# Patient Record
Sex: FEMALE | Race: WHITE | NOT HISPANIC OR LATINO | Employment: OTHER | URBAN - METROPOLITAN AREA
[De-identification: names, ages, dates, MRNs, and addresses within clinical notes are randomized per-mention and may not be internally consistent; named-entity substitution may affect disease eponyms.]

---

## 2017-05-04 RX ORDER — NADOLOL 80 MG/1
TABLET ORAL
Qty: 30 TABLET | Refills: 11 | Status: SHIPPED | OUTPATIENT
Start: 2017-05-04 | End: 2018-05-25 | Stop reason: SDUPTHER

## 2017-05-30 RX ORDER — SUMATRIPTAN SUCCINATE 100 MG/1
TABLET ORAL
Qty: 9 TABLET | Refills: 1 | Status: SHIPPED | OUTPATIENT
Start: 2017-05-30 | End: 2017-11-07 | Stop reason: SDUPTHER

## 2017-06-28 ENCOUNTER — TELEPHONE (OUTPATIENT)
Dept: FAMILY MEDICINE | Facility: CLINIC | Age: 77
End: 2017-06-28

## 2017-06-28 NOTE — TELEPHONE ENCOUNTER
Returned call. Spoke with patient. Patient stated that she got a letter for jury duty in and she wants to know if Dr. Scott will please excuse her from it due to the blood sugar attacks that she has. She states that she can not sit through jury duty that log. Informed would forward information to doctor. Patient verbalized understanding.

## 2017-06-28 NOTE — TELEPHONE ENCOUNTER
----- Message from Mitali Lou sent at 6/28/2017  9:36 AM CDT -----  Please call pt back at [phone] in regards to personal matter.

## 2017-06-29 RX ORDER — AMITRIPTYLINE HYDROCHLORIDE 75 MG/1
TABLET ORAL
Qty: 30 TABLET | Refills: 11 | Status: SHIPPED | OUTPATIENT
Start: 2017-06-29 | End: 2018-07-05 | Stop reason: SDUPTHER

## 2017-07-07 ENCOUNTER — OFFICE VISIT (OUTPATIENT)
Dept: FAMILY MEDICINE | Facility: CLINIC | Age: 77
End: 2017-07-07
Payer: MEDICARE

## 2017-07-07 VITALS
DIASTOLIC BLOOD PRESSURE: 80 MMHG | HEART RATE: 80 BPM | HEIGHT: 59 IN | SYSTOLIC BLOOD PRESSURE: 130 MMHG | BODY MASS INDEX: 25.23 KG/M2 | WEIGHT: 125.13 LBS | OXYGEN SATURATION: 96 % | TEMPERATURE: 97 F

## 2017-07-07 DIAGNOSIS — E16.2 LOW BLOOD SUGAR: Primary | ICD-10-CM

## 2017-07-07 PROCEDURE — 1159F MED LIST DOCD IN RCRD: CPT | Mod: S$GLB,,, | Performed by: FAMILY MEDICINE

## 2017-07-07 PROCEDURE — 1126F AMNT PAIN NOTED NONE PRSNT: CPT | Mod: S$GLB,,, | Performed by: FAMILY MEDICINE

## 2017-07-07 PROCEDURE — 99999 PR PBB SHADOW E&M-EST. PATIENT-LVL III: CPT | Mod: PBBFAC,,, | Performed by: FAMILY MEDICINE

## 2017-07-07 PROCEDURE — 99214 OFFICE O/P EST MOD 30 MIN: CPT | Mod: S$GLB,,, | Performed by: FAMILY MEDICINE

## 2017-07-07 RX ORDER — INSULIN PUMP SYRINGE, 3 ML
EACH MISCELLANEOUS
Qty: 1 EACH | Refills: 0 | Status: SHIPPED | OUTPATIENT
Start: 2017-07-07 | End: 2017-07-07 | Stop reason: SDUPTHER

## 2017-07-07 NOTE — LETTER
[facility]  [address]  [place] [address]  Phone: [phone]  Fax: [phone] July 7, 2017    Susietonyaicha RIVAS Mario  [address] [place] [address]      To Whom It May Concern:    Haim Martin is unable to participate in jury duty due to medical problems with her sugar.    If you have any questions or concerns, please feel free to call my office.    Sincerely,        Kavya Mesa MD

## 2017-07-07 NOTE — LETTER
[facility]  [address]  [place] [address]  Phone: [phone]  Fax: [phone] July 7, 2017    Haim Martin  [address] [place] [address]      To Whom It May Concern:    Haim Aguilarn is unable to participate in jury duty due to near syncopal episodes .    If you have any questions or concerns, please feel free to call my office.    Sincerely,              Kavya Mesa MD

## 2017-07-07 NOTE — PROGRESS NOTES
"Subjective:       Patient ID: Haim Martin is a 77 y.o. female.    Chief Complaint: jury duty excuse    77 y old female here to discuss "Low BS spells" . They occur 3-4 ties per months . She states she starts feeling dizzy , get blurry vision and has to eat something sweet , shortly after she must lay down otherwise she faints ? . She has not check bs when this occurs . She acknowledges being depressed and anxious  however these  Spells  don't resemble a panic attack. concerned since she was  appointed  To serve as a juror on 7/18/17       Review of Systems   Constitutional: Negative.    HENT: Negative.    Respiratory: Negative.    Cardiovascular: Negative.    Gastrointestinal: Negative.    Musculoskeletal: Negative.    Neurological: Positive for light-headedness.   Psychiatric/Behavioral: Positive for agitation.       Objective:      Physical Exam   Constitutional: She is oriented to person, place, and time. She appears well-developed and well-nourished. No distress.   HENT:   Head: Normocephalic and atraumatic.   Right Ear: External ear normal.   Left Ear: External ear normal.   Mouth/Throat: No oropharyngeal exudate.   Eyes: Conjunctivae and EOM are normal. Pupils are equal, round, and reactive to light. Right eye exhibits no discharge. Left eye exhibits no discharge. No scleral icterus.   Neck: Normal range of motion. Neck supple. No JVD present. No tracheal deviation present. No thyromegaly present.   Cardiovascular: Normal rate, regular rhythm and normal heart sounds.  Exam reveals no gallop and no friction rub.    No murmur heard.  Pulmonary/Chest: Effort normal and breath sounds normal. No stridor. No respiratory distress. She has no wheezes. She has no rales. She exhibits no tenderness.   Abdominal: Soft. Bowel sounds are normal. She exhibits no distension. There is no tenderness. There is no rebound and no guarding.   Musculoskeletal: Normal range of motion.   Lymphadenopathy:     She has no cervical " adenopathy.   Neurological: She is alert and oriented to person, place, and time.   Skin: Skin is warm and dry. She is not diaphoretic.   Psychiatric: She has a normal mood and affect. Her behavior is normal. Judgment and thought content normal.       Assessment:       1. Low blood sugar        Plan:     Haim was seen today for jury duty excuse.    Diagnoses and all orders for this visit:    Low blood sugar  -     Comprehensive metabolic panel; Future  -     C-peptide; Future  -     Insulin, random; Future  -     Hypoglycemic Agent Screen; Future  -     Cortisol, 8AM; Future  -     Insulin antibody; Future  -     Proinsulin; Future    Other orders  -     blood-glucose meter kit; Use as instructed  -     blood sugar diagnostic Strp; check bs as once daily  -     lancets-blood glucose strips 30 gauge Cmpk; 1 application by Misc.(Non-Drug; Combo Route) route once daily at 6am.     Will continue to follow up . Excuse for jury duty provided

## 2017-07-10 ENCOUNTER — LAB VISIT (OUTPATIENT)
Dept: LAB | Facility: HOSPITAL | Age: 77
End: 2017-07-10
Attending: FAMILY MEDICINE
Payer: MEDICARE

## 2017-07-10 DIAGNOSIS — E16.2 LOW BLOOD SUGAR: ICD-10-CM

## 2017-07-10 LAB
ALBUMIN SERPL BCP-MCNC: 3.4 G/DL
ALP SERPL-CCNC: 124 U/L
ALT SERPL W/O P-5'-P-CCNC: 31 U/L
ANION GAP SERPL CALC-SCNC: 11 MMOL/L
AST SERPL-CCNC: 35 U/L
BILIRUB SERPL-MCNC: 0.4 MG/DL
BUN SERPL-MCNC: 19 MG/DL
C PEPTIDE SERPL-MCNC: 2.44 NG/ML
CALCIUM SERPL-MCNC: 9.3 MG/DL
CHLORIDE SERPL-SCNC: 107 MMOL/L
CO2 SERPL-SCNC: 23 MMOL/L
CORTIS SERPL-MCNC: 15.4 UG/DL
CREAT SERPL-MCNC: 0.9 MG/DL
EST. GFR  (AFRICAN AMERICAN): >60 ML/MIN/1.73 M^2
EST. GFR  (NON AFRICAN AMERICAN): >60 ML/MIN/1.73 M^2
GLUCOSE SERPL-MCNC: 118 MG/DL
POTASSIUM SERPL-SCNC: 4.3 MMOL/L
PROT SERPL-MCNC: 6.9 G/DL
SODIUM SERPL-SCNC: 141 MMOL/L

## 2017-07-10 PROCEDURE — 80053 COMPREHEN METABOLIC PANEL: CPT

## 2017-07-10 PROCEDURE — 84206 ASSAY OF PROINSULIN: CPT

## 2017-07-10 PROCEDURE — 83525 ASSAY OF INSULIN: CPT

## 2017-07-10 PROCEDURE — 80307 DRUG TEST PRSMV CHEM ANLYZR: CPT

## 2017-07-10 PROCEDURE — 36415 COLL VENOUS BLD VENIPUNCTURE: CPT | Mod: PO

## 2017-07-10 PROCEDURE — 82533 TOTAL CORTISOL: CPT

## 2017-07-10 PROCEDURE — 84681 ASSAY OF C-PEPTIDE: CPT

## 2017-07-10 PROCEDURE — 86337 INSULIN ANTIBODIES: CPT

## 2017-07-10 RX ORDER — INSULIN PUMP SYRINGE, 3 ML
EACH MISCELLANEOUS
Qty: 1 EACH | Refills: 0 | Status: SHIPPED | OUTPATIENT
Start: 2017-07-10 | End: 2022-02-06

## 2017-07-11 ENCOUNTER — TELEPHONE (OUTPATIENT)
Dept: FAMILY MEDICINE | Facility: CLINIC | Age: 77
End: 2017-07-11

## 2017-07-11 DIAGNOSIS — R73.01 ELEVATED FASTING BLOOD SUGAR: Primary | ICD-10-CM

## 2017-07-11 LAB
INSULIN COLLECTION INTERVAL: NORMAL
INSULIN SERPL-ACNC: 11.3 UU/ML

## 2017-07-13 ENCOUNTER — TELEPHONE (OUTPATIENT)
Dept: FAMILY MEDICINE | Facility: CLINIC | Age: 77
End: 2017-07-13

## 2017-07-13 LAB — INSULIN AB SER-SCNC: 0 NMOL/L (ref 0–0.02)

## 2017-07-13 NOTE — TELEPHONE ENCOUNTER
----- Message from Dominique Benz sent at 7/13/2017 10:17 AM CDT -----  Contact: [facility]-[phone]  Would like to consult with nurse regarding additional information needed for the patient. She will need supported documentation for diabetic supplies in a diagnosis code and clinic notes. Please fax paperwork to [phone].Please call back at [phone].      Thanks,  Dominique Benz

## 2017-07-14 ENCOUNTER — LAB VISIT (OUTPATIENT)
Dept: LAB | Facility: HOSPITAL | Age: 77
End: 2017-07-14
Attending: FAMILY MEDICINE
Payer: MEDICARE

## 2017-07-14 DIAGNOSIS — R73.01 ELEVATED FASTING BLOOD SUGAR: ICD-10-CM

## 2017-07-14 LAB
ACETOHEXAMIDE SERPL-MCNC: NEGATIVE NG/ML
ANNOTATION COMMENT IMP: NORMAL
CHLORPROPAMIDE SERPL-MCNC: NEGATIVE NG/ML
ESTIMATED AVG GLUCOSE: 111 MG/DL
GLIMEPIRIDE SERPL-MCNC: NEGATIVE NG/ML
GLIPIZIDE SERPL-MCNC: NEGATIVE NG/ML
GLYBURIDE SERPL-MCNC: NEGATIVE NG/ML
HBA1C MFR BLD HPLC: 5.5 %
PROINSULIN SERPL-SCNC: 11 PMOL/L (ref 3–20)
REPAGLINIDE SERPL-MCNC: NEGATIVE NG/ML
TOLAZAMIDE SERPL-MCNC: NORMAL NG/ML
TOLBUTAMIDE SERPL-MCNC: NEGATIVE NG/ML

## 2017-07-14 PROCEDURE — 36415 COLL VENOUS BLD VENIPUNCTURE: CPT | Mod: PO

## 2017-07-14 PROCEDURE — 83036 HEMOGLOBIN GLYCOSYLATED A1C: CPT

## 2017-07-31 DIAGNOSIS — E03.9 ACQUIRED HYPOTHYROIDISM: Primary | ICD-10-CM

## 2017-07-31 RX ORDER — LEVOTHYROXINE SODIUM 75 UG/1
TABLET ORAL
Qty: 30 TABLET | Refills: 11 | Status: SHIPPED | OUTPATIENT
Start: 2017-07-31 | End: 2018-08-14 | Stop reason: SDUPTHER

## 2017-07-31 NOTE — TELEPHONE ENCOUNTER
Spoke with patient and informed her that she needs to come in for labs and her prescription for thyroid has been sent to the pharmacist.

## 2017-08-02 ENCOUNTER — LAB VISIT (OUTPATIENT)
Dept: LAB | Facility: HOSPITAL | Age: 77
End: 2017-08-02
Attending: NURSE PRACTITIONER
Payer: MEDICARE

## 2017-08-02 DIAGNOSIS — E03.9 ACQUIRED HYPOTHYROIDISM: ICD-10-CM

## 2017-08-02 LAB — TSH SERPL DL<=0.005 MIU/L-ACNC: 0.86 UIU/ML

## 2017-08-02 PROCEDURE — 36415 COLL VENOUS BLD VENIPUNCTURE: CPT | Mod: PO

## 2017-08-02 PROCEDURE — 84443 ASSAY THYROID STIM HORMONE: CPT

## 2017-08-28 DIAGNOSIS — E78.5 OTHER AND UNSPECIFIED HYPERLIPIDEMIA: ICD-10-CM

## 2017-08-28 RX ORDER — ATORVASTATIN CALCIUM 40 MG/1
TABLET, FILM COATED ORAL
Qty: 30 TABLET | Refills: 2 | Status: SHIPPED | OUTPATIENT
Start: 2017-08-28 | End: 2017-11-26 | Stop reason: SDUPTHER

## 2017-10-18 ENCOUNTER — TELEPHONE (OUTPATIENT)
Dept: FAMILY MEDICINE | Facility: CLINIC | Age: 77
End: 2017-10-18

## 2017-10-18 DIAGNOSIS — Z12.39 BREAST CANCER SCREENING: Primary | ICD-10-CM

## 2017-10-18 NOTE — TELEPHONE ENCOUNTER
Returned call and spoke with patient. She states that she received a letter from [facility] that stated that it was time for her mammogram and she wanted to schedule that. Informed would forward info to provider to get order and call her back with an appt.

## 2017-10-18 NOTE — TELEPHONE ENCOUNTER
Called patient to informed of mammo appt day and time. No answer. Left detailed message for patient.

## 2017-10-18 NOTE — TELEPHONE ENCOUNTER
----- Message from Dana Ramirez sent at 10/18/2017  1:46 PM CDT -----  Contact: self [phone]  States that she needs order for mammogram. Please call back at [phone]//thank you acc

## 2017-10-27 RX ORDER — SERTRALINE HYDROCHLORIDE 100 MG/1
TABLET, FILM COATED ORAL
Qty: 30 TABLET | Refills: 6 | Status: SHIPPED | OUTPATIENT
Start: 2017-10-27 | End: 2018-05-08 | Stop reason: SDUPTHER

## 2017-11-08 RX ORDER — SUMATRIPTAN SUCCINATE 100 MG/1
TABLET ORAL
Qty: 9 TABLET | Refills: 1 | Status: SHIPPED | OUTPATIENT
Start: 2017-11-08 | End: 2018-04-18 | Stop reason: SDUPTHER

## 2017-11-29 RX ORDER — ATORVASTATIN CALCIUM 40 MG/1
TABLET, FILM COATED ORAL
Qty: 30 TABLET | Refills: 2 | Status: SHIPPED | OUTPATIENT
Start: 2017-11-29 | End: 2018-02-02 | Stop reason: SDUPTHER

## 2018-02-02 RX ORDER — ATORVASTATIN CALCIUM 40 MG/1
TABLET, FILM COATED ORAL
Qty: 30 TABLET | Refills: 2 | Status: SHIPPED | OUTPATIENT
Start: 2018-02-02 | End: 2018-08-14 | Stop reason: SDUPTHER

## 2018-04-12 RX ORDER — SUMATRIPTAN SUCCINATE 100 MG/1
TABLET ORAL
Qty: 9 TABLET | Refills: 1 | Status: CANCELLED | OUTPATIENT
Start: 2018-04-12

## 2018-04-19 RX ORDER — SUMATRIPTAN SUCCINATE 100 MG/1
TABLET ORAL
Qty: 9 TABLET | Refills: 1 | Status: SHIPPED | OUTPATIENT
Start: 2018-04-19 | End: 2018-12-14 | Stop reason: SDUPTHER

## 2018-05-08 RX ORDER — SERTRALINE HYDROCHLORIDE 100 MG/1
TABLET, FILM COATED ORAL
Qty: 30 TABLET | Refills: 6 | Status: SHIPPED | OUTPATIENT
Start: 2018-05-08 | End: 2019-01-26 | Stop reason: SDUPTHER

## 2018-05-08 RX ORDER — NADOLOL 80 MG/1
TABLET ORAL
Qty: 30 TABLET | Refills: 11 | OUTPATIENT
Start: 2018-05-08

## 2018-05-25 RX ORDER — NADOLOL 80 MG/1
TABLET ORAL
Qty: 30 TABLET | Refills: 11 | OUTPATIENT
Start: 2018-05-25

## 2018-05-25 NOTE — TELEPHONE ENCOUNTER
----- Message from Cinthya Araujo sent at 5/25/2018  2:12 PM CDT -----  Contact: patient  Calling concerning a request for a RX refill. Please call patient @ [phone]. Thanks, be

## 2018-05-28 RX ORDER — NADOLOL 80 MG/1
40 TABLET ORAL DAILY
Qty: 30 TABLET | Refills: 0 | Status: SHIPPED | OUTPATIENT
Start: 2018-05-28 | End: 2018-06-01 | Stop reason: SDUPTHER

## 2018-05-31 ENCOUNTER — TELEPHONE (OUTPATIENT)
Dept: FAMILY MEDICINE | Facility: CLINIC | Age: 78
End: 2018-05-31

## 2018-06-01 RX ORDER — NADOLOL 80 MG/1
TABLET ORAL
Qty: 30 TABLET | Refills: 3 | Status: SHIPPED | OUTPATIENT
Start: 2018-06-01 | End: 2019-02-06 | Stop reason: SDUPTHER

## 2018-06-12 ENCOUNTER — HOSPITAL ENCOUNTER (OUTPATIENT)
Dept: RADIOLOGY | Facility: HOSPITAL | Age: 78
Discharge: HOME | End: 2018-06-12
Attending: FAMILY MEDICINE
Payer: MEDICARE

## 2018-06-12 DIAGNOSIS — Z12.39 BREAST CANCER SCREENING: ICD-10-CM

## 2018-06-12 PROCEDURE — 77063 BREAST TOMOSYNTHESIS BI: CPT | Mod: 26,,, | Performed by: RADIOLOGY

## 2018-06-12 PROCEDURE — 77067 SCR MAMMO BI INCL CAD: CPT | Mod: TC

## 2018-06-12 PROCEDURE — 77067 SCR MAMMO BI INCL CAD: CPT | Mod: 26,,, | Performed by: RADIOLOGY

## 2018-07-09 RX ORDER — AMITRIPTYLINE HYDROCHLORIDE 75 MG/1
TABLET ORAL
Qty: 90 TABLET | Refills: 0 | Status: SHIPPED | OUTPATIENT
Start: 2018-07-09 | End: 2018-07-14 | Stop reason: SDUPTHER

## 2018-07-16 RX ORDER — BLOOD SUGAR DIAGNOSTIC
STRIP MISCELLANEOUS
Qty: 50 STRIP | Refills: 1 | Status: SHIPPED | OUTPATIENT
Start: 2018-07-16 | End: 2022-02-06

## 2018-07-16 RX ORDER — AMITRIPTYLINE HYDROCHLORIDE 75 MG/1
TABLET ORAL
Qty: 30 TABLET | Refills: 0 | Status: SHIPPED | OUTPATIENT
Start: 2018-07-16 | End: 2018-11-11 | Stop reason: SDUPTHER

## 2018-08-14 RX ORDER — ATORVASTATIN CALCIUM 40 MG/1
TABLET, FILM COATED ORAL
Qty: 30 TABLET | Refills: 0 | Status: SHIPPED | OUTPATIENT
Start: 2018-08-14 | End: 2018-10-05 | Stop reason: SDUPTHER

## 2018-08-14 RX ORDER — LEVOTHYROXINE SODIUM 75 UG/1
TABLET ORAL
Qty: 30 TABLET | Refills: 0 | Status: SHIPPED | OUTPATIENT
Start: 2018-08-14 | End: 2018-09-28 | Stop reason: SDUPTHER

## 2018-08-14 NOTE — TELEPHONE ENCOUNTER
1 month supply of medication sent to pharmacy.  Pt is due for annual physical with labs.  Please schedule and come fasting.  No meds will be refilled until appt with labs

## 2018-08-20 ENCOUNTER — LAB VISIT (OUTPATIENT)
Dept: LAB | Facility: HOSPITAL | Age: 78
End: 2018-08-20
Attending: FAMILY MEDICINE
Payer: MEDICARE

## 2018-08-20 ENCOUNTER — OFFICE VISIT (OUTPATIENT)
Dept: FAMILY MEDICINE | Facility: CLINIC | Age: 78
End: 2018-08-20
Payer: MEDICARE

## 2018-08-20 VITALS
TEMPERATURE: 97 F | DIASTOLIC BLOOD PRESSURE: 86 MMHG | WEIGHT: 130.81 LBS | BODY MASS INDEX: 26.37 KG/M2 | HEART RATE: 90 BPM | HEIGHT: 59 IN | SYSTOLIC BLOOD PRESSURE: 132 MMHG | OXYGEN SATURATION: 98 %

## 2018-08-20 DIAGNOSIS — E78.5 DYSLIPIDEMIA: ICD-10-CM

## 2018-08-20 DIAGNOSIS — E03.9 HYPOTHYROIDISM, UNSPECIFIED TYPE: ICD-10-CM

## 2018-08-20 DIAGNOSIS — R79.9 ABNORMAL FINDING OF BLOOD CHEMISTRY: ICD-10-CM

## 2018-08-20 DIAGNOSIS — E03.9 HYPOTHYROIDISM, UNSPECIFIED TYPE: Primary | ICD-10-CM

## 2018-08-20 DIAGNOSIS — M85.80 OSTEOPENIA, UNSPECIFIED LOCATION: ICD-10-CM

## 2018-08-20 DIAGNOSIS — F32.4 MAJOR DEPRESSIVE DISORDER WITH SINGLE EPISODE, IN PARTIAL REMISSION: ICD-10-CM

## 2018-08-20 DIAGNOSIS — M94.9 DISORDER OF CARTILAGE: ICD-10-CM

## 2018-08-20 LAB
ALBUMIN SERPL BCP-MCNC: 3.7 G/DL
ALP SERPL-CCNC: 145 U/L
ALT SERPL W/O P-5'-P-CCNC: 34 U/L
ANION GAP SERPL CALC-SCNC: 8 MMOL/L
AST SERPL-CCNC: 34 U/L
BASOPHILS # BLD AUTO: 0.08 K/UL
BASOPHILS NFR BLD: 1.6 %
BILIRUB SERPL-MCNC: 0.6 MG/DL
BUN SERPL-MCNC: 17 MG/DL
CALCIUM SERPL-MCNC: 9.7 MG/DL
CHLORIDE SERPL-SCNC: 105 MMOL/L
CHOLEST SERPL-MCNC: 122 MG/DL
CHOLEST/HDLC SERPL: 2.1 {RATIO}
CO2 SERPL-SCNC: 28 MMOL/L
CREAT SERPL-MCNC: 0.9 MG/DL
DIFFERENTIAL METHOD: ABNORMAL
EOSINOPHIL # BLD AUTO: 0.3 K/UL
EOSINOPHIL NFR BLD: 5.2 %
ERYTHROCYTE [DISTWIDTH] IN BLOOD BY AUTOMATED COUNT: 13.8 %
EST. GFR  (AFRICAN AMERICAN): >60 ML/MIN/1.73 M^2
EST. GFR  (NON AFRICAN AMERICAN): >60 ML/MIN/1.73 M^2
ESTIMATED AVG GLUCOSE: 111 MG/DL
GLUCOSE SERPL-MCNC: 115 MG/DL
HBA1C MFR BLD HPLC: 5.5 %
HCT VFR BLD AUTO: 43.3 %
HDLC SERPL-MCNC: 57 MG/DL
HDLC SERPL: 46.7 %
HGB BLD-MCNC: 13.4 G/DL
IMM GRANULOCYTES # BLD AUTO: 0.01 K/UL
IMM GRANULOCYTES NFR BLD AUTO: 0.2 %
LDLC SERPL CALC-MCNC: 52.6 MG/DL
LYMPHOCYTES # BLD AUTO: 1.1 K/UL
LYMPHOCYTES NFR BLD: 22.8 %
MCH RBC QN AUTO: 32.2 PG
MCHC RBC AUTO-ENTMCNC: 30.9 G/DL
MCV RBC AUTO: 104 FL
MONOCYTES # BLD AUTO: 0.5 K/UL
MONOCYTES NFR BLD: 10.8 %
NEUTROPHILS # BLD AUTO: 3 K/UL
NEUTROPHILS NFR BLD: 59.4 %
NONHDLC SERPL-MCNC: 65 MG/DL
NRBC BLD-RTO: 0 /100 WBC
PLATELET # BLD AUTO: 206 K/UL
PMV BLD AUTO: 12.4 FL
POTASSIUM SERPL-SCNC: 4.1 MMOL/L
PROT SERPL-MCNC: 7 G/DL
RBC # BLD AUTO: 4.16 M/UL
SODIUM SERPL-SCNC: 141 MMOL/L
TRIGL SERPL-MCNC: 62 MG/DL
TSH SERPL DL<=0.005 MIU/L-ACNC: 1.65 UIU/ML
WBC # BLD AUTO: 5.01 K/UL

## 2018-08-20 PROCEDURE — 80053 COMPREHEN METABOLIC PANEL: CPT

## 2018-08-20 PROCEDURE — 85025 COMPLETE CBC W/AUTO DIFF WBC: CPT

## 2018-08-20 PROCEDURE — 99999 PR PBB SHADOW E&M-EST. PATIENT-LVL III: CPT | Mod: PBBFAC,,, | Performed by: FAMILY MEDICINE

## 2018-08-20 PROCEDURE — 84443 ASSAY THYROID STIM HORMONE: CPT

## 2018-08-20 PROCEDURE — 80061 LIPID PANEL: CPT

## 2018-08-20 PROCEDURE — 83036 HEMOGLOBIN GLYCOSYLATED A1C: CPT

## 2018-08-20 PROCEDURE — 99214 OFFICE O/P EST MOD 30 MIN: CPT | Mod: S$GLB,,, | Performed by: FAMILY MEDICINE

## 2018-08-20 PROCEDURE — 36415 COLL VENOUS BLD VENIPUNCTURE: CPT | Mod: PO

## 2018-08-20 NOTE — PROGRESS NOTES
Subjective:       Patient ID: Haim Martin is a 78 y.o. female.    Chief Complaint: Annual Exam    78 y old female with hypothyroidism, osteopenia , depression , migraines and lumbar DDD here for f.u . Doing fair Still mourns her late . No recent falls. On multivitamins. Compliant with Levothyroxine . No constipation , dry skin etc . Gets migraines once monthly   Abortive treatment  works well . 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    HENT: Negative.    Eyes: Negative.    Respiratory: Negative.    Cardiovascular: Negative.    Gastrointestinal: Negative.    Genitourinary: Negative.    Musculoskeletal: Negative.    Skin: Negative.    Hematological: Negative.        Objective:      Physical Exam   Constitutional: She is oriented to person, place, and time. She appears well-developed and well-nourished. No distress.   HENT:   Head: Normocephalic and atraumatic.   Right Ear: External ear normal.   Left Ear: External ear normal.   Mouth/Throat: No oropharyngeal exudate.   Eyes: Conjunctivae and EOM are normal. Pupils are equal, round, and reactive to light. Right eye exhibits no discharge. Left eye exhibits no discharge. No scleral icterus.   Neck: Normal range of motion. Neck supple. No JVD present. No tracheal deviation present. No thyromegaly present.   Cardiovascular: Normal rate, regular rhythm and normal heart sounds. Exam reveals no gallop and no friction rub.   No murmur heard.  Pulmonary/Chest: Effort normal and breath sounds normal. No stridor. No respiratory distress. She has no wheezes. She has no rales. She exhibits no tenderness.   Abdominal: Soft. Bowel sounds are normal. She exhibits no distension. There is no tenderness. There is no rebound and no guarding.   Musculoskeletal: Normal range of motion.   Lymphadenopathy:     She has no cervical adenopathy.   Neurological: She is alert and oriented to person, place, and time.   Skin:  Skin is warm and dry. She is not diaphoretic.   Psychiatric: She has a normal mood and affect. Her behavior is normal. Judgment and thought content normal.       Assessment:         Haim was seen today for annual exam.    Diagnoses and all orders for this visit:    Hypothyroidism, unspecified type  -     TSH; Future    Dyslipidemia  -     CBC auto differential; Future  -     Comprehensive metabolic panel; Future  -     Hemoglobin A1c; Future  -     Lipid panel; Future    Abnormal finding of blood chemistry   -     Hemoglobin A1c; Future    Osteopenia, unspecified location  -     DXA Bone Density Spine And Hip; Future    Disorder of cartilage   -     DXA Bone Density Spine And Hip; Future    Major depressive disorder with single episode, in partial remission      Plan:     Haim was seen today for annual exam.    Diagnoses and all orders for this visit:    Hypothyroidism, unspecified type  -     TSH; Future    Dyslipidemia  -     CBC auto differential; Future  -     Comprehensive metabolic panel; Future  -     Hemoglobin A1c; Future  -     Lipid panel; Future    Abnormal finding of blood chemistry   -     Hemoglobin A1c; Future    Osteopenia, unspecified location  -     DXA Bone Density Spine And Hip; Future    Disorder of cartilage   -     DXA Bone Density Spine And Hip; Future     Labs   Diet and ex   Rep DEXA   Stable . Cont med

## 2018-08-21 ENCOUNTER — APPOINTMENT (OUTPATIENT)
Dept: RADIOLOGY | Facility: CLINIC | Age: 78
End: 2018-08-21
Attending: FAMILY MEDICINE
Payer: MEDICARE

## 2018-08-21 DIAGNOSIS — M94.9 DISORDER OF CARTILAGE: ICD-10-CM

## 2018-08-21 DIAGNOSIS — M85.80 OSTEOPENIA, UNSPECIFIED LOCATION: ICD-10-CM

## 2018-08-21 PROCEDURE — 77080 DXA BONE DENSITY AXIAL: CPT | Mod: 26,,, | Performed by: RADIOLOGY

## 2018-08-21 PROCEDURE — 77080 DXA BONE DENSITY AXIAL: CPT | Mod: TC

## 2018-08-29 ENCOUNTER — TELEPHONE (OUTPATIENT)
Dept: FAMILY MEDICINE | Facility: CLINIC | Age: 78
End: 2018-08-29

## 2018-08-29 NOTE — TELEPHONE ENCOUNTER
Spoke with patient and informed her of lab results, patient aware that she has an appt to come into office tomorrow to discuss lab results.

## 2018-08-29 NOTE — TELEPHONE ENCOUNTER
----- Message from Sheila Choudhury sent at 8/29/2018  2:24 PM CDT -----  Contact: self [phone]  Returning a call. Please call back at [phone]. Elia Cam

## 2018-08-30 ENCOUNTER — OFFICE VISIT (OUTPATIENT)
Dept: FAMILY MEDICINE | Facility: CLINIC | Age: 78
End: 2018-08-30
Payer: MEDICARE

## 2018-08-30 ENCOUNTER — LAB VISIT (OUTPATIENT)
Dept: LAB | Facility: HOSPITAL | Age: 78
End: 2018-08-30
Attending: FAMILY MEDICINE
Payer: MEDICARE

## 2018-08-30 VITALS
HEART RATE: 90 BPM | BODY MASS INDEX: 26.56 KG/M2 | OXYGEN SATURATION: 99 % | HEIGHT: 59 IN | DIASTOLIC BLOOD PRESSURE: 76 MMHG | TEMPERATURE: 97 F | SYSTOLIC BLOOD PRESSURE: 138 MMHG | WEIGHT: 131.75 LBS

## 2018-08-30 DIAGNOSIS — M85.80 OSTEOPENIA, UNSPECIFIED LOCATION: ICD-10-CM

## 2018-08-30 DIAGNOSIS — M85.80 OSTEOPENIA, UNSPECIFIED LOCATION: Primary | ICD-10-CM

## 2018-08-30 DIAGNOSIS — R74.8 ELEVATED ALKALINE PHOSPHATASE LEVEL: ICD-10-CM

## 2018-08-30 LAB
25(OH)D3+25(OH)D2 SERPL-MCNC: 20 NG/ML
GGT SERPL-CCNC: 24 U/L

## 2018-08-30 PROCEDURE — 82306 VITAMIN D 25 HYDROXY: CPT

## 2018-08-30 PROCEDURE — 99999 PR PBB SHADOW E&M-EST. PATIENT-LVL III: CPT | Mod: PBBFAC,,, | Performed by: FAMILY MEDICINE

## 2018-08-30 PROCEDURE — 99214 OFFICE O/P EST MOD 30 MIN: CPT | Mod: S$GLB,,, | Performed by: FAMILY MEDICINE

## 2018-08-30 PROCEDURE — 82977 ASSAY OF GGT: CPT

## 2018-08-30 PROCEDURE — 36415 COLL VENOUS BLD VENIPUNCTURE: CPT | Mod: PO

## 2018-08-30 RX ORDER — ALENDRONATE SODIUM 70 MG/1
70 TABLET ORAL
Qty: 12 TABLET | Refills: 0 | Status: SHIPPED | OUTPATIENT
Start: 2018-08-30 | End: 2019-05-16

## 2018-08-30 NOTE — PROGRESS NOTES
Subjective:       Patient ID: Haim Matrin is a 78 y.o. female.    Chief Complaint: Follow-up    78 y old female whose recent DEXA  Showed O omar with a 3.3% risk of hip fracture . Stays active in her yard .  On Vit D . No fractures. She was also noted to have alk albert elevation . She doesn't drink etoh , no jaundice , ab pain etc       Review of Systems   Constitutional: Negative.    HENT: Negative.    Eyes: Negative.    Respiratory: Negative.    Cardiovascular: Negative.    Gastrointestinal: Negative.    Genitourinary: Negative.    Musculoskeletal: Negative.    Skin: Negative.    Hematological: Negative.        Objective:      Physical Exam   Constitutional: She is oriented to person, place, and time. She appears well-developed and well-nourished. No distress.   HENT:   Head: Normocephalic and atraumatic.   Right Ear: External ear normal.   Left Ear: External ear normal.   Mouth/Throat: No oropharyngeal exudate.   Eyes: Conjunctivae and EOM are normal. Pupils are equal, round, and reactive to light. Right eye exhibits no discharge. Left eye exhibits no discharge. No scleral icterus.   Neck: Normal range of motion. Neck supple. No JVD present. No tracheal deviation present. No thyromegaly present.   Cardiovascular: Normal rate, regular rhythm and normal heart sounds. Exam reveals no gallop and no friction rub.   No murmur heard.  Pulmonary/Chest: Effort normal and breath sounds normal. No stridor. No respiratory distress. She has no wheezes. She has no rales. She exhibits no tenderness.   Abdominal: Soft. Bowel sounds are normal. She exhibits no distension. There is no tenderness. There is no rebound and no guarding.   Musculoskeletal: Normal range of motion.   Lymphadenopathy:     She has no cervical adenopathy.   Neurological: She is alert and oriented to person, place, and time.   Skin: Skin is warm and dry. She is not diaphoretic.   Psychiatric: She has a normal mood and affect. Her behavior is normal.  Judgment and thought content normal.       Assessment:       1. Osteopenia, unspecified location    2. Elevated alkaline phosphatase level        Plan:     Haim was seen today for follow-up.    Diagnoses and all orders for this visit:    Osteopenia, unspecified location  -     Vitamin D; Future    Elevated alkaline phosphatase level  -     Gamma GT; Future     Haim was seen today for follow-up.    Diagnoses and all orders for this visit:    Osteopenia, unspecified location  -     Vitamin D; Future    Elevated alkaline phosphatase level  -     Gamma GT; Future    Other orders  -     alendronate (FOSAMAX) 70 MG tablet; Take 1 tablet (70 mg total) by mouth every 7 days.    Fosamax. SE discussed. Exercise !  GGT

## 2018-08-31 ENCOUNTER — TELEPHONE (OUTPATIENT)
Dept: FAMILY MEDICINE | Facility: CLINIC | Age: 78
End: 2018-08-31

## 2018-08-31 RX ORDER — ERGOCALCIFEROL 1.25 MG/1
50000 CAPSULE ORAL
Qty: 8 CAPSULE | Refills: 0 | Status: SHIPPED | OUTPATIENT
Start: 2018-08-31 | End: 2018-10-05 | Stop reason: SDUPTHER

## 2018-09-28 RX ORDER — LEVOTHYROXINE SODIUM 75 UG/1
TABLET ORAL
Qty: 90 TABLET | Refills: 0 | Status: SHIPPED | OUTPATIENT
Start: 2018-09-28 | End: 2018-12-23 | Stop reason: SDUPTHER

## 2018-10-05 RX ORDER — ATORVASTATIN CALCIUM 40 MG/1
TABLET, FILM COATED ORAL
Qty: 90 TABLET | Refills: 0 | Status: SHIPPED | OUTPATIENT
Start: 2018-10-05 | End: 2019-01-02 | Stop reason: SDUPTHER

## 2018-10-08 RX ORDER — ERGOCALCIFEROL 1.25 MG/1
CAPSULE ORAL
Qty: 12 CAPSULE | Refills: 0 | Status: SHIPPED | OUTPATIENT
Start: 2018-10-08 | End: 2019-05-16

## 2018-11-12 RX ORDER — AMITRIPTYLINE HYDROCHLORIDE 75 MG/1
TABLET ORAL
Qty: 90 TABLET | Refills: 0 | Status: SHIPPED | OUTPATIENT
Start: 2018-11-12 | End: 2019-02-13 | Stop reason: SDUPTHER

## 2018-12-14 RX ORDER — SUMATRIPTAN SUCCINATE 100 MG/1
TABLET ORAL
Qty: 9 TABLET | Refills: 0 | Status: SHIPPED | OUTPATIENT
Start: 2018-12-14 | End: 2019-03-14 | Stop reason: SDUPTHER

## 2018-12-26 RX ORDER — LEVOTHYROXINE SODIUM 75 UG/1
TABLET ORAL
Qty: 96 TABLET | Refills: 0 | Status: SHIPPED | OUTPATIENT
Start: 2018-12-26 | End: 2019-03-18 | Stop reason: ALTCHOICE

## 2019-01-02 RX ORDER — ATORVASTATIN CALCIUM 40 MG/1
TABLET, FILM COATED ORAL
Qty: 90 TABLET | Refills: 0 | Status: SHIPPED | OUTPATIENT
Start: 2019-01-02 | End: 2019-04-03 | Stop reason: SDUPTHER

## 2019-01-28 RX ORDER — SERTRALINE HYDROCHLORIDE 100 MG/1
TABLET, FILM COATED ORAL
Qty: 90 TABLET | Refills: 0 | Status: SHIPPED | OUTPATIENT
Start: 2019-01-28 | End: 2019-05-06 | Stop reason: SDUPTHER

## 2019-02-06 RX ORDER — NADOLOL 80 MG/1
TABLET ORAL
Qty: 30 TABLET | Refills: 3 | Status: SHIPPED | OUTPATIENT
Start: 2019-02-06 | End: 2019-11-02 | Stop reason: SDUPTHER

## 2019-02-13 RX ORDER — AMITRIPTYLINE HYDROCHLORIDE 75 MG/1
TABLET ORAL
Qty: 90 TABLET | Refills: 0 | Status: SHIPPED | OUTPATIENT
Start: 2019-02-13 | End: 2019-05-20 | Stop reason: SDUPTHER

## 2019-03-14 RX ORDER — SUMATRIPTAN SUCCINATE 100 MG/1
TABLET ORAL
Qty: 9 TABLET | Refills: 0 | Status: SHIPPED | OUTPATIENT
Start: 2019-03-14 | End: 2019-05-16

## 2019-03-18 RX ORDER — LEVOTHYROXINE SODIUM 75 UG/1
TABLET ORAL
Qty: 96 TABLET | Refills: 0 | Status: SHIPPED | OUTPATIENT
Start: 2019-03-18 | End: 2019-06-14 | Stop reason: SDUPTHER

## 2019-04-03 RX ORDER — ATORVASTATIN CALCIUM 40 MG/1
TABLET, FILM COATED ORAL
Qty: 90 TABLET | Refills: 0 | Status: SHIPPED | OUTPATIENT
Start: 2019-04-03 | End: 2019-07-08 | Stop reason: SDUPTHER

## 2019-05-03 ENCOUNTER — TELEPHONE (OUTPATIENT)
Dept: FAMILY MEDICINE | Facility: CLINIC | Age: 79
End: 2019-05-03

## 2019-05-03 NOTE — TELEPHONE ENCOUNTER
----- Message from Gray Palencia sent at 5/3/2019  1:06 PM CDT -----  Pt is here and needs to talk to someone abt her med sumatriptan,she states  the directions is written wrong

## 2019-05-06 RX ORDER — SERTRALINE HYDROCHLORIDE 100 MG/1
TABLET, FILM COATED ORAL
Qty: 90 TABLET | Refills: 0 | Status: SHIPPED | OUTPATIENT
Start: 2019-05-06 | End: 2019-08-09 | Stop reason: SDUPTHER

## 2019-05-16 ENCOUNTER — OFFICE VISIT (OUTPATIENT)
Dept: FAMILY MEDICINE | Facility: CLINIC | Age: 79
End: 2019-05-16
Attending: FAMILY MEDICINE
Payer: MEDICARE

## 2019-05-16 VITALS
DIASTOLIC BLOOD PRESSURE: 74 MMHG | HEIGHT: 59 IN | SYSTOLIC BLOOD PRESSURE: 124 MMHG | OXYGEN SATURATION: 98 % | WEIGHT: 128.88 LBS | HEART RATE: 84 BPM | TEMPERATURE: 98 F | BODY MASS INDEX: 25.98 KG/M2

## 2019-05-16 DIAGNOSIS — E78.5 DYSLIPIDEMIA: ICD-10-CM

## 2019-05-16 DIAGNOSIS — R30.0 DYSURIA: ICD-10-CM

## 2019-05-16 DIAGNOSIS — G43.809 OTHER MIGRAINE WITHOUT STATUS MIGRAINOSUS, NOT INTRACTABLE: ICD-10-CM

## 2019-05-16 DIAGNOSIS — E03.9 HYPOTHYROIDISM, UNSPECIFIED TYPE: Primary | ICD-10-CM

## 2019-05-16 DIAGNOSIS — R79.9 ABNORMAL FINDING OF BLOOD CHEMISTRY: ICD-10-CM

## 2019-05-16 DIAGNOSIS — F32.5 MAJOR DEPRESSIVE DISORDER WITH SINGLE EPISODE, IN FULL REMISSION: ICD-10-CM

## 2019-05-16 DIAGNOSIS — M85.80 OSTEOPENIA, UNSPECIFIED LOCATION: ICD-10-CM

## 2019-05-16 PROBLEM — F32.4 MAJOR DEPRESSIVE DISORDER WITH SINGLE EPISODE, IN PARTIAL REMISSION: Status: ACTIVE | Noted: 2019-05-16

## 2019-05-16 PROCEDURE — 90732 PPSV23 VACC 2 YRS+ SUBQ/IM: CPT | Mod: S$GLB,,, | Performed by: FAMILY MEDICINE

## 2019-05-16 PROCEDURE — G0009 ADMIN PNEUMOCOCCAL VACCINE: HCPCS | Mod: S$GLB,,, | Performed by: FAMILY MEDICINE

## 2019-05-16 PROCEDURE — 1101F PR PT FALLS ASSESS DOC 0-1 FALLS W/OUT INJ PAST YR: ICD-10-PCS | Mod: CPTII,S$GLB,, | Performed by: FAMILY MEDICINE

## 2019-05-16 PROCEDURE — G0009 PNEUMOCOCCAL POLYSACCHARIDE VACCINE 23-VALENT =>2YO SQ IM: ICD-10-PCS | Mod: S$GLB,,, | Performed by: FAMILY MEDICINE

## 2019-05-16 PROCEDURE — 99499 RISK ADDL DX/OHS AUDIT: ICD-10-PCS | Mod: S$GLB,,, | Performed by: FAMILY MEDICINE

## 2019-05-16 PROCEDURE — 99999 PR PBB SHADOW E&M-EST. PATIENT-LVL III: CPT | Mod: PBBFAC,,, | Performed by: FAMILY MEDICINE

## 2019-05-16 PROCEDURE — 99499 UNLISTED E&M SERVICE: CPT | Mod: S$GLB,,, | Performed by: FAMILY MEDICINE

## 2019-05-16 PROCEDURE — 99214 OFFICE O/P EST MOD 30 MIN: CPT | Mod: 25,S$GLB,, | Performed by: FAMILY MEDICINE

## 2019-05-16 PROCEDURE — 1101F PT FALLS ASSESS-DOCD LE1/YR: CPT | Mod: CPTII,S$GLB,, | Performed by: FAMILY MEDICINE

## 2019-05-16 PROCEDURE — 99999 PR PBB SHADOW E&M-EST. PATIENT-LVL III: ICD-10-PCS | Mod: PBBFAC,,, | Performed by: FAMILY MEDICINE

## 2019-05-16 PROCEDURE — 99214 PR OFFICE/OUTPT VISIT, EST, LEVL IV, 30-39 MIN: ICD-10-PCS | Mod: 25,S$GLB,, | Performed by: FAMILY MEDICINE

## 2019-05-16 PROCEDURE — 90732 PNEUMOCOCCAL POLYSACCHARIDE VACCINE 23-VALENT =>2YO SQ IM: ICD-10-PCS | Mod: S$GLB,,, | Performed by: FAMILY MEDICINE

## 2019-05-16 RX ORDER — SULFAMETHOXAZOLE AND TRIMETHOPRIM 800; 160 MG/1; MG/1
1 TABLET ORAL 2 TIMES DAILY
Qty: 14 TABLET | Refills: 0 | Status: SHIPPED | OUTPATIENT
Start: 2019-05-16 | End: 2019-10-07

## 2019-05-16 RX ORDER — ALENDRONATE SODIUM 70 MG/1
70 TABLET ORAL
Qty: 12 TABLET | Refills: 5 | Status: SHIPPED | OUTPATIENT
Start: 2019-05-16 | End: 2020-06-18 | Stop reason: SDUPTHER

## 2019-05-16 RX ORDER — SUMATRIPTAN SUCCINATE 100 MG/1
TABLET ORAL
Qty: 9 TABLET | Refills: 0 | Status: SHIPPED | OUTPATIENT
Start: 2019-05-16 | End: 2019-12-16 | Stop reason: SDUPTHER

## 2019-05-16 NOTE — PROGRESS NOTES
Subjective:       Patient ID: Haim Martin is a 79 y.o. female.    Chief Complaint: Hematuria and Dysuria    79 y old female with migraines, osteopenia , dlp and hypothyroidism , depression  here for f.u . + dysuria and hematuria since last night . No fever , abd pain , n/v / Stays active in her house . No falls. Tolerating fosamax. No fatigue, sadness  . constipation . Tries to  Keep a healthful  diet . Less than 4 migraines in 1 m     Review of Systems   Constitutional: Negative.    HENT: Negative.    Eyes: Negative.    Respiratory: Negative.    Cardiovascular: Negative.    Gastrointestinal: Negative.    Genitourinary: Positive for dysuria.   Musculoskeletal: Negative.    Skin: Negative.    Hematological: Negative.        Objective:      Physical Exam   Constitutional: She is oriented to person, place, and time. She appears well-developed and well-nourished. No distress.   HENT:   Head: Normocephalic and atraumatic.   Right Ear: External ear normal.   Left Ear: External ear normal.   Mouth/Throat: No oropharyngeal exudate.   Eyes: Pupils are equal, round, and reactive to light. Conjunctivae and EOM are normal. Right eye exhibits no discharge. Left eye exhibits no discharge. No scleral icterus.   Neck: Normal range of motion. Neck supple. No JVD present. No tracheal deviation present. No thyromegaly present.   Cardiovascular: Normal rate, regular rhythm and normal heart sounds. Exam reveals no gallop and no friction rub.   No murmur heard.  Pulmonary/Chest: Effort normal and breath sounds normal. No stridor. No respiratory distress. She has no wheezes. She has no rales. She exhibits no tenderness.   Abdominal: Soft. Bowel sounds are normal. She exhibits no distension. There is tenderness in the suprapubic area. There is no rebound and no guarding.   Musculoskeletal: Normal range of motion.   Lymphadenopathy:     She has no cervical adenopathy.   Neurological: She is alert and oriented to person, place, and  time.   Skin: Skin is warm and dry. She is not diaphoretic.   Psychiatric: She has a normal mood and affect. Her behavior is normal. Judgment and thought content normal.       Assessment:       1. Hypothyroidism, unspecified type    2. Osteopenia, unspecified location    3. Dyslipidemia    4. Dysuria    5. Abnormal finding of blood chemistry         Plan:     Hami was seen today for hematuria and dysuria.    Diagnoses and all orders for this visit:    Hypothyroidism, unspecified type  -     TSH; Future    Osteopenia, unspecified location  -     Vitamin D; Future    Dyslipidemia  -     CBC auto differential; Future  -     Comprehensive metabolic panel; Future  -     Hemoglobin A1c; Future  -     Lipid panel; Future    Dysuria  -     POCT URINE DIPSTICK WITHOUT MICROSCOPE  -     Urinalysis  -     Urine culture; Future    Abnormal finding of blood chemistry   -     Hemoglobin A1c; Future    Other orders  -     sumatriptan (IMITREX) 100 MG tablet; TAKE 1 TABLET BY MOUTH ON ONSET OF MIGRAINE  -     alendronate (FOSAMAX) 70 MG tablet; Take 1 tablet (70 mg total) by mouth every 7 days.  -     (In Office Administered) Pneumococcal Polysaccharide Vaccine (23 Valent) (SQ/IM)     labs   Cont fosamax, exercise   The patient is asked to make an attempt to improve diet and exercise patterns to aid in medical management of this problem.  Urine

## 2019-05-17 ENCOUNTER — LAB VISIT (OUTPATIENT)
Dept: LAB | Facility: HOSPITAL | Age: 79
End: 2019-05-17
Attending: FAMILY MEDICINE
Payer: MEDICARE

## 2019-05-17 DIAGNOSIS — M85.80 OSTEOPENIA, UNSPECIFIED LOCATION: ICD-10-CM

## 2019-05-17 DIAGNOSIS — R79.9 ABNORMAL FINDING OF BLOOD CHEMISTRY: ICD-10-CM

## 2019-05-17 DIAGNOSIS — E03.9 HYPOTHYROIDISM, UNSPECIFIED TYPE: ICD-10-CM

## 2019-05-17 DIAGNOSIS — E78.5 DYSLIPIDEMIA: ICD-10-CM

## 2019-05-17 LAB
25(OH)D3+25(OH)D2 SERPL-MCNC: 20 NG/ML (ref 30–96)
ALBUMIN SERPL BCP-MCNC: 3.7 G/DL (ref 3.5–5.2)
ALP SERPL-CCNC: 137 U/L (ref 55–135)
ALT SERPL W/O P-5'-P-CCNC: 21 U/L (ref 10–44)
ANION GAP SERPL CALC-SCNC: 7 MMOL/L (ref 8–16)
AST SERPL-CCNC: 23 U/L (ref 10–40)
BASOPHILS # BLD AUTO: 0.06 K/UL (ref 0–0.2)
BASOPHILS NFR BLD: 0.7 % (ref 0–1.9)
BILIRUB SERPL-MCNC: 0.5 MG/DL (ref 0.1–1)
BUN SERPL-MCNC: 16 MG/DL (ref 8–23)
CALCIUM SERPL-MCNC: 9.8 MG/DL (ref 8.7–10.5)
CHLORIDE SERPL-SCNC: 106 MMOL/L (ref 95–110)
CHOLEST SERPL-MCNC: 126 MG/DL (ref 120–199)
CHOLEST/HDLC SERPL: 2.1 {RATIO} (ref 2–5)
CO2 SERPL-SCNC: 27 MMOL/L (ref 23–29)
CREAT SERPL-MCNC: 1 MG/DL (ref 0.5–1.4)
DIFFERENTIAL METHOD: ABNORMAL
EOSINOPHIL # BLD AUTO: 0.3 K/UL (ref 0–0.5)
EOSINOPHIL NFR BLD: 3.2 % (ref 0–8)
ERYTHROCYTE [DISTWIDTH] IN BLOOD BY AUTOMATED COUNT: 14.1 % (ref 11.5–14.5)
EST. GFR  (AFRICAN AMERICAN): >60 ML/MIN/1.73 M^2
EST. GFR  (NON AFRICAN AMERICAN): 54.5 ML/MIN/1.73 M^2
ESTIMATED AVG GLUCOSE: 120 MG/DL (ref 68–131)
GLUCOSE SERPL-MCNC: 108 MG/DL (ref 70–110)
HBA1C MFR BLD HPLC: 5.8 % (ref 4–5.6)
HCT VFR BLD AUTO: 41.5 % (ref 37–48.5)
HDLC SERPL-MCNC: 60 MG/DL (ref 40–75)
HDLC SERPL: 47.6 % (ref 20–50)
HGB BLD-MCNC: 12.8 G/DL (ref 12–16)
IMM GRANULOCYTES # BLD AUTO: 0.03 K/UL (ref 0–0.04)
IMM GRANULOCYTES NFR BLD AUTO: 0.4 % (ref 0–0.5)
LDLC SERPL CALC-MCNC: 52 MG/DL (ref 63–159)
LYMPHOCYTES # BLD AUTO: 1.1 K/UL (ref 1–4.8)
LYMPHOCYTES NFR BLD: 13.6 % (ref 18–48)
MCH RBC QN AUTO: 31.8 PG (ref 27–31)
MCHC RBC AUTO-ENTMCNC: 30.8 G/DL (ref 32–36)
MCV RBC AUTO: 103 FL (ref 82–98)
MONOCYTES # BLD AUTO: 0.6 K/UL (ref 0.3–1)
MONOCYTES NFR BLD: 7.7 % (ref 4–15)
NEUTROPHILS # BLD AUTO: 6.1 K/UL (ref 1.8–7.7)
NEUTROPHILS NFR BLD: 74.4 % (ref 38–73)
NONHDLC SERPL-MCNC: 66 MG/DL
NRBC BLD-RTO: 0 /100 WBC
PLATELET # BLD AUTO: 206 K/UL (ref 150–350)
PMV BLD AUTO: 12.4 FL (ref 9.2–12.9)
POTASSIUM SERPL-SCNC: 4.4 MMOL/L (ref 3.5–5.1)
PROT SERPL-MCNC: 7.4 G/DL (ref 6–8.4)
RBC # BLD AUTO: 4.02 M/UL (ref 4–5.4)
SODIUM SERPL-SCNC: 140 MMOL/L (ref 136–145)
TRIGL SERPL-MCNC: 70 MG/DL (ref 30–150)
TSH SERPL DL<=0.005 MIU/L-ACNC: 2.68 UIU/ML (ref 0.4–4)
WBC # BLD AUTO: 8.23 K/UL (ref 3.9–12.7)

## 2019-05-17 PROCEDURE — 80053 COMPREHEN METABOLIC PANEL: CPT

## 2019-05-17 PROCEDURE — 85025 COMPLETE CBC W/AUTO DIFF WBC: CPT

## 2019-05-17 PROCEDURE — 36415 COLL VENOUS BLD VENIPUNCTURE: CPT | Mod: PO

## 2019-05-17 PROCEDURE — 82306 VITAMIN D 25 HYDROXY: CPT

## 2019-05-17 PROCEDURE — 83036 HEMOGLOBIN GLYCOSYLATED A1C: CPT

## 2019-05-17 PROCEDURE — 84443 ASSAY THYROID STIM HORMONE: CPT

## 2019-05-17 PROCEDURE — 80061 LIPID PANEL: CPT

## 2019-05-20 ENCOUNTER — TELEPHONE (OUTPATIENT)
Dept: FAMILY MEDICINE | Facility: CLINIC | Age: 79
End: 2019-05-20

## 2019-05-20 DIAGNOSIS — R31.9 HEMATURIA, UNSPECIFIED TYPE: Primary | ICD-10-CM

## 2019-05-20 DIAGNOSIS — E55.9 VITAMIN D DEFICIENCY: Primary | ICD-10-CM

## 2019-05-20 RX ORDER — AMITRIPTYLINE HYDROCHLORIDE 75 MG/1
TABLET ORAL
Qty: 90 TABLET | Refills: 0 | Status: SHIPPED | OUTPATIENT
Start: 2019-05-20 | End: 2019-08-19 | Stop reason: SDUPTHER

## 2019-05-20 RX ORDER — ERGOCALCIFEROL 1.25 MG/1
50000 CAPSULE ORAL
Qty: 8 CAPSULE | Refills: 0 | Status: SHIPPED | OUTPATIENT
Start: 2019-05-20 | End: 2019-07-03 | Stop reason: SDUPTHER

## 2019-05-27 ENCOUNTER — LAB VISIT (OUTPATIENT)
Dept: LAB | Facility: HOSPITAL | Age: 79
End: 2019-05-27
Attending: FAMILY MEDICINE
Payer: MEDICARE

## 2019-05-27 DIAGNOSIS — R31.9 HEMATURIA, UNSPECIFIED TYPE: ICD-10-CM

## 2019-05-27 PROCEDURE — 81001 URINALYSIS AUTO W/SCOPE: CPT

## 2019-05-28 LAB
BACTERIA #/AREA URNS AUTO: ABNORMAL /HPF
BILIRUB UR QL STRIP: NEGATIVE
CAOX CRY UR QL COMP ASSIST: ABNORMAL
CLARITY UR REFRACT.AUTO: ABNORMAL
COLOR UR AUTO: YELLOW
GLUCOSE UR QL STRIP: NEGATIVE
HGB UR QL STRIP: NEGATIVE
HYALINE CASTS UR QL AUTO: 6 /LPF
KETONES UR QL STRIP: NEGATIVE
LEUKOCYTE ESTERASE UR QL STRIP: ABNORMAL
MICROSCOPIC COMMENT: ABNORMAL
NITRITE UR QL STRIP: NEGATIVE
NON-SQ EPI CELLS #/AREA URNS AUTO: <1 /HPF
PH UR STRIP: 5 [PH] (ref 5–8)
PROT UR QL STRIP: NEGATIVE
RBC #/AREA URNS AUTO: 2 /HPF (ref 0–4)
SP GR UR STRIP: 1.02 (ref 1–1.03)
SQUAMOUS #/AREA URNS AUTO: 1 /HPF
URN SPEC COLLECT METH UR: ABNORMAL
WBC #/AREA URNS AUTO: 2 /HPF (ref 0–5)

## 2019-05-29 ENCOUNTER — TELEPHONE (OUTPATIENT)
Dept: FAMILY MEDICINE | Facility: CLINIC | Age: 79
End: 2019-05-29

## 2019-05-29 NOTE — TELEPHONE ENCOUNTER
Spoke with pt, informed her that Dr. Scott's office didn't call her today. She verbalized understanding.

## 2019-05-29 NOTE — TELEPHONE ENCOUNTER
----- Message from Wang Avendano sent at 5/29/2019  1:36 PM CDT -----  Contact: [phone]  ..Type:  Patient Returning Call    Who Called:Haim  Who Left Message for Patient:nurse  Does the patient know what this is regarding?:missed call   Would the patient rather a call back or a response via [facility]? Call back   Best Call Back Number:[phone]  Additional Information:

## 2019-06-14 RX ORDER — LEVOTHYROXINE SODIUM 75 UG/1
TABLET ORAL
Qty: 96 TABLET | Refills: 0 | Status: SHIPPED | OUTPATIENT
Start: 2019-06-14 | End: 2019-09-16 | Stop reason: SDUPTHER

## 2019-07-03 RX ORDER — ERGOCALCIFEROL 1.25 MG/1
CAPSULE ORAL
Qty: 8 CAPSULE | Refills: 0 | Status: SHIPPED | OUTPATIENT
Start: 2019-07-03 | End: 2019-09-03 | Stop reason: SDUPTHER

## 2019-07-08 RX ORDER — ATORVASTATIN CALCIUM 40 MG/1
TABLET, FILM COATED ORAL
Qty: 90 TABLET | Refills: 0 | Status: SHIPPED | OUTPATIENT
Start: 2019-07-08 | End: 2019-10-06 | Stop reason: SDUPTHER

## 2019-08-01 ENCOUNTER — TELEPHONE (OUTPATIENT)
Dept: FAMILY MEDICINE | Facility: CLINIC | Age: 79
End: 2019-08-01

## 2019-08-01 DIAGNOSIS — Z12.39 BREAST CANCER SCREENING: Primary | ICD-10-CM

## 2019-08-01 NOTE — TELEPHONE ENCOUNTER
----- Message from Rosetta Rod sent at 8/1/2019  4:31 PM CDT -----  Contact: Pt   States she's calling to have the orders placed to schedule her mammo and can be reached at [phone]//cass/miles     States to pls call back today

## 2019-08-02 ENCOUNTER — TELEPHONE (OUTPATIENT)
Dept: FAMILY MEDICINE | Facility: CLINIC | Age: 79
End: 2019-08-02

## 2019-08-02 NOTE — TELEPHONE ENCOUNTER
----- Message from Blanquita Roe sent at 8/2/2019  8:48 AM CDT -----  Contact: pt  She's calling in regards , to missed call       pls call pt back at [phone] (home)

## 2019-08-02 NOTE — TELEPHONE ENCOUNTER
Informed pt Dr. Scott has placed the order for a mammogram. Pt verbalized understanding and states she will call the office back to schedule. Verbalized understanding.

## 2019-08-09 RX ORDER — SERTRALINE HYDROCHLORIDE 100 MG/1
TABLET, FILM COATED ORAL
Qty: 90 TABLET | Refills: 0 | Status: SHIPPED | OUTPATIENT
Start: 2019-08-09 | End: 2019-08-15 | Stop reason: SDUPTHER

## 2019-08-15 RX ORDER — SERTRALINE HYDROCHLORIDE 100 MG/1
TABLET, FILM COATED ORAL
Qty: 90 TABLET | Refills: 0 | Status: SHIPPED | OUTPATIENT
Start: 2019-08-15 | End: 2020-02-17

## 2019-08-20 ENCOUNTER — TELEPHONE (OUTPATIENT)
Dept: OBSTETRICS AND GYNECOLOGY | Facility: CLINIC | Age: 79
End: 2019-08-20

## 2019-08-20 RX ORDER — AMITRIPTYLINE HYDROCHLORIDE 75 MG/1
TABLET ORAL
Qty: 90 TABLET | Refills: 0 | Status: SHIPPED | OUTPATIENT
Start: 2019-08-20 | End: 2019-11-21 | Stop reason: SDUPTHER

## 2019-08-20 NOTE — TELEPHONE ENCOUNTER
----- Message from Michi Paul sent at 8/20/2019  3:29 PM CDT -----  Contact: Pt  Pt called in regards to a appointment for a mammogram. Pt can be reached at [phone] (oxyu) .

## 2019-08-20 NOTE — TELEPHONE ENCOUNTER
Spoke with patient, has not been seen in OB in years, but PCP placed mammogram orders.  Mammogram booked with patient for 10/8/19 at 220 at [facility].  Patient verbalized understanding.

## 2019-09-03 RX ORDER — ERGOCALCIFEROL 1.25 MG/1
CAPSULE ORAL
Qty: 8 CAPSULE | Refills: 0 | Status: SHIPPED | OUTPATIENT
Start: 2019-09-03 | End: 2019-11-02 | Stop reason: SDUPTHER

## 2019-09-16 RX ORDER — LEVOTHYROXINE SODIUM 75 UG/1
TABLET ORAL
Qty: 96 TABLET | Refills: 0 | Status: SHIPPED | OUTPATIENT
Start: 2019-09-16 | End: 2019-12-30

## 2019-10-07 ENCOUNTER — OFFICE VISIT (OUTPATIENT)
Dept: FAMILY MEDICINE | Facility: CLINIC | Age: 79
End: 2019-10-07
Attending: FAMILY MEDICINE
Payer: MEDICARE

## 2019-10-07 ENCOUNTER — TELEPHONE (OUTPATIENT)
Dept: FAMILY MEDICINE | Facility: CLINIC | Age: 79
End: 2019-10-07

## 2019-10-07 VITALS
TEMPERATURE: 97 F | HEART RATE: 80 BPM | DIASTOLIC BLOOD PRESSURE: 84 MMHG | OXYGEN SATURATION: 97 % | BODY MASS INDEX: 27.38 KG/M2 | WEIGHT: 135.56 LBS | SYSTOLIC BLOOD PRESSURE: 142 MMHG

## 2019-10-07 DIAGNOSIS — S02.2XXA CLOSED FRACTURE OF NASAL BONE, INITIAL ENCOUNTER: ICD-10-CM

## 2019-10-07 DIAGNOSIS — M85.80 OSTEOPENIA, UNSPECIFIED LOCATION: ICD-10-CM

## 2019-10-07 DIAGNOSIS — I60.9 SUBARACHNOID HEMORRHAGE: Primary | ICD-10-CM

## 2019-10-07 PROCEDURE — 99214 PR OFFICE/OUTPT VISIT, EST, LEVL IV, 30-39 MIN: ICD-10-PCS | Mod: S$GLB,,, | Performed by: FAMILY MEDICINE

## 2019-10-07 PROCEDURE — 99214 OFFICE O/P EST MOD 30 MIN: CPT | Mod: S$GLB,,, | Performed by: FAMILY MEDICINE

## 2019-10-07 PROCEDURE — 1101F PR PT FALLS ASSESS DOC 0-1 FALLS W/OUT INJ PAST YR: ICD-10-PCS | Mod: CPTII,S$GLB,, | Performed by: FAMILY MEDICINE

## 2019-10-07 PROCEDURE — 1101F PT FALLS ASSESS-DOCD LE1/YR: CPT | Mod: CPTII,S$GLB,, | Performed by: FAMILY MEDICINE

## 2019-10-07 PROCEDURE — 99999 PR PBB SHADOW E&M-EST. PATIENT-LVL III: ICD-10-PCS | Mod: PBBFAC,,, | Performed by: FAMILY MEDICINE

## 2019-10-07 PROCEDURE — 99999 PR PBB SHADOW E&M-EST. PATIENT-LVL III: CPT | Mod: PBBFAC,,, | Performed by: FAMILY MEDICINE

## 2019-10-07 RX ORDER — LEVETIRACETAM 500 MG/1
500 TABLET ORAL 2 TIMES DAILY
Refills: 0 | COMMUNITY
Start: 2019-09-29 | End: 2020-06-18

## 2019-10-07 RX ORDER — BUTALBITAL, ACETAMINOPHEN AND CAFFEINE 50; 325; 40 MG/1; MG/1; MG/1
TABLET ORAL
Refills: 0 | COMMUNITY
Start: 2019-09-29 | End: 2020-06-18

## 2019-10-07 RX ORDER — ATORVASTATIN CALCIUM 40 MG/1
TABLET, FILM COATED ORAL
Qty: 90 TABLET | Refills: 0 | Status: SHIPPED | OUTPATIENT
Start: 2019-10-07 | End: 2020-01-15

## 2019-10-07 NOTE — PROGRESS NOTES
Subjective:       Patient ID: Haim Martin is a 79 y.o. female.    Chief Complaint: Hospital Follow Up    79 y old female with osteopenia  here for f/u on  observation stay at [facility]  On 9/28 due to fall  After flip flop bended which lead to  Small L side Subarachnoids hemorrhage and nasal bones fractures .  Head CT  On the following day demonstrated  stability of hemorrhage. ENT did not recommend  Any surgical  intervention  . Started on prophylactic  Keppra for 7 days . Doing well today     Review of Systems   Constitutional: Negative.    HENT: Negative.    Eyes: Negative.    Respiratory: Negative.    Cardiovascular: Negative.    Gastrointestinal: Negative.    Genitourinary: Negative.    Musculoskeletal: Negative.    Skin: Negative.    Hematological: Negative.        Objective:      Physical Exam   Constitutional: She is oriented to person, place, and time. No distress.   HENT:   Head: Normocephalic and atraumatic.   Right Ear: External ear normal.   Left Ear: External ear normal.   Mouth/Throat: No oropharyngeal exudate.   Eyes: Pupils are equal, round, and reactive to light. Conjunctivae and EOM are normal. Right eye exhibits no discharge. Left eye exhibits no discharge. No scleral icterus.   Neck: Normal range of motion. Neck supple. No JVD present. No tracheal deviation present. No thyromegaly present.   Cardiovascular: Normal rate, regular rhythm and normal heart sounds. Exam reveals no gallop and no friction rub.   No murmur heard.  Pulmonary/Chest: Effort normal and breath sounds normal. No stridor. No respiratory distress. She has no wheezes. She has no rales. She exhibits no tenderness.   Abdominal: Soft. Bowel sounds are normal. She exhibits no distension. There is no tenderness. There is no rebound and no guarding.   Musculoskeletal: Normal range of motion.   Lymphadenopathy:     She has no cervical adenopathy.   Neurological: She is alert and oriented to person, place, and time.   Skin: Skin is warm  and dry. She is not diaphoretic.   Psychiatric: She has a normal mood and affect. Her behavior is normal. Judgment and thought content normal.       Assessment:       1. Subarachnoid hemorrhage    2. Osteopenia, unspecified location    3. Closed fracture of nasal bone, initial encounter        Plan:     Haim was seen today for hospital follow up.    Diagnoses and all orders for this visit:    Subarachnoid hemorrhage    Osteopenia, unspecified location    Closed fracture of nasal bone, initial encounter    Other orders  -     Cancel: Mammo Digital Screening Bilateral With CAD; Future     Declined Neuro and ENT f.u     DEXA in 2 y . Cont fosamax.   High BP . N.v in 1 w     Time spent: 25 minutes in face to face discussion concerning diagnosis, prognosis, review of lab and test results, benefits of treatment as well as management of disease, counseling of patient and coordination of care between various health care providers . Greater than half the time spent was used for coordination of care and counseling of patient.

## 2019-10-16 ENCOUNTER — CLINICAL SUPPORT (OUTPATIENT)
Dept: FAMILY MEDICINE | Facility: CLINIC | Age: 79
End: 2019-10-16
Payer: MEDICARE

## 2019-10-16 ENCOUNTER — TELEPHONE (OUTPATIENT)
Dept: FAMILY MEDICINE | Facility: CLINIC | Age: 79
End: 2019-10-16

## 2019-10-16 VITALS — HEART RATE: 76 BPM | DIASTOLIC BLOOD PRESSURE: 80 MMHG | SYSTOLIC BLOOD PRESSURE: 134 MMHG

## 2019-10-16 NOTE — TELEPHONE ENCOUNTER
----- Message from Kimmy Yao sent at 10/16/2019 12:15 PM CDT -----  Contact: pt  Type:  Test Results    Who Called: Patient  Name of Test (Lab/Mammo/Etc): Blood Pressure Check  Date of Test: 10/16  Ordering Provider: Dr Scott  Where the test was performed: [facility]  Would the patient rather a call back or a response via [facility]? Call back  Best Call Back Number: [phone]  Additional Information:  na

## 2019-10-16 NOTE — NURSING
Pt came in today for a bp check,, pt is feeling fine no symptoms. Pt stated she was not taking any bp medications. bp was 134/80

## 2019-10-16 NOTE — TELEPHONE ENCOUNTER
Called pt left voicemail. Pt had stated she was not taking any bp meds at bp nurse visit. Needing to find out if she is taking nadolol 80 mg.

## 2019-10-16 NOTE — TELEPHONE ENCOUNTER
----- Message from Kavya Mesa MD sent at 10/16/2019 10:15 AM CDT -----  ?  ----- Message -----  From: Susy Angel MA  Sent: 10/16/2019  10:11 AM CDT  To: Kavya Mesa MD

## 2019-11-04 RX ORDER — NADOLOL 80 MG/1
TABLET ORAL
Qty: 30 TABLET | Refills: 3 | Status: SHIPPED | OUTPATIENT
Start: 2019-11-04 | End: 2020-02-27

## 2019-11-04 RX ORDER — ERGOCALCIFEROL 1.25 MG/1
CAPSULE ORAL
Qty: 8 CAPSULE | Refills: 0 | Status: SHIPPED | OUTPATIENT
Start: 2019-11-04 | End: 2019-12-30

## 2019-11-21 RX ORDER — AMITRIPTYLINE HYDROCHLORIDE 75 MG/1
TABLET ORAL
Qty: 90 TABLET | Refills: 0 | Status: SHIPPED | OUTPATIENT
Start: 2019-11-21 | End: 2020-02-26

## 2019-12-16 RX ORDER — SUMATRIPTAN SUCCINATE 100 MG/1
TABLET ORAL
Qty: 9 TABLET | Refills: 0 | Status: SHIPPED | OUTPATIENT
Start: 2019-12-16 | End: 2020-05-29

## 2019-12-30 RX ORDER — ERGOCALCIFEROL 1.25 MG/1
CAPSULE ORAL
Qty: 8 CAPSULE | Refills: 0 | Status: SHIPPED | OUTPATIENT
Start: 2019-12-30 | End: 2020-02-17

## 2019-12-30 RX ORDER — LEVOTHYROXINE SODIUM 75 UG/1
TABLET ORAL
Qty: 96 TABLET | Refills: 1 | Status: SHIPPED | OUTPATIENT
Start: 2019-12-30 | End: 2020-06-18 | Stop reason: SDUPTHER

## 2020-01-09 ENCOUNTER — HOSPITAL ENCOUNTER (OUTPATIENT)
Dept: RADIOLOGY | Facility: HOSPITAL | Age: 80
Discharge: HOME | End: 2020-01-09
Attending: FAMILY MEDICINE
Payer: MEDICARE

## 2020-01-09 DIAGNOSIS — Z12.39 BREAST CANCER SCREENING: ICD-10-CM

## 2020-01-09 DIAGNOSIS — Z12.31 VISIT FOR SCREENING MAMMOGRAM: ICD-10-CM

## 2020-01-09 PROCEDURE — 77067 MAMMO DIGITAL SCREENING BILAT WITH TOMOSYNTHESIS_CAD: ICD-10-PCS | Mod: 26,,, | Performed by: RADIOLOGY

## 2020-01-09 PROCEDURE — 77067 SCR MAMMO BI INCL CAD: CPT | Mod: 26,,, | Performed by: RADIOLOGY

## 2020-01-09 PROCEDURE — 77067 SCR MAMMO BI INCL CAD: CPT | Mod: TC

## 2020-01-09 PROCEDURE — 77063 MAMMO DIGITAL SCREENING BILAT WITH TOMOSYNTHESIS_CAD: ICD-10-PCS | Mod: 26,,, | Performed by: RADIOLOGY

## 2020-01-09 PROCEDURE — 77063 BREAST TOMOSYNTHESIS BI: CPT | Mod: 26,,, | Performed by: RADIOLOGY

## 2020-01-15 RX ORDER — ATORVASTATIN CALCIUM 40 MG/1
TABLET, FILM COATED ORAL
Qty: 90 TABLET | Refills: 0 | Status: SHIPPED | OUTPATIENT
Start: 2020-01-15 | End: 2020-04-13

## 2020-02-17 RX ORDER — SERTRALINE HYDROCHLORIDE 100 MG/1
TABLET, FILM COATED ORAL
Qty: 90 TABLET | Refills: 0 | Status: SHIPPED | OUTPATIENT
Start: 2020-02-17 | End: 2020-05-21

## 2020-02-17 RX ORDER — ERGOCALCIFEROL 1.25 MG/1
CAPSULE ORAL
Qty: 8 CAPSULE | Refills: 0 | Status: SHIPPED | OUTPATIENT
Start: 2020-02-17 | End: 2020-04-27

## 2020-02-26 ENCOUNTER — TELEPHONE (OUTPATIENT)
Dept: FAMILY MEDICINE | Facility: CLINIC | Age: 80
End: 2020-02-26

## 2020-02-26 RX ORDER — AMITRIPTYLINE HYDROCHLORIDE 75 MG/1
TABLET ORAL
Qty: 90 TABLET | Refills: 0 | Status: SHIPPED | OUTPATIENT
Start: 2020-02-26 | End: 2020-05-26

## 2020-02-27 RX ORDER — PROPRANOLOL HYDROCHLORIDE 40 MG/1
40 TABLET ORAL 2 TIMES DAILY
Qty: 60 TABLET | Refills: 0 | Status: SHIPPED | OUTPATIENT
Start: 2020-02-27 | End: 2020-05-19

## 2020-02-27 NOTE — TELEPHONE ENCOUNTER
----- Message from Bailey Dominguez MA sent at 2/27/2020  1:28 PM CST -----  Spoke with patient her Nadolol 80 mg cost $55.00 and she can not afford it, pharmacist stated that Propanolol 80 mg will not cost her anything. Can you change to Propanolol 80 mg 1 po daily?  ----- Message -----  From: Denisse Merchant  Sent: 2/27/2020  12:04 PM CST  To: Micha Hoff Staff    Type:  Needs Medical Advice    Who Called:  Jose Whitmore  Symptoms (please be specific):  The cost of med(Nadolol 80mg) has increased to $55.00   How long has patient had these symptoms:    Pharmacy name and phone #:    [facility] [address] in [place]///[phone]  Would the patient rather a call back or a response via [facility]?  Call back  Best Call Back Number:    [phone]   Additional Information:  The pharmacy has given her a name of a med(Prolranolol 80mg)//with this med she would not have to pay anything//please call to discuss//stuart/rosa elena

## 2020-04-13 RX ORDER — ATORVASTATIN CALCIUM 40 MG/1
TABLET, FILM COATED ORAL
Qty: 30 TABLET | Refills: 0 | Status: SHIPPED | OUTPATIENT
Start: 2020-04-13 | End: 2020-05-15

## 2020-04-27 RX ORDER — ERGOCALCIFEROL 1.25 MG/1
CAPSULE ORAL
Qty: 8 CAPSULE | Refills: 0 | Status: SHIPPED | OUTPATIENT
Start: 2020-04-27 | End: 2020-06-18 | Stop reason: SDUPTHER

## 2020-05-15 ENCOUNTER — TELEPHONE (OUTPATIENT)
Dept: FAMILY MEDICINE | Facility: CLINIC | Age: 80
End: 2020-05-15

## 2020-05-15 RX ORDER — ATORVASTATIN CALCIUM 40 MG/1
TABLET, FILM COATED ORAL
Qty: 30 TABLET | Refills: 0 | Status: SHIPPED | OUTPATIENT
Start: 2020-05-15 | End: 2020-06-18 | Stop reason: SDUPTHER

## 2020-05-15 NOTE — TELEPHONE ENCOUNTER
----- Message from Raúl Arndt sent at 5/15/2020  2:35 PM CDT -----  Contact: pt   .Type:  Patient Returning Call    Who Called: pt   Who Left Message for Patient: viki   Does the patient know what this is regarding? No   Would the patient rather a call back or a response via [facility]? Callback   Best Call Back Number: .[phone] (home)    Additional Information:

## 2020-05-15 NOTE — TELEPHONE ENCOUNTER
Spoke with pt, informed her that pt was refilled but due for appointment. Pt rescheduled appointment to 6/15/20 due to Dr. Scott being out of the clinic on 6/9/20.

## 2020-05-19 RX ORDER — PROPRANOLOL HYDROCHLORIDE 40 MG/1
TABLET ORAL
Qty: 60 TABLET | Refills: 0 | Status: SHIPPED | OUTPATIENT
Start: 2020-05-19 | End: 2020-06-18 | Stop reason: SDUPTHER

## 2020-05-21 RX ORDER — SERTRALINE HYDROCHLORIDE 100 MG/1
TABLET, FILM COATED ORAL
Qty: 90 TABLET | Refills: 0 | Status: SHIPPED | OUTPATIENT
Start: 2020-05-21 | End: 2020-06-18 | Stop reason: SDUPTHER

## 2020-05-26 RX ORDER — AMITRIPTYLINE HYDROCHLORIDE 75 MG/1
TABLET ORAL
Qty: 90 TABLET | Refills: 0 | Status: SHIPPED | OUTPATIENT
Start: 2020-05-26 | End: 2020-06-18 | Stop reason: SDUPTHER

## 2020-05-29 RX ORDER — SUMATRIPTAN SUCCINATE 100 MG/1
TABLET ORAL
Qty: 9 TABLET | Refills: 0 | Status: SHIPPED | OUTPATIENT
Start: 2020-05-29 | End: 2020-05-29 | Stop reason: SDUPTHER

## 2020-05-29 RX ORDER — SUMATRIPTAN SUCCINATE 100 MG/1
TABLET ORAL
Qty: 9 TABLET | Refills: 0 | Status: SHIPPED | OUTPATIENT
Start: 2020-05-29 | End: 2020-06-09 | Stop reason: SDUPTHER

## 2020-06-10 RX ORDER — SUMATRIPTAN SUCCINATE 100 MG/1
TABLET ORAL
Qty: 9 TABLET | Refills: 0 | Status: SHIPPED | OUTPATIENT
Start: 2020-06-10 | End: 2020-12-29 | Stop reason: SDUPTHER

## 2020-06-18 ENCOUNTER — TELEPHONE (OUTPATIENT)
Dept: FAMILY MEDICINE | Facility: CLINIC | Age: 80
End: 2020-06-18

## 2020-06-18 ENCOUNTER — HOSPITAL ENCOUNTER (OUTPATIENT)
Dept: RADIOLOGY | Facility: HOSPITAL | Age: 80
Discharge: HOME | End: 2020-06-18
Attending: FAMILY MEDICINE
Payer: MEDICARE

## 2020-06-18 ENCOUNTER — OFFICE VISIT (OUTPATIENT)
Dept: FAMILY MEDICINE | Facility: CLINIC | Age: 80
End: 2020-06-18
Attending: FAMILY MEDICINE
Payer: MEDICARE

## 2020-06-18 VITALS
TEMPERATURE: 98 F | SYSTOLIC BLOOD PRESSURE: 130 MMHG | DIASTOLIC BLOOD PRESSURE: 78 MMHG | WEIGHT: 132.06 LBS | HEART RATE: 99 BPM | OXYGEN SATURATION: 98 % | HEIGHT: 59 IN | BODY MASS INDEX: 26.62 KG/M2

## 2020-06-18 DIAGNOSIS — F32.5 MAJOR DEPRESSIVE DISORDER WITH SINGLE EPISODE, IN FULL REMISSION: ICD-10-CM

## 2020-06-18 DIAGNOSIS — M85.80 OSTEOPENIA, UNSPECIFIED LOCATION: ICD-10-CM

## 2020-06-18 DIAGNOSIS — M47.816 LUMBAR SPONDYLOSIS: Primary | ICD-10-CM

## 2020-06-18 DIAGNOSIS — R10.13 DYSPEPSIA: ICD-10-CM

## 2020-06-18 DIAGNOSIS — M54.9 DORSALGIA, UNSPECIFIED: ICD-10-CM

## 2020-06-18 DIAGNOSIS — E03.9 HYPOTHYROIDISM, UNSPECIFIED TYPE: ICD-10-CM

## 2020-06-18 DIAGNOSIS — R73.03 PRE-DIABETES: Primary | ICD-10-CM

## 2020-06-18 DIAGNOSIS — R79.9 ABNORMAL FINDING OF BLOOD CHEMISTRY, UNSPECIFIED: ICD-10-CM

## 2020-06-18 PROCEDURE — 99999 PR PBB SHADOW E&M-EST. PATIENT-LVL IV: CPT | Mod: PBBFAC,,, | Performed by: FAMILY MEDICINE

## 2020-06-18 PROCEDURE — 72100 XR LUMBAR SPINE AP AND LATERAL: ICD-10-PCS | Mod: 26,,, | Performed by: RADIOLOGY

## 2020-06-18 PROCEDURE — 1126F AMNT PAIN NOTED NONE PRSNT: CPT | Mod: S$GLB,,, | Performed by: FAMILY MEDICINE

## 2020-06-18 PROCEDURE — 1101F PT FALLS ASSESS-DOCD LE1/YR: CPT | Mod: CPTII,S$GLB,, | Performed by: FAMILY MEDICINE

## 2020-06-18 PROCEDURE — 1126F PR PAIN SEVERITY QUANTIFIED, NO PAIN PRESENT: ICD-10-PCS | Mod: S$GLB,,, | Performed by: FAMILY MEDICINE

## 2020-06-18 PROCEDURE — 72100 X-RAY EXAM L-S SPINE 2/3 VWS: CPT | Mod: TC,PO

## 2020-06-18 PROCEDURE — 99999 PR PBB SHADOW E&M-EST. PATIENT-LVL IV: ICD-10-PCS | Mod: PBBFAC,,, | Performed by: FAMILY MEDICINE

## 2020-06-18 PROCEDURE — 99499 RISK ADDL DX/OHS AUDIT: ICD-10-PCS | Mod: S$GLB,,, | Performed by: FAMILY MEDICINE

## 2020-06-18 PROCEDURE — 99214 OFFICE O/P EST MOD 30 MIN: CPT | Mod: S$GLB,,, | Performed by: FAMILY MEDICINE

## 2020-06-18 PROCEDURE — 1159F MED LIST DOCD IN RCRD: CPT | Mod: S$GLB,,, | Performed by: FAMILY MEDICINE

## 2020-06-18 PROCEDURE — 99214 PR OFFICE/OUTPT VISIT, EST, LEVL IV, 30-39 MIN: ICD-10-PCS | Mod: S$GLB,,, | Performed by: FAMILY MEDICINE

## 2020-06-18 PROCEDURE — 72100 X-RAY EXAM L-S SPINE 2/3 VWS: CPT | Mod: 26,,, | Performed by: RADIOLOGY

## 2020-06-18 PROCEDURE — 1159F PR MEDICATION LIST DOCUMENTED IN MEDICAL RECORD: ICD-10-PCS | Mod: S$GLB,,, | Performed by: FAMILY MEDICINE

## 2020-06-18 PROCEDURE — 99499 UNLISTED E&M SERVICE: CPT | Mod: S$GLB,,, | Performed by: FAMILY MEDICINE

## 2020-06-18 PROCEDURE — 1101F PR PT FALLS ASSESS DOC 0-1 FALLS W/OUT INJ PAST YR: ICD-10-PCS | Mod: CPTII,S$GLB,, | Performed by: FAMILY MEDICINE

## 2020-06-18 RX ORDER — ATORVASTATIN CALCIUM 40 MG/1
40 TABLET, FILM COATED ORAL NIGHTLY
Qty: 90 TABLET | Refills: 0 | Status: SHIPPED | OUTPATIENT
Start: 2020-06-18 | End: 2020-06-19

## 2020-06-18 RX ORDER — OMEPRAZOLE 20 MG/1
20 CAPSULE, DELAYED RELEASE ORAL
COMMUNITY
End: 2020-06-18 | Stop reason: SDUPTHER

## 2020-06-18 RX ORDER — SERTRALINE HYDROCHLORIDE 100 MG/1
100 TABLET, FILM COATED ORAL DAILY
Qty: 90 TABLET | Refills: 0 | Status: SHIPPED | OUTPATIENT
Start: 2020-06-18 | End: 2020-11-23

## 2020-06-18 RX ORDER — OMEPRAZOLE 20 MG/1
20 CAPSULE, DELAYED RELEASE ORAL
Qty: 90 CAPSULE | Refills: 4 | Status: SHIPPED | OUTPATIENT
Start: 2020-06-18

## 2020-06-18 RX ORDER — PROPRANOLOL HYDROCHLORIDE 40 MG/1
40 TABLET ORAL 2 TIMES DAILY
Qty: 180 TABLET | Refills: 0 | Status: SHIPPED | OUTPATIENT
Start: 2020-06-18 | End: 2020-10-06

## 2020-06-18 RX ORDER — AMITRIPTYLINE HYDROCHLORIDE 75 MG/1
75 TABLET ORAL NIGHTLY
Qty: 90 TABLET | Refills: 0 | Status: SHIPPED | OUTPATIENT
Start: 2020-06-18 | End: 2020-11-23

## 2020-06-18 RX ORDER — LEVOTHYROXINE SODIUM 75 UG/1
TABLET ORAL
Qty: 90 TABLET | Refills: 1 | Status: SHIPPED | OUTPATIENT
Start: 2020-06-18 | End: 2020-06-19

## 2020-06-18 RX ORDER — ERGOCALCIFEROL 1.25 MG/1
50000 CAPSULE ORAL
Qty: 8 CAPSULE | Refills: 0 | Status: SHIPPED | OUTPATIENT
Start: 2020-06-18 | End: 2020-08-17

## 2020-06-18 RX ORDER — ALENDRONATE SODIUM 70 MG/1
70 TABLET ORAL
Qty: 12 TABLET | Refills: 3 | Status: SHIPPED | OUTPATIENT
Start: 2020-06-18 | End: 2021-05-31

## 2020-06-18 NOTE — PROGRESS NOTES
Subjective:       Patient ID: Haim Martin is a 80 y.o. female.    Chief Complaint: Follow-up    80 y old female with osteopenia , on fosamax for 2 y , hypothyroidism , pre dm , depression with severe lower back pain for months . She injured lower back lifting her mother years back . Taking acetaminophen with no relief . Irradiated to RLE , no GI . NO  symptoms . Worsens with any activity . Also with dyspepsia for months . Not taking PPI on a regular basis. No weight loss, dysphagia  Etc .     Review of Systems   Constitutional: Negative.    HENT: Negative.    Eyes: Negative.    Respiratory: Negative.    Cardiovascular: Negative.    Gastrointestinal: Positive for abdominal pain.   Genitourinary: Negative.    Musculoskeletal: Positive for arthralgias.   Skin: Negative.    Hematological: Negative.        Objective:      Physical Exam  Constitutional:       General: She is not in acute distress.     Appearance: She is well-developed. She is not diaphoretic.   HENT:      Head: Normocephalic and atraumatic.      Right Ear: External ear normal.      Left Ear: External ear normal.      Mouth/Throat:      Pharynx: No oropharyngeal exudate.   Eyes:      General: No scleral icterus.        Right eye: No discharge.         Left eye: No discharge.      Conjunctiva/sclera: Conjunctivae normal.      Pupils: Pupils are equal, round, and reactive to light.   Neck:      Musculoskeletal: Normal range of motion and neck supple.      Thyroid: No thyromegaly.      Vascular: No JVD.      Trachea: No tracheal deviation.   Cardiovascular:      Rate and Rhythm: Normal rate and regular rhythm.      Heart sounds: Normal heart sounds. No murmur. No friction rub. No gallop.    Pulmonary:      Effort: Pulmonary effort is normal. No respiratory distress.      Breath sounds: Normal breath sounds. No stridor. No wheezing or rales.   Chest:      Chest wall: No tenderness.   Abdominal:      General: Bowel sounds are normal. There is no  distension.      Palpations: Abdomen is soft.      Tenderness: There is no abdominal tenderness. There is no guarding or rebound.   Musculoskeletal:      Lumbar back: She exhibits decreased range of motion, tenderness and bony tenderness. She exhibits no swelling, no edema, no deformity, no laceration and no pain.   Lymphadenopathy:      Cervical: No cervical adenopathy.   Skin:     General: Skin is warm and dry.   Neurological:      Mental Status: She is alert and oriented to person, place, and time.   Psychiatric:         Behavior: Behavior normal.         Thought Content: Thought content normal.         Judgment: Judgment normal.         Assessment:       Haim was seen today for follow-up.    Diagnoses and all orders for this visit:    Pre-diabetes  -     Hemoglobin A1C; Future    Dorsalgia, unspecified  -     X-Ray Lumbar Spine Ap And Lateral; Future    Hypothyroidism, unspecified type  -     TSH; Future    Osteopenia, unspecified location  -     CBC auto differential; Future  -     Comprehensive metabolic panel; Future  -     Lipid Panel; Future    Dyspepsia  -     H.Pylori Antibody IgG; Future    Abnormal finding of blood chemistry, unspecified   -     Lipid Panel; Future    Major depressive disorder with single episode, in full remission    Other orders  -     amitriptyline (ELAVIL) 75 MG tablet; Take 1 tablet (75 mg total) by mouth every evening.  -     atorvastatin (LIPITOR) 40 MG tablet; Take 1 tablet (40 mg total) by mouth every evening.  -     levothyroxine (SYNTHROID) 75 MCG tablet; TAKE 1 TABLET BY MOUTH DAILY MONDAY THROUGH SATURDAY AND 1 AND 1/2 TABLETS BY MOUTH ON SUNDAY  -     propranoloL (INDERAL) 40 MG tablet; Take 1 tablet (40 mg total) by mouth 2 (two) times daily.  -     sertraline (ZOLOFT) 100 MG tablet; Take 1 tablet (100 mg total) by mouth once daily.  -     ergocalciferol (VITAMIN D2) 50,000 unit Cap; Take 1 capsule (50,000 Units total) by mouth every 7 days.  -     omeprazole  (PRILOSEC) 20 MG capsule; Take 1 capsule (20 mg total) by mouth as needed.  -     alendronate (FOSAMAX) 70 MG tablet; Take 1 tablet (70 mg total) by mouth every 7 days.      Plan:   Diet and ex   X Rays . NSAID?   Labs   Fosamax   Take PPI daily if symptoms persist   Will proceedd to order EGD in 2 w   Stable . Cont med

## 2020-06-19 DIAGNOSIS — E87.6 HYPOKALEMIA: Primary | ICD-10-CM

## 2020-06-19 RX ORDER — ATORVASTATIN CALCIUM 40 MG/1
TABLET, FILM COATED ORAL
Qty: 30 TABLET | Refills: 0 | Status: SHIPPED | OUTPATIENT
Start: 2020-06-19 | End: 2020-10-14

## 2020-06-19 RX ORDER — LEVOTHYROXINE SODIUM 88 UG/1
TABLET ORAL
Qty: 45 TABLET | Refills: 0 | Status: SHIPPED | OUTPATIENT
Start: 2020-06-19 | End: 2020-08-04 | Stop reason: SDUPTHER

## 2020-06-19 RX ORDER — POTASSIUM CHLORIDE 20 MEQ/1
20 TABLET, EXTENDED RELEASE ORAL DAILY
Qty: 3 TABLET | Refills: 0 | Status: ON HOLD | OUTPATIENT
Start: 2020-06-19 | End: 2020-10-12 | Stop reason: HOSPADM

## 2020-07-01 ENCOUNTER — OFFICE VISIT (OUTPATIENT)
Dept: PAIN MEDICINE | Facility: CLINIC | Age: 80
End: 2020-07-01
Payer: MEDICARE

## 2020-07-01 ENCOUNTER — TELEPHONE (OUTPATIENT)
Dept: PAIN MEDICINE | Facility: CLINIC | Age: 80
End: 2020-07-01

## 2020-07-01 VITALS
BODY MASS INDEX: 26.61 KG/M2 | DIASTOLIC BLOOD PRESSURE: 64 MMHG | WEIGHT: 132 LBS | HEART RATE: 75 BPM | RESPIRATION RATE: 20 BRPM | HEIGHT: 59 IN | SYSTOLIC BLOOD PRESSURE: 136 MMHG

## 2020-07-01 DIAGNOSIS — M47.816 LUMBAR SPONDYLOSIS: ICD-10-CM

## 2020-07-01 DIAGNOSIS — M51.36 DDD (DEGENERATIVE DISC DISEASE), LUMBAR: ICD-10-CM

## 2020-07-01 DIAGNOSIS — Z03.818 ENCOUNTER FOR OBSERVATION FOR SUSPECTED EXPOSURE TO OTHER BIOLOGICAL AGENTS RULED OUT: Primary | ICD-10-CM

## 2020-07-01 DIAGNOSIS — M43.16 SPONDYLOLISTHESIS OF LUMBAR REGION: Primary | ICD-10-CM

## 2020-07-01 PROCEDURE — 99204 PR OFFICE/OUTPT VISIT, NEW, LEVL IV, 45-59 MIN: ICD-10-PCS | Mod: S$GLB,,, | Performed by: PHYSICAL MEDICINE & REHABILITATION

## 2020-07-01 PROCEDURE — 1125F AMNT PAIN NOTED PAIN PRSNT: CPT | Mod: S$GLB,,, | Performed by: PHYSICAL MEDICINE & REHABILITATION

## 2020-07-01 PROCEDURE — 1159F PR MEDICATION LIST DOCUMENTED IN MEDICAL RECORD: ICD-10-PCS | Mod: S$GLB,,, | Performed by: PHYSICAL MEDICINE & REHABILITATION

## 2020-07-01 PROCEDURE — 1101F PT FALLS ASSESS-DOCD LE1/YR: CPT | Mod: CPTII,S$GLB,, | Performed by: PHYSICAL MEDICINE & REHABILITATION

## 2020-07-01 PROCEDURE — 1125F PR PAIN SEVERITY QUANTIFIED, PAIN PRESENT: ICD-10-PCS | Mod: S$GLB,,, | Performed by: PHYSICAL MEDICINE & REHABILITATION

## 2020-07-01 PROCEDURE — 99999 PR PBB SHADOW E&M-EST. PATIENT-LVL V: CPT | Mod: PBBFAC,,, | Performed by: PHYSICAL MEDICINE & REHABILITATION

## 2020-07-01 PROCEDURE — 1101F PR PT FALLS ASSESS DOC 0-1 FALLS W/OUT INJ PAST YR: ICD-10-PCS | Mod: CPTII,S$GLB,, | Performed by: PHYSICAL MEDICINE & REHABILITATION

## 2020-07-01 PROCEDURE — 1159F MED LIST DOCD IN RCRD: CPT | Mod: S$GLB,,, | Performed by: PHYSICAL MEDICINE & REHABILITATION

## 2020-07-01 PROCEDURE — 99204 OFFICE O/P NEW MOD 45 MIN: CPT | Mod: S$GLB,,, | Performed by: PHYSICAL MEDICINE & REHABILITATION

## 2020-07-01 PROCEDURE — 99999 PR PBB SHADOW E&M-EST. PATIENT-LVL V: ICD-10-PCS | Mod: PBBFAC,,, | Performed by: PHYSICAL MEDICINE & REHABILITATION

## 2020-07-01 NOTE — H&P (VIEW-ONLY)
New Patient Chronic Pain Note (Initial Visit)    Referring Physician: Crow Mesa*    PCP: Kavya Mesa MD    Chief Complaint:   Chief Complaint   Patient presents with    Low-back Pain        SUBJECTIVE:    Haim Martin is a 80 y.o. female who presents to the clinic for the evaluation of lower back pain.  She was referred by her primary care provider for further evaluation management of this pain.  Of note, patient has past medical history of depression/anxiety, migraine, hypertension, hyperlipidemia, thyroid disease, pre diabetes, diverticulosis, osteopenia, and multiple other medical comorbidities as listed below.  The pain started about 1 year ago without any injury or trauma and symptoms have been worsening.The pain is located in the lower lumbar area and radiates to the bilateral hips and buttocks, mostly right-sided.  The pain is described as Aching and is rated as 4/10. The pain is rated with a score of  1/10 on the BEST day and a score of 8/10 on the WORST day.  Symptoms interfere with daily activity. The pain is exacerbated by walking, prolonged standing, increased activity.  The pain is mitigated by rest and sitting. The patient reports spending less than 2 hours per day reclining. The patient reports 6-8 hours of uninterrupted sleep per night.    Patient denies night fever/night sweats, urinary incontinence, bowel incontinence, significant weight loss, significant motor weakness and loss of sensations.  She does report that she sustained a fall in October 2019 after tripping over her [id number] any major injuries.    Pain Disability Index Review:  Last 3 PDI Scores 7/1/2020   Pain Disability Index (PDI) 24       Non-Pharmacologic Treatments:  Physical Therapy/Home Exercise: yes  Ice/Heat:yes  TENS: no  Acupuncture: no  Massage: no  Chiropractic: no    Other: no      Pain Medications:  - Opioids: None  - Adjuvant Medications: Elavil, Zoloft, sumatriptan, Fosamax  -  Anti-Coagulants: Aspirin     report:  Reviewed and consistent with medication use as prescribed.        Pain Procedures:   Denies      Imaging:   X-ray lumbar spine 06/18/2020:  No scoliosis.  Bones are demineralized.  No acute fracture.  There are 6 non rib weight-bearing lumbar type vertebral bodies.  Mild grade 1 anterolisthesis of L5 on L6, stable in appearance.  Multilevel facet arthropathy.  Aortic atherosclerosis.  No significant interval change.    Past Medical History:   Diagnosis Date    Depression     Diverticulosis of colon (without mention of hemorrhage) 8/25/2014    Hyperlipidemia     Hypertension     Thyroid disease      Past Surgical History:   Procedure Laterality Date    APPENDECTOMY      CATARACT EXTRACTION EXTRACAPSULAR W/ INTRAOCULAR LENS IMPLANTATION Bilateral 4/2014    STOMACH SURGERY  1998    not sure what they did, possible bowel resection, partial gastrectomy    TONSILLECTOMY       Social History     Socioeconomic History    Marital status:      Spouse name: Not on file    Number of children: Not on file    Years of education: Not on file    Highest education level: Not on file   Occupational History    Not on file   Social Needs    Financial resource strain: Not on file    Food insecurity     Worry: Not on file     Inability: Not on file    Transportation needs     Medical: Not on file     Non-medical: Not on file   Tobacco Use    Smoking status: Former Smoker    Smokeless tobacco: Never Used    Tobacco comment: quit 30 years ago   Substance and Sexual Activity    Alcohol use: No    Drug use: No    Sexual activity: Not Currently     Birth control/protection: None   Lifestyle    Physical activity     Days per week: Not on file     Minutes per session: Not on file    Stress: Not on file   Relationships    Social connections     Talks on phone: Not on file     Gets together: Not on file     Attends Hinduism service: Not on file     Active member of club or  organization: Not on file     Attends meetings of clubs or organizations: Not on file     Relationship status: Not on file   Other Topics Concern    Not on file   Social History Narrative    Not on file     Family History   Problem Relation Age of Onset    Lupus Mother     Stroke Father     Coronary artery disease Father     Diabetes type II Father     Kidney cancer Maternal Aunt     Breast cancer Maternal Aunt        Review of patient's allergies indicates:   Allergen Reactions    Clindamycin      Heaviness in chest      Penicillins Hives       Current Outpatient Medications   Medication Sig    ACCU-CHEK MARIANO PLUS TEST STRP Strp USE 1 STRIP TO CHECK GLUCOSE ONCE DAILY    alendronate (FOSAMAX) 70 MG tablet Take 1 tablet (70 mg total) by mouth every 7 days.    amitriptyline (ELAVIL) 75 MG tablet Take 1 tablet (75 mg total) by mouth every evening.    atorvastatin (LIPITOR) 40 MG tablet Take 1 tablet by mouth in the evening    ergocalciferol (VITAMIN D2) 50,000 unit Cap Take 1 capsule (50,000 Units total) by mouth every 7 days.    lancets-blood glucose strips 30 gauge Cmpk 1 application by Misc.(Non-Drug; Combo Route) route once daily at 6am.    levothyroxine (SYNTHROID) 88 MCG tablet TAKE 1 TABLET BY MOUTH DAILY MONDAY THROUGH SATURDAY AND 1 AND 1/2 TABLETS BY MOUTH ON SUNDAY    omeprazole (PRILOSEC) 20 MG capsule Take 1 capsule (20 mg total) by mouth as needed.    potassium chloride SA (K-DUR,KLOR-CON) 20 MEQ tablet Take 1 tablet (20 mEq total) by mouth once daily.    propranoloL (INDERAL) 40 MG tablet Take 1 tablet (40 mg total) by mouth 2 (two) times daily.    sertraline (ZOLOFT) 100 MG tablet Take 1 tablet (100 mg total) by mouth once daily.    sumatriptan (IMITREX) 100 MG tablet TAKE 1 TABLET BY MOUTH AT ONSET OF MIGRAINE    sumatriptan (IMITREX) 100 MG tablet TAKE 1 TABLET BY MOUTH AT ONSET OF MIGRAINE    vit C/E/Zn/coppr/lutein/zeaxan (PRESERVISION AREDS-2 ORAL) Take 1 tablet by mouth 2  "(two) times a day.    VIT C/VIT E/LUTEIN/MIN/OMEGA-3 (OCUVITE ORAL) Take by mouth once daily.    aspirin (ECOTRIN) 81 MG EC tablet Take 1 tablet (81 mg total) by mouth once daily.    blood-glucose meter kit Use as instructed     No current facility-administered medications for this visit.        Review of Systems     GENERAL:  No weight loss, malaise or fevers.  HEENT:   No recent changes in vision or hearing  NECK:  Negative for lumps, no difficulty with swallowing.  RESPIRATORY:  Negative for cough, wheezing or shortness of breath, patient denies any recent URI.  CARDIOVASCULAR:  Negative for chest pain, leg swelling or palpitations.  GI:  Negative for abdominal discomfort, blood in stools or black stools or change in bowel habits.  MUSCULOSKELETAL:  See HPI.  SKIN:  Negative for lesions, rash, and itching.  PSYCH:  No mood disorder or recent psychosocial stressors.  Patients sleep is not disturbed secondary to pain.  HEMATOLOGY/LYMPHOLOGY:  Negative for prolonged bleeding, bruising easily or swollen nodes.  Patient is currently taking anti-coagulants - ASA  NEURO:   No history of headaches, syncope, paralysis, seizures or tremors.  All other reviewed and negative other than HPI.    OBJECTIVE:    /64 (BP Location: Right arm, Patient Position: Sitting, BP Method: Medium (Automatic))   Pulse 75   Resp 20   Ht 4' 11" (1.499 m)   Wt 59.9 kg (132 lb)   BMI 26.66 kg/m²         Physical Exam    GENERAL: Well appearing, in no acute distress, alert and oriented x3.  PSYCH:  Mood and affect appropriate.  SKIN: Skin color, texture, turgor normal, no rashes or lesions.  HEAD/FACE:  Normocephalic, atraumatic. Cranial nerves grossly intact.  CV: RRR with palpation of the radial artery.  PULM: No evidence of respiratory difficulty, symmetric chest rise.  GI:  Soft and non-tender.  BACK: Positive axial loading test bilateral. Positive tenderness over both SIJ, Positive FABERE,Ganselin and Yeoman's test on the both " side. Negative FADIR. Straight leg raising in the sitting and supine positions is negative to radicular pain.  Mild-to-moderate pain to palpation over the facet joints of the lumbar spine and lumbar paraspinals.  Mildly limited range of motion secondary to pain reproduction.  EXTREMITIES: Peripheral joint ROM is full and pain free without obvious instability or laxity in all four extremities. No deformities, edema, or skin discoloration. Good capillary refill.  MUSCULOSKELETAL: Shoulder, hip, and knee provocative maneuvers are negative.   Bilateral upper and lower extremity strength is normal and symmetric.  No atrophy or tone abnormalities are noted.  NEURO: Bilateral upper and lower extremity coordination and muscle stretch reflexes are physiologic and symmetric.  Plantar response are downgoing. No clonus.  No loss of sensation is noted.  GAIT: normal.      LABS:  Lab Results   Component Value Date    WBC 10.51 06/18/2020    HGB 12.7 06/18/2020    HCT 41.9 06/18/2020     (H) 06/18/2020     06/18/2020       CMP  Sodium   Date Value Ref Range Status   06/18/2020 141 136 - 145 mmol/L Final     Potassium   Date Value Ref Range Status   06/18/2020 3.0 (L) 3.5 - 5.1 mmol/L Final     Chloride   Date Value Ref Range Status   06/18/2020 105 95 - 110 mmol/L Final     CO2   Date Value Ref Range Status   06/18/2020 25 23 - 29 mmol/L Final     Glucose   Date Value Ref Range Status   06/18/2020 115 (H) 70 - 110 mg/dL Final     BUN, Bld   Date Value Ref Range Status   06/18/2020 12 8 - 23 mg/dL Final     Creatinine   Date Value Ref Range Status   06/18/2020 1.0 0.5 - 1.4 mg/dL Final     Calcium   Date Value Ref Range Status   06/18/2020 9.1 8.7 - 10.5 mg/dL Final     Total Protein   Date Value Ref Range Status   06/18/2020 7.8 6.0 - 8.4 g/dL Final     Albumin   Date Value Ref Range Status   06/18/2020 3.9 3.5 - 5.2 g/dL Final     Total Bilirubin   Date Value Ref Range Status   06/18/2020 0.4 0.1 - 1.0 mg/dL Final      Comment:     For infants and newborns, interpretation of results should be based  on gestational age, weight and in agreement with clinical  observations.  Premature Infant recommended reference ranges:  Up to 24 hours.............<8.0 mg/dL  Up to 48 hours............<12.0 mg/dL  3-5 days..................<15.0 mg/dL  6-29 days.................<15.0 mg/dL       Alkaline Phosphatase   Date Value Ref Range Status   06/18/2020 105 55 - 135 U/L Final     AST   Date Value Ref Range Status   06/18/2020 25 10 - 40 U/L Final     ALT   Date Value Ref Range Status   06/18/2020 22 10 - 44 U/L Final     Anion Gap   Date Value Ref Range Status   06/18/2020 11 8 - 16 mmol/L Final     eGFR if    Date Value Ref Range Status   06/18/2020 >60.0 >60 mL/min/1.73 m^2 Final     eGFR if non    Date Value Ref Range Status   06/18/2020 54.1 (A) >60 mL/min/1.73 m^2 Final     Comment:     Calculation used to obtain the estimated glomerular filtration  rate (eGFR) is the CKD-EPI equation.          Lab Results   Component Value Date    HGBA1C 5.6 06/18/2020             ASSESSMENT: 80 y.o. year old female with lower back pain, consistent with     1. Spondylolisthesis of lumbar region     2. Lumbar spondylosis  Ambulatory referral/consult to Pain Clinic    IR Facet Inj Lumbar 3rd Vert Bi    IR Facet Inj Lumbar 2nd Vert Bi    IR Facet Inj Lumbar 1st Vert Bi    Case Request-RAD/Other Procedure Area: Bilateral L3-5 MBB with local   3. DDD (degenerative disc disease), lumbar           PLAN:   - Interventions: Schedule for a diagnostic Medial branch block at bilateral L3,4, and L5 to help with axial low back pain and progress with a home exercise program.. Explained the risks and benefits of the procedure in detail with the patient today in clinic along with alternative treatment options, and the patient elected to pursue the intervention at this time.  If there is significant benefit with the lumbar medial branch  blocks, will schedule for lumbar RFA at the same levels    - Anticoagulation use: yes aspirin    - Medications: I have stressed the importance of physical activity and a home exercise plan to help with pain and improve health. and Patient can continue with medications for now since they are providing benefits, using them appropriately, and without side effects.     - Therapy:  Advised patient continue with activities and exercises as tolerated    - Labs:  Reviewed    - Imaging: Reviewed available imaging with patient and answered any questions they had regarding study.    - Consults/Referrals:  None at this time    - Records:  Reviewed/Obtain old records from outside physicians and imaging    - Follow up visit:  Will call following the diagnostic medial branch blocks to determine future plan of care    - Counseled patient regarding the importance of activity modification and physical therapy    - This condition does not require this patient to take time off of work, and the primary goal of our Pain Management services is to improve the patient's functional capacity.    - Patient Questions: Answered all of the patient's questions regarding diagnosis, therapy, and treatment        The above plan and management options were discussed at length with patient. Patient is in agreement with the above and verbalized understanding.    I discussed the goals of interventional chronic pain management with the patient on today's visit.  I explained the utility of injections for diagnostic and therapeutic purposes.  We discussed a multimodal approach to pain including treating the patient's given worst pain at any given time.  We will use a systematic approach to addressing pain.  We will also adopt a multimodal approach that includes injections, adjuvant medications, physical therapy, at times psychiatry.  There may be a limited role for opioid use intermittently in the treatment of pain, more particularly for acute pain although  no one approach can be used as a sole treatment modality.    I emphasized the importance of regular exercise, core strengthening and stretching, diet and weight loss as a cornerstone of long-term pain management.      Luis Ashraf MD  Interventional Pain Management  [facility]    Disclaimer:  This note was prepared using voice recognition system and is likely to have sound alike errors that may have been overlooked even after proof reading.  Please call me with any questions

## 2020-07-01 NOTE — PATIENT INSTRUCTIONS
Pain Management Pre-Procedure Instructions  (also available in your [employer] account)    Patient Name:___Haim Martin____MRN: [id number] you are scheduled to have the following procedure:__ Lumbar Medial Branch Block  _with______Luis Ashraf MD on: __07/16/2020_____ at: [facility]    You will be contacted the day before your procedure to be given an arrival and procedure time                                                                                                            Day of Procedure   Ensure you have obtained arrival time from the Pain Management department  o We will call 48 hours in advance with your arrival time. Please check any voicemails you may have  o If you arrive past your scheduled procedure time, you may be asked to reschedule your procedure.   For your safety, ensure you have a  with you to remain present throughout your procedure   o If you arrive without a responsible adult to stay with you and drive you home, you may be asked to reschedule your procedure   Take all of your prescribed medications (exceptions noted below) with a small amount of water  o Do not have to stop medications  o [x] Nothing by mouth after midnight the night before your procedure.  It is ok to take your regular medications with a small sip of water.     Wear loose, comfortable clothing    You may wear glasses, dentures, contact lenses and/or hearing aids. Please leave all valuable items at home.   Contact the Pain Management department at [phone] or via [employer] if you are:  o Running a fever above 100 degrees  o Feel ill, have any type of infection, or are taking antibiotics now or have in the past 2 weeks  o Have had any outpatient procedures in the past 2 weeks (colonoscopy, major dental work, etc.)  o If you are allergic to iodine, IVP dye or shellfish.      Contact Information: [phone], ask to speak to the pain management department with any questions or  concerns or send a message via [employer]

## 2020-07-01 NOTE — PROGRESS NOTES
New Patient Chronic Pain Note (Initial Visit)    Referring Physician: Crow Mesa*    PCP: Kavya Mesa MD    Chief Complaint:   Chief Complaint   Patient presents with    Low-back Pain        SUBJECTIVE:    Haim Martin is a 80 y.o. female who presents to the clinic for the evaluation of lower back pain.  She was referred by her primary care provider for further evaluation management of this pain.  Of note, patient has past medical history of depression/anxiety, migraine, hypertension, hyperlipidemia, thyroid disease, pre diabetes, diverticulosis, osteopenia, and multiple other medical comorbidities as listed below.  The pain started about 1 year ago without any injury or trauma and symptoms have been worsening.The pain is located in the lower lumbar area and radiates to the bilateral hips and buttocks, mostly right-sided.  The pain is described as Aching and is rated as 4/10. The pain is rated with a score of  1/10 on the BEST day and a score of 8/10 on the WORST day.  Symptoms interfere with daily activity. The pain is exacerbated by walking, prolonged standing, increased activity.  The pain is mitigated by rest and sitting. The patient reports spending less than 2 hours per day reclining. The patient reports 6-8 hours of uninterrupted sleep per night.    Patient denies night fever/night sweats, urinary incontinence, bowel incontinence, significant weight loss, significant motor weakness and loss of sensations.  She does report that she sustained a fall in October 2019 after tripping over her [id number] any major injuries.    Pain Disability Index Review:  Last 3 PDI Scores 7/1/2020   Pain Disability Index (PDI) 24       Non-Pharmacologic Treatments:  Physical Therapy/Home Exercise: yes  Ice/Heat:yes  TENS: no  Acupuncture: no  Massage: no  Chiropractic: no    Other: no      Pain Medications:  - Opioids: None  - Adjuvant Medications: Elavil, Zoloft, sumatriptan, Fosamax  -  Anti-Coagulants: Aspirin     report:  Reviewed and consistent with medication use as prescribed.        Pain Procedures:   Denies      Imaging:   X-ray lumbar spine 06/18/2020:  No scoliosis.  Bones are demineralized.  No acute fracture.  There are 6 non rib weight-bearing lumbar type vertebral bodies.  Mild grade 1 anterolisthesis of L5 on L6, stable in appearance.  Multilevel facet arthropathy.  Aortic atherosclerosis.  No significant interval change.    Past Medical History:   Diagnosis Date    Depression     Diverticulosis of colon (without mention of hemorrhage) 8/25/2014    Hyperlipidemia     Hypertension     Thyroid disease      Past Surgical History:   Procedure Laterality Date    APPENDECTOMY      CATARACT EXTRACTION EXTRACAPSULAR W/ INTRAOCULAR LENS IMPLANTATION Bilateral 4/2014    STOMACH SURGERY  1998    not sure what they did, possible bowel resection, partial gastrectomy    TONSILLECTOMY       Social History     Socioeconomic History    Marital status:      Spouse name: Not on file    Number of children: Not on file    Years of education: Not on file    Highest education level: Not on file   Occupational History    Not on file   Social Needs    Financial resource strain: Not on file    Food insecurity     Worry: Not on file     Inability: Not on file    Transportation needs     Medical: Not on file     Non-medical: Not on file   Tobacco Use    Smoking status: Former Smoker    Smokeless tobacco: Never Used    Tobacco comment: quit 30 years ago   Substance and Sexual Activity    Alcohol use: No    Drug use: No    Sexual activity: Not Currently     Birth control/protection: None   Lifestyle    Physical activity     Days per week: Not on file     Minutes per session: Not on file    Stress: Not on file   Relationships    Social connections     Talks on phone: Not on file     Gets together: Not on file     Attends Mandaen service: Not on file     Active member of club or  organization: Not on file     Attends meetings of clubs or organizations: Not on file     Relationship status: Not on file   Other Topics Concern    Not on file   Social History Narrative    Not on file     Family History   Problem Relation Age of Onset    Lupus Mother     Stroke Father     Coronary artery disease Father     Diabetes type II Father     Kidney cancer Maternal Aunt     Breast cancer Maternal Aunt        Review of patient's allergies indicates:   Allergen Reactions    Clindamycin      Heaviness in chest      Penicillins Hives       Current Outpatient Medications   Medication Sig    ACCU-CHEK MARIANO PLUS TEST STRP Strp USE 1 STRIP TO CHECK GLUCOSE ONCE DAILY    alendronate (FOSAMAX) 70 MG tablet Take 1 tablet (70 mg total) by mouth every 7 days.    amitriptyline (ELAVIL) 75 MG tablet Take 1 tablet (75 mg total) by mouth every evening.    atorvastatin (LIPITOR) 40 MG tablet Take 1 tablet by mouth in the evening    ergocalciferol (VITAMIN D2) 50,000 unit Cap Take 1 capsule (50,000 Units total) by mouth every 7 days.    lancets-blood glucose strips 30 gauge Cmpk 1 application by Misc.(Non-Drug; Combo Route) route once daily at 6am.    levothyroxine (SYNTHROID) 88 MCG tablet TAKE 1 TABLET BY MOUTH DAILY MONDAY THROUGH SATURDAY AND 1 AND 1/2 TABLETS BY MOUTH ON SUNDAY    omeprazole (PRILOSEC) 20 MG capsule Take 1 capsule (20 mg total) by mouth as needed.    potassium chloride SA (K-DUR,KLOR-CON) 20 MEQ tablet Take 1 tablet (20 mEq total) by mouth once daily.    propranoloL (INDERAL) 40 MG tablet Take 1 tablet (40 mg total) by mouth 2 (two) times daily.    sertraline (ZOLOFT) 100 MG tablet Take 1 tablet (100 mg total) by mouth once daily.    sumatriptan (IMITREX) 100 MG tablet TAKE 1 TABLET BY MOUTH AT ONSET OF MIGRAINE    sumatriptan (IMITREX) 100 MG tablet TAKE 1 TABLET BY MOUTH AT ONSET OF MIGRAINE    vit C/E/Zn/coppr/lutein/zeaxan (PRESERVISION AREDS-2 ORAL) Take 1 tablet by mouth 2  "(two) times a day.    VIT C/VIT E/LUTEIN/MIN/OMEGA-3 (OCUVITE ORAL) Take by mouth once daily.    aspirin (ECOTRIN) 81 MG EC tablet Take 1 tablet (81 mg total) by mouth once daily.    blood-glucose meter kit Use as instructed     No current facility-administered medications for this visit.        Review of Systems     GENERAL:  No weight loss, malaise or fevers.  HEENT:   No recent changes in vision or hearing  NECK:  Negative for lumps, no difficulty with swallowing.  RESPIRATORY:  Negative for cough, wheezing or shortness of breath, patient denies any recent URI.  CARDIOVASCULAR:  Negative for chest pain, leg swelling or palpitations.  GI:  Negative for abdominal discomfort, blood in stools or black stools or change in bowel habits.  MUSCULOSKELETAL:  See HPI.  SKIN:  Negative for lesions, rash, and itching.  PSYCH:  No mood disorder or recent psychosocial stressors.  Patients sleep is not disturbed secondary to pain.  HEMATOLOGY/LYMPHOLOGY:  Negative for prolonged bleeding, bruising easily or swollen nodes.  Patient is currently taking anti-coagulants - ASA  NEURO:   No history of headaches, syncope, paralysis, seizures or tremors.  All other reviewed and negative other than HPI.    OBJECTIVE:    /64 (BP Location: Right arm, Patient Position: Sitting, BP Method: Medium (Automatic))   Pulse 75   Resp 20   Ht 4' 11" (1.499 m)   Wt 59.9 kg (132 lb)   BMI 26.66 kg/m²         Physical Exam    GENERAL: Well appearing, in no acute distress, alert and oriented x3.  PSYCH:  Mood and affect appropriate.  SKIN: Skin color, texture, turgor normal, no rashes or lesions.  HEAD/FACE:  Normocephalic, atraumatic. Cranial nerves grossly intact.  CV: RRR with palpation of the radial artery.  PULM: No evidence of respiratory difficulty, symmetric chest rise.  GI:  Soft and non-tender.  BACK: Positive axial loading test bilateral. Positive tenderness over both SIJ, Positive FABERE,Ganselin and Yeoman's test on the both " side. Negative FADIR. Straight leg raising in the sitting and supine positions is negative to radicular pain.  Mild-to-moderate pain to palpation over the facet joints of the lumbar spine and lumbar paraspinals.  Mildly limited range of motion secondary to pain reproduction.  EXTREMITIES: Peripheral joint ROM is full and pain free without obvious instability or laxity in all four extremities. No deformities, edema, or skin discoloration. Good capillary refill.  MUSCULOSKELETAL: Shoulder, hip, and knee provocative maneuvers are negative.   Bilateral upper and lower extremity strength is normal and symmetric.  No atrophy or tone abnormalities are noted.  NEURO: Bilateral upper and lower extremity coordination and muscle stretch reflexes are physiologic and symmetric.  Plantar response are downgoing. No clonus.  No loss of sensation is noted.  GAIT: normal.      LABS:  Lab Results   Component Value Date    WBC 10.51 06/18/2020    HGB 12.7 06/18/2020    HCT 41.9 06/18/2020     (H) 06/18/2020     06/18/2020       CMP  Sodium   Date Value Ref Range Status   06/18/2020 141 136 - 145 mmol/L Final     Potassium   Date Value Ref Range Status   06/18/2020 3.0 (L) 3.5 - 5.1 mmol/L Final     Chloride   Date Value Ref Range Status   06/18/2020 105 95 - 110 mmol/L Final     CO2   Date Value Ref Range Status   06/18/2020 25 23 - 29 mmol/L Final     Glucose   Date Value Ref Range Status   06/18/2020 115 (H) 70 - 110 mg/dL Final     BUN, Bld   Date Value Ref Range Status   06/18/2020 12 8 - 23 mg/dL Final     Creatinine   Date Value Ref Range Status   06/18/2020 1.0 0.5 - 1.4 mg/dL Final     Calcium   Date Value Ref Range Status   06/18/2020 9.1 8.7 - 10.5 mg/dL Final     Total Protein   Date Value Ref Range Status   06/18/2020 7.8 6.0 - 8.4 g/dL Final     Albumin   Date Value Ref Range Status   06/18/2020 3.9 3.5 - 5.2 g/dL Final     Total Bilirubin   Date Value Ref Range Status   06/18/2020 0.4 0.1 - 1.0 mg/dL Final      Comment:     For infants and newborns, interpretation of results should be based  on gestational age, weight and in agreement with clinical  observations.  Premature Infant recommended reference ranges:  Up to 24 hours.............<8.0 mg/dL  Up to 48 hours............<12.0 mg/dL  3-5 days..................<15.0 mg/dL  6-29 days.................<15.0 mg/dL       Alkaline Phosphatase   Date Value Ref Range Status   06/18/2020 105 55 - 135 U/L Final     AST   Date Value Ref Range Status   06/18/2020 25 10 - 40 U/L Final     ALT   Date Value Ref Range Status   06/18/2020 22 10 - 44 U/L Final     Anion Gap   Date Value Ref Range Status   06/18/2020 11 8 - 16 mmol/L Final     eGFR if    Date Value Ref Range Status   06/18/2020 >60.0 >60 mL/min/1.73 m^2 Final     eGFR if non    Date Value Ref Range Status   06/18/2020 54.1 (A) >60 mL/min/1.73 m^2 Final     Comment:     Calculation used to obtain the estimated glomerular filtration  rate (eGFR) is the CKD-EPI equation.          Lab Results   Component Value Date    HGBA1C 5.6 06/18/2020             ASSESSMENT: 80 y.o. year old female with lower back pain, consistent with     1. Spondylolisthesis of lumbar region     2. Lumbar spondylosis  Ambulatory referral/consult to Pain Clinic    IR Facet Inj Lumbar 3rd Vert Bi    IR Facet Inj Lumbar 2nd Vert Bi    IR Facet Inj Lumbar 1st Vert Bi    Case Request-RAD/Other Procedure Area: Bilateral L3-5 MBB with local   3. DDD (degenerative disc disease), lumbar           PLAN:   - Interventions: Schedule for a diagnostic Medial branch block at bilateral L3,4, and L5 to help with axial low back pain and progress with a home exercise program.. Explained the risks and benefits of the procedure in detail with the patient today in clinic along with alternative treatment options, and the patient elected to pursue the intervention at this time.  If there is significant benefit with the lumbar medial branch  blocks, will schedule for lumbar RFA at the same levels    - Anticoagulation use: yes aspirin    - Medications: I have stressed the importance of physical activity and a home exercise plan to help with pain and improve health. and Patient can continue with medications for now since they are providing benefits, using them appropriately, and without side effects.     - Therapy:  Advised patient continue with activities and exercises as tolerated    - Labs:  Reviewed    - Imaging: Reviewed available imaging with patient and answered any questions they had regarding study.    - Consults/Referrals:  None at this time    - Records:  Reviewed/Obtain old records from outside physicians and imaging    - Follow up visit:  Will call following the diagnostic medial branch blocks to determine future plan of care    - Counseled patient regarding the importance of activity modification and physical therapy    - This condition does not require this patient to take time off of work, and the primary goal of our Pain Management services is to improve the patient's functional capacity.    - Patient Questions: Answered all of the patient's questions regarding diagnosis, therapy, and treatment        The above plan and management options were discussed at length with patient. Patient is in agreement with the above and verbalized understanding.    I discussed the goals of interventional chronic pain management with the patient on today's visit.  I explained the utility of injections for diagnostic and therapeutic purposes.  We discussed a multimodal approach to pain including treating the patient's given worst pain at any given time.  We will use a systematic approach to addressing pain.  We will also adopt a multimodal approach that includes injections, adjuvant medications, physical therapy, at times psychiatry.  There may be a limited role for opioid use intermittently in the treatment of pain, more particularly for acute pain although  no one approach can be used as a sole treatment modality.    I emphasized the importance of regular exercise, core strengthening and stretching, diet and weight loss as a cornerstone of long-term pain management.      Luis Ashraf MD  Interventional Pain Management  [facility]    Disclaimer:  This note was prepared using voice recognition system and is likely to have sound alike errors that may have been overlooked even after proof reading.  Please call me with any questions

## 2020-07-01 NOTE — LETTER
July 1, 2020      Kavya Mesa MD  [address] [facility] [address]           [facility]  [address] [facility] [address]  Phone: [phone]  Fax: [phone]          Patient: Haim Martin   MR Number: [id number]   YOB: 1940   Date of Visit: 7/1/2020       Dear Dr. Kavya Mesa:    Thank you for referring Haim Martin to me for evaluation. Attached you will find relevant portions of my assessment and plan of care.    If you have questions, please do not hesitate to call me. I look forward to following Haim Martin along with you.    Sincerely,    Luis Ashraf MD    Enclosure  CC:  No Recipients    If you would like to receive this communication electronically, please contact [email or web address] or [phone] to request more information on reportbrain Link access.    For providers and/or their staff who would like to refer a patient to [facility], please contact us through our one-stop-shop provider referral line, [facility], at [phone].    If you feel you have received this communication in error or would no longer like to receive these types of communications, please e-mail [email or web address]

## 2020-07-01 NOTE — TELEPHONE ENCOUNTER
Mrs zarina lam has an appt with Dr Ashraf today 7/1/2020 I have called the contact number to [facility] the visit several times I have walked the parking lot in search for the pt I have came up with nothing I will continue to try to contact the pt for the visit ... virgil

## 2020-07-09 NOTE — PRE-PROCEDURE INSTRUCTIONS
Spoke with patient regarding procedure scheduled on   Arrival time 0630  Has patient been sick with fever or on antibiotics within the last 7 days? no  Has patient received a vaccination within the last 7 days? no  Has the patient stopped all medications as directed? na  Does patient have a pacemaker and or defibrillator? no  Does the patient have a ride to and from procedure and someone reliable to remain with patient? Yes bri Villasenor  Is the patient diabetic? No, but has issues with hypoglycemia  Does the patient have sleep apnea? Or use O2 at home? No no  Is the patient receiving sedation? yes  Is the patient instructed to remain NPO beginning at midnight the night before their procedure? yes  Procedure location confirmed with patient? Yes  Covid testin [address]

## 2020-07-13 ENCOUNTER — LAB VISIT (OUTPATIENT)
Dept: URGENT CARE | Facility: CLINIC | Age: 80
End: 2020-07-13
Payer: MEDICARE

## 2020-07-13 DIAGNOSIS — Z03.818 ENCOUNTER FOR OBSERVATION FOR SUSPECTED EXPOSURE TO OTHER BIOLOGICAL AGENTS RULED OUT: ICD-10-CM

## 2020-07-13 PROCEDURE — U0003 INFECTIOUS AGENT DETECTION BY NUCLEIC ACID (DNA OR RNA); SEVERE ACUTE RESPIRATORY SYNDROME CORONAVIRUS 2 (SARS-COV-2) (CORONAVIRUS DISEASE [COVID-19]), AMPLIFIED PROBE TECHNIQUE, MAKING USE OF HIGH THROUGHPUT TECHNOLOGIES AS DESCRIBED BY CMS-2020-01-R: HCPCS

## 2020-07-15 ENCOUNTER — TELEPHONE (OUTPATIENT)
Dept: PAIN MEDICINE | Facility: CLINIC | Age: 80
End: 2020-07-15

## 2020-07-15 LAB — SARS-COV-2 RNA RESP QL NAA+PROBE: NOT DETECTED

## 2020-07-15 NOTE — TELEPHONE ENCOUNTER
----- Message from Rudy Gracia sent at 7/15/2020  4:41 PM CDT -----  Regarding: Patient  The patient is scheduled for a procedure on 07/16/2020 at 6:30 am. She has not gotten her COVID test results back and is unsure of what to do. Please call back at [phone] (home)

## 2020-07-15 NOTE — TELEPHONE ENCOUNTER
Informed pt her covid test was negative . Pt understood. All questions answered.   Alexis Boyd,MA [facility]

## 2020-07-16 ENCOUNTER — HOSPITAL ENCOUNTER (OUTPATIENT)
Facility: HOSPITAL | Age: 80
Discharge: HOME | End: 2020-07-16
Attending: PHYSICAL MEDICINE & REHABILITATION | Admitting: PHYSICAL MEDICINE & REHABILITATION
Payer: MEDICARE

## 2020-07-16 VITALS
TEMPERATURE: 98 F | WEIGHT: 134.13 LBS | HEIGHT: 59 IN | OXYGEN SATURATION: 100 % | SYSTOLIC BLOOD PRESSURE: 167 MMHG | DIASTOLIC BLOOD PRESSURE: 74 MMHG | RESPIRATION RATE: 18 BRPM | HEART RATE: 68 BPM | BODY MASS INDEX: 27.04 KG/M2

## 2020-07-16 DIAGNOSIS — M47.816 SPONDYLOSIS WITHOUT MYELOPATHY OR RADICULOPATHY, LUMBAR REGION: ICD-10-CM

## 2020-07-16 PROCEDURE — 64495 INJ PARAVERT F JNT L/S 3 LEV: CPT | Mod: 50 | Performed by: PHYSICAL MEDICINE & REHABILITATION

## 2020-07-16 PROCEDURE — 64494 INJ PARAVERT F JNT L/S 2 LEV: CPT | Mod: LT,,, | Performed by: PHYSICAL MEDICINE & REHABILITATION

## 2020-07-16 PROCEDURE — 63600175 PHARM REV CODE 636 W HCPCS: Performed by: PHYSICAL MEDICINE & REHABILITATION

## 2020-07-16 PROCEDURE — 99152 MOD SED SAME PHYS/QHP 5/>YRS: CPT | Performed by: PHYSICAL MEDICINE & REHABILITATION

## 2020-07-16 PROCEDURE — 64494 INJ PARAVERT F JNT L/S 2 LEV: CPT | Mod: 50 | Performed by: PHYSICAL MEDICINE & REHABILITATION

## 2020-07-16 PROCEDURE — S0020 INJECTION, BUPIVICAINE HYDRO: HCPCS | Performed by: PHYSICAL MEDICINE & REHABILITATION

## 2020-07-16 PROCEDURE — 64493 INJ PARAVERT F JNT L/S 1 LEV: CPT | Mod: 50 | Performed by: PHYSICAL MEDICINE & REHABILITATION

## 2020-07-16 PROCEDURE — 64493 INJ PARAVERT F JNT L/S 1 LEV: CPT | Mod: 50,,, | Performed by: PHYSICAL MEDICINE & REHABILITATION

## 2020-07-16 PROCEDURE — 64494 PR INJ DX/THER AGNT PARAVERT FACET JOINT,IMG GUIDE,LUMBAR/SAC, 2ND LEVEL: ICD-10-PCS | Mod: LT,,, | Performed by: PHYSICAL MEDICINE & REHABILITATION

## 2020-07-16 PROCEDURE — 25000003 PHARM REV CODE 250: Performed by: PHYSICAL MEDICINE & REHABILITATION

## 2020-07-16 PROCEDURE — 64493 PR INJ DX/THER AGNT PARAVERT FACET JOINT,IMG GUIDE,LUMBAR/SAC,1ST LVL: ICD-10-PCS | Mod: 50,,, | Performed by: PHYSICAL MEDICINE & REHABILITATION

## 2020-07-16 RX ORDER — ONDANSETRON 2 MG/ML
4 INJECTION INTRAMUSCULAR; INTRAVENOUS ONCE AS NEEDED
Status: DISCONTINUED | OUTPATIENT
Start: 2020-07-16 | End: 2022-01-21 | Stop reason: SDUPTHER

## 2020-07-16 RX ORDER — FENTANYL CITRATE 50 UG/ML
INJECTION, SOLUTION INTRAMUSCULAR; INTRAVENOUS
Status: DISCONTINUED | OUTPATIENT
Start: 2020-07-16 | End: 2020-07-16 | Stop reason: HOSPADM

## 2020-07-16 RX ORDER — MIDAZOLAM HYDROCHLORIDE 1 MG/ML
INJECTION, SOLUTION INTRAMUSCULAR; INTRAVENOUS
Status: DISCONTINUED | OUTPATIENT
Start: 2020-07-16 | End: 2020-07-16 | Stop reason: HOSPADM

## 2020-07-16 RX ORDER — LIDOCAINE HYDROCHLORIDE 10 MG/ML
INJECTION, SOLUTION EPIDURAL; INFILTRATION; INTRACAUDAL; PERINEURAL
Status: DISCONTINUED | OUTPATIENT
Start: 2020-07-16 | End: 2020-07-16 | Stop reason: HOSPADM

## 2020-07-16 RX ORDER — BUPIVACAINE HYDROCHLORIDE 5 MG/ML
INJECTION, SOLUTION EPIDURAL; INTRACAUDAL
Status: DISCONTINUED | OUTPATIENT
Start: 2020-07-16 | End: 2020-07-16 | Stop reason: HOSPADM

## 2020-07-16 NOTE — DISCHARGE INSTRUCTIONS

## 2020-07-16 NOTE — DISCHARGE SUMMARY
Discharge Note  Short Stay      SUMMARY     Admit Date: 7/16/2020    Attending Physician: Luis Ashraf MD        Discharge Physician: Luis Ashraf MD        Discharge Date: 7/16/2020 7:26 AM    Procedure(s) (LRB):  Bilateral L3-5 MBB with local (Bilateral)    Final Diagnosis: Lumbar spondylosis [M47.816]    Disposition: Home or self care    Patient Instructions:   Current Discharge Medication List      CONTINUE these medications which have NOT CHANGED    Details   amitriptyline (ELAVIL) 75 MG tablet Take 1 tablet (75 mg total) by mouth every evening.  Qty: 90 tablet, Refills: 0      aspirin (ECOTRIN) 81 MG EC tablet Take 1 tablet (81 mg total) by mouth once daily.  Qty: 1 tablet, Refills: 0      atorvastatin (LIPITOR) 40 MG tablet Take 1 tablet by mouth in the evening  Qty: 30 tablet, Refills: 0      levothyroxine (SYNTHROID) 88 MCG tablet TAKE 1 TABLET BY MOUTH DAILY MONDAY THROUGH SATURDAY AND 1 AND 1/2 TABLETS BY MOUTH ON SUNDAY  Qty: 45 tablet, Refills: 0      omeprazole (PRILOSEC) 20 MG capsule Take 1 capsule (20 mg total) by mouth as needed.  Qty: 90 capsule, Refills: 4      propranoloL (INDERAL) 40 MG tablet Take 1 tablet (40 mg total) by mouth 2 (two) times daily.  Qty: 180 tablet, Refills: 0    Comments: .      sertraline (ZOLOFT) 100 MG tablet Take 1 tablet (100 mg total) by mouth once daily.  Qty: 90 tablet, Refills: 0      !! sumatriptan (IMITREX) 100 MG tablet TAKE 1 TABLET BY MOUTH AT ONSET OF MIGRAINE  Qty: 9 tablet, Refills: 0      vit C/E/Zn/coppr/lutein/zeaxan (PRESERVISION AREDS-2 ORAL) Take 1 tablet by mouth 2 (two) times a day.      VIT C/VIT E/LUTEIN/MIN/OMEGA-3 (OCUVITE ORAL) Take by mouth once daily.      ACCU-CHEK MARIANO PLUS TEST STRP Strp USE 1 STRIP TO CHECK GLUCOSE ONCE DAILY  Qty: 50 strip, Refills: 1    Comments: Please consider 90 day supplies to promote better adherence      alendronate (FOSAMAX) 70 MG tablet Take 1 tablet (70 mg total) by mouth every 7 days.  Qty: 12  tablet, Refills: 3      blood-glucose meter kit Use as instructed  Qty: 1 each, Refills: 0      ergocalciferol (VITAMIN D2) 50,000 unit Cap Take 1 capsule (50,000 Units total) by mouth every 7 days.  Qty: 8 capsule, Refills: 0      lancets-blood glucose strips 30 gauge Cmpk 1 application by Misc.(Non-Drug; Combo Route) route once daily at 6am.  Qty: 90 each, Refills: 0      potassium chloride SA (K-DUR,KLOR-CON) 20 MEQ tablet Take 1 tablet (20 mEq total) by mouth once daily.  Qty: 3 tablet, Refills: 0      !! sumatriptan (IMITREX) 100 MG tablet TAKE 1 TABLET BY MOUTH AT ONSET OF MIGRAINE  Qty: 9 tablet, Refills: 0       !! - Potential duplicate medications found. Please discuss with provider.              Discharge Diagnosis: Lumbar spondylosis [M47.816]  Condition on Discharge: Stable with no complications to procedure   Diet on Discharge: Same as before.  Activity: as per instruction sheet.  Discharge to: Home with a responsible adult.  Follow up: 2-4 weeks       Please call the office if you experience any weakness or loss of sensation, fever > 101.5, pain uncontrolled with oral medications, persistent nausea/vomiting/or diarrhea, redness or drainage from the incisions, or any other worrisome concerns. If physician on call was not reached or could not communicate with our office for any reason please go to the nearest emergency department

## 2020-07-16 NOTE — PLAN OF CARE
Pt and son verbalized understanding of discharge instructions. Bandaids x 2 to lower back c/d/i. Patient has no pain, with no further questions at this time. Dr. Ashraf office will call pt tomorrow in regards to block procedure.  Patient stood at side of bed, walked steps with no new motor deficits. VSS on RA. Recovery care complete.

## 2020-07-16 NOTE — OP NOTE
LUMBAR Medial Branch Block Under Fluoroscopy  Time-out taken to identify patient and procedure side prior to starting the procedure.            07/16/2020                                                         PROCEDURE:  Bilateral medial branch block at the transverse processes at the level of L4, L5, Sacral ala    REASON FOR PROCEDURE: Lumbar spondylosis [M47.816]    PHYSICIAN: Luis Ashraf MD  ASSISTANTS: None    SEDATION: Conscious sedation provided by M.D    The patient was monitored with continuous pulse oximetry, EKG, and intermittent blood pressure monitors.  The patient was hemodynamically stable throughout the entire process was responsive to voice, and breathing spontaneously.  Supplemental O2 was provided at 2L/min via nasal cannula.  Patient was comfortable for the duration of the procedure. (See nurse documentation and case log for sedation time)    There was a total of 1mg IV Midazolam and 25mcg Fentanyl titrated for the procedure      MEDICATIONS INJECTED: 0.5% bupivicane, 1mL at each level    LOCAL ANESTHETIC USED: Xylocaine 1% 10ml    SEDATION MEDICATIONS: None    ESTIMATED BLOOD LOSS:  None.    COMPLICATIONS:  None.    TECHNIQUE: Laying in a prone position, the patient was prepped and draped in the usual sterile fashion using ChloraPrep and fenestrated drape.  The level was determined under fluoroscopic guidance.  Local anesthetic was given by going down to the hub of the 27-gauge 1.25in needle and raising a wheel.  A 22-gauge 3.5inch needle was introduced to the anatomic local of the medial branch at each of the above levels using fluoroscopy in the AP, oblique, and lateral views.  After negative aspiration, medication was injected slowly. The patient tolerated the procedure well.       The patient was monitored after the procedure.  Patient was given post procedure and discharge instructions to follow at home.  We will see the patient back in two weeks or the patient may call to inform of  status. The patient was discharged in a stable condition

## 2020-07-16 NOTE — INTERVAL H&P NOTE
The patient has been examined and the H&P has been reviewed:    I concur with the findings and no changes have occurred since H&P was written.    Anesthesia/Surgery risks, benefits and alternative options discussed and understood by patient/family.          There are no hospital problems to display for this patient.      Luis Ashraf MD  Interventional Pain Medicine  [facility] [facility]

## 2020-07-17 ENCOUNTER — TELEPHONE (OUTPATIENT)
Dept: PAIN MEDICINE | Facility: CLINIC | Age: 80
End: 2020-07-17

## 2020-07-17 NOTE — TELEPHONE ENCOUNTER
Tried to call to get % relief from procedure on 07/16. No answer. Left   Ernesto Parks MA  [facility]   [facility]

## 2020-07-20 DIAGNOSIS — M47.816 LUMBAR SPONDYLOSIS: Primary | ICD-10-CM

## 2020-07-21 ENCOUNTER — TELEPHONE (OUTPATIENT)
Dept: PAIN MEDICINE | Facility: CLINIC | Age: 80
End: 2020-07-21

## 2020-07-21 NOTE — TELEPHONE ENCOUNTER
Pt stated she would get with her son , to see what day she can come do repeat mbb . Gave her dates , she stated she would cb . Pt understood. All questions answered.   Alexis Boyd,MA [facility]

## 2020-07-27 ENCOUNTER — TELEPHONE (OUTPATIENT)
Dept: PAIN MEDICINE | Facility: CLINIC | Age: 80
End: 2020-07-27

## 2020-07-27 NOTE — TELEPHONE ENCOUNTER
Tried to call. No answer. Left   Alexis Boyd, MA [facility]   [facility]

## 2020-07-27 NOTE — TELEPHONE ENCOUNTER
----- Message from Betzaida Hercules sent at 7/27/2020 11:26 AM CDT -----  Patient called to schedule an procedure appointment specifically with Dr Ashraf  And wishes to speak with a nurse regarding this matter.          can be reached at [phone]    Thanks  KB

## 2020-07-29 ENCOUNTER — TELEPHONE (OUTPATIENT)
Dept: PAIN MEDICINE | Facility: CLINIC | Age: 80
End: 2020-07-29

## 2020-07-29 NOTE — TELEPHONE ENCOUNTER
----- Message from Katia Parks sent at 7/29/2020 11:22 AM CDT -----  Regarding: Patient Advice  Patient Advice:    Pt called to speak to the nurse regarding her care and would like a call back today    Pt can be reached at [phone]

## 2020-08-03 NOTE — PRE-PROCEDURE INSTRUCTIONS
Spoke with patient regarding procedure scheduled on 8/10  Arrival time AWAITING INSURANCE APPROVAL  Has patient been sick with fever or on antibiotics within the last 7 days? No  Has patient received a vaccination within the last 7 days? no  Has the patient stopped all medications as directed? NA  Does patient have a pacemaker and or defibrillator? no  Does the patient have a ride to and from procedure and someone reliable to remain with patient? Juan Villasenor  Is the patient diabetic? no  Does the patient have sleep apnea? Or use O2 at home? No and no   Is the patient receiving sedation? yes  Is the patient instructed to remain NPO beginning at midnight the night before their procedure? yes  Procedure location confirmed with patient? yes  Covid- Denies signs/symptoms

## 2020-08-04 RX ORDER — LEVOTHYROXINE SODIUM 88 UG/1
TABLET ORAL
Qty: 135 TABLET | Refills: 0 | Status: SHIPPED | OUTPATIENT
Start: 2020-08-04 | End: 2020-11-23

## 2020-08-05 NOTE — PRE-PROCEDURE INSTRUCTIONS
Insurance approved. Spoke with patient regarding procedure scheduled on 8/10 at the [facility]. Arrival time 0830. Verbalized understanding

## 2020-08-10 ENCOUNTER — HOSPITAL ENCOUNTER (OUTPATIENT)
Facility: HOSPITAL | Age: 80
Discharge: HOME | End: 2020-08-10
Attending: PHYSICAL MEDICINE & REHABILITATION | Admitting: PHYSICAL MEDICINE & REHABILITATION
Payer: MEDICARE

## 2020-08-10 VITALS
BODY MASS INDEX: 27.29 KG/M2 | WEIGHT: 135.38 LBS | TEMPERATURE: 98 F | OXYGEN SATURATION: 100 % | DIASTOLIC BLOOD PRESSURE: 76 MMHG | HEART RATE: 72 BPM | SYSTOLIC BLOOD PRESSURE: 172 MMHG | RESPIRATION RATE: 16 BRPM | HEIGHT: 59 IN

## 2020-08-10 DIAGNOSIS — M47.816 SPONDYLOSIS WITHOUT MYELOPATHY OR RADICULOPATHY, LUMBAR REGION: ICD-10-CM

## 2020-08-10 PROCEDURE — 64494 INJ PARAVERT F JNT L/S 2 LEV: CPT | Mod: 50 | Performed by: PHYSICAL MEDICINE & REHABILITATION

## 2020-08-10 PROCEDURE — 25000003 PHARM REV CODE 250: Performed by: PHYSICAL MEDICINE & REHABILITATION

## 2020-08-10 PROCEDURE — 64493 INJ PARAVERT F JNT L/S 1 LEV: CPT | Mod: 50,,, | Performed by: PHYSICAL MEDICINE & REHABILITATION

## 2020-08-10 PROCEDURE — 99152 MOD SED SAME PHYS/QHP 5/>YRS: CPT | Performed by: PHYSICAL MEDICINE & REHABILITATION

## 2020-08-10 PROCEDURE — S0020 INJECTION, BUPIVICAINE HYDRO: HCPCS | Performed by: PHYSICAL MEDICINE & REHABILITATION

## 2020-08-10 PROCEDURE — 63600175 PHARM REV CODE 636 W HCPCS: Performed by: PHYSICAL MEDICINE & REHABILITATION

## 2020-08-10 PROCEDURE — 64493 PR INJ DX/THER AGNT PARAVERT FACET JOINT,IMG GUIDE,LUMBAR/SAC,1ST LVL: ICD-10-PCS | Mod: 50,,, | Performed by: PHYSICAL MEDICINE & REHABILITATION

## 2020-08-10 PROCEDURE — 64494 PR INJ DX/THER AGNT PARAVERT FACET JOINT,IMG GUIDE,LUMBAR/SAC, 2ND LEVEL: ICD-10-PCS | Mod: 50,,, | Performed by: PHYSICAL MEDICINE & REHABILITATION

## 2020-08-10 PROCEDURE — 64494 INJ PARAVERT F JNT L/S 2 LEV: CPT | Mod: 50,,, | Performed by: PHYSICAL MEDICINE & REHABILITATION

## 2020-08-10 PROCEDURE — 64493 INJ PARAVERT F JNT L/S 1 LEV: CPT | Mod: 50 | Performed by: PHYSICAL MEDICINE & REHABILITATION

## 2020-08-10 RX ORDER — BUPIVACAINE HYDROCHLORIDE 5 MG/ML
INJECTION, SOLUTION EPIDURAL; INTRACAUDAL
Status: DISCONTINUED | OUTPATIENT
Start: 2020-08-10 | End: 2020-08-10 | Stop reason: HOSPADM

## 2020-08-10 RX ORDER — MIDAZOLAM HYDROCHLORIDE 1 MG/ML
INJECTION, SOLUTION INTRAMUSCULAR; INTRAVENOUS
Status: DISCONTINUED | OUTPATIENT
Start: 2020-08-10 | End: 2020-08-10 | Stop reason: HOSPADM

## 2020-08-10 RX ORDER — ONDANSETRON 2 MG/ML
4 INJECTION INTRAMUSCULAR; INTRAVENOUS ONCE AS NEEDED
Status: DISCONTINUED | OUTPATIENT
Start: 2020-08-10 | End: 2020-08-10 | Stop reason: HOSPADM

## 2020-08-10 RX ORDER — LIDOCAINE HYDROCHLORIDE 10 MG/ML
INJECTION, SOLUTION EPIDURAL; INFILTRATION; INTRACAUDAL; PERINEURAL
Status: DISCONTINUED | OUTPATIENT
Start: 2020-08-10 | End: 2020-08-10 | Stop reason: HOSPADM

## 2020-08-10 NOTE — OP NOTE
LUMBAR Medial Branch Block Under Fluoroscopy  Time-out taken to identify patient and procedure side prior to starting the procedure.            08/10/2020                                                         PROCEDURE:  Bilateral medial branch block at the transverse processes at the level of L4, L5, Sacral ala    REASON FOR PROCEDURE: Lumbar spondylosis [M47.816]    PHYSICIAN: Luis Ashraf MD  ASSISTANTS: None    SEDATION MEDICATIONS: Conscious sedation provided by M.D    The patient was monitored with continuous pulse oximetry, EKG, and intermittent blood pressure monitors.  The patient was hemodynamically stable throughout the entire process was responsive to voice, and breathing spontaneously.  Supplemental O2 was provided at 2L/min via nasal cannula.  Patient was comfortable for the duration of the procedure. (See nurse documentation and case log for sedation time)    There was a total of 1mg IV Midazolam and 0mcg Fentanyl titrated for the procedure    MEDICATIONS INJECTED: 0.5% bupivicane, 1mL at each level    LOCAL ANESTHETIC USED: Xylocaine 1% 10ml      ESTIMATED BLOOD LOSS:  None.    COMPLICATIONS:  None.    TECHNIQUE: Laying in a prone position, the patient was prepped and draped in the usual sterile fashion using ChloraPrep and fenestrated drape.  The level was determined under fluoroscopic guidance.  Local anesthetic was given by going down to the hub of the 27-gauge 1.25in needle and raising a wheel.  A 22-gauge 3.5inch needle was introduced to the anatomic local of the medial branch at each of the above levels using fluoroscopy in the AP, oblique, and lateral views.  After negative aspiration, medication was injected slowly. The patient tolerated the procedure well.       The patient was monitored after the procedure.  Patient was given post procedure and discharge instructions to follow at home.  We will see the patient back in two weeks or the patient may call to inform of status. The patient  was discharged in a stable condition

## 2020-08-10 NOTE — DISCHARGE SUMMARY
Discharge Note  Short Stay      SUMMARY     Admit Date: 8/10/2020    Attending Physician: Luis Ashraf MD        Discharge Physician: Luis Ashraf MD        Discharge Date: 8/10/2020 9:14 AM    Procedure(s) (LRB):  Bilateral L3-5 MBB with local (Bilateral)    Final Diagnosis: Lumbar spondylosis [M47.816]    Disposition: Home or self care    Patient Instructions:   Current Discharge Medication List      CONTINUE these medications which have NOT CHANGED    Details   ACCU-CHEK MARIANO PLUS TEST STRP Strp USE 1 STRIP TO CHECK GLUCOSE ONCE DAILY  Qty: 50 strip, Refills: 1    Comments: Please consider 90 day supplies to promote better adherence      alendronate (FOSAMAX) 70 MG tablet Take 1 tablet (70 mg total) by mouth every 7 days.  Qty: 12 tablet, Refills: 3      amitriptyline (ELAVIL) 75 MG tablet Take 1 tablet (75 mg total) by mouth every evening.  Qty: 90 tablet, Refills: 0      aspirin (ECOTRIN) 81 MG EC tablet Take 1 tablet (81 mg total) by mouth once daily.  Qty: 1 tablet, Refills: 0      atorvastatin (LIPITOR) 40 MG tablet Take 1 tablet by mouth in the evening  Qty: 30 tablet, Refills: 0      blood-glucose meter kit Use as instructed  Qty: 1 each, Refills: 0      ergocalciferol (VITAMIN D2) 50,000 unit Cap Take 1 capsule (50,000 Units total) by mouth every 7 days.  Qty: 8 capsule, Refills: 0      lancets-blood glucose strips 30 gauge Cmpk 1 application by Misc.(Non-Drug; Combo Route) route once daily at 6am.  Qty: 90 each, Refills: 0      levothyroxine (SYNTHROID) 88 MCG tablet TAKE 1 TABLET BY MOUTH DAILY MONDAY THROUGH SATURDAY AND 1 AND 1/2 TABLETS BY MOUTH ON SUNDAY  Qty: 135 tablet, Refills: 0      omeprazole (PRILOSEC) 20 MG capsule Take 1 capsule (20 mg total) by mouth as needed.  Qty: 90 capsule, Refills: 4      potassium chloride SA (K-DUR,KLOR-CON) 20 MEQ tablet Take 1 tablet (20 mEq total) by mouth once daily.  Qty: 3 tablet, Refills: 0      propranoloL (INDERAL) 40 MG tablet Take 1 tablet (40  mg total) by mouth 2 (two) times daily.  Qty: 180 tablet, Refills: 0    Comments: .      sertraline (ZOLOFT) 100 MG tablet Take 1 tablet (100 mg total) by mouth once daily.  Qty: 90 tablet, Refills: 0      !! sumatriptan (IMITREX) 100 MG tablet TAKE 1 TABLET BY MOUTH AT ONSET OF MIGRAINE  Qty: 9 tablet, Refills: 0      !! sumatriptan (IMITREX) 100 MG tablet TAKE 1 TABLET BY MOUTH AT ONSET OF MIGRAINE  Qty: 9 tablet, Refills: 0      vit C/E/Zn/coppr/lutein/zeaxan (PRESERVISION AREDS-2 ORAL) Take 1 tablet by mouth 2 (two) times a day.      VIT C/VIT E/LUTEIN/MIN/OMEGA-3 (OCUVITE ORAL) Take by mouth once daily.       !! - Potential duplicate medications found. Please discuss with provider.              Discharge Diagnosis: Lumbar spondylosis [M47.816]  Condition on Discharge: Stable with no complications to procedure   Diet on Discharge: Same as before.  Activity: as per instruction sheet.  Discharge to: Home with a responsible adult.  Follow up: 2-4 weeks       Please call the office if you experience any weakness or loss of sensation, fever > 101.5, pain uncontrolled with oral medications, persistent nausea/vomiting/or diarrhea, redness or drainage from the incisions, or any other worrisome concerns. If physician on call was not reached or could not communicate with our office for any reason please go to the nearest emergency department

## 2020-08-10 NOTE — H&P
"HPI  Patient presenting for Procedure(s) (LRB):  Bilateral L3-5 MBB with local (Bilateral)     Patient on Anti-coagulation Yes, ASA - no need to hold    No health changes since previous encounter    Past Medical History:   Diagnosis Date    Depression     Diverticulosis of colon (without mention of hemorrhage) 8/25/2014    Hyperlipidemia     Hypertension     Thyroid disease      Past Surgical History:   Procedure Laterality Date    APPENDECTOMY      CATARACT EXTRACTION EXTRACAPSULAR W/ INTRAOCULAR LENS IMPLANTATION Bilateral 4/2014    INJECTION OF ANESTHETIC AGENT AROUND MEDIAL BRANCH NERVES INNERVATING LUMBAR FACET JOINT Bilateral 7/16/2020    Procedure: Bilateral L3-5 MBB with local;  Surgeon: Luis Ashraf MD;  Location: [facility];  Service: Pain Management;  Laterality: Bilateral;    STOMACH SURGERY  1998    not sure what they did, possible bowel resection, partial gastrectomy    TONSILLECTOMY       Review of patient's allergies indicates:   Allergen Reactions    Clindamycin      Heaviness in chest      Penicillins Hives      No current facility-administered medications for this encounter.      Facility-Administered Medications Ordered in Other Encounters   Medication    ondansetron injection 4 mg       PMHx, PSHx, Allergies, Medications reviewed in epic    ROS negative except pain complaints in HPI    OBJECTIVE:    BP (!) 159/79 (BP Location: Right arm, Patient Position: Sitting)   Pulse 81   Temp 98.1 °F (36.7 °C) (Temporal)   Resp 18   Ht 4' 11" (1.499 m)   Wt 61.4 kg (135 lb 5.8 oz)   SpO2 98%   Breastfeeding No   BMI 27.34 kg/m²     PHYSICAL EXAMINATION:    GENERAL: Well appearing, in no acute distress, alert and oriented x3.  PSYCH:  Mood and affect appropriate.  SKIN: Skin color, texture, turgor normal, no rashes or lesions which will impact the procedure.  CV: RRR with palpation of the radial artery.  PULM: No evidence of respiratory difficulty, symmetric chest rise. Clear to " auscultation.  NEURO: Cranial nerves grossly intact.    Plan:    Proceed with procedure as planned Procedure(s) (LRB):  Bilateral L3-5 MBB with local (Bilateral)    Luis Ashraf MD  08/10/2020

## 2020-08-10 NOTE — PLAN OF CARE
Discharge instructions reviewed with patient, verbalized understanding. 4 injection sites to lower back; clean, dry and intact. Voiced no complaints. Vital signs stable. Discharging home with ride.

## 2020-08-11 ENCOUNTER — TELEPHONE (OUTPATIENT)
Dept: PAIN MEDICINE | Facility: CLINIC | Age: 80
End: 2020-08-11

## 2020-08-11 DIAGNOSIS — M47.816 LUMBAR SPONDYLOSIS: ICD-10-CM

## 2020-08-11 DIAGNOSIS — M43.16 SPONDYLOLISTHESIS OF LUMBAR REGION: Primary | ICD-10-CM

## 2020-08-11 NOTE — TELEPHONE ENCOUNTER
Tried to call. No answer. No  vm set up   Alexis Boyd, MA [facility]   [facility]

## 2020-08-11 NOTE — TELEPHONE ENCOUNTER
Patient Name:___________Haim Dickerodianelys______________________MRN:  [id number]       underwent the following procedure:__Bilateral L3-5 ________ Lumbar Medial Branch Block  _with______Luis Ashraf MD on: _08/10/2020_____________________ and  received ____90________________% RELIEF.

## 2020-08-11 NOTE — TELEPHONE ENCOUNTER
Good morning,  Dr. Ashraf would like to perform a lumbar RFA , but patient is on aspirin and would need to hold all asa products 7 days prior, along with any other blood thinners. Please advise.

## 2020-08-17 ENCOUNTER — TELEPHONE (OUTPATIENT)
Dept: PAIN MEDICINE | Facility: CLINIC | Age: 80
End: 2020-08-17

## 2020-08-17 NOTE — TELEPHONE ENCOUNTER
Tried to call to set up procedure . No answer. Left   Ernesto Parks, MA [facility]   [facility]

## 2020-08-20 ENCOUNTER — TELEPHONE (OUTPATIENT)
Dept: PAIN MEDICINE | Facility: CLINIC | Age: 80
End: 2020-08-20

## 2020-08-20 NOTE — TELEPHONE ENCOUNTER
Made appt with  to discuss RFA in detail with  . Pt was asking detailed questions , so made appt with  to further address all her questions .

## 2020-09-02 ENCOUNTER — PATIENT OUTREACH (OUTPATIENT)
Dept: ADMINISTRATIVE | Facility: OTHER | Age: 80
End: 2020-09-02

## 2020-09-02 ENCOUNTER — TELEPHONE (OUTPATIENT)
Dept: PAIN MEDICINE | Facility: CLINIC | Age: 80
End: 2020-09-02

## 2020-09-02 ENCOUNTER — OFFICE VISIT (OUTPATIENT)
Dept: PAIN MEDICINE | Facility: CLINIC | Age: 80
End: 2020-09-02
Payer: MEDICARE

## 2020-09-02 VITALS
HEART RATE: 92 BPM | BODY MASS INDEX: 27.21 KG/M2 | RESPIRATION RATE: 18 BRPM | HEIGHT: 59 IN | WEIGHT: 135 LBS | DIASTOLIC BLOOD PRESSURE: 80 MMHG | SYSTOLIC BLOOD PRESSURE: 139 MMHG

## 2020-09-02 DIAGNOSIS — M51.36 DDD (DEGENERATIVE DISC DISEASE), LUMBAR: ICD-10-CM

## 2020-09-02 DIAGNOSIS — M43.16 SPONDYLOLISTHESIS OF LUMBAR REGION: ICD-10-CM

## 2020-09-02 DIAGNOSIS — M47.816 LUMBAR SPONDYLOSIS: Primary | ICD-10-CM

## 2020-09-02 PROCEDURE — 1159F PR MEDICATION LIST DOCUMENTED IN MEDICAL RECORD: ICD-10-PCS | Mod: S$GLB,,, | Performed by: PHYSICAL MEDICINE & REHABILITATION

## 2020-09-02 PROCEDURE — 1126F PR PAIN SEVERITY QUANTIFIED, NO PAIN PRESENT: ICD-10-PCS | Mod: S$GLB,,, | Performed by: PHYSICAL MEDICINE & REHABILITATION

## 2020-09-02 PROCEDURE — 1126F AMNT PAIN NOTED NONE PRSNT: CPT | Mod: S$GLB,,, | Performed by: PHYSICAL MEDICINE & REHABILITATION

## 2020-09-02 PROCEDURE — 1101F PR PT FALLS ASSESS DOC 0-1 FALLS W/OUT INJ PAST YR: ICD-10-PCS | Mod: CPTII,S$GLB,, | Performed by: PHYSICAL MEDICINE & REHABILITATION

## 2020-09-02 PROCEDURE — 99212 PR OFFICE/OUTPT VISIT, EST, LEVL II, 10-19 MIN: ICD-10-PCS | Mod: S$GLB,,, | Performed by: PHYSICAL MEDICINE & REHABILITATION

## 2020-09-02 PROCEDURE — 99999 PR PBB SHADOW E&M-EST. PATIENT-LVL V: ICD-10-PCS | Mod: PBBFAC,,, | Performed by: PHYSICAL MEDICINE & REHABILITATION

## 2020-09-02 PROCEDURE — 1101F PT FALLS ASSESS-DOCD LE1/YR: CPT | Mod: CPTII,S$GLB,, | Performed by: PHYSICAL MEDICINE & REHABILITATION

## 2020-09-02 PROCEDURE — 99212 OFFICE O/P EST SF 10 MIN: CPT | Mod: S$GLB,,, | Performed by: PHYSICAL MEDICINE & REHABILITATION

## 2020-09-02 PROCEDURE — 99999 PR PBB SHADOW E&M-EST. PATIENT-LVL V: CPT | Mod: PBBFAC,,, | Performed by: PHYSICAL MEDICINE & REHABILITATION

## 2020-09-02 PROCEDURE — 1159F MED LIST DOCD IN RCRD: CPT | Mod: S$GLB,,, | Performed by: PHYSICAL MEDICINE & REHABILITATION

## 2020-09-02 NOTE — TELEPHONE ENCOUNTER
Good morning,  Dr. Ashraf would like to perform a Lumbar RFA, but patient is on low-dose Aspirin. And we need to hold it 7 days prior the procedure. Please, advise.    Sincerely,  Tia Munoz CMA

## 2020-09-02 NOTE — PROGRESS NOTES
Established Patient Chronic Pain Note (Follow up visit)    Chief Complaint:   Chief Complaint   Patient presents with    Low-back Pain       SUBJECTIVE:  Hami Martin is a 80 y.o. female who presents to the clinic for a follow-up appointment for lower back pain.  She is status post bilateral L3-5 medial branch blocks on 07/16/2020 and 08/10/2020 that provided 100% and 90% relief respectively.  Since the last visit, Haim Martin states the pain has been persistant. Current pain intensity is 0/10 currently, but her pain will increase to 8/10 when she is more active.  She locates the pain in the same lower lumbar area with radiation into the buttock bilaterally..    Patient denies night fever/night sweats, urinary incontinence, bowel incontinence, significant weight loss, significant motor weakness and loss of sensations.  She does report that she sustained a fall in October 2019 after tripping over her [id number] any major injuries.    Pain Disability Index Review:  Last 3 PDI Scores 9/2/2020 7/1/2020   Pain Disability Index (PDI) 33 24     Initial HPI 07/01/2020:  Haim Martin is a 80 y.o. female who presents to the clinic for the evaluation of lower back pain.  She was referred by her primary care provider for further evaluation management of this pain.  Of note, patient has past medical history of depression/anxiety, migraine, hypertension, hyperlipidemia, thyroid disease, pre diabetes, diverticulosis, osteopenia, and multiple other medical comorbidities as listed below.  The pain started about 1 year ago without any injury or trauma and symptoms have been worsening.The pain is located in the lower lumbar area and radiates to the bilateral hips and buttocks, mostly right-sided.  The pain is described as Aching and is rated as 4/10. The pain is rated with a score of  1/10 on the BEST day and a score of 8/10 on the WORST day.  Symptoms interfere with daily activity. The pain is exacerbated  by walking, prolonged standing, increased activity.  The pain is mitigated by rest and sitting. The patient reports spending less than 2 hours per day reclining. The patient reports 6-8 hours of uninterrupted sleep per night.        Non-Pharmacologic Treatments:  Physical Therapy/Home Exercise: yes  Ice/Heat:yes  TENS: no  Acupuncture: no  Massage: no  Chiropractic: no    Other: no        Pain Medications:  - Opioids: None  - Adjuvant Medications: Elavil, Zoloft, sumatriptan, Fosamax  - Anti-Coagulants: Aspirin      report:  Reviewed and consistent with medication use as prescribed.          Pain Procedures:   -07/16/2020 and 08/10/2020:  Lumbar medial branch blocks bilateral L3-5, 100% and 90% relief        Imaging:   X-ray lumbar spine 06/18/2020:  No scoliosis.  Bones are demineralized.  No acute fracture.  There are 6 non rib weight-bearing lumbar type vertebral bodies.  Mild grade 1 anterolisthesis of L5 on L6, stable in appearance.  Multilevel facet arthropathy.  Aortic atherosclerosis.  No significant interval change.        PMHx,PSHx, Social history, and Family history:  I have reviewed the patient's medical, surgical, social, and family history in detail and updated the computerized patient record.        Review of patient's allergies indicates:   Allergen Reactions    Clindamycin      Heaviness in chest      Penicillins Hives       Current Outpatient Medications   Medication Sig    ACCU-CHEK MARIANO PLUS TEST STRP Strp USE 1 STRIP TO CHECK GLUCOSE ONCE DAILY    alendronate (FOSAMAX) 70 MG tablet Take 1 tablet (70 mg total) by mouth every 7 days.    amitriptyline (ELAVIL) 75 MG tablet Take 1 tablet (75 mg total) by mouth every evening.    atorvastatin (LIPITOR) 40 MG tablet Take 1 tablet by mouth in the evening    lancets-blood glucose strips 30 gauge Cmpk 1 application by Misc.(Non-Drug; Combo Route) route once daily at 6am.    levothyroxine (SYNTHROID) 88 MCG tablet TAKE 1 TABLET BY MOUTH DAILY  MONDAY THROUGH SATURDAY AND 1 AND 1/2 TABLETS BY MOUTH ON SUNDAY    omeprazole (PRILOSEC) 20 MG capsule Take 1 capsule (20 mg total) by mouth as needed.    propranoloL (INDERAL) 40 MG tablet Take 1 tablet (40 mg total) by mouth 2 (two) times daily.    sertraline (ZOLOFT) 100 MG tablet Take 1 tablet (100 mg total) by mouth once daily.    sumatriptan (IMITREX) 100 MG tablet TAKE 1 TABLET BY MOUTH AT ONSET OF MIGRAINE    sumatriptan (IMITREX) 100 MG tablet TAKE 1 TABLET BY MOUTH AT ONSET OF MIGRAINE    vit C/E/Zn/coppr/lutein/zeaxan (PRESERVISION AREDS-2 ORAL) Take 1 tablet by mouth 2 (two) times a day.    VIT C/VIT E/LUTEIN/MIN/OMEGA-3 (OCUVITE ORAL) Take by mouth once daily.    VITAMIN D2 1,250 mcg (50,000 unit) capsule Take 1 capsule by mouth once a week    aspirin (ECOTRIN) 81 MG EC tablet Take 1 tablet (81 mg total) by mouth once daily.    blood-glucose meter kit Use as instructed    potassium chloride SA (K-DUR,KLOR-CON) 20 MEQ tablet Take 1 tablet (20 mEq total) by mouth once daily.     No current facility-administered medications for this visit.      Facility-Administered Medications Ordered in Other Visits   Medication    ondansetron injection 4 mg         REVIEW OF SYSTEMS:    GENERAL:  No weight loss, malaise or fevers.  HEENT:   No recent changes in vision or hearing  NECK:  Negative for lumps, no difficulty with swallowing.  RESPIRATORY:  Negative for cough, wheezing or shortness of breath, patient denies any recent URI.  CARDIOVASCULAR:  Negative for chest pain, leg swelling or palpitations.  GI:  Negative for abdominal discomfort, blood in stools or black stools or change in bowel habits.  MUSCULOSKELETAL:  See HPI.  SKIN:  Negative for lesions, rash, and itching.  PSYCH:  No mood disorder or recent psychosocial stressors.  Patients sleep is not disturbed secondary to pain.  HEMATOLOGY/LYMPHOLOGY:  Negative for prolonged bleeding, bruising easily or swollen nodes.  Patient is currently taking  "anti-coagulants - ASA  NEURO:   No history of headaches, syncope, paralysis, seizures or tremors.  All other reviewed and negative other than HPI.    OBJECTIVE:    /80 (BP Location: Right arm, Patient Position: Sitting, BP Method: Medium (Automatic))   Pulse 92   Resp 18   Ht 4' 11" (1.499 m)   Wt 61.2 kg (135 lb)   BMI 27.27 kg/m²     PHYSICAL EXAMINATION:    GENERAL: Well appearing, in no acute distress, alert and oriented x3.  PSYCH:  Mood and affect appropriate.  SKIN: Skin color, texture, turgor normal, no rashes or lesions.  HEAD/FACE:  Normocephalic, atraumatic. Cranial nerves grossly intact.  CV: RRR with palpation of the radial artery.  PULM: No evidence of respiratory difficulty, symmetric chest rise.  GI:  Soft and non-tender.  BACK: Positive axial loading test bilateral. Positive tenderness over both SIJ, Positive FABERE,Ganselin and Yeoman's test on the both side. Negative FADIR. Straight leg raising in the sitting and supine positions is negative to radicular pain.  Mild-to-moderate pain to palpation over the facet joints of the lumbar spine and lumbar paraspinals.  Mildly limited range of motion secondary to pain reproduction.  EXTREMITIES: Peripheral joint ROM is full and pain free without obvious instability or laxity in all four extremities. No deformities, edema, or skin discoloration. Good capillary refill.  MUSCULOSKELETAL: Shoulder, hip, and knee provocative maneuvers are negative.   Bilateral upper and lower extremity strength is normal and symmetric.  No atrophy or tone abnormalities are noted.  NEURO: Bilateral upper and lower extremity coordination and muscle stretch reflexes are physiologic and symmetric.  Plantar response are downgoing. No clonus.  No loss of sensation is noted.  GAIT: normal.      LABS:  Lab Results   Component Value Date    WBC 10.51 06/18/2020    HGB 12.7 06/18/2020    HCT 41.9 06/18/2020     (H) 06/18/2020     06/18/2020       CMP  Sodium "   Date Value Ref Range Status   06/18/2020 141 136 - 145 mmol/L Final     Potassium   Date Value Ref Range Status   06/18/2020 3.0 (L) 3.5 - 5.1 mmol/L Final     Chloride   Date Value Ref Range Status   06/18/2020 105 95 - 110 mmol/L Final     CO2   Date Value Ref Range Status   06/18/2020 25 23 - 29 mmol/L Final     Glucose   Date Value Ref Range Status   06/18/2020 115 (H) 70 - 110 mg/dL Final     BUN, Bld   Date Value Ref Range Status   06/18/2020 12 8 - 23 mg/dL Final     Creatinine   Date Value Ref Range Status   06/18/2020 1.0 0.5 - 1.4 mg/dL Final     Calcium   Date Value Ref Range Status   06/18/2020 9.1 8.7 - 10.5 mg/dL Final     Total Protein   Date Value Ref Range Status   06/18/2020 7.8 6.0 - 8.4 g/dL Final     Albumin   Date Value Ref Range Status   06/18/2020 3.9 3.5 - 5.2 g/dL Final     Total Bilirubin   Date Value Ref Range Status   06/18/2020 0.4 0.1 - 1.0 mg/dL Final     Comment:     For infants and newborns, interpretation of results should be based  on gestational age, weight and in agreement with clinical  observations.  Premature Infant recommended reference ranges:  Up to 24 hours.............<8.0 mg/dL  Up to 48 hours............<12.0 mg/dL  3-5 days..................<15.0 mg/dL  6-29 days.................<15.0 mg/dL       Alkaline Phosphatase   Date Value Ref Range Status   06/18/2020 105 55 - 135 U/L Final     AST   Date Value Ref Range Status   06/18/2020 25 10 - 40 U/L Final     ALT   Date Value Ref Range Status   06/18/2020 22 10 - 44 U/L Final     Anion Gap   Date Value Ref Range Status   06/18/2020 11 8 - 16 mmol/L Final     eGFR if    Date Value Ref Range Status   06/18/2020 >60.0 >60 mL/min/1.73 m^2 Final     eGFR if non    Date Value Ref Range Status   06/18/2020 54.1 (A) >60 mL/min/1.73 m^2 Final     Comment:     Calculation used to obtain the estimated glomerular filtration  rate (eGFR) is the CKD-EPI equation.          Lab Results   Component Value  Date    HGBA1C 5.6 06/18/2020             ASSESSMENT: 80 y.o. year old female with lower back pain, consistent with     1. Lumbar spondylosis  IR RF Ablation Lumbar with Imaging    IR RF Ablation Lumbar with Imaging    IR RF Ablation Lumbar with Imaging    Case Request-RAD/Other Procedure Area: Left L3-5 Lumbar RFA    IR RF Ablation Lumbar with Imaging    IR RF Ablation Lumbar with Imaging    IR RF Ablation Lumbar with Imaging    Case Request-RAD/Other Procedure Area: Right L3-5 Lumbar RFA   2. Spondylolisthesis of lumbar region     3. DDD (degenerative disc disease), lumbar           PLAN:   - Interventions: Scheduled for RFA of the lumbar spine bilaterally L3-5 with the left side being done 1st on the right side being done 2 weeks later.. Explained the risks and benefits of the procedure in detail with the patient today in clinic along with alternative treatment options, and the patient elected to pursue the intervention.    - Anticoagulation: yes, aspirin will need to hold x7 days prior to procedures-message in PCP who is managing  - Medications: I have stressed the importance of physical activity and a home exercise plan to help with pain and improve health. and Patient can continue with medications for now since they are providing benefits, using them appropriately, and without side effects.  - Therapy:  Advised patient continue activities and exercises as tolerated  - Labs:  Reviewed  - Imaging:  Reviewed imaging available  - Consults/Referrals:  None at this time  - Records:  Reviewed/Obtain old records from outside physicians and imaging  - Follow up visit: return to clinic 4 weeks status post procedure  - Counseled patient regarding the importance of activity modification and physical therapy  - This condition does not require this patient to take time off of work, and the primary goal of our Pain Management services is to improve the patient's functional capacity.  - Patient Questions: Answered all of the  patient's questions regarding diagnosis, therapy, and treatment    The above plan and management options were discussed at length with patient. Patient is in agreement with the above and verbalized understanding.      Luis Ashraf MD  Interventional Pain Management  [facility]    Disclaimer:  This note was prepared using voice recognition system and is likely to have sound alike errors that may have been overlooked even after proof reading.  Please call me with any questions

## 2020-09-02 NOTE — H&P (VIEW-ONLY)
Established Patient Chronic Pain Note (Follow up visit)    Chief Complaint:   Chief Complaint   Patient presents with    Low-back Pain       SUBJECTIVE:  Haim Martin is a 80 y.o. female who presents to the clinic for a follow-up appointment for lower back pain.  She is status post bilateral L3-5 medial branch blocks on 07/16/2020 and 08/10/2020 that provided 100% and 90% relief respectively.  Since the last visit, Haim Martin states the pain has been persistant. Current pain intensity is 0/10 currently, but her pain will increase to 8/10 when she is more active.  She locates the pain in the same lower lumbar area with radiation into the buttock bilaterally..    Patient denies night fever/night sweats, urinary incontinence, bowel incontinence, significant weight loss, significant motor weakness and loss of sensations.  She does report that she sustained a fall in October 2019 after tripping over her [id number] any major injuries.    Pain Disability Index Review:  Last 3 PDI Scores 9/2/2020 7/1/2020   Pain Disability Index (PDI) 33 24     Initial HPI 07/01/2020:  Haim Martin is a 80 y.o. female who presents to the clinic for the evaluation of lower back pain.  She was referred by her primary care provider for further evaluation management of this pain.  Of note, patient has past medical history of depression/anxiety, migraine, hypertension, hyperlipidemia, thyroid disease, pre diabetes, diverticulosis, osteopenia, and multiple other medical comorbidities as listed below.  The pain started about 1 year ago without any injury or trauma and symptoms have been worsening.The pain is located in the lower lumbar area and radiates to the bilateral hips and buttocks, mostly right-sided.  The pain is described as Aching and is rated as 4/10. The pain is rated with a score of  1/10 on the BEST day and a score of 8/10 on the WORST day.  Symptoms interfere with daily activity. The pain is exacerbated  by walking, prolonged standing, increased activity.  The pain is mitigated by rest and sitting. The patient reports spending less than 2 hours per day reclining. The patient reports 6-8 hours of uninterrupted sleep per night.        Non-Pharmacologic Treatments:  Physical Therapy/Home Exercise: yes  Ice/Heat:yes  TENS: no  Acupuncture: no  Massage: no  Chiropractic: no    Other: no        Pain Medications:  - Opioids: None  - Adjuvant Medications: Elavil, Zoloft, sumatriptan, Fosamax  - Anti-Coagulants: Aspirin      report:  Reviewed and consistent with medication use as prescribed.          Pain Procedures:   -07/16/2020 and 08/10/2020:  Lumbar medial branch blocks bilateral L3-5, 100% and 90% relief        Imaging:   X-ray lumbar spine 06/18/2020:  No scoliosis.  Bones are demineralized.  No acute fracture.  There are 6 non rib weight-bearing lumbar type vertebral bodies.  Mild grade 1 anterolisthesis of L5 on L6, stable in appearance.  Multilevel facet arthropathy.  Aortic atherosclerosis.  No significant interval change.        PMHx,PSHx, Social history, and Family history:  I have reviewed the patient's medical, surgical, social, and family history in detail and updated the computerized patient record.        Review of patient's allergies indicates:   Allergen Reactions    Clindamycin      Heaviness in chest      Penicillins Hives       Current Outpatient Medications   Medication Sig    ACCU-CHEK MARIANO PLUS TEST STRP Strp USE 1 STRIP TO CHECK GLUCOSE ONCE DAILY    alendronate (FOSAMAX) 70 MG tablet Take 1 tablet (70 mg total) by mouth every 7 days.    amitriptyline (ELAVIL) 75 MG tablet Take 1 tablet (75 mg total) by mouth every evening.    atorvastatin (LIPITOR) 40 MG tablet Take 1 tablet by mouth in the evening    lancets-blood glucose strips 30 gauge Cmpk 1 application by Misc.(Non-Drug; Combo Route) route once daily at 6am.    levothyroxine (SYNTHROID) 88 MCG tablet TAKE 1 TABLET BY MOUTH DAILY  MONDAY THROUGH SATURDAY AND 1 AND 1/2 TABLETS BY MOUTH ON SUNDAY    omeprazole (PRILOSEC) 20 MG capsule Take 1 capsule (20 mg total) by mouth as needed.    propranoloL (INDERAL) 40 MG tablet Take 1 tablet (40 mg total) by mouth 2 (two) times daily.    sertraline (ZOLOFT) 100 MG tablet Take 1 tablet (100 mg total) by mouth once daily.    sumatriptan (IMITREX) 100 MG tablet TAKE 1 TABLET BY MOUTH AT ONSET OF MIGRAINE    sumatriptan (IMITREX) 100 MG tablet TAKE 1 TABLET BY MOUTH AT ONSET OF MIGRAINE    vit C/E/Zn/coppr/lutein/zeaxan (PRESERVISION AREDS-2 ORAL) Take 1 tablet by mouth 2 (two) times a day.    VIT C/VIT E/LUTEIN/MIN/OMEGA-3 (OCUVITE ORAL) Take by mouth once daily.    VITAMIN D2 1,250 mcg (50,000 unit) capsule Take 1 capsule by mouth once a week    aspirin (ECOTRIN) 81 MG EC tablet Take 1 tablet (81 mg total) by mouth once daily.    blood-glucose meter kit Use as instructed    potassium chloride SA (K-DUR,KLOR-CON) 20 MEQ tablet Take 1 tablet (20 mEq total) by mouth once daily.     No current facility-administered medications for this visit.      Facility-Administered Medications Ordered in Other Visits   Medication    ondansetron injection 4 mg         REVIEW OF SYSTEMS:    GENERAL:  No weight loss, malaise or fevers.  HEENT:   No recent changes in vision or hearing  NECK:  Negative for lumps, no difficulty with swallowing.  RESPIRATORY:  Negative for cough, wheezing or shortness of breath, patient denies any recent URI.  CARDIOVASCULAR:  Negative for chest pain, leg swelling or palpitations.  GI:  Negative for abdominal discomfort, blood in stools or black stools or change in bowel habits.  MUSCULOSKELETAL:  See HPI.  SKIN:  Negative for lesions, rash, and itching.  PSYCH:  No mood disorder or recent psychosocial stressors.  Patients sleep is not disturbed secondary to pain.  HEMATOLOGY/LYMPHOLOGY:  Negative for prolonged bleeding, bruising easily or swollen nodes.  Patient is currently taking  "anti-coagulants - ASA  NEURO:   No history of headaches, syncope, paralysis, seizures or tremors.  All other reviewed and negative other than HPI.    OBJECTIVE:    /80 (BP Location: Right arm, Patient Position: Sitting, BP Method: Medium (Automatic))   Pulse 92   Resp 18   Ht 4' 11" (1.499 m)   Wt 61.2 kg (135 lb)   BMI 27.27 kg/m²     PHYSICAL EXAMINATION:    GENERAL: Well appearing, in no acute distress, alert and oriented x3.  PSYCH:  Mood and affect appropriate.  SKIN: Skin color, texture, turgor normal, no rashes or lesions.  HEAD/FACE:  Normocephalic, atraumatic. Cranial nerves grossly intact.  CV: RRR with palpation of the radial artery.  PULM: No evidence of respiratory difficulty, symmetric chest rise.  GI:  Soft and non-tender.  BACK: Positive axial loading test bilateral. Positive tenderness over both SIJ, Positive FABERE,Ganselin and Yeoman's test on the both side. Negative FADIR. Straight leg raising in the sitting and supine positions is negative to radicular pain.  Mild-to-moderate pain to palpation over the facet joints of the lumbar spine and lumbar paraspinals.  Mildly limited range of motion secondary to pain reproduction.  EXTREMITIES: Peripheral joint ROM is full and pain free without obvious instability or laxity in all four extremities. No deformities, edema, or skin discoloration. Good capillary refill.  MUSCULOSKELETAL: Shoulder, hip, and knee provocative maneuvers are negative.   Bilateral upper and lower extremity strength is normal and symmetric.  No atrophy or tone abnormalities are noted.  NEURO: Bilateral upper and lower extremity coordination and muscle stretch reflexes are physiologic and symmetric.  Plantar response are downgoing. No clonus.  No loss of sensation is noted.  GAIT: normal.      LABS:  Lab Results   Component Value Date    WBC 10.51 06/18/2020    HGB 12.7 06/18/2020    HCT 41.9 06/18/2020     (H) 06/18/2020     06/18/2020       CMP  Sodium "   Date Value Ref Range Status   06/18/2020 141 136 - 145 mmol/L Final     Potassium   Date Value Ref Range Status   06/18/2020 3.0 (L) 3.5 - 5.1 mmol/L Final     Chloride   Date Value Ref Range Status   06/18/2020 105 95 - 110 mmol/L Final     CO2   Date Value Ref Range Status   06/18/2020 25 23 - 29 mmol/L Final     Glucose   Date Value Ref Range Status   06/18/2020 115 (H) 70 - 110 mg/dL Final     BUN, Bld   Date Value Ref Range Status   06/18/2020 12 8 - 23 mg/dL Final     Creatinine   Date Value Ref Range Status   06/18/2020 1.0 0.5 - 1.4 mg/dL Final     Calcium   Date Value Ref Range Status   06/18/2020 9.1 8.7 - 10.5 mg/dL Final     Total Protein   Date Value Ref Range Status   06/18/2020 7.8 6.0 - 8.4 g/dL Final     Albumin   Date Value Ref Range Status   06/18/2020 3.9 3.5 - 5.2 g/dL Final     Total Bilirubin   Date Value Ref Range Status   06/18/2020 0.4 0.1 - 1.0 mg/dL Final     Comment:     For infants and newborns, interpretation of results should be based  on gestational age, weight and in agreement with clinical  observations.  Premature Infant recommended reference ranges:  Up to 24 hours.............<8.0 mg/dL  Up to 48 hours............<12.0 mg/dL  3-5 days..................<15.0 mg/dL  6-29 days.................<15.0 mg/dL       Alkaline Phosphatase   Date Value Ref Range Status   06/18/2020 105 55 - 135 U/L Final     AST   Date Value Ref Range Status   06/18/2020 25 10 - 40 U/L Final     ALT   Date Value Ref Range Status   06/18/2020 22 10 - 44 U/L Final     Anion Gap   Date Value Ref Range Status   06/18/2020 11 8 - 16 mmol/L Final     eGFR if    Date Value Ref Range Status   06/18/2020 >60.0 >60 mL/min/1.73 m^2 Final     eGFR if non    Date Value Ref Range Status   06/18/2020 54.1 (A) >60 mL/min/1.73 m^2 Final     Comment:     Calculation used to obtain the estimated glomerular filtration  rate (eGFR) is the CKD-EPI equation.          Lab Results   Component Value  Date    HGBA1C 5.6 06/18/2020             ASSESSMENT: 80 y.o. year old female with lower back pain, consistent with     1. Lumbar spondylosis  IR RF Ablation Lumbar with Imaging    IR RF Ablation Lumbar with Imaging    IR RF Ablation Lumbar with Imaging    Case Request-RAD/Other Procedure Area: Left L3-5 Lumbar RFA    IR RF Ablation Lumbar with Imaging    IR RF Ablation Lumbar with Imaging    IR RF Ablation Lumbar with Imaging    Case Request-RAD/Other Procedure Area: Right L3-5 Lumbar RFA   2. Spondylolisthesis of lumbar region     3. DDD (degenerative disc disease), lumbar           PLAN:   - Interventions: Scheduled for RFA of the lumbar spine bilaterally L3-5 with the left side being done 1st on the right side being done 2 weeks later.. Explained the risks and benefits of the procedure in detail with the patient today in clinic along with alternative treatment options, and the patient elected to pursue the intervention.    - Anticoagulation: yes, aspirin will need to hold x7 days prior to procedures-message in PCP who is managing  - Medications: I have stressed the importance of physical activity and a home exercise plan to help with pain and improve health. and Patient can continue with medications for now since they are providing benefits, using them appropriately, and without side effects.  - Therapy:  Advised patient continue activities and exercises as tolerated  - Labs:  Reviewed  - Imaging:  Reviewed imaging available  - Consults/Referrals:  None at this time  - Records:  Reviewed/Obtain old records from outside physicians and imaging  - Follow up visit: return to clinic 4 weeks status post procedure  - Counseled patient regarding the importance of activity modification and physical therapy  - This condition does not require this patient to take time off of work, and the primary goal of our Pain Management services is to improve the patient's functional capacity.  - Patient Questions: Answered all of the  patient's questions regarding diagnosis, therapy, and treatment    The above plan and management options were discussed at length with patient. Patient is in agreement with the above and verbalized understanding.      Luis Ashraf MD  Interventional Pain Management  [facility]    Disclaimer:  This note was prepared using voice recognition system and is likely to have sound alike errors that may have been overlooked even after proof reading.  Please call me with any questions

## 2020-09-09 NOTE — TELEPHONE ENCOUNTER
Contacted pt. Appt for procedure scheduled 09/28/2020 and 10/12/2020  with . Went over instructions with pt and pt verbalized understanding.Instructions also mailed to pt.  All questions answered.

## 2020-09-22 NOTE — PRE-PROCEDURE INSTRUCTIONS
Spoke with patient regarding procedure scheduled on 9/28    Arrival time 1200    Has patient been sick with fever or on antibiotics within the last 7 days? No    Has patient received a vaccination within the last 7 days? no    Has the patient stopped all medications as directed? ASA, vitamins and supplements on 9/21. Hold vitamins, supplements and other NSAIDS.    Does patient have a pacemaker and or defibrillator? no    Does the patient have a ride to and from procedure and someone reliable to remain with patient? Son Hamilton    Is the patient diabetic? no    Does the patient have sleep apnea? Or use O2 at home? No and no     Is the patient receiving sedation? yes    Is the patient instructed to remain NPO beginning at midnight the night before their procedure? yes    Procedure location confirmed with patient? Yes    Covid- Denies signs/symptoms. Instructed to notify PAT/MD if any changes.

## 2020-09-28 ENCOUNTER — HOSPITAL ENCOUNTER (OUTPATIENT)
Facility: HOSPITAL | Age: 80
Discharge: HOME | End: 2020-09-28
Attending: PHYSICAL MEDICINE & REHABILITATION | Admitting: PHYSICAL MEDICINE & REHABILITATION
Payer: MEDICARE

## 2020-09-28 VITALS
DIASTOLIC BLOOD PRESSURE: 61 MMHG | HEIGHT: 59 IN | RESPIRATION RATE: 16 BRPM | WEIGHT: 134.94 LBS | HEART RATE: 78 BPM | BODY MASS INDEX: 27.2 KG/M2 | TEMPERATURE: 98 F | OXYGEN SATURATION: 98 % | SYSTOLIC BLOOD PRESSURE: 116 MMHG

## 2020-09-28 DIAGNOSIS — M47.816 SPONDYLOSIS WITHOUT MYELOPATHY OR RADICULOPATHY, LUMBAR REGION: ICD-10-CM

## 2020-09-28 PROCEDURE — 64635 DESTROY LUMB/SAC FACET JNT: CPT | Mod: LT,,, | Performed by: PHYSICAL MEDICINE & REHABILITATION

## 2020-09-28 PROCEDURE — 64636 DESTROY L/S FACET JNT ADDL: CPT | Performed by: PHYSICAL MEDICINE & REHABILITATION

## 2020-09-28 PROCEDURE — 99152 MOD SED SAME PHYS/QHP 5/>YRS: CPT | Performed by: PHYSICAL MEDICINE & REHABILITATION

## 2020-09-28 PROCEDURE — 64635 PR DESTROY LUMB/SAC FACET JNT: ICD-10-PCS | Mod: LT,,, | Performed by: PHYSICAL MEDICINE & REHABILITATION

## 2020-09-28 PROCEDURE — 63600175 PHARM REV CODE 636 W HCPCS: Performed by: PHYSICAL MEDICINE & REHABILITATION

## 2020-09-28 PROCEDURE — 64636 DESTROY L/S FACET JNT ADDL: CPT | Mod: LT,,, | Performed by: PHYSICAL MEDICINE & REHABILITATION

## 2020-09-28 PROCEDURE — 64635 DESTROY LUMB/SAC FACET JNT: CPT | Performed by: PHYSICAL MEDICINE & REHABILITATION

## 2020-09-28 PROCEDURE — 64636 PR DESTROY L/S FACET JNT ADDL: ICD-10-PCS | Mod: LT,,, | Performed by: PHYSICAL MEDICINE & REHABILITATION

## 2020-09-28 RX ORDER — FENTANYL CITRATE 50 UG/ML
INJECTION, SOLUTION INTRAMUSCULAR; INTRAVENOUS
Status: DISCONTINUED | OUTPATIENT
Start: 2020-09-28 | End: 2020-09-28 | Stop reason: HOSPADM

## 2020-09-28 RX ORDER — MIDAZOLAM HYDROCHLORIDE 1 MG/ML
INJECTION, SOLUTION INTRAMUSCULAR; INTRAVENOUS
Status: DISCONTINUED | OUTPATIENT
Start: 2020-09-28 | End: 2020-09-28 | Stop reason: HOSPADM

## 2020-09-28 RX ORDER — ONDANSETRON 2 MG/ML
4 INJECTION INTRAMUSCULAR; INTRAVENOUS ONCE AS NEEDED
Status: DISCONTINUED | OUTPATIENT
Start: 2020-09-28 | End: 2020-09-28 | Stop reason: HOSPADM

## 2020-09-28 NOTE — DISCHARGE SUMMARY
Discharge Note  Short Stay      SUMMARY     Admit Date: 9/28/2020    Attending Physician: Luis Ashraf MD        Discharge Physician: Luis Ashraf MD        Discharge Date: 9/28/2020 1:19 PM    Procedure(s) (LRB):  Left L3-5 Lumbar RFA (Left)    Final Diagnosis: Lumbar spondylosis [M47.816]    Disposition: Home or self care    Patient Instructions:   Current Discharge Medication List      CONTINUE these medications which have NOT CHANGED    Details   amitriptyline (ELAVIL) 75 MG tablet Take 1 tablet (75 mg total) by mouth every evening.  Qty: 90 tablet, Refills: 0      atorvastatin (LIPITOR) 40 MG tablet Take 1 tablet by mouth in the evening  Qty: 30 tablet, Refills: 0      levothyroxine (SYNTHROID) 88 MCG tablet TAKE 1 TABLET BY MOUTH DAILY MONDAY THROUGH SATURDAY AND 1 AND 1/2 TABLETS BY MOUTH ON SUNDAY  Qty: 135 tablet, Refills: 0      omeprazole (PRILOSEC) 20 MG capsule Take 1 capsule (20 mg total) by mouth as needed.  Qty: 90 capsule, Refills: 4      potassium chloride SA (K-DUR,KLOR-CON) 20 MEQ tablet Take 1 tablet (20 mEq total) by mouth once daily.  Qty: 3 tablet, Refills: 0      propranoloL (INDERAL) 40 MG tablet Take 1 tablet (40 mg total) by mouth 2 (two) times daily.  Qty: 180 tablet, Refills: 0    Comments: .      sertraline (ZOLOFT) 100 MG tablet Take 1 tablet (100 mg total) by mouth once daily.  Qty: 90 tablet, Refills: 0      !! sumatriptan (IMITREX) 100 MG tablet TAKE 1 TABLET BY MOUTH AT ONSET OF MIGRAINE  Qty: 9 tablet, Refills: 0      ACCU-CHEK MARIANO PLUS TEST STRP Strp USE 1 STRIP TO CHECK GLUCOSE ONCE DAILY  Qty: 50 strip, Refills: 1    Comments: Please consider 90 day supplies to promote better adherence      alendronate (FOSAMAX) 70 MG tablet Take 1 tablet (70 mg total) by mouth every 7 days.  Qty: 12 tablet, Refills: 3      aspirin (ECOTRIN) 81 MG EC tablet Take 1 tablet (81 mg total) by mouth once daily.  Qty: 1 tablet, Refills: 0      blood-glucose meter kit Use as  instructed  Qty: 1 each, Refills: 0      lancets-blood glucose strips 30 gauge Cmpk 1 application by Misc.(Non-Drug; Combo Route) route once daily at 6am.  Qty: 90 each, Refills: 0      !! sumatriptan (IMITREX) 100 MG tablet TAKE 1 TABLET BY MOUTH AT ONSET OF MIGRAINE  Qty: 9 tablet, Refills: 0      vit C/E/Zn/coppr/lutein/zeaxan (PRESERVISION AREDS-2 ORAL) Take 1 tablet by mouth 2 (two) times a day.      VIT C/VIT E/LUTEIN/MIN/OMEGA-3 (OCUVITE ORAL) Take by mouth once daily.      VITAMIN D2 1,250 mcg (50,000 unit) capsule Take 1 capsule by mouth once a week  Qty: 8 capsule, Refills: 0       !! - Potential duplicate medications found. Please discuss with provider.              Discharge Diagnosis: Lumbar spondylosis [M47.816]  Condition on Discharge: Stable with no complications to procedure   Diet on Discharge: Same as before.  Activity: as per instruction sheet.  Discharge to: Home with a responsible adult.  Follow up: 2-4 weeks       Please call the office if you experience any weakness or loss of sensation, fever > 101.5, pain uncontrolled with oral medications, persistent nausea/vomiting/or diarrhea, redness or drainage from the incisions, or any other worrisome concerns. If physician on call was not reached or could not communicate with our office for any reason please go to the nearest emergency department

## 2020-09-28 NOTE — DISCHARGE INSTRUCTIONS

## 2020-09-28 NOTE — INTERVAL H&P NOTE
The patient has been examined and the H&P has been reviewed:    I concur with the findings and no changes have occurred since H&P was written.    Anesthesia/Surgery risks, benefits and alternative options discussed and understood by patient/family.          Active Hospital Problems    Diagnosis  POA    Spondylosis without myelopathy or radiculopathy, lumbar region [M47.816]  Yes      Resolved Hospital Problems   No resolved problems to display.

## 2020-09-28 NOTE — OP NOTE
Lumbar Medial nerve branch block radiofrequency ablation Under Fluoroscopy     Time-out taken to identify patient and procedure side prior to starting the procedure.     09/28/2020    PROCEDURE:   1) Left radiofrequency ablation of the the medial branch nerves at the   transverse process of  L4, L5 and sacral ala    2)Conscious sedation provided by MD     REASON FOR PROCEDURE: Lumbar spondylosis [M47.816]     PHYSICIAN: Luis Ashraf MD    ASSISTANTS: None     SEDATION: Conscious sedation provided by M.D   The patient was monitored with continuous pulse oximetry, EKG, and intermittent blood pressure monitors. The patient was hemodynamically stable throughout the entire process was responsive to voice, and breathing spontaneously. Supplemental O2 was provided at 2L/min via nasal cannula. Patient was comfortable for the duration of the procedure. (See nurse documentation and case log for sedation time)    There was a total of 2mg IV Midazolam and 50mcg Fentanyl titrated for the procedure    MEDICATIONS INJECTED: 0.25% Bupivicaine total 6mL     LOCAL ANESTHETIC USED: Xylocaine 1% 1mL / site     ESTIMATED BLOOD LOSS: None.     COMPLICATIONS: None.     Interval history: Patient reports that he had complete relief of pain for the day of the procedure, we will proceed with the RFA     TECHNIQUE: Laying in a prone position, the patient was prepped and draped in the usual sterile fashion using ChloraPrep and fenestrated drape. The level was determined under fluoroscopic guidance. Local anesthetic was given by going down to the hub of the 27-gauge 1.25in needle and raising a wheel. A 18-gauge 10mm curved active tip needle was introduced to the anatomic local of the medial branch at each of the above levels using fluoroscopy. Then sensory and motor testing was performed to confirm that the needle tips were in the correct location. Then after negative aspiration, 1 mL of 0.25% bupivacaine was injected into each level. This  was followed by thermal lesioning at 80 degrees celsius for 90 seconds.       The patient tolerated the procedure well. Did not have any procedure related motor deficit at the conclusion of the procedure    The patient was monitored after the procedure. Patient was given post procedure and discharge instructions to follow at home. We will see the patient back in two weeks or the patient may call to inform of status. The patient was discharged in a stable condition

## 2020-10-05 NOTE — PRE-PROCEDURE INSTRUCTIONS
Spoke with patient regarding procedure scheduled on 10.12     Arrival time 0800     Has patient been sick with fever or on antibiotics within the last 7 days? No     Has patient received a vaccination within the last 7 days? no     Has the patient stopped all medications as directed? ASA, vitamins and supplements on 10.5. Hold vitamins, supplements and other NSAIDS.     Does patient have a pacemaker and or defibrillator? no     Does the patient have a ride to and from procedure and someone reliable to remain with patient? Juan Villasenor     Is the patient diabetic? no     Does the patient have sleep apnea? Or use O2 at home? No and no      Is the patient receiving sedation? yes     Is the patient instructed to remain NPO beginning at midnight the night before their procedure? yes     Procedure location confirmed with patient? Yes     Covid- Denies signs/symptoms. Instructed to notify PAT/MD if any changes.

## 2020-10-12 ENCOUNTER — HOSPITAL ENCOUNTER (OUTPATIENT)
Facility: HOSPITAL | Age: 80
Discharge: HOME | End: 2020-10-12
Attending: PHYSICAL MEDICINE & REHABILITATION | Admitting: PHYSICAL MEDICINE & REHABILITATION
Payer: MEDICARE

## 2020-10-12 VITALS
HEART RATE: 74 BPM | BODY MASS INDEX: 27.8 KG/M2 | WEIGHT: 137.88 LBS | SYSTOLIC BLOOD PRESSURE: 131 MMHG | TEMPERATURE: 99 F | HEIGHT: 59 IN | DIASTOLIC BLOOD PRESSURE: 58 MMHG | RESPIRATION RATE: 16 BRPM | OXYGEN SATURATION: 100 %

## 2020-10-12 DIAGNOSIS — M47.816 SPONDYLOSIS WITHOUT MYELOPATHY OR RADICULOPATHY, LUMBAR REGION: ICD-10-CM

## 2020-10-12 PROCEDURE — 64636 DESTROY L/S FACET JNT ADDL: CPT | Mod: RT,,, | Performed by: PHYSICAL MEDICINE & REHABILITATION

## 2020-10-12 PROCEDURE — 64635 PR DESTROY LUMB/SAC FACET JNT: ICD-10-PCS | Mod: RT,,, | Performed by: PHYSICAL MEDICINE & REHABILITATION

## 2020-10-12 PROCEDURE — 64636 DESTROY L/S FACET JNT ADDL: CPT | Performed by: PHYSICAL MEDICINE & REHABILITATION

## 2020-10-12 PROCEDURE — 64635 DESTROY LUMB/SAC FACET JNT: CPT | Mod: RT,,, | Performed by: PHYSICAL MEDICINE & REHABILITATION

## 2020-10-12 PROCEDURE — 64636 PR DESTROY L/S FACET JNT ADDL: ICD-10-PCS | Mod: RT,,, | Performed by: PHYSICAL MEDICINE & REHABILITATION

## 2020-10-12 PROCEDURE — 99152 MOD SED SAME PHYS/QHP 5/>YRS: CPT | Performed by: PHYSICAL MEDICINE & REHABILITATION

## 2020-10-12 PROCEDURE — 64635 DESTROY LUMB/SAC FACET JNT: CPT | Performed by: PHYSICAL MEDICINE & REHABILITATION

## 2020-10-12 PROCEDURE — 63600175 PHARM REV CODE 636 W HCPCS: Performed by: PHYSICAL MEDICINE & REHABILITATION

## 2020-10-12 RX ORDER — MIDAZOLAM HYDROCHLORIDE 1 MG/ML
INJECTION, SOLUTION INTRAMUSCULAR; INTRAVENOUS
Status: DISCONTINUED | OUTPATIENT
Start: 2020-10-12 | End: 2020-10-12 | Stop reason: HOSPADM

## 2020-10-12 RX ORDER — ONDANSETRON 2 MG/ML
4 INJECTION INTRAMUSCULAR; INTRAVENOUS ONCE AS NEEDED
Status: DISCONTINUED | OUTPATIENT
Start: 2020-10-12 | End: 2020-10-12 | Stop reason: HOSPADM

## 2020-10-12 RX ORDER — FENTANYL CITRATE 50 UG/ML
INJECTION, SOLUTION INTRAMUSCULAR; INTRAVENOUS
Status: DISCONTINUED | OUTPATIENT
Start: 2020-10-12 | End: 2020-10-12 | Stop reason: HOSPADM

## 2020-10-12 NOTE — PLAN OF CARE
Pt aaa o x4, denies any pain/ discomfort, what to expect during recovery discussed with Pt  at bedside. Pt  verbalized understanding of post op instructions. All questions and concerns addressed, will continue to monitor until discharged.

## 2020-10-12 NOTE — H&P
"HPI  Patient presenting for Procedure(s) (LRB):  Right L3-5 Lumbar RFA (Right)     Patient on Anti-coagulation Yes, ASA - held appropriately    No health changes since previous encounter    Past Medical History:   Diagnosis Date    Depression     Diverticulosis of colon (without mention of hemorrhage) 8/25/2014    Hyperlipidemia     Hypertension     Thyroid disease      Past Surgical History:   Procedure Laterality Date    APPENDECTOMY      CATARACT EXTRACTION EXTRACAPSULAR W/ INTRAOCULAR LENS IMPLANTATION Bilateral 4/2014    INJECTION OF ANESTHETIC AGENT AROUND MEDIAL BRANCH NERVES INNERVATING LUMBAR FACET JOINT Bilateral 7/16/2020    Procedure: Bilateral L3-5 MBB with local;  Surgeon: Luis Ashraf MD;  Location: [facility];  Service: Pain Management;  Laterality: Bilateral;    INJECTION OF ANESTHETIC AGENT AROUND MEDIAL BRANCH NERVES INNERVATING LUMBAR FACET JOINT Bilateral 8/10/2020    Procedure: Bilateral L3-5 MBB with local;  Surgeon: Luis Ashraf MD;  Location: [facility];  Service: Pain Management;  Laterality: Bilateral;    RADIOFREQUENCY THERMOCOAGULATION Left 9/28/2020    Procedure: Left L3-5 Lumbar RFA;  Surgeon: Luis Ashraf MD;  Location: [facility];  Service: Pain Management;  Laterality: Left;    STOMACH SURGERY  1998    not sure what they did, possible bowel resection, partial gastrectomy    TONSILLECTOMY       Review of patient's allergies indicates:   Allergen Reactions    Clindamycin      Heaviness in chest      Penicillins Hives      No current facility-administered medications for this encounter.      Facility-Administered Medications Ordered in Other Encounters   Medication    ondansetron injection 4 mg       PMHx, PSHx, Allergies, Medications reviewed in epic    ROS negative except pain complaints in HPI    OBJECTIVE:    /64 (BP Location: Right arm, Patient Position: Sitting)   Pulse 73   Temp 97.7 °F (36.5 °C) (Temporal)   Resp 16   Ht 4' 11" " (1.499 m)   Wt 62.5 kg (137 lb 14.4 oz)   SpO2 98%   Breastfeeding No   BMI 27.85 kg/m²     PHYSICAL EXAMINATION:    GENERAL: Well appearing, in no acute distress, alert and oriented x3.  PSYCH:  Mood and affect appropriate.  SKIN: Skin color, texture, turgor normal, no rashes or lesions which will impact the procedure.  CV: RRR with palpation of the radial artery.  PULM: No evidence of respiratory difficulty, symmetric chest rise. Clear to auscultation.  NEURO: Cranial nerves grossly intact.    Plan:    Proceed with procedure as planned Procedure(s) (LRB):  Right L3-5 Lumbar RFA (Right)    Luis Ashraf MD  10/12/2020

## 2020-10-12 NOTE — DISCHARGE SUMMARY
Discharge Note  Short Stay      SUMMARY     Admit Date: 10/12/2020    Attending Physician: Luis Ashraf MD        Discharge Physician: Luis Ashraf MD        Discharge Date: 10/12/2020 9:34 AM    Procedure(s) (LRB):  Right L3-5 Lumbar RFA (Right)    Final Diagnosis: Lumbar spondylosis [M47.816]    Disposition: Home or self care    Patient Instructions:   Current Discharge Medication List      CONTINUE these medications which have NOT CHANGED    Details   alendronate (FOSAMAX) 70 MG tablet Take 1 tablet (70 mg total) by mouth every 7 days.  Qty: 12 tablet, Refills: 3      amitriptyline (ELAVIL) 75 MG tablet Take 1 tablet (75 mg total) by mouth every evening.  Qty: 90 tablet, Refills: 0      aspirin (ECOTRIN) 81 MG EC tablet Take 1 tablet (81 mg total) by mouth once daily.  Qty: 1 tablet, Refills: 0      atorvastatin (LIPITOR) 40 MG tablet Take 1 tablet by mouth in the evening  Qty: 30 tablet, Refills: 0      levothyroxine (SYNTHROID) 88 MCG tablet TAKE 1 TABLET BY MOUTH DAILY MONDAY THROUGH SATURDAY AND 1 AND 1/2 TABLETS BY MOUTH ON SUNDAY  Qty: 135 tablet, Refills: 0      omeprazole (PRILOSEC) 20 MG capsule Take 1 capsule (20 mg total) by mouth as needed.  Qty: 90 capsule, Refills: 4      propranoloL (INDERAL) 40 MG tablet Take 1 tablet by mouth twice daily  Qty: 180 tablet, Refills: 0      sertraline (ZOLOFT) 100 MG tablet Take 1 tablet (100 mg total) by mouth once daily.  Qty: 90 tablet, Refills: 0      vit C/E/Zn/coppr/lutein/zeaxan (PRESERVISION AREDS-2 ORAL) Take 1 tablet by mouth 2 (two) times a day.      VIT C/VIT E/LUTEIN/MIN/OMEGA-3 (OCUVITE ORAL) Take by mouth once daily.      VITAMIN D2 1,250 mcg (50,000 unit) capsule Take 1 capsule by mouth once a week  Qty: 8 capsule, Refills: 0      ACCU-CHEK MARIANO PLUS TEST STRP Strp USE 1 STRIP TO CHECK GLUCOSE ONCE DAILY  Qty: 50 strip, Refills: 1    Comments: Please consider 90 day supplies to promote better adherence      blood-glucose meter kit Use as  instructed  Qty: 1 each, Refills: 0      lancets-blood glucose strips 30 gauge Cmpk 1 application by Misc.(Non-Drug; Combo Route) route once daily at 6am.  Qty: 90 each, Refills: 0      !! sumatriptan (IMITREX) 100 MG tablet TAKE 1 TABLET BY MOUTH AT ONSET OF MIGRAINE  Qty: 9 tablet, Refills: 0      !! sumatriptan (IMITREX) 100 MG tablet TAKE 1 TABLET BY MOUTH AT ONSET OF MIGRAINE  Qty: 9 tablet, Refills: 0       !! - Potential duplicate medications found. Please discuss with provider.      STOP taking these medications       potassium chloride SA (K-DUR,KLOR-CON) 20 MEQ tablet Comments:   Reason for Stopping:                   Discharge Diagnosis: Lumbar spondylosis [M47.816]  Condition on Discharge: Stable with no complications to procedure   Diet on Discharge: Same as before.  Activity: as per instruction sheet.  Discharge to: Home with a responsible adult.  Follow up: 2-4 weeks       Please call the office if you experience any weakness or loss of sensation, fever > 101.5, pain uncontrolled with oral medications, persistent nausea/vomiting/or diarrhea, redness or drainage from the incisions, or any other worrisome concerns. If physician on call was not reached or could not communicate with our office for any reason please go to the nearest emergency department

## 2020-10-12 NOTE — DISCHARGE INSTRUCTIONS

## 2020-10-12 NOTE — OP NOTE
Lumbar Medial nerve branch block radiofrequency ablation Under Fluoroscopy     Time-out taken to identify patient and procedure side prior to starting the procedure.     10/12/2020    PROCEDURE:   1) Right radiofrequency ablation of the the medial branch nerves at the   transverse process of  L4, L5 and sacral ala    2) Conscious sedation provided by MD     REASON FOR PROCEDURE: Lumbar spondylosis [M47.816]     PHYSICIAN: Luis Ashraf MD    ASSISTANTS: None    SEDATION: Conscious sedation provided by M.D    The patient was monitored with continuous pulse oximetry, EKG, and intermittent blood pressure monitors.  The patient was hemodynamically stable throughout the entire process was responsive to voice, and breathing spontaneously.  Supplemental O2 was provided at 2L/min via nasal cannula.  Patient was comfortable for the duration of the procedure. (See nurse documentation and case log for sedation time)    There was a total of 2mg IV Midazolam and 25mcg Fentanyl titrated for the procedure       MEDICATIONS INJECTED: 0.25% Bupivicaine total 6mL     LOCAL ANESTHETIC USED: Xylocaine 1% 1mL / site     ESTIMATED BLOOD LOSS: None.     COMPLICATIONS: None.     Interval history: Patient reports that he had complete relief of pain for the day of the procedure, we will proceed with the RFA     TECHNIQUE: Laying in a prone position, the patient was prepped and draped in the usual sterile fashion using ChloraPrep and fenestrated drape. The level was determined under fluoroscopic guidance. Local anesthetic was given by going down to the hub of the 27-gauge 1.25in needle and raising a wheel. A 18-gauge 10mm curved active tip needle was introduced to the anatomic local of the medial branch at each of the above levels using fluoroscopy. Then sensory and motor testing was performed to confirm that the needle tips were in the correct location. Then after negative aspiration, 1 mL of 0.25% bupivacaine was injected into each  level. This was followed by thermal lesioning at 80 degrees celsius for 90 seconds.     The patient tolerated the procedure well. Did not have any procedure related motor deficit at the conclusion of the procedure    The patient was monitored after the procedure. Patient was given post procedure and discharge instructions to follow at home. We will see the patient back in two weeks or the patient may call to inform of status. The patient was discharged in a stable condition

## 2020-12-30 RX ORDER — SUMATRIPTAN SUCCINATE 100 MG/1
TABLET ORAL
Qty: 9 TABLET | Refills: 0 | Status: SHIPPED | OUTPATIENT
Start: 2020-12-30 | End: 2020-12-31 | Stop reason: SDUPTHER

## 2021-01-11 ENCOUNTER — IMMUNIZATION (OUTPATIENT)
Dept: INTERNAL MEDICINE | Facility: CLINIC | Age: 81
End: 2021-01-11

## 2021-01-11 DIAGNOSIS — Z23 NEED FOR VACCINATION: ICD-10-CM

## 2021-01-11 PROCEDURE — 91300 COVID-19, MRNA, LNP-S, PF, 30 MCG/0.3 ML DOSE VACCINE: CPT | Mod: S$GLB,,, | Performed by: FAMILY MEDICINE

## 2021-01-11 PROCEDURE — 0001A COVID-19, MRNA, LNP-S, PF, 30 MCG/0.3 ML DOSE VACCINE: ICD-10-PCS | Mod: CV19,S$GLB,, | Performed by: FAMILY MEDICINE

## 2021-01-11 PROCEDURE — 91300 COVID-19, MRNA, LNP-S, PF, 30 MCG/0.3 ML DOSE VACCINE: ICD-10-PCS | Mod: S$GLB,,, | Performed by: FAMILY MEDICINE

## 2021-01-11 PROCEDURE — 0001A COVID-19, MRNA, LNP-S, PF, 30 MCG/0.3 ML DOSE VACCINE: CPT | Mod: CV19,S$GLB,, | Performed by: FAMILY MEDICINE

## 2021-02-01 ENCOUNTER — IMMUNIZATION (OUTPATIENT)
Dept: INTERNAL MEDICINE | Facility: CLINIC | Age: 81
End: 2021-02-01
Payer: MEDICARE

## 2021-02-01 DIAGNOSIS — Z23 NEED FOR VACCINATION: Primary | ICD-10-CM

## 2021-02-01 PROCEDURE — 91300 COVID-19, MRNA, LNP-S, PF, 30 MCG/0.3 ML DOSE VACCINE: CPT | Mod: PBBFAC | Performed by: FAMILY MEDICINE

## 2021-02-01 PROCEDURE — 0002A COVID-19, MRNA, LNP-S, PF, 30 MCG/0.3 ML DOSE VACCINE: CPT | Mod: PBBFAC | Performed by: FAMILY MEDICINE

## 2021-05-31 RX ORDER — ALENDRONATE SODIUM 70 MG/1
TABLET ORAL
Qty: 12 TABLET | Refills: 0 | Status: SHIPPED | OUTPATIENT
Start: 2021-05-31 | End: 2021-09-21

## 2021-08-03 RX ORDER — LEVOTHYROXINE SODIUM 88 UG/1
TABLET ORAL
Qty: 10 TABLET | Refills: 0 | Status: SHIPPED | OUTPATIENT
Start: 2021-08-03 | End: 2021-08-12 | Stop reason: SDUPTHER

## 2021-08-12 ENCOUNTER — LAB VISIT (OUTPATIENT)
Dept: LAB | Facility: HOSPITAL | Age: 81
End: 2021-08-12
Attending: FAMILY MEDICINE
Payer: MEDICARE

## 2021-08-12 ENCOUNTER — TELEPHONE (OUTPATIENT)
Dept: PAIN MEDICINE | Facility: CLINIC | Age: 81
End: 2021-08-12

## 2021-08-12 ENCOUNTER — OFFICE VISIT (OUTPATIENT)
Dept: FAMILY MEDICINE | Facility: CLINIC | Age: 81
End: 2021-08-12
Attending: FAMILY MEDICINE
Payer: MEDICARE

## 2021-08-12 VITALS
SYSTOLIC BLOOD PRESSURE: 120 MMHG | HEIGHT: 59 IN | DIASTOLIC BLOOD PRESSURE: 72 MMHG | OXYGEN SATURATION: 95 % | HEART RATE: 89 BPM | WEIGHT: 136.25 LBS | BODY MASS INDEX: 27.47 KG/M2 | TEMPERATURE: 98 F

## 2021-08-12 DIAGNOSIS — E03.9 HYPOTHYROIDISM, UNSPECIFIED TYPE: Primary | ICD-10-CM

## 2021-08-12 DIAGNOSIS — R79.9 ABNORMAL FINDING OF BLOOD CHEMISTRY, UNSPECIFIED: ICD-10-CM

## 2021-08-12 DIAGNOSIS — N95.8 OTHER SPECIFIED MENOPAUSAL AND PERIMENOPAUSAL DISORDERS: ICD-10-CM

## 2021-08-12 DIAGNOSIS — M85.80 OSTEOPENIA, UNSPECIFIED LOCATION: ICD-10-CM

## 2021-08-12 DIAGNOSIS — E03.9 HYPOTHYROIDISM, UNSPECIFIED TYPE: ICD-10-CM

## 2021-08-12 DIAGNOSIS — I10 HYPERTENSION, UNSPECIFIED TYPE: ICD-10-CM

## 2021-08-12 DIAGNOSIS — M51.36 DDD (DEGENERATIVE DISC DISEASE), LUMBAR: ICD-10-CM

## 2021-08-12 DIAGNOSIS — F32.5 MAJOR DEPRESSIVE DISORDER WITH SINGLE EPISODE, IN FULL REMISSION: ICD-10-CM

## 2021-08-12 LAB
BASOPHILS # BLD AUTO: 0.07 K/UL (ref 0–0.2)
BASOPHILS NFR BLD: 1.2 % (ref 0–1.9)
DIFFERENTIAL METHOD: ABNORMAL
EOSINOPHIL # BLD AUTO: 0.2 K/UL (ref 0–0.5)
EOSINOPHIL NFR BLD: 3.8 % (ref 0–8)
ERYTHROCYTE [DISTWIDTH] IN BLOOD BY AUTOMATED COUNT: 13.2 % (ref 11.5–14.5)
ESTIMATED AVG GLUCOSE: 114 MG/DL (ref 68–131)
HBA1C MFR BLD: 5.6 % (ref 4–5.6)
HCT VFR BLD AUTO: 40.2 % (ref 37–48.5)
HGB BLD-MCNC: 12.3 G/DL (ref 12–16)
IMM GRANULOCYTES # BLD AUTO: 0.02 K/UL (ref 0–0.04)
IMM GRANULOCYTES NFR BLD AUTO: 0.3 % (ref 0–0.5)
LYMPHOCYTES # BLD AUTO: 1.4 K/UL (ref 1–4.8)
LYMPHOCYTES NFR BLD: 24.4 % (ref 18–48)
MCH RBC QN AUTO: 30.9 PG (ref 27–31)
MCHC RBC AUTO-ENTMCNC: 30.6 G/DL (ref 32–36)
MCV RBC AUTO: 101 FL (ref 82–98)
MONOCYTES # BLD AUTO: 0.6 K/UL (ref 0.3–1)
MONOCYTES NFR BLD: 10.5 % (ref 4–15)
NEUTROPHILS # BLD AUTO: 3.4 K/UL (ref 1.8–7.7)
NEUTROPHILS NFR BLD: 59.8 % (ref 38–73)
NRBC BLD-RTO: 0 /100 WBC
PLATELET # BLD AUTO: 214 K/UL (ref 150–450)
PMV BLD AUTO: 11.4 FL (ref 9.2–12.9)
RBC # BLD AUTO: 3.98 M/UL (ref 4–5.4)
TSH SERPL DL<=0.005 MIU/L-ACNC: 2.64 UIU/ML (ref 0.4–4)
WBC # BLD AUTO: 5.73 K/UL (ref 3.9–12.7)

## 2021-08-12 PROCEDURE — 99999 PR PBB SHADOW E&M-EST. PATIENT-LVL IV: ICD-10-PCS | Mod: PBBFAC,,, | Performed by: FAMILY MEDICINE

## 2021-08-12 PROCEDURE — 1160F RVW MEDS BY RX/DR IN RCRD: CPT | Mod: CPTII,S$GLB,, | Performed by: FAMILY MEDICINE

## 2021-08-12 PROCEDURE — 99214 OFFICE O/P EST MOD 30 MIN: CPT | Mod: S$GLB,,, | Performed by: FAMILY MEDICINE

## 2021-08-12 PROCEDURE — 3074F SYST BP LT 130 MM HG: CPT | Mod: CPTII,S$GLB,, | Performed by: FAMILY MEDICINE

## 2021-08-12 PROCEDURE — 3288F FALL RISK ASSESSMENT DOCD: CPT | Mod: CPTII,S$GLB,, | Performed by: FAMILY MEDICINE

## 2021-08-12 PROCEDURE — 84443 ASSAY THYROID STIM HORMONE: CPT | Performed by: FAMILY MEDICINE

## 2021-08-12 PROCEDURE — 80053 COMPREHEN METABOLIC PANEL: CPT | Performed by: FAMILY MEDICINE

## 2021-08-12 PROCEDURE — 1101F PT FALLS ASSESS-DOCD LE1/YR: CPT | Mod: CPTII,S$GLB,, | Performed by: FAMILY MEDICINE

## 2021-08-12 PROCEDURE — 1159F MED LIST DOCD IN RCRD: CPT | Mod: CPTII,S$GLB,, | Performed by: FAMILY MEDICINE

## 2021-08-12 PROCEDURE — 3078F DIAST BP <80 MM HG: CPT | Mod: CPTII,S$GLB,, | Performed by: FAMILY MEDICINE

## 2021-08-12 PROCEDURE — 1159F PR MEDICATION LIST DOCUMENTED IN MEDICAL RECORD: ICD-10-PCS | Mod: CPTII,S$GLB,, | Performed by: FAMILY MEDICINE

## 2021-08-12 PROCEDURE — 83036 HEMOGLOBIN GLYCOSYLATED A1C: CPT | Performed by: FAMILY MEDICINE

## 2021-08-12 PROCEDURE — 99499 UNLISTED E&M SERVICE: CPT | Mod: S$GLB,,, | Performed by: FAMILY MEDICINE

## 2021-08-12 PROCEDURE — 3078F PR MOST RECENT DIASTOLIC BLOOD PRESSURE < 80 MM HG: ICD-10-PCS | Mod: CPTII,S$GLB,, | Performed by: FAMILY MEDICINE

## 2021-08-12 PROCEDURE — 99999 PR PBB SHADOW E&M-EST. PATIENT-LVL IV: CPT | Mod: PBBFAC,,, | Performed by: FAMILY MEDICINE

## 2021-08-12 PROCEDURE — 1126F PR PAIN SEVERITY QUANTIFIED, NO PAIN PRESENT: ICD-10-PCS | Mod: CPTII,S$GLB,, | Performed by: FAMILY MEDICINE

## 2021-08-12 PROCEDURE — 36415 COLL VENOUS BLD VENIPUNCTURE: CPT | Mod: PO | Performed by: FAMILY MEDICINE

## 2021-08-12 PROCEDURE — 99214 PR OFFICE/OUTPT VISIT, EST, LEVL IV, 30-39 MIN: ICD-10-PCS | Mod: S$GLB,,, | Performed by: FAMILY MEDICINE

## 2021-08-12 PROCEDURE — 1101F PR PT FALLS ASSESS DOC 0-1 FALLS W/OUT INJ PAST YR: ICD-10-PCS | Mod: CPTII,S$GLB,, | Performed by: FAMILY MEDICINE

## 2021-08-12 PROCEDURE — 3288F PR FALLS RISK ASSESSMENT DOCUMENTED: ICD-10-PCS | Mod: CPTII,S$GLB,, | Performed by: FAMILY MEDICINE

## 2021-08-12 PROCEDURE — 3074F PR MOST RECENT SYSTOLIC BLOOD PRESSURE < 130 MM HG: ICD-10-PCS | Mod: CPTII,S$GLB,, | Performed by: FAMILY MEDICINE

## 2021-08-12 PROCEDURE — 1160F PR REVIEW ALL MEDS BY PRESCRIBER/CLIN PHARMACIST DOCUMENTED: ICD-10-PCS | Mod: CPTII,S$GLB,, | Performed by: FAMILY MEDICINE

## 2021-08-12 PROCEDURE — 80061 LIPID PANEL: CPT | Performed by: FAMILY MEDICINE

## 2021-08-12 PROCEDURE — 1126F AMNT PAIN NOTED NONE PRSNT: CPT | Mod: CPTII,S$GLB,, | Performed by: FAMILY MEDICINE

## 2021-08-12 PROCEDURE — 85025 COMPLETE CBC W/AUTO DIFF WBC: CPT | Performed by: FAMILY MEDICINE

## 2021-08-12 PROCEDURE — 99499 RISK ADDL DX/OHS AUDIT: ICD-10-PCS | Mod: S$GLB,,, | Performed by: FAMILY MEDICINE

## 2021-08-12 RX ORDER — LEVOTHYROXINE SODIUM 88 UG/1
TABLET ORAL
Qty: 30 TABLET | Refills: 0 | Status: SHIPPED | OUTPATIENT
Start: 2021-08-12 | End: 2021-09-14

## 2021-08-13 LAB
ALBUMIN SERPL BCP-MCNC: 3.8 G/DL (ref 3.5–5.2)
ALP SERPL-CCNC: 103 U/L (ref 55–135)
ALT SERPL W/O P-5'-P-CCNC: 16 U/L (ref 10–44)
ANION GAP SERPL CALC-SCNC: 10 MMOL/L (ref 8–16)
AST SERPL-CCNC: 21 U/L (ref 10–40)
BILIRUB SERPL-MCNC: 0.6 MG/DL (ref 0.1–1)
BUN SERPL-MCNC: 12 MG/DL (ref 8–23)
CALCIUM SERPL-MCNC: 9.7 MG/DL (ref 8.7–10.5)
CHLORIDE SERPL-SCNC: 106 MMOL/L (ref 95–110)
CHOLEST SERPL-MCNC: 120 MG/DL (ref 120–199)
CHOLEST/HDLC SERPL: 2.4 {RATIO} (ref 2–5)
CO2 SERPL-SCNC: 24 MMOL/L (ref 23–29)
CREAT SERPL-MCNC: 0.8 MG/DL (ref 0.5–1.4)
EST. GFR  (AFRICAN AMERICAN): >60 ML/MIN/1.73 M^2
EST. GFR  (NON AFRICAN AMERICAN): >60 ML/MIN/1.73 M^2
GLUCOSE SERPL-MCNC: 103 MG/DL (ref 70–110)
HDLC SERPL-MCNC: 49 MG/DL (ref 40–75)
HDLC SERPL: 40.8 % (ref 20–50)
LDLC SERPL CALC-MCNC: 53.8 MG/DL (ref 63–159)
NONHDLC SERPL-MCNC: 71 MG/DL
POTASSIUM SERPL-SCNC: 4.2 MMOL/L (ref 3.5–5.1)
PROT SERPL-MCNC: 7.2 G/DL (ref 6–8.4)
SODIUM SERPL-SCNC: 140 MMOL/L (ref 136–145)
TRIGL SERPL-MCNC: 86 MG/DL (ref 30–150)

## 2021-08-26 ENCOUNTER — TELEPHONE (OUTPATIENT)
Dept: FAMILY MEDICINE | Facility: CLINIC | Age: 81
End: 2021-08-26

## 2021-09-14 RX ORDER — LEVOTHYROXINE SODIUM 88 UG/1
TABLET ORAL
Qty: 96 TABLET | Refills: 0 | Status: SHIPPED | OUTPATIENT
Start: 2021-09-14 | End: 2021-09-20

## 2021-09-14 RX ORDER — LEVOTHYROXINE SODIUM 88 UG/1
TABLET ORAL
Qty: 10 TABLET | Refills: 0 | Status: SHIPPED | OUTPATIENT
Start: 2021-09-14 | End: 2021-09-14 | Stop reason: SDUPTHER

## 2021-10-18 ENCOUNTER — TELEPHONE (OUTPATIENT)
Dept: PAIN MEDICINE | Facility: CLINIC | Age: 81
End: 2021-10-18

## 2021-10-19 ENCOUNTER — APPOINTMENT (OUTPATIENT)
Dept: RADIOLOGY | Facility: HOSPITAL | Age: 81
End: 2021-10-19
Attending: FAMILY MEDICINE
Payer: MEDICARE

## 2021-10-19 DIAGNOSIS — M85.80 OSTEOPENIA, UNSPECIFIED LOCATION: ICD-10-CM

## 2021-10-19 DIAGNOSIS — N95.8 OTHER SPECIFIED MENOPAUSAL AND PERIMENOPAUSAL DISORDERS: ICD-10-CM

## 2021-10-19 PROCEDURE — 77080 DEXA BONE DENSITY SPINE HIP: ICD-10-PCS | Mod: 26,,, | Performed by: RADIOLOGY

## 2021-10-19 PROCEDURE — 77080 DXA BONE DENSITY AXIAL: CPT | Mod: 26,,, | Performed by: RADIOLOGY

## 2021-10-19 PROCEDURE — 77080 DXA BONE DENSITY AXIAL: CPT | Mod: TC

## 2021-10-20 ENCOUNTER — PATIENT OUTREACH (OUTPATIENT)
Dept: ADMINISTRATIVE | Facility: OTHER | Age: 81
End: 2021-10-20

## 2021-10-21 ENCOUNTER — OFFICE VISIT (OUTPATIENT)
Dept: PAIN MEDICINE | Facility: CLINIC | Age: 81
End: 2021-10-21
Payer: MEDICARE

## 2021-10-21 VITALS
BODY MASS INDEX: 26.85 KG/M2 | DIASTOLIC BLOOD PRESSURE: 84 MMHG | RESPIRATION RATE: 17 BRPM | WEIGHT: 133.19 LBS | SYSTOLIC BLOOD PRESSURE: 156 MMHG | HEIGHT: 59 IN | HEART RATE: 88 BPM

## 2021-10-21 DIAGNOSIS — M47.816 LUMBAR SPONDYLOSIS: ICD-10-CM

## 2021-10-21 DIAGNOSIS — M46.1 SACROILIITIS: Primary | ICD-10-CM

## 2021-10-21 DIAGNOSIS — M53.3 SACROILIAC JOINT PAIN: ICD-10-CM

## 2021-10-21 DIAGNOSIS — M43.16 SPONDYLOLISTHESIS OF LUMBAR REGION: ICD-10-CM

## 2021-10-21 PROCEDURE — 99214 OFFICE O/P EST MOD 30 MIN: CPT | Mod: S$GLB,,, | Performed by: PHYSICIAN ASSISTANT

## 2021-10-21 PROCEDURE — 3077F PR MOST RECENT SYSTOLIC BLOOD PRESSURE >= 140 MM HG: ICD-10-PCS | Mod: CPTII,S$GLB,, | Performed by: PHYSICIAN ASSISTANT

## 2021-10-21 PROCEDURE — 99499 RISK ADDL DX/OHS AUDIT: ICD-10-PCS | Mod: S$GLB,,, | Performed by: PHYSICIAN ASSISTANT

## 2021-10-21 PROCEDURE — 99999 PR PBB SHADOW E&M-EST. PATIENT-LVL V: ICD-10-PCS | Mod: PBBFAC,,, | Performed by: PHYSICIAN ASSISTANT

## 2021-10-21 PROCEDURE — 1101F PT FALLS ASSESS-DOCD LE1/YR: CPT | Mod: CPTII,S$GLB,, | Performed by: PHYSICIAN ASSISTANT

## 2021-10-21 PROCEDURE — 1126F PR PAIN SEVERITY QUANTIFIED, NO PAIN PRESENT: ICD-10-PCS | Mod: CPTII,S$GLB,, | Performed by: PHYSICIAN ASSISTANT

## 2021-10-21 PROCEDURE — 3288F FALL RISK ASSESSMENT DOCD: CPT | Mod: CPTII,S$GLB,, | Performed by: PHYSICIAN ASSISTANT

## 2021-10-21 PROCEDURE — 1160F PR REVIEW ALL MEDS BY PRESCRIBER/CLIN PHARMACIST DOCUMENTED: ICD-10-PCS | Mod: CPTII,S$GLB,, | Performed by: PHYSICIAN ASSISTANT

## 2021-10-21 PROCEDURE — 3079F PR MOST RECENT DIASTOLIC BLOOD PRESSURE 80-89 MM HG: ICD-10-PCS | Mod: CPTII,S$GLB,, | Performed by: PHYSICIAN ASSISTANT

## 2021-10-21 PROCEDURE — 1159F MED LIST DOCD IN RCRD: CPT | Mod: CPTII,S$GLB,, | Performed by: PHYSICIAN ASSISTANT

## 2021-10-21 PROCEDURE — 3288F PR FALLS RISK ASSESSMENT DOCUMENTED: ICD-10-PCS | Mod: CPTII,S$GLB,, | Performed by: PHYSICIAN ASSISTANT

## 2021-10-21 PROCEDURE — 99999 PR PBB SHADOW E&M-EST. PATIENT-LVL V: CPT | Mod: PBBFAC,,, | Performed by: PHYSICIAN ASSISTANT

## 2021-10-21 PROCEDURE — 1126F AMNT PAIN NOTED NONE PRSNT: CPT | Mod: CPTII,S$GLB,, | Performed by: PHYSICIAN ASSISTANT

## 2021-10-21 PROCEDURE — 3079F DIAST BP 80-89 MM HG: CPT | Mod: CPTII,S$GLB,, | Performed by: PHYSICIAN ASSISTANT

## 2021-10-21 PROCEDURE — 1101F PR PT FALLS ASSESS DOC 0-1 FALLS W/OUT INJ PAST YR: ICD-10-PCS | Mod: CPTII,S$GLB,, | Performed by: PHYSICIAN ASSISTANT

## 2021-10-21 PROCEDURE — 3077F SYST BP >= 140 MM HG: CPT | Mod: CPTII,S$GLB,, | Performed by: PHYSICIAN ASSISTANT

## 2021-10-21 PROCEDURE — 1159F PR MEDICATION LIST DOCUMENTED IN MEDICAL RECORD: ICD-10-PCS | Mod: CPTII,S$GLB,, | Performed by: PHYSICIAN ASSISTANT

## 2021-10-21 PROCEDURE — 99499 UNLISTED E&M SERVICE: CPT | Mod: S$GLB,,, | Performed by: PHYSICIAN ASSISTANT

## 2021-10-21 PROCEDURE — 1160F RVW MEDS BY RX/DR IN RCRD: CPT | Mod: CPTII,S$GLB,, | Performed by: PHYSICIAN ASSISTANT

## 2021-10-21 PROCEDURE — 99214 PR OFFICE/OUTPT VISIT, EST, LEVL IV, 30-39 MIN: ICD-10-PCS | Mod: S$GLB,,, | Performed by: PHYSICIAN ASSISTANT

## 2021-10-21 RX ORDER — MELOXICAM 7.5 MG/1
7.5 TABLET ORAL DAILY PRN
Qty: 30 TABLET | Refills: 0 | Status: SHIPPED | OUTPATIENT
Start: 2021-10-21 | End: 2021-12-09 | Stop reason: SDUPTHER

## 2021-10-21 RX ORDER — LIDOCAINE 50 MG/G
PATCH TOPICAL
Qty: 90 PATCH | Refills: 0 | Status: SHIPPED | OUTPATIENT
Start: 2021-10-21 | End: 2021-12-09 | Stop reason: SDUPTHER

## 2021-11-01 NOTE — PRE-PROCEDURE INSTRUCTIONS
Spoke with patient regarding procedure scheduled on 11.4     Arrival time 0740     Has patient been sick with fever or on antibiotics within the last 7 days? No     Does the patient have any open wounds, sores or rashes? No     Does the patient have any recent fractures? no     Has patient received a vaccination within the last 7 days? No     Received the COVID vaccination? yes     Has the patient stopped all medications as directed? Na     Does patient have a pacemaker and or defibrillator? no     Does the patient have a ride to and from procedure and someone reliable to remain with patient? JENNA PATEL      Is the patient diabetic? no     Does the patient have sleep apnea? Or use O2 at home? No and no      Is the patient receiving sedation? yes     Is the patient instructed to remain NPO beginning at midnight the night before their procedure? yes     Procedure location confirmed with patient? Yes     Covid- Denies signs/symptoms. Instructed to notify PAT/MD if any changes.

## 2021-11-04 ENCOUNTER — HOSPITAL ENCOUNTER (OUTPATIENT)
Facility: HOSPITAL | Age: 81
Discharge: HOME | End: 2021-11-04
Attending: PHYSICAL MEDICINE & REHABILITATION | Admitting: PHYSICAL MEDICINE & REHABILITATION
Payer: MEDICARE

## 2021-11-04 VITALS
WEIGHT: 133.06 LBS | TEMPERATURE: 98 F | BODY MASS INDEX: 26.82 KG/M2 | OXYGEN SATURATION: 98 % | DIASTOLIC BLOOD PRESSURE: 67 MMHG | HEIGHT: 59 IN | HEART RATE: 68 BPM | SYSTOLIC BLOOD PRESSURE: 154 MMHG | RESPIRATION RATE: 16 BRPM

## 2021-11-04 DIAGNOSIS — M46.1 SACROILIITIS: Primary | ICD-10-CM

## 2021-11-04 PROCEDURE — 27096 PR INJECTION,SACROILIAC JOINT: ICD-10-PCS | Mod: 50,,, | Performed by: PHYSICAL MEDICINE & REHABILITATION

## 2021-11-04 PROCEDURE — 27096 INJECT SACROILIAC JOINT: CPT | Mod: 50,,, | Performed by: PHYSICAL MEDICINE & REHABILITATION

## 2021-11-04 PROCEDURE — 63600175 PHARM REV CODE 636 W HCPCS: Performed by: PHYSICAL MEDICINE & REHABILITATION

## 2021-11-04 PROCEDURE — 25500020 PHARM REV CODE 255: Performed by: PHYSICAL MEDICINE & REHABILITATION

## 2021-11-04 PROCEDURE — 25000003 PHARM REV CODE 250: Performed by: PHYSICAL MEDICINE & REHABILITATION

## 2021-11-04 PROCEDURE — 27096 INJECT SACROILIAC JOINT: CPT | Mod: 50 | Performed by: PHYSICAL MEDICINE & REHABILITATION

## 2021-11-04 RX ORDER — BUPIVACAINE HYDROCHLORIDE 2.5 MG/ML
INJECTION, SOLUTION EPIDURAL; INFILTRATION; INTRACAUDAL
Status: DISCONTINUED | OUTPATIENT
Start: 2021-11-04 | End: 2021-11-04 | Stop reason: HOSPADM

## 2021-11-04 RX ORDER — FENTANYL CITRATE 50 UG/ML
INJECTION, SOLUTION INTRAMUSCULAR; INTRAVENOUS
Status: DISCONTINUED | OUTPATIENT
Start: 2021-11-04 | End: 2021-11-04 | Stop reason: HOSPADM

## 2021-11-04 RX ORDER — MIDAZOLAM HYDROCHLORIDE 1 MG/ML
INJECTION, SOLUTION INTRAMUSCULAR; INTRAVENOUS
Status: DISCONTINUED | OUTPATIENT
Start: 2021-11-04 | End: 2021-11-04 | Stop reason: HOSPADM

## 2021-11-04 RX ORDER — METHYLPREDNISOLONE ACETATE 80 MG/ML
INJECTION, SUSPENSION INTRA-ARTICULAR; INTRALESIONAL; INTRAMUSCULAR; SOFT TISSUE
Status: DISCONTINUED | OUTPATIENT
Start: 2021-11-04 | End: 2021-11-04 | Stop reason: HOSPADM

## 2021-11-04 NOTE — DISCHARGE INSTRUCTIONS

## 2021-11-04 NOTE — DISCHARGE SUMMARY
Discharge Note  Short Stay      SUMMARY     Admit Date: 11/4/2021    Attending Physician: uLis Ashraf MD        Discharge Physician: Luis Ashraf MD        Discharge Date: 11/4/2021 9:08 AM    Procedure(s) (LRB):  Bilateral SIJ Injection (Bilateral)    Final Diagnosis: Sacroiliitis [M46.1]    Disposition: Home or self care    Patient Instructions:   Current Discharge Medication List      CONTINUE these medications which have NOT CHANGED    Details   alendronate (FOSAMAX) 70 MG tablet TAKE 1 TABLET BY MOUTH EVERY 7 DAYS  Qty: 12 tablet, Refills: 0      amitriptyline (ELAVIL) 75 MG tablet Take 1 tablet by mouth in the evening  Qty: 90 tablet, Refills: 0      aspirin (ECOTRIN) 81 MG EC tablet Take 1 tablet (81 mg total) by mouth once daily.  Qty: 1 tablet, Refills: 0      atorvastatin (LIPITOR) 40 MG tablet Take 1 tablet by mouth in the evening  Qty: 90 tablet, Refills: 4      levothyroxine (SYNTHROID) 88 MCG tablet TAKE 1 TABLET BY MOUTH ONCE DAILY ON MONDAY THROUGH SATURDAY AND 1.5 TABLETS ON SUNDAY  Qty: 10 tablet, Refills: 0      omeprazole (PRILOSEC) 20 MG capsule Take 1 capsule (20 mg total) by mouth as needed.  Qty: 90 capsule, Refills: 4      !! propranoloL (INDERAL) 40 MG tablet Take 1 tablet by mouth twice daily  Qty: 180 tablet, Refills: 0      sertraline (ZOLOFT) 100 MG tablet Take 1 tablet by mouth once daily  Qty: 90 tablet, Refills: 3      sumatriptan (IMITREX) 100 MG tablet TAKE ONE TABLET BY MOUTH AT ONSET OF MIGRAINE  Qty: 9 tablet, Refills: 0      vit C/E/Zn/coppr/lutein/zeaxan (PRESERVISION AREDS-2 ORAL) Take 1 tablet by mouth 2 (two) times a day.      VIT C/VIT E/LUTEIN/MIN/OMEGA-3 (OCUVITE ORAL) Take by mouth once daily.      ACCU-CHEK MARIANO PLUS TEST STRP Strp USE 1 STRIP TO CHECK GLUCOSE ONCE DAILY  Qty: 50 strip, Refills: 1    Comments: Please consider 90 day supplies to promote better adherence      blood-glucose meter kit Use as instructed  Qty: 1 each, Refills: 0       ergocalciferol (ERGOCALCIFEROL) 50,000 unit Cap Take 1 capsule by mouth once a week  Qty: 8 capsule, Refills: 0      lancets-blood glucose strips 30 gauge Cmpk 1 application by Misc.(Non-Drug; Combo Route) route once daily at 6am.  Qty: 90 each, Refills: 0      LIDOcaine (LIDODERM) 5 % Use 1-3 patches per day over recommended area. Remove & Discard patch within 12 hours or as directed by MD.  Qty: 90 patch, Refills: 0    Associated Diagnoses: Sacroiliac joint pain      meloxicam (MOBIC) 7.5 MG tablet Take 1 tablet (7.5 mg total) by mouth daily as needed for Pain. Take with food.  Qty: 30 tablet, Refills: 0    Associated Diagnoses: Lumbar spondylosis      !! propranoloL (INDERAL) 40 MG tablet Take 1 tablet by mouth twice daily  Qty: 180 tablet, Refills: 0       !! - Potential duplicate medications found. Please discuss with provider.              Discharge Diagnosis: Sacroiliitis [M46.1]  Condition on Discharge: Stable with no complications to procedure   Diet on Discharge: Same as before.  Activity: as per instruction sheet.  Discharge to: Home with a responsible adult.  Follow up: 2-4 weeks       Please call the office if you experience any weakness or loss of sensation, fever > 101.5, pain uncontrolled with oral medications, persistent nausea/vomiting/or diarrhea, redness or drainage from the incisions, or any other worrisome concerns. If physician on call was not reached or could not communicate with our office for any reason please go to the nearest emergency department

## 2021-11-04 NOTE — OP NOTE
Sacroiliac Joint Injection under Fluoroscopy    Date of procedure 11/04/2021    Time-out taken to identify patient and procedure side prior to starting the procedure.                                                                 PROCEDURE:  Bilateral sacroiliac joint injection under fluoroscopy.    REASON FOR PROCEDURE: bilateral Sacroiliitis [M46.1]    PHYSICIAN: Luis Ashraf MD    ASSISTANTS: None    MEDICATIONS INJECTED:  Depo-Medrol 40mg and 4 mL Bupivacaine 0.25%    LOCAL ANESTHETIC USED: Xylocaine 1% 5mL    SEDATION MEDICATIONS: Conscious sedation provided by M.D    The patient was monitored with continuous pulse oximetry, EKG, and intermittent blood pressure monitors, immediately prior to administration of sedation.  The patient was hemodynamically stable throughout the entire process was responsive to voice, and breathing spontaneously.  Supplemental O2 was provided at 2L/min via nasal cannula.  Patient was comfortable for the duration of the procedure.     There was a total of 2mg IV Midazolam and 25mcg Fentanyl titrated for the procedure    Total sedation time was <10 minutes      ESTIMATED BLOOD LOSS:  None.    COMPLICATIONS:  None.    TECHNIQUE:   Laying in the prone position, the patient was prepped and draped in the usual sterile fashion using ChloraPrep and fenestrated drape.  The area was determined under fluoroscopy.  Local Xylocaine was injected by raising a wheel and going down to the periosteum using a 27-gauge hypodermic needle.  The 3.5 inch 22-gauge spinal needle was introduce into the bilateral sacroiliac joint.  Negative pressure applied to confirm no intravascular placement.  Omnipaque was injected to confirm placement and to confirm that there was no vascular runoff.  The medication was then injected slowly.  The patient tolerated the procedure well.      The patient was monitored for approximately 30 minutes after the procedure.  Patient was given post procedure and discharge  instructions to follow at home.  We will see the patient back in two weeks or the patient may call to inform of status. The patient was discharged in a stable condition

## 2021-11-04 NOTE — H&P
HPI  Patient presenting for Procedure(s) (LRB):  Bilateral SIJ Injection (Bilateral)     No health changes since previous encounter    Past Medical History:   Diagnosis Date    Depression     Diverticulosis of colon (without mention of hemorrhage) 8/25/2014    Hyperlipidemia     Hypertension     Thyroid disease      Past Surgical History:   Procedure Laterality Date    APPENDECTOMY      CATARACT EXTRACTION EXTRACAPSULAR W/ INTRAOCULAR LENS IMPLANTATION Bilateral 4/2014    INJECTION OF ANESTHETIC AGENT AROUND MEDIAL BRANCH NERVES INNERVATING LUMBAR FACET JOINT Bilateral 7/16/2020    Procedure: Bilateral L3-5 MBB with local;  Surgeon: Luis Ashraf MD;  Location: [facility];  Service: Pain Management;  Laterality: Bilateral;    INJECTION OF ANESTHETIC AGENT AROUND MEDIAL BRANCH NERVES INNERVATING LUMBAR FACET JOINT Bilateral 8/10/2020    Procedure: Bilateral L3-5 MBB with local;  Surgeon: Luis Ashraf MD;  Location: [facility];  Service: Pain Management;  Laterality: Bilateral;    RADIOFREQUENCY THERMOCOAGULATION Left 9/28/2020    Procedure: Left L3-5 Lumbar RFA;  Surgeon: Luis Ashraf MD;  Location: [facility];  Service: Pain Management;  Laterality: Left;    RADIOFREQUENCY THERMOCOAGULATION Right 10/12/2020    Procedure: Right L3-5 Lumbar RFA;  Surgeon: Luis Ashraf MD;  Location: [facility];  Service: Pain Management;  Laterality: Right;    STOMACH SURGERY  1998    not sure what they did, possible bowel resection, partial gastrectomy    TONSILLECTOMY       Review of patient's allergies indicates:   Allergen Reactions    Clindamycin      Heaviness in chest      Penicillins Hives        Current Facility-Administered Medications on File Prior to Encounter   Medication Dose Route Frequency Provider Last Rate Last Admin    ondansetron injection 4 mg  4 mg Intravenous Once PRN Luis Ashraf MD         Current Outpatient Medications on File Prior to Encounter   Medication  Sig Dispense Refill    alendronate (FOSAMAX) 70 MG tablet TAKE 1 TABLET BY MOUTH EVERY 7 DAYS 12 tablet 0    amitriptyline (ELAVIL) 75 MG tablet Take 1 tablet by mouth in the evening 90 tablet 0    aspirin (ECOTRIN) 81 MG EC tablet Take 1 tablet (81 mg total) by mouth once daily. 1 tablet 0    atorvastatin (LIPITOR) 40 MG tablet Take 1 tablet by mouth in the evening 90 tablet 4    levothyroxine (SYNTHROID) 88 MCG tablet TAKE 1 TABLET BY MOUTH ONCE DAILY ON MONDAY THROUGH SATURDAY AND 1.5 TABLETS ON SUNDAY 10 tablet 0    omeprazole (PRILOSEC) 20 MG capsule Take 1 capsule (20 mg total) by mouth as needed. 90 capsule 4    sertraline (ZOLOFT) 100 MG tablet Take 1 tablet by mouth once daily 90 tablet 3    sumatriptan (IMITREX) 100 MG tablet TAKE ONE TABLET BY MOUTH AT ONSET OF MIGRAINE 9 tablet 0    vit C/E/Zn/coppr/lutein/zeaxan (PRESERVISION AREDS-2 ORAL) Take 1 tablet by mouth 2 (two) times a day.      VIT C/VIT E/LUTEIN/MIN/OMEGA-3 (OCUVITE ORAL) Take by mouth once daily.      ACCU-CHEK MARIANO PLUS TEST STRP Strp USE 1 STRIP TO CHECK GLUCOSE ONCE DAILY 50 strip 1    blood-glucose meter kit Use as instructed 1 each 0    ergocalciferol (ERGOCALCIFEROL) 50,000 unit Cap Take 1 capsule by mouth once a week 8 capsule 0    lancets-blood glucose strips 30 gauge Cmpk 1 application by Misc.(Non-Drug; Combo Route) route once daily at 6am. 90 each 0    LIDOcaine (LIDODERM) 5 % Use 1-3 patches per day over recommended area. Remove & Discard patch within 12 hours or as directed by MD. 90 patch 0    meloxicam (MOBIC) 7.5 MG tablet Take 1 tablet (7.5 mg total) by mouth daily as needed for Pain. Take with food. 30 tablet 0    propranoloL (INDERAL) 40 MG tablet Take 1 tablet by mouth twice daily 180 tablet 0        PMHx, PSHx, Allergies, Medications reviewed in epic    ROS negative except pain complaints in HPI    OBJECTIVE:    /83 (BP Location: Left arm, Patient Position: Lying)   Pulse 71   Temp 97.1 °F (36.2  "°C) (Temporal)   Resp 17   Ht 4' 11" (1.499 m)   Wt 60.3 kg (133 lb 0.8 oz)   SpO2 100%   Breastfeeding No   BMI 26.87 kg/m²     PHYSICAL EXAMINATION:    GENERAL: Well appearing, in no acute distress, alert and oriented x3.  PSYCH:  Mood and affect appropriate.  SKIN: Skin color, texture, turgor normal, no rashes or lesions which will impact the procedure.  CV: RRR with palpation of the radial artery.  PULM: No evidence of respiratory difficulty, symmetric chest rise. Clear to auscultation.  NEURO: Cranial nerves grossly intact.    Plan:    Proceed with procedure as planned Procedure(s) (LRB):  Bilateral SIJ Injection (Bilateral)    Luis Ashraf MD  11/04/2021            "

## 2021-12-09 ENCOUNTER — OFFICE VISIT (OUTPATIENT)
Dept: PAIN MEDICINE | Facility: CLINIC | Age: 81
End: 2021-12-09
Payer: MEDICARE

## 2021-12-09 VITALS
SYSTOLIC BLOOD PRESSURE: 165 MMHG | HEIGHT: 59 IN | DIASTOLIC BLOOD PRESSURE: 77 MMHG | WEIGHT: 134.56 LBS | RESPIRATION RATE: 18 BRPM | HEART RATE: 78 BPM | BODY MASS INDEX: 27.13 KG/M2

## 2021-12-09 DIAGNOSIS — M51.36 DDD (DEGENERATIVE DISC DISEASE), LUMBAR: ICD-10-CM

## 2021-12-09 DIAGNOSIS — M51.36 DISCOGENIC LOW BACK PAIN: ICD-10-CM

## 2021-12-09 DIAGNOSIS — M43.16 SPONDYLOLISTHESIS OF LUMBAR REGION: Primary | ICD-10-CM

## 2021-12-09 DIAGNOSIS — M47.816 LUMBAR SPONDYLOSIS: ICD-10-CM

## 2021-12-09 PROCEDURE — 99999 PR PBB SHADOW E&M-EST. PATIENT-LVL V: ICD-10-PCS | Mod: PBBFAC,,, | Performed by: PHYSICIAN ASSISTANT

## 2021-12-09 PROCEDURE — 99999 PR PBB SHADOW E&M-EST. PATIENT-LVL V: CPT | Mod: PBBFAC,,, | Performed by: PHYSICIAN ASSISTANT

## 2021-12-09 PROCEDURE — 99214 PR OFFICE/OUTPT VISIT, EST, LEVL IV, 30-39 MIN: ICD-10-PCS | Mod: S$GLB,,, | Performed by: PHYSICIAN ASSISTANT

## 2021-12-09 PROCEDURE — 99214 OFFICE O/P EST MOD 30 MIN: CPT | Mod: S$GLB,,, | Performed by: PHYSICIAN ASSISTANT

## 2021-12-09 RX ORDER — MELOXICAM 7.5 MG/1
7.5 TABLET ORAL 2 TIMES DAILY PRN
Qty: 30 TABLET | Refills: 1 | Status: SHIPPED | OUTPATIENT
Start: 2021-12-09 | End: 2022-02-06

## 2021-12-13 ENCOUNTER — TELEPHONE (OUTPATIENT)
Dept: PAIN MEDICINE | Facility: CLINIC | Age: 81
End: 2021-12-13
Payer: MEDICARE

## 2021-12-27 ENCOUNTER — TELEPHONE (OUTPATIENT)
Dept: PAIN MEDICINE | Facility: CLINIC | Age: 81
End: 2021-12-27
Payer: MEDICARE

## 2021-12-31 ENCOUNTER — HOSPITAL ENCOUNTER (EMERGENCY)
Facility: HOSPITAL | Age: 81
Discharge: HOME | End: 2021-12-31
Attending: EMERGENCY MEDICINE
Payer: MEDICARE

## 2021-12-31 VITALS
RESPIRATION RATE: 18 BRPM | DIASTOLIC BLOOD PRESSURE: 73 MMHG | WEIGHT: 130.19 LBS | HEIGHT: 59 IN | OXYGEN SATURATION: 97 % | TEMPERATURE: 98 F | SYSTOLIC BLOOD PRESSURE: 130 MMHG | HEART RATE: 100 BPM | BODY MASS INDEX: 26.24 KG/M2

## 2021-12-31 DIAGNOSIS — L02.511 ABSCESS OF RIGHT INDEX FINGER: Primary | ICD-10-CM

## 2021-12-31 PROCEDURE — 25000003 PHARM REV CODE 250: Performed by: EMERGENCY MEDICINE

## 2021-12-31 PROCEDURE — 99283 EMERGENCY DEPT VISIT LOW MDM: CPT | Mod: 25

## 2021-12-31 PROCEDURE — U0005 INFEC AGEN DETEC AMPLI PROBE: HCPCS | Performed by: EMERGENCY MEDICINE

## 2021-12-31 PROCEDURE — 10060 I&D ABSCESS SIMPLE/SINGLE: CPT | Mod: RT

## 2021-12-31 PROCEDURE — U0003 INFECTIOUS AGENT DETECTION BY NUCLEIC ACID (DNA OR RNA); SEVERE ACUTE RESPIRATORY SYNDROME CORONAVIRUS 2 (SARS-COV-2) (CORONAVIRUS DISEASE [COVID-19]), AMPLIFIED PROBE TECHNIQUE, MAKING USE OF HIGH THROUGHPUT TECHNOLOGIES AS DESCRIBED BY CMS-2020-01-R: HCPCS | Performed by: EMERGENCY MEDICINE

## 2021-12-31 RX ORDER — SULFAMETHOXAZOLE AND TRIMETHOPRIM 800; 160 MG/1; MG/1
1 TABLET ORAL
Status: COMPLETED | OUTPATIENT
Start: 2021-12-31 | End: 2021-12-31

## 2021-12-31 RX ORDER — LIDOCAINE HYDROCHLORIDE 10 MG/ML
10 INJECTION, SOLUTION EPIDURAL; INFILTRATION; INTRACAUDAL; PERINEURAL
Status: COMPLETED | OUTPATIENT
Start: 2021-12-31 | End: 2021-12-31

## 2021-12-31 RX ORDER — SULFAMETHOXAZOLE AND TRIMETHOPRIM 800; 160 MG/1; MG/1
1 TABLET ORAL 2 TIMES DAILY
Qty: 14 TABLET | Refills: 0 | Status: SHIPPED | OUTPATIENT
Start: 2021-12-31 | End: 2022-01-07

## 2021-12-31 RX ADMIN — LIDOCAINE HYDROCHLORIDE 100 MG: 10 INJECTION, SOLUTION EPIDURAL; INFILTRATION; INTRACAUDAL at 02:12

## 2021-12-31 RX ADMIN — SULFAMETHOXAZOLE AND TRIMETHOPRIM 1 TABLET: 800; 160 TABLET ORAL at 02:12

## 2021-12-31 NOTE — DISCHARGE INSTRUCTIONS
Warm compresses 3 times daily.  Use ibuprofen for pain.  Bactrim for infection.  Follow up with her doctor on Monday.  Return as needed for any worsening symptoms, problems, questions or concerns.

## 2021-12-31 NOTE — ED PROVIDER NOTES
SCRIBE #1 NOTE: I, Pedro Hernandez, am scribing for, and in the presence of, Fredis Vera Jr., MD. I have scribed the entire note.       History     Chief Complaint   Patient presents with    Hand Pain     Pt. States he has been noticing pain in fingers where bump has developed but states they have been there for a while but just recently has noted swelling with redness that has started progressing up finger to hand.      Review of patient's allergies indicates:   Allergen Reactions    Clindamycin      Heaviness in chest      Penicillins Hives         History of Present Illness     HPI    12/31/2021, 2:07 PM  History obtained from the patient      History of Present Illness: Haim Martin is a 81 y.o. female patient with a PMHx of HLD and HTN who presents to the Emergency Department for evaluation of R pointer and L pointer finger pain and swelling. Symptoms are constant and moderate in severity. No mitigating or exacerbating factors reported. No associated sxs reported. Patient denies any fever, chills, n/v, abd pain, HA, and all other sxs at this time. No prior tx reported. No further complaints or concerns at this time.       Arrival mode: Personal vehicle     PCP: Kavya Mesa MD        Past Medical History:  Past Medical History:   Diagnosis Date    Depression     Diverticulosis of colon (without mention of hemorrhage) 8/25/2014    Hyperlipidemia     Hypertension     Thyroid disease        Past Surgical History:  Past Surgical History:   Procedure Laterality Date    APPENDECTOMY      CATARACT EXTRACTION EXTRACAPSULAR W/ INTRAOCULAR LENS IMPLANTATION Bilateral 4/2014    INJECTION OF ANESTHETIC AGENT AROUND MEDIAL BRANCH NERVES INNERVATING LUMBAR FACET JOINT Bilateral 7/16/2020    Procedure: Bilateral L3-5 MBB with local;  Surgeon: Luis Ashraf MD;  Location: [facility];  Service: Pain Management;  Laterality: Bilateral;    INJECTION OF ANESTHETIC AGENT AROUND MEDIAL BRANCH  NERVES INNERVATING LUMBAR FACET JOINT Bilateral 8/10/2020    Procedure: Bilateral L3-5 MBB with local;  Surgeon: Luis Ashraf MD;  Location: [facility];  Service: Pain Management;  Laterality: Bilateral;    INJECTION OF ANESTHETIC AGENT INTO SACROILIAC JOINT Bilateral 11/4/2021    Procedure: Bilateral SIJ Injection;  Surgeon: Luis Ashraf MD;  Location: [facility];  Service: Pain Management;  Laterality: Bilateral;    RADIOFREQUENCY THERMOCOAGULATION Left 9/28/2020    Procedure: Left L3-5 Lumbar RFA;  Surgeon: Luis Ashraf MD;  Location: [facility];  Service: Pain Management;  Laterality: Left;    RADIOFREQUENCY THERMOCOAGULATION Right 10/12/2020    Procedure: Right L3-5 Lumbar RFA;  Surgeon: Luis Ashraf MD;  Location: [facility];  Service: Pain Management;  Laterality: Right;    STOMACH SURGERY  1998    not sure what they did, possible bowel resection, partial gastrectomy    TONSILLECTOMY           Family History:  Family History   Problem Relation Age of Onset    Lupus Mother     Stroke Father     Coronary artery disease Father     Diabetes type II Father     Kidney cancer Maternal Aunt     Breast cancer Maternal Aunt        Social History:  Social History     Tobacco Use    Smoking status: Former Smoker    Smokeless tobacco: Never Used    Tobacco comment: quit 30 years ago   Substance and Sexual Activity    Alcohol use: No    Drug use: No    Sexual activity: Not Currently     Birth control/protection: None        Review of Systems     Review of Systems   Constitutional: Negative for chills and fever.   HENT: Negative for sore throat.    Respiratory: Negative for shortness of breath.    Cardiovascular: Negative for chest pain.   Gastrointestinal: Negative for abdominal pain, nausea and vomiting.   Genitourinary: Negative for dysuria.   Musculoskeletal: Positive for joint swelling. Negative for back pain.        (+) R and L pointer finger pain and swelling   Skin:  Negative for rash.   Neurological: Negative for weakness and headaches.   Hematological: Does not bruise/bleed easily.   All other systems reviewed and are negative.     Physical Exam     Initial Vitals [12/31/21 1356]   BP Pulse Resp Temp SpO2   130/73 100 18 98 °F (36.7 °C) 97 %      MAP       --          Physical Exam  Nursing Notes and Vital Signs Reviewed.  Constitutional: Patient is in no acute distress. Well-developed and well-nourished.  Head: Atraumatic. Normocephalic.  Eyes:  EOM intact.  No scleral icterus.  ENT: Mucous membranes are moist.  Nares clear   Neck:  Full ROM. No JVD.  Cardiovascular: Regular rate. Regular rhythm No murmurs, rubs, or gallops. Distal pulses are 2+ and symmetric  Musculoskeletal: Moves all extremities. No obvious deformities.  5 x 5 strength in all extremities there is swelling to the distal aspect of the right index finger.  This is from the D IP to the finger tip with some mild swelling extending proximally.  She does have movement of the D IP.  She has no sausage digit.  This appears to be a superficial abscess with lesion to the ulnar aspect of the finger.  This is not appear to be tenosynovitis.  There is no swelling to the palmar aspect.  There is no paronychia.  There is no felon.  Skin: Warm and dry.  See musculoskeletal  Neurological:  Alert, awake, and appropriate.  Normal speech.  No acute focal neurological deficits are appreciated.  Two through 12 intact bilaterally.  Psychiatric: Normal affect. Good eye contact. Appropriate in content.     ED Course   I & D - Incision and Drainage    Date/Time: 12/31/2021 2:49 PM  Location procedure was performed: [facility] EMERGENCY DEPARTMENT  Performed by: Fredis Vera Jr., MD  Authorized by: Fredis Vera Jr., MD   Consent Done: Yes  Consent: Verbal consent obtained.  Risks and benefits: risks, benefits and alternatives were discussed  Consent given by: patient  Patient understanding: patient states understanding of the  "procedure being performed  Patient consent: the patient's understanding of the procedure matches consent given  Procedure consent: procedure consent matches procedure scheduled  Relevant documents: relevant documents present and verified  Test results: test results available and properly labeled  Site marked: the operative site was marked  Imaging studies: imaging studies available  Patient identity confirmed: , MRN and name  Time out: Immediately prior to procedure a "time out" was called to verify the correct patient, procedure, equipment, support staff and site/side marked as required.  Body area: upper extremity  Location details: right hand  Anesthesia: local infiltration    Anesthesia:  Local Anesthetic: lidocaine 1% with epinephrine    Patient sedated: no  Complexity: simple  Wound treatment: No packing.  Complications: No  Specimens: No  Implants: No  Patient tolerance: Patient tolerated the procedure well with no immediate complications        ED Vital Signs:  Vitals:    21 1356   BP: 130/73   Pulse: 100   Resp: 18   Temp: 98 °F (36.7 °C)   TempSrc: Oral   SpO2: 97%   Weight: 59.1 kg (130 lb 2.9 oz)   Height: 4' 11" (1.499 m)       Abnormal Lab Results:  Labs Reviewed   SARS-COV-2 (COVID-19) QUALITATIVE PCR        All Lab Results:  none  Imaging Results:  Imaging Results    None                 The Emergency Provider reviewed the vital signs and test results, which are outlined above.     ED Discussion     2:49 PM: Reassessed pt at this time. Discussed with pt all pertinent ED information and results. Discussed pt dx and plan of tx. Gave pt all f/u and return to the ED instructions. All questions and concerns were addressed at this time. Pt expresses understanding of information and instructions, and is comfortable with plan to discharge. Pt is stable for discharge.    I discussed with patient and/or family/caretaker that evaluation in the ED does not suggest any emergent or life threatening " medical conditions requiring immediate intervention beyond what was provided in the ED, and I believe patient is safe for discharge.  Regardless, an unremarkable evaluation in the ED does not preclude the development or presence of a serious of life threatening condition. As such, patient was instructed to return immediately for any worsening or change in current symptoms.                 ED Medication(s):  Medications   LIDOcaine (PF) 10 mg/ml (1%) injection 100 mg (100 mg Infiltration Given 12/31/21 1425)   sulfamethoxazole-trimethoprim 800-160mg per tablet 1 tablet (1 tablet Oral Given 12/31/21 1425)       New Prescriptions    SULFAMETHOXAZOLE-TRIMETHOPRIM 800-160MG (BACTRIM DS) 800-160 MG TAB    Take 1 tablet by mouth 2 (two) times daily. for 7 days        Follow-up Information     Kavya Mesa MD In 3 days.    Specialty: Family Medicine  Contact information:  [address] [place] [id number]  [phone]                             Scribe Attestation:   Scribe #1: I performed the above scribed service and the documentation accurately describes the services I performed. I attest to the accuracy of the note.     Attending:   Physician Attestation Statement for Scribe #1: I, Fredis Vera Jr., MD, personally performed the services described in this documentation, as scribed by Pedro Hernandez, in my presence, and it is both accurate and complete.           Clinical Impression       ICD-10-CM ICD-9-CM   1. Abscess of right index finger  L02.511 681.00       Disposition:   Disposition: Discharged  Condition: Stable         Fredis Vera Jr., MD  12/31/21 [id number]

## 2022-01-02 LAB
SARS-COV-2 RNA RESP QL NAA+PROBE: DETECTED
SARS-COV-2- CYCLE NUMBER: 6

## 2022-01-03 ENCOUNTER — PATIENT MESSAGE (OUTPATIENT)
Dept: PAIN MEDICINE | Facility: CLINIC | Age: 82
End: 2022-01-03
Payer: MEDICARE

## 2022-01-03 DIAGNOSIS — U07.1 COVID-19 VIRUS DETECTED: ICD-10-CM

## 2022-01-08 NOTE — TELEPHONE ENCOUNTER
No new care gaps identified.  Powered by [employer]. Reference number: [id number].   1/08/2022 1:53:01 PM CST

## 2022-01-09 ENCOUNTER — HOSPITAL ENCOUNTER (EMERGENCY)
Facility: HOSPITAL | Age: 82
Discharge: HOME | End: 2022-01-09
Attending: EMERGENCY MEDICINE
Payer: MEDICARE

## 2022-01-09 VITALS
RESPIRATION RATE: 20 BRPM | TEMPERATURE: 97 F | HEART RATE: 104 BPM | OXYGEN SATURATION: 100 % | DIASTOLIC BLOOD PRESSURE: 100 MMHG | SYSTOLIC BLOOD PRESSURE: 160 MMHG | BODY MASS INDEX: 25.89 KG/M2 | WEIGHT: 128.19 LBS

## 2022-01-09 DIAGNOSIS — L03.011 CELLULITIS OF FINGER OF RIGHT HAND: Primary | ICD-10-CM

## 2022-01-09 LAB
ALBUMIN SERPL BCP-MCNC: 3.9 G/DL (ref 3.5–5.2)
ALP SERPL-CCNC: 121 U/L (ref 55–135)
ALT SERPL W/O P-5'-P-CCNC: 20 U/L (ref 10–44)
ANION GAP SERPL CALC-SCNC: 11 MMOL/L (ref 8–16)
AST SERPL-CCNC: 30 U/L (ref 10–40)
BASOPHILS # BLD AUTO: 0.08 K/UL (ref 0–0.2)
BASOPHILS NFR BLD: 1.1 % (ref 0–1.9)
BILIRUB SERPL-MCNC: 0.5 MG/DL (ref 0.1–1)
BUN SERPL-MCNC: 16 MG/DL (ref 8–23)
CALCIUM SERPL-MCNC: 9.1 MG/DL (ref 8.7–10.5)
CHLORIDE SERPL-SCNC: 108 MMOL/L (ref 95–110)
CO2 SERPL-SCNC: 19 MMOL/L (ref 23–29)
CREAT SERPL-MCNC: 1 MG/DL (ref 0.5–1.4)
CRP SERPL-MCNC: 16.8 MG/L (ref 0–8.2)
DIFFERENTIAL METHOD: ABNORMAL
EOSINOPHIL # BLD AUTO: 0.2 K/UL (ref 0–0.5)
EOSINOPHIL NFR BLD: 3.1 % (ref 0–8)
ERYTHROCYTE [DISTWIDTH] IN BLOOD BY AUTOMATED COUNT: 14 % (ref 11.5–14.5)
ERYTHROCYTE [SEDIMENTATION RATE] IN BLOOD BY WESTERGREN METHOD: 75 MM/HR (ref 0–20)
EST. GFR  (AFRICAN AMERICAN): >60 ML/MIN/1.73 M^2
EST. GFR  (NON AFRICAN AMERICAN): 54 ML/MIN/1.73 M^2
GLUCOSE SERPL-MCNC: 110 MG/DL (ref 70–110)
HCT VFR BLD AUTO: 40.9 % (ref 37–48.5)
HGB BLD-MCNC: 12.8 G/DL (ref 12–16)
IMM GRANULOCYTES # BLD AUTO: 0.05 K/UL (ref 0–0.04)
IMM GRANULOCYTES NFR BLD AUTO: 0.7 % (ref 0–0.5)
LYMPHOCYTES # BLD AUTO: 1.6 K/UL (ref 1–4.8)
LYMPHOCYTES NFR BLD: 21.2 % (ref 18–48)
MCH RBC QN AUTO: 30.9 PG (ref 27–31)
MCHC RBC AUTO-ENTMCNC: 31.3 G/DL (ref 32–36)
MCV RBC AUTO: 99 FL (ref 82–98)
MONOCYTES # BLD AUTO: 0.6 K/UL (ref 0.3–1)
MONOCYTES NFR BLD: 7.4 % (ref 4–15)
NEUTROPHILS # BLD AUTO: 4.9 K/UL (ref 1.8–7.7)
NEUTROPHILS NFR BLD: 66.5 % (ref 38–73)
NRBC BLD-RTO: 0 /100 WBC
PLATELET # BLD AUTO: 370 K/UL (ref 150–450)
PMV BLD AUTO: 9.8 FL (ref 9.2–12.9)
POTASSIUM SERPL-SCNC: 5.8 MMOL/L (ref 3.5–5.1)
PROT SERPL-MCNC: 8 G/DL (ref 6–8.4)
RBC # BLD AUTO: 4.14 M/UL (ref 4–5.4)
SODIUM SERPL-SCNC: 138 MMOL/L (ref 136–145)
WBC # BLD AUTO: 7.41 K/UL (ref 3.9–12.7)

## 2022-01-09 PROCEDURE — 86140 C-REACTIVE PROTEIN: CPT | Performed by: PHYSICIAN ASSISTANT

## 2022-01-09 PROCEDURE — 85651 RBC SED RATE NONAUTOMATED: CPT | Performed by: PHYSICIAN ASSISTANT

## 2022-01-09 PROCEDURE — 85025 COMPLETE CBC W/AUTO DIFF WBC: CPT | Performed by: NURSE PRACTITIONER

## 2022-01-09 PROCEDURE — 87077 CULTURE AEROBIC IDENTIFY: CPT | Performed by: PHYSICIAN ASSISTANT

## 2022-01-09 PROCEDURE — 87186 SC STD MICRODIL/AGAR DIL: CPT | Performed by: PHYSICIAN ASSISTANT

## 2022-01-09 PROCEDURE — 26010 DRAINAGE OF FINGER ABSCESS: CPT | Mod: RT

## 2022-01-09 PROCEDURE — 87070 CULTURE OTHR SPECIMN AEROBIC: CPT | Performed by: PHYSICIAN ASSISTANT

## 2022-01-09 PROCEDURE — 99284 EMERGENCY DEPT VISIT MOD MDM: CPT | Mod: 25

## 2022-01-09 PROCEDURE — 80053 COMPREHEN METABOLIC PANEL: CPT | Performed by: NURSE PRACTITIONER

## 2022-01-09 PROCEDURE — 25000003 PHARM REV CODE 250: Performed by: PHYSICIAN ASSISTANT

## 2022-01-09 RX ORDER — DOXYCYCLINE HYCLATE 100 MG
100 TABLET ORAL
Status: COMPLETED | OUTPATIENT
Start: 2022-01-09 | End: 2022-01-09

## 2022-01-09 RX ORDER — HYDROCODONE BITARTRATE AND ACETAMINOPHEN 5; 325 MG/1; MG/1
1 TABLET ORAL EVERY 4 HOURS PRN
Qty: 8 TABLET | Refills: 0 | Status: ON HOLD | OUTPATIENT
Start: 2022-01-09 | End: 2022-01-24 | Stop reason: HOSPADM

## 2022-01-09 RX ORDER — DOXYCYCLINE 100 MG/1
100 CAPSULE ORAL 2 TIMES DAILY
Qty: 14 CAPSULE | Refills: 0 | Status: SHIPPED | OUTPATIENT
Start: 2022-01-09 | End: 2022-01-16

## 2022-01-09 RX ORDER — BUPIVACAINE HYDROCHLORIDE 5 MG/ML
5 INJECTION, SOLUTION EPIDURAL; INTRACAUDAL
Status: COMPLETED | OUTPATIENT
Start: 2022-01-09 | End: 2022-01-09

## 2022-01-09 RX ADMIN — BUPIVACAINE HYDROCHLORIDE 25 MG: 5 INJECTION, SOLUTION EPIDURAL; INTRACAUDAL at 01:01

## 2022-01-09 RX ADMIN — DOXYCYCLINE HYCLATE 100 MG: 100 TABLET, COATED ORAL at 04:01

## 2022-01-09 NOTE — ED NOTES
Patient identifiers verified and correct for Haim Martin.  Patient presents to ED with abscessed right index finger. Finger is noted to be edematous, reddened and painful.     LOC: The patient is awake, alert and aware of environment with an appropriate affect, the patient is oriented x 3 and speaking appropriately.  APPEARANCE: Patient resting comfortably and in no acute distress, patient is clean and well groomed, patient's clothing is properly fastened.  SKIN: The skin is warm and dry, color consistent with ethnicity, patient has normal skin turgor and moist mucus membranes, skin intact, no breakdown or bruising noted.  MUSCULOSKELETAL: Patient moving all extremities spontaneously.  RESPIRATORY: Airway is open and patent, respirations are spontaneous.  CARDIAC: Patient has a normal rate, no periphreal edema noted, capillary refill < 3 seconds.  ABDOMEN: Soft and non tender to palpation.

## 2022-01-09 NOTE — FIRST PROVIDER EVALUATION
"Medical screening exam completed.  I have conducted a focused provider triage encounter, findings are as follows:    Brief history of present illness: right index finger pain, swelling, redness. Pain 10/10 if bumps.   Here 12/31/2021 initial flare. Has worsened. Completed 7 days bactrim ds, triple antibiotic. She states finger is making her "just feel terrible" with pain of right index finger    There were no vitals filed for this visit.    Pertinent physical exam:  Right 2nd finger, erythema, swelling, arthritic deformity, pain. Appears stable.     Brief workup plan:  Xray finger, lab, q track evaluation.     Preliminary workup initiated; this workup will be continued and followed by the physician or advanced practice provider that is assigned to the patient when roomed.  "

## 2022-01-09 NOTE — ED PROVIDER NOTES
History      Chief Complaint   Patient presents with    Wound Check     Pt was seen [facility] for wound/abscess on 4th finger on R. Hand. Pt reports worsening symptoms.        Review of patient's allergies indicates:   Allergen Reactions    Clindamycin      Heaviness in chest      Penicillins Hives        HPI   HPI    1/9/2022, 1:34 PM   History obtained from the patient      History of Present Illness: Haim Martin is a 81 y.o. female patient who presents to the Emergency Department for swelling and redness to 4th finger.  She had I&D 12/31 and completed bactrim with no improvement. Symptoms are moderate in severity.     No further complaints or concerns at this time.           PCP: Kavya Mesa MD       Past Medical History:  Past Medical History:   Diagnosis Date    Depression     Diverticulosis of colon (without mention of hemorrhage) 8/25/2014    Hyperlipidemia     Hypertension     Thyroid disease          Past Surgical History:  Past Surgical History:   Procedure Laterality Date    APPENDECTOMY      CATARACT EXTRACTION EXTRACAPSULAR W/ INTRAOCULAR LENS IMPLANTATION Bilateral 4/2014    INJECTION OF ANESTHETIC AGENT AROUND MEDIAL BRANCH NERVES INNERVATING LUMBAR FACET JOINT Bilateral 7/16/2020    Procedure: Bilateral L3-5 MBB with local;  Surgeon: Luis Ashraf MD;  Location: [facility] PAIN [facility]GT;  Service: Pain Management;  Laterality: Bilateral;    INJECTION OF ANESTHETIC AGENT AROUND MEDIAL BRANCH NERVES INNERVATING LUMBAR FACET JOINT Bilateral 8/10/2020    Procedure: Bilateral L3-5 MBB with local;  Surgeon: Luis Ashraf MD;  Location: [facility] PAIN [facility]GT;  Service: Pain Management;  Laterality: Bilateral;    INJECTION OF ANESTHETIC AGENT INTO SACROILIAC JOINT Bilateral 11/4/2021    Procedure: Bilateral SIJ Injection;  Surgeon: Luis Ashraf MD;  Location: [facility] PAIN [facility]GT;  Service: Pain Management;  Laterality: Bilateral;    RADIOFREQUENCY THERMOCOAGULATION Left  9/28/2020    Procedure: Left L3-5 Lumbar RFA;  Surgeon: Luis Ashraf MD;  Location: [facility]IN [facility]GT;  Service: Pain Management;  Laterality: Left;    RADIOFREQUENCY THERMOCOAGULATION Right 10/12/2020    Procedure: Right L3-5 Lumbar RFA;  Surgeon: Luis Ashraf MD;  Location: [facility] PAIN [facility]GT;  Service: Pain Management;  Laterality: Right;    STOMACH SURGERY  1998    not sure what they did, possible bowel resection, partial gastrectomy    TONSILLECTOMY             Family History:  Family History   Problem Relation Age of Onset    Lupus Mother     Stroke Father     Coronary artery disease Father     Diabetes type II Father     Kidney cancer Maternal Aunt     Breast cancer Maternal Aunt            Social History:  Social History     Tobacco Use    Smoking status: Former Smoker    Smokeless tobacco: Never Used    Tobacco comment: quit 30 years ago   Substance and Sexual Activity    Alcohol use: No    Drug use: No    Sexual activity: Not Currently     Birth control/protection: None       ROS     Review of Systems   Constitutional: Negative for chills and fever.   HENT: Negative for facial swelling and trouble swallowing.    Eyes: Negative for discharge, redness and visual disturbance.   Respiratory: Negative for chest tightness and shortness of breath.    Cardiovascular: Negative for chest pain and leg swelling.   Gastrointestinal: Negative for diarrhea and vomiting.   Genitourinary: Negative for decreased urine volume and dysuria.   Musculoskeletal: Negative for joint swelling and neck stiffness.   Skin: Positive for color change and wound. Negative for rash.   Neurological: Negative for syncope and facial asymmetry.   All other systems reviewed and are negative.      Physical Exam      Initial Vitals [01/09/22 1309]   BP Pulse Resp Temp SpO2   (!) 160/100 104 20 97.4 °F (36.3 °C) 100 %      MAP       --         Physical Exam  Vital signs and nursing notes reviewed.  Constitutional: Patient is in NAD.  Awake and alert. Well-developed and well-nourished.  Head: Atraumatic. Normocephalic.  Eyes: PERRL. EOM intact. Conjunctivae nl. No scleral icterus.  ENT: Mucous membranes are moist. Oropharynx is clear.  Neck: Supple. No JVD. No lymphadenopathy.  No meningismus  Cardiovascular: Regular rate and rhythm. No murmurs, rubs, or gallops. Distal pulses are 2+ and symmetric.  Pulmonary/Chest: No respiratory distress. Clear to auscultation bilaterally. No wheezing, rales, or rhonchi.  Abdominal: Soft. Non-distended. No TTP. No rebound, guarding, or rigidity. Good bowel sounds.  Genitourinary: No CVA tenderness  Musculoskeletal: Moves all extremities.               Skin: Warm and dry.  Neurological: Awake and alert. No acute focal neurological deficits are appreciated.  Psychiatric: Normal affect. Good eye contact. Appropriate in content.      ED Course          I & D - Incision and Drainage    Date/Time: 1/9/2022 12:16 PM  Location procedure was performed: [facility] EMERGENCY DEPARTMENT  Performed by: Betzaida Villa PA-C  Authorized by: Vinnie Farris MD   Type: abscess  Body area: upper extremity  Location details: right index finger  Anesthesia: digital block  Scalpel size: 11  Incision type: single straight  Complexity: simple  Drainage: bloody  Wound treatment: incision,  expression of material and  no return  Complications: No  Patient tolerance: Patient tolerated the procedure well with no immediate complications        ED Vital Signs:  Vitals:    01/09/22 1309   BP: (!) 160/100   Pulse: 104   Resp: 20   Temp: 97.4 °F (36.3 °C)   TempSrc: Oral   SpO2: 100%   Weight: 58.2 kg (128 lb 3.2 oz)         Results for orders placed or performed during the hospital encounter of 01/09/22   Aerobic culture (Specify Source) **CANNOT BE ORDERED AS STAT**    Specimen: Abscess   Result Value Ref Range    Aerobic Bacterial Culture (A)      STAPHYLOCOCCUS AUREUS  Moderate  Susceptibility pending     CBC auto differential   Result Value  Ref Range    WBC 7.41 3.90 - 12.70 K/uL    RBC 4.14 4.00 - 5.40 M/uL    Hemoglobin 12.8 12.0 - 16.0 g/dL    Hematocrit 40.9 37.0 - 48.5 %    MCV 99 (H) 82 - 98 fL    MCH 30.9 27.0 - 31.0 pg    MCHC 31.3 (L) 32.0 - 36.0 g/dL    RDW 14.0 11.5 - 14.5 %    Platelets 370 150 - 450 K/uL    MPV 9.8 9.2 - 12.9 fL    Immature Granulocytes 0.7 (H) 0.0 - 0.5 %    Gran # (ANC) 4.9 1.8 - 7.7 K/uL    Immature Grans (Abs) 0.05 (H) 0.00 - 0.04 K/uL    Lymph # 1.6 1.0 - 4.8 K/uL    Mono # 0.6 0.3 - 1.0 K/uL    Eos # 0.2 0.0 - 0.5 K/uL    Baso # 0.08 0.00 - 0.20 K/uL    nRBC 0 0 /100 WBC    Gran % 66.5 38.0 - 73.0 %    Lymph % 21.2 18.0 - 48.0 %    Mono % 7.4 4.0 - 15.0 %    Eosinophil % 3.1 0.0 - 8.0 %    Basophil % 1.1 0.0 - 1.9 %    Differential Method Automated    Comprehensive metabolic panel   Result Value Ref Range    Sodium 138 136 - 145 mmol/L    Potassium 5.8 (H) 3.5 - 5.1 mmol/L    Chloride 108 95 - 110 mmol/L    CO2 19 (L) 23 - 29 mmol/L    Glucose 110 70 - 110 mg/dL    BUN 16 8 - 23 mg/dL    Creatinine 1.0 0.5 - 1.4 mg/dL    Calcium 9.1 8.7 - 10.5 mg/dL    Total Protein 8.0 6.0 - 8.4 g/dL    Albumin 3.9 3.5 - 5.2 g/dL    Total Bilirubin 0.5 0.1 - 1.0 mg/dL    Alkaline Phosphatase 121 55 - 135 U/L    AST 30 10 - 40 U/L    ALT 20 10 - 44 U/L    Anion Gap 11 8 - 16 mmol/L    eGFR if African American >60 >60 mL/min/1.73 m^2    eGFR if non African American 54 (A) >60 mL/min/1.73 m^2   Sedimentation rate   Result Value Ref Range    Sed Rate 75 (H) 0 - 20 mm/Hr   C-reactive protein   Result Value Ref Range    CRP 16.8 (H) 0.0 - 8.2 mg/L             Imaging Results:  Imaging Results          X-Ray Finger 2 or More Views Right (Final result)  Result time 01/09/22 13:23:51    Final result by DEVIN Thorne Sr., MD (01/09/22 13:23:51)                 Impression:      1. There is mild prominence of the soft tissue thickness in the distal aspect of the index finger.  This is consistent with the patient's history and characteristic  of cellulitis.  2. There is no radiographic evidence of acute osteomyelitis.  3. There is a 2 mm oval-shaped calcific density in the soft tissue distal to the distal phalanx of the index finger.  This is characteristic of prior trauma.  A foreign body cannot be excluded.  4. There is a 4 mm oval shaped osseous density dorsal and medial to the distal aspect of the middle phalanx of the index finger.  This is characteristic of a fracture fragment or an unfused accessory ossification center.      Electronically signed by: Imtiaz Thorne MD  Date:    01/09/2022  Time:    13:23             Narrative:    EXAMINATION:  XR FINGER 2 OR MORE VIEWS RIGHT    CLINICAL HISTORY:  right index finger cellulitis;    COMPARISON:  None    FINDINGS:  There is a 4 mm oval shaped osseous density dorsal and medial to the distal aspect of the middle phalanx of the index finger.  There is no dislocation.  There is mild prominence of the soft tissue thickness in the distal aspect of the index finger.  There is no radiographic evidence of acute osteomyelitis.  There is a 2 mm oval-shaped calcific density in the soft tissue distal to the distal phalanx of the index finger.                                   The Emergency Provider reviewed the vital signs and test results, which are outlined above.    ED Discussion       12:17 PM CONSULT Discussed case with Dr Arias, who looked at pictures and recommends repeat I&D, doxy, and pt to fu in clinic wed.        Medication(s) given in the ER:  Medications   BUPivacaine (PF) 0.5% (5 mg/mL) injection 25 mg (25 mg Subcutaneous Given 1/9/22 1341)   doxycycline tablet 100 mg (100 mg Oral Given 1/9/22 1637)            Follow-up Information     [facility]. Schedule an appointment as soon as possible for a visit in 3 days.    Why: CALL TOMORROW AM AND LET  KNOW THAT DR ARIAS SAID FOR YOU TO COME IN WEDNESDAY  Contact information:  [address] [place] [address] [address]  [place]  [id number]  [phone]                                Medication List      START taking these medications    doxycycline 100 MG Cap  Commonly known as: VIBRAMYCIN  Take 1 capsule (100 mg total) by mouth 2 (two) times daily. for 7 days     HYDROcodone-acetaminophen 5-325 mg per tablet  Commonly known as: NORCO  Take 1 tablet by mouth every 4 (four) hours as needed for Pain.        ASK your doctor about these medications    ACCU-CHEK MARIANO PLUS TEST STRP Strp  Generic drug: blood sugar diagnostic  USE 1 STRIP TO CHECK GLUCOSE ONCE DAILY     alendronate 70 MG tablet  Commonly known as: FOSAMAX  TAKE 1 TABLET BY MOUTH EVERY 7 DAYS     amitriptyline 75 MG tablet  Commonly known as: ELAVIL  Take 1 tablet by mouth in the evening     aspirin 81 MG EC tablet  Commonly known as: ECOTRIN  Take 1 tablet (81 mg total) by mouth once daily.     atorvastatin 40 MG tablet  Commonly known as: LIPITOR  Take 1 tablet by mouth in the evening     blood-glucose meter kit  Use as instructed     ergocalciferol 50,000 unit Cap  Commonly known as: ERGOCALCIFEROL  Take 1 capsule by mouth once a week     lancets-blood glucose strips 30 gauge Cmpk  1 application by Misc.(Non-Drug; Combo Route) route once daily at 6am.     levothyroxine 88 MCG tablet  Commonly known as: SYNTHROID  TAKE 1 TABLET BY MOUTH ONCE DAILY ON MONDAY THROUGH SATURDAY AND 1.5 TABLETS ON SUNDAY     meloxicam 7.5 MG tablet  Commonly known as: MOBIC  Take 1 tablet (7.5 mg total) by mouth 2 (two) times daily as needed for Pain. Take with food.     OCUVITE ORAL     omeprazole 20 MG capsule  Commonly known as: PRILOSEC  Take 1 capsule (20 mg total) by mouth as needed.     PRESERVISION AREDS-2 ORAL     * propranoloL 40 MG tablet  Commonly known as: INDERAL  Take 1 tablet by mouth twice daily     * propranoloL 40 MG tablet  Commonly known as: INDERAL  Take 1 tablet by mouth twice daily     sertraline 100 MG tablet  Commonly known as: ZOLOFT  Take 1 tablet by mouth once daily      sumatriptan 100 MG tablet  Commonly known as: IMITREX  TAKE 1 TABLET BY MOUTH AT ONSET OF MIGRAINE         * This list has 2 medication(s) that are the same as other medications prescribed for you. Read the directions carefully, and ask your doctor or other care provider to review them with you.               Where to Get Your Medications      These medications were sent to [facility] [address] [place] [address], [place] - [address] [facility]  [address] [facility], [facility] [address]    Phone: [phone]   · doxycycline 100 MG Cap     You can get these medications from any pharmacy    Bring a paper prescription for each of these medications  · HYDROcodone-acetaminophen 5-325 mg per tablet             Medical Decision Making        All findings were reviewed with the patient/family in detail.   All remaining questions and concerns were addressed at that time.  Patient/family has been counseled regarding the need for follow-up as well as the indication to return to the emergency room should new or worrisome developments occur.        MDM               Clinical Impression:        ICD-10-CM ICD-9-CM   1. Cellulitis of finger of right hand  L03.011 681.00               Betzaida Villa PA-C  01/11/22 1218

## 2022-01-10 ENCOUNTER — TELEPHONE (OUTPATIENT)
Dept: ORTHOPEDICS | Facility: CLINIC | Age: 82
End: 2022-01-10
Payer: MEDICARE

## 2022-01-10 ENCOUNTER — TELEPHONE (OUTPATIENT)
Dept: FAMILY MEDICINE | Facility: CLINIC | Age: 82
End: 2022-01-10
Payer: MEDICARE

## 2022-01-10 NOTE — TELEPHONE ENCOUNTER
----- Message from Patrica Perez MA sent at 1/10/2022 11:27 AM CST -----  Contact: [phone] Patient    ----- Message -----  From: Yuli Orellana  Sent: 1/10/2022  11:09 AM CST  To: [facility] Ortho Clinical Staff    Pt states she was advised by the ER doctor to see someone in ortho for cellulitis of the finger on the right hand. Pt states she advised to be seen by 01/12/2022. Pt is requesting to be seen at the [place] location. Please call and advise.

## 2022-01-10 NOTE — TELEPHONE ENCOUNTER
Spoke to pt, she states that she took Doxycycline on an empty stomach and vomited within 15 minutes. She states she hasn't taken it since. Advised pt to try eating and then taking medication. She states she will call back if symptoms persist.

## 2022-01-10 NOTE — TELEPHONE ENCOUNTER
----- Message from Yuli Orellana sent at 1/10/2022 11:10 AM CST -----  Contact: Patient [phone]  Pt is asking if the medication doxycycline (VIBRAMYCIN) 100 MG Cap would cause her to vomit. Pt states she took the medication yesterday on an empty stomach and vomited 1X. Please call and advise.

## 2022-01-10 NOTE — TELEPHONE ENCOUNTER
Spoke with patient and scheduled her ER follow up appointment. Patient verbalized understanding of appointment date, time and location

## 2022-01-12 ENCOUNTER — OFFICE VISIT (OUTPATIENT)
Dept: ORTHOPEDICS | Facility: CLINIC | Age: 82
End: 2022-01-12
Payer: MEDICARE

## 2022-01-12 VITALS
DIASTOLIC BLOOD PRESSURE: 81 MMHG | WEIGHT: 128 LBS | SYSTOLIC BLOOD PRESSURE: 134 MMHG | HEIGHT: 59 IN | HEART RATE: 96 BPM | BODY MASS INDEX: 25.8 KG/M2

## 2022-01-12 DIAGNOSIS — M00.9: Primary | ICD-10-CM

## 2022-01-12 LAB — BACTERIA SPEC AEROBE CULT: ABNORMAL

## 2022-01-12 PROCEDURE — 3288F PR FALLS RISK ASSESSMENT DOCUMENTED: ICD-10-PCS | Mod: CPTII,S$GLB,, | Performed by: ORTHOPAEDIC SURGERY

## 2022-01-12 PROCEDURE — 3288F FALL RISK ASSESSMENT DOCD: CPT | Mod: CPTII,S$GLB,, | Performed by: ORTHOPAEDIC SURGERY

## 2022-01-12 PROCEDURE — 3079F PR MOST RECENT DIASTOLIC BLOOD PRESSURE 80-89 MM HG: ICD-10-PCS | Mod: CPTII,S$GLB,, | Performed by: ORTHOPAEDIC SURGERY

## 2022-01-12 PROCEDURE — 99203 PR OFFICE/OUTPT VISIT, NEW, LEVL III, 30-44 MIN: ICD-10-PCS | Mod: S$GLB,,, | Performed by: ORTHOPAEDIC SURGERY

## 2022-01-12 PROCEDURE — 1126F AMNT PAIN NOTED NONE PRSNT: CPT | Mod: CPTII,S$GLB,, | Performed by: ORTHOPAEDIC SURGERY

## 2022-01-12 PROCEDURE — 1159F PR MEDICATION LIST DOCUMENTED IN MEDICAL RECORD: ICD-10-PCS | Mod: CPTII,S$GLB,, | Performed by: ORTHOPAEDIC SURGERY

## 2022-01-12 PROCEDURE — 3079F DIAST BP 80-89 MM HG: CPT | Mod: CPTII,S$GLB,, | Performed by: ORTHOPAEDIC SURGERY

## 2022-01-12 PROCEDURE — 1160F PR REVIEW ALL MEDS BY PRESCRIBER/CLIN PHARMACIST DOCUMENTED: ICD-10-PCS | Mod: CPTII,S$GLB,, | Performed by: ORTHOPAEDIC SURGERY

## 2022-01-12 PROCEDURE — 99999 PR PBB SHADOW E&M-EST. PATIENT-LVL IV: ICD-10-PCS | Mod: PBBFAC,,, | Performed by: ORTHOPAEDIC SURGERY

## 2022-01-12 PROCEDURE — 1101F PT FALLS ASSESS-DOCD LE1/YR: CPT | Mod: CPTII,S$GLB,, | Performed by: ORTHOPAEDIC SURGERY

## 2022-01-12 PROCEDURE — 1126F PR PAIN SEVERITY QUANTIFIED, NO PAIN PRESENT: ICD-10-PCS | Mod: CPTII,S$GLB,, | Performed by: ORTHOPAEDIC SURGERY

## 2022-01-12 PROCEDURE — 99999 PR PBB SHADOW E&M-EST. PATIENT-LVL IV: CPT | Mod: PBBFAC,,, | Performed by: ORTHOPAEDIC SURGERY

## 2022-01-12 PROCEDURE — 99203 OFFICE O/P NEW LOW 30 MIN: CPT | Mod: S$GLB,,, | Performed by: ORTHOPAEDIC SURGERY

## 2022-01-12 PROCEDURE — 1101F PR PT FALLS ASSESS DOC 0-1 FALLS W/OUT INJ PAST YR: ICD-10-PCS | Mod: CPTII,S$GLB,, | Performed by: ORTHOPAEDIC SURGERY

## 2022-01-12 PROCEDURE — 3075F PR MOST RECENT SYSTOLIC BLOOD PRESS GE 130-139MM HG: ICD-10-PCS | Mod: CPTII,S$GLB,, | Performed by: ORTHOPAEDIC SURGERY

## 2022-01-12 PROCEDURE — 1160F RVW MEDS BY RX/DR IN RCRD: CPT | Mod: CPTII,S$GLB,, | Performed by: ORTHOPAEDIC SURGERY

## 2022-01-12 PROCEDURE — 3075F SYST BP GE 130 - 139MM HG: CPT | Mod: CPTII,S$GLB,, | Performed by: ORTHOPAEDIC SURGERY

## 2022-01-12 PROCEDURE — 1159F MED LIST DOCD IN RCRD: CPT | Mod: CPTII,S$GLB,, | Performed by: ORTHOPAEDIC SURGERY

## 2022-01-12 RX ORDER — MUPIROCIN 20 MG/G
OINTMENT TOPICAL 2 TIMES DAILY
Qty: 15 G | Refills: 1 | Status: SHIPPED | OUTPATIENT
Start: 2022-01-12 | End: 2023-05-16

## 2022-01-12 RX ORDER — ALENDRONATE SODIUM 70 MG/1
TABLET ORAL
Qty: 12 TABLET | Refills: 0 | OUTPATIENT
Start: 2022-01-12

## 2022-01-12 RX ORDER — LEVOTHYROXINE SODIUM 88 UG/1
TABLET ORAL
Qty: 90 TABLET | Refills: 0 | Status: SHIPPED | OUTPATIENT
Start: 2022-01-12 | End: 2022-05-19

## 2022-01-12 NOTE — PATIENT INSTRUCTIONS
Infected right index finger distal joint.  Do warm soaks with Epsom salts twice a day, apply the mupirocin prescription and cover with bandage.  Return in 1 week.  Continue doxycycline.

## 2022-01-12 NOTE — PROGRESS NOTES
Subjective:     Patient ID: Haim Martin is a 81 y.o. female.    Chief Complaint: Pain of the Right Hand      HPI:  The patient is here today in follow-up from the emergency room.  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  The right index finger cyst opened and drained became infected about 2 weeks ago.  She was placed on Bactrim in the ER but did not improve.  She returned to the ER January 9th and was changed to doxycycline.  She still has a painful swollen finger tip but says is been no purulent drainage in some time.    Past Medical History:   Diagnosis Date    Depression     Diverticulosis of colon (without mention of hemorrhage) 8/25/2014    Hyperlipidemia     Hypertension     Thyroid disease      Past Surgical History:   Procedure Laterality Date    APPENDECTOMY      CATARACT EXTRACTION EXTRACAPSULAR W/ INTRAOCULAR LENS IMPLANTATION Bilateral 4/2014    INJECTION OF ANESTHETIC AGENT AROUND MEDIAL BRANCH NERVES INNERVATING LUMBAR FACET JOINT Bilateral 7/16/2020    Procedure: Bilateral L3-5 MBB with local;  Surgeon: Luis Ashraf MD;  Location: [facility] PAIN MGT;  Service: Pain Management;  Laterality: Bilateral;    INJECTION OF ANESTHETIC AGENT AROUND MEDIAL BRANCH NERVES INNERVATING LUMBAR FACET JOINT Bilateral 8/10/2020    Procedure: Bilateral L3-5 MBB with local;  Surgeon: Luis Ashraf MD;  Location: [facility] PAIN MGT;  Service: Pain Management;  Laterality: Bilateral;    INJECTION OF ANESTHETIC AGENT INTO SACROILIAC JOINT Bilateral 11/4/2021    Procedure: Bilateral SIJ Injection;  Surgeon: Luis Ashraf MD;  [facility];  Service: Pain Management;  Laterality: Bilateral;    RADIOFREQUENCY THERMOCOAGULATION Left 9/28/2020    Procedure: Left L3-5 Lumbar RFA;  Surgeon: Luis Ashraf MD;  Location: [facility] PAIN MGT;  Service: Pain Management;  Laterality: Left;    RADIOFREQUENCY THERMOCOAGULATION Right 10/12/2020     Procedure: Right L3-5 Lumbar RFA;  Surgeon: Luis Ashraf MD;  Location: [facility] PAIN [facility]GT;  Service: Pain Management;  Laterality: Right;    STOMACH SURGERY  1998    not sure what they did, possible bowel resection, partial gastrectomy    TONSILLECTOMY       Family History   Problem Relation Age of Onset    Lupus Mother     Stroke Father     Coronary artery disease Father     Diabetes type II Father     Kidney cancer Maternal Aunt     Breast cancer Maternal Aunt      Social History     Socioeconomic History    Marital status:    Tobacco Use    Smoking status: Former Smoker    Smokeless tobacco: Never Used    Tobacco comment: quit 30 years ago   Substance and Sexual Activity    Alcohol use: No    Drug use: No    Sexual activity: Not Currently     Birth control/protection: None     Medication List with Changes/Refills   New Medications    MUPIROCIN (BACTROBAN) 2 % OINTMENT    Apply topically 2 (two) times daily.   Current Medications    ACCU-CHEK MARIANO PLUS TEST STRP STRP    USE 1 STRIP TO CHECK GLUCOSE ONCE DAILY    ALENDRONATE (FOSAMAX) 70 MG TABLET    TAKE 1 TABLET BY MOUTH EVERY 7 DAYS    AMITRIPTYLINE (ELAVIL) 75 MG TABLET    Take 1 tablet by mouth in the evening    ASPIRIN (ECOTRIN) 81 MG EC TABLET    Take 1 tablet (81 mg total) by mouth once daily.    ATORVASTATIN (LIPITOR) 40 MG TABLET    Take 1 tablet by mouth in the evening    BLOOD-GLUCOSE METER KIT    Use as instructed    DOXYCYCLINE (VIBRAMYCIN) 100 MG CAP    Take 1 capsule (100 mg total) by mouth 2 (two) times daily. for 7 days    ERGOCALCIFEROL (ERGOCALCIFEROL) 50,000 UNIT CAP    Take 1 capsule by mouth once a week    HYDROCODONE-ACETAMINOPHEN (NORCO) 5-325 MG PER TABLET    Take 1 tablet by mouth every 4 (four) hours as needed for Pain.    LANCETS-BLOOD GLUCOSE STRIPS 30 GAUGE CMPK    1 application by Misc.(Non-Drug; Combo Route) route once daily at 6am.    LEVOTHYROXINE (SYNTHROID) 88 MCG TABLET    TAKE 1 TABLET BY MOUTH ONCE  "DAILY MONDAY - SATURDAY  AND ONE & ONE-HALF TABLET ON SUNDAY    MELOXICAM (MOBIC) 7.5 MG TABLET    Take 1 tablet (7.5 mg total) by mouth 2 (two) times daily as needed for Pain. Take with food.    OMEPRAZOLE (PRILOSEC) 20 MG CAPSULE    Take 1 capsule (20 mg total) by mouth as needed.    PROPRANOLOL (INDERAL) 40 MG TABLET    Take 1 tablet by mouth twice daily    PROPRANOLOL (INDERAL) 40 MG TABLET    Take 1 tablet by mouth twice daily    SERTRALINE (ZOLOFT) 100 MG TABLET    Take 1 tablet by mouth once daily    SUMATRIPTAN (IMITREX) 100 MG TABLET    TAKE 1 TABLET BY MOUTH AT ONSET OF MIGRAINE    VIT C/E/ZN/COPPR/LUTEIN/ZEAXAN (PRESERVISION AREDS-2 ORAL)    Take 1 tablet by mouth 2 (two) times a day.    VIT C/VIT E/LUTEIN/MIN/OMEGA-3 (OCUVITE ORAL)    Take by mouth once daily.     Review of patient's allergies indicates:   Allergen Reactions    Clindamycin      Heaviness in chest      Penicillins Hives     ROS     Objective:   Body mass index is 25.85 kg/m².  Vitals:    01/12/22 1035   BP: 134/81   Pulse: 96   Weight: 58.1 kg (128 lb)   Height: 4' 11" (1.499 m)   PainSc: 0-No pain   PainLoc: Finger       PHYSICAL EXAM:  Well-developed well-nourished female in no acute distress.  She is awake, alert, oriented, cooperative with evaluation.    Examination of the right index finger reveals swelling over the distal joint with an open ulcerated looking wound at the ulnar aspect of the D IP.  There was mallet deformity to the finger.  Surrounding erythema.  No active drainage.    The patient has degenerative changes of all the small joints in her hands and does have a healed over mucinous cyst at the left middle finger distal joint.    X-rays of the right hand are reviewed.  The index finger distal joint is bone on bone with flexion deformity and a exostosis are osteophyte at the ulnar aspect of the distal joint likely from the middle phalanx    Infected finger joint  -     mupirocin (BACTROBAN) 2 % ointment; Apply topically " 2 (two) times daily.  Dispense: 15 g; Refill: 1        Plan:  The patient has infected right index finger involving the distal joint.  There is underlying degenerative arthritis.  She is going to do twice daily soaks, topical antibiotic, continue the doxycycline.  Discussed potential need for surgical intervention.  Return for follow-up in 1 week.  I did review her culture report, growing Staph coccus which is sensitive to the tetracycline.    Patient Instructions     Infected right index finger distal joint.  Do warm soaks with Epsom salts twice a day, apply the mupirocin prescription and cover with bandage.  Return in 1 week.  Continue doxycycline.             Glenn Arias MD, FAAOS [facility], Orthopedic Trauma Service  [place]

## 2022-01-12 NOTE — TELEPHONE ENCOUNTER
Refill Routing Note   Medication(s) are not appropriate for processing by [facility] for the following reason(s):      - Outside of protocol  - Patient has been seen in the Emergency Room/Department since the last PCP visit    [facility] action(s):  Defer  Route       Medication Therapy Plan: Recent ED visit for Cellulitis of finger of right hand  (1/9/22); Defer levothyroxine; Route  alendronate  --->Care Gap information included in message below if applicable.   Medication reconciliation completed: No   Automatic Epic Generated Protocol Data:        Requested Prescriptions   Pending Prescriptions Disp Refills    alendronate (FOSAMAX) 70 MG tablet [Pharmacy Med Name: Alendronate Sodium 70 MG Oral Tablet] 12 tablet 0     Sig: Take 1 tablet by mouth once a week       There is no refill protocol information for this order       levothyroxine (SYNTHROID) 88 MCG tablet [Pharmacy Med Name: Levothyroxine Sodium 88 MCG Oral Tablet] 96 tablet 0     Sig: TAKE 1 TABLET BY MOUTH ONCE DAILY MONDAY - SATURDAY  AND ONE & ONE-HALF TABLET ON SUNDAY       Endocrinology:  Hypothyroid Agents Passed - 1/8/2022  1:52 PM        Passed - Patient is at least 18 years old        Passed - Valid encounter within last 15 months     Recent Visits  Date Type Provider Dept   08/12/21 Office Visit Kavya Mesa MD [facility] Internal Medicine   06/18/20 Office Visit Kavya Mesa MD [facility] Internal Medicine   Showing recent visits within past 720 days and meeting all other requirements  Future Appointments  No visits were found meeting these conditions.  Showing future appointments within next 150 days and meeting all other requirements      Future Appointments              Today [facility]                Passed - Manual Review: FT4 is not required if last TSH is WNL.        Passed - TSH in normal range and within 360 days     TSH   Date Value Ref Range Status   08/12/2021 2.644  0.400 - 4.000 uIU/mL Final   06/18/2020 6.795 (H) 0.400 - 4.000 uIU/mL Final   05/17/2019 2.679 0.400 - 4.000 uIU/mL Final              Passed - T4 free within 1080 days     Free T4   Date Value Ref Range Status   06/18/2020 0.92 0.71 - 1.51 ng/dL Final   12/04/2007 1.19 0.71 - 1.51 ng/dl Final   10/10/2007 1.25 0.71 - 1.51 ng/dl Final                    Appointments  past 12m or future 3m with PCP    Date Provider   Last Visit   8/12/2021 Kavya Mesa MD   Next Visit    Kavya Mesa MD   ED visits in past 90 days: 2        Note composed:5:56 AM 01/12/2022

## 2022-01-13 ENCOUNTER — TELEPHONE (OUTPATIENT)
Dept: ORTHOPEDICS | Facility: CLINIC | Age: 82
End: 2022-01-13
Payer: MEDICARE

## 2022-01-13 NOTE — TELEPHONE ENCOUNTER
----- Message from Kaia Gamboa sent at 1/13/2022 12:14 PM CST -----  Contact: self/[phone]  Would like to consult with nurse regarding last appt and medication. Please call her back at [phone]. Thanks/ar

## 2022-01-13 NOTE — TELEPHONE ENCOUNTER
Spoke with patient and advised her to apply a very thin coat of the Bactroban Ointment the the wound and then cover the area with Xeroform and dress with a bandage. Patient verbalized understanding.

## 2022-01-18 ENCOUNTER — PES CALL (OUTPATIENT)
Dept: ADMINISTRATIVE | Facility: CLINIC | Age: 82
End: 2022-01-18
Payer: MEDICARE

## 2022-01-19 RX ORDER — ALENDRONATE SODIUM 70 MG/1
TABLET ORAL
Qty: 12 TABLET | Refills: 0 | OUTPATIENT
Start: 2022-01-19

## 2022-01-19 RX ORDER — ERGOCALCIFEROL 1.25 MG/1
CAPSULE ORAL
Qty: 8 CAPSULE | Refills: 0 | Status: SHIPPED | OUTPATIENT
Start: 2022-01-19 | End: 2023-01-26 | Stop reason: SDUPTHER

## 2022-01-21 ENCOUNTER — OFFICE VISIT (OUTPATIENT)
Dept: ORTHOPEDICS | Facility: CLINIC | Age: 82
End: 2022-01-21
Payer: MEDICARE

## 2022-01-21 ENCOUNTER — HOSPITAL ENCOUNTER (INPATIENT)
Facility: HOSPITAL | Age: 82
LOS: 4 days | Discharge: HOME HEALTH | DRG: 516 | End: 2022-01-25
Attending: STUDENT IN AN ORGANIZED HEALTH CARE EDUCATION/TRAINING PROGRAM | Admitting: INTERNAL MEDICINE
Payer: MEDICARE

## 2022-01-21 ENCOUNTER — ANESTHESIA (OUTPATIENT)
Dept: SURGERY | Facility: HOSPITAL | Age: 82
DRG: 516 | End: 2022-01-21
Payer: MEDICARE

## 2022-01-21 ENCOUNTER — ANESTHESIA EVENT (OUTPATIENT)
Dept: SURGERY | Facility: HOSPITAL | Age: 82
DRG: 516 | End: 2022-01-21
Payer: MEDICARE

## 2022-01-21 VITALS
HEART RATE: 71 BPM | BODY MASS INDEX: 25.8 KG/M2 | HEIGHT: 59 IN | DIASTOLIC BLOOD PRESSURE: 72 MMHG | SYSTOLIC BLOOD PRESSURE: 151 MMHG | WEIGHT: 128 LBS

## 2022-01-21 DIAGNOSIS — L08.9 ABRASION OF RIGHT INDEX FINGER WITH INFECTION: Primary | ICD-10-CM

## 2022-01-21 DIAGNOSIS — S60.410A ABRASION OF RIGHT INDEX FINGER WITH INFECTION: Primary | ICD-10-CM

## 2022-01-21 DIAGNOSIS — Z01.818 PRE-OP EXAM: ICD-10-CM

## 2022-01-21 DIAGNOSIS — F32.4 MAJOR DEPRESSIVE DISORDER WITH SINGLE EPISODE, IN PARTIAL REMISSION: ICD-10-CM

## 2022-01-21 DIAGNOSIS — R07.9 CHEST PAIN: ICD-10-CM

## 2022-01-21 DIAGNOSIS — M00.9: Primary | ICD-10-CM

## 2022-01-21 DIAGNOSIS — M86.9 FINGER OSTEOMYELITIS, RIGHT: ICD-10-CM

## 2022-01-21 DIAGNOSIS — R07.9 CHEST PAIN, UNSPECIFIED TYPE: ICD-10-CM

## 2022-01-21 DIAGNOSIS — E03.9 HYPOTHYROIDISM, UNSPECIFIED TYPE: ICD-10-CM

## 2022-01-21 LAB
ANION GAP SERPL CALC-SCNC: 11 MMOL/L (ref 8–16)
BASOPHILS # BLD AUTO: 0.05 K/UL (ref 0–0.2)
BASOPHILS NFR BLD: 0.8 % (ref 0–1.9)
BUN SERPL-MCNC: 8 MG/DL (ref 8–23)
CALCIUM SERPL-MCNC: 9.1 MG/DL (ref 8.7–10.5)
CHLORIDE SERPL-SCNC: 109 MMOL/L (ref 95–110)
CO2 SERPL-SCNC: 22 MMOL/L (ref 23–29)
CREAT SERPL-MCNC: 0.7 MG/DL (ref 0.5–1.4)
DIFFERENTIAL METHOD: ABNORMAL
EOSINOPHIL # BLD AUTO: 0.1 K/UL (ref 0–0.5)
EOSINOPHIL NFR BLD: 0.8 % (ref 0–8)
ERYTHROCYTE [DISTWIDTH] IN BLOOD BY AUTOMATED COUNT: 14.7 % (ref 11.5–14.5)
EST. GFR  (AFRICAN AMERICAN): >60 ML/MIN/1.73 M^2
EST. GFR  (NON AFRICAN AMERICAN): >60 ML/MIN/1.73 M^2
GLUCOSE SERPL-MCNC: 117 MG/DL (ref 70–110)
HCT VFR BLD AUTO: 37.3 % (ref 37–48.5)
HGB BLD-MCNC: 11.5 G/DL (ref 12–16)
IMM GRANULOCYTES # BLD AUTO: 0.02 K/UL (ref 0–0.04)
IMM GRANULOCYTES NFR BLD AUTO: 0.3 % (ref 0–0.5)
LYMPHOCYTES # BLD AUTO: 0.9 K/UL (ref 1–4.8)
LYMPHOCYTES NFR BLD: 13 % (ref 18–48)
MCH RBC QN AUTO: 31.3 PG (ref 27–31)
MCHC RBC AUTO-ENTMCNC: 30.8 G/DL (ref 32–36)
MCV RBC AUTO: 101 FL (ref 82–98)
MONOCYTES # BLD AUTO: 0.2 K/UL (ref 0.3–1)
MONOCYTES NFR BLD: 2.3 % (ref 4–15)
NEUTROPHILS # BLD AUTO: 5.4 K/UL (ref 1.8–7.7)
NEUTROPHILS NFR BLD: 82.8 % (ref 38–73)
NRBC BLD-RTO: 0 /100 WBC
PLATELET # BLD AUTO: 195 K/UL (ref 150–450)
PMV BLD AUTO: 11.2 FL (ref 9.2–12.9)
POCT GLUCOSE: 109 MG/DL (ref 70–110)
POCT GLUCOSE: 152 MG/DL (ref 70–110)
POTASSIUM SERPL-SCNC: 3.7 MMOL/L (ref 3.5–5.1)
RBC # BLD AUTO: 3.68 M/UL (ref 4–5.4)
SODIUM SERPL-SCNC: 142 MMOL/L (ref 136–145)
WBC # BLD AUTO: 6.56 K/UL (ref 3.9–12.7)

## 2022-01-21 PROCEDURE — 37000008 HC ANESTHESIA 1ST 15 MINUTES: Performed by: STUDENT IN AN ORGANIZED HEALTH CARE EDUCATION/TRAINING PROGRAM

## 2022-01-21 PROCEDURE — 99999 PR PBB SHADOW E&M-EST. PATIENT-LVL V: ICD-10-PCS | Mod: PBBFAC,,, | Performed by: STUDENT IN AN ORGANIZED HEALTH CARE EDUCATION/TRAINING PROGRAM

## 2022-01-21 PROCEDURE — 87077 CULTURE AEROBIC IDENTIFY: CPT | Performed by: STUDENT IN AN ORGANIZED HEALTH CARE EDUCATION/TRAINING PROGRAM

## 2022-01-21 PROCEDURE — 26080 PR EXPLORE/TREAT INTERPHALANGEAL JT,EA: ICD-10-PCS | Mod: F6,,, | Performed by: STUDENT IN AN ORGANIZED HEALTH CARE EDUCATION/TRAINING PROGRAM

## 2022-01-21 PROCEDURE — 36000704 HC OR TIME LEV I 1ST 15 MIN: Performed by: STUDENT IN AN ORGANIZED HEALTH CARE EDUCATION/TRAINING PROGRAM

## 2022-01-21 PROCEDURE — 71000039 HC RECOVERY, EACH ADD'L HOUR: Performed by: STUDENT IN AN ORGANIZED HEALTH CARE EDUCATION/TRAINING PROGRAM

## 2022-01-21 PROCEDURE — 36000705 HC OR TIME LEV I EA ADD 15 MIN: Performed by: STUDENT IN AN ORGANIZED HEALTH CARE EDUCATION/TRAINING PROGRAM

## 2022-01-21 PROCEDURE — 63600175 PHARM REV CODE 636 W HCPCS: Performed by: INTERNAL MEDICINE

## 2022-01-21 PROCEDURE — 37000009 HC ANESTHESIA EA ADD 15 MINS: Performed by: STUDENT IN AN ORGANIZED HEALTH CARE EDUCATION/TRAINING PROGRAM

## 2022-01-21 PROCEDURE — 3077F SYST BP >= 140 MM HG: CPT | Mod: CPTII,S$GLB,, | Performed by: STUDENT IN AN ORGANIZED HEALTH CARE EDUCATION/TRAINING PROGRAM

## 2022-01-21 PROCEDURE — 3288F FALL RISK ASSESSMENT DOCD: CPT | Mod: CPTII,S$GLB,, | Performed by: STUDENT IN AN ORGANIZED HEALTH CARE EDUCATION/TRAINING PROGRAM

## 2022-01-21 PROCEDURE — 3077F PR MOST RECENT SYSTOLIC BLOOD PRESSURE >= 140 MM HG: ICD-10-PCS | Mod: CPTII,S$GLB,, | Performed by: STUDENT IN AN ORGANIZED HEALTH CARE EDUCATION/TRAINING PROGRAM

## 2022-01-21 PROCEDURE — 1125F AMNT PAIN NOTED PAIN PRSNT: CPT | Mod: CPTII,S$GLB,, | Performed by: STUDENT IN AN ORGANIZED HEALTH CARE EDUCATION/TRAINING PROGRAM

## 2022-01-21 PROCEDURE — 99214 OFFICE O/P EST MOD 30 MIN: CPT | Mod: S$GLB,,, | Performed by: STUDENT IN AN ORGANIZED HEALTH CARE EDUCATION/TRAINING PROGRAM

## 2022-01-21 PROCEDURE — 3078F DIAST BP <80 MM HG: CPT | Mod: CPTII,S$GLB,, | Performed by: STUDENT IN AN ORGANIZED HEALTH CARE EDUCATION/TRAINING PROGRAM

## 2022-01-21 PROCEDURE — 26080 EXPLORE/TREAT FINGER JOINT: CPT | Mod: F6,,, | Performed by: STUDENT IN AN ORGANIZED HEALTH CARE EDUCATION/TRAINING PROGRAM

## 2022-01-21 PROCEDURE — 1125F PR PAIN SEVERITY QUANTIFIED, PAIN PRESENT: ICD-10-PCS | Mod: CPTII,S$GLB,, | Performed by: STUDENT IN AN ORGANIZED HEALTH CARE EDUCATION/TRAINING PROGRAM

## 2022-01-21 PROCEDURE — 25000003 PHARM REV CODE 250: Performed by: PHYSICIAN ASSISTANT

## 2022-01-21 PROCEDURE — 3288F PR FALLS RISK ASSESSMENT DOCUMENTED: ICD-10-PCS | Mod: CPTII,S$GLB,, | Performed by: STUDENT IN AN ORGANIZED HEALTH CARE EDUCATION/TRAINING PROGRAM

## 2022-01-21 PROCEDURE — 87205 SMEAR GRAM STAIN: CPT | Mod: 59 | Performed by: STUDENT IN AN ORGANIZED HEALTH CARE EDUCATION/TRAINING PROGRAM

## 2022-01-21 PROCEDURE — 99999 PR PBB SHADOW E&M-EST. PATIENT-LVL V: CPT | Mod: PBBFAC,,, | Performed by: STUDENT IN AN ORGANIZED HEALTH CARE EDUCATION/TRAINING PROGRAM

## 2022-01-21 PROCEDURE — 1101F PR PT FALLS ASSESS DOC 0-1 FALLS W/OUT INJ PAST YR: ICD-10-PCS | Mod: CPTII,S$GLB,, | Performed by: STUDENT IN AN ORGANIZED HEALTH CARE EDUCATION/TRAINING PROGRAM

## 2022-01-21 PROCEDURE — 87070 CULTURE OTHR SPECIMN AEROBIC: CPT | Performed by: STUDENT IN AN ORGANIZED HEALTH CARE EDUCATION/TRAINING PROGRAM

## 2022-01-21 PROCEDURE — 25000003 PHARM REV CODE 250: Performed by: INTERNAL MEDICINE

## 2022-01-21 PROCEDURE — 87075 CULTR BACTERIA EXCEPT BLOOD: CPT | Performed by: STUDENT IN AN ORGANIZED HEALTH CARE EDUCATION/TRAINING PROGRAM

## 2022-01-21 PROCEDURE — 85025 COMPLETE CBC W/AUTO DIFF WBC: CPT | Performed by: INTERNAL MEDICINE

## 2022-01-21 PROCEDURE — 25000003 PHARM REV CODE 250: Performed by: NURSE ANESTHETIST, CERTIFIED REGISTERED

## 2022-01-21 PROCEDURE — A4216 STERILE WATER/SALINE, 10 ML: HCPCS | Performed by: INTERNAL MEDICINE

## 2022-01-21 PROCEDURE — 25000003 PHARM REV CODE 250: Performed by: STUDENT IN AN ORGANIZED HEALTH CARE EDUCATION/TRAINING PROGRAM

## 2022-01-21 PROCEDURE — 36415 COLL VENOUS BLD VENIPUNCTURE: CPT | Performed by: INTERNAL MEDICINE

## 2022-01-21 PROCEDURE — 63600175 PHARM REV CODE 636 W HCPCS: Performed by: NURSE ANESTHETIST, CERTIFIED REGISTERED

## 2022-01-21 PROCEDURE — 80048 BASIC METABOLIC PNL TOTAL CA: CPT | Performed by: INTERNAL MEDICINE

## 2022-01-21 PROCEDURE — 87186 SC STD MICRODIL/AGAR DIL: CPT | Performed by: STUDENT IN AN ORGANIZED HEALTH CARE EDUCATION/TRAINING PROGRAM

## 2022-01-21 PROCEDURE — 1101F PT FALLS ASSESS-DOCD LE1/YR: CPT | Mod: CPTII,S$GLB,, | Performed by: STUDENT IN AN ORGANIZED HEALTH CARE EDUCATION/TRAINING PROGRAM

## 2022-01-21 PROCEDURE — 71000033 HC RECOVERY, INTIAL HOUR: Performed by: STUDENT IN AN ORGANIZED HEALTH CARE EDUCATION/TRAINING PROGRAM

## 2022-01-21 PROCEDURE — 3078F PR MOST RECENT DIASTOLIC BLOOD PRESSURE < 80 MM HG: ICD-10-PCS | Mod: CPTII,S$GLB,, | Performed by: STUDENT IN AN ORGANIZED HEALTH CARE EDUCATION/TRAINING PROGRAM

## 2022-01-21 PROCEDURE — 87102 FUNGUS ISOLATION CULTURE: CPT | Performed by: STUDENT IN AN ORGANIZED HEALTH CARE EDUCATION/TRAINING PROGRAM

## 2022-01-21 PROCEDURE — 87040 BLOOD CULTURE FOR BACTERIA: CPT | Performed by: INTERNAL MEDICINE

## 2022-01-21 PROCEDURE — 99214 PR OFFICE/OUTPT VISIT, EST, LEVL IV, 30-39 MIN: ICD-10-PCS | Mod: S$GLB,,, | Performed by: STUDENT IN AN ORGANIZED HEALTH CARE EDUCATION/TRAINING PROGRAM

## 2022-01-21 RX ORDER — MELOXICAM 7.5 MG/1
7.5 TABLET ORAL 2 TIMES DAILY PRN
Status: DISCONTINUED | OUTPATIENT
Start: 2022-01-21 | End: 2022-01-25 | Stop reason: HOSPADM

## 2022-01-21 RX ORDER — NALOXONE HCL 0.4 MG/ML
0.02 VIAL (ML) INJECTION
Status: DISCONTINUED | OUTPATIENT
Start: 2022-01-21 | End: 2022-01-25 | Stop reason: HOSPADM

## 2022-01-21 RX ORDER — SUCRALFATE 1 G/10ML
1 SUSPENSION ORAL EVERY 6 HOURS
Status: DISCONTINUED | OUTPATIENT
Start: 2022-01-21 | End: 2022-01-25 | Stop reason: HOSPADM

## 2022-01-21 RX ORDER — BUPIVACAINE HYDROCHLORIDE 2.5 MG/ML
INJECTION, SOLUTION EPIDURAL; INFILTRATION; INTRACAUDAL
Status: DISCONTINUED | OUTPATIENT
Start: 2022-01-21 | End: 2022-01-21 | Stop reason: HOSPADM

## 2022-01-21 RX ORDER — HEPARIN SODIUM 5000 [USP'U]/ML
5000 INJECTION, SOLUTION INTRAVENOUS; SUBCUTANEOUS EVERY 8 HOURS
Status: DISCONTINUED | OUTPATIENT
Start: 2022-01-21 | End: 2022-01-25 | Stop reason: HOSPADM

## 2022-01-21 RX ORDER — CHLORHEXIDINE GLUCONATE ORAL RINSE 1.2 MG/ML
10 SOLUTION DENTAL 2 TIMES DAILY
Status: DISCONTINUED | OUTPATIENT
Start: 2022-01-21 | End: 2022-01-25 | Stop reason: HOSPADM

## 2022-01-21 RX ORDER — AMITRIPTYLINE HYDROCHLORIDE 25 MG/1
25 TABLET, FILM COATED ORAL NIGHTLY
Status: DISCONTINUED | OUTPATIENT
Start: 2022-01-21 | End: 2022-01-25 | Stop reason: HOSPADM

## 2022-01-21 RX ORDER — GLUCAGON 1 MG
1 KIT INJECTION
Status: DISCONTINUED | OUTPATIENT
Start: 2022-01-21 | End: 2022-01-25 | Stop reason: HOSPADM

## 2022-01-21 RX ORDER — LIDOCAINE HYDROCHLORIDE 10 MG/ML
INJECTION, SOLUTION EPIDURAL; INFILTRATION; INTRACAUDAL; PERINEURAL
Status: DISCONTINUED | OUTPATIENT
Start: 2022-01-21 | End: 2022-01-21 | Stop reason: HOSPADM

## 2022-01-21 RX ORDER — SODIUM CHLORIDE, SODIUM LACTATE, POTASSIUM CHLORIDE, CALCIUM CHLORIDE 600; 310; 30; 20 MG/100ML; MG/100ML; MG/100ML; MG/100ML
INJECTION, SOLUTION INTRAVENOUS CONTINUOUS PRN
Status: DISCONTINUED | OUTPATIENT
Start: 2022-01-21 | End: 2022-01-21

## 2022-01-21 RX ORDER — ACETAMINOPHEN 325 MG/1
650 TABLET ORAL EVERY 4 HOURS PRN
Status: DISCONTINUED | OUTPATIENT
Start: 2022-01-21 | End: 2022-01-25 | Stop reason: HOSPADM

## 2022-01-21 RX ORDER — AMITRIPTYLINE HYDROCHLORIDE 75 MG/1
75 TABLET ORAL NIGHTLY
Status: DISCONTINUED | OUTPATIENT
Start: 2022-01-21 | End: 2022-01-21

## 2022-01-21 RX ORDER — PROPOFOL 10 MG/ML
INJECTION, EMULSION INTRAVENOUS
Status: DISCONTINUED | OUTPATIENT
Start: 2022-01-21 | End: 2022-01-21

## 2022-01-21 RX ORDER — LIDOCAINE HCL/PF 100 MG/5ML
SYRINGE (ML) INTRAVENOUS
Status: DISCONTINUED | OUTPATIENT
Start: 2022-01-21 | End: 2022-01-21

## 2022-01-21 RX ORDER — SODIUM CHLORIDE 0.9 % (FLUSH) 0.9 %
10 SYRINGE (ML) INJECTION
Status: DISCONTINUED | OUTPATIENT
Start: 2022-01-21 | End: 2022-01-25 | Stop reason: HOSPADM

## 2022-01-21 RX ORDER — ONDANSETRON 2 MG/ML
INJECTION INTRAMUSCULAR; INTRAVENOUS
Status: DISCONTINUED | OUTPATIENT
Start: 2022-01-21 | End: 2022-01-21

## 2022-01-21 RX ORDER — IBUPROFEN 200 MG
16 TABLET ORAL
Status: DISCONTINUED | OUTPATIENT
Start: 2022-01-21 | End: 2022-01-25 | Stop reason: HOSPADM

## 2022-01-21 RX ORDER — LEVOTHYROXINE SODIUM 88 UG/1
88 TABLET ORAL
Status: DISCONTINUED | OUTPATIENT
Start: 2022-01-22 | End: 2022-01-25 | Stop reason: HOSPADM

## 2022-01-21 RX ORDER — AMITRIPTYLINE HYDROCHLORIDE 50 MG/1
50 TABLET, FILM COATED ORAL NIGHTLY
Status: DISCONTINUED | OUTPATIENT
Start: 2022-01-21 | End: 2022-01-25 | Stop reason: HOSPADM

## 2022-01-21 RX ORDER — LANOLIN ALCOHOL/MO/W.PET/CERES
800 CREAM (GRAM) TOPICAL
Status: DISCONTINUED | OUTPATIENT
Start: 2022-01-21 | End: 2022-01-25 | Stop reason: HOSPADM

## 2022-01-21 RX ORDER — ONDANSETRON 2 MG/ML
4 INJECTION INTRAMUSCULAR; INTRAVENOUS EVERY 8 HOURS PRN
Status: DISCONTINUED | OUTPATIENT
Start: 2022-01-21 | End: 2022-01-25 | Stop reason: HOSPADM

## 2022-01-21 RX ORDER — ONDANSETRON 2 MG/ML
4 INJECTION INTRAMUSCULAR; INTRAVENOUS DAILY PRN
Status: DISCONTINUED | OUTPATIENT
Start: 2022-01-21 | End: 2022-01-21 | Stop reason: HOSPADM

## 2022-01-21 RX ORDER — HYDROCODONE BITARTRATE AND ACETAMINOPHEN 5; 325 MG/1; MG/1
1 TABLET ORAL EVERY 4 HOURS PRN
Status: DISCONTINUED | OUTPATIENT
Start: 2022-01-21 | End: 2022-01-25 | Stop reason: HOSPADM

## 2022-01-21 RX ORDER — TALC
6 POWDER (GRAM) TOPICAL NIGHTLY PRN
Status: DISCONTINUED | OUTPATIENT
Start: 2022-01-21 | End: 2022-01-25 | Stop reason: HOSPADM

## 2022-01-21 RX ORDER — HYDROCODONE BITARTRATE AND ACETAMINOPHEN 5; 325 MG/1; MG/1
1 TABLET ORAL EVERY 4 HOURS PRN
Status: DISCONTINUED | OUTPATIENT
Start: 2022-01-21 | End: 2022-01-21 | Stop reason: SDUPTHER

## 2022-01-21 RX ORDER — SODIUM,POTASSIUM PHOSPHATES 280-250MG
2 POWDER IN PACKET (EA) ORAL
Status: DISCONTINUED | OUTPATIENT
Start: 2022-01-21 | End: 2022-01-25 | Stop reason: HOSPADM

## 2022-01-21 RX ORDER — DEXAMETHASONE SODIUM PHOSPHATE 4 MG/ML
INJECTION, SOLUTION INTRA-ARTICULAR; INTRALESIONAL; INTRAMUSCULAR; INTRAVENOUS; SOFT TISSUE
Status: DISCONTINUED | OUTPATIENT
Start: 2022-01-21 | End: 2022-01-21

## 2022-01-21 RX ORDER — IBUPROFEN 200 MG
24 TABLET ORAL
Status: DISCONTINUED | OUTPATIENT
Start: 2022-01-21 | End: 2022-01-25 | Stop reason: HOSPADM

## 2022-01-21 RX ORDER — OXYCODONE AND ACETAMINOPHEN 5; 325 MG/1; MG/1
1 TABLET ORAL
Status: DISCONTINUED | OUTPATIENT
Start: 2022-01-21 | End: 2022-01-21

## 2022-01-21 RX ORDER — ONDANSETRON 8 MG/1
8 TABLET, ORALLY DISINTEGRATING ORAL EVERY 8 HOURS PRN
Status: DISCONTINUED | OUTPATIENT
Start: 2022-01-21 | End: 2022-01-25 | Stop reason: HOSPADM

## 2022-01-21 RX ORDER — ATORVASTATIN CALCIUM 40 MG/1
40 TABLET, FILM COATED ORAL NIGHTLY
Status: DISCONTINUED | OUTPATIENT
Start: 2022-01-21 | End: 2022-01-25 | Stop reason: HOSPADM

## 2022-01-21 RX ORDER — HYDROMORPHONE HYDROCHLORIDE 2 MG/ML
0.2 INJECTION, SOLUTION INTRAMUSCULAR; INTRAVENOUS; SUBCUTANEOUS EVERY 5 MIN PRN
Status: DISCONTINUED | OUTPATIENT
Start: 2022-01-21 | End: 2022-01-21 | Stop reason: HOSPADM

## 2022-01-21 RX ORDER — INSULIN ASPART 100 [IU]/ML
0-5 INJECTION, SOLUTION INTRAVENOUS; SUBCUTANEOUS
Status: DISCONTINUED | OUTPATIENT
Start: 2022-01-21 | End: 2022-01-25 | Stop reason: HOSPADM

## 2022-01-21 RX ORDER — ASPIRIN 81 MG/1
81 TABLET ORAL DAILY
Status: DISCONTINUED | OUTPATIENT
Start: 2022-01-22 | End: 2022-01-25 | Stop reason: HOSPADM

## 2022-01-21 RX ORDER — SERTRALINE HYDROCHLORIDE 50 MG/1
100 TABLET, FILM COATED ORAL DAILY
Status: DISCONTINUED | OUTPATIENT
Start: 2022-01-22 | End: 2022-01-25 | Stop reason: HOSPADM

## 2022-01-21 RX ORDER — PANTOPRAZOLE SODIUM 40 MG/1
40 TABLET, DELAYED RELEASE ORAL DAILY
Refills: 4 | Status: DISCONTINUED | OUTPATIENT
Start: 2022-01-22 | End: 2022-01-25 | Stop reason: HOSPADM

## 2022-01-21 RX ORDER — PROPRANOLOL HYDROCHLORIDE 40 MG/1
40 TABLET ORAL 2 TIMES DAILY
Status: DISCONTINUED | OUTPATIENT
Start: 2022-01-21 | End: 2022-01-25 | Stop reason: HOSPADM

## 2022-01-21 RX ORDER — MAG HYDROX/ALUMINUM HYD/SIMETH 200-200-20
30 SUSPENSION, ORAL (FINAL DOSE FORM) ORAL 4 TIMES DAILY PRN
Status: DISCONTINUED | OUTPATIENT
Start: 2022-01-21 | End: 2022-01-25 | Stop reason: HOSPADM

## 2022-01-21 RX ORDER — ONDANSETRON 8 MG/1
8 TABLET, ORALLY DISINTEGRATING ORAL EVERY 8 HOURS PRN
Status: DISCONTINUED | OUTPATIENT
Start: 2022-01-21 | End: 2022-01-21 | Stop reason: SDUPTHER

## 2022-01-21 RX ORDER — KETOROLAC TROMETHAMINE 30 MG/ML
15 INJECTION, SOLUTION INTRAMUSCULAR; INTRAVENOUS EVERY 8 HOURS PRN
Status: DISCONTINUED | OUTPATIENT
Start: 2022-01-21 | End: 2022-01-21 | Stop reason: HOSPADM

## 2022-01-21 RX ORDER — FENTANYL CITRATE 50 UG/ML
INJECTION, SOLUTION INTRAMUSCULAR; INTRAVENOUS
Status: DISCONTINUED | OUTPATIENT
Start: 2022-01-21 | End: 2022-01-21

## 2022-01-21 RX ORDER — SODIUM CHLORIDE 0.9 % (FLUSH) 0.9 %
10 SYRINGE (ML) INJECTION EVERY 8 HOURS
Status: DISCONTINUED | OUTPATIENT
Start: 2022-01-21 | End: 2022-01-25 | Stop reason: HOSPADM

## 2022-01-21 RX ORDER — PROPOFOL 10 MG/ML
INJECTION, EMULSION INTRAVENOUS CONTINUOUS PRN
Status: DISCONTINUED | OUTPATIENT
Start: 2022-01-21 | End: 2022-01-21

## 2022-01-21 RX ORDER — AMOXICILLIN 250 MG
1 CAPSULE ORAL 2 TIMES DAILY PRN
Status: DISCONTINUED | OUTPATIENT
Start: 2022-01-21 | End: 2022-01-25 | Stop reason: HOSPADM

## 2022-01-21 RX ORDER — SIMETHICONE 80 MG
1 TABLET,CHEWABLE ORAL 4 TIMES DAILY PRN
Status: DISCONTINUED | OUTPATIENT
Start: 2022-01-21 | End: 2022-01-25 | Stop reason: HOSPADM

## 2022-01-21 RX ORDER — MAG HYDROX/ALUMINUM HYD/SIMETH 200-200-20
30 SUSPENSION, ORAL (FINAL DOSE FORM) ORAL
Status: DISCONTINUED | OUTPATIENT
Start: 2022-01-21 | End: 2022-01-25 | Stop reason: HOSPADM

## 2022-01-21 RX ORDER — HYDROCODONE BITARTRATE AND ACETAMINOPHEN 10; 325 MG/1; MG/1
1 TABLET ORAL EVERY 6 HOURS PRN
Status: DISCONTINUED | OUTPATIENT
Start: 2022-01-21 | End: 2022-01-25 | Stop reason: HOSPADM

## 2022-01-21 RX ADMIN — FENTANYL CITRATE 50 MCG: 50 INJECTION, SOLUTION INTRAMUSCULAR; INTRAVENOUS at 02:01

## 2022-01-21 RX ADMIN — CHLORHEXIDINE GLUCONATE 0.12% ORAL RINSE 10 ML: 1.2 LIQUID ORAL at 09:01

## 2022-01-21 RX ADMIN — PROPRANOLOL HYDROCHLORIDE 40 MG: 40 TABLET ORAL at 09:01

## 2022-01-21 RX ADMIN — MAGNESIUM HYDROXIDE/ALUMINUM HYDROXICE/SIMETHICONE 30 ML: 120; 1200; 1200 SUSPENSION ORAL at 09:01

## 2022-01-21 RX ADMIN — PROPOFOL 75 MCG/KG/MIN: 10 INJECTION, EMULSION INTRAVENOUS at 02:01

## 2022-01-21 RX ADMIN — LIDOCAINE HYDROCHLORIDE 50 MG: 20 INJECTION, SOLUTION INTRAVENOUS at 02:01

## 2022-01-21 RX ADMIN — PROPOFOL 50 MG: 10 INJECTION, EMULSION INTRAVENOUS at 02:01

## 2022-01-21 RX ADMIN — VANCOMYCIN HYDROCHLORIDE 1000 MG: 1 INJECTION, POWDER, LYOPHILIZED, FOR SOLUTION INTRAVENOUS at 02:01

## 2022-01-21 RX ADMIN — Medication 10 ML: at 09:01

## 2022-01-21 RX ADMIN — AMITRIPTYLINE HYDROCHLORIDE 25 MG: 25 TABLET, FILM COATED ORAL at 09:01

## 2022-01-21 RX ADMIN — DEXAMETHASONE SODIUM PHOSPHATE 4 MG: 4 INJECTION, SOLUTION INTRAMUSCULAR; INTRAVENOUS at 02:01

## 2022-01-21 RX ADMIN — ONDANSETRON 4 MG: 2 INJECTION, SOLUTION INTRAMUSCULAR; INTRAVENOUS at 02:01

## 2022-01-21 RX ADMIN — VANCOMYCIN HYDROCHLORIDE 500 MG: 500 INJECTION, POWDER, LYOPHILIZED, FOR SOLUTION INTRAVENOUS at 09:01

## 2022-01-21 RX ADMIN — ATORVASTATIN CALCIUM 40 MG: 40 TABLET, FILM COATED ORAL at 09:01

## 2022-01-21 RX ADMIN — SODIUM CHLORIDE, SODIUM LACTATE, POTASSIUM CHLORIDE, AND CALCIUM CHLORIDE: .6; .31; .03; .02 INJECTION, SOLUTION INTRAVENOUS at 02:01

## 2022-01-21 RX ADMIN — AMITRIPTYLINE HYDROCHLORIDE 50 MG: 50 TABLET, FILM COATED ORAL at 09:01

## 2022-01-21 RX ADMIN — HEPARIN SODIUM 5000 UNITS: 5000 INJECTION INTRAVENOUS; SUBCUTANEOUS at 09:01

## 2022-01-21 NOTE — ASSESSMENT & PLAN NOTE
Ortho on board s/p I&D R Index finger  ID on consult for recs on MSSA Cellulitis.  XRAY negative  Vancomycin on board  Aerobic Bacterial Culture  Abnormal   STAPHYLOCOCCUS AUREUS   Moderate     Resulting Agency [facility]          Susceptibility     Staphylococcus aureus     CULTURE, AEROBIC  (SPECIFY SOURCE)     Clindamycin <=0.5 mcg/mL Resistant     Erythromycin >4 mcg/mL Resistant     Oxacillin <=0.25 mcg/mL Sensitive     Penicillin 2 mcg/mL Resistant     Tetracycline <=4 mcg/mL Sensitive     Trimeth/Sulfa <=0.5/9.5 m... Sensitive               Plan for abx based on ID recommendation

## 2022-01-21 NOTE — TRANSFER OF CARE
"Anesthesia Transfer of Care Note    Patient: Haim Martin    Procedure(s) Performed: Procedure(s) (LRB):  INCISION AND DRAINAGE, HAND (Right)    Patient location: PACU    Anesthesia Type: MAC    Transport from OR: Transported from OR on room air with adequate spontaneous ventilation    Post pain: adequate analgesia    Post assessment: no apparent anesthetic complications    Post vital signs: stable    Level of consciousness: awake    Complications: none    Transfer of care protocol was followed      Last vitals:   Visit Vitals  BP (!) 193/81   Pulse 71   Temp 36.6 °C (97.8 °F) (Temporal)   Resp 18   Ht 4' 11" (1.499 m)   Wt 61.1 kg (134 lb 13 oz)   SpO2 100%   Breastfeeding No   BMI 27.23 kg/m²     "

## 2022-01-21 NOTE — H&P
Patient ID: Haim Martin is a 81 y.o. female.     Chief Complaint: Pain and Follow-up of the Right Hand     HPI:  Patient is a 81-year-old female right-hand-dominant who presents to orthopedic clinic for follow-up regarding right index finger draining mucous cyst and cellulitis.  Patient has not had any improvement and states that the erythema is worsening.  She was initially managed non operatively with soaks and antibiotics.  The patient previously had incisions and drainage performed in the ED on December 13, 2021 and January 9th was 1022. The patient states that she has not had any improvement in her symptoms.  Currently she states that the pain is a 7/10.  Denies any fevers or chills.  States that the erythema is localized to her finger.            Past Medical History:   Diagnosis Date    Depression      Diverticulosis of colon (without mention of hemorrhage) 8/25/2014    Hyperlipidemia      Hypertension      Thyroid disease           Current Outpatient Medications:     ACCU-CHEK MARIANO PLUS TEST STRP Strp, USE 1 STRIP TO CHECK GLUCOSE ONCE DAILY, Disp: 50 strip, Rfl: 1    alendronate (FOSAMAX) 70 MG tablet, TAKE 1 TABLET BY MOUTH EVERY 7 DAYS, Disp: 12 tablet, Rfl: 0    amitriptyline (ELAVIL) 75 MG tablet, Take 1 tablet by mouth in the evening, Disp: 90 tablet, Rfl: 0    atorvastatin (LIPITOR) 40 MG tablet, Take 1 tablet by mouth in the evening, Disp: 90 tablet, Rfl: 4    ergocalciferol (ERGOCALCIFEROL) 50,000 unit Cap, Take 1 capsule by mouth once a week, Disp: 8 capsule, Rfl: 0    lancets-blood glucose strips 30 gauge Cmpk, 1 application by Misc.(Non-Drug; Combo Route) route once daily at 6am., Disp: 90 each, Rfl: 0    levothyroxine (SYNTHROID) 88 MCG tablet, TAKE 1 TABLET BY MOUTH ONCE DAILY MONDAY - SATURDAY  AND ONE & ONE-HALF TABLET ON SUNDAY, Disp: 90 tablet, Rfl: 0    meloxicam (MOBIC) 7.5 MG tablet, Take 1 tablet (7.5 mg total) by mouth 2 (two) times daily as needed for Pain. Take  "with food., Disp: 30 tablet, Rfl: 1    mupirocin (BACTROBAN) 2 % ointment, Apply topically 2 (two) times daily., Disp: 15 g, Rfl: 1    omeprazole (PRILOSEC) 20 MG capsule, Take 1 capsule (20 mg total) by mouth as needed., Disp: 90 capsule, Rfl: 4    propranoloL (INDERAL) 40 MG tablet, Take 1 tablet by mouth twice daily, Disp: 180 tablet, Rfl: 0    propranoloL (INDERAL) 40 MG tablet, Take 1 tablet by mouth twice daily, Disp: 180 tablet, Rfl: 0    sertraline (ZOLOFT) 100 MG tablet, Take 1 tablet by mouth once daily, Disp: 90 tablet, Rfl: 3    sumatriptan (IMITREX) 100 MG tablet, TAKE 1 TABLET BY MOUTH AT ONSET OF MIGRAINE, Disp: 9 tablet, Rfl: 0    vit C/E/Zn/coppr/lutein/zeaxan (PRESERVISION AREDS-2 ORAL), Take 1 tablet by mouth 2 (two) times a day., Disp: , Rfl:     VIT C/VIT E/LUTEIN/MIN/OMEGA-3 (OCUVITE ORAL), Take by mouth once daily., Disp: , Rfl:     aspirin (ECOTRIN) 81 MG EC tablet, Take 1 tablet (81 mg total) by mouth once daily., Disp: 1 tablet, Rfl: 0    blood-glucose meter kit, Use as instructed, Disp: 1 each, Rfl: 0    HYDROcodone-acetaminophen (NORCO) 5-325 mg per tablet, Take 1 tablet by mouth every 4 (four) hours as needed for Pain. (Patient not taking: Reported on 1/21/2022), Disp: 8 tablet, Rfl: 0  No current facility-administered medications for this visit.     Facility-Administered Medications Ordered in Other Visits:     ondansetron injection 4 mg, 4 mg, Intravenous, Once PRN, Luis Ashraf MD        Review of patient's allergies indicates:   Allergen Reactions    Clindamycin         Heaviness in chest       Penicillins Hives         BP (!) 151/72   Pulse 71   Ht 4' 11" (1.499 m)   Wt 58.1 kg (128 lb)   BMI 25.85 kg/m²      Review of Systems   Skin: Positive for color change and rash.   Musculoskeletal: Positive for joint pain and joint swelling.   All other systems reviewed and are negative.         Objective:    Right Hand Exam      Tenderness   The patient is experiencing " tenderness in the dorsal area.     Range of Motion   Wrist   Extension: normal   Flexion: normal   Pronation: normal   Supination: normal   Hand   MP Index: normal   PIP Index: normal   DIP Index: abnormal      Other   Erythema: present  Scars: present  Sensation: normal  Pulse: present     Comments:  Patient with mucinous cyst that is draining on the ulnar aspect of the right index finger DIPJ.  There is an area of 1 cm of drainage.  There is surrounding erythema that stops distal to the PIPJ.  There is pain with passive motion of the DIPJ.                  Assessment:      Imaging:  No new imaging was obtained at today's visit.  Previous radiographs demonstrate degenerative changes noted at the DIPJ of the index finger.  There is a 2 mm calcific density over the DIPJ.           1. Abrasion of right index finger with infection           Plan:       Follow up in about 2 weeks (around 2/4/2022) for Wound check.  Patient will be going to hospital today for hospitalization and surgery..         The patient has failed outpatient therapy and conservative measures.  She will require formal irrigation and debridement in the operating room.  She will also require hospitalization for IV antibiotics.  She will need an Infectious Disease consultation and management of antibiotic therapy.  She should be admitted by Medicine for medical management of her medical comorbidities.  The plan will be for a right index finger irrigation and debridement with cultures taken.  Plan for surgery today.  Patient will be NPO.       1.  Activity Modifications: [The patient will continue with activity modifications, to include low impact activities.]  2.  Conservative Measures: [The patient will proceed with non-operative management at this time.]  3.  Medications: [The patient will continue to take NSAIDs as needed for pain.]  4.  Injections: [No need.]  5.  Physical Therapy: [A consult to OT will be placed after surgery.]  6.  Surgery:  [Surgical options were discussed with the patient.]  7.  Counseling: [Greater than 25 minutes was spent counseling the patient and the patient was given ample time to ask questions. All questions were answered.]     The patient was counseled about the risks and benefits of surgery.  Risks discussed include infection, bleeding, damage to surrounding structures such as vessels and nerves, failure to provide intended benefit of surgery, blood clots potentially leading to pulmonary embolism and death, stiffness, need for further procedures, wound complications, loss of limb, and pain. Risks of anesthesia include but are not limited to heart attack, stroke, and loss of life.     The Patient was educated on the etiology of the pathology as well as treatment option spectrum ranging from most conservative to most invasive.      Patient verbalized understanding diagnosis, treatment options, and had all questioned answered.      Ramirez Flores MD, MPH  Orthopedic Surgery

## 2022-01-21 NOTE — H&P
[facility]  History & Physical    Patient Name: Haim Martin  MRN: [id number]  Patient Class: OP- Outpatient Recovery  Admission Date: 1/21/2022  Attending Physician: Alen Caal MD  Primary Care Provider: Kavya Mesa MD         Patient information was obtained from parent and ER records.     Subjective:     Principal Problem:Finger osteomyelitis, right    Chief Complaint:   Chief Complaint   Patient presents with    Hand Pain    R Index Finger s/p I&D    HPI: Patient 80 yo female with pmhx significant for HTN, Hypothyroidism, Hyperlipidemia presents to hospital for I&D of R Index finger. Patient with cellulitis, concern for Osteomyelitis. Patient wound culture on 1/9/22 + for MSSA sn to Bactrim, Oxacillin. Patient tolerated procedure well and is placed in O/P extended recovery. Patient denies current pain, no cp, sob, n/v/d/f/c. Patient initiated on Vancomycin, ID consult pending.Patient is afebrile, alert and appropriate. Await consultant inputs.    Review of Systems - General ROS: positive for  - R Index Finger pain / swelling s/p I&D  No CHest Pain, Nausea, Vomiting, Fever, Chills    Physical Exam  Vitals and nursing note reviewed.   Constitutional:       General: She is not in acute distress.     Appearance: She is well-developed and well-nourished. She is not ill-appearing.   HENT:      Head: Normocephalic and atraumatic.   Eyes:      Extraocular Movements: EOM normal.      Pupils: Pupils are equal, round, and reactive to light.   Neck:      Vascular: No JVD.   Cardiovascular:      Rate and Rhythm: Normal rate and regular rhythm.      Pulses: Intact distal pulses.      Heart sounds: Murmur heard.    Systolic murmur is present with a grade of 3/6.      Pulmonary:      Effort: Pulmonary effort is normal. No respiratory distress.      Breath sounds: Normal breath sounds. No wheezing.   Abdominal:      General: Bowel sounds are normal. There is no distension.       Palpations: Abdomen is soft.   Musculoskeletal:         General: No tenderness or edema. Normal range of motion.      Cervical back: Normal range of motion and neck supple.      Right lower leg: No edema.      Left lower leg: No edema.   Skin:     General: Skin is warm and dry.      Findings: No erythema.   Neurological:      Mental Status: She is alert and oriented to person, place, and time.      Cranial Nerves: No cranial nerve deficit.      Deep Tendon Reflexes: Reflexes are normal and symmetric.   Psychiatric:         Mood and Affect: Mood and affect normal.         Behavior: Behavior normal.         Thought Content: Thought content normal.         Judgment: Judgment normal.               Assessment/Plan:     * Finger osteomyelitis, right  Ortho on board s/p I&D R Index finger  ID on consult for recs on MSSA Cellulitis.  XRAY negative  Vancomycin on board  Blood Cultures pending    Aerobic Bacterial Culture  Abnormal   STAPHYLOCOCCUS AUREUS   Moderate     Resulting Agency [facility]          Susceptibility     Staphylococcus aureus     CULTURE, AEROBIC  (SPECIFY SOURCE)     Clindamycin <=0.5 mcg/mL Resistant     Erythromycin >4 mcg/mL Resistant     Oxacillin <=0.25 mcg/mL Sensitive     Penicillin 2 mcg/mL Resistant     Tetracycline <=4 mcg/mL Sensitive     Trimeth/Sulfa <=0.5/9.5 m... Sensitive               Plan for abx based on ID recommendation      Major depressive disorder with single episode, in partial remission  Resume home meds      Pre-diabetes  Accuchecks  NISS      Anxiety  Home Med  Elavil  Monitor  Controlled at this time      Hypothyroidism  Resume home meds      VTE Risk Mitigation (From admission, onward)         Ordered     heparin (porcine) injection 5,000 Units  Every 8 hours         01/21/22 1637     Place sequential compression device  Until discontinued         01/21/22 1637     IP VTE HIGH RISK PATIENT  Once         01/21/22 1637     Place sequential compression device  Until discontinued          01/21/22 1507                   Alen Caal MD  Department of Hospital Medicine   [facility] - Med Surg

## 2022-01-21 NOTE — PROGRESS NOTES
Subjective:      Patient ID: Haim Martin is a 81 y.o. female.    Chief Complaint: Pain and Follow-up of the Right Hand      HPI:  Patient is a 81-year-old female right-hand-dominant who presents to orthopedic clinic for follow-up regarding right index finger draining mucous cyst and cellulitis.  Patient has not had any improvement and states that the erythema is worsening.  She was initially managed non operatively with soaks and antibiotics.  The patient previously had incisions and drainage performed in the ED on December 13, 2021 and January 9th was 1022. The patient states that she has not had any improvement in her symptoms.  Currently she states that the pain is a 7/10.  Denies any fevers or chills.  States that the erythema is localized to her finger.      Past Medical History:   Diagnosis Date    Depression     Diverticulosis of colon (without mention of hemorrhage) 8/25/2014    Hyperlipidemia     Hypertension     Thyroid disease        Current Outpatient Medications:     ACCU-CHEK MARIANO PLUS TEST STRP Strp, USE 1 STRIP TO CHECK GLUCOSE ONCE DAILY, Disp: 50 strip, Rfl: 1    alendronate (FOSAMAX) 70 MG tablet, TAKE 1 TABLET BY MOUTH EVERY 7 DAYS, Disp: 12 tablet, Rfl: 0    amitriptyline (ELAVIL) 75 MG tablet, Take 1 tablet by mouth in the evening, Disp: 90 tablet, Rfl: 0    atorvastatin (LIPITOR) 40 MG tablet, Take 1 tablet by mouth in the evening, Disp: 90 tablet, Rfl: 4    ergocalciferol (ERGOCALCIFEROL) 50,000 unit Cap, Take 1 capsule by mouth once a week, Disp: 8 capsule, Rfl: 0    lancets-blood glucose strips 30 gauge Cmpk, 1 application by Misc.(Non-Drug; Combo Route) route once daily at 6am., Disp: 90 each, Rfl: 0    levothyroxine (SYNTHROID) 88 MCG tablet, TAKE 1 TABLET BY MOUTH ONCE DAILY MONDAY - SATURDAY  AND ONE & ONE-HALF TABLET ON SUNDAY, Disp: 90 tablet, Rfl: 0    meloxicam (MOBIC) 7.5 MG tablet, Take 1 tablet (7.5 mg total) by mouth 2 (two) times daily as needed for Pain.  "Take with food., Disp: 30 tablet, Rfl: 1    mupirocin (BACTROBAN) 2 % ointment, Apply topically 2 (two) times daily., Disp: 15 g, Rfl: 1    omeprazole (PRILOSEC) 20 MG capsule, Take 1 capsule (20 mg total) by mouth as needed., Disp: 90 capsule, Rfl: 4    propranoloL (INDERAL) 40 MG tablet, Take 1 tablet by mouth twice daily, Disp: 180 tablet, Rfl: 0    propranoloL (INDERAL) 40 MG tablet, Take 1 tablet by mouth twice daily, Disp: 180 tablet, Rfl: 0    sertraline (ZOLOFT) 100 MG tablet, Take 1 tablet by mouth once daily, Disp: 90 tablet, Rfl: 3    sumatriptan (IMITREX) 100 MG tablet, TAKE 1 TABLET BY MOUTH AT ONSET OF MIGRAINE, Disp: 9 tablet, Rfl: 0    vit C/E/Zn/coppr/lutein/zeaxan (PRESERVISION AREDS-2 ORAL), Take 1 tablet by mouth 2 (two) times a day., Disp: , Rfl:     VIT C/VIT E/LUTEIN/MIN/OMEGA-3 (OCUVITE ORAL), Take by mouth once daily., Disp: , Rfl:     aspirin (ECOTRIN) 81 MG EC tablet, Take 1 tablet (81 mg total) by mouth once daily., Disp: 1 tablet, Rfl: 0    blood-glucose meter kit, Use as instructed, Disp: 1 each, Rfl: 0    HYDROcodone-acetaminophen (NORCO) 5-325 mg per tablet, Take 1 tablet by mouth every 4 (four) hours as needed for Pain. (Patient not taking: Reported on 1/21/2022), Disp: 8 tablet, Rfl: 0  No current facility-administered medications for this visit.    Facility-Administered Medications Ordered in Other Visits:     ondansetron injection 4 mg, 4 mg, Intravenous, Once PRN, Luis Ashraf MD  Review of patient's allergies indicates:   Allergen Reactions    Clindamycin      Heaviness in chest      Penicillins Hives       BP (!) 151/72   Pulse 71   Ht 4' 11" (1.499 m)   Wt 58.1 kg (128 lb)   BMI 25.85 kg/m²     Review of Systems   Skin: Positive for color change and rash.   Musculoskeletal: Positive for joint pain and joint swelling.   All other systems reviewed and are negative.        Objective:    Right Hand Exam     Tenderness   The patient is experiencing tenderness " in the dorsal area.    Range of Motion   Wrist   Extension: normal   Flexion: normal   Pronation: normal   Supination: normal   Hand   MP Index: normal   PIP Index: normal   DIP Index: abnormal     Other   Erythema: present  Scars: present  Sensation: normal  Pulse: present    Comments:  Patient with mucinous cyst that is draining on the ulnar aspect of the right index finger DIPJ.  There is an area of 1 cm of drainage.  There is surrounding erythema that stops distal to the PIPJ.  There is pain with passive motion of the DIPJ.               Assessment:     Imaging:  No new imaging was obtained at today's visit.  Previous radiographs demonstrate degenerative changes noted at the DIPJ of the index finger.  There is a 2 mm calcific density over the DIPJ.          1. Abrasion of right index finger with infection          Plan:          Follow up in about 2 weeks (around 2/4/2022) for Wound check.  Patient will be going to hospital today for hospitalization and surgery..        The patient has failed outpatient therapy and conservative measures.  She will require formal irrigation and debridement in the operating room.  She will also require hospitalization for IV antibiotics.  She will need an Infectious Disease consultation and management of antibiotic therapy.  She should be admitted by Medicine for medical management of her medical comorbidities.  The plan will be for a right index finger irrigation and debridement with cultures taken.  Plan for surgery today.  Patient will be NPO.      1.  Activity Modifications: [The patient will continue with activity modifications, to include low impact activities.]  2.  Conservative Measures: [The patient will proceed with non-operative management at this time.]  3.  Medications: [The patient will continue to take NSAIDs as needed for pain.]  4.  Injections: [No need.]  5.  Physical Therapy: [A consult to OT will be placed after surgery.]  6.  Surgery: [Surgical options were  discussed with the patient.]  7.  Counseling: [Greater than 25 minutes was spent counseling the patient and the patient was given ample time to ask questions. All questions were answered.]    The patient was counseled about the risks and benefits of surgery.  Risks discussed include infection, bleeding, damage to surrounding structures such as vessels and nerves, failure to provide intended benefit of surgery, blood clots potentially leading to pulmonary embolism and death, stiffness, need for further procedures, wound complications, loss of limb, and pain. Risks of anesthesia include but are not limited to heart attack, stroke, and loss of life.    The Patient was educated on the etiology of the pathology as well as treatment option spectrum ranging from most conservative to most invasive.     Patient verbalized understanding diagnosis, treatment options, and had all questioned answered.     Ramirez Flores MD, MPH  Orthopedic Surgery

## 2022-01-21 NOTE — OP NOTE
OPERATIVE NOTE    Date of surgery:  21 January 2022    Preoperative Diagnosis:  · 1.  Right index finger distal interphalangeal joint infection    Postoperative Diagnosis:  · 1. Right index finger distal phalanx osteomyelitis  · 2. Right index finger distal interphalangeal joint infection    Surgeon: Ramirez Flores MD    Assistant Surgeon:  Joseph Kenny PA-C    Procedure Performed:  1. Right index finger irrigation and debridement    Anesthesia:  MAC with local block    Fluids: Per Anesthesia Record    UOP: Per Anesthesia Record    Blood Loss: 5 mL    Implants: None    Drains:  None    Tourniquet Time:  N/A    Fluoro/C-arm utilized during the case: N/A    Intraoperative Findings:  Infected distal interphalangeal joint    Indications for Procedure and Brief History:    Patient is a 81-year-old female who presented to orthopedic clinic earlier today with a right index finger infection.  She had a previous draining mucus cyst with overlying cellulitis.  She has failed outpatient therapy and repeated irrigation debridements in the emergency department.    I had a long discussion with the patient about treatment options. We discussed operative and non-operative options. We discussed the risks of surgery at length, including but not limited to pain, infection, bleeding, damage to adjacent structures such as nerves and blood vessels, failure to heal, stiffness, laxity, need for more surgery, stroke, blood clot, loss of life, loss of limb, need for removal of any implants used, anesthesia risks, breathing problems, and heart problems. She expressed understanding of the risks an opted to proceed with surgical management.    Description of Procedure: I me the the patient in the preoperative holding area. I identified, confirmed, and marked the operative extremity. All questions were answered. The patient was then taken back to the operating room and transferred to the operative table. The patient was placed in the supine  position. All bony prominences were padded. Anesthesia was induced without complication. The operative extremity was then prepped and draped in the standard sterile fashion. A timeout was performed to ensure we had the proper patient, proper operative site, and were performing the proper procedure. All members of the operative team were in agreement with this. Intravenous antibiotics were administered within 60 minutes prior to the incision.     I began the procedure by putting 5 cc of 1% lidocaine in the index finger.  This was done for a local digital block.  I marked out a 1 cm incision over the dorsal aspect of the right index finger in line with the draining mucus cyst.  Decision is made with a 15 blade.  There is evidence that the infection had been a chronic process and had been wearing away at her extensor tendon and triangular ligament.  The patient had direct communication with the joint.  There was a bony fragment that appeared to be infected that was in direct line with the draining tract.  This was removed with a rongeur.    Debridement was carried out an excisional fashion by sharp cutting and curetting for any necrotic, contaminated, and infected appearing skin, subcutaneous tissue, muscle, fat, and bone until healthy appearing tissues with bleeding edges were obtained.  In total a 1 cm x 1 cm area along the traumatic wound is debrided along the aforementioned areas.  The wound was then irrigated with normal saline.  Edges of the incision were loosely approximated with nylon suture.  Quarter-inch packing was placed into the finger.  The wound was then dressed with Xeroform, 4x4s, 2 inch Chato and a aluminum foam splint.    All sponge, instrument, and needle counts were correct x 2 at the end of the case. I was present and performed all key portions of the procedure. The patient was awoken from anesthesia transported to the PACU in stable condition. The patient was examined postoperatively and the limb  was warm and well perfused with appropriate neurovascular status relative to preoperative status and block status.     Postoperative Plan:  · Medicine admission  · Weight bearing: Nonweightbearing  · Range of motion: As tolerated  · IV antibiotics for cellulitis.   · DVT PPx SCDs and ambulation. Chemoprophylaxis per primary team.   · Postoperative x-rays of right hand  · PT/OT for wound care  · Follow-up: 2 weeks after surgery with [facility].     Ramriez Flores MD  Orthopaedic Surgeon

## 2022-01-21 NOTE — HPI
Patient 82 yo female with pmhx significant for HTN, Hypothyroidism, Hyperlipidemia presents to hospital for I&D of R Index finger. Patient with cellulitis, concern for Osteomyelitis. Patient wound culture on 1/9/22 + for MSSA sn to Bactrim, Oxacillin. Patient tolerated procedure well and is placed in O/P extended recovery. Patietn denies current pain, no cp, sob, n/v/d/f/c. Patient initiated on Vancomycin, ID consult pending.Patient is afebrile, alert and appropriate. Await consultant inputs.

## 2022-01-21 NOTE — PATIENT INSTRUCTIONS
Please go to the hospital and do not have anything to eat or drink.  He will need surgery to take care of the infection.  He will likely need IV antibiotics and hospitalization.

## 2022-01-21 NOTE — ANESTHESIA PREPROCEDURE EVALUATION
01/21/2022  Haim Martin is a 81 y.o., female.  Patient Active Problem List   Diagnosis    Hypothyroidism    Depressive disorder, not elsewhere classified    Anxiety    Other and unspecified hyperlipidemia    Migraine    Special screening for malignant neoplasms, colon    Diverticulosis of colon (without mention of hemorrhage)    Lumbar spondylosis    Osteopenia    Pre-diabetes    Major depressive disorder with single episode, in partial remission    Spondylosis without myelopathy or radiculopathy, lumbar region    Sacroiliitis    Infected finger joint     Past Surgical History:   Procedure Laterality Date    APPENDECTOMY      CATARACT EXTRACTION EXTRACAPSULAR W/ INTRAOCULAR LENS IMPLANTATION Bilateral 4/2014    INJECTION OF ANESTHETIC AGENT AROUND MEDIAL BRANCH NERVES INNERVATING LUMBAR FACET JOINT Bilateral 7/16/2020    Procedure: Bilateral L3-5 MBB with local;  Surgeon: Luis Ashraf MD;  Location: [facility];  Service: Pain Management;  Laterality: Bilateral;    INJECTION OF ANESTHETIC AGENT AROUND MEDIAL BRANCH NERVES INNERVATING LUMBAR FACET JOINT Bilateral 8/10/2020    Procedure: Bilateral L3-5 MBB with local;  Surgeon: Luis Ashraf MD;  Location: [facility];  Service: Pain Management;  Laterality: Bilateral;    INJECTION OF ANESTHETIC AGENT INTO SACROILIAC JOINT Bilateral 11/4/2021    Procedure: Bilateral SIJ Injection;  Surgeon: Luis Ashraf MD;  Location: [facility];  Service: Pain Management;  Laterality: Bilateral;    RADIOFREQUENCY THERMOCOAGULATION Left 9/28/2020    Procedure: Left L3-5 Lumbar RFA;  Surgeon: Luis Ashraf MD;  Location: [facility];  Service: Pain Management;  Laterality: Left;    RADIOFREQUENCY THERMOCOAGULATION Right 10/12/2020    Procedure: Right L3-5 Lumbar RFA;  Surgeon: Luis Ashraf MD;  Location: [facility];   Service: Pain Management;  Laterality: Right;    STOMACH SURGERY  1998    not sure what they did, possible bowel resection, partial gastrectomy    TONSILLECTOMY         Anesthesia Evaluation    I have reviewed the Patient Summary Reports.    I have reviewed the Nursing Notes. I have reviewed the NPO Status.   I have reviewed the Medications.     Review of Systems  Anesthesia Hx:  No problems with previous Anesthesia    Social:  Non-Smoker    Hematology/Oncology:  Hematology Normal        Cardiovascular:   Hypertension hyperlipidemia    Pulmonary:  Pulmonary Normal    Renal/:  Renal/ Normal     Hepatic/GI:  Hepatic/GI Normal    Musculoskeletal:   Arthritis  Spondylosis without myelopathy or radiculopathy, lumbar region  Sacroiliitis  Infected finger joint     Neurological:   Headaches    Endocrine:   Hypothyroidism Pre-diabetes   Psych:   depression          Physical Exam  General:  Well nourished    Airway/Jaw/Neck:  Airway Findings: Mouth Opening: Small, but > 3cm Tongue: Normal  General Airway Assessment: Adult  Mallampati: II  TM Distance: 4 - 6 cm  Jaw/Neck Findings:  Neck ROM: Normal ROM      Dental:  Dental Findings: Upper Dentures, Lower Dentures   Chest/Lungs:  Chest/Lungs Findings: Clear to auscultation, Normal Respiratory Rate     Heart/Vascular:  Heart Findings: Rate: Normal  Rhythm: Regular Rhythm  Sounds: Normal        Mental Status:  Mental Status Findings:  Cooperative, Alert and Oriented         Anesthesia Plan  Type of Anesthesia, risks & benefits discussed:  Anesthesia Type:  general    Patient's Preference:   Plan Factors:          Intra-op Monitoring Plan: standard ASA monitors  Intra-op Monitoring Plan Comments:   Post Op Pain Control Plan: multimodal analgesia and per primary service following discharge from PACU  Post Op Pain Control Plan Comments:     Induction:   IV  Beta Blocker:  Patient is on a Beta-Blocker and has received one dose within the past 24 hours (No further documentation  required).       Informed Consent: Patient understands risks and agrees with Anesthesia plan.  Questions answered. Anesthesia consent signed with patient.  ASA Score: 2     Day of Surgery Review of History & Physical:  There are no significant changes.          Ready For Surgery From Anesthesia Perspective.         Chemistry        Component Value Date/Time     01/09/2022 1351    K 5.8 (H) 01/09/2022 1351     01/09/2022 1351    CO2 19 (L) 01/09/2022 1351    BUN 16 01/09/2022 1351    CREATININE 1.0 01/09/2022 1351     01/09/2022 1351        Component Value Date/Time    CALCIUM 9.1 01/09/2022 1351    ALKPHOS 121 01/09/2022 1351    AST 30 01/09/2022 1351    ALT 20 01/09/2022 1351    BILITOT 0.5 01/09/2022 1351    ESTGFRAFRICA >60 01/09/2022 1351    EGFRNONAA 54 (A) 01/09/2022 1351        Lab Results   Component Value Date    WBC 7.41 01/09/2022    HGB 12.8 01/09/2022    HCT 40.9 01/09/2022    MCV 99 (H) 01/09/2022     01/09/2022

## 2022-01-21 NOTE — PLAN OF CARE
Patient prepped for surgery; patient verbalized understanding of preop procedures; belongings given to family/secured; patient had no further questions; will continue to update patient while in preop and make safety rounds.

## 2022-01-21 NOTE — OP NOTE
Certification of Assistant at Surgery       Surgery Date: 1/21/2022     Participating Surgeons:  Surgeon(s) and Role:     * Ramirez Flores MD - Primary    Procedures:  Procedure(s) (LRB):  INCISION AND DRAINAGE, HAND (Right)    Assistant Surgeon's Certification of Necessity:  I understand that section 1842 (b) (6) (d) of the Social Security Act generally prohibits Medicare Part B reasonable charge payment for the services of assistants at surgery in teaching hospitals when qualified residents are available to furnish such services. I certify that the services for which payment is claimed were medically necessary, and that no qualified resident was available to perform the services. I further understand that these services are subject to post-payment review by the Medicare carrier.      Joseph Kenny PA-C    01/21/2022  3:17 PM

## 2022-01-21 NOTE — SUBJECTIVE & OBJECTIVE
Past Medical History:   Diagnosis Date    Depression     Diverticulosis of colon (without mention of hemorrhage) 8/25/2014    Hyperlipidemia     Hypertension     Thyroid disease        Past Surgical History:   Procedure Laterality Date    APPENDECTOMY      CATARACT EXTRACTION EXTRACAPSULAR W/ INTRAOCULAR LENS IMPLANTATION Bilateral 4/2014    INJECTION OF ANESTHETIC AGENT AROUND MEDIAL BRANCH NERVES INNERVATING LUMBAR FACET JOINT Bilateral 7/16/2020    Procedure: Bilateral L3-5 MBB with local;  Surgeon: Luis Ashraf MD;  Location: [facility];  Service: Pain Management;  Laterality: Bilateral;    INJECTION OF ANESTHETIC AGENT AROUND MEDIAL BRANCH NERVES INNERVATING LUMBAR FACET JOINT Bilateral 8/10/2020    Procedure: Bilateral L3-5 MBB with local;  Surgeon: Luis Ashraf MD;  Location: [facility];  Service: Pain Management;  Laterality: Bilateral;    INJECTION OF ANESTHETIC AGENT INTO SACROILIAC JOINT Bilateral 11/4/2021    Procedure: Bilateral SIJ Injection;  Surgeon: Luis Ashraf MD;  Location: [facility];  Service: Pain Management;  Laterality: Bilateral;    RADIOFREQUENCY THERMOCOAGULATION Left 9/28/2020    Procedure: Left L3-5 Lumbar RFA;  Surgeon: Luis Ashraf MD;  Location: [facility];  Service: Pain Management;  Laterality: Left;    RADIOFREQUENCY THERMOCOAGULATION Right 10/12/2020    Procedure: Right L3-5 Lumbar RFA;  Surgeon: Luis Ashraf MD;  Location: [facility];  Service: Pain Management;  Laterality: Right;    STOMACH SURGERY  1998    not sure what they did, possible bowel resection, partial gastrectomy    TONSILLECTOMY         Review of patient's allergies indicates:   Allergen Reactions    Clindamycin      Heaviness in chest      Penicillins Hives       Current Facility-Administered Medications on File Prior to Encounter   Medication    ondansetron injection 4 mg     Current Outpatient Medications on File Prior to Encounter   Medication Sig     alendronate (FOSAMAX) 70 MG tablet TAKE 1 TABLET BY MOUTH EVERY 7 DAYS    amitriptyline (ELAVIL) 75 MG tablet Take 1 tablet by mouth in the evening    atorvastatin (LIPITOR) 40 MG tablet Take 1 tablet by mouth in the evening    ergocalciferol (ERGOCALCIFEROL) 50,000 unit Cap Take 1 capsule by mouth once a week    levothyroxine (SYNTHROID) 88 MCG tablet TAKE 1 TABLET BY MOUTH ONCE DAILY MONDAY - SATURDAY  AND ONE & ONE-HALF TABLET ON SUNDAY    propranoloL (INDERAL) 40 MG tablet Take 1 tablet by mouth twice daily    propranoloL (INDERAL) 40 MG tablet Take 1 tablet by mouth twice daily    sertraline (ZOLOFT) 100 MG tablet Take 1 tablet by mouth once daily    ACCU-CHEK MARIANO PLUS TEST STRP Strp USE 1 STRIP TO CHECK GLUCOSE ONCE DAILY    aspirin (ECOTRIN) 81 MG EC tablet Take 1 tablet (81 mg total) by mouth once daily.    blood-glucose meter kit Use as instructed    HYDROcodone-acetaminophen (NORCO) 5-325 mg per tablet Take 1 tablet by mouth every 4 (four) hours as needed for Pain. (Patient not taking: No sig reported)    lancets-blood glucose strips 30 gauge Cmpk 1 application by Misc.(Non-Drug; Combo Route) route once daily at 6am.    meloxicam (MOBIC) 7.5 MG tablet Take 1 tablet (7.5 mg total) by mouth 2 (two) times daily as needed for Pain. Take with food.    mupirocin (BACTROBAN) 2 % ointment Apply topically 2 (two) times daily.    omeprazole (PRILOSEC) 20 MG capsule Take 1 capsule (20 mg total) by mouth as needed.    sumatriptan (IMITREX) 100 MG tablet TAKE 1 TABLET BY MOUTH AT ONSET OF MIGRAINE    vit C/E/Zn/coppr/lutein/zeaxan (PRESERVISION AREDS-2 ORAL) Take 1 tablet by mouth 2 (two) times a day.    VIT C/VIT E/LUTEIN/MIN/OMEGA-3 (OCUVITE ORAL) Take by mouth once daily.     Family History     Problem Relation (Age of Onset)    Breast cancer Maternal Aunt    Coronary artery disease Father    Diabetes type II Father    Kidney cancer Maternal Aunt    Lupus Mother    Stroke Father        Tobacco  Use    Smoking status: Former Smoker    Smokeless tobacco: Never Used    Tobacco comment: quit 30 years ago   Substance and Sexual Activity    Alcohol use: No    Drug use: No    Sexual activity: Not Currently     Birth control/protection: None     Review of Systems   Constitutional: Positive for activity change. Negative for unexpected weight change.   HENT: Negative.  Negative for trouble swallowing.    Eyes: Negative.    Respiratory: Negative.  Negative for cough, shortness of breath and wheezing.    Cardiovascular: Negative.  Negative for chest pain.   Gastrointestinal: Negative.    Endocrine: Negative.    Genitourinary: Negative.    Musculoskeletal: Negative.    Skin: Positive for wound.   Allergic/Immunologic: Negative.    Neurological: Positive for weakness. Negative for dizziness.   Hematological: Negative.    Psychiatric/Behavioral: Negative.    All other systems reviewed and are negative.    Objective:     Vital Signs (Most Recent):  Temp: 97.1 °F (36.2 °C) (01/21/22 1515)  Pulse: 78 (01/21/22 1655)  Resp: 13 (01/21/22 1655)  BP: (!) 160/69 (01/21/22 1645)  SpO2: 96 % (01/21/22 1655) Vital Signs (24h Range):  Temp:  [97.1 °F (36.2 °C)-97.8 °F (36.6 °C)] 97.1 °F (36.2 °C)  Pulse:  [66-78] 78  Resp:  [12-25] 13  SpO2:  [96 %-100 %] 96 %  BP: (151-204)/(56-86) 160/69     Weight: 61.1 kg (134 lb 13 oz)  Body mass index is 27.23 kg/m².    Physical Exam  Vitals and nursing note reviewed.   Constitutional:       Appearance: She is well-developed and well-nourished.   HENT:      Head: Normocephalic and atraumatic.   Eyes:      Extraocular Movements: EOM normal.      Pupils: Pupils are equal, round, and reactive to light.   Neck:      Vascular: No JVD.   Cardiovascular:      Rate and Rhythm: Normal rate and regular rhythm.      Pulses: Intact distal pulses.      Heart sounds: Murmur heard.    Systolic murmur is present with a grade of 3/6.      Pulmonary:      Effort: Pulmonary effort is normal. No tachypnea,  accessory muscle usage or respiratory distress.      Breath sounds: Normal breath sounds. No wheezing.   Abdominal:      General: Bowel sounds are normal. There is no distension.      Palpations: Abdomen is soft.   Musculoskeletal:         General: No tenderness or edema. Normal range of motion.      Cervical back: Normal range of motion and neck supple.      Right lower leg: No edema.      Left lower leg: No edema.   Skin:     General: Skin is warm and dry.      Findings: No erythema.          Neurological:      Mental Status: She is alert and oriented to person, place, and time.      Cranial Nerves: No cranial nerve deficit.      Deep Tendon Reflexes: Reflexes are normal and symmetric.   Psychiatric:         Mood and Affect: Mood and affect normal.         Behavior: Behavior normal.         Thought Content: Thought content normal.         Judgment: Judgment normal.           CRANIAL NERVES     CN III, IV, VI   Pupils are equal, round, and reactive to light.  Extraocular motions are normal.          Significant Imaging: I have reviewed all pertinent imaging results/findings within the past 24 hours.     Aerobic Bacterial Culture  Abnormal   STAPHYLOCOCCUS AUREUS   Moderate     Resulting Agency [facility]          Susceptibility     Staphylococcus aureus     CULTURE, AEROBIC  (SPECIFY SOURCE)     Clindamycin <=0.5 mcg/mL Resistant     Erythromycin >4 mcg/mL Resistant     Oxacillin <=0.25 mcg/mL Sensitive     Penicillin 2 mcg/mL Resistant     Tetracycline <=4 mcg/mL Sensitive     Trimeth/Sulfa <=0.5/9.5 m... Sensitive

## 2022-01-22 LAB
ALBUMIN SERPL BCP-MCNC: 3.2 G/DL (ref 3.5–5.2)
ALP SERPL-CCNC: 86 U/L (ref 55–135)
ALT SERPL W/O P-5'-P-CCNC: 28 U/L (ref 10–44)
ANION GAP SERPL CALC-SCNC: 11 MMOL/L (ref 8–16)
AST SERPL-CCNC: 28 U/L (ref 10–40)
BILIRUB SERPL-MCNC: 0.5 MG/DL (ref 0.1–1)
BUN SERPL-MCNC: 10 MG/DL (ref 8–23)
CALCIUM SERPL-MCNC: 9 MG/DL (ref 8.7–10.5)
CHLORIDE SERPL-SCNC: 107 MMOL/L (ref 95–110)
CO2 SERPL-SCNC: 25 MMOL/L (ref 23–29)
CREAT SERPL-MCNC: 0.7 MG/DL (ref 0.5–1.4)
EST. GFR  (AFRICAN AMERICAN): >60 ML/MIN/1.73 M^2
EST. GFR  (NON AFRICAN AMERICAN): >60 ML/MIN/1.73 M^2
GLUCOSE SERPL-MCNC: 113 MG/DL (ref 70–110)
GRAM STN SPEC: NORMAL
POCT GLUCOSE: 108 MG/DL (ref 70–110)
POCT GLUCOSE: 122 MG/DL (ref 70–110)
POTASSIUM SERPL-SCNC: 3.4 MMOL/L (ref 3.5–5.1)
PROT SERPL-MCNC: 6.1 G/DL (ref 6–8.4)
SODIUM SERPL-SCNC: 143 MMOL/L (ref 136–145)

## 2022-01-22 PROCEDURE — 25000003 PHARM REV CODE 250: Performed by: INTERNAL MEDICINE

## 2022-01-22 PROCEDURE — 97165 OT EVAL LOW COMPLEX 30 MIN: CPT

## 2022-01-22 PROCEDURE — 97530 THERAPEUTIC ACTIVITIES: CPT

## 2022-01-22 PROCEDURE — 63600175 PHARM REV CODE 636 W HCPCS: Performed by: INTERNAL MEDICINE

## 2022-01-22 PROCEDURE — 36415 COLL VENOUS BLD VENIPUNCTURE: CPT | Performed by: INTERNAL MEDICINE

## 2022-01-22 PROCEDURE — 99223 1ST HOSP IP/OBS HIGH 75: CPT | Mod: NSCH,,, | Performed by: INTERNAL MEDICINE

## 2022-01-22 PROCEDURE — 97161 PT EVAL LOW COMPLEX 20 MIN: CPT

## 2022-01-22 PROCEDURE — 99223 PR INITIAL HOSPITAL CARE,LEVL III: ICD-10-PCS | Mod: NSCH,,, | Performed by: INTERNAL MEDICINE

## 2022-01-22 PROCEDURE — 25000003 PHARM REV CODE 250: Performed by: NURSE PRACTITIONER

## 2022-01-22 PROCEDURE — 36569 INSJ PICC 5 YR+ W/O IMAGING: CPT

## 2022-01-22 PROCEDURE — 11000001 HC ACUTE MED/SURG PRIVATE ROOM

## 2022-01-22 PROCEDURE — 80053 COMPREHEN METABOLIC PANEL: CPT | Performed by: INTERNAL MEDICINE

## 2022-01-22 PROCEDURE — C1751 CATH, INF, PER/CENT/MIDLINE: HCPCS

## 2022-01-22 PROCEDURE — A4216 STERILE WATER/SALINE, 10 ML: HCPCS | Performed by: INTERNAL MEDICINE

## 2022-01-22 PROCEDURE — 25000003 PHARM REV CODE 250: Performed by: PHYSICIAN ASSISTANT

## 2022-01-22 PROCEDURE — 94761 N-INVAS EAR/PLS OXIMETRY MLT: CPT

## 2022-01-22 RX ORDER — POTASSIUM CHLORIDE 20 MEQ/1
40 TABLET, EXTENDED RELEASE ORAL ONCE
Status: COMPLETED | OUTPATIENT
Start: 2022-01-22 | End: 2022-01-22

## 2022-01-22 RX ADMIN — MAGNESIUM HYDROXIDE/ALUMINUM HYDROXICE/SIMETHICONE 30 ML: 120; 1200; 1200 SUSPENSION ORAL at 09:01

## 2022-01-22 RX ADMIN — ATORVASTATIN CALCIUM 40 MG: 40 TABLET, FILM COATED ORAL at 09:01

## 2022-01-22 RX ADMIN — PROPRANOLOL HYDROCHLORIDE 40 MG: 40 TABLET ORAL at 09:01

## 2022-01-22 RX ADMIN — HEPARIN SODIUM 5000 UNITS: 5000 INJECTION INTRAVENOUS; SUBCUTANEOUS at 06:01

## 2022-01-22 RX ADMIN — Medication 10 ML: at 01:01

## 2022-01-22 RX ADMIN — AMITRIPTYLINE HYDROCHLORIDE 25 MG: 25 TABLET, FILM COATED ORAL at 09:01

## 2022-01-22 RX ADMIN — LEVOTHYROXINE SODIUM 88 MCG: 88 TABLET ORAL at 06:01

## 2022-01-22 RX ADMIN — PANTOPRAZOLE SODIUM 40 MG: 40 TABLET, DELAYED RELEASE ORAL at 08:01

## 2022-01-22 RX ADMIN — MAGNESIUM HYDROXIDE/ALUMINUM HYDROXICE/SIMETHICONE 30 ML: 120; 1200; 1200 SUSPENSION ORAL at 06:01

## 2022-01-22 RX ADMIN — HEPARIN SODIUM 5000 UNITS: 5000 INJECTION INTRAVENOUS; SUBCUTANEOUS at 01:01

## 2022-01-22 RX ADMIN — SUCRALFATE 1 G: 1 SUSPENSION ORAL at 06:01

## 2022-01-22 RX ADMIN — AMITRIPTYLINE HYDROCHLORIDE 50 MG: 50 TABLET, FILM COATED ORAL at 09:01

## 2022-01-22 RX ADMIN — POTASSIUM CHLORIDE 40 MEQ: 1500 TABLET, EXTENDED RELEASE ORAL at 11:01

## 2022-01-22 RX ADMIN — Medication 10 ML: at 06:01

## 2022-01-22 RX ADMIN — HYDROCODONE BITARTRATE AND ACETAMINOPHEN 1 TABLET: 10; 325 TABLET ORAL at 09:01

## 2022-01-22 RX ADMIN — CHLORHEXIDINE GLUCONATE 0.12% ORAL RINSE 10 ML: 1.2 LIQUID ORAL at 08:01

## 2022-01-22 RX ADMIN — PROPRANOLOL HYDROCHLORIDE 40 MG: 40 TABLET ORAL at 08:01

## 2022-01-22 RX ADMIN — HYDROCODONE BITARTRATE AND ACETAMINOPHEN 1 TABLET: 5; 325 TABLET ORAL at 07:01

## 2022-01-22 RX ADMIN — Medication 10 ML: at 10:01

## 2022-01-22 RX ADMIN — SERTRALINE HYDROCHLORIDE 100 MG: 50 TABLET ORAL at 08:01

## 2022-01-22 RX ADMIN — SUCRALFATE 1 G: 1 SUSPENSION ORAL at 12:01

## 2022-01-22 RX ADMIN — CHLORHEXIDINE GLUCONATE 0.12% ORAL RINSE 10 ML: 1.2 LIQUID ORAL at 09:01

## 2022-01-22 RX ADMIN — HEPARIN SODIUM 5000 UNITS: 5000 INJECTION INTRAVENOUS; SUBCUTANEOUS at 09:01

## 2022-01-22 RX ADMIN — DAPTOMYCIN 350 MG: 350 INJECTION, POWDER, LYOPHILIZED, FOR SOLUTION INTRAVENOUS at 11:01

## 2022-01-22 RX ADMIN — ASPIRIN 81 MG: 81 TABLET, COATED ORAL at 08:01

## 2022-01-22 NOTE — ASSESSMENT & PLAN NOTE
On 1/9/2022          S/pI and D at this time, was on bactrim/doxy- will do IV Ancef  2 gram every 8 hours for 6 weeks   Weekly ESR, cRP, CBc , BMP  EOC-02/26/22  Wound cultures-[facility]

## 2022-01-22 NOTE — PT/OT/SLP EVAL
Occupational Therapy   Evaluation and Discharge Note    Name: Haim Martin  MRN: [id number]  Admitting Diagnosis:  Finger osteomyelitis, right   Recent Surgery: Procedure(s) (LRB):  INCISION AND DRAINAGE, HAND (Right) 1 Day Post-Op    Recommendations:     Discharge Recommendations: home health OT (TO ASSESS SAFETY IN HOME ENVIRONMENT.)  Discharge Equipment Recommendations:  none  Barriers to discharge:  None (PATIENT STATED HER SON FROM [place] WILL STAY WITH HER AT D/C.  PATIENT STATED DTR CANNOT (A) PATIENT DUE TO HAVING ROTATOR CUFF SX.)    Assessment:     Haim Martin is a 81 y.o. female with a medical diagnosis of Finger osteomyelitis, right. At this time, patient is functioning at their prior level of function and does not require further acute OT services.     Plan:     During this hospitalization, patient does not require further acute OT services.  Please re-consult if situation changes.    · Plan of Care Reviewed with: patient    Subjective     Chief Complaint: NO C/O.  Patient/Family Comments/goals: TO RETURN HOME.     Occupational Profile:  Living Environment: PATIENT LIVES ALONE IN 1 STORY HOME WITH NO STEPS.  Previous level of function: PATIENT WAS (I) WITH ALL LEVELS OF SELF CARE.  Roles and Routines: PATIENT WAS (I) DRIVING AND [employer]PING  Equipment Used at home:  none  Assistance upon Discharge: SON FROM [place] WILL STAY WITH PAT D/C.    Pain/Comfort:  · Pain Rating 1: 0/10    Patients cultural, spiritual, Uatsdin conflicts given the current situation:      Objective:     Communicated with: NURSE prior to session.  Patient found supine with KIRBY Hayden upon OT entry to room.    General Precautions: Standard, fall   Orthopedic Precautions: ((R) 2ND DIGIT SECURED WITH GAUZE AND COBAN.)   Braces:    Respiratory Status: N/A     Occupational Performance:    Bed Mobility:    · Patient completed Scooting/Bridging with modified independence  · Patient completed Supine to Sit with  modified independence  · Patient completed Sit to Supine with modified independence    Functional Mobility/Transfers:  · Patient completed Sit <> Stand Transfer with modified independence  with  no assistive device   · Patient completed Bed <> Chair Transfer using Stand Pivot technique with modified independence with no assistive device  · Patient completed Toilet Transfer Stand Pivot technique with modified independence with  no AD  · Functional Mobility: PATIENT MOD (I) WITH FUNC MOBILITY.    Activities of Daily Living:  · Upper Body Dressing: modified independence      · Lower Body Dressing: modified independence      · Toileting: modified independence        Cognitive/Visual Perceptual:  NO DEFICITS NOTED.    Physical Exam:  Balance:    -       NO DEFICITS NOTED.  Upper Extremity Range of Motion:     -       Right Upper Extremity: WFL except 2ND DIGIT IMMOBILIZED AFTER SX.  -       Left Upper Extremity: WFL    AMPAC 6 Click ADL:  AMPAC Total Score: 23    Treatment & Education:  OT EVAL COMPLETED.  PATIENT PRACTICED FUNC ACTIVITIES SEATED AND STANDING WITH NO LOB.  Education:    Patient left up in chair with all lines intact and call button in reach    GOALS:   Multidisciplinary Problems     Occupational Therapy Goals        Problem: Occupational Therapy Goal    Goal Priority Disciplines Outcome Interventions   Occupational Therapy Goal     OT, PT/OT     Description: PATIENT TO PARTICIPATE IN PEOPLE MOVERS PROGRAM DUE TO NO SKILLED OT INTERVENTION NEEDS IDENTIFIED.                   History:     Past Medical History:   Diagnosis Date    Depression     Diverticulosis of colon (without mention of hemorrhage) 8/25/2014    Hyperlipidemia     Hypertension     Thyroid disease        Past Surgical History:   Procedure Laterality Date    APPENDECTOMY      CATARACT EXTRACTION EXTRACAPSULAR W/ INTRAOCULAR LENS IMPLANTATION Bilateral 4/2014    INJECTION OF ANESTHETIC AGENT AROUND MEDIAL BRANCH NERVES INNERVATING  LUMBAR FACET JOINT Bilateral 7/16/2020    Procedure: Bilateral L3-5 MBB with local;  Surgeon: Luis Ashraf MD;  Location: [facility];  Service: Pain Management;  Laterality: Bilateral;    INJECTION OF ANESTHETIC AGENT AROUND MEDIAL BRANCH NERVES INNERVATING LUMBAR FACET JOINT Bilateral 8/10/2020    Procedure: Bilateral L3-5 MBB with local;  Surgeon: Luis Ashraf MD;  Location: [facility];  Service: Pain Management;  Laterality: Bilateral;    INJECTION OF ANESTHETIC AGENT INTO SACROILIAC JOINT Bilateral 11/4/2021    Procedure: Bilateral SIJ Injection;  Surgeon: Luis Ashraf MD;  Location: [facility];  Service: Pain Management;  Laterality: Bilateral;    RADIOFREQUENCY THERMOCOAGULATION Left 9/28/2020    Procedure: Left L3-5 Lumbar RFA;  Surgeon: Luis Ashraf MD;  Location: [facility];  Service: Pain Management;  Laterality: Left;    RADIOFREQUENCY THERMOCOAGULATION Right 10/12/2020    Procedure: Right L3-5 Lumbar RFA;  Surgeon: Luis Ashraf MD;  Location: [facility];  Service: Pain Management;  Laterality: Right;    STOMACH SURGERY  1998    not sure what they did, possible bowel resection, partial gastrectomy    TONSILLECTOMY         Time Tracking:     OT Date of Treatment: 01/22/22  OT Start Time:  947  OT Stop Time:  1012  OT Total Time (min):   25 MINUTES  Billable Minutes:Evaluation 10  Therapeutic Activity 15    1/22/2022

## 2022-01-22 NOTE — PROGRESS NOTES
Vancomycin Sign-Off:    Therapy with Vancomycin is complete and/or consult discontinued by provider.  Pharmacy will sign off, please re-consult as needed.    Thanks,    Ernesto Washburn, PharmD 1/22/2022 10:39 AM

## 2022-01-22 NOTE — CONSULTS
Pharmacokinetic Initial Assessment: IV Vancomycin    Assessment/Plan:    Initiate intravenous vancomycin with loading dose of 1500 mg once followed by a maintenance dose of vancomycin 1250 mg IV every 24 hours  Desired empiric serum trough concentration is 10 to 15 mcg/mL  Draw vancomycin trough level 60 min prior to third dose on 1/23/22 at approximately 1330.  Pharmacy will continue to follow and monitor vancomycin.      Please contact pharmacy at extension [phone] with any questions regarding this assessment.     Thank you for the consult,   Fernanda Malhotra       Patient brief summary:  Haim Martin is a 81 y.o. female initiated on antimicrobial therapy with IV Vancomycin for treatment of suspected skin & soft tissue infection    Drug Allergies:   Review of patient's allergies indicates:   Allergen Reactions    Clindamycin      Heaviness in chest      Penicillins Hives       Actual Body Weight:   61.1 Kg     Renal Function:   Estimated Creatinine Clearance: 55.3 mL/min (based on SCr of 0.7 mg/dL).,     Dialysis Method (if applicable):  N/A    CBC (last 72 hours):  Recent Labs   Lab Result Units 01/21/22  1806   WBC K/uL 6.56   Hemoglobin g/dL 11.5*   Hematocrit % 37.3   Platelets K/uL 195   Gran % % 82.8*   Lymph % % 13.0*   Mono % % 2.3*   Eosinophil % % 0.8   Basophil % % 0.8   Differential Method  Automated       Metabolic Panel (last 72 hours):  Recent Labs   Lab Result Units 01/21/22  1806   Sodium mmol/L 142   Potassium mmol/L 3.7   Chloride mmol/L 109   CO2 mmol/L 22*   Glucose mg/dL 117*   BUN mg/dL 8   Creatinine mg/dL 0.7       Drug levels (last 3 results):  No results for input(s): VANCOMYCINRA, VANCOMYCINPE, VANCOMYCINTR in the last 72 hours.    Microbiologic Results:  Microbiology Results (last 7 days)       Procedure Component Value Units Date/Time    Blood culture (site 1) [[id number]] Collected: 01/21/22 1738    Order Status: Sent Specimen: Blood Updated: 01/21/22 1738    Blood culture  (site 2) [[id number]] Collected: 01/21/22 1738    Order Status: Sent Specimen: Blood from Antecubital, Left Arm Updated: 01/21/22 1738    Gram stain [[id number]] Collected: 01/21/22 1527    Order Status: Sent Specimen: Abscess from Finger, Right Hand Updated: 01/21/22 1551    Aerobic culture [[id number]] Collected: 01/21/22 1527    Order Status: Sent Specimen: Abscess from Finger, Right Hand Updated: 01/21/22 1551    Fungus culture [[id number]] Collected: 01/21/22 1527    Order Status: Sent Specimen: Abscess from Finger, Right Hand Updated: 01/21/22 1551    Culture, Anaerobe [[id number]] Collected: 01/21/22 1527    Order Status: Sent Specimen: Abscess from Finger, Right Hand Updated: 01/21/22 1550    Gram stain [[id number]] Collected: 01/21/22 1530    Order Status: Sent Specimen: Bone from Finger, Right Hand Updated: 01/21/22 1547    Aerobic culture [[id number]] Collected: 01/21/22 1530    Order Status: Sent Specimen: Bone from Finger, Right Hand Updated: 01/21/22 1546    Fungus culture [[id number]] Collected: 01/21/22 1530    Order Status: Sent Specimen: Bone from Finger, Right Hand Updated: 01/21/22 1546    Culture, Anaerobe [[id number]] Collected: 01/21/22 1530    Order Status: Sent Specimen: Bone from Finger, Right Hand Updated: 01/21/22 1531    Tissue culture [[id number]]     Order Status: No result Specimen: Tissue from Finger, Right Hand

## 2022-01-22 NOTE — PLAN OF CARE
Pt remains free from falls/injuries this shift. Safety precautions maintained. Pain managed with pain medication and rest. Finger soaked and redressed per orders. No s/s of acute distress noted. Will continue to monitor. Chart check completed.

## 2022-01-22 NOTE — PROCEDURES
"Haim Martin is a 81 y.o. female patient.    Temp: 97.9 °F (36.6 °C) (01/22/22 1119)  Pulse: 79 (01/22/22 1119)  Resp: 16 (01/22/22 1119)  BP: 130/65 (01/22/22 1119)  SpO2: 98 % (01/22/22 1119)  Weight: 66.3 kg (146 lb 2.6 oz) (01/22/22 0421)  Height: 4' 11" (149.9 cm) (01/21/22 1863)    PICC  Date/Time: 1/22/2022 1:23 PM  Performed by: Lonnie Ro RN  Supervising provider: Diane Becker RN  Consent Done: Yes  Time out: Immediately prior to procedure a time out was called to verify the correct patient, procedure, equipment, support staff and site/side marked as required  Indications: med administration and vascular access  Anesthesia: local infiltration  Local anesthetic: lidocaine 1% without epinephrine  Anesthetic Total (mL): 3  Preparation: skin prepped with ChloraPrep  Skin prep agent dried: skin prep agent completely dried prior to procedure  Sterile barriers: all five maximum sterile barriers used - cap, mask, sterile gown, sterile gloves, and large sterile sheet  Hand hygiene: hand hygiene performed prior to central venous catheter insertion  Location details: left basilic  Catheter size: 5 Fr  Catheter Length: 0cm    Ultrasound guidance: yes  Vessel Caliber: medium and patent, compressibility normal  Vascular Doppler: not done  Needle advanced into vessel with real time Ultrasound guidance.  Guidewire confirmed in vessel.  Sterile sheath used.  no esophageal manometryNumber of attempts: 1  Post-procedure: blood return through all ports, chlorhexidine patch and sterile dressing applied  Estimated blood loss (mL): 0  Specimens: No  Implants: No  Assessment: placement verified by x-ray  Tip Termination Explanation: see rad. report  Complications: none  Comments: Do not use until placement verified by MD          Oneyda Ro RN  1/22/2022    "

## 2022-01-22 NOTE — ASSESSMENT & PLAN NOTE
Ortho on board s/p I&D R Index finger  ID on consult for recs on MSSA Cellulitis.  XRAY negative  Vancomycin on board  Aerobic Bacterial Culture  Abnormal   STAPHYLOCOCCUS AUREUS   Moderate     Resulting Agency [facility]          Susceptibility     Staphylococcus aureus     CULTURE, AEROBIC  (SPECIFY SOURCE)     Clindamycin <=0.5 mcg/mL Resistant     Erythromycin >4 mcg/mL Resistant     Oxacillin <=0.25 mcg/mL Sensitive     Penicillin 2 mcg/mL Resistant     Tetracycline <=4 mcg/mL Sensitive     Trimeth/Sulfa <=0.5/9.5 m... Sensitive               Plan for abx based on ID recommendation  1/22/22  -Pain improving.   -Infectious diease consulted recommends IV daptomycin 350 mg daily x 6 weeks.  - Picc line placed today. Social work consulted to arrange for home infusion.   -Awaiting insurance authorization.

## 2022-01-22 NOTE — ANESTHESIA POSTPROCEDURE EVALUATION
Anesthesia Post Evaluation    Patient: Haim Martin    Procedure(s) Performed: Procedure(s) (LRB):  INCISION AND DRAINAGE, HAND (Right)    Final Anesthesia Type: MAC      Patient location during evaluation: PACU  Patient participation: Yes- Able to Participate  Level of consciousness: awake and alert  Post-procedure vital signs: reviewed and stable  Airway patency: patent      Anesthetic complications: no      Cardiovascular status: blood pressure returned to baseline  Respiratory status: unassisted and spontaneous ventilation  Hydration status: euvolemic  Follow-up not needed.          Vitals Value Taken Time   /66 01/21/22 1906   Temp 36.6 °C (97.9 °F) 01/21/22 1906   Pulse 81 01/21/22 1906   Resp 16 01/21/22 1906   SpO2 97 % 01/21/22 1906         Event Time   Out of Recovery 16:58:00         Pain/Rachel Score: Rachel Score: 10 (1/21/2022  5:00 PM)

## 2022-01-22 NOTE — HOSPITAL COURSE
S/p I&D of right index finger. Pain improving. Infectious diease consulted recommends IV daptomycin 350 mg daily x 6 weeks. Picc line placed today. Social work consulted to arrange for home infusion, approved with [facility]. Patient to follow-up with Orthopedic Surgery in outpatient setting. Patient clinically improved and medically stable for discharge home with home health with instructions to follow-up with PCP in 1-2 weeks.    Patient and/or family was seen on day of discharge by myself and was examined. They were stable for discharge. Discharge plan, follow up instructions, and contacts in case of further needs were discussed in detail.

## 2022-01-22 NOTE — SUBJECTIVE & OBJECTIVE
Past Medical History:   Diagnosis Date    Depression     Diverticulosis of colon (without mention of hemorrhage) 8/25/2014    Hyperlipidemia     Hypertension     Thyroid disease        Past Surgical History:   Procedure Laterality Date    APPENDECTOMY      CATARACT EXTRACTION EXTRACAPSULAR W/ INTRAOCULAR LENS IMPLANTATION Bilateral 4/2014    INJECTION OF ANESTHETIC AGENT AROUND MEDIAL BRANCH NERVES INNERVATING LUMBAR FACET JOINT Bilateral 7/16/2020    Procedure: Bilateral L3-5 MBB with local;  Surgeon: Luis Ashraf MD;  Location: [facility];  Service: Pain Management;  Laterality: Bilateral;    INJECTION OF ANESTHETIC AGENT AROUND MEDIAL BRANCH NERVES INNERVATING LUMBAR FACET JOINT Bilateral 8/10/2020    Procedure: Bilateral L3-5 MBB with local;  Surgeon: Luis Ashraf MD;  Location: [facility];  Service: Pain Management;  Laterality: Bilateral;    INJECTION OF ANESTHETIC AGENT INTO SACROILIAC JOINT Bilateral 11/4/2021    Procedure: Bilateral SIJ Injection;  Surgeon: Luis Ashraf MD;  Location: [facility];  Service: Pain Management;  Laterality: Bilateral;    RADIOFREQUENCY THERMOCOAGULATION Left 9/28/2020    Procedure: Left L3-5 Lumbar RFA;  Surgeon: Luis Ashraf MD;  Location: [facility];  Service: Pain Management;  Laterality: Left;    RADIOFREQUENCY THERMOCOAGULATION Right 10/12/2020    Procedure: Right L3-5 Lumbar RFA;  Surgeon: Luis Ashraf MD;  Location: [facility];  Service: Pain Management;  Laterality: Right;    STOMACH SURGERY  1998    not sure what they did, possible bowel resection, partial gastrectomy    TONSILLECTOMY         Review of patient's allergies indicates:   Allergen Reactions    Clindamycin      Heaviness in chest      Penicillins Hives       Medications:  Medications Prior to Admission   Medication Sig    alendronate (FOSAMAX) 70 MG tablet TAKE 1 TABLET BY MOUTH EVERY 7 DAYS    amitriptyline (ELAVIL) 75 MG tablet Take 1 tablet by  "mouth in the evening    atorvastatin (LIPITOR) 40 MG tablet Take 1 tablet by mouth in the evening    ergocalciferol (ERGOCALCIFEROL) 50,000 unit Cap Take 1 capsule by mouth once a week    levothyroxine (SYNTHROID) 88 MCG tablet TAKE 1 TABLET BY MOUTH ONCE DAILY MONDAY - SATURDAY  AND ONE & ONE-HALF TABLET ON SUNDAY    propranoloL (INDERAL) 40 MG tablet Take 1 tablet by mouth twice daily    propranoloL (INDERAL) 40 MG tablet Take 1 tablet by mouth twice daily    sertraline (ZOLOFT) 100 MG tablet Take 1 tablet by mouth once daily    ACCU-CHEK MARIANO PLUS TEST STRP Strp USE 1 STRIP TO CHECK GLUCOSE ONCE DAILY    aspirin (ECOTRIN) 81 MG EC tablet Take 1 tablet (81 mg total) by mouth once daily.    blood-glucose meter kit Use as instructed    HYDROcodone-acetaminophen (NORCO) 5-325 mg per tablet Take 1 tablet by mouth every 4 (four) hours as needed for Pain. (Patient not taking: No sig reported)    lancets-blood glucose strips 30 gauge Cmpk 1 application by Misc.(Non-Drug; Combo Route) route once daily at 6am.    meloxicam (MOBIC) 7.5 MG tablet Take 1 tablet (7.5 mg total) by mouth 2 (two) times daily as needed for Pain. Take with food.    mupirocin (BACTROBAN) 2 % ointment Apply topically 2 (two) times daily.    omeprazole (PRILOSEC) 20 MG capsule Take 1 capsule (20 mg total) by mouth as needed.    sumatriptan (IMITREX) 100 MG tablet TAKE 1 TABLET BY MOUTH AT ONSET OF MIGRAINE    vit C/E/Zn/coppr/lutein/zeaxan (PRESERVISION AREDS-2 ORAL) Take 1 tablet by mouth 2 (two) times a day.    VIT C/VIT E/LUTEIN/MIN/OMEGA-3 (OCUVITE ORAL) Take by mouth once daily.     Antibiotics (From admission, onward)            Start     Stop Route Frequency Ordered    01/22/22 1430  vancomycin 1.25 g in dextrose 5% 250 mL IVPB (ready to mix)         -- IV Every 24 hours (non-standard times) 01/21/22 1940    01/21/22 1730  vancomycin - pharmacy to dose  (vancomycin IVPB)        "And" Linked Group Details    -- IV pharmacy to " manage frequency 01/21/22 1639        Antifungals (From admission, onward)            None        Antivirals (From admission, onward)    None           Immunization History   Administered Date(s) Administered    COVID-19, MRNA, LN-S, PF (Pfizer) (Purple Cap) 01/11/2021, 02/01/2021    Influenza 09/13/2013    Influenza - High Dose - PF (65 years and older) 10/10/2011, 10/02/2012, 09/22/2014, 08/21/2015, 10/21/2016, 09/26/2018    Influenza Split 10/18/2006, 10/07/2008, 10/07/2009, 10/12/2010    Pneumococcal Conjugate - 13 Valent 07/15/2015    Pneumococcal Polysaccharide - 23 Valent 05/16/2019    Tdap 07/15/2015, 09/28/2019       Family History     Problem Relation (Age of Onset)    Breast cancer Maternal Aunt    Coronary artery disease Father    Diabetes type II Father    Kidney cancer Maternal Aunt    Lupus Mother    Stroke Father        Social History     Socioeconomic History    Marital status:    Tobacco Use    Smoking status: Former Smoker    Smokeless tobacco: Never Used    Tobacco comment: quit 30 years ago   Substance and Sexual Activity    Alcohol use: No    Drug use: No    Sexual activity: Not Currently     Birth control/protection: None     Review of Systems   Constitutional: Negative for chills and fatigue.   HENT: Negative for congestion, ear pain, facial swelling, sinus pressure and sore throat.    Eyes: Negative for pain.   Respiratory: Negative for apnea, chest tightness, shortness of breath and stridor.    Cardiovascular: Negative for chest pain, palpitations and leg swelling.   Gastrointestinal: Negative for abdominal distention, abdominal pain, diarrhea and nausea.   Endocrine: Negative for polydipsia and polyphagia.   Genitourinary: Negative for decreased urine volume, difficulty urinating, frequency and genital sores.   Musculoskeletal: Negative for arthralgias and gait problem.        Dressing noted over the right finger   Neurological: Negative for light-headedness and  headaches.   Hematological: Negative for adenopathy.   Psychiatric/Behavioral: Negative for agitation, confusion and decreased concentration.     Objective:     Vital Signs (Most Recent):  Temp: 97.7 °F (36.5 °C) (01/22/22 0701)  Pulse: 83 (01/22/22 0701)  Resp: 16 (01/22/22 0722)  BP: (!) 147/72 (01/22/22 0701)  SpO2: 97 % (01/22/22 0753) Vital Signs (24h Range):  Temp:  [97.1 °F (36.2 °C)-98.4 °F (36.9 °C)] 97.7 °F (36.5 °C)  Pulse:  [66-88] 83  Resp:  [12-25] 16  SpO2:  [96 %-100 %] 97 %  BP: (116-204)/(56-86) 147/72     Weight: 66.3 kg (146 lb 2.6 oz)  Body mass index is 29.52 kg/m².    Estimated Creatinine Clearance: 57.4 mL/min (based on SCr of 0.7 mg/dL).    Physical Exam  Vitals and nursing note reviewed.   Constitutional:       Appearance: She is well-developed.   HENT:      Head: Normocephalic and atraumatic.   Eyes:      Pupils: Pupils are equal, round, and reactive to light.   Neck:      Vascular: No JVD.   Cardiovascular:      Rate and Rhythm: Normal rate and regular rhythm.      Heart sounds: Murmur heard.    Systolic murmur is present with a grade of 3/6.      Pulmonary:      Effort: Pulmonary effort is normal. No tachypnea, accessory muscle usage or respiratory distress.      Breath sounds: Normal breath sounds. No wheezing.   Abdominal:      General: Bowel sounds are normal. There is no distension.      Palpations: Abdomen is soft.   Musculoskeletal:         General: No tenderness. Normal range of motion.      Cervical back: Normal range of motion and neck supple.      Right lower leg: No edema.      Left lower leg: No edema.   Skin:     General: Skin is warm and dry.      Findings: No erythema.          Neurological:      Mental Status: She is alert and oriented to person, place, and time.      Cranial Nerves: No cranial nerve deficit.      Deep Tendon Reflexes: Reflexes are normal and symmetric.   Psychiatric:         Behavior: Behavior normal.         Thought Content: Thought content normal.          Judgment: Judgment normal.     01/09-            Significant Labs:   Blood Culture: No results for input(s): LABBLOO in the last 4320 hours.  CBC:   Recent Labs   Lab 01/21/22  1806   WBC 6.56   HGB 11.5*   HCT 37.3        Wound Culture:   Recent Labs   Lab 01/09/22  1632   LABAERO STAPHYLOCOCCUS AUREUS  Moderate  *     All pertinent labs within the past 24 hours have been reviewed.    Significant Imaging: I have reviewed all pertinent imaging results/findings within the past 24 hours.

## 2022-01-22 NOTE — ASSESSMENT & PLAN NOTE
1/21 Taken to OR by Dr. Flores for I&D, drain placement.  1/22 Pain improving, dressing removed and soaks started; ID recs pending culture data    WBAT to the right hand and can do soapy soaks 2-3 times a day with OT or wound care. She has a packing strip that can be pulled Sunday. The patient can be discharged if her clinical exam is improved, inflammatory markers are down trending, and once cultures finalize so she can be discharged with the appropriate antibiotics. In the setting of osteomyelitis, she will likely require IV antibiotics and a PICC line . We will continue to follow.

## 2022-01-22 NOTE — PROGRESS NOTES
[facility]  Orthopedics  Progress Note    Patient Name: Haim Martin  MRN: [id number]  Admission Date: 1/21/2022  Hospital Length of Stay: 1 days  Attending Provider: Ramirez Anguiano MD  Primary Care Provider: Kavya Mesa MD  Follow-up For: Procedure(s) (LRB):  INCISION AND DRAINAGE, HAND (Right)    Post-Operative Day: Day of Surgery  Subjective:     Principal Problem:Finger osteomyelitis, right    Principal Orthopedic Problem: Right index finger osteomyelitis, DIPJ infection    Interval History: Patient is resting comfortably in bed and eating dinner. States pain is well controlled.     Review of patient's allergies indicates:   Allergen Reactions    Clindamycin      Heaviness in chest      Penicillins Hives       Current Facility-Administered Medications   Medication    acetaminophen tablet 650 mg    aluminum-magnesium hydroxide-simethicone 200-200-20 mg/5 mL suspension 30 mL    aluminum-magnesium hydroxide-simethicone 200-200-20 mg/5 mL suspension 30 mL    amitriptyline tablet 50 mg    And    amitriptyline tablet 25 mg    [START ON 1/22/2022] aspirin EC tablet 81 mg    atorvastatin tablet 40 mg    chlorhexidine 0.12 % solution 10 mL    dextrose 50% injection 12.5 g    dextrose 50% injection 25 g    glucagon (human recombinant) injection 1 mg    glucose chewable tablet 16 g    glucose chewable tablet 24 g    heparin (porcine) injection 5,000 Units    HYDROcodone-acetaminophen  mg per tablet 1 tablet    HYDROcodone-acetaminophen 5-325 mg per tablet 1 tablet    insulin aspart U-100 pen 0-5 Units    [START ON 1/22/2022] levothyroxine tablet 88 mcg    magnesium oxide tablet 800 mg    magnesium oxide tablet 800 mg    melatonin tablet 6 mg    meloxicam tablet 7.5 mg    naloxone 0.4 mg/mL injection 0.02 mg    ondansetron disintegrating tablet 8 mg    ondansetron injection 4 mg    [START ON 1/22/2022] pantoprazole EC tablet 40 mg    potassium bicarbonate  "disintegrating tablet 35 mEq    potassium bicarbonate disintegrating tablet 50 mEq    potassium bicarbonate disintegrating tablet 60 mEq    potassium, sodium phosphates 280-160-250 mg packet 2 packet    potassium, sodium phosphates 280-160-250 mg packet 2 packet    potassium, sodium phosphates 280-160-250 mg packet 2 packet    propranoloL tablet 40 mg    senna-docusate 8.6-50 mg per tablet 1 tablet    [START ON 1/22/2022] sertraline tablet 100 mg    simethicone chewable tablet 80 mg    sodium chloride 0.9% flush 10 mL    sodium chloride 0.9% flush 10 mL    sucralfate 100 mg/mL suspension 1 g    vancomycin - pharmacy to dose    [START ON 1/22/2022] vancomycin 1.25 g in dextrose 5% 250 mL IVPB (ready to mix)    vancomycin 500 mg in dextrose 5 % 100 mL IVPB (ready to mix system)     Objective:     Vital Signs (Most Recent):  Temp: 97.9 °F (36.6 °C) (01/21/22 2015)  Pulse: 84 (01/21/22 2015)  Resp: 16 (01/21/22 2015)  BP: (!) 148/66 (01/21/22 2015)  SpO2: 97 % (01/21/22 2015) Vital Signs (24h Range):  Temp:  [97.1 °F (36.2 °C)-98.4 °F (36.9 °C)] 97.9 °F (36.6 °C)  Pulse:  [66-84] 84  Resp:  [12-25] 16  SpO2:  [96 %-100 %] 97 %  BP: (148-204)/(56-86) 148/66     Weight: 61.1 kg (134 lb 13 oz)  Height: 4' 11" (149.9 cm)  Body mass index is 27.23 kg/m².      Intake/Output Summary (Last 24 hours) at 1/21/2022 2111  Last data filed at 1/21/2022 1552  Gross per 24 hour   Intake 300 ml   Output 175 ml   Net 125 ml                   Right Hand/Wrist Exam     Comments:  Dressing is c/d/i. No signs of saturation.             Significant Labs: All pertinent labs within the past 24 hours have been reviewed.    Significant Imaging: I have reviewed all pertinent imaging results/findings.    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 She is doing well at this time. She is to be WBAT to the right hand and can do soapy soaks 2-3 times a day with OT or wound care. She has a packing strip that can be pulled Sunday. The patient can be discharged if her clinical exam is improved, inflammatory markers are down trending, and once cultures finalize so she can be discharged with the appropriate antibiotics. In the setting of osteomyelitis, she will likely require IV antibiotics and a PICC line . We will continue to follow.       Ramirez Flores MD  Orthopedics  [facility]

## 2022-01-22 NOTE — PROGRESS NOTES
[facility] [place] [facility]  Progress Note    Patient Name: Haim Martin  MRN: [id number]  Patient Class: OP- Outpatient Recovery   Admission Date: 1/21/2022  Length of Stay: 1 days  Attending Physician: Ramirez Anguiano MD  Primary Care Provider: Kavya Mesa MD        Subjective:     Principal Problem:Finger osteomyelitis, right        HPI:  Patient 82 yo female with pmhx significant for HTN, Hypothyroidism, Hyperlipidemia presents to hospital for I&D of R Index finger. Patient with cellulitis, concern for Osteomyelitis. Patient wound culture on 1/9/22 + for MSSA sn to Bactrim, Oxacillin. Patient tolerated procedure well and is placed in O/P extended recovery. Patietn denies current pain, no cp, sob, n/v/d/f/c. Patient initiated on Vancomycin, ID consult pending.Patient is afebrile, alert and appropriate. Await consultant inputs.        Overview/Hospital Course:  S/p I&D of right index finger. Pain improving. Infectious diease consulted recommends IV daptomycin 350 mg daily x 6 weeks. Picc line placed today. Social work consulted to arrange for home infusion. Awaiting insurance authorization.          Review of Systems   Constitutional: Positive for activity change. Negative for unexpected weight change.   HENT: Negative.  Negative for trouble swallowing.    Eyes: Negative.    Respiratory: Negative.  Negative for cough, shortness of breath and wheezing.    Cardiovascular: Negative.  Negative for chest pain.   Gastrointestinal: Negative.    Endocrine: Negative.    Genitourinary: Negative.    Musculoskeletal: Negative.    Skin: Positive for wound.   Allergic/Immunologic: Negative.    Neurological: Positive for weakness. Negative for dizziness.   Hematological: Negative.    Psychiatric/Behavioral: Negative.    All other systems reviewed and are negative.    Objective:     Vital Signs (Most Recent):  Temp: 97.6 °F (36.4 °C) (01/22/22 1509)  Pulse: 80 (01/22/22 1509)  Resp: 16 (01/22/22  1509)  BP: (!) 120/58 (01/22/22 1509)  SpO2: 96 % (01/22/22 1509) Vital Signs (24h Range):  Temp:  [97.6 °F (36.4 °C)-98.4 °F (36.9 °C)] 97.6 °F (36.4 °C)  Pulse:  [66-88] 80  Resp:  [12-25] 16  SpO2:  [96 %-100 %] 96 %  BP: (116-188)/(56-78) 120/58     Weight: 66.3 kg (146 lb 2.6 oz)  Body mass index is 29.52 kg/m².    Intake/Output Summary (Last 24 hours) at 1/22/2022 1550  Last data filed at 1/22/2022 0800  Gross per 24 hour   Intake 681.52 ml   Output 415 ml   Net 266.52 ml      Physical Exam  Vitals and nursing note reviewed.   Constitutional:       Appearance: She is well-developed.   HENT:      Head: Normocephalic and atraumatic.   Eyes:      Pupils: Pupils are equal, round, and reactive to light.   Neck:      Vascular: No JVD.   Cardiovascular:      Rate and Rhythm: Normal rate and regular rhythm.      Heart sounds: Murmur heard.    Systolic murmur is present with a grade of 3/6.      Pulmonary:      Effort: Pulmonary effort is normal. No tachypnea, accessory muscle usage or respiratory distress.      Breath sounds: Normal breath sounds. No wheezing.   Abdominal:      General: Bowel sounds are normal. There is no distension.      Palpations: Abdomen is soft.   Musculoskeletal:         General: No tenderness. Normal range of motion.      Cervical back: Normal range of motion and neck supple.      Right lower leg: No edema.      Left lower leg: No edema.   Skin:     General: Skin is warm and dry.      Findings: No erythema.          Neurological:      Mental Status: She is alert and oriented to person, place, and time.      Cranial Nerves: No cranial nerve deficit.      Deep Tendon Reflexes: Reflexes are normal and symmetric.   Psychiatric:         Behavior: Behavior normal.         Thought Content: Thought content normal.         Judgment: Judgment normal.         Significant Labs:   All pertinent labs within the past 24 hours have been reviewed.  Blood Culture: No results for input(s): LABBLOO in the last 48  hours.  BMP:   Recent Labs   Lab 01/22/22  0705   *      K 3.4*      CO2 25   BUN 10   CREATININE 0.7   CALCIUM 9.0     CBC:   Recent Labs   Lab 01/21/22  1806   WBC 6.56   HGB 11.5*   HCT 37.3        CMP:   Recent Labs   Lab 01/21/22  1806 01/22/22  0705    143   K 3.7 3.4*    107   CO2 22* 25   * 113*   BUN 8 10   CREATININE 0.7 0.7   CALCIUM 9.1 9.0   PROT  --  6.1   ALBUMIN  --  3.2*   BILITOT  --  0.5   ALKPHOS  --  86   AST  --  28   ALT  --  28   ANIONGAP 11 11   EGFRNONAA >60 >60       Significant Imaging: I have reviewed all pertinent imaging results/findings within the past 24 hours.      Assessment/Plan:      * Finger osteomyelitis, right  Ortho on board s/p I&D R Index finger  ID on consult for recs on MSSA Cellulitis.  XRAY negative  Vancomycin on board  Aerobic Bacterial Culture  Abnormal   STAPHYLOCOCCUS AUREUS   Moderate     Resulting Agency [facility]          Susceptibility     Staphylococcus aureus     CULTURE, AEROBIC  (SPECIFY SOURCE)     Clindamycin <=0.5 mcg/mL Resistant     Erythromycin >4 mcg/mL Resistant     Oxacillin <=0.25 mcg/mL Sensitive     Penicillin 2 mcg/mL Resistant     Tetracycline <=4 mcg/mL Sensitive     Trimeth/Sulfa <=0.5/9.5 m... Sensitive               Plan for abx based on ID recommendation  1/22/22  -Pain improving.   -Infectious diease consulted recommends IV daptomycin 350 mg daily x 6 weeks.  - Picc line placed today. Social work consulted to arrange for home infusion.   -Awaiting insurance authorization.        Major depressive disorder with single episode, in partial remission  Stable   Resume home meds      Pre-diabetes  Hemoglobin A1c 5.6 on 8/12/2021      Anxiety  Home Med  Elavil  Monitor  Controlled at this time      Hypothyroidism  Resume home meds        VTE Risk Mitigation (From admission, onward)         Ordered     heparin (porcine) injection 5,000 Units  Every 8 hours         01/21/22 1637     Place sequential  compression device  Until discontinued         01/21/22 1637     IP VTE HIGH RISK PATIENT  Once         01/21/22 1637     Place sequential compression device  Until discontinued         01/21/22 1507                Discharge Planning   JUAN: 1/22/2022     Code Status: Full Code   Is the patient medically ready for discharge?:     Reason for patient still in hospital (select all that apply): Laboratory test, Treatment and Pending disposition                     Rubia Sigala NP  Department of Hospital Medicine   [facility]

## 2022-01-22 NOTE — HPI
Patient 80 yo female with pmhx significant for HTN, Hypothyroidism, Hyperlipidemia presents to hospital for I&D of R Index finger. P    All cultures reviewed-. Patient wound culture on 1/9/22 + for MSSA .She was seen in the ED- 12/31 and was given Bactrim and came back for wound check -1/09/22 -given doxycycline.  She is afebrile.

## 2022-01-22 NOTE — PT/OT/SLP EVAL
Physical Therapy Evaluation and Discharge Note    Patient Name:  Haim Martin   MRN:  [id number]    Recommendations:     Discharge Recommendations:  home   Discharge Equipment Recommendations: none   Barriers to discharge: None    Assessment:     Haim Martin is a 81 y.o. female admitted with a medical diagnosis of Finger osteomyelitis, right. .  At this time, patient is functioning at their prior level of function and does not require further acute PT services.     Recent Surgery: Procedure(s) (LRB):  INCISION AND DRAINAGE, HAND (Right) 1 Day Post-Op    Plan:     During this hospitalization, patient does not require further acute PT services.  Please re-consult if situation changes.  D/C PT FROM P.T. SERVICES TO PEOPLE [facility] PROGRAM FOR CONT. GAIT/TE TO TOLERANCE    Subjective     Chief Complaint:   Patient/Family Comments/goals:   Pain/Comfort:  · Pain Rating 1: 2/10  · Location - Side 1: Right  · Location 1: hand  · Pain Addressed 1: Reposition,Distraction    Patients cultural, spiritual, Hoahaoism conflicts given the current situation:      Living Environment:  PT LIVES ALONE, SON AND DAUGHTER CHECK IN, 1 STORY HOUSE NO STEPS, AMB INDEP COMMUNITY DISTANCES, DRIVES, INDEP WITH ADL'S  Prior to admission, patients level of function was INDEP.  Equipment used at home: grab bar,cane, straight (PT DOES NOT USE DME).  DME owned (not currently used): none.  Upon discharge, patient will have assistance from FAMILY.    Objective:     Communicated with NURSE PLAZA prior to session.  Patient found supine with peripheral IV,PureWick upon PT entry to room.    General Precautions: Standard  Orthopedic Precautions:N/A   Braces: N/A   Respiratory Status: Room air    Exams:  · Cognitive Exam:  Patient is oriented to Person, Place, Time and Situation  · Postural Exam:  Patient presented with the following abnormalities:    · -       Rounded shoulders  · Sensation:    · -       Intact  · RLE ROM: WFL  · RLE  Strength: WFL  · LLE ROM: WFL  · LLE Strength: WFL    Functional Mobility:  · Bed Mobility:     · Rolling Left:  modified independence  · Scooting: modified independence  · Supine to Sit: modified independence  · Transfers:     · Sit to Stand:  modified independence with no AD  · Bed to Chair: modified independence with  no AD  using  Step Transfer  · Toilet Transfer: modified independence with  no AD  using  Step Transfer  · Gait: PT ' NO AD RICKY, NO LOB OR SOB ON ROOM AIR  · Balance: GOOD    AM-PAC 6 CLICK MOBILITY  Total Score:21     Therapeutic Activities and Exercises:   PT EDUCATED IN ROLE OF P.T., PT PERFORMED TOILETING IN BATHROOM RICKY, PT WASH/DRY HANDS AT SINK RICKY, PT ENCOURAGED TO INCREASE TIME OOB IN CHAIR    AM-PAC 6 CLICK MOBILITY  Total Score:21     Patient left up in chair with all lines intact, call button in reach and NURSE notified.    GOALS:   Multidisciplinary Problems     Physical Therapy Goals     Not on file                History:     Past Medical History:   Diagnosis Date    Depression     Diverticulosis of colon (without mention of hemorrhage) 8/25/2014    Hyperlipidemia     Hypertension     Thyroid disease        Past Surgical History:   Procedure Laterality Date    APPENDECTOMY      CATARACT EXTRACTION EXTRACAPSULAR W/ INTRAOCULAR LENS IMPLANTATION Bilateral 4/2014    INJECTION OF ANESTHETIC AGENT AROUND MEDIAL BRANCH NERVES INNERVATING LUMBAR FACET JOINT Bilateral 7/16/2020    Procedure: Bilateral L3-5 MBB with local;  Surgeon: Luis Ashraf MD;  Location: [facility];  Service: Pain Management;  Laterality: Bilateral;    INJECTION OF ANESTHETIC AGENT AROUND MEDIAL BRANCH NERVES INNERVATING LUMBAR FACET JOINT Bilateral 8/10/2020    Procedure: Bilateral L3-5 MBB with local;  Surgeon: Luis Ashraf MD;  Location: [facility]IN [facility];  Service: Pain Management;  Laterality: Bilateral;    INJECTION OF ANESTHETIC AGENT INTO SACROILIAC JOINT Bilateral 11/4/2021     Procedure: Bilateral SIJ Injection;  Surgeon: Luis Ashraf MD;  Location: [facility]IN [facility]GT;  Service: Pain Management;  Laterality: Bilateral;    RADIOFREQUENCY THERMOCOAGULATION Left 9/28/2020    Procedure: Left L3-5 Lumbar RFA;  Surgeon: Luis Ashraf MD;  Location: [facility] PAIN [facility]GT;  Service: Pain Management;  Laterality: Left;    RADIOFREQUENCY THERMOCOAGULATION Right 10/12/2020    Procedure: Right L3-5 Lumbar RFA;  Surgeon: Luis Ashraf MD;  Location: [facility]IN [facility]GT;  Service: Pain Management;  Laterality: Right;    STOMACH SURGERY  1998    not sure what they did, possible bowel resection, partial gastrectomy    TONSILLECTOMY         Time Tracking:     PT Received On: 01/22/22  PT Start Time: 0900     PT Stop Time: 0923  PT Total Time (min): 23 min     Billable Minutes: Evaluation 15 and Therapeutic Activity 8    01/22/2022

## 2022-01-22 NOTE — PROGRESS NOTES
[facility]  Or[facility]opedics  Progress Note    Patient Name: Haim Martin  MRN: [id number]  Admission Date: 1/21/2022  Hospital Length of Stay: 1 days  Attending Provider: Ramirez Anguiano MD  Primary Care Provider: Kavya Mesa MD  Follow-up For: Procedure(s) (LRB):  INCISION AND DRAINAGE, HAND (Right)    Post-Operative Day: 1 Day Post-Op  Subjective:     Principal Problem:Finger osteomyelitis, right    Principal Orthopedic Problem: Same  Interval History: Pain improving slowly, .    Review of patient's allergies indicates:   Allergen Reactions    Clindamycin      Heaviness in chest      Penicillins Hives       Current Facility-Administered Medications   Medication    acetaminophen tablet 650 mg    aluminum-magnesium hydroxide-simethicone 200-200-20 mg/5 mL suspension 30 mL    aluminum-magnesium hydroxide-simethicone 200-200-20 mg/5 mL suspension 30 mL    amitriptyline tablet 50 mg    And    amitriptyline tablet 25 mg    aspirin EC tablet 81 mg    atorvastatin tablet 40 mg    chlorhexidine 0.12 % solution 10 mL    DAPTOmycin (CUBICIN) 350 mg in sodium chloride 0.9% IVPB    dextrose 50% injection 12.5 g    dextrose 50% injection 25 g    glucagon (human recombinant) injection 1 mg    glucose chewable tablet 16 g    glucose chewable tablet 24 g    heparin (porcine) injection 5,000 Units    HYDROcodone-acetaminophen  mg per tablet 1 tablet    HYDROcodone-acetaminophen 5-325 mg per tablet 1 tablet    insulin aspart U-100 pen 0-5 Units    levothyroxine tablet 88 mcg    magnesium oxide tablet 800 mg    magnesium oxide tablet 800 mg    melatonin tablet 6 mg    meloxicam tablet 7.5 mg    naloxone 0.4 mg/mL injection 0.02 mg    ondansetron disintegrating tablet 8 mg    ondansetron injection 4 mg    pantoprazole EC tablet 40 mg    potassium bicarbonate disintegrating tablet 35 mEq    potassium bicarbonate disintegrating tablet 50 mEq    potassium  "bicarbonate disintegrating tablet 60 mEq    potassium chloride SA CR tablet 40 mEq    potassium, sodium phosphates 280-160-250 mg packet 2 packet    potassium, sodium phosphates 280-160-250 mg packet 2 packet    potassium, sodium phosphates 280-160-250 mg packet 2 packet    propranoloL tablet 40 mg    senna-docusate 8.6-50 mg per tablet 1 tablet    sertraline tablet 100 mg    simethicone chewable tablet 80 mg    sodium chloride 0.9% flush 10 mL    sodium chloride 0.9% flush 10 mL    sucralfate 100 mg/mL suspension 1 g     Objective:     Vital Signs (Most Recent):  Temp: 97.7 °F (36.5 °C) (01/22/22 0701)  Pulse: 83 (01/22/22 0701)  Resp: 16 (01/22/22 0722)  BP: (!) 147/72 (01/22/22 0701)  SpO2: 97 % (01/22/22 0753) Vital Signs (24h Range):  Temp:  [97.1 °F (36.2 °C)-98.4 °F (36.9 °C)] 97.7 °F (36.5 °C)  Pulse:  [66-88] 83  Resp:  [12-25] 16  SpO2:  [96 %-100 %] 97 %  BP: (116-204)/(56-86) 147/72     Weight: 66.3 kg (146 lb 2.6 oz)  Height: 4' 11" (149.9 cm)  Body mass index is 29.52 kg/m².      Intake/Output Summary (Last 24 hours) at 1/22/2022 1110  Last data filed at 1/22/2022 0800  Gross per 24 hour   Intake 981.52 ml   Output 415 ml   Net 566.52 ml       General    Constitutional: No distress.             Right Hand/Wrist Exam     Comments:  Incision benign, packing in place, erythema improved.  TTP over DIP but no proximal palmar pain; neg Canavel signs          Vascular Exam       Capillary Refill  Decreased capillary refill: Brisk.      Significant Labs:   CBC:   Recent Labs   Lab 01/21/22  1806   WBC 6.56   HGB 11.5*   HCT 37.3        CRP: No results for input(s): CRP in the last 48 hours.  Wound Culture: Gram stain neg, cultures pending  All pertinent labs within the past 24 hours have been reviewed.    Significant Imaging: None    Assessment/Plan:     * Finger osteomyelitis, right  1/21 Taken to OR by Dr. Flores for I&D, drain placement.  1/22 Pain improving, dressing removed and soaks " started; ID recs pending culture data    WBAT to the right hand and can do soapy soaks 2-3 times a day with OT or wound care. She has a packing strip that can be pulled Sunday. The patient can be discharged if her clinical exam is improved, inflammatory markers are down trending, and once cultures finalize so she can be discharged with the appropriate antibiotics. In the setting of osteomyelitis, she will likely require IV antibiotics and a PICC line . We will continue to follow.         Stephanie Apodaca MD  Orthopedics  [facility]

## 2022-01-22 NOTE — PLAN OF CARE
Problem: Adult Inpatient Plan of Care  Goal: Plan of Care Review  Outcome: Ongoing, Progressing        Received pt resting in bed AAOx3 in stable condition. No apparent distress noted. Respiration even unlabored. No complaint of any pain thus far. ABD soft nontender. Skin clean and dry. ADLs assistance offered. Safety and comfort measures in place. Call bell in reach

## 2022-01-22 NOTE — PLAN OF CARE
OT EVAL COMPLETED.  NO SKILLED OT INTERVENTION NEEDS IDENTIFIED.  PATIENT WILL PARTICIPATE IN [facility] PROGRAM FOR MOBILITY AND CONDITIONING TO PREPARE PATIENT FOR SAFE D/C HOME.

## 2022-01-22 NOTE — CONSULTS
Thank you for your consult to [facility]. We have reviewed the patient chart. This patient does meet criteria for [facility] service at this time. Will assume care on 01/23/22 at 6AM     Vinnie Prieto M.D.  Department [facility] Medicine

## 2022-01-22 NOTE — CONSULTS
[facility]  Infectious Disease  Consult Note    Patient Name: Haim Martin  MRN: [id number]  Admission Date: 1/21/2022  Hospital Length of Stay: 1 days  Attending Physician: Ramirez Anguiano MD  Primary Care Provider: Kavya Mesa MD     Isolation Status: No active isolations    Patient information was obtained from patient, past medical records and ER records.      Consults  Assessment/Plan:     * Finger osteomyelitis, right  On 1/9/2022          S/pI and D at this time, was on bactrim/doxy- will do IV Ancef  2 gram every 8 hours for 6 weeks   Weekly ESR, cRP, CBc , BMP  EOC-02/26/22  Wound cultures-MSSA    Major depressive disorder with single episode, in partial remission  Follow primary team    Anxiety  Follow primary team    Hypothyroidism  Follow primary team , synthroid         Thank you for your consult. I will follow-up with patient. Please contact us if you have any additional questions.    Kevin Flor MD  Infectious Disease  [facility]    Subjective:     Principal Problem: Finger osteomyelitis, right    HPI: Patient 82 yo female with pmhx significant for HTN, Hypothyroidism, Hyperlipidemia presents to hospital for I&D of R Index finger. P    All cultures reviewed-. Patient wound culture on 1/9/22 + for MSSA .She was seen in the ED- 12/31 and was given Bactrim and came back for wound check -1/09/22 -given doxycycline.  She is afebrile.        Past Medical History:   Diagnosis Date    Depression     Diverticulosis of colon (without mention of hemorrhage) 8/25/2014    Hyperlipidemia     Hypertension     Thyroid disease        Past Surgical History:   Procedure Laterality Date    APPENDECTOMY      CATARACT EXTRACTION EXTRACAPSULAR W/ INTRAOCULAR LENS IMPLANTATION Bilateral 4/2014    INJECTION OF ANESTHETIC AGENT AROUND MEDIAL BRANCH NERVES INNERVATING LUMBAR FACET JOINT Bilateral 7/16/2020    Procedure: Bilateral L3-5 MBB with local;  Surgeon: Luis Ashraf MD;   Location: [facility];  Service: Pain Management;  Laterality: Bilateral;    INJECTION OF ANESTHETIC AGENT AROUND MEDIAL BRANCH NERVES INNERVATING LUMBAR FACET JOINT Bilateral 8/10/2020    Procedure: Bilateral L3-5 MBB with local;  Surgeon: Luis Ashraf MD;  Location: [facility];  Service: Pain Management;  Laterality: Bilateral;    INJECTION OF ANESTHETIC AGENT INTO SACROILIAC JOINT Bilateral 11/4/2021    Procedure: Bilateral SIJ Injection;  Surgeon: Luis Ashraf MD;  Location: [facility];  Service: Pain Management;  Laterality: Bilateral;    RADIOFREQUENCY THERMOCOAGULATION Left 9/28/2020    Procedure: Left L3-5 Lumbar RFA;  Surgeon: Luis Ashraf MD;  Location: [facility];  Service: Pain Management;  Laterality: Left;    RADIOFREQUENCY THERMOCOAGULATION Right 10/12/2020    Procedure: Right L3-5 Lumbar RFA;  Surgeon: Luis Ashraf MD;  Location: [facility];  Service: Pain Management;  Laterality: Right;    STOMACH SURGERY  1998    not sure what they did, possible bowel resection, partial gastrectomy    TONSILLECTOMY         Review of patient's allergies indicates:   Allergen Reactions    Clindamycin      Heaviness in chest      Penicillins Hives       Medications:  Medications Prior to Admission   Medication Sig    alendronate (FOSAMAX) 70 MG tablet TAKE 1 TABLET BY MOUTH EVERY 7 DAYS    amitriptyline (ELAVIL) 75 MG tablet Take 1 tablet by mouth in the evening    atorvastatin (LIPITOR) 40 MG tablet Take 1 tablet by mouth in the evening    ergocalciferol (ERGOCALCIFEROL) 50,000 unit Cap Take 1 capsule by mouth once a week    levothyroxine (SYNTHROID) 88 MCG tablet TAKE 1 TABLET BY MOUTH ONCE DAILY MONDAY - SATURDAY  AND ONE & ONE-HALF TABLET ON SUNDAY    propranoloL (INDERAL) 40 MG tablet Take 1 tablet by mouth twice daily    propranoloL (INDERAL) 40 MG tablet Take 1 tablet by mouth twice daily    sertraline (ZOLOFT) 100 MG tablet Take 1 tablet by mouth once daily  "   ACCU-CHEK MARIANO PLUS TEST STRP Strp USE 1 STRIP TO CHECK GLUCOSE ONCE DAILY    aspirin (ECOTRIN) 81 MG EC tablet Take 1 tablet (81 mg total) by mouth once daily.    blood-glucose meter kit Use as instructed    HYDROcodone-acetaminophen (NORCO) 5-325 mg per tablet Take 1 tablet by mouth every 4 (four) hours as needed for Pain. (Patient not taking: No sig reported)    lancets-blood glucose strips 30 gauge Cmpk 1 application by Misc.(Non-Drug; Combo Route) route once daily at 6am.    meloxicam (MOBIC) 7.5 MG tablet Take 1 tablet (7.5 mg total) by mouth 2 (two) times daily as needed for Pain. Take with food.    mupirocin (BACTROBAN) 2 % ointment Apply topically 2 (two) times daily.    omeprazole (PRILOSEC) 20 MG capsule Take 1 capsule (20 mg total) by mouth as needed.    sumatriptan (IMITREX) 100 MG tablet TAKE 1 TABLET BY MOUTH AT ONSET OF MIGRAINE    vit C/E/Zn/coppr/lutein/zeaxan (PRESERVISION AREDS-2 ORAL) Take 1 tablet by mouth 2 (two) times a day.    VIT C/VIT E/LUTEIN/MIN/OMEGA-3 (OCUVITE ORAL) Take by mouth once daily.     Antibiotics (From admission, onward)            Start     Stop Route Frequency Ordered    01/22/22 1430  vancomycin 1.25 g in dextrose 5% 250 mL IVPB (ready to mix)         -- IV Every 24 hours (non-standard times) 01/21/22 1940    01/21/22 1739  vancomycin - pharmacy to dose  (vancomycin IVPB)        "And" Linked Group Details    -- IV pharmacy to manage frequency 01/21/22 1639        Antifungals (From admission, onward)            None        Antivirals (From admission, onward)    None           Immunization History   Administered Date(s) Administered    COVID-19, MRNA, LN-S, PF (Pfizer) (Purple Cap) 01/11/2021, 02/01/2021    Influenza 09/13/2013    Influenza - High Dose - PF (65 years and older) 10/10/2011, 10/02/2012, 09/22/2014, 08/21/2015, 10/21/2016, 09/26/2018    Influenza Split 10/18/2006, 10/07/2008, 10/07/2009, 10/12/2010    Pneumococcal Conjugate - 13 Valent " 07/15/2015    Pneumococcal Polysaccharide - 23 Valent 05/16/2019    Tdap 07/15/2015, 09/28/2019       Family History     Problem Relation (Age of Onset)    Breast cancer Maternal Aunt    Coronary artery disease Father    Diabetes type II Father    Kidney cancer Maternal Aunt    Lupus Mother    Stroke Father        Social History     Socioeconomic History    Marital status:    Tobacco Use    Smoking status: Former Smoker    Smokeless tobacco: Never Used    Tobacco comment: quit 30 years ago   Substance and Sexual Activity    Alcohol use: No    Drug use: No    Sexual activity: Not Currently     Birth control/protection: None     Review of Systems   Constitutional: Negative for chills and fatigue.   HENT: Negative for congestion, ear pain, facial swelling, sinus pressure and sore throat.    Eyes: Negative for pain.   Respiratory: Negative for apnea, chest tightness, shortness of breath and stridor.    Cardiovascular: Negative for chest pain, palpitations and leg swelling.   Gastrointestinal: Negative for abdominal distention, abdominal pain, diarrhea and nausea.   Endocrine: Negative for polydipsia and polyphagia.   Genitourinary: Negative for decreased urine volume, difficulty urinating, frequency and genital sores.   Musculoskeletal: Negative for arthralgias and gait problem.        Dressing noted over the right finger   Neurological: Negative for light-headedness and headaches.   Hematological: Negative for adenopathy.   Psychiatric/Behavioral: Negative for agitation, confusion and decreased concentration.     Objective:     Vital Signs (Most Recent):  Temp: 97.7 °F (36.5 °C) (01/22/22 0701)  Pulse: 83 (01/22/22 0701)  Resp: 16 (01/22/22 0722)  BP: (!) 147/72 (01/22/22 0701)  SpO2: 97 % (01/22/22 0753) Vital Signs (24h Range):  Temp:  [97.1 °F (36.2 °C)-98.4 °F (36.9 °C)] 97.7 °F (36.5 °C)  Pulse:  [66-88] 83  Resp:  [12-25] 16  SpO2:  [96 %-100 %] 97 %  BP: (116-204)/(56-86) 147/72     Weight: 66.3  kg (146 lb 2.6 oz)  Body mass index is 29.52 kg/m².    Estimated Creatinine Clearance: 57.4 mL/min (based on SCr of 0.7 mg/dL).    Physical Exam  Vitals and nursing note reviewed.   Constitutional:       Appearance: She is well-developed.   HENT:      Head: Normocephalic and atraumatic.   Eyes:      Pupils: Pupils are equal, round, and reactive to light.   Neck:      Vascular: No JVD.   Cardiovascular:      Rate and Rhythm: Normal rate and regular rhythm.      Heart sounds: Murmur heard.    Systolic murmur is present with a grade of 3/6.      Pulmonary:      Effort: Pulmonary effort is normal. No tachypnea, accessory muscle usage or respiratory distress.      Breath sounds: Normal breath sounds. No wheezing.   Abdominal:      General: Bowel sounds are normal. There is no distension.      Palpations: Abdomen is soft.   Musculoskeletal:         General: No tenderness. Normal range of motion.      Cervical back: Normal range of motion and neck supple.      Right lower leg: No edema.      Left lower leg: No edema.   Skin:     General: Skin is warm and dry.      Findings: No erythema.          Neurological:      Mental Status: She is alert and oriented to person, place, and time.      Cranial Nerves: No cranial nerve deficit.      Deep Tendon Reflexes: Reflexes are normal and symmetric.   Psychiatric:         Behavior: Behavior normal.         Thought Content: Thought content normal.         Judgment: Judgment normal.     01/09-            Significant Labs:   Blood Culture: No results for input(s): LABBLOO in the last 4320 hours.  CBC:   Recent Labs   Lab 01/21/22  1806   WBC 6.56   HGB 11.5*   HCT 37.3        Wound Culture:   Recent Labs   Lab 01/09/22  1632   LABAERO STAPHYLOCOCCUS AUREUS  Moderate  *     All pertinent labs within the past 24 hours have been reviewed.    Significant Imaging: I have reviewed all pertinent imaging results/findings within the past 24 hours.

## 2022-01-22 NOTE — SUBJECTIVE & OBJECTIVE
Review of Systems   Constitutional: Positive for activity change. Negative for unexpected weight change.   HENT: Negative.  Negative for trouble swallowing.    Eyes: Negative.    Respiratory: Negative.  Negative for cough, shortness of breath and wheezing.    Cardiovascular: Negative.  Negative for chest pain.   Gastrointestinal: Negative.    Endocrine: Negative.    Genitourinary: Negative.    Musculoskeletal: Negative.    Skin: Positive for wound.   Allergic/Immunologic: Negative.    Neurological: Positive for weakness. Negative for dizziness.   Hematological: Negative.    Psychiatric/Behavioral: Negative.    All other systems reviewed and are negative.    Objective:     Vital Signs (Most Recent):  Temp: 97.6 °F (36.4 °C) (01/22/22 1509)  Pulse: 80 (01/22/22 1509)  Resp: 16 (01/22/22 1509)  BP: (!) 120/58 (01/22/22 1509)  SpO2: 96 % (01/22/22 1509) Vital Signs (24h Range):  Temp:  [97.6 °F (36.4 °C)-98.4 °F (36.9 °C)] 97.6 °F (36.4 °C)  Pulse:  [66-88] 80  Resp:  [12-25] 16  SpO2:  [96 %-100 %] 96 %  BP: (116-188)/(56-78) 120/58     Weight: 66.3 kg (146 lb 2.6 oz)  Body mass index is 29.52 kg/m².    Intake/Output Summary (Last 24 hours) at 1/22/2022 1550  Last data filed at 1/22/2022 0800  Gross per 24 hour   Intake 681.52 ml   Output 415 ml   Net 266.52 ml      Physical Exam  Vitals and nursing note reviewed.   Constitutional:       Appearance: She is well-developed.   HENT:      Head: Normocephalic and atraumatic.   Eyes:      Pupils: Pupils are equal, round, and reactive to light.   Neck:      Vascular: No JVD.   Cardiovascular:      Rate and Rhythm: Normal rate and regular rhythm.      Heart sounds: Murmur heard.    Systolic murmur is present with a grade of 3/6.      Pulmonary:      Effort: Pulmonary effort is normal. No tachypnea, accessory muscle usage or respiratory distress.      Breath sounds: Normal breath sounds. No wheezing.   Abdominal:      General: Bowel sounds are normal. There is no distension.       Palpations: Abdomen is soft.   Musculoskeletal:         General: No tenderness. Normal range of motion.      Cervical back: Normal range of motion and neck supple.      Right lower leg: No edema.      Left lower leg: No edema.   Skin:     General: Skin is warm and dry.      Findings: No erythema.          Neurological:      Mental Status: She is alert and oriented to person, place, and time.      Cranial Nerves: No cranial nerve deficit.      Deep Tendon Reflexes: Reflexes are normal and symmetric.   Psychiatric:         Behavior: Behavior normal.         Thought Content: Thought content normal.         Judgment: Judgment normal.         Significant Labs:   All pertinent labs within the past 24 hours have been reviewed.  Blood Culture: No results for input(s): LABBLOO in the last 48 hours.  BMP:   Recent Labs   Lab 01/22/22  0705   *      K 3.4*      CO2 25   BUN 10   CREATININE 0.7   CALCIUM 9.0     CBC:   Recent Labs   Lab 01/21/22  1806   WBC 6.56   HGB 11.5*   HCT 37.3        CMP:   Recent Labs   Lab 01/21/22  1806 01/22/22  0705    143   K 3.7 3.4*    107   CO2 22* 25   * 113*   BUN 8 10   CREATININE 0.7 0.7   CALCIUM 9.1 9.0   PROT  --  6.1   ALBUMIN  --  3.2*   BILITOT  --  0.5   ALKPHOS  --  86   AST  --  28   ALT  --  28   ANIONGAP 11 11   EGFRNONAA >60 >60       Significant Imaging: I have reviewed all pertinent imaging results/findings within the past 24 hours.

## 2022-01-22 NOTE — SUBJECTIVE & OBJECTIVE
Principal Problem:Finger osteomyelitis, right    Principal Orthopedic Problem: Same  Interval History: Pain improving slowly, .    Review of patient's allergies indicates:   Allergen Reactions    Clindamycin      Heaviness in chest      Penicillins Hives       Current Facility-Administered Medications   Medication    acetaminophen tablet 650 mg    aluminum-magnesium hydroxide-simethicone 200-200-20 mg/5 mL suspension 30 mL    aluminum-magnesium hydroxide-simethicone 200-200-20 mg/5 mL suspension 30 mL    amitriptyline tablet 50 mg    And    amitriptyline tablet 25 mg    aspirin EC tablet 81 mg    atorvastatin tablet 40 mg    chlorhexidine 0.12 % solution 10 mL    DAPTOmycin (CUBICIN) 350 mg in sodium chloride 0.9% IVPB    dextrose 50% injection 12.5 g    dextrose 50% injection 25 g    glucagon (human recombinant) injection 1 mg    glucose chewable tablet 16 g    glucose chewable tablet 24 g    heparin (porcine) injection 5,000 Units    HYDROcodone-acetaminophen  mg per tablet 1 tablet    HYDROcodone-acetaminophen 5-325 mg per tablet 1 tablet    insulin aspart U-100 pen 0-5 Units    levothyroxine tablet 88 mcg    magnesium oxide tablet 800 mg    magnesium oxide tablet 800 mg    melatonin tablet 6 mg    meloxicam tablet 7.5 mg    naloxone 0.4 mg/mL injection 0.02 mg    ondansetron disintegrating tablet 8 mg    ondansetron injection 4 mg    pantoprazole EC tablet 40 mg    potassium bicarbonate disintegrating tablet 35 mEq    potassium bicarbonate disintegrating tablet 50 mEq    potassium bicarbonate disintegrating tablet 60 mEq    potassium chloride SA CR tablet 40 mEq    potassium, sodium phosphates 280-160-250 mg packet 2 packet    potassium, sodium phosphates 280-160-250 mg packet 2 packet    potassium, sodium phosphates 280-160-250 mg packet 2 packet    propranoloL tablet 40 mg    senna-docusate 8.6-50 mg per tablet 1 tablet    sertraline tablet 100 mg     "simethicone chewable tablet 80 mg    sodium chloride 0.9% flush 10 mL    sodium chloride 0.9% flush 10 mL    sucralfate 100 mg/mL suspension 1 g     Objective:     Vital Signs (Most Recent):  Temp: 97.7 °F (36.5 °C) (01/22/22 0701)  Pulse: 83 (01/22/22 0701)  Resp: 16 (01/22/22 0722)  BP: (!) 147/72 (01/22/22 0701)  SpO2: 97 % (01/22/22 0753) Vital Signs (24h Range):  Temp:  [97.1 °F (36.2 °C)-98.4 °F (36.9 °C)] 97.7 °F (36.5 °C)  Pulse:  [66-88] 83  Resp:  [12-25] 16  SpO2:  [96 %-100 %] 97 %  BP: (116-204)/(56-86) 147/72     Weight: 66.3 kg (146 lb 2.6 oz)  Height: 4' 11" (149.9 cm)  Body mass index is 29.52 kg/m².      Intake/Output Summary (Last 24 hours) at 1/22/2022 1110  Last data filed at 1/22/2022 0800  Gross per 24 hour   Intake 981.52 ml   Output 415 ml   Net 566.52 ml       General    Constitutional: No distress.             Right Hand/Wrist Exam     Comments:  Incision benign, packing in place, erythema improved.  TTP over DIP but no proximal palmar pain; neg Canavel signs          Vascular Exam       Capillary Refill  Decreased capillary refill: Brisk.      Significant Labs:   CBC:   Recent Labs   Lab 01/21/22  1806   WBC 6.56   HGB 11.5*   HCT 37.3        CRP: No results for input(s): CRP in the last 48 hours.  Wound Culture: Gram stain neg, cultures pending  All pertinent labs within the past 24 hours have been reviewed.    Significant Imaging: None  "

## 2022-01-22 NOTE — HOSPITAL COURSE
1/21 Taken to OR by Dr. Flores for I&D, drain placement.  1/22 Pain improving, dressing removed and soaks started; ID recs pending culture data

## 2022-01-23 LAB
ALBUMIN SERPL BCP-MCNC: 3.1 G/DL (ref 3.5–5.2)
ALP SERPL-CCNC: 78 U/L (ref 55–135)
ALT SERPL W/O P-5'-P-CCNC: 25 U/L (ref 10–44)
ANION GAP SERPL CALC-SCNC: 10 MMOL/L (ref 8–16)
AST SERPL-CCNC: 21 U/L (ref 10–40)
BILIRUB SERPL-MCNC: 0.4 MG/DL (ref 0.1–1)
BUN SERPL-MCNC: 16 MG/DL (ref 8–23)
CALCIUM SERPL-MCNC: 8.8 MG/DL (ref 8.7–10.5)
CHLORIDE SERPL-SCNC: 107 MMOL/L (ref 95–110)
CO2 SERPL-SCNC: 26 MMOL/L (ref 23–29)
CREAT SERPL-MCNC: 0.8 MG/DL (ref 0.5–1.4)
EST. GFR  (AFRICAN AMERICAN): >60 ML/MIN/1.73 M^2
EST. GFR  (NON AFRICAN AMERICAN): >60 ML/MIN/1.73 M^2
GLUCOSE SERPL-MCNC: 88 MG/DL (ref 70–110)
POTASSIUM SERPL-SCNC: 3.9 MMOL/L (ref 3.5–5.1)
PROT SERPL-MCNC: 5.8 G/DL (ref 6–8.4)
SODIUM SERPL-SCNC: 143 MMOL/L (ref 136–145)

## 2022-01-23 PROCEDURE — 25000003 PHARM REV CODE 250: Performed by: INTERNAL MEDICINE

## 2022-01-23 PROCEDURE — 36415 COLL VENOUS BLD VENIPUNCTURE: CPT | Performed by: INTERNAL MEDICINE

## 2022-01-23 PROCEDURE — 11000001 HC ACUTE MED/SURG PRIVATE ROOM

## 2022-01-23 PROCEDURE — 25000003 PHARM REV CODE 250: Performed by: PHYSICIAN ASSISTANT

## 2022-01-23 PROCEDURE — 63600175 PHARM REV CODE 636 W HCPCS: Performed by: INTERNAL MEDICINE

## 2022-01-23 PROCEDURE — 80053 COMPREHEN METABOLIC PANEL: CPT | Performed by: INTERNAL MEDICINE

## 2022-01-23 PROCEDURE — A4216 STERILE WATER/SALINE, 10 ML: HCPCS | Performed by: INTERNAL MEDICINE

## 2022-01-23 RX ADMIN — CHLORHEXIDINE GLUCONATE 0.12% ORAL RINSE 10 ML: 1.2 LIQUID ORAL at 09:01

## 2022-01-23 RX ADMIN — PROPRANOLOL HYDROCHLORIDE 40 MG: 40 TABLET ORAL at 09:01

## 2022-01-23 RX ADMIN — ATORVASTATIN CALCIUM 40 MG: 40 TABLET, FILM COATED ORAL at 09:01

## 2022-01-23 RX ADMIN — PANTOPRAZOLE SODIUM 40 MG: 40 TABLET, DELAYED RELEASE ORAL at 09:01

## 2022-01-23 RX ADMIN — LEVOTHYROXINE SODIUM 88 MCG: 88 TABLET ORAL at 06:01

## 2022-01-23 RX ADMIN — HEPARIN SODIUM 5000 UNITS: 5000 INJECTION INTRAVENOUS; SUBCUTANEOUS at 06:01

## 2022-01-23 RX ADMIN — SUCRALFATE 1 G: 1 SUSPENSION ORAL at 12:01

## 2022-01-23 RX ADMIN — AMITRIPTYLINE HYDROCHLORIDE 50 MG: 50 TABLET, FILM COATED ORAL at 10:01

## 2022-01-23 RX ADMIN — DAPTOMYCIN 350 MG: 350 INJECTION, POWDER, LYOPHILIZED, FOR SOLUTION INTRAVENOUS at 11:01

## 2022-01-23 RX ADMIN — Medication 10 ML: at 10:01

## 2022-01-23 RX ADMIN — HEPARIN SODIUM 5000 UNITS: 5000 INJECTION INTRAVENOUS; SUBCUTANEOUS at 01:01

## 2022-01-23 RX ADMIN — AMITRIPTYLINE HYDROCHLORIDE 25 MG: 25 TABLET, FILM COATED ORAL at 09:01

## 2022-01-23 RX ADMIN — HYDROCODONE BITARTRATE AND ACETAMINOPHEN 1 TABLET: 10; 325 TABLET ORAL at 09:01

## 2022-01-23 RX ADMIN — HEPARIN SODIUM 5000 UNITS: 5000 INJECTION INTRAVENOUS; SUBCUTANEOUS at 09:01

## 2022-01-23 RX ADMIN — Medication 10 ML: at 01:01

## 2022-01-23 RX ADMIN — SERTRALINE HYDROCHLORIDE 100 MG: 50 TABLET ORAL at 09:01

## 2022-01-23 RX ADMIN — MAGNESIUM HYDROXIDE/ALUMINUM HYDROXICE/SIMETHICONE 30 ML: 120; 1200; 1200 SUSPENSION ORAL at 09:01

## 2022-01-23 RX ADMIN — ASPIRIN 81 MG: 81 TABLET, COATED ORAL at 09:01

## 2022-01-23 RX ADMIN — SUCRALFATE 1 G: 1 SUSPENSION ORAL at 06:01

## 2022-01-23 RX ADMIN — Medication 10 ML: at 06:01

## 2022-01-23 NOTE — SUBJECTIVE & OBJECTIVE
Principal Problem:Finger osteomyelitis, right    Principal Orthopedic Problem: Same     Interval History: Pain continues to improve.  Notes it looked better this AM as well - just completed soak.    Review of patient's allergies indicates:   Allergen Reactions    Clindamycin      Heaviness in chest      Penicillins Hives       Current Facility-Administered Medications   Medication    acetaminophen tablet 650 mg    aluminum-magnesium hydroxide-simethicone 200-200-20 mg/5 mL suspension 30 mL    aluminum-magnesium hydroxide-simethicone 200-200-20 mg/5 mL suspension 30 mL    amitriptyline tablet 50 mg    And    amitriptyline tablet 25 mg    aspirin EC tablet 81 mg    atorvastatin tablet 40 mg    chlorhexidine 0.12 % solution 10 mL    DAPTOmycin (CUBICIN) 350 mg in sodium chloride 0.9% IVPB    dextrose 50% injection 12.5 g    dextrose 50% injection 25 g    glucagon (human recombinant) injection 1 mg    glucose chewable tablet 16 g    glucose chewable tablet 24 g    heparin (porcine) injection 5,000 Units    HYDROcodone-acetaminophen  mg per tablet 1 tablet    HYDROcodone-acetaminophen 5-325 mg per tablet 1 tablet    insulin aspart U-100 pen 0-5 Units    levothyroxine tablet 88 mcg    magnesium oxide tablet 800 mg    magnesium oxide tablet 800 mg    melatonin tablet 6 mg    meloxicam tablet 7.5 mg    naloxone 0.4 mg/mL injection 0.02 mg    ondansetron disintegrating tablet 8 mg    ondansetron injection 4 mg    pantoprazole EC tablet 40 mg    potassium bicarbonate disintegrating tablet 35 mEq    potassium bicarbonate disintegrating tablet 50 mEq    potassium bicarbonate disintegrating tablet 60 mEq    potassium, sodium phosphates 280-160-250 mg packet 2 packet    potassium, sodium phosphates 280-160-250 mg packet 2 packet    potassium, sodium phosphates 280-160-250 mg packet 2 packet    propranoloL tablet 40 mg    senna-docusate 8.6-50 mg per tablet 1 tablet    sertraline tablet  "100 mg    simethicone chewable tablet 80 mg    sodium chloride 0.9% flush 10 mL    sodium chloride 0.9% flush 10 mL    sucralfate 100 mg/mL suspension 1 g     Objective:     Vital Signs (Most Recent):  Temp: 97.8 °F (36.6 °C) (01/23/22 1201)  Pulse: 71 (01/23/22 1201)  Resp: 16 (01/23/22 1201)  BP: (!) 117/56 (01/23/22 1201)  SpO2: (!) 93 % (01/23/22 1201) Vital Signs (24h Range):  Temp:  [97.4 °F (36.3 °C)-98.9 °F (37.2 °C)] 97.8 °F (36.6 °C)  Pulse:  [71-80] 71  Resp:  [14-16] 16  SpO2:  [93 %-98 %] 93 %  BP: (116-139)/(56-63) 117/56     Weight: 67.5 kg (148 lb 13 oz)  Height: 4' 11" (149.9 cm)  Body mass index is 30.06 kg/m².      Intake/Output Summary (Last 24 hours) at 1/23/2022 1337  Last data filed at 1/23/2022 0900  Gross per 24 hour   Intake 360 ml   Output --   Net 360 ml               Left Hand/Wrist Exam     Comments:  Dressing CDI.  Will defer packing pull until tomorrow.            Significant Labs:   CBC:   Recent Labs   Lab 01/21/22  1806   WBC 6.56   HGB 11.5*   HCT 37.3        Wound Culture:   Recent Labs   Lab 01/21/22  1527 01/21/22  1530   LABAERO STAPHYLOCOCCUS AUREUS  Few  Susceptibility pending  * No growth     All pertinent labs within the past 24 hours have been reviewed.    Significant Imaging: None  "

## 2022-01-23 NOTE — PLAN OF CARE
Pt remains free from falls/injuries this shift. Safety precautions maintained. Pain managed with rest. Finger soaked and dressing changed. No s/s of acute distress noted. Will continue to monitor. Chart check completed.

## 2022-01-23 NOTE — CONSULTS
JANIS spoke with Lonnie from [employer] in reference to the referral for Daptomycin.  He reported that they will have to run patient's insurance due to the medication sometimes being a $1000.00 copay.  He reported that they will not be able to run his insurance until Monday.  JANIS sent the referral via [facility] for JANIS/[place] to follow up in the am. JANIS spoke with and informed the NP.       Mariangel Tran LMSW 1/23/2022 10:28 AM

## 2022-01-23 NOTE — PROGRESS NOTES
[facility] Medicine  Telemedicine Progress Note    Patient Name: Haim Martin  MRN: [id number]  Patient Class: IP- Inpatient   Admission Date: 1/21/2022  Length of Stay: 2 days  Attending Physician: Vinnie Prieto MD  Primary Care Provider: Kavya Mesa MD          Subjective:     Principal Problem:Finger osteomyelitis, right        HPI:  Patient 80 yo female with pmhx significant for HTN, Hypothyroidism, Hyperlipidemia presents to hospital for I&D of R Index finger. Patient with cellulitis, concern for Osteomyelitis. Patient wound culture on 1/9/22 + for MSSA sn to Bactrim, Oxacillin. Patient tolerated procedure well and is placed in O/P extended recovery. Patietn denies current pain, no cp, sob, n/v/d/f/c. Patient initiated on Vancomycin, ID consult pending.Patient is afebrile, alert and appropriate. Await consultant inputs.        Overview/Hospital Course:  S/p I&D of right index finger. Pain improving. Infectious diease consulted recommends IV daptomycin 350 mg daily x 6 weeks. Picc line placed today. Social work consulted to arrange for home infusion. Awaiting insurance authorization.        Interval History:     Ms. Martin feels great. She has no new complaints. Her hand pain is improving.    Discussed with NP taking care of the patient yesterday. The current plan is for daptomycin for her MSSA osteomyelitis, on the basis of distant penicillin allergy.    Ms. More reports a distant history of hives with PCN when she was in her early 20s. She tolerated keflex without issue in 2016 prior to a dental procedure. Will reach out to ID and see if ancef remains a feasible option rather than dapto, for both narrower spectrum and lower cost.     Review of Systems   Constitutional: Negative for chills and fever.   Respiratory: Negative for cough and shortness of breath.    Cardiovascular: Negative for chest pain and leg swelling.   Gastrointestinal: Negative for  abdominal pain, constipation, diarrhea, nausea and vomiting.   Genitourinary: Negative for difficulty urinating.   Musculoskeletal: Positive for arthralgias and joint swelling. Negative for myalgias.   Skin: Positive for wound. Negative for rash.   Neurological: Negative for weakness and numbness.   Psychiatric/Behavioral: Negative for dysphoric mood. The patient is not nervous/anxious.      Objective:     Vital Signs (Most Recent):  Temp: 97.8 °F (36.6 °C) (01/23/22 1201)  Pulse: 71 (01/23/22 1201)  Resp: 16 (01/23/22 1201)  BP: (!) 117/56 (01/23/22 1201)  SpO2: (!) 93 % (01/23/22 1201) Vital Signs (24h Range):  Temp:  [97.4 °F (36.3 °C)-98.9 °F (37.2 °C)] 97.8 °F (36.6 °C)  Pulse:  [71-80] 71  Resp:  [14-16] 16  SpO2:  [93 %-98 %] 93 %  BP: (116-139)/(56-63) 117/56     Weight: 67.5 kg (148 lb 13 oz)  Body mass index is 30.06 kg/m².    Intake/Output Summary (Last 24 hours) at 1/23/2022 1259  Last data filed at 1/23/2022 0900  Gross per 24 hour   Intake 360 ml   Output --   Net 360 ml      Physical Exam  Constitutional:       General: She is not in acute distress.  HENT:      Head: Normocephalic and atraumatic.   Eyes:      General: No scleral icterus.        Right eye: No discharge.         Left eye: No discharge.   Pulmonary:      Effort: Pulmonary effort is normal.      Comments: Breathing comfortably  Abdominal:      General: There is no distension.   Musculoskeletal:      Comments: Moves all extremities well  Right hand in post-op bandage   Skin:     Findings: No erythema or rash.   Neurological:      General: No focal deficit present.      Mental Status: She is alert and oriented to person, place, and time.      Comments: Alert, conversant   Psychiatric:         Mood and Affect: Mood normal.         Behavior: Behavior normal.         Significant Labs: All pertinent labs within the past 24 hours have been reviewed.    Significant Imaging: I have reviewed all pertinent imaging results/findings within the past 24  hours.      Assessment/Plan:      * Finger osteomyelitis, right    Improving  Ortho on board s/p I&D R Index finger  ID on consult for recs on MSSA Cellulitis.  XRAY negative  Vancomycin on board  Aerobic Bacterial Culture  Abnormal   STAPHYLOCOCCUS AUREUS   Moderate     Resulting Agency [facility]          Susceptibility     Staphylococcus aureus     CULTURE, AEROBIC  (SPECIFY SOURCE)     Clindamycin <=0.5 mcg/mL Resistant     Erythromycin >4 mcg/mL Resistant     Oxacillin <=0.25 mcg/mL Sensitive     Penicillin 2 mcg/mL Resistant     Tetracycline <=4 mcg/mL Sensitive     Trimeth/Sulfa <=0.5/9.5 m... Sensitive               Plan for abx based on ID recommendation  1/22/22  -Pain improving.   -Infectious diease consulted recommends IV daptomycin 350 mg daily x 6 weeks.  - Picc line placed today. Social work consulted to arrange for home infusion.   -Awaiting insurance authorization.      1/23 - re-exploring ancef due to keflex tolerance in 2016, otherwise patient improving still with plans for home IV ABx. Reached out to ID       Major depressive disorder with single episode, in partial remission    - Chronic and stable  - Continue home meds      Pre-diabetes    - Chronic and stable  - Hemoglobin A1c 5.6 on 8/12/2021  - PCP f/u    Anxiety    - Chronic and stable  - Continue home Elavil    Hypothyroidism    - Chronic and stable  - Continue home levothyroxine      VTE Risk Mitigation (From admission, onward)         Ordered     heparin (porcine) injection 5,000 Units  Every 8 hours         01/21/22 1637     Place sequential compression device  Until discontinued         01/21/22 1637     IP VTE HIGH RISK PATIENT  Once         01/21/22 1637     Place sequential compression device  Until discontinued         01/21/22 1507                      I have assessed these finding virtually using telemed platform and with assistance of bedside nurse                 The attending portion of this evaluation, treatment, and documentation  was performed per Vinnie Prieto MD via Telemedicine AudioVisual using the secure [employer] software platform with 2 way audio/video. The provider was located off-site and the patient is located in the hospital. The aforementioned video software was utilized to document the relevant history and physical exam    Vinnie Prieto MD  Department of Hospital Medicine   '[facility]

## 2022-01-23 NOTE — PROGRESS NOTES
[facility]  Orthopedics  Progress Note    Patient Name: Haim Martin  MRN: [id number]  Admission Date: 1/21/2022  Hospital Length of Stay: 2 days  Attending Provider: Vinnie Prieto MD  Primary Care Provider: Kavya Mesa MD  Follow-up For: Procedure(s) (LRB):  INCISION AND DRAINAGE, HAND (Right)    Post-Operative Day: 2 Days Post-Op  Subjective:     Principal Problem:Finger osteomyelitis, right    Principal Orthopedic Problem: Same     Interval History: Pain continues to improve.  Notes it looked better this AM as well - just completed soak.    Review of patient's allergies indicates:   Allergen Reactions    Clindamycin      Heaviness in chest      Penicillins Hives       Current Facility-Administered Medications   Medication    acetaminophen tablet 650 mg    aluminum-magnesium hydroxide-simethicone 200-200-20 mg/5 mL suspension 30 mL    aluminum-magnesium hydroxide-simethicone 200-200-20 mg/5 mL suspension 30 mL    amitriptyline tablet 50 mg    And    amitriptyline tablet 25 mg    aspirin EC tablet 81 mg    atorvastatin tablet 40 mg    chlorhexidine 0.12 % solution 10 mL    DAPTOmycin (CUBICIN) 350 mg in sodium chloride 0.9% IVPB    dextrose 50% injection 12.5 g    dextrose 50% injection 25 g    glucagon (human recombinant) injection 1 mg    glucose chewable tablet 16 g    glucose chewable tablet 24 g    heparin (porcine) injection 5,000 Units    HYDROcodone-acetaminophen  mg per tablet 1 tablet    HYDROcodone-acetaminophen 5-325 mg per tablet 1 tablet    insulin aspart U-100 pen 0-5 Units    levothyroxine tablet 88 mcg    magnesium oxide tablet 800 mg    magnesium oxide tablet 800 mg    melatonin tablet 6 mg    meloxicam tablet 7.5 mg    naloxone 0.4 mg/mL injection 0.02 mg    ondansetron disintegrating tablet 8 mg    ondansetron injection 4 mg    pantoprazole EC tablet 40 mg    potassium bicarbonate disintegrating tablet 35 mEq    potassium  "bicarbonate disintegrating tablet 50 mEq    potassium bicarbonate disintegrating tablet 60 mEq    potassium, sodium phosphates 280-160-250 mg packet 2 packet    potassium, sodium phosphates 280-160-250 mg packet 2 packet    potassium, sodium phosphates 280-160-250 mg packet 2 packet    propranoloL tablet 40 mg    senna-docusate 8.6-50 mg per tablet 1 tablet    sertraline tablet 100 mg    simethicone chewable tablet 80 mg    sodium chloride 0.9% flush 10 mL    sodium chloride 0.9% flush 10 mL    sucralfate 100 mg/mL suspension 1 g     Objective:     Vital Signs (Most Recent):  Temp: 97.8 °F (36.6 °C) (01/23/22 1201)  Pulse: 71 (01/23/22 1201)  Resp: 16 (01/23/22 1201)  BP: (!) 117/56 (01/23/22 1201)  SpO2: (!) 93 % (01/23/22 1201) Vital Signs (24h Range):  Temp:  [97.4 °F (36.3 °C)-98.9 °F (37.2 °C)] 97.8 °F (36.6 °C)  Pulse:  [71-80] 71  Resp:  [14-16] 16  SpO2:  [93 %-98 %] 93 %  BP: (116-139)/(56-63) 117/56     Weight: 67.5 kg (148 lb 13 oz)  Height: 4' 11" (149.9 cm)  Body mass index is 30.06 kg/m².      Intake/Output Summary (Last 24 hours) at 1/23/2022 1337  Last data filed at 1/23/2022 0900  Gross per 24 hour   Intake 360 ml   Output --   Net 360 ml               Left Hand/Wrist Exam     Comments:  Dressing CDI.  Will defer packing pull until tomorrow.            Significant Labs:   CBC:   Recent Labs   Lab 01/21/22  1806   WBC 6.56   HGB 11.5*   HCT 37.3        Wound Culture:   Recent Labs   Lab 01/21/22  1527 01/21/22  1530   LABAERO STAPHYLOCOCCUS AUREUS  Few  Susceptibility pending  * No growth     All pertinent labs within the past 24 hours have been reviewed.    Significant Imaging: None    Assessment/Plan:     * Finger osteomyelitis, right  1/21 Taken to OR by Dr. Flores for I&D, drain placement.  1/22 Pain improving, dressing removed and soaks started; ID recs pending culture data  1/23 Continues to clinically improve; staph aureus positive cultures, sensitivities pending, per Dr. Flores- " high suspicion for residual osteomyelitis    · Medicine admission  · Weight bearing: Nonweightbearing, no lifting gripping >5 pounds  · Range of motion: As tolerated  · IV antibiotics for cellulitis and osteomyelitis, appreciate ID recs  · DVT PPx SCDs and ambulation. Chemoprophylaxis per primary team.   · Wound: daily CHG + saline soaks, remove packing Monday  · Follow-up: 2 weeks after surgery with [facility].   · Discharge pending packing removal and stable wound as well as finalization of ID recs            Stephanie Apodaca MD  Orthopedics  [facility]

## 2022-01-23 NOTE — ASSESSMENT & PLAN NOTE
Improving  Ortho on board s/p I&D R Index finger  ID on consult for recs on MSSA Cellulitis.  XRAY negative  Vancomycin on board  Aerobic Bacterial Culture  Abnormal   STAPHYLOCOCCUS AUREUS   Moderate     Resulting Agency [facility]          Susceptibility     Staphylococcus aureus     CULTURE, AEROBIC  (SPECIFY SOURCE)     Clindamycin <=0.5 mcg/mL Resistant     Erythromycin >4 mcg/mL Resistant     Oxacillin <=0.25 mcg/mL Sensitive     Penicillin 2 mcg/mL Resistant     Tetracycline <=4 mcg/mL Sensitive     Trimeth/Sulfa <=0.5/9.5 m... Sensitive               Plan for abx based on ID recommendation  1/22/22  -Pain improving.   -Infectious diease consulted recommends IV daptomycin 350 mg daily x 6 weeks.  - Picc line placed today. Social work consulted to arrange for home infusion.   -Awaiting insurance authorization.      1/23 - re-exploring ancef due to keflex tolerance in 2016, otherwise patient improving still with plans for home IV ABx. Reached out to ID

## 2022-01-23 NOTE — ASSESSMENT & PLAN NOTE
1/21 Taken to OR by Dr. Flores for I&D, drain placement.  1/22 Pain improving, dressing removed and soaks started; ID recs pending culture data  1/23 Continues to clinically improve; staph aureus positive cultures, sensitivities pending, per Dr. Flores- high suspicion for residual osteomyelitis    · Medicine admission  · Weight bearing: Nonweightbearing, no lifting gripping >5 pounds  · Range of motion: As tolerated  · IV antibiotics for cellulitis and osteomyelitis, appreciate ID recs  · DVT PPx SCDs and ambulation. Chemoprophylaxis per primary team.   · Wound: daily CHG + saline soaks, remove packing Monday  · Follow-up: 2 weeks after surgery with [facility].   · Discharge pending packing removal and stable wound as well as finalization of ID recs

## 2022-01-23 NOTE — SUBJECTIVE & OBJECTIVE
Interval History:     Ms. Martin feels great. She has no new complaints. Her hand pain is improving.    Discussed with NP taking care of the patient yesterday. The current plan is for daptomycin for her MSSA osteomyelitis, on the basis of distant penicillin allergy.    Ms. More reports a distant history of hives with PCN when she was in her early 20s. She tolerated keflex without issue in 2016 prior to a dental procedure. Will reach out to ID and see if ancef remains a feasible option rather than dapto, for both narrower spectrum and lower cost.     Review of Systems   Constitutional: Negative for chills and fever.   Respiratory: Negative for cough and shortness of breath.    Cardiovascular: Negative for chest pain and leg swelling.   Gastrointestinal: Negative for abdominal pain, constipation, diarrhea, nausea and vomiting.   Genitourinary: Negative for difficulty urinating.   Musculoskeletal: Positive for arthralgias and joint swelling. Negative for myalgias.   Skin: Positive for wound. Negative for rash.   Neurological: Negative for weakness and numbness.   Psychiatric/Behavioral: Negative for dysphoric mood. The patient is not nervous/anxious.      Objective:     Vital Signs (Most Recent):  Temp: 97.8 °F (36.6 °C) (01/23/22 1201)  Pulse: 71 (01/23/22 1201)  Resp: 16 (01/23/22 1201)  BP: (!) 117/56 (01/23/22 1201)  SpO2: (!) 93 % (01/23/22 1201) Vital Signs (24h Range):  Temp:  [97.4 °F (36.3 °C)-98.9 °F (37.2 °C)] 97.8 °F (36.6 °C)  Pulse:  [71-80] 71  Resp:  [14-16] 16  SpO2:  [93 %-98 %] 93 %  BP: (116-139)/(56-63) 117/56     Weight: 67.5 kg (148 lb 13 oz)  Body mass index is 30.06 kg/m².    Intake/Output Summary (Last 24 hours) at 1/23/2022 1259  Last data filed at 1/23/2022 0900  Gross per 24 hour   Intake 360 ml   Output --   Net 360 ml      Physical Exam  Constitutional:       General: She is not in acute distress.  HENT:      Head: Normocephalic and atraumatic.   Eyes:      General: No scleral  icterus.        Right eye: No discharge.         Left eye: No discharge.   Pulmonary:      Effort: Pulmonary effort is normal.      Comments: Breathing comfortably  Abdominal:      General: There is no distension.   Musculoskeletal:      Comments: Moves all extremities well  Right hand in post-op bandage   Skin:     Findings: No erythema or rash.   Neurological:      General: No focal deficit present.      Mental Status: She is alert and oriented to person, place, and time.      Comments: Alert, conversant   Psychiatric:         Mood and Affect: Mood normal.         Behavior: Behavior normal.         Significant Labs: All pertinent labs within the past 24 hours have been reviewed.    Significant Imaging: I have reviewed all pertinent imaging results/findings within the past 24 hours.

## 2022-01-24 LAB
ALBUMIN SERPL BCP-MCNC: 3.1 G/DL (ref 3.5–5.2)
ALP SERPL-CCNC: 81 U/L (ref 55–135)
ALT SERPL W/O P-5'-P-CCNC: 21 U/L (ref 10–44)
ANION GAP SERPL CALC-SCNC: 11 MMOL/L (ref 8–16)
AST SERPL-CCNC: 24 U/L (ref 10–40)
BACTERIA SPEC AEROBE CULT: ABNORMAL
BILIRUB SERPL-MCNC: 0.4 MG/DL (ref 0.1–1)
BUN SERPL-MCNC: 13 MG/DL (ref 8–23)
CALCIUM SERPL-MCNC: 8.7 MG/DL (ref 8.7–10.5)
CHLORIDE SERPL-SCNC: 106 MMOL/L (ref 95–110)
CO2 SERPL-SCNC: 24 MMOL/L (ref 23–29)
CREAT SERPL-MCNC: 0.9 MG/DL (ref 0.5–1.4)
EST. GFR  (AFRICAN AMERICAN): >60 ML/MIN/1.73 M^2
EST. GFR  (NON AFRICAN AMERICAN): >60 ML/MIN/1.73 M^2
GLUCOSE SERPL-MCNC: 232 MG/DL (ref 70–110)
POTASSIUM SERPL-SCNC: 3.4 MMOL/L (ref 3.5–5.1)
PROT SERPL-MCNC: 6 G/DL (ref 6–8.4)
SODIUM SERPL-SCNC: 141 MMOL/L (ref 136–145)

## 2022-01-24 PROCEDURE — 25000003 PHARM REV CODE 250: Performed by: PHYSICIAN ASSISTANT

## 2022-01-24 PROCEDURE — 11000001 HC ACUTE MED/SURG PRIVATE ROOM

## 2022-01-24 PROCEDURE — 99024 PR POST-OP FOLLOW-UP VISIT: ICD-10-PCS | Mod: ,,, | Performed by: PHYSICIAN ASSISTANT

## 2022-01-24 PROCEDURE — 36415 COLL VENOUS BLD VENIPUNCTURE: CPT | Performed by: INTERNAL MEDICINE

## 2022-01-24 PROCEDURE — A4216 STERILE WATER/SALINE, 10 ML: HCPCS | Performed by: INTERNAL MEDICINE

## 2022-01-24 PROCEDURE — 63600175 PHARM REV CODE 636 W HCPCS: Performed by: INTERNAL MEDICINE

## 2022-01-24 PROCEDURE — 25000003 PHARM REV CODE 250: Performed by: INTERNAL MEDICINE

## 2022-01-24 PROCEDURE — 99024 POSTOP FOLLOW-UP VISIT: CPT | Mod: ,,, | Performed by: PHYSICIAN ASSISTANT

## 2022-01-24 PROCEDURE — 80053 COMPREHEN METABOLIC PANEL: CPT | Performed by: INTERNAL MEDICINE

## 2022-01-24 RX ORDER — HYDROCODONE BITARTRATE AND ACETAMINOPHEN 5; 325 MG/1; MG/1
1-2 TABLET ORAL EVERY 6 HOURS PRN
Qty: 10 TABLET | Refills: 0 | Status: SHIPPED | OUTPATIENT
Start: 2022-01-24 | End: 2022-02-06

## 2022-01-24 RX ADMIN — SUCRALFATE 1 G: 1 SUSPENSION ORAL at 06:01

## 2022-01-24 RX ADMIN — AMITRIPTYLINE HYDROCHLORIDE 25 MG: 25 TABLET, FILM COATED ORAL at 09:01

## 2022-01-24 RX ADMIN — PANTOPRAZOLE SODIUM 40 MG: 40 TABLET, DELAYED RELEASE ORAL at 10:01

## 2022-01-24 RX ADMIN — ATORVASTATIN CALCIUM 40 MG: 40 TABLET, FILM COATED ORAL at 09:01

## 2022-01-24 RX ADMIN — AMITRIPTYLINE HYDROCHLORIDE 50 MG: 50 TABLET, FILM COATED ORAL at 09:01

## 2022-01-24 RX ADMIN — SUCRALFATE 1 G: 1 SUSPENSION ORAL at 12:01

## 2022-01-24 RX ADMIN — DAPTOMYCIN 350 MG: 350 INJECTION, POWDER, LYOPHILIZED, FOR SOLUTION INTRAVENOUS at 10:01

## 2022-01-24 RX ADMIN — SERTRALINE HYDROCHLORIDE 100 MG: 50 TABLET ORAL at 10:01

## 2022-01-24 RX ADMIN — HEPARIN SODIUM 5000 UNITS: 5000 INJECTION INTRAVENOUS; SUBCUTANEOUS at 09:01

## 2022-01-24 RX ADMIN — Medication 10 ML: at 09:01

## 2022-01-24 RX ADMIN — ASPIRIN 81 MG: 81 TABLET, COATED ORAL at 10:01

## 2022-01-24 RX ADMIN — PROPRANOLOL HYDROCHLORIDE 40 MG: 40 TABLET ORAL at 09:01

## 2022-01-24 RX ADMIN — CHLORHEXIDINE GLUCONATE 0.12% ORAL RINSE 10 ML: 1.2 LIQUID ORAL at 10:01

## 2022-01-24 RX ADMIN — LEVOTHYROXINE SODIUM 88 MCG: 88 TABLET ORAL at 06:01

## 2022-01-24 RX ADMIN — HEPARIN SODIUM 5000 UNITS: 5000 INJECTION INTRAVENOUS; SUBCUTANEOUS at 02:01

## 2022-01-24 RX ADMIN — HEPARIN SODIUM 5000 UNITS: 5000 INJECTION INTRAVENOUS; SUBCUTANEOUS at 06:01

## 2022-01-24 RX ADMIN — Medication 10 ML: at 06:01

## 2022-01-24 RX ADMIN — CHLORHEXIDINE GLUCONATE 0.12% ORAL RINSE 10 ML: 1.2 LIQUID ORAL at 09:01

## 2022-01-24 RX ADMIN — PROPRANOLOL HYDROCHLORIDE 40 MG: 40 TABLET ORAL at 10:01

## 2022-01-24 NOTE — SUBJECTIVE & OBJECTIVE
Interval History:     Ms. Martin feels well. She has no new complaints. Her finger pain has improved.    She is stable for d/c just awaiting insurance auth for her abx    Review of Systems   Constitutional: Negative for chills and fever.   Respiratory: Negative for cough and shortness of breath.    Cardiovascular: Negative for chest pain and leg swelling.   Gastrointestinal: Negative for abdominal pain, constipation, diarrhea, nausea and vomiting.   Genitourinary: Negative for difficulty urinating.   Musculoskeletal: Positive for arthralgias and joint swelling. Negative for myalgias.   Skin: Positive for wound. Negative for rash.   Neurological: Negative for weakness and numbness.   Psychiatric/Behavioral: Negative for dysphoric mood. The patient is not nervous/anxious.      Objective:     Vital Signs (Most Recent):  Temp: 98.1 °F (36.7 °C) (01/24/22 1142)  Pulse: 76 (01/24/22 1142)  Resp: 18 (01/24/22 1142)  BP: (!) 130/58 (01/24/22 1142)  SpO2: 97 % (01/24/22 1142) Vital Signs (24h Range):  Temp:  [97.5 °F (36.4 °C)-98.6 °F (37 °C)] 98.1 °F (36.7 °C)  Pulse:  [69-76] 76  Resp:  [14-18] 18  SpO2:  [94 %-98 %] 97 %  BP: (117-148)/(56-67) 130/58     Weight: 68.9 kg (151 lb 14.4 oz)  Body mass index is 30.68 kg/m².    Intake/Output Summary (Last 24 hours) at 1/24/2022 1526  Last data filed at 1/24/2022 1400  Gross per 24 hour   Intake 787.02 ml   Output --   Net 787.02 ml      Physical Exam  Constitutional:       General: She is not in acute distress.  HENT:      Head: Normocephalic and atraumatic.   Eyes:      General: No scleral icterus.        Right eye: No discharge.         Left eye: No discharge.   Pulmonary:      Effort: Pulmonary effort is normal.      Comments: Breathing comfortably  Abdominal:      General: There is no distension.   Musculoskeletal:      Comments: Moves all extremities well  Right hand in post-op bandage   Skin:     Findings: No erythema or rash.   Neurological:      General: No focal  deficit present.      Mental Status: She is alert and oriented to person, place, and time.      Comments: Alert, conversant   Psychiatric:         Mood and Affect: Mood normal.         Behavior: Behavior normal.         Significant Labs: All pertinent labs within the past 24 hours have been reviewed.    Significant Imaging: I have reviewed all pertinent imaging results/findings within the past 24 hours.

## 2022-01-24 NOTE — PROGRESS NOTES
[facility]  Telemedicine Progress Note    Patient Name: Haim Martin  MRN: [id number]  Patient Class: IP- Inpatient   Admission Date: 1/21/2022  Length of Stay: 3 days  Attending Physician: Vinnie Prieto MD  Primary Care Provider: Kavya Mesa MD          Subjective:     Principal Problem:Finger osteomyelitis, right        HPI:  Patient 80 yo female with pmhx significant for HTN, Hypothyroidism, Hyperlipidemia presents to hospital for I&D of R Index finger. Patient with cellulitis, concern for Osteomyelitis. Patient wound culture on 1/9/22 + for MSSA sn to Bactrim, Oxacillin. Patient tolerated procedure well and is placed in O/P extended recovery. Patietn denies current pain, no cp, sob, n/v/d/f/c. Patient initiated on Vancomycin, ID consult pending.Patient is afebrile, alert and appropriate. Await consultant inputs.        Overview/Hospital Course:  S/p I&D of right index finger. Pain improving. Infectious diease consulted recommends IV daptomycin 350 mg daily x 6 weeks. Picc line placed today. Social work consulted to arrange for home infusion. Awaiting insurance authorization.        Interval History:     Ms. Martin feels well. She has no new complaints. Her finger pain has improved.    She is stable for d/c just awaiting insurance auth for her abx    Review of Systems   Constitutional: Negative for chills and fever.   Respiratory: Negative for cough and shortness of breath.    Cardiovascular: Negative for chest pain and leg swelling.   Gastrointestinal: Negative for abdominal pain, constipation, diarrhea, nausea and vomiting.   Genitourinary: Negative for difficulty urinating.   Musculoskeletal: Positive for arthralgias and joint swelling. Negative for myalgias.   Skin: Positive for wound. Negative for rash.   Neurological: Negative for weakness and numbness.   Psychiatric/Behavioral: Negative for dysphoric mood. The patient is not nervous/anxious.       Objective:     Vital Signs (Most Recent):  Temp: 98.1 °F (36.7 °C) (01/24/22 1142)  Pulse: 76 (01/24/22 1142)  Resp: 18 (01/24/22 1142)  BP: (!) 130/58 (01/24/22 1142)  SpO2: 97 % (01/24/22 1142) Vital Signs (24h Range):  Temp:  [97.5 °F (36.4 °C)-98.6 °F (37 °C)] 98.1 °F (36.7 °C)  Pulse:  [69-76] 76  Resp:  [14-18] 18  SpO2:  [94 %-98 %] 97 %  BP: (117-148)/(56-67) 130/58     Weight: 68.9 kg (151 lb 14.4 oz)  Body mass index is 30.68 kg/m².    Intake/Output Summary (Last 24 hours) at 1/24/2022 1526  Last data filed at 1/24/2022 1400  Gross per 24 hour   Intake 787.02 ml   Output --   Net 787.02 ml      Physical Exam  Constitutional:       General: She is not in acute distress.  HENT:      Head: Normocephalic and atraumatic.   Eyes:      General: No scleral icterus.        Right eye: No discharge.         Left eye: No discharge.   Pulmonary:      Effort: Pulmonary effort is normal.      Comments: Breathing comfortably  Abdominal:      General: There is no distension.   Musculoskeletal:      Comments: Moves all extremities well  Right hand in post-op bandage   Skin:     Findings: No erythema or rash.   Neurological:      General: No focal deficit present.      Mental Status: She is alert and oriented to person, place, and time.      Comments: Alert, conversant   Psychiatric:         Mood and Affect: Mood normal.         Behavior: Behavior normal.         Significant Labs: All pertinent labs within the past 24 hours have been reviewed.    Significant Imaging: I have reviewed all pertinent imaging results/findings within the past 24 hours.      Assessment/Plan:      * Finger osteomyelitis, right    Improving  Ortho on board s/p I&D R Index finger  ID on consult for recs on MSSA Cellulitis.  XRAY negative  Vancomycin on board  Aerobic Bacterial Culture  Abnormal   STAPHYLOCOCCUS AUREUS   Moderate     Resulting Agency [facility]          Susceptibility     Staphylococcus aureus     CULTURE, AEROBIC  (SPECIFY SOURCE)      Clindamycin <=0.5 mcg/mL Resistant     Erythromycin >4 mcg/mL Resistant     Oxacillin <=0.25 mcg/mL Sensitive     Penicillin 2 mcg/mL Resistant     Tetracycline <=4 mcg/mL Sensitive     Trimeth/Sulfa <=0.5/9.5 m... Sensitive               Plan for abx based on ID recommendation  1/22/22  -Pain improving.   -Infectious diease consulted recommends IV daptomycin 350 mg daily x 6 weeks.  - Picc line placed today. Social work consulted to arrange for home infusion.   -Awaiting insurance authorization.      1/23 - re-exploring ancef due to keflex tolerance in 2016, otherwise patient improving still with plans for home IV ABx. Reached out to ID   1/24 - Discussed with Dr. Panda who still recommends dapto with ancef as a backup option      Major depressive disorder with single episode, in partial remission    - Chronic and stable  - Continue home meds      Pre-diabetes    - Chronic and stable  - Hemoglobin A1c 5.6 on 8/12/2021  - PCP f/u    Anxiety    - Chronic and stable  - Continue home Elavil    Hypothyroidism    - Chronic and stable  - Continue home levothyroxine      VTE Risk Mitigation (From admission, onward)         Ordered     heparin (porcine) injection 5,000 Units  Every 8 hours         01/21/22 1637     Place sequential compression device  Until discontinued         01/21/22 1637     IP VTE HIGH RISK PATIENT  Once         01/21/22 1637     Place sequential compression device  Until discontinued         01/21/22 1507                      I have assessed these finding virtually using telemed platform and with assistance of bedside nurse                 The attending portion of this evaluation, treatment, and documentation was performed per Vinnie Prieto MD via Telemedicine AudioVisual using the secure [employer] software platform with 2 way audio/video. The provider was located off-site and the patient is located in the hospital. The aforementioned video software was utilized to document the relevant history and  physical exam    Vinnie Prieto MD  Department of Hospital Medicine   '[facility]

## 2022-01-24 NOTE — ASSESSMENT & PLAN NOTE
Improving  Ortho on board s/p I&D R Index finger  ID on consult for recs on MSSA Cellulitis.  XRAY negative  Vancomycin on board  Aerobic Bacterial Culture  Abnormal   STAPHYLOCOCCUS AUREUS   Moderate     Resulting Agency [facility]          Susceptibility     Staphylococcus aureus     CULTURE, AEROBIC  (SPECIFY SOURCE)     Clindamycin <=0.5 mcg/mL Resistant     Erythromycin >4 mcg/mL Resistant     Oxacillin <=0.25 mcg/mL Sensitive     Penicillin 2 mcg/mL Resistant     Tetracycline <=4 mcg/mL Sensitive     Trimeth/Sulfa <=0.5/9.5 m... Sensitive               Plan for abx based on ID recommendation  1/22/22  -Pain improving.   -Infectious diease consulted recommends IV daptomycin 350 mg daily x 6 weeks.  - Picc line placed today. Social work consulted to arrange for home infusion.   -Awaiting insurance authorization.      1/23 - re-exploring ancef due to keflex tolerance in 2016, otherwise patient improving still with plans for home IV ABx. Reached out to ID   1/24 - Discussed with Dr. Panda who still recommends dapto with ancef as a backup option

## 2022-01-24 NOTE — PLAN OF CARE
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       Follow Up Appointments:  No future appointments.    Allergies:  Review of patient's allergies indicates:   Allergen Reactions    Clindamycin      Heaviness in chest      Penicillins Hives       Medications: Review discharge medications with patient and family and provide education.    Current Facility-Administered Medications   Medication Dose Route Frequency Provider Last Rate Last Admin    acetaminophen tablet 650 mg  650 mg Oral Q4H PRN Alen Caal MD        aluminum-magnesium hydroxide-simethicone 200-200-20 mg/5 mL suspension 30 mL  30 mL Oral QID (AC & HS) Alen Caal MD   30 mL at 01/23/22 2157    aluminum-magnesium hydroxide-simethicone 200-200-20 mg/5 mL suspension 30 mL  30 mL Oral QID PRN Alen Caal MD   30 mL at 01/22/22 2118    amitriptyline tablet 50 mg  50 mg Oral QHS Ramirez Anguiano MD   50 mg at 01/23/22 2202    And    amitriptyline tablet 25 mg  25 mg Oral QHS Ramirez Anguiano MD   25 mg at 01/23/22 2156    aspirin EC tablet 81 mg  81 mg Oral Daily Alen Caal MD   81 mg at 01/24/22 1023    atorvastatin tablet 40 mg  40 mg Oral QHS Alen Caal MD   40 mg at 01/23/22 2157    chlorhexidine 0.12 % solution 10 mL  10 mL Mouth/Throat BID Joseph Kenny PA-C   10 mL at 01/24/22 1024     DAPTOmycin (CUBICIN) 350 mg in sodium chloride 0.9% IVPB  350 mg Intravenous Q24H Alen Caal MD   Stopped at 01/24/22 1049    dextrose 50% injection 12.5 g  12.5 g Intravenous PRN Alen Caal MD        dextrose 50% injection 25 g  25 g Intravenous PRN Alen Caal MD        glucagon (human recombinant) injection 1 mg  1 mg Intramuscular PRN Alen Caal MD        glucose chewable tablet 16 g  16 g Oral PRN Alen Caal MD        glucose chewable tablet 24 g  24 g Oral PRN Alen Caal MD        heparin (porcine) injection 5,000 Units  5,000 Units Subcutaneous Q8H Alen Caal MD   5,000 Units at 01/24/22 0602    HYDROcodone-acetaminophen  mg per tablet 1 tablet  1 tablet Oral Q6H PRN Alen Caal MD   1 tablet at 01/23/22 2156    HYDROcodone-acetaminophen 5-325 mg per tablet 1 tablet  1 tablet Oral Q4H PRN Joseph Kenny PA-C   1 tablet at 01/22/22 0722    insulin aspart U-100 pen 0-5 Units  0-5 Units Subcutaneous QID (AC + HS) PRN Alen Caal MD        levothyroxine tablet 88 mcg  88 mcg Oral Before breakfast Alen Caal MD   88 mcg at 01/24/22 0602    magnesium oxide tablet 800 mg  800 mg Oral PRN Alen Caal MD        magnesium oxide tablet 800 mg  800 mg Oral PRN Alen Caal MD        melatonin tablet 6 mg  6 mg Oral Nightly PRN Alen Caal MD        meloxicam tablet 7.5 mg  7.5 mg Oral BID PRN Alen Caal MD        naloxone 0.4 mg/mL injection 0.02 mg  0.02 mg Intravenous PRN Alen Caal MD        ondansetron disintegrating tablet 8 mg  8 mg Oral Q8H PRN Joseph Kenny PA-C        ondansetron injection 4 mg  4 mg Intravenous Q8H PRN Alen Caal MD        pantoprazole EC tablet 40 mg  40 mg Oral Daily Alen Caal MD   40 mg at 01/24/22 1023    potassium bicarbonate disintegrating tablet 35 mEq  35 mEq Oral PRN Alen Caal MD        potassium bicarbonate disintegrating tablet 50 mEq  50  mEq Oral PRN Alen Caal MD        potassium bicarbonate disintegrating tablet 60 mEq  60 mEq Oral PRN Alen Caal MD        potassium, sodium phosphates 280-160-250 mg packet 2 packet  2 packet Oral PRN Alen Caal MD        potassium, sodium phosphates 280-160-250 mg packet 2 packet  2 packet Oral PRN Alen Caal MD        potassium, sodium phosphates 280-160-250 mg packet 2 packet  2 packet Oral PRN Alen Caal MD        propranoloL tablet 40 mg  40 mg Oral BID Alen Caal MD   40 mg at 01/24/22 1023    sars-cov-2 (covid-19) (MODERNA COVID-19) 50 mcg/0.25 ml injection (BOOSTER) 0.25 mL  0.25 mL Intramuscular vaccine x 1 dose Vinnie Prieto MD        senna-docusate 8.6-50 mg per tablet 1 tablet  1 tablet Oral BID PRN Alen Caal MD        sertraline tablet 100 mg  100 mg Oral Daily Alen Caal MD   100 mg at 01/24/22 1023    simethicone chewable tablet 80 mg  1 tablet Oral QID PRN Alen Caal MD        sodium chloride 0.9% flush 10 mL  10 mL Intravenous PRN Alen Caal MD        sodium chloride 0.9% flush 10 mL  10 mL Intravenous Q8H Alen Caal MD   10 mL at 01/24/22 0600    sucralfate 100 mg/mL suspension 1 g  1 g Oral Q6H Alen Caal MD   1 g at 01/24/22 0602     Current Discharge Medication List      START taking these medications    Details   sodium chloride 0.9% SolP 50 mL with DAPTOmycin 350 mg SolR 350 mg Inject 350 mg into the vein once daily. Ending 02/6/22         CONTINUE these medications which have CHANGED    Details   HYDROcodone-acetaminophen (NORCO) 5-325 mg per tablet Take 1-2 tablets by mouth every 6 (six) hours as needed.  Qty: 10 tablet, Refills: 0    Comments: Quantity prescribed more than 7 day supply? No         CONTINUE these medications which have NOT CHANGED    Details   alendronate (FOSAMAX) 70 MG tablet TAKE 1 TABLET BY MOUTH EVERY 7 DAYS  Qty: 12 tablet, Refills: 0      amitriptyline (ELAVIL) 75 MG  tablet Take 1 tablet by mouth in the evening  Qty: 90 tablet, Refills: 0      atorvastatin (LIPITOR) 40 MG tablet Take 1 tablet by mouth in the evening  Qty: 90 tablet, Refills: 4      ergocalciferol (ERGOCALCIFEROL) 50,000 unit Cap Take 1 capsule by mouth once a week  Qty: 8 capsule, Refills: 0      levothyroxine (SYNTHROID) 88 MCG tablet TAKE 1 TABLET BY MOUTH ONCE DAILY MONDAY - SATURDAY  AND ONE & ONE-HALF TABLET ON SUNDAY  Qty: 90 tablet, Refills: 0      !! propranoloL (INDERAL) 40 MG tablet Take 1 tablet by mouth twice daily  Qty: 180 tablet, Refills: 0      !! propranoloL (INDERAL) 40 MG tablet Take 1 tablet by mouth twice daily  Qty: 180 tablet, Refills: 0      sertraline (ZOLOFT) 100 MG tablet Take 1 tablet by mouth once daily  Qty: 90 tablet, Refills: 3      ACCU-CHEK MARIANO PLUS TEST STRP Strp USE 1 STRIP TO CHECK GLUCOSE ONCE DAILY  Qty: 50 strip, Refills: 1    Comments: Please consider 90 day supplies to promote better adherence      aspirin (ECOTRIN) 81 MG EC tablet Take 1 tablet (81 mg total) by mouth once daily.  Qty: 1 tablet, Refills: 0      blood-glucose meter kit Use as instructed  Qty: 1 each, Refills: 0      lancets-blood glucose strips 30 gauge Cmpk 1 application by Misc.(Non-Drug; Combo Route) route once daily at 6am.  Qty: 90 each, Refills: 0      meloxicam (MOBIC) 7.5 MG tablet Take 1 tablet (7.5 mg total) by mouth 2 (two) times daily as needed for Pain. Take with food.  Qty: 30 tablet, Refills: 1    Associated Diagnoses: Lumbar spondylosis      mupirocin (BACTROBAN) 2 % ointment Apply topically 2 (two) times daily.  Qty: 15 g, Refills: 1    Associated Diagnoses: Infected finger joint      omeprazole (PRILOSEC) 20 MG capsule Take 1 capsule (20 mg total) by mouth as needed.  Qty: 90 capsule, Refills: 4      sumatriptan (IMITREX) 100 MG tablet TAKE 1 TABLET BY MOUTH AT ONSET OF MIGRAINE  Qty: 9 tablet, Refills: 0      vit C/E/Zn/coppr/lutein/zeaxan (PRESERVISION AREDS-2 ORAL) Take 1 tablet by  mouth 2 (two) times a day.      VIT C/VIT E/LUTEIN/MIN/OMEGA-3 (OCUVITE ORAL) Take by mouth once daily.       !! - Potential duplicate medications found. Please discuss with provider.            I have seen and examined this patient within the last 30 days. My clinical findings that support the need for the home health skilled services and home bound status are the following:no   Weakness/numbness causing balance and gait disturbance due to Infection making it taxing to leave home.     Diet:   regular diet    Labs:    Weekly Weekly ESR, CRP, CB, BMP, and CK    Referrals/ Consults  Physical Therapy to evaluate and treat. Evaluate for home safety and equipment needs; Establish/upgrade home exercise program. Perform / instruct on therapeutic exercises, gait training, transfer training, and Range of Motion.  Occupational Therapy to evaluate and treat. Evaluate home environment for safety and equipment needs. Perform/Instruct on transfers, ADL training, ROM, and therapeutic exercises.   to evaluate for community resources/long-range planning.  Aide to provide assistance with personal care, ADLs, and vital signs.    Activities:   activity as tolerated    Nursing:   Agency to admit patient within 24 hours of hospital discharge unless specified on physician order or at patient request    SN to complete comprehensive assessment including routine vital signs. Instruct on disease process and s/s of complications to report to MD. Review/verify medication list sent home with the patient at time of discharge  and instruct patient/caregiver as needed. Frequency may be adjusted depending on start of care date.     Skilled nurse to perform up to 3 visits PRN for symptoms related to diagnosis    Notify MD if SBP > 160 or < 90; DBP > 90 or < 50; HR > 120 or < 50; Temp > 101; O2 < 88%    Ok to schedule additional visits based on staff availability and patient request on consecutive days within the home health  episode.    When multiple disciplines ordered:    Start of Care occurs on Sunday - Wednesday schedule remaining discipline evaluations as ordered on separate consecutive days following the start of care.    Thursday SOC -schedule subsequent evaluations Friday and Monday the following week.     Friday - Saturday SOC - schedule subsequent discipline evaluations on consecutive days starting Monday of the following week.    For all post-discharge communication and subsequent orders please contact patient's primary care physician.    Miscellaneous   Home Infusion Therapy:   SN to perform Infusion Therapy/Central Line Care.  Review Central Line Care & Central Line Flush with patient.    Administer (drug and dose): Daptomycin 350 mg IV Q24H until 2/6/22  Last dose given: 1/24/22                         Home dose due: 1/25/22    Scrub the Hub: Prior to accessing the line, always perform a 30 second alcohol scrub  Each lumen of the central line is to be flushed at least daily with 10 mL Normal Saline and 3 mL Heparin flush (10 units/mL)  Skilled Nurse (SN) may draw blood from IV access  Blood Draw Procedure:   - Aspirate at least 5 mL of blood   - Discard   - Obtain specimen   - Change injection cap   - Flush with 20 mL Normal Saline followed by a                 3-5 mL Heparin flush (10 units/mL)  Central :   - Sterile dressing changes are done weekly and as needed.   - Use chlor-hexadine scrub to cleanse site, apply Biopatch to insertion site,       apply securement device dressing   - Injection caps are changed weekly and after EVERY lab draw.   - If sterile gauze is under dressing to control oozing,                 dressing change must be performed every 24 hours until gauze is not needed.    Home Health Aide:  Nursing Three times weekly, Physical Therapy Three times weekly, Occupational Therapy Three times weekly, Medical Social Work Weekly and Home Health Aide Three times weekly    Wound Care  Orders  n/a    I certify that this patient is confined to her home and needs intermittent skilled nursing care, physical therapy and occupational therapy.           [facility]

## 2022-01-24 NOTE — PLAN OF CARE
[facility]  Initial Discharge Assessment       Primary Care Provider: Kavya Mesa MD    Admission Diagnosis: Abrasion of right index finger with infection [S60.410A, L08.9]  Finger osteomyelitis, right [M86.9]    Admission Date: 1/21/2022  Expected Discharge Date: 1/22/2022    Discharge Barriers Identified: None    Payor: [employer] MEDICARE / Plan: [employer] MEDICARE / Product Type: Medicare Advantage /     Extended Emergency Contact Information  Primary Emergency Contact: aaron lam  Mobile Phone: [phone]  Relation: Son    Discharge Plan A: Home with family,Home Health  Discharge Plan B: Home with family,Home Health      [facility] [facility] [place] - [address] [facility] [facility]  [address] [facility] [address]  Phone: [phone] Fax: [phone]    [facility] [facility] [place] - [address]  [address] [address]  Phone: [phone] Fax: [phone]    [address] - [address] [place] [address]  [address] [place] [address]  [facility] [address]  Phone: [phone] Fax: [phone]      Initial Assessment (most recent)     Adult Discharge Assessment - 01/24/22 0950        Discharge Assessment    Assessment Type Discharge Planning Assessment     Confirmed/corrected address, phone number and insurance Yes     Confirmed Demographics Correct on Facesheet     Source of Information patient     When was your last doctors appointment? 08/12/21     Communicated JUAN with patient/caregiver Yes     Reason For Admission Finger infection     Lives With alone     Facility Arrived From: Home     Do you expect to return to your current living situation? Yes     Do you have help at home or someone to help you manage your care at home? Yes     Who are your caregiver(s) and their phone number(s)? Aaron Lam [phone]     Prior to hospitilization  cognitive status: Alert/Oriented     Current cognitive status: Alert/Oriented     Walking or Climbing Stairs Difficulty none     Dressing/Bathing Difficulty none     Equipment Currently Used at Home none     Readmission within 30 days? No     Patient currently being followed by outpatient case management? No     Do you currently have service(s) that help you manage your care at home? No     Do you take prescription medications? Yes     Do you have prescription coverage? Yes     Coverage [facility]     Do you have any problems affording any of your prescribed medications? No     Is the patient taking medications as prescribed? yes     Who is going to help you get home at discharge? [place]     How do you get to doctors appointments? family or friend will provide;car, drives self     Are you on dialysis? No     Do you take coumadin? No     Discharge Plan A Home with family;Home Health     Discharge Plan B Home with family;Home Health     DME Needed Upon Discharge  none     Discharge Plan discussed with: Patient     Discharge Barriers Identified None               CM met with patient at the bedside to assess for discharge needs.  Patient normally lives alone but her son is currently staying with her.  Her son will be her transportation home at discharge.  CM discussed the possible need for home health services.  Patient is agreeable and a choice form was completed for [facility] assignment. CM will continue to follow for needs.  CM provided a transitional care folder, information on advanced directives, information on pharmacy bedside delivery, and discharge planning begins on admission with contact information for any needs/questions.

## 2022-01-24 NOTE — PLAN OF CARE
Patient received laying in bed awake, x4, in no acute distress or discomfort. Vitals presents within defined limits. Medications tolerated well. Dressing to right hand remains clean and intact with splint in place. Safety wdl, Care and comfort provided for by staff. Will continue to moniotr progress.

## 2022-01-24 NOTE — PROGRESS NOTES
[facility]  Orthopedics  Progress Note    Patient Name: Haim Martin  MRN: [id number]  Admission Date: 1/21/2022  Hospital Length of Stay: 3 days  Attending Provider: Vinnie Prieto MD  Primary Care Provider: Kavya Mesa MD  Follow-up For: Procedure(s) (LRB):  INCISION AND DRAINAGE, HAND (Right)    Post-Operative Day: 3 Days Post-Op  Subjective:     Principal Problem:Finger osteomyelitis, right    Principal Orthopedic Problem:  Osteomyelitis right index finger    Interval History: Haim Martin is a 81-year-old female postop day 3 status post right index finger I and D with bone debridement.  Patient is resting comfortably in the bed.  She reports that she experienced pain the middle the night and hand the dressing taken down and wound redressed.  Patient reports packing was pulled at that time.  No new complaints at this time    Review of patient's allergies indicates:   Allergen Reactions    Clindamycin      Heaviness in chest      Penicillins Hives       Current Facility-Administered Medications   Medication    acetaminophen tablet 650 mg    aluminum-magnesium hydroxide-simethicone 200-200-20 mg/5 mL suspension 30 mL    aluminum-magnesium hydroxide-simethicone 200-200-20 mg/5 mL suspension 30 mL    amitriptyline tablet 50 mg    And    amitriptyline tablet 25 mg    aspirin EC tablet 81 mg    atorvastatin tablet 40 mg    chlorhexidine 0.12 % solution 10 mL    DAPTOmycin (CUBICIN) 350 mg in sodium chloride 0.9% IVPB    dextrose 50% injection 12.5 g    dextrose 50% injection 25 g    glucagon (human recombinant) injection 1 mg    glucose chewable tablet 16 g    glucose chewable tablet 24 g    heparin (porcine) injection 5,000 Units    HYDROcodone-acetaminophen  mg per tablet 1 tablet    HYDROcodone-acetaminophen 5-325 mg per tablet 1 tablet    insulin aspart U-100 pen 0-5 Units    levothyroxine tablet 88 mcg    magnesium oxide tablet 800 mg    magnesium  "oxide tablet 800 mg    melatonin tablet 6 mg    meloxicam tablet 7.5 mg    naloxone 0.4 mg/mL injection 0.02 mg    ondansetron disintegrating tablet 8 mg    ondansetron injection 4 mg    pantoprazole EC tablet 40 mg    potassium bicarbonate disintegrating tablet 35 mEq    potassium bicarbonate disintegrating tablet 50 mEq    potassium bicarbonate disintegrating tablet 60 mEq    potassium, sodium phosphates 280-160-250 mg packet 2 packet    potassium, sodium phosphates 280-160-250 mg packet 2 packet    potassium, sodium phosphates 280-160-250 mg packet 2 packet    propranoloL tablet 40 mg    senna-docusate 8.6-50 mg per tablet 1 tablet    sertraline tablet 100 mg    simethicone chewable tablet 80 mg    sodium chloride 0.9% flush 10 mL    sodium chloride 0.9% flush 10 mL    sucralfate 100 mg/mL suspension 1 g     Objective:     Vital Signs (Most Recent):  Temp: 97.5 °F (36.4 °C) (01/24/22 0741)  Pulse: 70 (01/24/22 0741)  Resp: 15 (01/24/22 0741)  BP: 134/61 (01/24/22 0741)  SpO2: 97 % (01/24/22 0741) Vital Signs (24h Range):  Temp:  [97.5 °F (36.4 °C)-98.6 °F (37 °C)] 97.5 °F (36.4 °C)  Pulse:  [69-76] 70  Resp:  [14-16] 15  SpO2:  [93 %-98 %] 97 %  BP: (117-148)/(56-67) 134/61     Weight: 68.9 kg (151 lb 14.4 oz)  Height: 4' 11" (149.9 cm)  Body mass index is 30.68 kg/m².      Intake/Output Summary (Last 24 hours) at 1/24/2022 1104  Last data filed at 1/24/2022 1100  Gross per 24 hour   Intake 907.02 ml   Output --   Net 907.02 ml       Ortho/SPM Exam   Right index finger  Dressing is clean, dry, and intact  Mild edema  Sensation pulses intact    GEN: Well developed, well nourished female. AAOX3. No acute distress.   Normocephalic, atraumatic.   FARRAH  Breathing unlabored.  Mood and affect appropriate.      Significant Labs: All pertinent labs within the past 24 hours have been reviewed.    Significant Imaging: I have reviewed and interpreted all pertinent imaging " results/findings.    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] 05/16/2019 Yes    Pre-diabetes [R73.03] 07/28/2015 Yes    Hypothyroidism [E03.9] 03/10/2014 Yes    Anxiety [F41.9] 03/10/2014 Yes      Problems Resolved During this Admission:     Assessment:  81-year-old female postop day 3 status post right index finger I&D with post debridement    Plan:  NWB RUE  ROM as tolerated  Continue IV antibiotics  Await culture results  Packing was removed today  DVT prophylaxis:  SCDs and ambulation, chemoprophylaxis per primary team  [facility] 2 weeks postop  Ready for discharge from orthopedic standpoint    Joseph Kenny PA-C  Orthopedics  [facility]

## 2022-01-24 NOTE — PLAN OF CARE
FLORENCE communicated with Marilu at [facility].  They will process the antibiotic order but does not expect to receive authorization from [facility] today.  FLORENCE updated Dr Prieto via secure chat.

## 2022-01-24 NOTE — CONSULTS
CM faxed home health orders to [facility] for assignment.    FLORENCE sent a message to Marilu Bhardwaj to check status of IV antibiotics

## 2022-01-25 ENCOUNTER — TELEPHONE (OUTPATIENT)
Dept: ORTHOPEDICS | Facility: CLINIC | Age: 82
End: 2022-01-25
Payer: MEDICARE

## 2022-01-25 VITALS
BODY MASS INDEX: 28.98 KG/M2 | RESPIRATION RATE: 15 BRPM | SYSTOLIC BLOOD PRESSURE: 137 MMHG | TEMPERATURE: 98 F | WEIGHT: 143.75 LBS | OXYGEN SATURATION: 96 % | DIASTOLIC BLOOD PRESSURE: 75 MMHG | HEART RATE: 102 BPM | HEIGHT: 59 IN

## 2022-01-25 PROCEDURE — 25000003 PHARM REV CODE 250: Performed by: INTERNAL MEDICINE

## 2022-01-25 PROCEDURE — 63600175 PHARM REV CODE 636 W HCPCS: Performed by: INTERNAL MEDICINE

## 2022-01-25 PROCEDURE — 25000003 PHARM REV CODE 250: Performed by: PHYSICIAN ASSISTANT

## 2022-01-25 PROCEDURE — A4216 STERILE WATER/SALINE, 10 ML: HCPCS | Performed by: INTERNAL MEDICINE

## 2022-01-25 RX ADMIN — LEVOTHYROXINE SODIUM 88 MCG: 88 TABLET ORAL at 05:01

## 2022-01-25 RX ADMIN — HYDROCODONE BITARTRATE AND ACETAMINOPHEN 1 TABLET: 10; 325 TABLET ORAL at 01:01

## 2022-01-25 RX ADMIN — HEPARIN SODIUM 5000 UNITS: 5000 INJECTION INTRAVENOUS; SUBCUTANEOUS at 05:01

## 2022-01-25 RX ADMIN — SERTRALINE HYDROCHLORIDE 100 MG: 50 TABLET ORAL at 08:01

## 2022-01-25 RX ADMIN — CHLORHEXIDINE GLUCONATE 0.12% ORAL RINSE 10 ML: 1.2 LIQUID ORAL at 08:01

## 2022-01-25 RX ADMIN — HYDROCODONE BITARTRATE AND ACETAMINOPHEN 1 TABLET: 5; 325 TABLET ORAL at 03:01

## 2022-01-25 RX ADMIN — ASPIRIN 81 MG: 81 TABLET, COATED ORAL at 08:01

## 2022-01-25 RX ADMIN — DAPTOMYCIN 350 MG: 350 INJECTION, POWDER, LYOPHILIZED, FOR SOLUTION INTRAVENOUS at 10:01

## 2022-01-25 RX ADMIN — PANTOPRAZOLE SODIUM 40 MG: 40 TABLET, DELAYED RELEASE ORAL at 08:01

## 2022-01-25 RX ADMIN — Medication 10 ML: at 05:01

## 2022-01-25 NOTE — NURSING
Received pt sitting up in bed awake and alert, resp even and unlabored, denies any pain or discomfort at this time, assessment per flowsheet, bed low, call light within reach. Will continue to monitor.

## 2022-01-25 NOTE — PLAN OF CARE
Andrew spoke with Melvi with [facility], patient has been placed with [facility].  contacted [facility] to confirm admit date, [employer] will admit on 01/26/2022.

## 2022-01-25 NOTE — NURSING
Pt c/o pain to left hip. Tolerated pain medication well. Awake and alert, bed low, call light within reach.

## 2022-01-25 NOTE — DISCHARGE SUMMARY
[facility] Medicine  Discharge Summary      Patient Name: Haim Martin  MRN: [id number]  Patient Class: IP- Inpatient  Admission Date: 1/21/2022  Hospital Length of Stay: 4 days  Discharge Date and Time:  01/25/2022 12:59 PM  Attending Physician: Irineo Kendall MD   Discharging Provider: Irineo Kendall MD  Primary Care Provider: Kavya Mesa MD      HPI:   Patient 82 yo female with pmhx significant for HTN, Hypothyroidism, Hyperlipidemia presents to hospital for I&D of R Index finger. Patient with cellulitis, concern for Osteomyelitis. Patient wound culture on 1/9/22 + for MSSA sn to Bactrim, Oxacillin. Patient tolerated procedure well and is placed in O/P extended recovery. Patietn denies current pain, no cp, sob, n/v/d/f/c. Patient initiated on Vancomycin, ID consult pending.Patient is afebrile, alert and appropriate. Await consultant inputs.        Procedure(s) (LRB):  INCISION AND DRAINAGE, HAND (Right)      Hospital Course:   S/p I&D of right index finger. Pain improving. Infectious diease consulted recommends IV daptomycin 350 mg daily x 6 weeks. Picc line placed today. Social work consulted to arrange for home infusion, approved with [facility] [place]. Patient to follow-up with Orthopedic Surgery in outpatient setting. Patient clinically improved and medically stable for discharge home with home health with instructions to follow-up with PCP in 1-2 weeks.    Patient and/or family was seen on day of discharge by myself and was examined. They were stable for discharge. Discharge plan, follow up instructions, and contacts in case of further needs were discussed in detail.         Goals of Care Treatment Preferences:  Code Status: Full Code      Consults:   Consults (From admission, onward)        Status Ordering Provider     Inpatient consult to Social Work  Once        Provider:  (Not yet assigned)    Completed LASHON COUCH     Inpatient virtual consult to [facility]  Medicine  Once        Provider:  Vinnie Prieto MD    Completed KARO GUTIÉRREZ     Inpatient consult to Social Work/Case Management  Once        Provider:  (Not yet assigned)    Completed ROSA CHAMBERS     Inpatient consult to PICC team ([facility])  Once        Provider:  (Not yet assigned)    Acknowledged ROSA CHAMBERS     Inpatient consult to Social Work  Once        Provider:  (Not yet assigned)    Completed KARO GUTIÉRREZ     Inpatient consult to Infectious Diseases  Once        Provider:  Kevin Flor MD    Completed ROSA CHAMBERS          No new Assessment & Plan notes have been filed under this hospital service since the last note was generated.  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    Specialty: Family Medicine  Why: Post-hospitalization Follow-up  Contact information:  [address] [place] [address] [phone]             Ramirez Flores MD. Schedule an appointment as soon as possible for a visit in 2 weeks.    Specialty: Orthopedic Surgery  Why: Post-hospitalization Follow-up  Contact information:  [address] [place] [address] Dr Marizol [place]E [address] [phone]                       Patient Instructions:      Ambulatory referral/consult to Home Health   Standing Status: Future   Referral Priority: Routine Referral Type: Home Health   Referral Reason: Specialty Services Required   Requested Specialty:  Home Health Services   Number of Visits Requested: 1     Diet Cardiac     Notify your health care provider if you experience any of the following:  temperature >100.4     Notify your health care provider if you experience any of the following:  persistent nausea and vomiting or diarrhea     Notify your health care provider if you experience any of the following:  severe uncontrolled pain     Notify your health care provider if you experience any of the following:  redness, tenderness, or signs of infection (pain, swelling, redness, odor or green/yellow discharge around incision site)     Notify your health care provider if you experience any of the following:  difficulty breathing or increased cough     Notify your health care provider if you experience any of the following:  severe persistent headache     Notify your health care provider if you experience any of the following:  worsening rash     Notify your health care provider if you experience any of the following:  persistent dizziness, light-headedness, or visual disturbances     Notify your health care provider if you experience any of the following:  increased confusion or weakness     Notify your health care provider if you experience any of the following:  temperature >100.4     Notify your health care provider if you experience any of the following:  severe uncontrolled pain     Notify your health care provider if you experience any of the following:  redness, tenderness, or signs of infection (pain, swelling, redness, odor or green/yellow discharge around incision site)     Notify your health care provider if you experience any of the following:  difficulty breathing or increased cough     Notify your health care provider if you experience any of the following:  persistent dizziness, light-headedness, or visual disturbances     Notify your health care provider if you experience any of the following:  increased confusion or weakness     Activity as  tolerated       Significant Diagnostic Studies: Labs:   [facility]   Recent Labs   Lab 01/24/22  0908      K 3.4*      CO2 24   *   BUN 13   CREATININE 0.9   CALCIUM 8.7   PROT 6.0   ALBUMIN 3.1*   BILITOT 0.4   ALKPHOS 81   AST 24   ALT 21   ANIONGAP 11   ESTGFRAFRICA >60   EGFRNONAA >60    and CBC No results for input(s): WBC, HGB, HCT, PLT in the last 48 hours.    Pending Diagnostic Studies:     None         Medications:  Reconciled Home Medications:      Medication List      START taking these medications    sodium chloride 0.9% SolP 50 mL with DAPTOmycin 350 mg SolR 350 mg  Inject 350 mg into the vein once daily. Ending 02/6/22        CHANGE how you take these medications    HYDROcodone-acetaminophen 5-325 mg per tablet  Commonly known as: NORCO  Take 1-2 tablets by mouth every 6 (six) hours as needed.  What changed:   · how much to take  · when to take this  · reasons to take this        CONTINUE taking these medications    ACCU-CHEK MARIANO PLUS TEST STRP Strp  Generic drug: blood sugar diagnostic  USE 1 STRIP TO CHECK GLUCOSE ONCE DAILY     alendronate 70 MG tablet  Commonly known as: FOSAMAX  TAKE 1 TABLET BY MOUTH EVERY 7 DAYS     amitriptyline 75 MG tablet  Commonly known as: ELAVIL  Take 1 tablet by mouth in the evening     aspirin 81 MG EC tablet  Commonly known as: ECOTRIN  Take 1 tablet (81 mg total) by mouth once daily.     atorvastatin 40 MG tablet  Commonly known as: LIPITOR  Take 1 tablet by mouth in the evening     blood-glucose meter kit  Use as instructed     ergocalciferol 50,000 unit Cap  Commonly known as: ERGOCALCIFEROL  Take 1 capsule by mouth once a week     lancets-blood glucose strips [facility]  1 application by Misc.(Non-Drug; Combo Route) route once daily at 6am.     levothyroxine 88 MCG tablet  Commonly known as: SYNTHROID  TAKE 1 TABLET BY MOUTH ONCE DAILY MONDAY - SATURDAY  AND ONE & ONE-HALF TABLET ON SUNDAY     meloxicam 7.5 MG tablet  Commonly known as: MOBIC  Take  1 tablet (7.5 mg total) by mouth 2 (two) times daily as needed for Pain. Take with food.     mupirocin 2 % ointment  Commonly known as: BACTROBAN  Apply topically 2 (two) times daily.     OCUVITE ORAL  Take by mouth once daily.     omeprazole 20 MG capsule  Commonly known as: PRILOSEC  Take 1 capsule (20 mg total) by mouth as needed.     PRESERVISION AREDS-2 ORAL  Take 1 tablet by mouth 2 (two) times a day.     * propranoloL 40 MG tablet  Commonly known as: INDERAL  Take 1 tablet by mouth twice daily     * propranoloL 40 MG tablet  Commonly known as: INDERAL  Take 1 tablet by mouth twice daily     sertraline 100 MG tablet  Commonly known as: ZOLOFT  Take 1 tablet by mouth once daily     sumatriptan 100 MG tablet  Commonly known as: IMITREX  TAKE 1 TABLET BY MOUTH AT ONSET OF MIGRAINE         * This list has 2 medication(s) that are the same as other medications prescribed for you. Read the directions carefully, and ask your doctor or other care provider to review them with you.                Indwelling Lines/Drains at time of discharge:   Lines/Drains/Airways     Peripherally Inserted Central Catheter Line            PICC Double Lumen 01/22/22 1323 left basilic 2 days                Time spent on the discharge of patient: 35 minutes         The attending portion of this evaluation, treatment, and documentation was performed per Irineo Kendall MD via Telemedicine AudioVisual using the secure Vidyo software platform with 2 way audio/video. The provider was located off-site and the patient is located in the hospital. The aforementioned video software was utilized to document the relevant history and physical exam    Irineo Kendall MD  Department of Hospital Medicine  [facility] Surg

## 2022-01-25 NOTE — PLAN OF CARE
[facility] - Med Surg  Discharge Final Note    Primary Care Provider: Kavya Mesa MD    Expected Discharge Date: 1/25/2022    Final Discharge Note (most recent)     Final Note - 01/25/22 1322        Final Note    Assessment Type Final Discharge Note     Anticipated Discharge Disposition Home or Self Care     Hospital Resources/Appts/Education Provided Provided patient/caregiver with written discharge plan information;Appointments scheduled and added to AVS        Post-Acute Status    Discharge Delays None known at this time                 Important Message from Medicare  Important Message from Medicare regarding Discharge Appeal Rights: Given to patient/caregiver,Explained to patient/caregiver,Signed/date by patient/caregiver     Date IMM was signed: 01/24/22  Time IMM was signed: 0938    Contact Info     Kavya Mesa MD   Specialty: Family Medicine   Relationship: PCP - General    [address] [place] [address] [place] [address]   Phone: [phone]       Next Steps: Schedule an appointment as soon as possible for a visit    Instructions: Post-hospitalization Follow-up    Ramirez Flores MD   Specialty: Orthopedic Surgery    [address] [place] [address] Dr Marizol [address]E [address]   Phone: [phone]       Next Steps: Schedule an appointment as soon as possible for a visit in 2 week(s)    Instructions: Post-hospitalization Follow-up        Patient will dc home with [facility] . FU appts scheduled and added to AVS. No other cm needs.

## 2022-01-25 NOTE — PLAN OF CARE
Patient received laying in bed awake, x4, in no acute distress or discomfort. Vitals presents within defined limits. Medications tolerated well. Dressing to right hand remains clean and intact with splint in place. Safety wdl, Care and comfort provided for by staff. Will continue to moniotr progress.  **pt. D/c'd during shift, paperwork reviewed and received by pt. No additional questions or concerns.

## 2022-01-25 NOTE — DISCHARGE INSTRUCTIONS
Thank you for choosing [facility] for your medical care. The primary doctor who is taking care of you at the time of your discharge is Irineo Kendall MD.     You were admitted to the hospital with Finger osteomyelitis, right.     Please note your discharge instructions, including diet/activity restrictions, follow-up appointments, and medication changes.  If you have any questions about your medical issues, prescriptions, or any other questions, please feel free to contact the [facility] at [phone] and we will help.    If you are previously with Home health, outpatient PT/OT or under a therapy program, you are cleared to return to those programs.    Please direct all long term medication refills and follow up to your primary care provider, Kavya Mesa MD. Thank you again for letting us take care of your health care needs.    Please note the following discharge instructions per your discharging physician-  Follow-up with your PCP in 1-2 weeks.  Follow-up with your Orthopedic Surgeon in 1-2 weeks.

## 2022-01-26 ENCOUNTER — LAB VISIT (OUTPATIENT)
Dept: LAB | Facility: HOSPITAL | Age: 82
End: 2022-01-26
Attending: INTERNAL MEDICINE
Payer: MEDICARE

## 2022-01-26 DIAGNOSIS — M86.9 INFLAMMATION OF BONE: Primary | ICD-10-CM

## 2022-01-26 LAB
ALBUMIN SERPL BCP-MCNC: 3.6 G/DL (ref 3.5–5.2)
ALP SERPL-CCNC: 97 U/L (ref 55–135)
ALT SERPL W/O P-5'-P-CCNC: 35 U/L (ref 10–44)
ANION GAP SERPL CALC-SCNC: 12 MMOL/L (ref 8–16)
AST SERPL-CCNC: 28 U/L (ref 10–40)
BACTERIA SPEC AEROBE CULT: NO GROWTH
BACTERIA SPEC ANAEROBE CULT: NORMAL
BASOPHILS # BLD AUTO: 0.03 K/UL (ref 0–0.2)
BASOPHILS NFR BLD: 0.4 % (ref 0–1.9)
BILIRUB SERPL-MCNC: 0.6 MG/DL (ref 0.1–1)
BUN SERPL-MCNC: 15 MG/DL (ref 8–23)
CALCIUM SERPL-MCNC: 8.9 MG/DL (ref 8.7–10.5)
CHLORIDE SERPL-SCNC: 101 MMOL/L (ref 95–110)
CK SERPL-CCNC: 49 U/L (ref 20–180)
CO2 SERPL-SCNC: 26 MMOL/L (ref 23–29)
CREAT SERPL-MCNC: 0.8 MG/DL (ref 0.5–1.4)
CRP SERPL-MCNC: 136.6 MG/L (ref 0–8.2)
DIFFERENTIAL METHOD: ABNORMAL
EOSINOPHIL # BLD AUTO: 0.3 K/UL (ref 0–0.5)
EOSINOPHIL NFR BLD: 3.9 % (ref 0–8)
ERYTHROCYTE [DISTWIDTH] IN BLOOD BY AUTOMATED COUNT: 15.1 % (ref 11.5–14.5)
ERYTHROCYTE [SEDIMENTATION RATE] IN BLOOD BY WESTERGREN METHOD: 85 MM/HR (ref 0–20)
EST. GFR  (AFRICAN AMERICAN): >60 ML/MIN/1.73 M^2
EST. GFR  (NON AFRICAN AMERICAN): >60 ML/MIN/1.73 M^2
GLUCOSE SERPL-MCNC: 104 MG/DL (ref 70–110)
HCT VFR BLD AUTO: 35.6 % (ref 37–48.5)
HGB BLD-MCNC: 10.9 G/DL (ref 12–16)
IMM GRANULOCYTES # BLD AUTO: 0.02 K/UL (ref 0–0.04)
IMM GRANULOCYTES NFR BLD AUTO: 0.3 % (ref 0–0.5)
LYMPHOCYTES # BLD AUTO: 1.1 K/UL (ref 1–4.8)
LYMPHOCYTES NFR BLD: 14 % (ref 18–48)
MCH RBC QN AUTO: 31.1 PG (ref 27–31)
MCHC RBC AUTO-ENTMCNC: 30.6 G/DL (ref 32–36)
MCV RBC AUTO: 102 FL (ref 82–98)
MONOCYTES # BLD AUTO: 0.8 K/UL (ref 0.3–1)
MONOCYTES NFR BLD: 10 % (ref 4–15)
NEUTROPHILS # BLD AUTO: 5.4 K/UL (ref 1.8–7.7)
NEUTROPHILS NFR BLD: 71.4 % (ref 38–73)
NRBC BLD-RTO: 0 /100 WBC
PLATELET # BLD AUTO: 158 K/UL (ref 150–450)
PMV BLD AUTO: 11.7 FL (ref 9.2–12.9)
POTASSIUM SERPL-SCNC: 3.7 MMOL/L (ref 3.5–5.1)
PROT SERPL-MCNC: 6.6 G/DL (ref 6–8.4)
RBC # BLD AUTO: 3.5 M/UL (ref 4–5.4)
SODIUM SERPL-SCNC: 139 MMOL/L (ref 136–145)
WBC # BLD AUTO: 7.51 K/UL (ref 3.9–12.7)

## 2022-01-26 PROCEDURE — 82550 ASSAY OF CK (CPK): CPT | Performed by: INTERNAL MEDICINE

## 2022-01-26 PROCEDURE — G0180 PR HOME HEALTH MD CERTIFICATION: ICD-10-PCS | Mod: ,,, | Performed by: INTERNAL MEDICINE

## 2022-01-26 PROCEDURE — G0180 MD CERTIFICATION HHA PATIENT: HCPCS | Mod: ,,, | Performed by: INTERNAL MEDICINE

## 2022-01-26 PROCEDURE — 85025 COMPLETE CBC W/AUTO DIFF WBC: CPT | Performed by: INTERNAL MEDICINE

## 2022-01-26 PROCEDURE — 36415 COLL VENOUS BLD VENIPUNCTURE: CPT | Performed by: INTERNAL MEDICINE

## 2022-01-26 PROCEDURE — 86140 C-REACTIVE PROTEIN: CPT | Performed by: INTERNAL MEDICINE

## 2022-01-26 PROCEDURE — 80053 COMPREHEN METABOLIC PANEL: CPT | Performed by: INTERNAL MEDICINE

## 2022-01-26 PROCEDURE — 85651 RBC SED RATE NONAUTOMATED: CPT | Performed by: INTERNAL MEDICINE

## 2022-01-26 RX ORDER — HYDROCODONE BITARTRATE AND ACETAMINOPHEN 7.5; 325 MG/1; MG/1
TABLET ORAL
Qty: 20 TABLET | Refills: 0 | Status: SHIPPED | OUTPATIENT
Start: 2022-01-26 | End: 2022-02-06

## 2022-01-26 NOTE — TELEPHONE ENCOUNTER
Patient called complaining on L. Hip pain. States that pain rate is 10 out of 10. She was in the hospital for finger infection and she got 14 tablets on Norco 5 - 325. She is taking currently 1 tab. BID with 1 200mg Advil and it doesn't touch her pain, she hardly can move. Per consult with Grace ESPINOZA informed patient that we send Delaplaine 7.5 mg to the pharmacy. And she has to watch her symptoms for constipation. Shae informed patietn that if she takes Advil, stop taking Meloxicam. Asked her to let us know how she is doing in couple of days.

## 2022-01-27 ENCOUNTER — TELEPHONE (OUTPATIENT)
Dept: ORTHOPEDICS | Facility: CLINIC | Age: 82
End: 2022-01-27
Payer: MEDICARE

## 2022-01-27 LAB
BACTERIA BLD CULT: NORMAL
BACTERIA BLD CULT: NORMAL

## 2022-01-27 NOTE — TELEPHONE ENCOUNTER
Spoke with patient and scheduled her post op appointment for 02/04/2022. Patient verbalized understanding of appointment date, time and location.

## 2022-01-31 ENCOUNTER — LAB VISIT (OUTPATIENT)
Dept: LAB | Facility: HOSPITAL | Age: 82
End: 2022-01-31
Attending: INTERNAL MEDICINE
Payer: MEDICARE

## 2022-01-31 ENCOUNTER — PATIENT OUTREACH (OUTPATIENT)
Dept: ADMINISTRATIVE | Facility: HOSPITAL | Age: 82
End: 2022-01-31
Payer: MEDICARE

## 2022-01-31 DIAGNOSIS — M86.9 INFLAMMATION OF BONE: Primary | ICD-10-CM

## 2022-01-31 LAB
ALBUMIN SERPL BCP-MCNC: 3.6 G/DL (ref 3.5–5.2)
ALP SERPL-CCNC: 99 U/L (ref 55–135)
ALT SERPL W/O P-5'-P-CCNC: 20 U/L (ref 10–44)
ANION GAP SERPL CALC-SCNC: 9 MMOL/L (ref 8–16)
AST SERPL-CCNC: 27 U/L (ref 10–40)
BASOPHILS # BLD AUTO: 0.06 K/UL (ref 0–0.2)
BASOPHILS NFR BLD: 0.8 % (ref 0–1.9)
BILIRUB SERPL-MCNC: 0.3 MG/DL (ref 0.1–1)
BUN SERPL-MCNC: 12 MG/DL (ref 8–23)
CALCIUM SERPL-MCNC: 8.9 MG/DL (ref 8.7–10.5)
CHLORIDE SERPL-SCNC: 106 MMOL/L (ref 95–110)
CO2 SERPL-SCNC: 27 MMOL/L (ref 23–29)
CREAT SERPL-MCNC: 1 MG/DL (ref 0.5–1.4)
CRP SERPL-MCNC: 42.2 MG/L (ref 0–8.2)
DIFFERENTIAL METHOD: ABNORMAL
EOSINOPHIL # BLD AUTO: 0.3 K/UL (ref 0–0.5)
EOSINOPHIL NFR BLD: 3.5 % (ref 0–8)
ERYTHROCYTE [DISTWIDTH] IN BLOOD BY AUTOMATED COUNT: 14.5 % (ref 11.5–14.5)
ERYTHROCYTE [SEDIMENTATION RATE] IN BLOOD BY WESTERGREN METHOD: 30 MM/HR (ref 0–20)
EST. GFR  (AFRICAN AMERICAN): >60 ML/MIN/1.73 M^2
EST. GFR  (NON AFRICAN AMERICAN): 54 ML/MIN/1.73 M^2
GLUCOSE SERPL-MCNC: 109 MG/DL (ref 70–110)
HCT VFR BLD AUTO: 36.4 % (ref 37–48.5)
HGB BLD-MCNC: 11.2 G/DL (ref 12–16)
IMM GRANULOCYTES # BLD AUTO: 0.02 K/UL (ref 0–0.04)
IMM GRANULOCYTES NFR BLD AUTO: 0.3 % (ref 0–0.5)
LYMPHOCYTES # BLD AUTO: 1.4 K/UL (ref 1–4.8)
LYMPHOCYTES NFR BLD: 18.6 % (ref 18–48)
MCH RBC QN AUTO: 32.1 PG (ref 27–31)
MCHC RBC AUTO-ENTMCNC: 30.8 G/DL (ref 32–36)
MCV RBC AUTO: 104 FL (ref 82–98)
MONOCYTES # BLD AUTO: 0.8 K/UL (ref 0.3–1)
MONOCYTES NFR BLD: 10.4 % (ref 4–15)
NEUTROPHILS # BLD AUTO: 4.9 K/UL (ref 1.8–7.7)
NEUTROPHILS NFR BLD: 66.4 % (ref 38–73)
NRBC BLD-RTO: 0 /100 WBC
PLATELET # BLD AUTO: 260 K/UL (ref 150–450)
PLATELET BLD QL SMEAR: ABNORMAL
PMV BLD AUTO: 11.2 FL (ref 9.2–12.9)
POTASSIUM SERPL-SCNC: 4.1 MMOL/L (ref 3.5–5.1)
PROT SERPL-MCNC: 6.9 G/DL (ref 6–8.4)
RBC # BLD AUTO: 3.49 M/UL (ref 4–5.4)
SODIUM SERPL-SCNC: 142 MMOL/L (ref 136–145)
WBC # BLD AUTO: 7.4 K/UL (ref 3.9–12.7)

## 2022-01-31 PROCEDURE — 85025 COMPLETE CBC W/AUTO DIFF WBC: CPT | Performed by: INTERNAL MEDICINE

## 2022-01-31 PROCEDURE — 86140 C-REACTIVE PROTEIN: CPT | Performed by: INTERNAL MEDICINE

## 2022-01-31 PROCEDURE — 36415 COLL VENOUS BLD VENIPUNCTURE: CPT | Performed by: INTERNAL MEDICINE

## 2022-01-31 PROCEDURE — 80053 COMPREHEN METABOLIC PANEL: CPT | Performed by: INTERNAL MEDICINE

## 2022-01-31 PROCEDURE — 85651 RBC SED RATE NONAUTOMATED: CPT | Performed by: INTERNAL MEDICINE

## 2022-02-01 ENCOUNTER — OFFICE VISIT (OUTPATIENT)
Dept: FAMILY MEDICINE | Facility: CLINIC | Age: 82
End: 2022-02-01
Payer: MEDICARE

## 2022-02-01 VITALS
DIASTOLIC BLOOD PRESSURE: 78 MMHG | BODY MASS INDEX: 26.27 KG/M2 | HEIGHT: 59 IN | HEART RATE: 100 BPM | WEIGHT: 130.31 LBS | SYSTOLIC BLOOD PRESSURE: 130 MMHG | TEMPERATURE: 97 F | OXYGEN SATURATION: 97 %

## 2022-02-01 DIAGNOSIS — M46.1 SACROILIITIS: ICD-10-CM

## 2022-02-01 DIAGNOSIS — M86.9 FINGER OSTEOMYELITIS, RIGHT: Primary | ICD-10-CM

## 2022-02-01 DIAGNOSIS — S06.6X0A TRAUMATIC SUBARACHNOID HEMORRHAGE WITHOUT LOSS OF CONSCIOUSNESS, INITIAL ENCOUNTER: ICD-10-CM

## 2022-02-01 PROCEDURE — 99999 PR PBB SHADOW E&M-EST. PATIENT-LVL IV: ICD-10-PCS | Mod: PBBFAC,,, | Performed by: NURSE PRACTITIONER

## 2022-02-01 PROCEDURE — 99999 PR PBB SHADOW E&M-EST. PATIENT-LVL IV: CPT | Mod: PBBFAC,,, | Performed by: NURSE PRACTITIONER

## 2022-02-01 PROCEDURE — 1126F PR PAIN SEVERITY QUANTIFIED, NO PAIN PRESENT: ICD-10-PCS | Mod: CPTII,S$GLB,, | Performed by: NURSE PRACTITIONER

## 2022-02-01 PROCEDURE — 3288F FALL RISK ASSESSMENT DOCD: CPT | Mod: CPTII,S$GLB,, | Performed by: NURSE PRACTITIONER

## 2022-02-01 PROCEDURE — 3078F PR MOST RECENT DIASTOLIC BLOOD PRESSURE < 80 MM HG: ICD-10-PCS | Mod: CPTII,S$GLB,, | Performed by: NURSE PRACTITIONER

## 2022-02-01 PROCEDURE — 1101F PT FALLS ASSESS-DOCD LE1/YR: CPT | Mod: CPTII,S$GLB,, | Performed by: NURSE PRACTITIONER

## 2022-02-01 PROCEDURE — 99214 PR OFFICE/OUTPT VISIT, EST, LEVL IV, 30-39 MIN: ICD-10-PCS | Mod: S$GLB,,, | Performed by: NURSE PRACTITIONER

## 2022-02-01 PROCEDURE — 1101F PR PT FALLS ASSESS DOC 0-1 FALLS W/OUT INJ PAST YR: ICD-10-PCS | Mod: CPTII,S$GLB,, | Performed by: NURSE PRACTITIONER

## 2022-02-01 PROCEDURE — 3075F PR MOST RECENT SYSTOLIC BLOOD PRESS GE 130-139MM HG: ICD-10-PCS | Mod: CPTII,S$GLB,, | Performed by: NURSE PRACTITIONER

## 2022-02-01 PROCEDURE — 3075F SYST BP GE 130 - 139MM HG: CPT | Mod: CPTII,S$GLB,, | Performed by: NURSE PRACTITIONER

## 2022-02-01 PROCEDURE — 1126F AMNT PAIN NOTED NONE PRSNT: CPT | Mod: CPTII,S$GLB,, | Performed by: NURSE PRACTITIONER

## 2022-02-01 PROCEDURE — 99214 OFFICE O/P EST MOD 30 MIN: CPT | Mod: S$GLB,,, | Performed by: NURSE PRACTITIONER

## 2022-02-01 PROCEDURE — 3288F PR FALLS RISK ASSESSMENT DOCUMENTED: ICD-10-PCS | Mod: CPTII,S$GLB,, | Performed by: NURSE PRACTITIONER

## 2022-02-01 PROCEDURE — 3078F DIAST BP <80 MM HG: CPT | Mod: CPTII,S$GLB,, | Performed by: NURSE PRACTITIONER

## 2022-02-01 NOTE — PROGRESS NOTES
Subjective:       Patient ID: Haim Martin is a 81 y.o. female.    Chief Complaint: Hospital Follow Up  Transitional Care Note    Family and/or Caretaker present at visit?  No.  Diagnostic tests reviewed/disposition: I have reviewed all completed as well as pending diagnostic tests at the time of discharge.  Disease/illness education: osteomyelitis   Home health/community services discussion/referrals: Patient has home health established at [facility].   Establishment or re-establishment of referral orders for community resources: No other necessary community resources.   Discussion with other health care providers: No discussion with other health care providers necessary.         Patient 82 yo female with pmhx significant for HTN, Hypothyroidism, Hyperlipidemia presents to hospital for I&D of R Index finger. Patient with cellulitis, concern for Osteomyelitis. Patient wound culture on 1/9/22 + for MSSA sn to Bactrim, Oxacillin. Patient tolerated procedure well and is placed in O/P extended recovery. Patietn denies current pain, no cp, sob, n/v/d/f/c. Patient initiated on Vancomycin, ID consult pending.    Discharged to home with IV daptomycin x6 days.  No recent fever, denies worsening edema, erythema.  Follow up appt has been scheduled with ortho        Review of Systems   Constitutional: Negative.  Negative for fever.   HENT: Negative.    Respiratory: Negative.    Genitourinary: Negative.    Musculoskeletal: Negative for arthralgias.       Objective:      Physical Exam  Vitals reviewed.   HENT:      Head: Normocephalic.      Nose: Nose normal.      Mouth/Throat:      Mouth: Mucous membranes are dry.   Cardiovascular:      Rate and Rhythm: Normal rate.      Heart sounds: Normal heart sounds.   Pulmonary:      Effort: Pulmonary effort is normal.      Breath sounds: Normal breath sounds.   Musculoskeletal:      Cervical back: Normal range of motion.      Comments: Index: dressing dry and intact.  Not removed  at this time   Skin:     General: Skin is warm and dry.   Neurological:      Mental Status: She is alert and oriented to person, place, and time.   Psychiatric:         Behavior: Behavior normal.         Assessment:       1. Finger osteomyelitis, right    2. Traumatic subarachnoid hemorrhage without loss of consciousness, initial encounter    3. Sacroiliitis        Plan:   Finger osteomyelitis, right  Continue treatment plan as outlined by ID and ortho.   Traumatic subarachnoid hemorrhage without loss of consciousness, initial encounter  09/28/2019; stable managed per neuro  Sacroiliitis  Stable continue treatment plan     No follow-ups on file.

## 2022-02-04 ENCOUNTER — TELEPHONE (OUTPATIENT)
Dept: ORTHOPEDICS | Facility: CLINIC | Age: 82
End: 2022-02-04
Payer: MEDICARE

## 2022-02-04 NOTE — TELEPHONE ENCOUNTER
----- Message from Miriam Weathers sent at 2/4/2022  8:03 AM CST -----  States she would like to reschedule her appt. Nothing coming up on the schedule. Please call pt [phone]. States she has been throwing up. She would like to get something called in for nausea. Thank you

## 2022-02-04 NOTE — TELEPHONE ENCOUNTER
Spoke with patient and rescheduled her appointment. Patient verbalized understanding of appointment date, time and location.

## 2022-02-06 ENCOUNTER — HOSPITAL ENCOUNTER (INPATIENT)
Facility: HOSPITAL | Age: 82
LOS: 7 days | Discharge: HOME HEALTH | DRG: 871 | End: 2022-02-14
Attending: EMERGENCY MEDICINE | Admitting: INTERNAL MEDICINE
Payer: MEDICARE

## 2022-02-06 DIAGNOSIS — A41.9 SEVERE SEPSIS: Primary | ICD-10-CM

## 2022-02-06 DIAGNOSIS — J96.01 ACUTE RESPIRATORY FAILURE WITH HYPOXIA: ICD-10-CM

## 2022-02-06 DIAGNOSIS — I38 ENDOCARDITIS: ICD-10-CM

## 2022-02-06 DIAGNOSIS — R07.9 CHEST PAIN, UNSPECIFIED TYPE: ICD-10-CM

## 2022-02-06 DIAGNOSIS — R65.20 SEVERE SEPSIS: Primary | ICD-10-CM

## 2022-02-06 DIAGNOSIS — R53.1 WEAKNESS: ICD-10-CM

## 2022-02-06 DIAGNOSIS — U07.1 PNEUMONIA DUE TO COVID-19 VIRUS: ICD-10-CM

## 2022-02-06 DIAGNOSIS — R53.81 DEBILITY: ICD-10-CM

## 2022-02-06 DIAGNOSIS — J12.82 PNEUMONIA DUE TO COVID-19 VIRUS: ICD-10-CM

## 2022-02-06 DIAGNOSIS — U07.1 COVID-19: ICD-10-CM

## 2022-02-06 PROBLEM — D64.9 NORMOCYTIC ANEMIA: Status: ACTIVE | Noted: 2022-02-06

## 2022-02-06 PROBLEM — E87.6 HYPOKALEMIA: Status: ACTIVE | Noted: 2022-02-06

## 2022-02-06 PROBLEM — E88.09 HYPOALBUMINEMIA: Status: ACTIVE | Noted: 2022-02-06

## 2022-02-06 LAB
ALBUMIN SERPL BCP-MCNC: 2.6 G/DL (ref 3.5–5.2)
ALP SERPL-CCNC: 91 U/L (ref 55–135)
ALT SERPL W/O P-5'-P-CCNC: 19 U/L (ref 10–44)
ANION GAP SERPL CALC-SCNC: 13 MMOL/L (ref 8–16)
APTT BLDCRRT: 36.5 SEC (ref 21–32)
AST SERPL-CCNC: 23 U/L (ref 10–40)
BASOPHILS # BLD AUTO: 0.01 K/UL (ref 0–0.2)
BASOPHILS NFR BLD: 0.2 % (ref 0–1.9)
BILIRUB SERPL-MCNC: 0.6 MG/DL (ref 0.1–1)
BILIRUB UR QL STRIP: NEGATIVE
BNP SERPL-MCNC: 38 PG/ML (ref 0–99)
BUN SERPL-MCNC: 6 MG/DL (ref 8–23)
CALCIUM SERPL-MCNC: 9.1 MG/DL (ref 8.7–10.5)
CHLORIDE SERPL-SCNC: 100 MMOL/L (ref 95–110)
CK SERPL-CCNC: 86 U/L (ref 20–180)
CLARITY UR: CLEAR
CO2 SERPL-SCNC: 23 MMOL/L (ref 23–29)
COLOR UR: YELLOW
CREAT SERPL-MCNC: 0.7 MG/DL (ref 0.5–1.4)
CRP SERPL-MCNC: 358.8 MG/L (ref 0–8.2)
CTP QC/QA: YES
D DIMER PPP IA.FEU-MCNC: 2.06 MG/L FEU
DIFFERENTIAL METHOD: ABNORMAL
EOSINOPHIL # BLD AUTO: 0.1 K/UL (ref 0–0.5)
EOSINOPHIL NFR BLD: 0.9 % (ref 0–8)
ERYTHROCYTE [DISTWIDTH] IN BLOOD BY AUTOMATED COUNT: 13.8 % (ref 11.5–14.5)
ERYTHROCYTE [SEDIMENTATION RATE] IN BLOOD BY WESTERGREN METHOD: 134 MM/HR (ref 0–20)
EST. GFR  (AFRICAN AMERICAN): >60 ML/MIN/1.73 M^2
EST. GFR  (NON AFRICAN AMERICAN): >60 ML/MIN/1.73 M^2
FERRITIN SERPL-MCNC: 455 NG/ML (ref 20–300)
GLUCOSE SERPL-MCNC: 145 MG/DL (ref 70–110)
GLUCOSE UR QL STRIP: NEGATIVE
HCT VFR BLD AUTO: 31 % (ref 37–48.5)
HGB BLD-MCNC: 9.9 G/DL (ref 12–16)
HGB UR QL STRIP: NEGATIVE
IMM GRANULOCYTES # BLD AUTO: 0.03 K/UL (ref 0–0.04)
IMM GRANULOCYTES NFR BLD AUTO: 0.5 % (ref 0–0.5)
INR PPP: 1 (ref 0.8–1.2)
KETONES UR QL STRIP: NEGATIVE
LACTATE SERPL-SCNC: 0.8 MMOL/L (ref 0.5–2.2)
LDH SERPL L TO P-CCNC: 288 U/L (ref 110–260)
LEUKOCYTE ESTERASE UR QL STRIP: NEGATIVE
LIPASE SERPL-CCNC: 4 U/L (ref 4–60)
LYMPHOCYTES # BLD AUTO: 0.6 K/UL (ref 1–4.8)
LYMPHOCYTES NFR BLD: 9 % (ref 18–48)
MAGNESIUM SERPL-MCNC: 1.8 MG/DL (ref 1.6–2.6)
MCH RBC QN AUTO: 31.4 PG (ref 27–31)
MCHC RBC AUTO-ENTMCNC: 31.9 G/DL (ref 32–36)
MCV RBC AUTO: 98 FL (ref 82–98)
MONOCYTES # BLD AUTO: 0.4 K/UL (ref 0.3–1)
MONOCYTES NFR BLD: 6.6 % (ref 4–15)
NEUTROPHILS # BLD AUTO: 5.3 K/UL (ref 1.8–7.7)
NEUTROPHILS NFR BLD: 82.8 % (ref 38–73)
NITRITE UR QL STRIP: NEGATIVE
NRBC BLD-RTO: 0 /100 WBC
PH UR STRIP: 6 [PH] (ref 5–8)
PLATELET # BLD AUTO: 363 K/UL (ref 150–450)
PLATELET BLD QL SMEAR: ABNORMAL
PMV BLD AUTO: 9.9 FL (ref 9.2–12.9)
POTASSIUM SERPL-SCNC: 2.7 MMOL/L (ref 3.5–5.1)
PROCALCITONIN SERPL IA-MCNC: 0.17 NG/ML
PROT SERPL-MCNC: 7.2 G/DL (ref 6–8.4)
PROT UR QL STRIP: NEGATIVE
PROTHROMBIN TIME: 10.8 SEC (ref 9–12.5)
RBC # BLD AUTO: 3.15 M/UL (ref 4–5.4)
SARS-COV-2 RDRP RESP QL NAA+PROBE: POSITIVE
SODIUM SERPL-SCNC: 136 MMOL/L (ref 136–145)
SP GR UR STRIP: 1.01 (ref 1–1.03)
TROPONIN I SERPL DL<=0.01 NG/ML-MCNC: <0.006 NG/ML (ref 0–0.03)
URN SPEC COLLECT METH UR: NORMAL
UROBILINOGEN UR STRIP-ACNC: NEGATIVE EU/DL
WBC # BLD AUTO: 6.36 K/UL (ref 3.9–12.7)

## 2022-02-06 PROCEDURE — 63600175 PHARM REV CODE 636 W HCPCS: Performed by: EMERGENCY MEDICINE

## 2022-02-06 PROCEDURE — 85610 PROTHROMBIN TIME: CPT | Performed by: NURSE PRACTITIONER

## 2022-02-06 PROCEDURE — 36415 COLL VENOUS BLD VENIPUNCTURE: CPT | Performed by: NURSE PRACTITIONER

## 2022-02-06 PROCEDURE — 25000003 PHARM REV CODE 250: Performed by: EMERGENCY MEDICINE

## 2022-02-06 PROCEDURE — 94799 UNLISTED PULMONARY SVC/PX: CPT

## 2022-02-06 PROCEDURE — 80053 COMPREHEN METABOLIC PANEL: CPT | Performed by: EMERGENCY MEDICINE

## 2022-02-06 PROCEDURE — G0378 HOSPITAL OBSERVATION PER HR: HCPCS

## 2022-02-06 PROCEDURE — 99291 CRITICAL CARE FIRST HOUR: CPT | Mod: 25

## 2022-02-06 PROCEDURE — 94761 N-INVAS EAR/PLS OXIMETRY MLT: CPT

## 2022-02-06 PROCEDURE — 96374 THER/PROPH/DIAG INJ IV PUSH: CPT

## 2022-02-06 PROCEDURE — 25000003 PHARM REV CODE 250: Performed by: NURSE PRACTITIONER

## 2022-02-06 PROCEDURE — 94640 AIRWAY INHALATION TREATMENT: CPT

## 2022-02-06 PROCEDURE — 85651 RBC SED RATE NONAUTOMATED: CPT | Performed by: NURSE PRACTITIONER

## 2022-02-06 PROCEDURE — 85379 FIBRIN DEGRADATION QUANT: CPT | Performed by: NURSE PRACTITIONER

## 2022-02-06 PROCEDURE — 93010 ELECTROCARDIOGRAM REPORT: CPT | Mod: ,,, | Performed by: INTERNAL MEDICINE

## 2022-02-06 PROCEDURE — 81003 URINALYSIS AUTO W/O SCOPE: CPT | Performed by: EMERGENCY MEDICINE

## 2022-02-06 PROCEDURE — 87040 BLOOD CULTURE FOR BACTERIA: CPT | Performed by: NURSE PRACTITIONER

## 2022-02-06 PROCEDURE — 84484 ASSAY OF TROPONIN QUANT: CPT | Performed by: EMERGENCY MEDICINE

## 2022-02-06 PROCEDURE — 83605 ASSAY OF LACTIC ACID: CPT | Performed by: EMERGENCY MEDICINE

## 2022-02-06 PROCEDURE — 84145 PROCALCITONIN (PCT): CPT | Performed by: EMERGENCY MEDICINE

## 2022-02-06 PROCEDURE — 27000221 HC OXYGEN, UP TO 24 HOURS

## 2022-02-06 PROCEDURE — 86140 C-REACTIVE PROTEIN: CPT | Performed by: EMERGENCY MEDICINE

## 2022-02-06 PROCEDURE — 96361 HYDRATE IV INFUSION ADD-ON: CPT

## 2022-02-06 PROCEDURE — 97161 PT EVAL LOW COMPLEX 20 MIN: CPT

## 2022-02-06 PROCEDURE — U0002 COVID-19 LAB TEST NON-CDC: HCPCS | Performed by: EMERGENCY MEDICINE

## 2022-02-06 PROCEDURE — 87186 SC STD MICRODIL/AGAR DIL: CPT | Mod: 59 | Performed by: NURSE PRACTITIONER

## 2022-02-06 PROCEDURE — 83880 ASSAY OF NATRIURETIC PEPTIDE: CPT | Performed by: NURSE PRACTITIONER

## 2022-02-06 PROCEDURE — 97530 THERAPEUTIC ACTIVITIES: CPT

## 2022-02-06 PROCEDURE — 63600175 PHARM REV CODE 636 W HCPCS: Performed by: NURSE PRACTITIONER

## 2022-02-06 PROCEDURE — 25000242 PHARM REV CODE 250 ALT 637 W/ HCPCS: Performed by: NURSE PRACTITIONER

## 2022-02-06 PROCEDURE — 85025 COMPLETE CBC W/AUTO DIFF WBC: CPT | Performed by: EMERGENCY MEDICINE

## 2022-02-06 PROCEDURE — 87077 CULTURE AEROBIC IDENTIFY: CPT | Performed by: NURSE PRACTITIONER

## 2022-02-06 PROCEDURE — 83615 LACTATE (LD) (LDH) ENZYME: CPT | Performed by: EMERGENCY MEDICINE

## 2022-02-06 PROCEDURE — 82728 ASSAY OF FERRITIN: CPT | Performed by: EMERGENCY MEDICINE

## 2022-02-06 PROCEDURE — 96372 THER/PROPH/DIAG INJ SC/IM: CPT | Performed by: NURSE PRACTITIONER

## 2022-02-06 PROCEDURE — 82550 ASSAY OF CK (CPK): CPT | Performed by: EMERGENCY MEDICINE

## 2022-02-06 PROCEDURE — 85730 THROMBOPLASTIN TIME PARTIAL: CPT | Performed by: NURSE PRACTITIONER

## 2022-02-06 PROCEDURE — 83735 ASSAY OF MAGNESIUM: CPT | Performed by: NURSE PRACTITIONER

## 2022-02-06 PROCEDURE — 83690 ASSAY OF LIPASE: CPT | Performed by: EMERGENCY MEDICINE

## 2022-02-06 PROCEDURE — 93005 ELECTROCARDIOGRAM TRACING: CPT

## 2022-02-06 PROCEDURE — 93010 EKG 12-LEAD: ICD-10-PCS | Mod: ,,, | Performed by: INTERNAL MEDICINE

## 2022-02-06 PROCEDURE — 99900035 HC TECH TIME PER 15 MIN (STAT)

## 2022-02-06 RX ORDER — ONDANSETRON 2 MG/ML
4 INJECTION INTRAMUSCULAR; INTRAVENOUS
Status: COMPLETED | OUTPATIENT
Start: 2022-02-06 | End: 2022-02-06

## 2022-02-06 RX ORDER — ENOXAPARIN SODIUM 100 MG/ML
1 INJECTION SUBCUTANEOUS 2 TIMES DAILY
Status: DISCONTINUED | OUTPATIENT
Start: 2022-02-06 | End: 2022-02-14 | Stop reason: HOSPADM

## 2022-02-06 RX ORDER — ZINC SULFATE 50(220)MG
220 CAPSULE ORAL NIGHTLY
Status: DISCONTINUED | OUTPATIENT
Start: 2022-02-06 | End: 2022-02-14 | Stop reason: HOSPADM

## 2022-02-06 RX ORDER — ASPIRIN 81 MG/1
81 TABLET ORAL DAILY
Status: DISCONTINUED | OUTPATIENT
Start: 2022-02-07 | End: 2022-02-14 | Stop reason: HOSPADM

## 2022-02-06 RX ORDER — ASPIRIN 81 MG/1
81 TABLET ORAL DAILY
COMMUNITY

## 2022-02-06 RX ORDER — ERGOCALCIFEROL 1.25 MG/1
50000 CAPSULE ORAL
Status: DISCONTINUED | OUTPATIENT
Start: 2022-02-06 | End: 2022-02-14 | Stop reason: HOSPADM

## 2022-02-06 RX ORDER — ASCORBIC ACID 500 MG
500 TABLET ORAL NIGHTLY
Status: DISCONTINUED | OUTPATIENT
Start: 2022-02-06 | End: 2022-02-14 | Stop reason: HOSPADM

## 2022-02-06 RX ORDER — SERTRALINE HYDROCHLORIDE 50 MG/1
100 TABLET, FILM COATED ORAL DAILY
Status: DISCONTINUED | OUTPATIENT
Start: 2022-02-07 | End: 2022-02-14 | Stop reason: HOSPADM

## 2022-02-06 RX ORDER — GUAIFENESIN/DEXTROMETHORPHAN 100-10MG/5
10 SYRUP ORAL EVERY 4 HOURS PRN
Status: DISCONTINUED | OUTPATIENT
Start: 2022-02-06 | End: 2022-02-14 | Stop reason: HOSPADM

## 2022-02-06 RX ORDER — ONDANSETRON 2 MG/ML
4 INJECTION INTRAMUSCULAR; INTRAVENOUS EVERY 6 HOURS PRN
Status: DISCONTINUED | OUTPATIENT
Start: 2022-02-06 | End: 2022-02-14 | Stop reason: HOSPADM

## 2022-02-06 RX ORDER — BISACODYL 10 MG
10 SUPPOSITORY, RECTAL RECTAL DAILY PRN
Status: DISCONTINUED | OUTPATIENT
Start: 2022-02-06 | End: 2022-02-14 | Stop reason: HOSPADM

## 2022-02-06 RX ORDER — POTASSIUM CHLORIDE 20 MEQ/1
40 TABLET, EXTENDED RELEASE ORAL
Status: COMPLETED | OUTPATIENT
Start: 2022-02-06 | End: 2022-02-06

## 2022-02-06 RX ORDER — PANTOPRAZOLE SODIUM 40 MG/1
40 TABLET, DELAYED RELEASE ORAL DAILY
Refills: 4 | Status: DISCONTINUED | OUTPATIENT
Start: 2022-02-07 | End: 2022-02-14 | Stop reason: HOSPADM

## 2022-02-06 RX ORDER — POLYETHYLENE GLYCOL 3350 17 G/17G
17 POWDER, FOR SOLUTION ORAL 2 TIMES DAILY PRN
Status: DISCONTINUED | OUTPATIENT
Start: 2022-02-06 | End: 2022-02-14 | Stop reason: HOSPADM

## 2022-02-06 RX ORDER — MAGNESIUM SULFATE HEPTAHYDRATE 40 MG/ML
2 INJECTION, SOLUTION INTRAVENOUS ONCE
Status: COMPLETED | OUTPATIENT
Start: 2022-02-06 | End: 2022-02-06

## 2022-02-06 RX ORDER — POTASSIUM CHLORIDE 7.45 MG/ML
10 INJECTION INTRAVENOUS
Status: COMPLETED | OUTPATIENT
Start: 2022-02-06 | End: 2022-02-06

## 2022-02-06 RX ORDER — ATORVASTATIN CALCIUM 40 MG/1
40 TABLET, FILM COATED ORAL NIGHTLY
COMMUNITY
End: 2023-05-30 | Stop reason: SDUPTHER

## 2022-02-06 RX ORDER — CHOLECALCIFEROL (VITAMIN D3) 25 MCG
2000 TABLET ORAL DAILY
Status: DISCONTINUED | OUTPATIENT
Start: 2022-02-06 | End: 2022-02-07

## 2022-02-06 RX ORDER — PROPRANOLOL HYDROCHLORIDE 40 MG/1
40 TABLET ORAL 2 TIMES DAILY
Status: DISCONTINUED | OUTPATIENT
Start: 2022-02-06 | End: 2022-02-14 | Stop reason: HOSPADM

## 2022-02-06 RX ORDER — LEVOTHYROXINE SODIUM 88 UG/1
88 TABLET ORAL
Status: DISCONTINUED | OUTPATIENT
Start: 2022-02-07 | End: 2022-02-14 | Stop reason: HOSPADM

## 2022-02-06 RX ORDER — MUPIROCIN 20 MG/G
OINTMENT TOPICAL 2 TIMES DAILY
Status: DISCONTINUED | OUTPATIENT
Start: 2022-02-06 | End: 2022-02-14 | Stop reason: HOSPADM

## 2022-02-06 RX ORDER — HYDRALAZINE HYDROCHLORIDE 20 MG/ML
5 INJECTION INTRAMUSCULAR; INTRAVENOUS EVERY 4 HOURS PRN
Status: DISCONTINUED | OUTPATIENT
Start: 2022-02-06 | End: 2022-02-08

## 2022-02-06 RX ORDER — ACETAMINOPHEN 325 MG/1
650 TABLET ORAL EVERY 8 HOURS PRN
Status: DISCONTINUED | OUTPATIENT
Start: 2022-02-06 | End: 2022-02-07

## 2022-02-06 RX ORDER — ACETAMINOPHEN 325 MG/1
650 TABLET ORAL EVERY 4 HOURS PRN
Status: DISCONTINUED | OUTPATIENT
Start: 2022-02-06 | End: 2022-02-14 | Stop reason: HOSPADM

## 2022-02-06 RX ORDER — PROPRANOLOL HYDROCHLORIDE 40 MG/1
40 TABLET ORAL 2 TIMES DAILY
COMMUNITY
End: 2022-02-21 | Stop reason: SDUPTHER

## 2022-02-06 RX ORDER — PROPRANOLOL HYDROCHLORIDE 40 MG/1
40 TABLET ORAL 2 TIMES DAILY
Status: DISCONTINUED | OUTPATIENT
Start: 2022-02-06 | End: 2022-02-06

## 2022-02-06 RX ORDER — ALBUTEROL SULFATE 90 UG/1
2 AEROSOL, METERED RESPIRATORY (INHALATION) EVERY 6 HOURS
Status: DISCONTINUED | OUTPATIENT
Start: 2022-02-06 | End: 2022-02-14 | Stop reason: HOSPADM

## 2022-02-06 RX ORDER — HEPARIN SODIUM 5000 [USP'U]/.5ML
INJECTION, SOLUTION INTRAVENOUS; SUBCUTANEOUS
COMMUNITY
End: 2022-02-06

## 2022-02-06 RX ORDER — ALENDRONATE SODIUM 70 MG/1
70 TABLET ORAL
COMMUNITY
End: 2023-05-16

## 2022-02-06 RX ORDER — SODIUM CHLORIDE 0.9 % (FLUSH) 0.9 %
10 SYRINGE (ML) INJECTION
Status: DISCONTINUED | OUTPATIENT
Start: 2022-02-06 | End: 2022-02-14 | Stop reason: HOSPADM

## 2022-02-06 RX ADMIN — ALBUTEROL SULFATE 2 PUFF: 90 AEROSOL, METERED RESPIRATORY (INHALATION) at 07:02

## 2022-02-06 RX ADMIN — ENOXAPARIN SODIUM 60 MG: 100 INJECTION SUBCUTANEOUS at 01:02

## 2022-02-06 RX ADMIN — SODIUM CHLORIDE 500 ML: 0.9 INJECTION, SOLUTION INTRAVENOUS at 09:02

## 2022-02-06 RX ADMIN — THERA TABS 1 TABLET: TAB at 01:02

## 2022-02-06 RX ADMIN — MUPIROCIN: 20 OINTMENT TOPICAL at 08:02

## 2022-02-06 RX ADMIN — POTASSIUM CHLORIDE 10 MEQ: 7.46 INJECTION, SOLUTION INTRAVENOUS at 01:02

## 2022-02-06 RX ADMIN — DEXAMETHASONE 6 MG: 4 TABLET ORAL at 01:02

## 2022-02-06 RX ADMIN — POTASSIUM CHLORIDE 40 MEQ: 1500 TABLET, EXTENDED RELEASE ORAL at 12:02

## 2022-02-06 RX ADMIN — OXYCODONE HYDROCHLORIDE AND ACETAMINOPHEN 500 MG: 500 TABLET ORAL at 08:02

## 2022-02-06 RX ADMIN — ONDANSETRON 4 MG: 2 INJECTION INTRAMUSCULAR; INTRAVENOUS at 09:02

## 2022-02-06 RX ADMIN — Medication 2000 UNITS: at 02:02

## 2022-02-06 RX ADMIN — PROPRANOLOL HYDROCHLORIDE 40 MG: 40 TABLET ORAL at 04:02

## 2022-02-06 RX ADMIN — MAGNESIUM SULFATE HEPTAHYDRATE 2 G: 2 INJECTION, SOLUTION INTRAVENOUS at 04:02

## 2022-02-06 RX ADMIN — POTASSIUM CHLORIDE 10 MEQ: 7.46 INJECTION, SOLUTION INTRAVENOUS at 02:02

## 2022-02-06 RX ADMIN — ALBUTEROL SULFATE 2 PUFF: 90 AEROSOL, METERED RESPIRATORY (INHALATION) at 02:02

## 2022-02-06 RX ADMIN — POTASSIUM CHLORIDE 10 MEQ: 7.46 INJECTION, SOLUTION INTRAVENOUS at 03:02

## 2022-02-06 RX ADMIN — ZINC SULFATE 220 MG (50 MG) CAPSULE 220 MG: CAPSULE at 08:02

## 2022-02-06 RX ADMIN — ERGOCALCIFEROL 50000 UNITS: 1.25 CAPSULE ORAL at 04:02

## 2022-02-06 RX ADMIN — ENOXAPARIN SODIUM 60 MG: 100 INJECTION SUBCUTANEOUS at 08:02

## 2022-02-06 RX ADMIN — REMDESIVIR 200 MG: 100 INJECTION, POWDER, LYOPHILIZED, FOR SOLUTION INTRAVENOUS at 02:02

## 2022-02-06 RX ADMIN — AMITRIPTYLINE HYDROCHLORIDE 75 MG: 50 TABLET, FILM COATED ORAL at 08:02

## 2022-02-06 NOTE — ASSESSMENT & PLAN NOTE
- COVID-19 testing Collection Date: 12/31/2021 Collection Time:   2:17 PM   - Infection Control notified     - Isolation:   - Airborne, Contact and Droplet Precautions  - Cohort patients into COVID units  - N95 mask, wear eye protection  - 20 second hand hygiene              - Limit visitors per hospital policy              - Consolidating lab draws, nursing care, provider visits, and interventions    - Management:  Supplemental O2 to maintain SpO2 >92%  Telemetry  Continuous/intermittent Pulse Ox  Albuterol treatment PRN  Continue MVI, Qpm Zinc and Vitamin C, Vitamin D, Remdesivir, and dexamethasone  Encourage Incentive spirometer q 1 hour while awake    Advance Care Planning   Full Code        This patient does have evidence of infective focus  My overall impression is Severe  sepsis. Vital signs were reviewed and noted in progress note.  Antibiotics given-   Antibiotics (From admission, onward)            None        Cultures were taken-   Microbiology Results (last 7 days)     Procedure Component Value Units Date/Time    Blood culture [[id number]]     Order Status: Sent Specimen: Blood     Blood culture [[id number]]     Order Status: Sent Specimen: Blood         Latest lactate reviewed, they are-  Recent Labs   Lab 02/06/22  0947   LACTATE 0.8       Organ dysfunction indicated by Acute respiratory failure     Source- Covid pneumonia    Source control Achieved by- Symptomatic relief

## 2022-02-06 NOTE — ED PROVIDER NOTES
"SCRIBE #1 NOTE: I, Yousef Alejandro, am scribing for, and in the presence of, Fredis Vera Jr., MD. I have scribed the entire note.       History     Chief Complaint   Patient presents with    Vomiting     PT states, "I have been vomiting since Thursday, feel very weak and have been running a fever."     Review of patient's allergies indicates:   Allergen Reactions    Clindamycin      Heaviness in chest      Penicillins Hives         History of Present Illness     HPI    2/6/2022, 9:22 AM  History obtained from the patient      History of Present Illness: Haim Martin is a 81 y.o. female patient with a PMHx of HLD and HTN who presents to the Emergency Department for evaluation of vomitting which onset gradually x 3 days pta. Pt states she was infected with COVID-19 3 weeks ago. She reports being on daptomycin. Symptoms are constant and moderate in severity. No mitigating or exacerbating factors reported. Associated sxs include dry cough and diahhrea. Patient denies any CP, leg swelling, abd pain, fever, chills, and all other sxs at this time. No further complaints or concerns at this time.       Arrival mode: Personal vehicle     PCP: Kavya Mesa MD        Past Medical History:  Past Medical History:   Diagnosis Date    Depression     Diverticulosis of colon (without mention of hemorrhage) 8/25/2014    Hyperlipidemia     Hypertension     Thyroid disease        Past Surgical History:  Past Surgical History:   Procedure Laterality Date    APPENDECTOMY      CATARACT EXTRACTION EXTRACAPSULAR W/ INTRAOCULAR LENS IMPLANTATION Bilateral 4/2014    INCISION AND DRAINAGE OF HAND Right 1/21/2022    Procedure: INCISION AND DRAINAGE, HAND;  Surgeon: Ramirez Flores MD;  Location: [facility];  Service: Orthopedics;  Laterality: Right;  Right Index Finger    INJECTION OF ANESTHETIC AGENT AROUND MEDIAL BRANCH NERVES INNERVATING LUMBAR FACET JOINT Bilateral 7/16/2020    Procedure: Bilateral L3-5 MBB " with local;  Surgeon: Luis Ashraf MD;  Location: [facility];  Service: Pain Management;  Laterality: Bilateral;    INJECTION OF ANESTHETIC AGENT AROUND MEDIAL BRANCH NERVES INNERVATING LUMBAR FACET JOINT Bilateral 8/10/2020    Procedure: Bilateral L3-5 MBB with local;  Surgeon: Luis Ashraf MD;  Location: [facility];  Service: Pain Management;  Laterality: Bilateral;    INJECTION OF ANESTHETIC AGENT INTO SACROILIAC JOINT Bilateral 11/4/2021    Procedure: Bilateral SIJ Injection;  Surgeon: Luis Ashraf MD;  Location: [facility];  Service: Pain Management;  Laterality: Bilateral;    RADIOFREQUENCY THERMOCOAGULATION Left 9/28/2020    Procedure: Left L3-5 Lumbar RFA;  Surgeon: Luis Ashraf MD;  Location: [facility];  Service: Pain Management;  Laterality: Left;    RADIOFREQUENCY THERMOCOAGULATION Right 10/12/2020    Procedure: Right L3-5 Lumbar RFA;  Surgeon: Luis Ashraf MD;  Location: [facility];  Service: Pain Management;  Laterality: Right;    STOMACH SURGERY  1998    not sure what they did, possible bowel resection, partial gastrectomy    TONSILLECTOMY           Family History:  Family History   Problem Relation Age of Onset    Lupus Mother     Stroke Father     Coronary artery disease Father     Diabetes type II Father     Kidney cancer Maternal Aunt     Breast cancer Maternal Aunt        Social History:  Social History     Tobacco Use    Smoking status: Former Smoker    Smokeless tobacco: Never Used    Tobacco comment: quit 30 years ago   Substance and Sexual Activity    Alcohol use: No    Drug use: No    Sexual activity: Not Currently     Birth control/protection: None        Review of Systems     Review of Systems   Constitutional: Negative for chills and fever.   HENT: Negative for sore throat.    Respiratory: Positive for cough (dry). Negative for shortness of breath.    Cardiovascular: Negative for chest pain and leg swelling.   Gastrointestinal:  Positive for diarrhea, nausea and vomiting. Negative for abdominal pain.   Genitourinary: Negative for dysuria.   Musculoskeletal: Negative for back pain.   Skin: Negative for rash.   Neurological: Negative for weakness.   Hematological: Does not bruise/bleed easily.   All other systems reviewed and are negative.       Physical Exam     Initial Vitals [02/06/22 0904]   BP Pulse Resp Temp SpO2   (!) 157/72 98 20 98.4 °F (36.9 °C) (!) 89 %      MAP       --          Physical Exam  Nursing Notes and Vital Signs Reviewed.  Constitutional: Patient is in no apparent distress. Well-developed and well-nourished.  Head: Atraumatic. Normocephalic.  Eyes: PERRL. EOM intact. Conjunctivae are not pale. No scleral icterus.  ENT: Mucous membranes are moist. Oropharynx is clear and symmetric.    Neck: Supple. Full ROM. No lymphadenopathy.  Cardiovascular: Regular rate. Regular rhythm. No murmurs, rubs, or gallops. Distal pulses are 2+ and symmetric.  Pulmonary/Chest: No respiratory distress. Clear to auscultation bilaterally. No wheezing or rales.  Abdominal: Soft and non-distended.  There is no tenderness.  No rebound, guarding, or rigidity. Good bowel sounds.  Genitourinary: No CVA tenderness  Musculoskeletal: Moves all extremities. No obvious deformities. No edema. No calf tenderness.  Skin: Warm and dry.  Neurological:  Alert, awake, and appropriate.  Normal speech.  No acute focal neurological deficits are appreciated.  Psychiatric: Normal affect. Good eye contact. Appropriate in content.     ED Course   Critical Care    Date/Time: 2/6/2022 10:22 AM  Performed by: Fredis Vera Jr., MD  Authorized by: Fredis Vera Jr., MD   Direct patient critical care time: 9 minutes  Additional history critical care time: 7 minutes  Ordering / reviewing critical care time: 8 minutes  Documentation critical care time: 5 minutes  Consulting other physicians critical care time: 4 minutes  Consult with family critical care time: 2  "minutes  Total critical care time (exclusive of procedural time) : 35 minutes  Critical care time was exclusive of separately billable procedures and treating other patients and teaching time.  Critical care was necessary to treat or prevent imminent or life-threatening deterioration of the following conditions: respiratory failure (hypoxia).  Critical care was time spent personally by me on the following activities: blood draw for specimens, development of treatment plan with patient or surrogate, discussions with consultants, interpretation of cardiac output measurements, evaluation of patient's response to treatment, examination of patient, obtaining history from patient or surrogate, ordering and performing treatments and interventions, ordering and review of laboratory studies, ordering and review of radiographic studies, pulse oximetry, re-evaluation of patient's condition and review of old charts.        ED Vital Signs:  Vitals:    02/06/22 0904 02/06/22 0917 02/06/22 0930 02/06/22 0937   BP: (!) 157/72  (!) 157/66    Pulse: 98  90    Resp: 20  20    Temp: 98.4 °F (36.9 °C)      TempSrc: Oral      SpO2: (!) 89%  96%    Weight:  61.5 kg (135 lb 9.6 oz)  61.4 kg (135 lb 6.4 oz)   Height: 4' 11" (1.499 m)          Abnormal Lab Results:  Labs Reviewed   CBC W/ AUTO DIFFERENTIAL - Abnormal; Notable for the following components:       Result Value    RBC 3.15 (*)     Hemoglobin 9.9 (*)     Hematocrit 31.0 (*)     MCH 31.4 (*)     MCHC 31.9 (*)     Lymph # 0.6 (*)     Gran % 82.8 (*)     Lymph % 9.0 (*)     All other components within normal limits   COMPREHENSIVE METABOLIC PANEL - Abnormal; Notable for the following components:    Potassium 2.7 (*)     Glucose 145 (*)     BUN 6 (*)     Albumin 2.6 (*)     All other components within normal limits    Narrative:      POTASSIUM  critical result(s) called and verbal readback obtained   from LYDIA DAVEY RN by HIRA 02/06/2022 10:56   C-REACTIVE PROTEIN - Abnormal; " Notable for the following components:    .8 (*)     All other components within normal limits   FERRITIN - Abnormal; Notable for the following components:    Ferritin 455 (*)     All other components within normal limits   LACTATE DEHYDROGENASE - Abnormal; Notable for the following components:     (*)     All other components within normal limits   SARS-COV-2 RDRP GENE - Abnormal; Notable for the following components:    POC Rapid COVID Positive (*)     All other components within normal limits    Narrative:     This test utilizes isothermal nucleic acid amplification   technology to detect the SARS-CoV-2 RdRp nucleic acid segment.   The analytical sensitivity (limit of detection) is 125 genome   equivalents/mL.   A POSITIVE result implies infection with the SARS-CoV-2 virus;   the patient is presumed to be contagious.     A NEGATIVE result means that SARS-CoV-2 nucleic acids are not   present above the limit of detection. A NEGATIVE result should be   treated as presumptive. It does not rule out the possibility of   COVID-19 and should not be the sole basis for treatment decisions.   If COVID-19 is strongly suspected based on clinical and exposure   history, re-testing using an alternate molecular assay should be   considered.   This test is only for use under the Food and Drug   Administration s Emergency Use Authorization (EUA).   Commercial kits are provided by [employer].   Performance characteristics of the EUA have been independently   verified by [facility] Department of   Pathology and Laboratory Medicine.   _________________________________________________________________   The authorized Fact Sheet for Healthcare Providers and the authorized Fact   Sheet for Patients of the ID NOW COVID-19 are available on the FDA   website:     https://www.fda.gov/media/420675/download  https://www.fda.gov/media/135241/download         CK   LACTIC ACID, PLASMA   TROPONIN I   PROCALCITONIN    LIPASE   URINALYSIS, REFLEX TO URINE CULTURE        All Lab Results:  Results for orders placed or performed during the hospital encounter of 02/06/22   CBC auto differential   Result Value Ref Range    WBC 6.36 3.90 - 12.70 K/uL    RBC 3.15 (L) 4.00 - 5.40 M/uL    Hemoglobin 9.9 (L) 12.0 - 16.0 g/dL    Hematocrit 31.0 (L) 37.0 - 48.5 %    MCV 98 82 - 98 fL    MCH 31.4 (H) 27.0 - 31.0 pg    MCHC 31.9 (L) 32.0 - 36.0 g/dL    RDW 13.8 11.5 - 14.5 %    Platelets 363 150 - 450 K/uL    MPV 9.9 9.2 - 12.9 fL    Immature Granulocytes 0.5 0.0 - 0.5 %    Gran # (ANC) 5.3 1.8 - 7.7 K/uL    Immature Grans (Abs) 0.03 0.00 - 0.04 K/uL    Lymph # 0.6 (L) 1.0 - 4.8 K/uL    Mono # 0.4 0.3 - 1.0 K/uL    Eos # 0.1 0.0 - 0.5 K/uL    Baso # 0.01 0.00 - 0.20 K/uL    nRBC 0 0 /100 WBC    Gran % 82.8 (H) 38.0 - 73.0 %    Lymph % 9.0 (L) 18.0 - 48.0 %    Mono % 6.6 4.0 - 15.0 %    Eosinophil % 0.9 0.0 - 8.0 %    Basophil % 0.2 0.0 - 1.9 %    Platelet Estimate Appears normal     Differential Method Automated    Comprehensive metabolic panel   Result Value Ref Range    Sodium 136 136 - 145 mmol/L    Potassium 2.7 (LL) 3.5 - 5.1 mmol/L    Chloride 100 95 - 110 mmol/L    CO2 23 23 - 29 mmol/L    Glucose 145 (H) 70 - 110 mg/dL    BUN 6 (L) 8 - 23 mg/dL    Creatinine 0.7 0.5 - 1.4 mg/dL    Calcium 9.1 8.7 - 10.5 mg/dL    Total Protein 7.2 6.0 - 8.4 g/dL    Albumin 2.6 (L) 3.5 - 5.2 g/dL    Total Bilirubin 0.6 0.1 - 1.0 mg/dL    Alkaline Phosphatase 91 55 - 135 U/L    AST 23 10 - 40 U/L    ALT 19 10 - 44 U/L    Anion Gap 13 8 - 16 mmol/L    eGFR if African American >60 >60 mL/min/1.73 m^2    eGFR if non African American >60 >60 mL/min/1.73 m^2   C-Reactive Protein   Result Value Ref Range    .8 (H) 0.0 - 8.2 mg/L   Ferritin   Result Value Ref Range    Ferritin 455 (H) 20.0 - 300.0 ng/mL   Lactate Dehydrogenase   Result Value Ref Range     (H) 110 - 260 U/L   CK   Result Value Ref Range    CPK 86 20 - 180 U/L   Lactic Acid, Plasma    Result Value Ref Range    Lactate (Lactic Acid) 0.8 0.5 - 2.2 mmol/L   Troponin I   Result Value Ref Range    Troponin I <0.006 0.000 - 0.026 ng/mL   Procalcitonin   Result Value Ref Range    Procalcitonin 0.17 <0.25 ng/mL   Lipase   Result Value Ref Range    Lipase 4 4 - 60 U/L   POCT COVID-19 Rapid Screening   Result Value Ref Range    POC Rapid COVID Positive (A) Negative     Acceptable Yes          Imaging Results:  Imaging Results          X-Ray Chest AP Portable (Final result)  Result time 02/06/22 09:35:06    Final result by Franklin Eduardo MD (02/06/22 09:35:06)                 Impression:      Development of bilateral infiltrates.      Electronically signed by: Franklin Eduardo MD  Date:    02/06/2022  Time:    09:35             Narrative:    EXAMINATION:  XR CHEST AP PORTABLE    CLINICAL HISTORY:  COVID-19;    TECHNIQUE:  AP view of the chest was performed.    COMPARISON:  01/22/2022    FINDINGS:  The cardiac and mediastinal silhouettes appear within normal limits. Patchy alveolar and ground-glass infiltrates in the perihilar and lower lobe regions bilaterally, left greater than right.  Stable position of the left-sided PICC.  No pneumothorax per in                                 The EKG was ordered, reviewed, and independently interpreted by the ED provider.  Interpretation time: 9:07  Rate: 99 BPM  Rhythm: Sinus rhythm with Premature supraventricular complexes and Fusion complexes  Interpretation: Possible Left atrial enlargement. Nonspecific ST abnormality. Abnormal ECG. No STEMI.             The Emergency Provider reviewed the vital signs and test results, which are outlined above.     ED Discussion       11:24 AM: Discussed case with CHRIS Preston ([facility]). Dr. Caal agrees with current care and management of pt and accepts admission.   Admitting Service: [facility] medicine  Admitting Physician: Dr. Caal  Admit to: Obs tele    11:27 AM: Re-evaluated pt. I have discussed  test results, shared treatment plan, and the need for admission with patient and family at bedside. Pt and family express understanding at this time and agree with all information. All questions answered. Pt and family have no further questions or concerns at this time. Pt is ready for admit.    Patient arrived hypoxic withf her COVID pneumonia.  She also has GI symptoms currently.  She is being admitted to Hospital Medicine for further evaluation treatment.     Medical Decision Making:   Clinical Tests:   Lab Tests: Ordered and Reviewed  Radiological Study: Reviewed and Ordered  Medical Tests: Ordered and Reviewed           ED Medication(s):  Medications   potassium chloride SA CR tablet 40 mEq (has no administration in time range)   sodium chloride 0.9% bolus 500 mL (500 mLs Intravenous New Bag 2/6/22 0948)   ondansetron injection 4 mg (4 mg Intravenous Given 2/6/22 0948)       New Prescriptions    No medications on file               Scribe Attestation:   Scribe #1: I performed the above scribed service and the documentation accurately describes the services I performed. I attest to the accuracy of the note.     Attending:   Physician Attestation Statement for Scribe #1: I, Fredis Vera Jr., MD, personally performed the services described in this documentation, as scribed by Yuli Allen, in my presence, and it is both accurate and complete.           Clinical Impression       ICD-10-CM ICD-9-CM   1. Pneumonia due to COVID-19 virus  U07.1 480.8    J12.82 079.89   2. Weakness  R53.1 780.79   3. COVID-19  U07.1 079.89   4. Acute respiratory failure with hypoxia  J96.01 518.81       Disposition:   Disposition: Placed in Observation  Condition: Fair         Fredis Vera Jr., MD  02/06/22 [id number]

## 2022-02-06 NOTE — H&P
"[facility] Medicine  History & Physical    Patient Name: Haim Martin  MRN: [id number]  Patient Class: OP- Observation  Admission Date: 2/6/2022  Attending Physician: Alen Caal MD   Primary Care Provider: Kavya Mesa MD     Daughter at bedside and updated on patient's status and plan of care    Patient information was obtained from patient, ER records and Daughter at bedside.     Subjective:     Principal Problem:Severe sepsis secondary to Covid pneumonia    Chief Complaint:   Chief Complaint   Patient presents with    Vomiting     PT states, "I have been vomiting since Thursday, feel very weak and have been running a fever."        HPI:   Vomiting        PT states, "I have been vomiting since Thursday, feel very weak and have been running a fever."      Per ER- This  is a 81 y.o. female patient with a PMHx of Depression, Diverticulosis of colon, thyroid disease,  HLD and HTN who presents to the Emergency Department for evaluation of vomitting which onset gradually x 3 days pta. Patient states she was infected with COVID-19 3 weeks ago. She reports being on daptomycin. Symptoms are constant and moderate in severity. No mitigating or exacerbating factors reported. Associated sxs include dry cough and diahhrea. Patient denies any CP, leg swelling, abd pain, fever, chills, and all other sxs at this time. No further complaints or concerns at this time.     Patient was evaluated in ER and found to have Covid pneumonia and Placed In Observation for further evaluation and treatment.     Patient also reports she has been receiving Iv daptomycin at home with infected finger.       Past Medical History:   Diagnosis Date    Depression     Diverticulosis of colon (without mention of hemorrhage) 8/25/2014    Hyperlipidemia     Hypertension     Thyroid disease        Past Surgical History:   Procedure Laterality Date    APPENDECTOMY      CATARACT EXTRACTION " EXTRACAPSULAR W/ INTRAOCULAR LENS IMPLANTATION Bilateral 4/2014    INCISION AND DRAINAGE OF HAND Right 1/21/2022    Procedure: INCISION AND DRAINAGE, HAND;  Surgeon: Ramirez Flores MD;  Location: [facility];  Service: Orthopedics;  Laterality: Right;  Right Index Finger    INJECTION OF ANESTHETIC AGENT AROUND MEDIAL BRANCH NERVES INNERVATING LUMBAR FACET JOINT Bilateral 7/16/2020    Procedure: Bilateral L3-5 MBB with local;  Surgeon: Luis Ashraf MD;  Location: [facility];  Service: Pain Management;  Laterality: Bilateral;    INJECTION OF ANESTHETIC AGENT AROUND MEDIAL BRANCH NERVES INNERVATING LUMBAR FACET JOINT Bilateral 8/10/2020    Procedure: Bilateral L3-5 MBB with local;  Surgeon: Luis Ashraf MD;  Location: [facility];  Service: Pain Management;  Laterality: Bilateral;    INJECTION OF ANESTHETIC AGENT INTO SACROILIAC JOINT Bilateral 11/4/2021    Procedure: Bilateral SIJ Injection;  Surgeon: Luis Ashraf MD;  Location: [facility];  Service: Pain Management;  Laterality: Bilateral;    RADIOFREQUENCY THERMOCOAGULATION Left 9/28/2020    Procedure: Left L3-5 Lumbar RFA;  Surgeon: Luis Ashraf MD;  Location: [facility];  Service: Pain Management;  Laterality: Left;    RADIOFREQUENCY THERMOCOAGULATION Right 10/12/2020    Procedure: Right L3-5 Lumbar RFA;  Surgeon: Luis Ashraf MD;  Location: [facility];  Service: Pain Management;  Laterality: Right;    STOMACH SURGERY  1998    not sure what they did, possible bowel resection, partial gastrectomy    TONSILLECTOMY         Review of patient's allergies indicates:   Allergen Reactions    Clindamycin      Heaviness in chest      Penicillins Hives       No current facility-administered medications on file prior to encounter.     Current Outpatient Medications on File Prior to Encounter   Medication Sig    amitriptyline (ELAVIL) 75 MG tablet Take 1 tablet by mouth in the evening    aspirin (ECOTRIN) 81 MG EC tablet Take 81  mg by mouth once daily.    levothyroxine (SYNTHROID) 88 MCG tablet TAKE 1 TABLET BY MOUTH ONCE DAILY MONDAY - SATURDAY  AND ONE & ONE-HALF TABLET ON SUNDAY    mupirocin (BACTROBAN) 2 % ointment Apply topically 2 (two) times daily.    omeprazole (PRILOSEC) 20 MG capsule Take 1 capsule (20 mg total) by mouth as needed.    propranoloL (INDERAL) 40 MG tablet Take 40 mg by mouth 2 (two) times daily.    sertraline (ZOLOFT) 100 MG tablet Take 1 tablet by mouth once daily    sumatriptan (IMITREX) 100 MG tablet TAKE 1 TABLET BY MOUTH AT ONSET OF MIGRAINE    vit C/E/Zn/coppr/lutein/zeaxan (PRESERVISION AREDS-2 ORAL) Take 1 tablet by mouth 2 (two) times a day.    [DISCONTINUED] aspirin (ECOTRIN) 81 MG EC tablet Take 1 tablet (81 mg total) by mouth once daily.    [DISCONTINUED] meloxicam (MOBIC) 7.5 MG tablet Take 1 tablet (7.5 mg total) by mouth 2 (two) times daily as needed for Pain. Take with food.    alendronate (FOSAMAX) 70 MG tablet Take 70 mg by mouth every 7 days.    atorvastatin (LIPITOR) 40 MG tablet Take 40 mg by mouth every evening.    ergocalciferol (ERGOCALCIFEROL) 50,000 unit Cap Take 1 capsule by mouth once a week (Patient taking differently: On Sundays)    sodium chloride 0.9% SolP 50 mL with DAPTOmycin 350 mg SolR 350 mg Inject 350 mg into the vein once daily. Ending 02/6/22    [DISCONTINUED] ACCU-CHEK MARIANO PLUS TEST STRP Strp USE 1 STRIP TO CHECK GLUCOSE ONCE DAILY    [DISCONTINUED] alendronate (FOSAMAX) 70 MG tablet TAKE 1 TABLET BY MOUTH EVERY 7 DAYS    [DISCONTINUED] atorvastatin (LIPITOR) 40 MG tablet Take 1 tablet by mouth in the evening    [DISCONTINUED] blood-glucose meter kit Use as instructed    [DISCONTINUED] heparin sodium,porcine/PF (HEPARIN, PORCINE, PF,) 5,000 unit/0.5 mL Syrg Inject into the skin.    [DISCONTINUED] HYDROcodone-acetaminophen (NORCO) 5-325 mg per tablet Take 1-2 tablets by mouth every 6 (six) hours as needed.    [DISCONTINUED] HYDROcodone-acetaminophen  (NORCO) 7.5-325 mg per tablet Take 1/2 to 1 tablets BID up to TID PRN pain.    [DISCONTINUED] lancets-blood glucose strips 30 gauge Cmpk 1 application by Misc.(Non-Drug; Combo Route) route once daily at 6am.    [DISCONTINUED] propranoloL (INDERAL) 40 MG tablet Take 1 tablet by mouth twice daily    [DISCONTINUED] propranoloL (INDERAL) 40 MG tablet Take 1 tablet by mouth twice daily    [DISCONTINUED] VIT C/VIT E/LUTEIN/MIN/OMEGA-3 (OCUVITE ORAL) Take by mouth once daily.     Family History     Problem Relation (Age of Onset)    Breast cancer Maternal Aunt    Coronary artery disease Father    Diabetes type II Father    Kidney cancer Maternal Aunt    Lupus Mother    Stroke Father        Tobacco Use    Smoking status: Former Smoker    Smokeless tobacco: Never Used    Tobacco comment: quit 30 years ago   Substance and Sexual Activity    Alcohol use: No    Drug use: No    Sexual activity: Not Currently     Birth control/protection: None     Review of Systems   Constitutional: Positive for activity change, appetite change, fatigue and fever. Negative for diaphoresis.   HENT: Negative for ear discharge, ear pain and facial swelling.    Eyes: Negative for pain and redness.   Respiratory: Positive for cough. Negative for shortness of breath.         No sputum   Cardiovascular: Negative for chest pain, palpitations and leg swelling.   Gastrointestinal: Positive for diarrhea, nausea and vomiting. Negative for abdominal distention, abdominal pain, blood in stool and constipation.   Endocrine: Negative for polydipsia and polyphagia.   Genitourinary: Negative for difficulty urinating, dyspareunia, dysuria, flank pain and hematuria.   Musculoskeletal: Negative for neck pain and neck stiffness.   Skin: Positive for wound. Negative for color change.        Stitches intact to right index finger   Allergic/Immunologic: Negative for food allergies.   Neurological: Positive for weakness. Negative for facial asymmetry and speech  difficulty.   Hematological: Does not bruise/bleed easily.   Psychiatric/Behavioral: Negative for agitation, behavioral problems, confusion, dysphoric mood and suicidal ideas. The patient is not nervous/anxious.      Objective:     Vital Signs (Most Recent):  Temp: 98.2 °F (36.8 °C) (02/06/22 1330)  Pulse: 83 (02/06/22 1405)  Resp: 18 (02/06/22 1405)  BP: (!) 170/76 (02/06/22 1330)  SpO2: (!) 94 % (02/06/22 1405) Vital Signs (24h Range):  Temp:  [98.2 °F (36.8 °C)-98.4 °F (36.9 °C)] 98.2 °F (36.8 °C)  Pulse:  [83-98] 83  Resp:  [18-26] 18  SpO2:  [89 %-96 %] 94 %  BP: (157-190)/(66-79) 170/76     Weight: 61.4 kg (135 lb 6.4 oz)  Body mass index is 27.35 kg/m².    Physical Exam  Constitutional:       General: She is not in acute distress.     Appearance: She is ill-appearing. She is not diaphoretic.   HENT:      Head: Normocephalic and atraumatic.      Mouth/Throat:      Mouth: Mucous membranes are dry.   Eyes:      General:         Right eye: No discharge.         Left eye: No discharge.      Extraocular Movements: Extraocular movements intact.      Conjunctiva/sclera: Conjunctivae normal.      Pupils: Pupils are equal, round, and reactive to light.   Cardiovascular:      Rate and Rhythm: Normal rate and regular rhythm.   Pulmonary:      Effort: Pulmonary effort is normal. No respiratory distress.   Abdominal:      General: There is no distension.      Tenderness: There is no abdominal tenderness. There is no guarding.   Genitourinary:     Comments: Not examined  Musculoskeletal:      Cervical back: Normal range of motion and neck supple. No rigidity or tenderness.      Comments: Stiff joints  General debility   Skin:     General: Skin is warm and dry.      Comments: Right index finger noted with intact stitches with incision approximated   Neurological:      General: No focal deficit present.      Mental Status: She is alert and oriented to person, place, and time. Mental status is at baseline.      Cranial Nerves:  No cranial nerve deficit.   Psychiatric:         Mood and Affect: Mood normal.         Behavior: Behavior normal.         Thought Content: Thought content normal.         Judgment: Judgment normal.           CRANIAL NERVES     CN III, IV, VI   Pupils are equal, round, and reactive to light.       Imaging Results          X-Ray Chest AP Portable (Final result)  Result time 02/06/22 09:35:06    Final result by Franklin Eduardo MD (02/06/22 09:35:06)                 Impression:      Development of bilateral infiltrates.      Electronically signed by: Franklin Eduardo MD  Date:    02/06/2022  Time:    09:35             Narrative:    EXAMINATION:  XR CHEST AP PORTABLE    CLINICAL HISTORY:  COVID-19;    TECHNIQUE:  AP view of the chest was performed.    COMPARISON:  01/22/2022    FINDINGS:  The cardiac and mediastinal silhouettes appear within normal limits. Patchy alveolar and ground-glass infiltrates in the perihilar and lower lobe regions bilaterally, left greater than right.  Stable position of the left-sided PICC.  No pneumothorax per in                                Results for orders placed or performed during the hospital encounter of 02/06/22   CBC auto differential   Result Value Ref Range    WBC 6.36 3.90 - 12.70 K/uL    RBC 3.15 (L) 4.00 - 5.40 M/uL    Hemoglobin 9.9 (L) 12.0 - 16.0 g/dL    Hematocrit 31.0 (L) 37.0 - 48.5 %    MCV 98 82 - 98 fL    MCH 31.4 (H) 27.0 - 31.0 pg    MCHC 31.9 (L) 32.0 - 36.0 g/dL    RDW 13.8 11.5 - 14.5 %    Platelets 363 150 - 450 K/uL    MPV 9.9 9.2 - 12.9 fL    Immature Granulocytes 0.5 0.0 - 0.5 %    Gran # (ANC) 5.3 1.8 - 7.7 K/uL    Immature Grans (Abs) 0.03 0.00 - 0.04 K/uL    Lymph # 0.6 (L) 1.0 - 4.8 K/uL    Mono # 0.4 0.3 - 1.0 K/uL    Eos # 0.1 0.0 - 0.5 K/uL    Baso # 0.01 0.00 - 0.20 K/uL    nRBC 0 0 /100 WBC    Gran % 82.8 (H) 38.0 - 73.0 %    Lymph % 9.0 (L) 18.0 - 48.0 %    Mono % 6.6 4.0 - 15.0 %    Eosinophil % 0.9 0.0 - 8.0 %    Basophil % 0.2 0.0 - 1.9 %    Platelet  Estimate Appears normal     Differential Method Automated    Comprehensive metabolic panel   Result Value Ref Range    Sodium 136 136 - 145 mmol/L    Potassium 2.7 (LL) 3.5 - 5.1 mmol/L    Chloride 100 95 - 110 mmol/L    CO2 23 23 - 29 mmol/L    Glucose 145 (H) 70 - 110 mg/dL    BUN 6 (L) 8 - 23 mg/dL    Creatinine 0.7 0.5 - 1.4 mg/dL    Calcium 9.1 8.7 - 10.5 mg/dL    Total Protein 7.2 6.0 - 8.4 g/dL    Albumin 2.6 (L) 3.5 - 5.2 g/dL    Total Bilirubin 0.6 0.1 - 1.0 mg/dL    Alkaline Phosphatase 91 55 - 135 U/L    AST 23 10 - 40 U/L    ALT 19 10 - 44 U/L    Anion Gap 13 8 - 16 mmol/L    eGFR if African American >60 >60 mL/min/1.73 m^2    eGFR if non African American >60 >60 mL/min/1.73 m^2   C-Reactive Protein   Result Value Ref Range    .8 (H) 0.0 - 8.2 mg/L   Ferritin   Result Value Ref Range    Ferritin 455 (H) 20.0 - 300.0 ng/mL   Lactate Dehydrogenase   Result Value Ref Range     (H) 110 - 260 U/L   CK   Result Value Ref Range    CPK 86 20 - 180 U/L   Lactic Acid, Plasma   Result Value Ref Range    Lactate (Lactic Acid) 0.8 0.5 - 2.2 mmol/L   Troponin I   Result Value Ref Range    Troponin I <0.006 0.000 - 0.026 ng/mL   Procalcitonin   Result Value Ref Range    Procalcitonin 0.17 <0.25 ng/mL   Lipase   Result Value Ref Range    Lipase 4 4 - 60 U/L   PT/INR   Result Value Ref Range    Prothrombin Time 10.8 9.0 - 12.5 sec    INR 1.0 0.8 - 1.2   PTT   Result Value Ref Range    aPTT 36.5 (H) 21.0 - 32.0 sec   D-Dimer, Quantitative   Result Value Ref Range    D-Dimer 2.06 (H) <0.50 mg/L FEU   Brain natriuretic peptide   Result Value Ref Range    BNP 38 0 - 99 pg/mL   Magnesium   Result Value Ref Range    Magnesium 1.8 1.6 - 2.6 mg/dL   POCT COVID-19 Rapid Screening   Result Value Ref Range    POC Rapid COVID Positive (A) Negative     Acceptable Yes          Assessment/Plan:     * Severe sepsis secondary to covid pneumonia  - COVID-19 testing Collection Date: 12/31/2021 Collection Time:    2:17 PM   - Infection Control notified     - Isolation:   - Airborne, Contact and Droplet Precautions  - Cohort patients into COVID units  - N95 mask, wear eye protection  - 20 second hand hygiene              - Limit visitors per hospital policy              - Consolidating lab draws, nursing care, provider visits, and interventions    - Management:  Supplemental O2 to maintain SpO2 >92%  Telemetry  Continuous/intermittent Pulse Ox  Albuterol treatment PRN  Continue MVI, Qpm Zinc and Vitamin C, Vitamin D, Remdesivir, and dexamethasone  Encourage Incentive spirometer q 1 hour while awake    Advance Care Planning   Full Code       This patient does have evidence of infective focus  My overall impression is Severe  sepsis. Vital signs were reviewed and noted in progress note.  Antibiotics given-   Antibiotics (From admission, onward)            None        Cultures were taken-   Microbiology Results (last 7 days)     Procedure Component Value Units Date/Time    Blood culture [[id number]]     Order Status: Sent Specimen: Blood     Blood culture [[id number]]     Order Status: Sent Specimen: Blood         Latest lactate reviewed, they are-  Recent Labs   Lab 02/06/22  0947   LACTATE 0.8       Organ dysfunction indicated by Acute respiratory failure     Source- Covid pneumonia    Source control Achieved by- Symptomatic relief      Debility  2/06 Fall and skin precautions  Turn Q 2 hours  Consult PT and OT        Hypoalbuminemia  2/06 Add po supplement      Normocytic anemia  2/06 Add MVI      Acute hypoxemic respiratory failure secondary to Covid pneumonia  Patient with Hypoxic Respiratory failure which is Acute.  she is not on home oxygen. Supplemental oxygen was provided and noted-  .   Signs/symptoms of respiratory failure include- tachypnea and increased work of breathing. Contributing diagnoses includes - Covid  Pneumonia      2/06 O2 at 2l per nc    Hypokalemia  2/06 Patient received 40 meq po in ER  Add KCL 10 meq Iv  riders x 3  Repeat level in am  Check magnesium level      Infected finger joint  2/06 Continue home Iv Daptomycin- Patient for a 6 weeks course started on 1/22/2022 as per prior Infectious diease consult  During last hospitalization  Patient reports she has orthopedic appointment for this week to get stitches out        VTE Risk Mitigation (From admission, onward)         Ordered     enoxaparin injection 60 mg  2 times daily         02/06/22 1301                Time spent seeing patient( greater than 1/2 spent in direct contact) : 76 minutes      CHRIS Mcfarland  Department of Hospital Medicine   [facility] - Telemetry ([facility])

## 2022-02-06 NOTE — HPI
"Vomiting        PT states, "I have been vomiting since Thursday, feel very weak and have been running a fever."      Per ER- This  is a 81 y.o. female patient with a PMHx of Depression, Diverticulosis of colon, thyroid disease,  HLD and HTN who presents to the Emergency Department for evaluation of vomitting which onset gradually x 3 days pta. Patient states she was infected with COVID-19 3 weeks ago. She reports being on daptomycin. Symptoms are constant and moderate in severity. No mitigating or exacerbating factors reported. Associated sxs include dry cough and diahhrea. Patient denies any CP, leg swelling, abd pain, fever, chills, and all other sxs at this time. No further complaints or concerns at this time.     Patient was evaluated in ER and found to have Covid pneumonia and Placed In Observation for further evaluation and treatment.     Patient also reports she has been receiving Iv daptomycin at home with infected finger.                    "

## 2022-02-06 NOTE — PHARMACY MED REC
"Admission Medication History     The home medication history was taken by Rogelio Muro.    You may go to "Admission" then "Reconcile Home Medications" tabs to review and/or act upon these items.      The home medication list has been updated by the Pharmacy department.    Please read ALL comments highlighted in yellow.    Please address this information as you see fit.     Feel free to contact us if you have any questions or require assistance.      Rogelio Muro  [phone]    Current Outpatient Medications on File Prior to Encounter   Medication Sig Dispense Refill Last Dose    amitriptyline (ELAVIL) 75 MG tablet Take 1 tablet by mouth in the evening 90 tablet 0 2/5/2022 at Unknown time    aspirin (ECOTRIN) 81 MG EC tablet Take 81 mg by mouth once daily.   2/5/2022 at Unknown time    levothyroxine (SYNTHROID) 88 MCG tablet TAKE 1 TABLET BY MOUTH ONCE DAILY MONDAY - SATURDAY  AND ONE & ONE-HALF TABLET ON SUNDAY 90 tablet 0 2/5/2022 at Unknown time    mupirocin (BACTROBAN) 2 % ointment Apply topically 2 (two) times daily. 15 g 1 Past Month at Unknown time    omeprazole (PRILOSEC) 20 MG capsule Take 1 capsule (20 mg total) by mouth as needed. 90 capsule 4 2/5/2022 at Unknown time    propranoloL (INDERAL) 40 MG tablet Take 40 mg by mouth 2 (two) times daily.   2/5/2022 at Unknown time    sertraline (ZOLOFT) 100 MG tablet Take 1 tablet by mouth once daily 90 tablet 3 2/5/2022 at Unknown time    sumatriptan (IMITREX) 100 MG tablet TAKE 1 TABLET BY MOUTH AT ONSET OF MIGRAINE 9 tablet 0 2/5/2022 at Unknown time    vit C/E/Zn/coppr/lutein/zeaxan (PRESERVISION AREDS-2 ORAL) Take 1 tablet by mouth 2 (two) times a day.   2/5/2022 at Unknown time    [DISCONTINUED] aspirin (ECOTRIN) 81 MG EC tablet Take 1 tablet (81 mg total) by mouth once daily. 1 tablet 0 2/5/2022    [DISCONTINUED] meloxicam (MOBIC) 7.5 MG tablet Take 1 tablet (7.5 mg total) by mouth 2 (two) times daily as needed for Pain. Take with " food. 30 tablet 1 2/5/2022 at Unknown time    alendronate (FOSAMAX) 70 MG tablet Take 70 mg by mouth every 7 days.   More than a month at Unknown time    atorvastatin (LIPITOR) 40 MG tablet Take 40 mg by mouth every evening.       ergocalciferol (ERGOCALCIFEROL) 50,000 unit Cap Take 1 capsule by mouth once a week (Patient taking differently: On Sundays) 8 capsule 0 1/30/2022    sodium chloride 0.9% SolP 50 mL with DAPTOmycin 350 mg SolR 350 mg Inject 350 mg into the vein once daily. Ending 02/6/22                                .

## 2022-02-06 NOTE — ASSESSMENT & PLAN NOTE
Patient with Hypoxic Respiratory failure which is Acute.  she is not on home oxygen. Supplemental oxygen was provided and noted-  .   Signs/symptoms of respiratory failure include- tachypnea and increased work of breathing. Contributing diagnoses includes - Covid  Pneumonia      2/06 O2 at 2l per nc

## 2022-02-06 NOTE — PT/OT/SLP EVAL
Physical Therapy Evaluation    Patient Name:  Haim Martin   MRN:  [id number]    Recommendations:     Discharge Recommendations:  home health PT   Discharge Equipment Recommendations: walker, rolling,shower chair   Barriers to discharge: None    Assessment:     Haim Martin is a 81 y.o. female admitted with a medical diagnosis of <principal problem not specified>.  She presents with the following impairments/functional limitations:  weakness,impaired endurance,impaired functional mobilty,gait instability,impaired balance,decreased lower extremity function Will benefit from acute care PT.    Rehab Prognosis: Good; patient would benefit from acute skilled PT services to address these deficits and reach maximum level of function.    Recent Surgery: * No surgery found *      Plan:     During this hospitalization, patient to be seen 3 x/week to address the identified rehab impairments via gait training,therapeutic activities,therapeutic exercises and progress toward the following goals:    · Plan of Care Expires:  02/20/22    Subjective     Chief Complaint: fatigue/weakness  Patient/Family Comments/goals: get stronger  Pain/Comfort:  · Pain Rating 1: 3/10  · Location 1: abdomen    Patients cultural, spiritual, Moravian conflicts given the current situation:      Living Environment:  Pt lives alone but son has been staying with her.  Lives in house with no steps.    Prior to admission, patients level of function was IND.  Equipment used at home: none.  DME owned (not currently used): none.  Upon discharge, patient will have assistance from son.    Objective:     Communicated with Epic and nursing prior to session.  Patient found HOB elevated with    upon PT entry to room.    General Precautions: Standard,     Orthopedic Precautions:    Braces:    Exams:  · RLE ROM: WFL  · RLE Strength: Deficits: 4/5  · LLE ROM: WFL  · LLE Strength: Deficits: 4/5    Functional Mobility:  · Bed Mobility:     · Supine to Sit:  minimum assistance  · Transfers:     · Bed to Chair: minimum assistance with  hand-held assist  using  Stand Pivot  · Toilet Transfer: minimum assistance with  hand-held assist  using  Stand Pivot  · Gait: 5 ft with min A HHA    Therapeutic Activities and Exercises:   After evaluation performed toilet transfer to [facility] with min A.  Min A for standing balance and to doff pajama bottoms.  Pt fatigued quickly     AM-PAC 6 CLICK MOBILITY  Total Score:17     Patient left HOB elevated with all lines intact and call button in reach.    GOALS:   Multidisciplinary Problems     Physical Therapy Goals        Problem: Physical Therapy Goal    Goal Priority Disciplines Outcome Goal Variances Interventions   Physical Therapy Goal     PT, PT/OT Ongoing, Progressing                     History:     Past Medical History:   Diagnosis Date    Depression     Diverticulosis of colon (without mention of hemorrhage) 8/25/2014    Hyperlipidemia     Hypertension     Thyroid disease        Past Surgical History:   Procedure Laterality Date    APPENDECTOMY      CATARACT EXTRACTION EXTRACAPSULAR W/ INTRAOCULAR LENS IMPLANTATION Bilateral 4/2014    INCISION AND DRAINAGE OF HAND Right 1/21/2022    Procedure: INCISION AND DRAINAGE, HAND;  Surgeon: Ramirez Flores MD;  Location: [facility];  Service: Orthopedics;  Laterality: Right;  Right Index Finger    INJECTION OF ANESTHETIC AGENT AROUND MEDIAL BRANCH NERVES INNERVATING LUMBAR FACET JOINT Bilateral 7/16/2020    Procedure: Bilateral L3-5 MBB with local;  Surgeon: Luis Ashraf MD;  Location: [facility];  Service: Pain Management;  Laterality: Bilateral;    INJECTION OF ANESTHETIC AGENT AROUND MEDIAL BRANCH NERVES INNERVATING LUMBAR FACET JOINT Bilateral 8/10/2020    Procedure: Bilateral L3-5 MBB with local;  Surgeon: Luis Ashraf MD;  Location: [facility];  Service: Pain Management;  Laterality: Bilateral;    INJECTION OF ANESTHETIC AGENT INTO SACROILIAC JOINT Bilateral  11/4/2021    Procedure: Bilateral SIJ Injection;  Surgeon: Luis Ashraf MD;  Location: [facility];  Service: Pain Management;  Laterality: Bilateral;    RADIOFREQUENCY THERMOCOAGULATION Left 9/28/2020    Procedure: Left L3-5 Lumbar RFA;  Surgeon: Luis Ashraf MD;  Location: [facility];  Service: Pain Management;  Laterality: Left;    RADIOFREQUENCY THERMOCOAGULATION Right 10/12/2020    Procedure: Right L3-5 Lumbar RFA;  Surgeon: Luis Ashraf MD;  Location: [facility];  Service: Pain Management;  Laterality: Right;    STOMACH SURGERY  1998    not sure what they did, possible bowel resection, partial gastrectomy    TONSILLECTOMY         Time Tracking:     PT Received On: 02/06/22  PT Start Time: 1345     PT Stop Time: 1410  PT Total Time (min): 25 min     Billable Minutes: Evaluation 15 and Therapeutic Activity 10      02/06/2022

## 2022-02-06 NOTE — PLAN OF CARE
PT eval complete. The following goals will be met in 14 days  Pt will be IND with bed mobility  Pt will be IND with transfers  Pt will amb 125 ft with RW and SPV

## 2022-02-06 NOTE — ASSESSMENT & PLAN NOTE
2/06 Continue home Iv Daptomycin  Patient reports she has orthopedic appointment for this week to get stitches out

## 2022-02-06 NOTE — SUBJECTIVE & OBJECTIVE
Past Medical History:   Diagnosis Date    Depression     Diverticulosis of colon (without mention of hemorrhage) 8/25/2014    Hyperlipidemia     Hypertension     Thyroid disease        Past Surgical History:   Procedure Laterality Date    APPENDECTOMY      CATARACT EXTRACTION EXTRACAPSULAR W/ INTRAOCULAR LENS IMPLANTATION Bilateral 4/2014    INCISION AND DRAINAGE OF HAND Right 1/21/2022    Procedure: INCISION AND DRAINAGE, HAND;  Surgeon: Ramirez Flores MD;  Location: [facility];  Service: Orthopedics;  Laterality: Right;  Right Index Finger    INJECTION OF ANESTHETIC AGENT AROUND MEDIAL BRANCH NERVES INNERVATING LUMBAR FACET JOINT Bilateral 7/16/2020    Procedure: Bilateral L3-5 MBB with local;  Surgeon: Luis Ashraf MD;  Location: [facility];  Service: Pain Management;  Laterality: Bilateral;    INJECTION OF ANESTHETIC AGENT AROUND MEDIAL BRANCH NERVES INNERVATING LUMBAR FACET JOINT Bilateral 8/10/2020    Procedure: Bilateral L3-5 MBB with local;  Surgeon: Luis Ashraf MD;  Location: [facility];  Service: Pain Management;  Laterality: Bilateral;    INJECTION OF ANESTHETIC AGENT INTO SACROILIAC JOINT Bilateral 11/4/2021    Procedure: Bilateral SIJ Injection;  Surgeon: Luis Ashraf MD;  Location: [facility];  Service: Pain Management;  Laterality: Bilateral;    RADIOFREQUENCY THERMOCOAGULATION Left 9/28/2020    Procedure: Left L3-5 Lumbar RFA;  Surgeon: Luis Ashraf MD;  Location: [facility];  Service: Pain Management;  Laterality: Left;    RADIOFREQUENCY THERMOCOAGULATION Right 10/12/2020    Procedure: Right L3-5 Lumbar RFA;  Surgeon: Luis Ashraf MD;  Location: [facility];  Service: Pain Management;  Laterality: Right;    STOMACH SURGERY  1998    not sure what they did, possible bowel resection, partial gastrectomy    TONSILLECTOMY         Review of patient's allergies indicates:   Allergen Reactions    Clindamycin      Heaviness in chest      Penicillins  Hives       No current facility-administered medications on file prior to encounter.     Current Outpatient Medications on File Prior to Encounter   Medication Sig    amitriptyline (ELAVIL) 75 MG tablet Take 1 tablet by mouth in the evening    aspirin (ECOTRIN) 81 MG EC tablet Take 81 mg by mouth once daily.    levothyroxine (SYNTHROID) 88 MCG tablet TAKE 1 TABLET BY MOUTH ONCE DAILY MONDAY - SATURDAY  AND ONE & ONE-HALF TABLET ON SUNDAY    mupirocin (BACTROBAN) 2 % ointment Apply topically 2 (two) times daily.    omeprazole (PRILOSEC) 20 MG capsule Take 1 capsule (20 mg total) by mouth as needed.    propranoloL (INDERAL) 40 MG tablet Take 40 mg by mouth 2 (two) times daily.    sertraline (ZOLOFT) 100 MG tablet Take 1 tablet by mouth once daily    sumatriptan (IMITREX) 100 MG tablet TAKE 1 TABLET BY MOUTH AT ONSET OF MIGRAINE    vit C/E/Zn/coppr/lutein/zeaxan (PRESERVISION AREDS-2 ORAL) Take 1 tablet by mouth 2 (two) times a day.    [DISCONTINUED] aspirin (ECOTRIN) 81 MG EC tablet Take 1 tablet (81 mg total) by mouth once daily.    [DISCONTINUED] meloxicam (MOBIC) 7.5 MG tablet Take 1 tablet (7.5 mg total) by mouth 2 (two) times daily as needed for Pain. Take with food.    alendronate (FOSAMAX) 70 MG tablet Take 70 mg by mouth every 7 days.    atorvastatin (LIPITOR) 40 MG tablet Take 40 mg by mouth every evening.    ergocalciferol (ERGOCALCIFEROL) 50,000 unit Cap Take 1 capsule by mouth once a week (Patient taking differently: On Sundays)    sodium chloride 0.9% SolP 50 mL with DAPTOmycin 350 mg SolR 350 mg Inject 350 mg into the vein once daily. Ending 02/6/22    [DISCONTINUED] ACCU-CHEK MARIANO PLUS TEST STRP Strp USE 1 STRIP TO CHECK GLUCOSE ONCE DAILY    [DISCONTINUED] alendronate (FOSAMAX) 70 MG tablet TAKE 1 TABLET BY MOUTH EVERY 7 DAYS    [DISCONTINUED] atorvastatin (LIPITOR) 40 MG tablet Take 1 tablet by mouth in the evening    [DISCONTINUED] blood-glucose meter kit Use as instructed     [DISCONTINUED] heparin sodium,porcine/PF (HEPARIN, PORCINE, PF,) 5,000 unit/0.5 mL Syrg Inject into the skin.    [DISCONTINUED] HYDROcodone-acetaminophen (NORCO) 5-325 mg per tablet Take 1-2 tablets by mouth every 6 (six) hours as needed.    [DISCONTINUED] HYDROcodone-acetaminophen (NORCO) 7.5-325 mg per tablet Take 1/2 to 1 tablets BID up to TID PRN pain.    [DISCONTINUED] lancets-blood glucose strips 30 gauge Cmpk 1 application by Misc.(Non-Drug; Combo Route) route once daily at 6am.    [DISCONTINUED] propranoloL (INDERAL) 40 MG tablet Take 1 tablet by mouth twice daily    [DISCONTINUED] propranoloL (INDERAL) 40 MG tablet Take 1 tablet by mouth twice daily    [DISCONTINUED] VIT C/VIT E/LUTEIN/MIN/OMEGA-3 (OCUVITE ORAL) Take by mouth once daily.     Family History     Problem Relation (Age of Onset)    Breast cancer Maternal Aunt    Coronary artery disease Father    Diabetes type II Father    Kidney cancer Maternal Aunt    Lupus Mother    Stroke Father        Tobacco Use    Smoking status: Former Smoker    Smokeless tobacco: Never Used    Tobacco comment: quit 30 years ago   Substance and Sexual Activity    Alcohol use: No    Drug use: No    Sexual activity: Not Currently     Birth control/protection: None     Review of Systems   Constitutional: Positive for activity change, appetite change, fatigue and fever. Negative for diaphoresis.   HENT: Negative for ear discharge, ear pain and facial swelling.    Eyes: Negative for pain and redness.   Respiratory: Positive for cough. Negative for shortness of breath.         No sputum   Cardiovascular: Negative for chest pain, palpitations and leg swelling.   Gastrointestinal: Positive for diarrhea, nausea and vomiting. Negative for abdominal distention, abdominal pain, blood in stool and constipation.   Endocrine: Negative for polydipsia and polyphagia.   Genitourinary: Negative for difficulty urinating, dyspareunia, dysuria, flank pain and hematuria.    Musculoskeletal: Negative for neck pain and neck stiffness.   Skin: Positive for wound. Negative for color change.        Stitches intact to right index finger   Allergic/Immunologic: Negative for food allergies.   Neurological: Positive for weakness. Negative for facial asymmetry and speech difficulty.   Hematological: Does not bruise/bleed easily.   Psychiatric/Behavioral: Negative for agitation, behavioral problems, confusion, dysphoric mood and suicidal ideas. The patient is not nervous/anxious.      Objective:     Vital Signs (Most Recent):  Temp: 98.2 °F (36.8 °C) (02/06/22 1330)  Pulse: 83 (02/06/22 1405)  Resp: 18 (02/06/22 1405)  BP: (!) 170/76 (02/06/22 1330)  SpO2: (!) 94 % (02/06/22 1405) Vital Signs (24h Range):  Temp:  [98.2 °F (36.8 °C)-98.4 °F (36.9 °C)] 98.2 °F (36.8 °C)  Pulse:  [83-98] 83  Resp:  [18-26] 18  SpO2:  [89 %-96 %] 94 %  BP: (157-190)/(66-79) 170/76     Weight: 61.4 kg (135 lb 6.4 oz)  Body mass index is 27.35 kg/m².    Physical Exam  Constitutional:       General: She is not in acute distress.     Appearance: She is ill-appearing. She is not diaphoretic.   HENT:      Head: Normocephalic and atraumatic.      Mouth/Throat:      Mouth: Mucous membranes are dry.   Eyes:      General:         Right eye: No discharge.         Left eye: No discharge.      Extraocular Movements: Extraocular movements intact.      Conjunctiva/sclera: Conjunctivae normal.      Pupils: Pupils are equal, round, and reactive to light.   Cardiovascular:      Rate and Rhythm: Normal rate and regular rhythm.   Pulmonary:      Effort: Pulmonary effort is normal. No respiratory distress.   Abdominal:      General: There is no distension.      Tenderness: There is no abdominal tenderness. There is no guarding.   Genitourinary:     Comments: Not examined  Musculoskeletal:      Cervical back: Normal range of motion and neck supple. No rigidity or tenderness.      Comments: Stiff joints  General debility   Skin:      General: Skin is warm and dry.      Comments: Right index finger noted with intact stitches with incision approximated   Neurological:      General: No focal deficit present.      Mental Status: She is alert and oriented to person, place, and time. Mental status is at baseline.      Cranial Nerves: No cranial nerve deficit.   Psychiatric:         Mood and Affect: Mood normal.         Behavior: Behavior normal.         Thought Content: Thought content normal.         Judgment: Judgment normal.           CRANIAL NERVES     CN III, IV, VI   Pupils are equal, round, and reactive to light.       Imaging Results          X-Ray Chest AP Portable (Final result)  Result time 02/06/22 09:35:06    Final result by Franklin Eduardo MD (02/06/22 09:35:06)                 Impression:      Development of bilateral infiltrates.      Electronically signed by: Franklin Eduardo MD  Date:    02/06/2022  Time:    09:35             Narrative:    EXAMINATION:  XR CHEST AP PORTABLE    CLINICAL HISTORY:  COVID-19;    TECHNIQUE:  AP view of the chest was performed.    COMPARISON:  01/22/2022    FINDINGS:  The cardiac and mediastinal silhouettes appear within normal limits. Patchy alveolar and ground-glass infiltrates in the perihilar and lower lobe regions bilaterally, left greater than right.  Stable position of the left-sided PICC.  No pneumothorax per in                                Results for orders placed or performed during the hospital encounter of 02/06/22   CBC auto differential   Result Value Ref Range    WBC 6.36 3.90 - 12.70 K/uL    RBC 3.15 (L) 4.00 - 5.40 M/uL    Hemoglobin 9.9 (L) 12.0 - 16.0 g/dL    Hematocrit 31.0 (L) 37.0 - 48.5 %    MCV 98 82 - 98 fL    MCH 31.4 (H) 27.0 - 31.0 pg    MCHC 31.9 (L) 32.0 - 36.0 g/dL    RDW 13.8 11.5 - 14.5 %    Platelets 363 150 - 450 K/uL    MPV 9.9 9.2 - 12.9 fL    Immature Granulocytes 0.5 0.0 - 0.5 %    Gran # (ANC) 5.3 1.8 - 7.7 K/uL    Immature Grans (Abs) 0.03 0.00 - 0.04 K/uL    Lymph # 0.6  (L) 1.0 - 4.8 K/uL    Mono # 0.4 0.3 - 1.0 K/uL    Eos # 0.1 0.0 - 0.5 K/uL    Baso # 0.01 0.00 - 0.20 K/uL    nRBC 0 0 /100 WBC    Gran % 82.8 (H) 38.0 - 73.0 %    Lymph % 9.0 (L) 18.0 - 48.0 %    Mono % 6.6 4.0 - 15.0 %    Eosinophil % 0.9 0.0 - 8.0 %    Basophil % 0.2 0.0 - 1.9 %    Platelet Estimate Appears normal     Differential Method Automated    Comprehensive metabolic panel   Result Value Ref Range    Sodium 136 136 - 145 mmol/L    Potassium 2.7 (LL) 3.5 - 5.1 mmol/L    Chloride 100 95 - 110 mmol/L    CO2 23 23 - 29 mmol/L    Glucose 145 (H) 70 - 110 mg/dL    BUN 6 (L) 8 - 23 mg/dL    Creatinine 0.7 0.5 - 1.4 mg/dL    Calcium 9.1 8.7 - 10.5 mg/dL    Total Protein 7.2 6.0 - 8.4 g/dL    Albumin 2.6 (L) 3.5 - 5.2 g/dL    Total Bilirubin 0.6 0.1 - 1.0 mg/dL    Alkaline Phosphatase 91 55 - 135 U/L    AST 23 10 - 40 U/L    ALT 19 10 - 44 U/L    Anion Gap 13 8 - 16 mmol/L    eGFR if African American >60 >60 mL/min/1.73 m^2    eGFR if non African American >60 >60 mL/min/1.73 m^2   C-Reactive Protein   Result Value Ref Range    .8 (H) 0.0 - 8.2 mg/L   Ferritin   Result Value Ref Range    Ferritin 455 (H) 20.0 - 300.0 ng/mL   Lactate Dehydrogenase   Result Value Ref Range     (H) 110 - 260 U/L   CK   Result Value Ref Range    CPK 86 20 - 180 U/L   Lactic Acid, Plasma   Result Value Ref Range    Lactate (Lactic Acid) 0.8 0.5 - 2.2 mmol/L   Troponin I   Result Value Ref Range    Troponin I <0.006 0.000 - 0.026 ng/mL   Procalcitonin   Result Value Ref Range    Procalcitonin 0.17 <0.25 ng/mL   Lipase   Result Value Ref Range    Lipase 4 4 - 60 U/L   PT/INR   Result Value Ref Range    Prothrombin Time 10.8 9.0 - 12.5 sec    INR 1.0 0.8 - 1.2   PTT   Result Value Ref Range    aPTT 36.5 (H) 21.0 - 32.0 sec   D-Dimer, Quantitative   Result Value Ref Range    D-Dimer 2.06 (H) <0.50 mg/L FEU   Brain natriuretic peptide   Result Value Ref Range    BNP 38 0 - 99 pg/mL   Magnesium   Result Value Ref Range     Magnesium 1.8 1.6 - 2.6 mg/dL   POCT COVID-19 Rapid Screening   Result Value Ref Range    POC Rapid COVID Positive (A) Negative     Acceptable Yes

## 2022-02-06 NOTE — PT/OT/SLP PROGRESS
Occupational Therapy      Patient Name:  Haim Martin   MRN:  [id number]    MD ORDER RECEIVED FOR OT EVAL AND TX.  EVAL INITIATED VIA Epic CHART REVIEW.  Will follow-up.    2/6/2022

## 2022-02-06 NOTE — ASSESSMENT & PLAN NOTE
2/06 Patient received 40 meq po in ER  Add KCL 10 meq Iv riders x 3  Repeat level in am  Check magnesium level

## 2022-02-07 PROBLEM — E87.6 HYPOKALEMIA: Status: RESOLVED | Noted: 2022-02-06 | Resolved: 2022-02-07

## 2022-02-07 LAB
ANION GAP SERPL CALC-SCNC: 11 MMOL/L (ref 8–16)
BASOPHILS # BLD AUTO: 0.01 K/UL (ref 0–0.2)
BASOPHILS NFR BLD: 0.1 % (ref 0–1.9)
BUN SERPL-MCNC: 9 MG/DL (ref 8–23)
CALCIUM SERPL-MCNC: 8.7 MG/DL (ref 8.7–10.5)
CHLORIDE SERPL-SCNC: 107 MMOL/L (ref 95–110)
CO2 SERPL-SCNC: 23 MMOL/L (ref 23–29)
CREAT SERPL-MCNC: 0.7 MG/DL (ref 0.5–1.4)
DIFFERENTIAL METHOD: ABNORMAL
EOSINOPHIL # BLD AUTO: 0 K/UL (ref 0–0.5)
EOSINOPHIL NFR BLD: 0.1 % (ref 0–8)
ERYTHROCYTE [DISTWIDTH] IN BLOOD BY AUTOMATED COUNT: 14.1 % (ref 11.5–14.5)
EST. GFR  (AFRICAN AMERICAN): >60 ML/MIN/1.73 M^2
EST. GFR  (NON AFRICAN AMERICAN): >60 ML/MIN/1.73 M^2
GLUCOSE SERPL-MCNC: 150 MG/DL (ref 70–110)
HCT VFR BLD AUTO: 33 % (ref 37–48.5)
HGB BLD-MCNC: 10.6 G/DL (ref 12–16)
IMM GRANULOCYTES # BLD AUTO: 0.05 K/UL (ref 0–0.04)
IMM GRANULOCYTES NFR BLD AUTO: 0.7 % (ref 0–0.5)
LYMPHOCYTES # BLD AUTO: 0.6 K/UL (ref 1–4.8)
LYMPHOCYTES NFR BLD: 9.1 % (ref 18–48)
MAGNESIUM SERPL-MCNC: 2.6 MG/DL (ref 1.6–2.6)
MCH RBC QN AUTO: 31.5 PG (ref 27–31)
MCHC RBC AUTO-ENTMCNC: 32.1 G/DL (ref 32–36)
MCV RBC AUTO: 98 FL (ref 82–98)
MONOCYTES # BLD AUTO: 0.4 K/UL (ref 0.3–1)
MONOCYTES NFR BLD: 6.6 % (ref 4–15)
NEUTROPHILS # BLD AUTO: 5.6 K/UL (ref 1.8–7.7)
NEUTROPHILS NFR BLD: 83.4 % (ref 38–73)
NRBC BLD-RTO: 0 /100 WBC
PLATELET # BLD AUTO: 385 K/UL (ref 150–450)
PMV BLD AUTO: 10.1 FL (ref 9.2–12.9)
POTASSIUM SERPL-SCNC: 4.7 MMOL/L (ref 3.5–5.1)
RBC # BLD AUTO: 3.37 M/UL (ref 4–5.4)
SODIUM SERPL-SCNC: 141 MMOL/L (ref 136–145)
WBC # BLD AUTO: 6.67 K/UL (ref 3.9–12.7)

## 2022-02-07 PROCEDURE — 85025 COMPLETE CBC W/AUTO DIFF WBC: CPT | Performed by: NURSE PRACTITIONER

## 2022-02-07 PROCEDURE — 99900035 HC TECH TIME PER 15 MIN (STAT)

## 2022-02-07 PROCEDURE — 80048 BASIC METABOLIC PNL TOTAL CA: CPT | Performed by: NURSE PRACTITIONER

## 2022-02-07 PROCEDURE — 94640 AIRWAY INHALATION TREATMENT: CPT

## 2022-02-07 PROCEDURE — 83735 ASSAY OF MAGNESIUM: CPT | Performed by: NURSE PRACTITIONER

## 2022-02-07 PROCEDURE — 27000207 HC ISOLATION

## 2022-02-07 PROCEDURE — 25000003 PHARM REV CODE 250: Performed by: NURSE PRACTITIONER

## 2022-02-07 PROCEDURE — 97530 THERAPEUTIC ACTIVITIES: CPT | Mod: CQ

## 2022-02-07 PROCEDURE — 27000221 HC OXYGEN, UP TO 24 HOURS

## 2022-02-07 PROCEDURE — 96372 THER/PROPH/DIAG INJ SC/IM: CPT | Performed by: NURSE PRACTITIONER

## 2022-02-07 PROCEDURE — 21400001 HC TELEMETRY ROOM

## 2022-02-07 PROCEDURE — 25000003 PHARM REV CODE 250: Performed by: EMERGENCY MEDICINE

## 2022-02-07 PROCEDURE — 97166 OT EVAL MOD COMPLEX 45 MIN: CPT

## 2022-02-07 PROCEDURE — 63600175 PHARM REV CODE 636 W HCPCS: Performed by: NURSE PRACTITIONER

## 2022-02-07 PROCEDURE — 36415 COLL VENOUS BLD VENIPUNCTURE: CPT | Performed by: NURSE PRACTITIONER

## 2022-02-07 PROCEDURE — 94799 UNLISTED PULMONARY SVC/PX: CPT

## 2022-02-07 PROCEDURE — 97110 THERAPEUTIC EXERCISES: CPT

## 2022-02-07 PROCEDURE — 94761 N-INVAS EAR/PLS OXIMETRY MLT: CPT

## 2022-02-07 RX ORDER — BUTALBITAL, ACETAMINOPHEN AND CAFFEINE 50; 325; 40 MG/1; MG/1; MG/1
2 TABLET ORAL EVERY 6 HOURS PRN
Status: DISCONTINUED | OUTPATIENT
Start: 2022-02-07 | End: 2022-02-07

## 2022-02-07 RX ORDER — BUTALBITAL, ACETAMINOPHEN AND CAFFEINE 50; 325; 40 MG/1; MG/1; MG/1
2 TABLET ORAL EVERY 8 HOURS PRN
Status: DISCONTINUED | OUTPATIENT
Start: 2022-02-07 | End: 2022-02-14 | Stop reason: HOSPADM

## 2022-02-07 RX ADMIN — SERTRALINE HYDROCHLORIDE 100 MG: 50 TABLET ORAL at 08:02

## 2022-02-07 RX ADMIN — MUPIROCIN: 20 OINTMENT TOPICAL at 08:02

## 2022-02-07 RX ADMIN — AMITRIPTYLINE HYDROCHLORIDE 75 MG: 50 TABLET, FILM COATED ORAL at 08:02

## 2022-02-07 RX ADMIN — REMDESIVIR 100 MG: 100 INJECTION, POWDER, LYOPHILIZED, FOR SOLUTION INTRAVENOUS at 08:02

## 2022-02-07 RX ADMIN — PROPRANOLOL HYDROCHLORIDE 40 MG: 40 TABLET ORAL at 08:02

## 2022-02-07 RX ADMIN — DAPTOMYCIN 350 MG: 350 INJECTION, POWDER, LYOPHILIZED, FOR SOLUTION INTRAVENOUS at 09:02

## 2022-02-07 RX ADMIN — PANTOPRAZOLE SODIUM 40 MG: 40 TABLET, DELAYED RELEASE ORAL at 08:02

## 2022-02-07 RX ADMIN — Medication 2000 UNITS: at 08:02

## 2022-02-07 RX ADMIN — ENOXAPARIN SODIUM 60 MG: 100 INJECTION SUBCUTANEOUS at 08:02

## 2022-02-07 RX ADMIN — LEVOTHYROXINE SODIUM 88 MCG: 88 TABLET ORAL at 05:02

## 2022-02-07 RX ADMIN — ALBUTEROL SULFATE 2 PUFF: 90 AEROSOL, METERED RESPIRATORY (INHALATION) at 07:02

## 2022-02-07 RX ADMIN — MUPIROCIN: 20 OINTMENT TOPICAL at 09:02

## 2022-02-07 RX ADMIN — ASPIRIN 81 MG: 81 TABLET, COATED ORAL at 08:02

## 2022-02-07 RX ADMIN — DEXAMETHASONE 6 MG: 4 TABLET ORAL at 08:02

## 2022-02-07 RX ADMIN — ZINC SULFATE 220 MG (50 MG) CAPSULE 220 MG: CAPSULE at 08:02

## 2022-02-07 RX ADMIN — THERA TABS 1 TABLET: TAB at 08:02

## 2022-02-07 RX ADMIN — ALBUTEROL SULFATE 2 PUFF: 90 AEROSOL, METERED RESPIRATORY (INHALATION) at 01:02

## 2022-02-07 RX ADMIN — OXYCODONE HYDROCHLORIDE AND ACETAMINOPHEN 500 MG: 500 TABLET ORAL at 08:02

## 2022-02-07 RX ADMIN — BUTALBITAL, ACETAMINOPHEN AND CAFFEINE 2 TABLET: 50; 325; 40 TABLET ORAL at 08:02

## 2022-02-07 NOTE — PLAN OF CARE
OT ottoniel completed. Patient completed sup>sit with CGA, sit>stand with CGA, and step>pivot to bedside chair with min HHA. Recommending HHOT at d/c.

## 2022-02-07 NOTE — PLAN OF CARE
Aox4.    Able to verbalize needs & follow commands. Calm & cooperative throughout shift.   POC reviewed with pt. Interventions implemented as appropriate.    VSS; SR on tele-monitor.  On 2L O2 NC. Respiratory treatments as ordered.    DEISY PICC patent.     Receiving Remdesivir & IV abx. No adverse reactions noted.  Skin WDI.    No c/o pain.    Incontinence of bladder. Incontinence pad changed as needed. External female catheter in place. No s/s of discomfort noted.  Lovenox for VTE.  Activity ad judson. Frequent position changes encouraged. Turn q2h.  NADN. Resting quietly in bed.   Free of falls. Hourly rounding complete.   All safety measures remain in place. SR up x2; bed low & locked. Bed alarm on & audible. Call light w/in reach.   Will continue to monitor throughout shift.

## 2022-02-07 NOTE — PT/OT/SLP EVAL
"Occupational Therapy   Evaluation    Name: Haim Martin  MRN: [id number]  Admitting Diagnosis:  Severe sepsis  Recent Surgery: * No surgery found *      Recommendations:     Discharge Recommendations: home health OT  Discharge Equipment Recommendations:  walker, rolling,shower chair,bath bench  Barriers to discharge:  None    Assessment:     Haim Martin is a 81 y.o. female with a medical diagnosis of Severe sepsis.  She presents with the following performance deficits affecting function: weakness,impaired endurance,impaired self care skills,impaired functional mobilty,impaired balance,decreased safety awareness,impaired cardiopulmonary response to activity.      Rehab Prognosis: Good; patient would benefit from acute skilled OT services to address these deficits and reach maximum level of function.       Plan:     Patient to be seen 2 x/week to address the above listed problems via self-care/home management,therapeutic activities,therapeutic exercises  · Plan of Care Expires: 02/21/22  · Plan of Care Reviewed with: patient    Subjective     Chief Complaint: reported "I am ready to get out of this bed."  Patient/Family Comments/goals: get stronger and go home    Occupational Profile:  Living Environment: Patient resides in a one story home with no steps to enter. She typically resides alone, but her son has been staying with her since September.  Previous level of function: Patient was independent with ADLs and ambulation prior to admission. She was an active .  Roles and Routines: n/a  Equipment Used at Home:  none  Assistance upon Discharge: son    Pain/Comfort:  · Pain Rating 1: 0/10    Objective:     Communicated with: NurseAliyah, prior to session.  Patient found supine with telemetry,peripheral IV upon OT entry to room.    General Precautions: Standard, airborne,fall,contact,droplet   Orthopedic Precautions:N/A   Braces: N/A  Respiratory Status: Nasal cannula, flow 2 L/min    Bed Mobility:  "   · Patient completed Supine to Sit with contact guard assistance and with side rail    Functional Mobility/Transfers:  · Patient completed Sit <> Stand Transfer with contact guard assistance  with  no assistive device   · Patient completed Bed <> Chair Transfer using Step Transfer technique with minimum assistance with hand-held assist    Activities of Daily Living:  · Grooming: setup while seated in bedside chair  · Upper Body Dressing: minimum assistance .    Cognitive/Visual Perceptual:  Cognitive/Psychosocial Skills:     -       Oriented to: Person, Place, Time and Situation   -       Follows Commands/attention:Follows two-step commands    Physical Exam:  Balance:    -       sitting: good, static standing: fair, dynamic standing: poor +  Upper Extremity Range of Motion:     -       Right Upper Extremity: WFL  -       Left Upper Extremity: WFL  Upper Extremity Strength:    -       Right Upper Extremity: Deficits: grossly 4/5  -       Left Upper Extremity: Deficits: grossly 4/5   Strength:    -       Right Upper Extremity: Deficits: fair  -       Left Upper Extremity: Deficits: fair    AMPAC 6 Click ADL:  AMPAC Total Score: 18    Treatment & Education:  Patient educated on role of OT in acute setting and benefits of participation. Educated on benefits of OOB activity and importance of calling for A to transfer back to bed to decrease risk of falling. Patient encouraged to complete B UE AROM HEP therex while seated in chair to increase strength and activity tolerance. Reinforcement of all education required.  Education:    Patient left up in chair with all lines intact, call button in reach and nurse present    GOALS:   Multidisciplinary Problems     Occupational Therapy Goals        Problem: Occupational Therapy Goal    Goal Priority Disciplines Outcome Interventions   Occupational Therapy Goal     OT, PT/OT     Description: Goals to be met by: 2/21/22     Patient will increase functional independence with  ADLs by performing:    UE Dressing with Modified Yell.  Toileting from toilet with Supervision for hygiene and clothing management.   Toilet transfer to toilet with Supervision.  Increased functional strength in B UE to grossly WFL to increase independence with ADLs.                     History:     Past Medical History:   Diagnosis Date    Depression     Diverticulosis of colon (without mention of hemorrhage) 8/25/2014    Hyperlipidemia     Hypertension     Thyroid disease        Past Surgical History:   Procedure Laterality Date    APPENDECTOMY      CATARACT EXTRACTION EXTRACAPSULAR W/ INTRAOCULAR LENS IMPLANTATION Bilateral 4/2014    INCISION AND DRAINAGE OF HAND Right 1/21/2022    Procedure: INCISION AND DRAINAGE, HAND;  Surgeon: Ramirez Flores MD;  Location: [facility] OR;  Service: Orthopedics;  Laterality: Right;  Right Index Finger    INJECTION OF ANESTHETIC AGENT AROUND MEDIAL BRANCH NERVES INNERVATING LUMBAR FACET JOINT Bilateral 7/16/2020    Procedure: Bilateral L3-5 MBB with local;  Surgeon: Luis Ashraf MD;  Location: [facility] PAIN [facility]GT;  Service: Pain Management;  Laterality: Bilateral;    INJECTION OF ANESTHETIC AGENT AROUND MEDIAL BRANCH NERVES INNERVATING LUMBAR FACET JOINT Bilateral 8/10/2020    Procedure: Bilateral L3-5 MBB with local;  Surgeon: Luis Ashraf MD;  Location: [facility] PAIN [facility];  Service: Pain Management;  Laterality: Bilateral;    INJECTION OF ANESTHETIC AGENT INTO SACROILIAC JOINT Bilateral 11/4/2021    Procedure: Bilateral SIJ Injection;  Surgeon: Luis Ashraf MD;  Location: [facility] PAIN [facility]GT;  Service: Pain Management;  Laterality: Bilateral;    RADIOFREQUENCY THERMOCOAGULATION Left 9/28/2020    Procedure: Left L3-5 Lumbar RFA;  Surgeon: Luis Ashraf MD;  Location: [facility] PAIN [facility]GT;  Service: Pain Management;  Laterality: Left;    RADIOFREQUENCY THERMOCOAGULATION Right 10/12/2020    Procedure: Right L3-5 Lumbar RFA;  Surgeon: Luis Ashraf MD;  Location:  [facility] PAIN MGT;  Service: Pain Management;  Laterality: Right;    STOMACH SURGERY  1998    not sure what they did, possible bowel resection, partial gastrectomy    TONSILLECTOMY         Time Tracking:     OT Date of Treatment: 02/07/22  OT Start Time: 0900  OT Stop Time: 0925  OT Total Time (min): 25 min    Billable Minutes:Evaluation 15  Therapeutic Activity 10    2/7/2022

## 2022-02-07 NOTE — PT/OT/SLP PROGRESS
Physical Therapy      Patient Name:  Haim Martin   MRN:  [id number]    Time: 1080-3116    Communicated with nurse, Alyiah, and completed Epic chart review prior to tx session.    S: Patient refused PT session stated she had just finished toileting and was feeling very tired and weak.     O: Educated patient on importance of full and active participation in functional mobility training to prevent further loss of ROM and strength. Encouraged patient to perform AROM TE to BLE throughout the day including: SAQ, Heel Slides, AP, Hip ADD/ABD, glute sets. Re enforced importance of utilizing call light to meet needs in room and not attempt to get up without staff assistance. Patient verbalized understanding and agreed to comply.     Patient left in supine with call light in reach.     A: Patient would continue to benefit from skilled PT to address functional limitations in order to return to PLOF/decrease caregiver burden.    P: Cont PT POC & recommended placement    Charges: 1HARMEET Bagley, PTA

## 2022-02-07 NOTE — PLAN OF CARE
Pt AAO x4.  VSS.  Pt remained afebrile throughout this shift.   IV meds administered per order.   Pt remained free of falls this shift.   Plan of care reviewed. Patient verbalizes understanding.   Bed low, side rails up x 2, wheels locked, call light in reach.   Patient instructed to call for assistance.   Hourly rounding completed.   Will continue to monitor.

## 2022-02-08 LAB
BASOPHILS # BLD AUTO: 0.01 K/UL (ref 0–0.2)
BASOPHILS NFR BLD: 0.2 % (ref 0–1.9)
BSA FOR ECHO PROCEDURE: 1.57 M2
CRP SERPL-MCNC: 266.4 MG/L (ref 0–8.2)
D DIMER PPP IA.FEU-MCNC: 1.84 MG/L FEU
DIFFERENTIAL METHOD: ABNORMAL
EJECTION FRACTION: 55 %
EOSINOPHIL # BLD AUTO: 0 K/UL (ref 0–0.5)
EOSINOPHIL NFR BLD: 0.7 % (ref 0–8)
ERYTHROCYTE [DISTWIDTH] IN BLOOD BY AUTOMATED COUNT: 14.4 % (ref 11.5–14.5)
HCT VFR BLD AUTO: 35.7 % (ref 37–48.5)
HGB BLD-MCNC: 11.1 G/DL (ref 12–16)
IMM GRANULOCYTES # BLD AUTO: 0.05 K/UL (ref 0–0.04)
IMM GRANULOCYTES NFR BLD AUTO: 0.9 % (ref 0–0.5)
LDH SERPL L TO P-CCNC: 283 U/L (ref 110–260)
LYMPHOCYTES # BLD AUTO: 1.1 K/UL (ref 1–4.8)
LYMPHOCYTES NFR BLD: 20.1 % (ref 18–48)
MCH RBC QN AUTO: 30.4 PG (ref 27–31)
MCHC RBC AUTO-ENTMCNC: 31.1 G/DL (ref 32–36)
MCV RBC AUTO: 98 FL (ref 82–98)
MONOCYTES # BLD AUTO: 0.3 K/UL (ref 0.3–1)
MONOCYTES NFR BLD: 5.4 % (ref 4–15)
NEUTROPHILS # BLD AUTO: 3.9 K/UL (ref 1.8–7.7)
NEUTROPHILS NFR BLD: 72.7 % (ref 38–73)
NRBC BLD-RTO: 0 /100 WBC
PISA TR MAX VEL: 3.49 M/S
PLATELET # BLD AUTO: 429 K/UL (ref 150–450)
PMV BLD AUTO: 10.1 FL (ref 9.2–12.9)
PROX AORTA: 2.7 CM
RBC # BLD AUTO: 3.65 M/UL (ref 4–5.4)
SINUS: 2.4 CM
TR MAX PG: 49 MMHG
WBC # BLD AUTO: 5.38 K/UL (ref 3.9–12.7)

## 2022-02-08 PROCEDURE — 21400001 HC TELEMETRY ROOM

## 2022-02-08 PROCEDURE — 85025 COMPLETE CBC W/AUTO DIFF WBC: CPT | Performed by: NURSE PRACTITIONER

## 2022-02-08 PROCEDURE — 99900035 HC TECH TIME PER 15 MIN (STAT)

## 2022-02-08 PROCEDURE — 27000207 HC ISOLATION

## 2022-02-08 PROCEDURE — 94640 AIRWAY INHALATION TREATMENT: CPT

## 2022-02-08 PROCEDURE — 94799 UNLISTED PULMONARY SVC/PX: CPT

## 2022-02-08 PROCEDURE — 27000221 HC OXYGEN, UP TO 24 HOURS

## 2022-02-08 PROCEDURE — 85379 FIBRIN DEGRADATION QUANT: CPT | Performed by: EMERGENCY MEDICINE

## 2022-02-08 PROCEDURE — 63600175 PHARM REV CODE 636 W HCPCS: Performed by: NURSE PRACTITIONER

## 2022-02-08 PROCEDURE — 25000003 PHARM REV CODE 250: Performed by: INTERNAL MEDICINE

## 2022-02-08 PROCEDURE — 97116 GAIT TRAINING THERAPY: CPT

## 2022-02-08 PROCEDURE — 25000003 PHARM REV CODE 250: Performed by: NURSE PRACTITIONER

## 2022-02-08 PROCEDURE — 94761 N-INVAS EAR/PLS OXIMETRY MLT: CPT

## 2022-02-08 PROCEDURE — 36415 COLL VENOUS BLD VENIPUNCTURE: CPT | Performed by: EMERGENCY MEDICINE

## 2022-02-08 PROCEDURE — 86140 C-REACTIVE PROTEIN: CPT | Performed by: EMERGENCY MEDICINE

## 2022-02-08 PROCEDURE — 97110 THERAPEUTIC EXERCISES: CPT

## 2022-02-08 PROCEDURE — 83615 LACTATE (LD) (LDH) ENZYME: CPT | Performed by: EMERGENCY MEDICINE

## 2022-02-08 RX ORDER — HYDRALAZINE HYDROCHLORIDE 20 MG/ML
5 INJECTION INTRAMUSCULAR; INTRAVENOUS EVERY 4 HOURS PRN
Status: DISCONTINUED | OUTPATIENT
Start: 2022-02-08 | End: 2022-02-14 | Stop reason: HOSPADM

## 2022-02-08 RX ORDER — CLONIDINE HYDROCHLORIDE 0.1 MG/1
0.1 TABLET ORAL 2 TIMES DAILY
Status: DISCONTINUED | OUTPATIENT
Start: 2022-02-08 | End: 2022-02-14 | Stop reason: HOSPADM

## 2022-02-08 RX ADMIN — ALBUTEROL SULFATE 2 PUFF: 90 AEROSOL, METERED RESPIRATORY (INHALATION) at 12:02

## 2022-02-08 RX ADMIN — OXYCODONE HYDROCHLORIDE AND ACETAMINOPHEN 500 MG: 500 TABLET ORAL at 08:02

## 2022-02-08 RX ADMIN — THERA TABS 1 TABLET: TAB at 08:02

## 2022-02-08 RX ADMIN — MUPIROCIN: 20 OINTMENT TOPICAL at 08:02

## 2022-02-08 RX ADMIN — ENOXAPARIN SODIUM 60 MG: 100 INJECTION SUBCUTANEOUS at 08:02

## 2022-02-08 RX ADMIN — ASPIRIN 81 MG: 81 TABLET, COATED ORAL at 08:02

## 2022-02-08 RX ADMIN — PROPRANOLOL HYDROCHLORIDE 40 MG: 40 TABLET ORAL at 08:02

## 2022-02-08 RX ADMIN — LEVOTHYROXINE SODIUM 88 MCG: 88 TABLET ORAL at 05:02

## 2022-02-08 RX ADMIN — ALBUTEROL SULFATE 2 PUFF: 90 AEROSOL, METERED RESPIRATORY (INHALATION) at 07:02

## 2022-02-08 RX ADMIN — PANTOPRAZOLE SODIUM 40 MG: 40 TABLET, DELAYED RELEASE ORAL at 08:02

## 2022-02-08 RX ADMIN — DAPTOMYCIN 350 MG: 350 INJECTION, POWDER, LYOPHILIZED, FOR SOLUTION INTRAVENOUS at 11:02

## 2022-02-08 RX ADMIN — SERTRALINE HYDROCHLORIDE 100 MG: 50 TABLET ORAL at 08:02

## 2022-02-08 RX ADMIN — ALBUTEROL SULFATE 2 PUFF: 90 AEROSOL, METERED RESPIRATORY (INHALATION) at 01:02

## 2022-02-08 RX ADMIN — DEXAMETHASONE 6 MG: 4 TABLET ORAL at 08:02

## 2022-02-08 RX ADMIN — AMITRIPTYLINE HYDROCHLORIDE 75 MG: 50 TABLET, FILM COATED ORAL at 08:02

## 2022-02-08 RX ADMIN — ZINC SULFATE 220 MG (50 MG) CAPSULE 220 MG: CAPSULE at 08:02

## 2022-02-08 RX ADMIN — CLONIDINE HYDROCHLORIDE 0.1 MG: 0.1 TABLET ORAL at 04:02

## 2022-02-08 RX ADMIN — REMDESIVIR 100 MG: 100 INJECTION, POWDER, LYOPHILIZED, FOR SOLUTION INTRAVENOUS at 08:02

## 2022-02-08 NOTE — ASSESSMENT & PLAN NOTE
Patient with Hypoxic Respiratory failure which is Acute.  she is not on home oxygen. Supplemental oxygen was provided and noted- Oxygen Concentration (%):  [28] 28.   Signs/symptoms of respiratory failure include- tachypnea and increased work of breathing. Contributing diagnoses includes - Covid  Pneumonia      2/06 O2 at 2l per nc    2/7- stable sats on 2 L NC

## 2022-02-08 NOTE — ASSESSMENT & PLAN NOTE
- COVID-19 testing Collection Date: 12/31/2021 Collection Time:   2:17 PM   - Infection Control notified     - Isolation:   - Airborne, Contact and Droplet Precautions  - Cohort patients into COVID units  - N95 mask, wear eye protection  - 20 second hand hygiene              - Limit visitors per hospital policy              - Consolidating lab draws, nursing care, provider visits, and interventions    - Management:  Supplemental O2 to maintain SpO2 >92%  Telemetry  Continuous/intermittent Pulse Ox  Albuterol treatment PRN  Continue MVI, Qpm Zinc and Vitamin C, Vitamin D, Remdesivir, and dexamethasone  Encourage Incentive spirometer q 1 hour while awake    Advance Care Planning   Full Code        This patient does have evidence of infective focus  My overall impression is Severe  sepsis. Vital signs were reviewed and noted in progress note.  Antibiotics given-   Antibiotics (From admission, onward)            Start     Stop Route Frequency Ordered    02/07/22 0900  DAPTOmycin (CUBICIN) 350 mg in sodium chloride 0.9% IVPB         -- IV Daily 02/06/22 1535    02/06/22 2100  mupirocin 2 % ointment         -- Top 2 times daily 02/06/22 1535        Cultures were taken-   Microbiology Results (last 7 days)     Procedure Component Value Units Date/Time    Blood culture [[id number]] Collected: 02/06/22 1418    Order Status: Completed Specimen: Blood Updated: 02/07/22 2012     Blood Culture, Routine No Growth to date      No Growth to date    Blood culture [[id number]] Collected: 02/06/22 1418    Order Status: Completed Specimen: Blood Updated: 02/07/22 1955     Blood Culture, Routine Gram stain aer bottle: Gram positive cocci in clusters resembling Staph      Results called to and read back by:Brendan Villarreal LPN 02/07/2022  19:55        Latest lactate reviewed, they are-  Recent Labs   Lab 02/06/22  0947   LACTATE 0.8       Organ dysfunction indicated by Acute respiratory failure     Source- Covid pneumonia    Source control  Achieved by- Symptomatic relief    Improving, afebrile- continue Remdesivir plus Dexa

## 2022-02-08 NOTE — SUBJECTIVE & OBJECTIVE
Interval History:     Review of Systems   Constitutional: Positive for activity change and fatigue. Negative for appetite change, chills, diaphoresis, fever and unexpected weight change.   HENT: Negative for congestion, hearing loss, mouth sores, postnasal drip, rhinorrhea, sore throat and trouble swallowing.    Eyes: Negative for discharge and visual disturbance.   Respiratory: Positive for cough and shortness of breath. Negative for chest tightness.    Cardiovascular: Negative for chest pain, palpitations and leg swelling.   Gastrointestinal: Positive for abdominal distention and nausea. Negative for blood in stool, constipation, diarrhea and vomiting.   Endocrine: Negative for cold intolerance and heat intolerance.   Genitourinary: Negative for difficulty urinating, dyspareunia, flank pain and hematuria.   Musculoskeletal: Positive for arthralgias. Negative for back pain and myalgias.   Skin: Negative.    Neurological: Positive for weakness and headaches. Negative for dizziness and light-headedness.   Hematological: Negative for adenopathy. Does not bruise/bleed easily.   Psychiatric/Behavioral: Negative for agitation, behavioral problems and confusion. The patient is not nervous/anxious.      Objective:     Vital Signs (Most Recent):  Temp: 97.5 °F (36.4 °C) (02/08/22 1203)  Pulse: 70 (02/08/22 1300)  Resp: 20 (02/08/22 1300)  BP: (!) 153/66 (02/08/22 1246)  SpO2: 96 % (02/08/22 1300) Vital Signs (24h Range):  Temp:  [96.7 °F (35.9 °C)-98.5 °F (36.9 °C)] 97.5 °F (36.4 °C)  Pulse:  [66-91] 70  Resp:  [16-20] 20  SpO2:  [90 %-96 %] 96 %  BP: (125-184)/(63-78) 153/66     Weight: 59 kg (130 lb)  Body mass index is 26.26 kg/m².    Intake/Output Summary (Last 24 hours) at 2/8/2022 1513  Last data filed at 2/8/2022 1000  Gross per 24 hour   Intake 440 ml   Output 500 ml   Net -60 ml      Physical Exam  Vitals and nursing note reviewed.   Constitutional:       General: She is not in acute distress.     Appearance: She  is well-developed. She is ill-appearing.   HENT:      Head: Normocephalic and atraumatic.      Right Ear: Hearing and external ear normal.      Left Ear: Hearing and external ear normal.      Nose: No rhinorrhea.      Right Sinus: No maxillary sinus tenderness or frontal sinus tenderness.      Left Sinus: No maxillary sinus tenderness or frontal sinus tenderness.      Mouth/Throat:      Mouth: No oral lesions.      Pharynx: Uvula midline.   Eyes:      General:         Right eye: No discharge.         Left eye: No discharge.      Conjunctiva/sclera: Conjunctivae normal.      Pupils: Pupils are equal, round, and reactive to light.   Neck:      Thyroid: No thyromegaly.      Vascular: No carotid bruit.      Trachea: No tracheal deviation.   Cardiovascular:      Rate and Rhythm: Normal rate and regular rhythm.      Pulses:           Dorsalis pedis pulses are 2+ on the right side and 2+ on the left side.      Heart sounds: Normal heart sounds, S1 normal and S2 normal. No murmur heard.      Pulmonary:      Effort: Pulmonary effort is normal. No respiratory distress.      Breath sounds: Examination of the right-lower field reveals rales. Examination of the left-lower field reveals rales. Rales present.   Chest:   Breasts:      Right: No supraclavicular adenopathy.      Left: No supraclavicular adenopathy.       Abdominal:      General: Bowel sounds are decreased. There is no distension.      Palpations: Abdomen is soft. There is no mass.      Tenderness: There is no abdominal tenderness.   Musculoskeletal:         General: Normal range of motion.      Cervical back: Normal range of motion.   Lymphadenopathy:      Cervical: No cervical adenopathy.      Upper Body:      Right upper body: No supraclavicular adenopathy.      Left upper body: No supraclavicular adenopathy.   Skin:     General: Skin is warm and dry.      Capillary Refill: Capillary refill takes less than 2 seconds.      Coloration: Skin is pale.      Findings: No  rash.   Neurological:      Mental Status: She is alert and oriented to person, place, and time.      Sensory: No sensory deficit.      Coordination: Coordination normal.      Gait: Gait normal.   Psychiatric:         Mood and Affect: Mood is anxious. Mood is not depressed. Affect is not labile.         Speech: Speech normal.         Behavior: Behavior normal.         Thought Content: Thought content normal.         Judgment: Judgment normal.         Significant Labs:   All pertinent labs within the past 24 hours have been reviewed.  CBC:   Recent Labs   Lab 02/07/22  0630 02/08/22  0508   WBC 6.67 5.38   HGB 10.6* 11.1*   HCT 33.0* 35.7*    429     CMP:   Recent Labs   Lab 02/07/22  0630      K 4.7      CO2 23   *   BUN 9   CREATININE 0.7   CALCIUM 8.7   ANIONGAP 11   EGFRNONAA >60       Significant Imaging: I have reviewed all pertinent imaging results/findings within the past 24 hours.

## 2022-02-08 NOTE — ASSESSMENT & PLAN NOTE
2/06 Continue home Iv Daptomycin  Patient reports she has orthopedic appointment for this week to get stitches out    2/7- LIF improving

## 2022-02-08 NOTE — PROGRESS NOTES
"[facility] ([facility]  Progress Note    Patient Name: Haim Martin  MRN: [id number]  Patient Class: IP- Inpatient   Admission Date: 2/6/2022  Length of Stay: 0 days  Attending Physician: José Miguel Blankenship MD  Primary Care Provider: Kavya Mesa MD        Subjective:     Principal Problem:Severe sepsis        HPI:  Vomiting        PT states, "I have been vomiting since Thursday, feel very weak and have been running a fever."      Per ER- This  is a 81 y.o. female patient with a PMHx of Depression, Diverticulosis of colon, thyroid disease,  HLD and HTN who presents to the Emergency Department for evaluation of vomitting which onset gradually x 3 days pta. Patient states she was infected with COVID-19 3 weeks ago. She reports being on daptomycin. Symptoms are constant and moderate in severity. No mitigating or exacerbating factors reported. Associated sxs include dry cough and diahhrea. Patient denies any CP, leg swelling, abd pain, fever, chills, and all other sxs at this time. No further complaints or concerns at this time.     Patient was evaluated in ER and found to have Covid pneumonia and Placed In Observation for further evaluation and treatment.     Patient also reports she has been receiving Iv daptomycin at home with infected finger.                        Overview/Hospital Course:  81 y.o. female patient with Hx of Depression, Diverticulosis of colon, thyroid disease, HLD and HTN who presente for evaluation of vomitting x 3 days. Patient states she was infected with COVID-19 3 weeks ago and reports being on Daptomycin. Associated sxs include dry cough and diahhrea. She  was evaluated in ER and found to have Covid pneumonia and placed In Observation for further evaluation and treatment. Patient also reports she has been receiving Iv daptomycin at home for infected finger.     2/7- still feels weak and has HA- treated with Fioricet but overall better. No diarrhea, did " PT/OT. K improved to 4.7, H/H 10.6/32. Getting Dexa and Remdesivir. Sats 92-93% on 2 L NC, encouraged to do IS and will try to get her OOB to chair and ambulate.       Interval History: still feels weak and has HA- treated with Fioricet but overall better. No diarrhea, did PT/OT. K improved to 4.7, H/H 10.6/32. Getting Dexa and Remdesivir. Sats 92-93% on 2 L NC, encouraged to do IS and will try to get her OOB to chair and ambulate.     Review of Systems   Constitutional: Positive for activity change, appetite change, fatigue and fever. Negative for diaphoresis.   HENT: Negative for ear discharge, ear pain and facial swelling.    Eyes: Negative for pain and redness.   Respiratory: Positive for cough. Negative for shortness of breath.         No sputum   Cardiovascular: Negative for chest pain, palpitations and leg swelling.   Gastrointestinal: Positive for diarrhea, nausea and vomiting. Negative for abdominal distention, abdominal pain, blood in stool and constipation.   Endocrine: Negative for polydipsia and polyphagia.   Genitourinary: Negative for difficulty urinating, dyspareunia, dysuria, flank pain and hematuria.   Musculoskeletal: Negative for neck pain and neck stiffness.   Skin: Positive for wound. Negative for color change.        Stitches intact to right index finger   Allergic/Immunologic: Negative for food allergies.   Neurological: Positive for weakness. Negative for facial asymmetry and speech difficulty.   Hematological: Does not bruise/bleed easily.   Psychiatric/Behavioral: Negative for agitation, behavioral problems, confusion, dysphoric mood and suicidal ideas. The patient is not nervous/anxious.      Objective:     Vital Signs (Most Recent):  Temp: 98.2 °F (36.8 °C) (02/07/22 1938)  Pulse: 83 (02/07/22 1938)  Resp: 18 (02/07/22 1938)  BP: 136/65 (02/07/22 1938)  SpO2: (!) 92 % (02/07/22 1938) Vital Signs (24h Range):  Temp:  [97.5 °F (36.4 °C)-99 °F (37.2 °C)] 98.2 °F (36.8 °C)  Pulse:  [70-91]  83  Resp:  [16-20] 18  SpO2:  [90 %-93 %] 92 %  BP: (126-183)/(64-78) 136/65     Weight: 59.9 kg (132 lb 0.9 oz)  Body mass index is 26.67 kg/m².    Intake/Output Summary (Last 24 hours) at 2/7/2022 2153  Last data filed at 2/7/2022 1700  Gross per 24 hour   Intake 438 ml   Output 601 ml   Net -163 ml      Physical Exam  Constitutional:       General: She is not in acute distress.     Appearance: She is ill-appearing. She is not diaphoretic.   HENT:      Head: Normocephalic and atraumatic.      Mouth/Throat:      Mouth: Mucous membranes are dry.   Eyes:      General:         Right eye: No discharge.         Left eye: No discharge.      Extraocular Movements: Extraocular movements intact.      Conjunctiva/sclera: Conjunctivae normal.      Pupils: Pupils are equal, round, and reactive to light.   Cardiovascular:      Rate and Rhythm: Normal rate and regular rhythm.   Pulmonary:      Effort: Pulmonary effort is normal. No respiratory distress.   Abdominal:      General: There is no distension.      Tenderness: There is no abdominal tenderness. There is no guarding.   Genitourinary:     Comments: Not examined  Musculoskeletal:      Cervical back: Normal range of motion and neck supple. No rigidity or tenderness.      Comments: Stiff joints  General debility   Skin:     General: Skin is warm and dry.      Comments: Right index finger noted with intact stitches with incision approximated   Neurological:      General: No focal deficit present.      Mental Status: She is alert and oriented to person, place, and time. Mental status is at baseline.      Cranial Nerves: No cranial nerve deficit.   Psychiatric:         Mood and Affect: Mood normal.         Behavior: Behavior normal.         Thought Content: Thought content normal.         Judgment: Judgment normal.       Flow (L/min): 2  Oxygen Concentration (%):  [28] 28  Temp:  [97.5 °F (36.4 °C)-99 °F (37.2 °C)] 98.2 °F (36.8 °C)  Pulse:  [70-91] 83  Resp:  [16-20] 18  SpO2:   [90 %-93 %] 92 %  BP: (126-183)/(64-78) 136/65      Lab Results   Component Value Date    [id number] Positive (A) 02/06/2022    [id number] Detected (A) 12/31/2021    [id number] Not Detected 07/13/2020      Recent Labs   Lab 02/06/22  0947 02/06/22  1418 02/07/22  0630   PROCAL 0.17  --   --    FERRITIN 455*  --   --    LACTATE 0.8  --   --    *  --   --    .8*  --   --    TROPONINI <0.006  --   --    DDIMER  --  2.06*  --      --  141   WBC 6.36  --  6.67   GRAN 82.8*  5.3  --  83.4*  5.6   LYMPH 9.0*  0.6*  --  9.1*  0.6*   HGB 9.9*  --  10.6*   HCT 31.0*  --  33.0*   BUN 6*  --  9   CREATININE 0.7  --  0.7   ESTGFRAFRICA >60  --  >60   EGFRNONAA >60  --  >60   K 2.7*  --  4.7     --  107   CO2 23  --  23   MG  --  1.8 2.6     Microbiology Results (last 7 days)     Procedure Component Value Units Date/Time    Blood culture [[id number]] Collected: 02/06/22 1418    Order Status: Completed Specimen: Blood Updated: 02/07/22 2012     Blood Culture, Routine No Growth to date      No Growth to date    Blood culture [[id number]] Collected: 02/06/22 1418    Order Status: Completed Specimen: Blood Updated: 02/07/22 1955     Blood Culture, Routine Gram stain aer bottle: Gram positive cocci in clusters resembling Staph      Results called to and read back by:Brendan Villarreal LPN 02/07/2022  19:55        Antibiotics (From admission, onward)            Start     Stop Route Frequency Ordered    02/07/22 0900  DAPTOmycin (CUBICIN) 350 mg in sodium chloride 0.9% IVPB         -- IV Daily 02/06/22 1535    02/06/22 2100  mupirocin 2 % ointment         -- Top 2 times daily 02/06/22 1535        Anticoagulants     Ordered     Route Frequency Start Stop    02/06/22 1301  enoxaparin         SubQ 2 times daily 02/06/22 1315 --        X-Ray Chest AP Portable  Narrative: EXAMINATION:  XR CHEST AP PORTABLE    CLINICAL HISTORY:  COVID-19;    TECHNIQUE:  AP view of the chest was  performed.    COMPARISON:  01/22/2022    FINDINGS:  The cardiac and mediastinal silhouettes appear within normal limits. Patchy alveolar and ground-glass infiltrates in the perihilar and lower lobe regions bilaterally, left greater than right.  Stable position of the left-sided PICC.  No pneumothorax per in  Impression: Development of bilateral infiltrates.    Electronically signed by: Franklin Eduardo MD  Date:    02/06/2022  Time:    09:35        Significant Labs: All pertinent labs within the past 24 hours have been reviewed.    Significant Imaging: I have reviewed all pertinent imaging results/findings within the past 24 hours.      Assessment/Plan:      * Severe sepsis secondary to covid pneumonia  - COVID-19 testing Collection Date: 12/31/2021 Collection Time:   2:17 PM   - Infection Control notified     - Isolation:   - Airborne, Contact and Droplet Precautions  - Cohort patients into COVID units  - N95 mask, wear eye protection  - 20 second hand hygiene              - Limit visitors per hospital policy              - Consolidating lab draws, nursing care, provider visits, and interventions    - Management:  Supplemental O2 to maintain SpO2 >92%  Telemetry  Continuous/intermittent Pulse Ox  Albuterol treatment PRN  Continue MVI, Qpm Zinc and Vitamin C, Vitamin D, Remdesivir, and dexamethasone  Encourage Incentive spirometer q 1 hour while awake    Advance Care Planning   Full Code       This patient does have evidence of infective focus  My overall impression is Severe  sepsis. Vital signs were reviewed and noted in progress note.  Antibiotics given-   Antibiotics (From admission, onward)            Start     Stop Route Frequency Ordered    02/07/22 0900  DAPTOmycin (CUBICIN) 350 mg in sodium chloride 0.9% IVPB         -- IV Daily 02/06/22 1535    02/06/22 2100  mupirocin 2 % ointment         -- Top 2 times daily 02/06/22 1535        Cultures were taken-   Microbiology Results (last 7 days)     Procedure Component  Value Units Date/Time    Blood culture [[id number]] Collected: 02/06/22 1418    Order Status: Completed Specimen: Blood Updated: 02/07/22 2012     Blood Culture, Routine No Growth to date      No Growth to date    Blood culture [[id number]] Collected: 02/06/22 1418    Order Status: Completed Specimen: Blood Updated: 02/07/22 1955     Blood Culture, Routine Gram stain aer bottle: Gram positive cocci in clusters resembling Staph      Results called to and read back by:Brendan Villarreal LPN 02/07/2022  19:55        Latest lactate reviewed, they are-  Recent Labs   Lab 02/06/22  0947   LACTATE 0.8       Organ dysfunction indicated by Acute respiratory failure     Source- Covid pneumonia    Source control Achieved by- Symptomatic relief    Improving, afebrile- continue Remdesivir plus Dexa      Acute hypoxemic respiratory failure secondary to Covid pneumonia  Patient with Hypoxic Respiratory failure which is Acute.  she is not on home oxygen. Supplemental oxygen was provided and noted- Oxygen Concentration (%):  [28] 28.   Signs/symptoms of respiratory failure include- tachypnea and increased work of breathing. Contributing diagnoses includes - Covid  Pneumonia      2/06 O2 at 2l per nc    2/7- stable sats on 2 L NC    Infected finger joint  2/06 Continue home Iv Daptomycin  Patient reports she has orthopedic appointment for this week to get stitches out    2/7- LIF improving      Hypoalbuminemia  2/06 Add po supplement      Normocytic anemia  2/06 Add MVI      Debility  2/06 Fall and skin precautions  Turn Q 2 hours  Consult PT and OT          VTE Risk Mitigation (From admission, onward)         Ordered     enoxaparin injection 60 mg  2 times daily         02/06/22 1301                Discharge Planning   JUAN:      Code Status: Full Code   Is the patient medically ready for discharge?:     Reason for patient still in hospital (select all that apply): Patient trending condition, Laboratory test, Treatment, Imaging, Consult  recommendations and PT / OT recommendations           José Miguel Blankenship MD  Department of Hospital Medicine   [facility] ([facility])

## 2022-02-08 NOTE — ASSESSMENT & PLAN NOTE
Chronic, likely secondary to chronic disease, normocytic    --Daily CBC  --Transfuse with 1 unit PRBC for Hgb <7.0 or <8.0 if symptomatic

## 2022-02-08 NOTE — ASSESSMENT & PLAN NOTE
- COVID-19 testing Collection Date: 12/31/2021 Collection Time:   2:17 PM   - Infection Control notified     - Isolation:   - Airborne, Contact and Droplet Precautions  - Cohort patients into COVID units  - N95 mask, wear eye protection  - 20 second hand hygiene              - Limit visitors per hospital policy              - Consolidating lab draws, nursing care, provider visits, and interventions    - Management:  Supplemental O2 to maintain SpO2 >92%  Telemetry  Continuous/intermittent Pulse Ox  Albuterol treatment PRN  Continue MVI, Qpm Zinc and Vitamin C, Vitamin D, Remdesivir, and dexamethasone  Encourage Incentive spirometer q 1 hour while awake    Advance Care Planning   Full Code        This patient does have evidence of infective focus  My overall impression is Severe  sepsis. Vital signs were reviewed and noted in progress note.  Antibiotics given-   Antibiotics (From admission, onward)            Start     Stop Route Frequency Ordered    02/07/22 0900  DAPTOmycin (CUBICIN) 350 mg in sodium chloride 0.9% IVPB         -- IV Daily 02/06/22 1535    02/06/22 2100  mupirocin 2 % ointment         -- Top 2 times daily 02/06/22 1535        Cultures were taken-   Microbiology Results (last 7 days)     Procedure Component Value Units Date/Time    Blood culture [[id number]] Collected: 02/06/22 1418    Order Status: Completed Specimen: Blood Updated: 02/08/22 0801     Blood Culture, Routine Gram stain aer bottle: Gram positive cocci in clusters resembling Staph      Positive results previously called 02/07/2022    Blood culture [[id number]] Collected: 02/06/22 1418    Order Status: Completed Specimen: Blood Updated: 02/08/22 0801     Blood Culture, Routine Gram stain aer bottle: Gram positive cocci in clusters resembling Staph      Results called to and read back by:Brendan Villarreal LPN 02/07/2022  19:55        Latest lactate reviewed, they are-  Recent Labs   Lab 02/06/22  0947   LACTATE 0.8       Organ dysfunction  indicated by Acute respiratory failure     Source- Covid pneumonia    Source control Achieved by- Symptomatic relief    Improving, afebrile- continue Remdesivir plus Dexa

## 2022-02-08 NOTE — ASSESSMENT & PLAN NOTE
Continue home Iv Daptomycin, expected to continue until 2/26/2022, managed by ID as outpatient  Patient reports she has orthopedic appointment for this week to get stitches out    --Continue Daptomycin

## 2022-02-08 NOTE — PLAN OF CARE
[facility] (H[facility])  Initial Discharge Assessment       Primary Care Provider: Kavya Mesa MD    Admission Diagnosis: Weakness [R53.1]  Acute respiratory failure with hypoxia [J96.01]  Pneumonia due to COVID-19 virus [U07.1, J12.82]  COVID-19 [U07.1]    Admission Date: 2/6/2022  Expected Discharge Date:     Discharge Barriers Identified: None    Payor: [employer] MEDICARE / Plan: [employer] MEDICARE / Product Type: Medicare Advantage /     Extended Emergency Contact Information  Primary Emergency Contact: aaron lam  Mobile Phone: [phone]  Relation: Son  Secondary Emergency Contact: carmencita pickering  Mobile Phone: [phone]  Relation: Daughter  Preferred language: English   needed? No    Discharge Plan A: Home Health         [facility] [facility] [place] - [address] [facility]  [address] [facility] [address]  Phone: [phone] Fax: [phone]    [facility] [facility] [place] - [address] [facility]  [address] [facility] [place] [address]  Phone: [phone] Fax: [phone]    [address] [place] [address]  [address] [place] [address]  [place] [address]  Phone: [phone] Fax: [phone]      Initial Assessment (most recent)     Adult Discharge Assessment - 02/08/22 1507        Discharge Assessment    Assessment Type Discharge Planning Assessment     Confirmed/corrected address, phone number and insurance Yes     Confirmed Demographics Correct on Facesheet     Source of Information patient     When was your last doctors appointment? 02/01/22     Communicated JUAN with patient/caregiver Date not available/Unable to determine     Reason For Admission Severe sepsis secondary to covid pneumonia     Lives With child(buster), adult     Facility Arrived From: home     Do you expect to return to your current living situation? Yes     Do you  have help at home or someone to help you manage your care at home? Yes     Who are your caregiver(s) and their phone number(s)? sonHamilton     Prior to hospitilization cognitive status: Alert/Oriented     Current cognitive status: Alert/Oriented     Walking or Climbing Stairs Difficulty none     Dressing/Bathing Difficulty none     Home Accessibility wheelchair accessible     Home Layout Able to live on 1st floor     Equipment Currently Used at Home none     Readmission within 30 days? No     Patient currently being followed by outpatient case management? No     Do you currently have service(s) that help you manage your care at home? Yes     Name and Contact number of agency [facility]     Is the pt/caregiver preference to resume services with current agency Yes     Do you take prescription medications? Yes     Do you have prescription coverage? Yes     Do you have any problems affording any of your prescribed medications? No     Is the patient taking medications as prescribed? yes     Who is going to help you get home at discharge? Hamilton Martinez     How do you get to doctors appointments? family or friend will provide     Are you on dialysis? No     Do you take coumadin? No     Discharge Plan A Home Health     DME Needed Upon Discharge  none     Discharge Plan discussed with: Adult children;Patient     Discharge Barriers Identified None        Relationship/Environment    Name(s) of Who Lives With Patient Hamilton Martin/bri                 Readmission Assessment (most recent)     Readmission Assessment - 02/08/22 1505        Readmission    Why were you hospitalized in the last 30 days? infected finger     Why were you readmitted? New medical problem     When you left the hospital how did you feel? good     When you left the hospital where did you go? Home with Home Health     Did patient/caregiver refused recommended DC plan? No     Tell me about what happened between when you left the hospital and  the day you returned. started having nausea     When did you start not feeling well? yesterday     Did you try to manage your symptoms your self? No     Did you try to see or did see a doctor or nurse before you came? No     Did you have  a follow-up appointment on discharge? Yes     Did you go? Yes     Was this a planned readmission? No                Anticipated DC dispo: Home Health (Current with [facility])  Prior Level of Function: independent   PCP: Kavya Mesa MD    Comments:  Readmission assessment complete. 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 Will need resumption orders for HH at [place]. Patient lives with alone but son is staying with her. Son will also be help at home and can provide transport. CM discharge needs depends on hospital progress. CM will continue following to assist with other needs.

## 2022-02-08 NOTE — ASSESSMENT & PLAN NOTE
Patient with Hypoxic Respiratory failure which is Acute.  she is not on home oxygen. Supplemental oxygen was provided and noted- Oxygen Concentration (%):  [28-36] 36.   Signs/symptoms of respiratory failure include- tachypnea and increased work of breathing. Contributing diagnoses includes - Covid  Pneumonia      --continue supportive O2  --Telemetry  --Vitals Q4H

## 2022-02-08 NOTE — PT/OT/SLP PROGRESS
Physical Therapy  Treatment    Haim Matrin   MRN: [id number]   Admitting Diagnosis: Severe sepsis    PT Received On: 02/08/22  PT Start Time: 1043     PT Stop Time: 1108    PT Total Time (min): 25 min       Billable Minutes:  Gait Training 15 and Therapeutic Exercise 10    Treatment Type: Treatment  PT/PTA: PT     PTA Visit Number: 0       General Precautions: Standard, airborne,fall,contact,droplet  Orthopedic Precautions: N/A   Braces: N/A  Respiratory Status: Nasal cannula, flow 4 L/min         Subjective:  Communicated with NURSE SMITH AND [facility] CHART REVIEW  prior to session.   PT AGREED TO TX     Pain/Comfort  Pain Rating 1: 0/10  Location 1: abdomen  Pain Rating Post-Intervention 1: 0/10    Objective:   Patient found with: telemetry,peripheral IV    Functional Mobility:  PT MET IN RM SUP IN BED. PT SUP>SIT EOB WITH SBA. PT SCOOTED TO EOB AND STOOD WITH RW AND GT TRAINED X 24' WITH RW AND O2 IN TOW. PT RETURNED TO CHAIR WITH INC SOB AND FATIGUE. PT SEATED FOR B LE TE X 10 REPS OF AP, TKE, AND MIP. PT LEFT SEATED IN CHAIR WITH ALL NEEDS MET.     AM-PAC 6 CLICK MOBILITY  How much help from another person does this patient currently need?   1 = Unable, Total/Dependent Assistance  2 = A lot, Maximum/Moderate Assistance  3 = A little, Minimum/Contact Guard/Supervision  4 = None, Modified [place]/Independent    Turning over in bed (including adjusting bedclothes, sheets and blankets)?: 3  Sitting down on and standing up from a chair with arms (e.g., wheelchair, bedside commode, etc.): 3  Moving from lying on back to sitting on the side of the bed?: 3  Moving to and from a bed to a chair (including a wheelchair)?: 3  Need to walk in hospital room?: 3  Climbing 3-5 steps with a railing?: 1  Basic Mobility Total Score: 16    AM-PAC Raw Score CMS G-Code Modifier Level of Impairment Assistance   6 % Total / Unable   7 - 9 CM 80 - 100% Maximal Assist   10 - 14 CL 60 - 80% Moderate Assist   15 - 19 CK 40 -  60% Moderate Assist   20 - 22 CJ 20 - 40% Minimal Assist   23 CI 1-20% SBA / CGA   24 CH 0% Independent/ Mod I     Patient left up in chair with call button in reach.    Assessment:  PT PROGRESSING WITH GT.     Rehab identified problem list/impairments: Rehab identified problem list/impairments: weakness,impaired endurance,gait instability,impaired balance,impaired self care skills,decreased lower extremity function,decreased ROM,impaired functional mobilty,impaired cardiopulmonary response to activity    Rehab potential is good.    Activity tolerance: Good    Discharge recommendations: Discharge Facility/Level of Care Needs: home health PT     Barriers to discharge:      Equipment recommendations: Equipment Needed After Discharge: walker, rolling,shower chair     GOALS:   Multidisciplinary Problems     Physical Therapy Goals        Problem: Physical Therapy Goal    Goal Priority Disciplines Outcome Goal Variances Interventions   Physical Therapy Goal     PT, PT/OT Ongoing, Progressing                     PLAN:    Patient to be seen 3 x/week  to address the above listed problems via gait training,therapeutic activities,therapeutic exercises  Plan of Care expires: 02/20/22  Plan of Care reviewed with: patient         02/08/2022

## 2022-02-08 NOTE — PLAN OF CARE
Aox4.    Able to verbalize needs & follow commands. Calm & cooperative throughout shift.   POC reviewed with pt. Interventions implemented as appropriate.    VSS; SR on tele-monitor.  On 2L O2 NC. Respiratory treatments as ordered.    DEISY PICC patent.     Receiving Remdesivir & IV abx. No adverse reactions noted.  + blood culture - gram + cocci in clusters resembling staph. No new orders.  Skin WDI.    No c/o pain.    Incontinence of bladder. Incontinence pad changed as needed. External female catheter in place. No s/s of discomfort noted.  Lovenox for VTE.  Activity ad judson. Frequent position changes encouraged. Turn q2h.  NADN. Resting quietly in bed.   Free of falls. Hourly rounding complete.   All safety measures remain in place. SR up x2; bed low & locked. Bed alarm on & audible. Call light w/in reach.   Will continue to monitor throughout shift.

## 2022-02-08 NOTE — ASSESSMENT & PLAN NOTE
2/06 Patient received 40 meq po in ER  Add KCL 10 meq Iv riders x 3  Repeat level in am  Check magnesium level    Corrected, replaced

## 2022-02-08 NOTE — PROGRESS NOTES
"[facility], [place] Medicine  Progress Note    Patient Name: Haim Martin  MRN: [id number]  Patient Class: IP- Inpatient   Admission Date: 2/6/2022  Length of Stay: 1 days  Attending Physician: José Miguel Blankenship MD  Primary Care Provider: Kavya Mesa MD        Subjective:     Principal Problem:Severe sepsis        HPI:  Vomiting        PT states, "I have been vomiting since Thursday, feel very weak and have been running a fever."      Per ER- This  is a 81 y.o. female patient with a PMHx of Depression, Diverticulosis of colon, thyroid disease,  HLD and HTN who presents to the Emergency Department for evaluation of vomitting which onset gradually x 3 days pta. Patient states she was infected with COVID-19 3 weeks ago. She reports being on daptomycin. Symptoms are constant and moderate in severity. No mitigating or exacerbating factors reported. Associated sxs include dry cough and diahhrea. Patient denies any CP, leg swelling, abd pain, fever, chills, and all other sxs at this time. No further complaints or concerns at this time.     Patient was evaluated in ER and found to have Covid pneumonia and Placed In Observation for further evaluation and treatment.     Patient also reports she has been receiving Iv daptomycin at home with infected finger.                        Overview/Hospital Course:  81 y.o. female patient with Hx of Depression, Diverticulosis of colon, thyroid disease, HLD and HTN who presente for evaluation of vomitting x 3 days. Patient states she was infected with COVID-19 3 weeks ago and reports being on Daptomycin. Associated sxs include dry cough and diahhrea. She  was evaluated in ER and found to have Covid pneumonia and placed In Observation for further evaluation and treatment. Patient also reports she has been receiving Iv daptomycin at home for infected finger.     2/7- still feels weak and has HA- treated with Fioricet but overall better. No diarrhea, did " PT/OT. K improved to 4.7, H/H 10.6/32. Getting Dexa and Remdesivir. Sats 92-93% on 2 L NC, encouraged to do IS and will try to get her OOB to chair and ambulate.     2/8/22: Patient reports she feels weak, able to participate with PT/OT today, walker with assistance 24'. Patient requesting additional snacks this afternoon. Vitals stable, PO2 stable on 4L, Labs stable. Encouraged patient to use IS, seems motivated to participate.       Interval History:     Review of Systems   Constitutional: Positive for activity change and fatigue. Negative for appetite change, chills, diaphoresis, fever and unexpected weight change.   HENT: Negative for congestion, hearing loss, mouth sores, postnasal drip, rhinorrhea, sore throat and trouble swallowing.    Eyes: Negative for discharge and visual disturbance.   Respiratory: Positive for cough and shortness of breath. Negative for chest tightness.    Cardiovascular: Negative for chest pain, palpitations and leg swelling.   Gastrointestinal: Positive for abdominal distention and nausea. Negative for blood in stool, constipation, diarrhea and vomiting.   Endocrine: Negative for cold intolerance and heat intolerance.   Genitourinary: Negative for difficulty urinating, dyspareunia, flank pain and hematuria.   Musculoskeletal: Positive for arthralgias. Negative for back pain and myalgias.   Skin: Negative.    Neurological: Positive for weakness and headaches. Negative for dizziness and light-headedness.   Hematological: Negative for adenopathy. Does not bruise/bleed easily.   Psychiatric/Behavioral: Negative for agitation, behavioral problems and confusion. The patient is not nervous/anxious.      Objective:     Vital Signs (Most Recent):  Temp: 97.5 °F (36.4 °C) (02/08/22 1203)  Pulse: 70 (02/08/22 1300)  Resp: 20 (02/08/22 1300)  BP: (!) 153/66 (02/08/22 1246)  SpO2: 96 % (02/08/22 1300) Vital Signs (24h Range):  Temp:  [96.7 °F (35.9 °C)-98.5 °F (36.9 °C)] 97.5 °F (36.4 °C)  Pulse:   [66-91] 70  Resp:  [16-20] 20  SpO2:  [90 %-96 %] 96 %  BP: (125-184)/(63-78) 153/66     Weight: 59 kg (130 lb)  Body mass index is 26.26 kg/m².    Intake/Output Summary (Last 24 hours) at 2/8/2022 1513  Last data filed at 2/8/2022 1000  Gross per 24 hour   Intake 440 ml   Output 500 ml   Net -60 ml      Physical Exam  Vitals and nursing note reviewed.   Constitutional:       General: She is not in acute distress.     Appearance: She is well-developed. She is ill-appearing.   HENT:      Head: Normocephalic and atraumatic.      Right Ear: Hearing and external ear normal.      Left Ear: Hearing and external ear normal.      Nose: No rhinorrhea.      Right Sinus: No maxillary sinus tenderness or frontal sinus tenderness.      Left Sinus: No maxillary sinus tenderness or frontal sinus tenderness.      Mouth/Throat:      Mouth: No oral lesions.      Pharynx: Uvula midline.   Eyes:      General:         Right eye: No discharge.         Left eye: No discharge.      Conjunctiva/sclera: Conjunctivae normal.      Pupils: Pupils are equal, round, and reactive to light.   Neck:      Thyroid: No thyromegaly.      Vascular: No carotid bruit.      Trachea: No tracheal deviation.   Cardiovascular:      Rate and Rhythm: Normal rate and regular rhythm.      Pulses:           Dorsalis pedis pulses are 2+ on the right side and 2+ on the left side.      Heart sounds: Normal heart sounds, S1 normal and S2 normal. No murmur heard.      Pulmonary:      Effort: Pulmonary effort is normal. No respiratory distress.      Breath sounds: Examination of the right-lower field reveals rales. Examination of the left-lower field reveals rales. Rales present.   Chest:   Breasts:      Right: No supraclavicular adenopathy.      Left: No supraclavicular adenopathy.       Abdominal:      General: Bowel sounds are decreased. There is no distension.      Palpations: Abdomen is soft. There is no mass.      Tenderness: There is no abdominal tenderness.    Musculoskeletal:         General: Normal range of motion.      Cervical back: Normal range of motion.   Lymphadenopathy:      Cervical: No cervical adenopathy.      Upper Body:      Right upper body: No supraclavicular adenopathy.      Left upper body: No supraclavicular adenopathy.   Skin:     General: Skin is warm and dry.      Capillary Refill: Capillary refill takes less than 2 seconds.      Coloration: Skin is pale.      Findings: No rash.   Neurological:      Mental Status: She is alert and oriented to person, place, and time.      Sensory: No sensory deficit.      Coordination: Coordination normal.      Gait: Gait normal.   Psychiatric:         Mood and Affect: Mood is anxious. Mood is not depressed. Affect is not labile.         Speech: Speech normal.         Behavior: Behavior normal.         Thought Content: Thought content normal.         Judgment: Judgment normal.         Significant Labs:   All pertinent labs within the past 24 hours have been reviewed.  CBC:   Recent Labs   Lab 02/07/22  0630 02/08/22  0508   WBC 6.67 5.38   HGB 10.6* 11.1*   HCT 33.0* 35.7*    429     CMP:   Recent Labs   Lab 02/07/22  0630      K 4.7      CO2 23   *   BUN 9   CREATININE 0.7   CALCIUM 8.7   ANIONGAP 11   EGFRNONAA >60       Significant Imaging: I have reviewed all pertinent imaging results/findings within the past 24 hours.      Assessment/Plan:      * Severe sepsis secondary to covid pneumonia  - COVID-19 testing Collection Date: 12/31/2021 Collection Time:   2:17 PM   - Infection Control notified     - Isolation:   - Airborne, Contact and Droplet Precautions  - Cohort patients into COVID units  - N95 mask, wear eye protection  - 20 second hand hygiene              - Limit visitors per hospital policy              - Consolidating lab draws, nursing care, provider visits, and interventions    - Management:  Supplemental O2 to maintain SpO2 >92%  Telemetry  Continuous/intermittent Pulse  Ox  Albuterol treatment PRN  Continue MVI, Qpm Zinc and Vitamin C, Vitamin D, Remdesivir, and dexamethasone  Encourage Incentive spirometer q 1 hour while awake    Advance Care Planning   Full Code       This patient does have evidence of infective focus  My overall impression is Severe  sepsis. Vital signs were reviewed and noted in progress note.  Antibiotics given-   Antibiotics (From admission, onward)            Start     Stop Route Frequency Ordered    02/07/22 0900  DAPTOmycin (CUBICIN) 350 mg in sodium chloride 0.9% IVPB         -- IV Daily 02/06/22 1535    02/06/22 2100  mupirocin 2 % ointment         -- Top 2 times daily 02/06/22 1535        Cultures were taken-   Microbiology Results (last 7 days)     Procedure Component Value Units Date/Time    Blood culture [[id number]] Collected: 02/06/22 1418    Order Status: Completed Specimen: Blood Updated: 02/08/22 0801     Blood Culture, Routine Gram stain aer bottle: Gram positive cocci in clusters resembling Staph      Positive results previously called 02/07/2022    Blood culture [[id number]] Collected: 02/06/22 1418    Order Status: Completed Specimen: Blood Updated: 02/08/22 0801     Blood Culture, Routine Gram stain aer bottle: Gram positive cocci in clusters resembling Staph      Results called to and read back by:Brendan Villarreal LPN 02/07/2022  19:55        Latest lactate reviewed, they are-  Recent Labs   Lab 02/06/22  0947   LACTATE 0.8       Organ dysfunction indicated by Acute respiratory failure     Source- Covid pneumonia    Source control Achieved by- Symptomatic relief    Improving, afebrile- continue Remdesivir plus Dexa      Debility  2/06 Fall and skin precautions  Turn Q 2 hours  Consult PT and OT        Hypoalbuminemia  Decreased from previous hospitalization    --add boost to all meals      Normocytic anemia  Chronic, likely secondary to chronic disease, normocytic    --Daily CBC  --Transfuse with 1 unit PRBC for Hgb <7.0 or <8.0 if symptomatic      Acute hypoxemic respiratory failure secondary to Covid pneumonia  Patient with Hypoxic Respiratory failure which is Acute.  she is not on home oxygen. Supplemental oxygen was provided and noted- Oxygen Concentration (%):  [28-36] 36.   Signs/symptoms of respiratory failure include- tachypnea and increased work of breathing. Contributing diagnoses includes - Covid  Pneumonia      --continue supportive O2  --Telemetry  --Vitals Q4H      Infected finger joint  Continue home Iv Daptomycin, expected to continue until 2/26/2022, managed by ID as outpatient  Patient reports she has orthopedic appointment for this week to get stitches out    --Continue Daptomycin        VTE Risk Mitigation (From admission, onward)         Ordered     enoxaparin injection 60 mg  2 times daily         02/06/22 1301                Discharge Planning   JUAN:      Code Status: Full Code   Is the patient medically ready for discharge?:     Reason for patient still in hospital (select all that apply): Patient trending condition  Discharge Plan A: Home Health                  Cece Taylor NP  Department of Hospital Medicine   [facility] - Telemetry ([facility])

## 2022-02-08 NOTE — PLAN OF CARE
Pt Awake, alert and oriented   Vital signs stable  Pt remained afebrile throughout this shift.   Pt remained free of falls this shift.   Pt denies pain this shift.  Plan of care reviewed. Patient verbalizes understanding.   Pt moving/turing when needed. Frequent weight shifting encouraged.  Patient continue on tele monitor.   Bed low, side rails up x 2, wheels locked, call light in reach.   Patient instructed to call for assistance.   Hourly rounding completed.   Will continue to monitor.

## 2022-02-08 NOTE — SUBJECTIVE & OBJECTIVE
Interval History: still feels weak and has HA- treated with Fioricet but overall better. No diarrhea, did PT/OT. K improved to 4.7, H/H 10.6/32. Getting Dexa and Remdesivir. Sats 92-93% on 2 L NC, encouraged to do IS and will try to get her OOB to chair and ambulate.     Review of Systems   Constitutional: Positive for activity change, appetite change, fatigue and fever. Negative for diaphoresis.   HENT: Negative for ear discharge, ear pain and facial swelling.    Eyes: Negative for pain and redness.   Respiratory: Positive for cough. Negative for shortness of breath.         No sputum   Cardiovascular: Negative for chest pain, palpitations and leg swelling.   Gastrointestinal: Positive for diarrhea, nausea and vomiting. Negative for abdominal distention, abdominal pain, blood in stool and constipation.   Endocrine: Negative for polydipsia and polyphagia.   Genitourinary: Negative for difficulty urinating, dyspareunia, dysuria, flank pain and hematuria.   Musculoskeletal: Negative for neck pain and neck stiffness.   Skin: Positive for wound. Negative for color change.        Stitches intact to right index finger   Allergic/Immunologic: Negative for food allergies.   Neurological: Positive for weakness. Negative for facial asymmetry and speech difficulty.   Hematological: Does not bruise/bleed easily.   Psychiatric/Behavioral: Negative for agitation, behavioral problems, confusion, dysphoric mood and suicidal ideas. The patient is not nervous/anxious.      Objective:     Vital Signs (Most Recent):  Temp: 98.2 °F (36.8 °C) (02/07/22 1938)  Pulse: 83 (02/07/22 1938)  Resp: 18 (02/07/22 1938)  BP: 136/65 (02/07/22 1938)  SpO2: (!) 92 % (02/07/22 1938) Vital Signs (24h Range):  Temp:  [97.5 °F (36.4 °C)-99 °F (37.2 °C)] 98.2 °F (36.8 °C)  Pulse:  [70-91] 83  Resp:  [16-20] 18  SpO2:  [90 %-93 %] 92 %  BP: (126-183)/(64-78) 136/65     Weight: 59.9 kg (132 lb 0.9 oz)  Body mass index is 26.67 kg/m².    Intake/Output  Summary (Last 24 hours) at 2/7/2022 2153  Last data filed at 2/7/2022 1700  Gross per 24 hour   Intake 438 ml   Output 601 ml   Net -163 ml      Physical Exam  Constitutional:       General: She is not in acute distress.     Appearance: She is ill-appearing. She is not diaphoretic.   HENT:      Head: Normocephalic and atraumatic.      Mouth/Throat:      Mouth: Mucous membranes are dry.   Eyes:      General:         Right eye: No discharge.         Left eye: No discharge.      Extraocular Movements: Extraocular movements intact.      Conjunctiva/sclera: Conjunctivae normal.      Pupils: Pupils are equal, round, and reactive to light.   Cardiovascular:      Rate and Rhythm: Normal rate and regular rhythm.   Pulmonary:      Effort: Pulmonary effort is normal. No respiratory distress.   Abdominal:      General: There is no distension.      Tenderness: There is no abdominal tenderness. There is no guarding.   Genitourinary:     Comments: Not examined  Musculoskeletal:      Cervical back: Normal range of motion and neck supple. No rigidity or tenderness.      Comments: Stiff joints  General debility   Skin:     General: Skin is warm and dry.      Comments: Right index finger noted with intact stitches with incision approximated   Neurological:      General: No focal deficit present.      Mental Status: She is alert and oriented to person, place, and time. Mental status is at baseline.      Cranial Nerves: No cranial nerve deficit.   Psychiatric:         Mood and Affect: Mood normal.         Behavior: Behavior normal.         Thought Content: Thought content normal.         Judgment: Judgment normal.       Flow (L/min): 2  Oxygen Concentration (%):  [28] 28  Temp:  [97.5 °F (36.4 °C)-99 °F (37.2 °C)] 98.2 °F (36.8 °C)  Pulse:  [70-91] 83  Resp:  [16-20] 18  SpO2:  [90 %-93 %] 92 %  BP: (126-183)/(64-78) 136/65      Lab Results   Component Value Date    SYK19LVNHRXQ Positive (A) 02/06/2022    [id number] Detected (A)  12/31/2021    JYN64LLSJHKB Not Detected 07/13/2020      Recent Labs   Lab 02/06/22  0947 02/06/22  1418 02/07/22  0630   PROCAL 0.17  --   --    FERRITIN 455*  --   --    LACTATE 0.8  --   --    *  --   --    .8*  --   --    TROPONINI <0.006  --   --    DDIMER  --  2.06*  --      --  141   WBC 6.36  --  6.67   GRAN 82.8*  5.3  --  83.4*  5.6   LYMPH 9.0*  0.6*  --  9.1*  0.6*   HGB 9.9*  --  10.6*   HCT 31.0*  --  33.0*   BUN 6*  --  9   CREATININE 0.7  --  0.7   ESTGFRAFRICA >60  --  >60   EGFRNONAA >60  --  >60   K 2.7*  --  4.7     --  107   CO2 23  --  23   MG  --  1.8 2.6     Microbiology Results (last 7 days)     Procedure Component Value Units Date/Time    Blood culture [[id number]] Collected: 02/06/22 1418    Order Status: Completed Specimen: Blood Updated: 02/07/22 2012     Blood Culture, Routine No Growth to date      No Growth to date    Blood culture [[id number]] Collected: 02/06/22 1418    Order Status: Completed Specimen: Blood Updated: 02/07/22 1955     Blood Culture, Routine Gram stain aer bottle: Gram positive cocci in clusters resembling Staph      Results called to and read back by:Brendan Villarreal LPN 02/07/2022  19:55        Antibiotics (From admission, onward)            Start     Stop Route Frequency Ordered    02/07/22 0900  DAPTOmycin (CUBICIN) 350 mg in sodium chloride 0.9% IVPB         -- IV Daily 02/06/22 1535    02/06/22 2100  mupirocin 2 % ointment         -- Top 2 times daily 02/06/22 1535        Anticoagulants     Ordered     Route Frequency Start Stop    02/06/22 1301  enoxaparin         SubQ 2 times daily 02/06/22 1315 --        X-Ray Chest AP Portable  Narrative: EXAMINATION:  XR CHEST AP PORTABLE    CLINICAL HISTORY:  COVID-19;    TECHNIQUE:  AP view of the chest was performed.    COMPARISON:  01/22/2022    FINDINGS:  The cardiac and mediastinal silhouettes appear within normal limits. Patchy alveolar and ground-glass infiltrates in the perihilar and lower  lobe regions bilaterally, left greater than right.  Stable position of the left-sided PICC.  No pneumothorax per in  Impression: Development of bilateral infiltrates.    Electronically signed by: Franklin Eduardo MD  Date:    02/06/2022  Time:    09:35        Significant Labs: All pertinent labs within the past 24 hours have been reviewed.    Significant Imaging: I have reviewed all pertinent imaging results/findings within the past 24 hours.

## 2022-02-08 NOTE — HOSPITAL COURSE
81 y.o. female patient with Hx of Depression, Diverticulosis of colon, thyroid disease, HLD and HTN who presente for evaluation of vomitting x 3 days. Patient states she was infected with COVID-19 3 weeks ago and reports being on Daptomycin. Associated sxs include dry cough and diahhrea. She  was evaluated in ER and found to have Covid pneumonia and placed In Observation for further evaluation and treatment. Patient also reports she has been receiving Iv daptomycin at home for infected finger.     2/7- still feels weak and has HA- treated with Fioricet but overall better. No diarrhea, did PT/OT. K improved to 4.7, H/H 10.6/32. Getting Dexa and Remdesivir. Sats 92-93% on 2 L NC, encouraged to do IS and will try to get her OOB to chair and ambulate.     2/8/22: Patient reports she feels weak, able to participate with PT/OT today, walker with assistance 24'. Patient requesting additional snacks this afternoon. Vitals stable, PO2 stable on 4L, Labs stable. Encouraged patient to use IS, seems motivated to participate.     2/9/22: Patient reported to be lethargic this AM, evaluated patient and ordered Lactic, Procalcitonin- both negative. Nutritional studies ordered.  O2 increased to 5L, PO2: 91-92%, 87% overnight on 4L. Patient reports feeling tired and sleeping more. Appetite is increased from yesterday. Patient reports using IS as instructed.     2/10/22: Patient stable on 5L O2 per NC. Patient continues to report fatigue. Labs reviewed, patient is iron deficient, will replete with Venofer IV while inpatient and plan for Hem/Onc follow-up for anemia at d/c. Recommend GI follow-up at d/c. Labs stable, Vitals stable.     2/11- looks and feels better, getting stronger, had transient urinary retention but resolved spontaneously, still on 5 L NC- sats 90%, able to get up and walk in the room, do IS. Encouraged to do deep breathing exercises, continue Daptomycin, CRP coming down. Getting Venofer daily for KIERA and Inj B12 IM.      2/12- looks and feels much better, sitting up in chair, breathing improved. Fio2 had increased to 10 L yesterday from 5, now down to 7 L now. Repeat blood Cx are a contamination- continue Daptomycin. Encouraged to ambulate and do IS. Has KIERA- will replace venofer. Also inflammatory markers improving.     2/13- comfortably sitting in chair, eating dinner, no SOB, walked in the room earlier. O2 down to 6 L and sats maintained at 96-99%- will wean O2 down further. Repeat Blood Cx NGTD, ESR, CRP coming down, LDH still rising. Continue same Tx.     2/14- looks much better, comfortably lying in bed, excited about going home. Eating drinking well, walking around well. Repeat Cx remain NGTD. She qualified for home O2 which was arranged and she was seen and examined and deemed stable for discharge home today with HH and will continue Daptomycin as before till 3/9/22 with Stephanie and f/u with Dr. Flor.

## 2022-02-09 LAB
ALBUMIN SERPL BCP-MCNC: 2.5 G/DL (ref 3.5–5.2)
ALP SERPL-CCNC: 97 U/L (ref 55–135)
ALT SERPL W/O P-5'-P-CCNC: 26 U/L (ref 10–44)
ANION GAP SERPL CALC-SCNC: 11 MMOL/L (ref 8–16)
AST SERPL-CCNC: 31 U/L (ref 10–40)
BASOPHILS # BLD AUTO: 0.03 K/UL (ref 0–0.2)
BASOPHILS NFR BLD: 0.4 % (ref 0–1.9)
BILIRUB SERPL-MCNC: 0.4 MG/DL (ref 0.1–1)
BUN SERPL-MCNC: 16 MG/DL (ref 8–23)
CALCIUM SERPL-MCNC: 9.6 MG/DL (ref 8.7–10.5)
CHLORIDE SERPL-SCNC: 100 MMOL/L (ref 95–110)
CO2 SERPL-SCNC: 28 MMOL/L (ref 23–29)
CREAT SERPL-MCNC: 0.8 MG/DL (ref 0.5–1.4)
DIFFERENTIAL METHOD: ABNORMAL
EOSINOPHIL # BLD AUTO: 0.1 K/UL (ref 0–0.5)
EOSINOPHIL NFR BLD: 1.5 % (ref 0–8)
ERYTHROCYTE [DISTWIDTH] IN BLOOD BY AUTOMATED COUNT: 14.7 % (ref 11.5–14.5)
EST. GFR  (AFRICAN AMERICAN): >60 ML/MIN/1.73 M^2
EST. GFR  (NON AFRICAN AMERICAN): >60 ML/MIN/1.73 M^2
FERRITIN SERPL-MCNC: 783 NG/ML (ref 20–300)
GLUCOSE SERPL-MCNC: 117 MG/DL (ref 70–110)
HCT VFR BLD AUTO: 38.4 % (ref 37–48.5)
HGB BLD-MCNC: 11.7 G/DL (ref 12–16)
IMM GRANULOCYTES # BLD AUTO: 0.12 K/UL (ref 0–0.04)
IMM GRANULOCYTES NFR BLD AUTO: 1.4 % (ref 0–0.5)
IRON SERPL-MCNC: 18 UG/DL (ref 30–160)
LACTATE SERPL-SCNC: 1.5 MMOL/L (ref 0.5–2.2)
LYMPHOCYTES # BLD AUTO: 1.4 K/UL (ref 1–4.8)
LYMPHOCYTES NFR BLD: 16.1 % (ref 18–48)
MCH RBC QN AUTO: 30.3 PG (ref 27–31)
MCHC RBC AUTO-ENTMCNC: 30.5 G/DL (ref 32–36)
MCV RBC AUTO: 100 FL (ref 82–98)
MONOCYTES # BLD AUTO: 0.4 K/UL (ref 0.3–1)
MONOCYTES NFR BLD: 5 % (ref 4–15)
NEUTROPHILS # BLD AUTO: 6.4 K/UL (ref 1.8–7.7)
NEUTROPHILS NFR BLD: 75.6 % (ref 38–73)
NRBC BLD-RTO: 0 /100 WBC
PLATELET # BLD AUTO: 449 K/UL (ref 150–450)
PMV BLD AUTO: 9.9 FL (ref 9.2–12.9)
POTASSIUM SERPL-SCNC: 4 MMOL/L (ref 3.5–5.1)
PROCALCITONIN SERPL IA-MCNC: 0.13 NG/ML
PROT SERPL-MCNC: 7 G/DL (ref 6–8.4)
RBC # BLD AUTO: 3.86 M/UL (ref 4–5.4)
SATURATED IRON: 8 % (ref 20–50)
SODIUM SERPL-SCNC: 139 MMOL/L (ref 136–145)
TOTAL IRON BINDING CAPACITY: 238 UG/DL (ref 250–450)
TRANSFERRIN SERPL-MCNC: 161 MG/DL (ref 200–375)
VIT B12 SERPL-MCNC: 502 PG/ML (ref 210–950)
WBC # BLD AUTO: 8.47 K/UL (ref 3.9–12.7)

## 2022-02-09 PROCEDURE — 27000221 HC OXYGEN, UP TO 24 HOURS

## 2022-02-09 PROCEDURE — 27000207 HC ISOLATION

## 2022-02-09 PROCEDURE — 94640 AIRWAY INHALATION TREATMENT: CPT

## 2022-02-09 PROCEDURE — 21400001 HC TELEMETRY ROOM

## 2022-02-09 PROCEDURE — 97110 THERAPEUTIC EXERCISES: CPT

## 2022-02-09 PROCEDURE — 84145 PROCALCITONIN (PCT): CPT | Performed by: NURSE PRACTITIONER

## 2022-02-09 PROCEDURE — 84466 ASSAY OF TRANSFERRIN: CPT | Performed by: NURSE PRACTITIONER

## 2022-02-09 PROCEDURE — 36415 COLL VENOUS BLD VENIPUNCTURE: CPT | Performed by: NURSE PRACTITIONER

## 2022-02-09 PROCEDURE — 83605 ASSAY OF LACTIC ACID: CPT | Performed by: NURSE PRACTITIONER

## 2022-02-09 PROCEDURE — 99900035 HC TECH TIME PER 15 MIN (STAT)

## 2022-02-09 PROCEDURE — 80053 COMPREHEN METABOLIC PANEL: CPT | Performed by: NURSE PRACTITIONER

## 2022-02-09 PROCEDURE — 63600175 PHARM REV CODE 636 W HCPCS: Performed by: NURSE PRACTITIONER

## 2022-02-09 PROCEDURE — 94799 UNLISTED PULMONARY SVC/PX: CPT

## 2022-02-09 PROCEDURE — 25000003 PHARM REV CODE 250: Performed by: NURSE PRACTITIONER

## 2022-02-09 PROCEDURE — 25000003 PHARM REV CODE 250: Performed by: INTERNAL MEDICINE

## 2022-02-09 PROCEDURE — 85025 COMPLETE CBC W/AUTO DIFF WBC: CPT | Performed by: NURSE PRACTITIONER

## 2022-02-09 PROCEDURE — 97116 GAIT TRAINING THERAPY: CPT

## 2022-02-09 PROCEDURE — 97530 THERAPEUTIC ACTIVITIES: CPT

## 2022-02-09 PROCEDURE — 82728 ASSAY OF FERRITIN: CPT | Performed by: NURSE PRACTITIONER

## 2022-02-09 PROCEDURE — 82607 VITAMIN B-12: CPT | Performed by: NURSE PRACTITIONER

## 2022-02-09 PROCEDURE — 94761 N-INVAS EAR/PLS OXIMETRY MLT: CPT

## 2022-02-09 RX ADMIN — ENOXAPARIN SODIUM 60 MG: 100 INJECTION SUBCUTANEOUS at 08:02

## 2022-02-09 RX ADMIN — LEVOTHYROXINE SODIUM 88 MCG: 88 TABLET ORAL at 06:02

## 2022-02-09 RX ADMIN — PROPRANOLOL HYDROCHLORIDE 40 MG: 40 TABLET ORAL at 09:02

## 2022-02-09 RX ADMIN — ZINC SULFATE 220 MG (50 MG) CAPSULE 220 MG: CAPSULE at 09:02

## 2022-02-09 RX ADMIN — CLONIDINE HYDROCHLORIDE 0.1 MG: 0.1 TABLET ORAL at 08:02

## 2022-02-09 RX ADMIN — PANTOPRAZOLE SODIUM 40 MG: 40 TABLET, DELAYED RELEASE ORAL at 08:02

## 2022-02-09 RX ADMIN — THERA TABS 1 TABLET: TAB at 08:02

## 2022-02-09 RX ADMIN — ALBUTEROL SULFATE 2 PUFF: 90 AEROSOL, METERED RESPIRATORY (INHALATION) at 07:02

## 2022-02-09 RX ADMIN — DAPTOMYCIN 350 MG: 350 INJECTION, POWDER, LYOPHILIZED, FOR SOLUTION INTRAVENOUS at 08:02

## 2022-02-09 RX ADMIN — ONDANSETRON 4 MG: 2 INJECTION INTRAMUSCULAR; INTRAVENOUS at 01:02

## 2022-02-09 RX ADMIN — ASPIRIN 81 MG: 81 TABLET, COATED ORAL at 08:02

## 2022-02-09 RX ADMIN — PROPRANOLOL HYDROCHLORIDE 40 MG: 40 TABLET ORAL at 08:02

## 2022-02-09 RX ADMIN — DEXAMETHASONE 6 MG: 4 TABLET ORAL at 08:02

## 2022-02-09 RX ADMIN — SERTRALINE HYDROCHLORIDE 100 MG: 50 TABLET ORAL at 08:02

## 2022-02-09 RX ADMIN — AMITRIPTYLINE HYDROCHLORIDE 75 MG: 50 TABLET, FILM COATED ORAL at 09:02

## 2022-02-09 RX ADMIN — ALBUTEROL SULFATE 2 PUFF: 90 AEROSOL, METERED RESPIRATORY (INHALATION) at 01:02

## 2022-02-09 RX ADMIN — MUPIROCIN: 20 OINTMENT TOPICAL at 09:02

## 2022-02-09 RX ADMIN — REMDESIVIR 100 MG: 100 INJECTION, POWDER, LYOPHILIZED, FOR SOLUTION INTRAVENOUS at 08:02

## 2022-02-09 RX ADMIN — ENOXAPARIN SODIUM 60 MG: 100 INJECTION SUBCUTANEOUS at 09:02

## 2022-02-09 RX ADMIN — OXYCODONE HYDROCHLORIDE AND ACETAMINOPHEN 500 MG: 500 TABLET ORAL at 09:02

## 2022-02-09 NOTE — PT/OT/SLP PROGRESS
Physical Therapy  Treatment    Haim Martin   MRN: [id number]   Admitting Diagnosis: Severe sepsis    PT Received On: 02/09/22  PT Start Time: 1032     PT Stop Time: 1057    PT Total Time (min): 25 min       Billable Minutes:  Gait Training 15 and Therapeutic Exercise 10    Treatment Type: Treatment  PT/PTA: PT     PTA Visit Number: 0       General Precautions: Standard, airborne,droplet,fall,contact  Orthopedic Precautions: N/A   Braces: N/A  Respiratory Status: Nasal cannula, flow 5 L/min         Subjective:  Communicated with NURSE SMITH AND [facility] CHART REVIEW  prior to session.   PT AGREED TO TX WITH INC ENCOURAGEMENT     Pain/Comfort  Pain Rating 1: 0/10  Pain Rating Post-Intervention 1: 0/10    Objective:   Patient found with: telemetry,peripheral IV    Functional Mobility:  PT MET IN RM SUP.SIT EOB WITH MIN A. PT SCOOTED TO EOB WITH MIN A. PT STOOD WITH RW AND GT TRAINED X 24' WITH SBA >CGA WITH INC FATIGUE AND SOB. PT RETURNED TO BEDSIDE CHAIR WITH CGA. PT SEATED FOR REST BREAK. PT COMPLETED B LE TE X 10 REPS OF AP, TKE, AND MIP WITH LIMITED ROM. PT LEFT SEATED IN CHAIR FOR OOB TOLERANCE WITH ALL NEEDS MET AND CALL BELL IN REACH     AM-PAC 6 CLICK MOBILITY  How much help from another person does this patient currently need?   1 = Unable, Total/Dependent Assistance  2 = A lot, Maximum/Moderate Assistance  3 = A little, Minimum/Contact Guard/Supervision  4 = None, Modified [place]/Independent    Turning over in bed (including adjusting bedclothes, sheets and blankets)?: 3  Sitting down on and standing up from a chair with arms (e.g., wheelchair, bedside commode, etc.): 3  Moving from lying on back to sitting on the side of the bed?: 3  Moving to and from a bed to a chair (including a wheelchair)?: 3  Need to walk in hospital room?: 3  Climbing 3-5 steps with a railing?: 1  Basic Mobility Total Score: 16    AM-PAC Raw Score CMS G-Code Modifier Level of Impairment Assistance   6 % Total / Unable    7 - 9 CM 80 - 100% Maximal Assist   10 - 14 CL 60 - 80% Moderate Assist   15 - 19 CK 40 - 60% Moderate Assist   20 - 22 CJ 20 - 40% Minimal Assist   23 CI 1-20% SBA / CGA   24 CH 0% Independent/ Mod I     Patient left up in chair with call button in reach.    Assessment:  PT BEN MIN TX WITH INC FATIGUE    Rehab identified problem list/impairments: Rehab identified problem list/impairments: weakness,impaired endurance,gait instability,impaired balance,impaired self care skills,decreased lower extremity function,impaired functional mobilty,impaired cardiopulmonary response to activity,decreased ROM    Rehab potential is good.    Activity tolerance: Fair    Discharge recommendations: Discharge Facility/Level of Care Needs: home health PT VS. SNF      Barriers to discharge:      Equipment recommendations: Equipment Needed After Discharge: walker, rolling     GOALS:   Multidisciplinary Problems     Physical Therapy Goals        Problem: Physical Therapy Goal    Goal Priority Disciplines Outcome Goal Variances Interventions   Physical Therapy Goal     PT, PT/OT Ongoing, Progressing                     PLAN:    Patient to be seen 3 x/week  to address the above listed problems via gait training,therapeutic activities,therapeutic exercises  Plan of Care expires: 02/20/22  Plan of Care reviewed with: patient         02/09/2022

## 2022-02-09 NOTE — PT/OT/SLP PROGRESS
Occupational Therapy  Treatment    Haim Martin   MRN: [id number]   Admitting Diagnosis: Severe sepsis    OT Date of Treatment: 02/09/22   OT Start Time: 1035  OT Stop Time: 1058  OT Total Time (min): 23 min    Billable Minutes:  Therapeutic Activity 13 MINUTES and Therapeutic Exercise  10 MINUTES    OT/NADEEM: OT          General Precautions: Standard, airborne,fall,droplet,contact  Orthopedic Precautions: N/A  Braces: N/A  Respiratory Status: Room air         Subjective:  Communicated with NURSE AND Epic CHART REVIEW prior to session.    Pain/Comfort  Pain Rating 1: 0/10    Objective:  Patient found with: telemetry,peripheral IV     Functional Mobility:  Bed Mobility:  MIN A WITH ROLLING R<L  MIN A WITH SUPINE<>SIT    Transfers:   SBA/ CGA WITH SIT<>STAND  Functional Ambulation:   SBA X 24 FEET WITH ROLLING WALKER        Balance:   Static Sit: FAIR+: Able to take MINIMAL challenges from all directions  Dynamic Sit: FAIR+: Maintains balance through MINIMAL excursions of active trunk motion  Static Stand: FAIR: Maintains without assist but unable to take challenges  Dynamic stand: FAIR: Needs CONTACT GUARD during gait    Therapeutic Activities and Exercises:  PT EDUCATED ON HEP. PT PERFORMED 1 SET X 10 REPS B UE ROM EXERCISE IN ALL AVAILABLE PLANES AND RANGES (SHOULDER FLEXION, ELBOW FLEX/EXT, CHEST /DIGITS FLEX/EXT)    AM-PAC 6 CLICK ADL   How much help from another person does this patient currently need?   1 = Unable, Total/Dependent Assistance  2 = A lot, Maximum/Moderate Assistance  3 = A little, Minimum/Contact Guard/Supervision  4 = None, Modified [place]/Independent    Putting on and taking off regular lower body clothing? : 2  Bathing (including washing, rinsing, drying)?: 2  Toileting, which includes using toilet, bedpan, or urinal? : 2 (PUREWICK PLACEMENT)  Putting on and taking off regular upper body clothing?: 2  Taking care of personal grooming such as brushing teeth?: 3  Eating meals?:  "3  Daily Activity Total Score: 14     AM-PAC Raw Score CMS "G-Code Modifier Level of Impairment Assistance   6 % Total / Unable   7 - 8 CM 80 - 100% Maximal Assist   9-13 CL 60 - 80% Moderate Assist   14 - 19 CK 40 - 60% Moderate Assist   20 - 22 CJ 20 - 40% Minimal Assist   23 CI 1-20% SBA / CGA   24 CH 0% Independent/ Mod I       Patient left up in chair with all lines intact, call button in reach and NURSE notified    ASSESSMENT:  Haim Martin is a 81 y.o. female with a medical diagnosis of Severe sepsis and presents with DEBILITY AND GENERALIZED WEAKNESS.    Rehab identified problem list/impairments: Rehab identified problem list/impairments: weakness,impaired functional mobilty,decreased safety awareness,impaired endurance,gait instability,impaired balance,decreased upper extremity function,impaired self care skills,other (comment) (NAUSEA)    Rehab potential is good.    Activity tolerance: Good    Discharge recommendations: Discharge Facility/Level of Care Needs: home health OT     Barriers to discharge:      Equipment recommendations: walker, rolling,shower chair     GOALS:   Multidisciplinary Problems     Occupational Therapy Goals        Problem: Occupational Therapy Goal    Goal Priority Disciplines Outcome Interventions   Occupational Therapy Goal     OT, PT/OT Ongoing, Progressing    Description: Goals to be met by: 2/21/22     Patient will increase functional independence with ADLs by performing:    UE Dressing with Modified [place].  Toileting from toilet with Supervision for hygiene and clothing management.   Toilet transfer to toilet with Supervision.  Increased functional strength in B UE to grossly WFL to increase independence with ADLs.                     Plan:  Patient to be seen 2 x/week to address the above listed problems via self-care/home management,therapeutic activities,therapeutic exercises  Plan of Care expires: 02/23/22  Plan of Care reviewed with: patient     "     02/09/2022

## 2022-02-09 NOTE — PROGRESS NOTES
"[facility] Medicine  Progress Note    Patient Name: Haim Martin  MRN: [id number]  Patient Class: IP- Inpatient   Admission Date: 2/6/2022  Length of Stay: 2 days  Attending Physician: Alen Caal MD  Primary Care Provider: Kavya Mesa MD        Subjective:     Principal Problem:Severe sepsis        HPI:  Vomiting        PT states, "I have been vomiting since Thursday, feel very weak and have been running a fever."      Per ER- This  is a 81 y.o. female patient with a PMHx of Depression, Diverticulosis of colon, thyroid disease,  HLD and HTN who presents to the Emergency Department for evaluation of vomitting which onset gradually x 3 days pta. Patient states she was infected with COVID-19 3 weeks ago. She reports being on daptomycin. Symptoms are constant and moderate in severity. No mitigating or exacerbating factors reported. Associated sxs include dry cough and diahhrea. Patient denies any CP, leg swelling, abd pain, fever, chills, and all other sxs at this time. No further complaints or concerns at this time.     Patient was evaluated in ER and found to have Covid pneumonia and Placed In Observation for further evaluation and treatment.     Patient also reports she has been receiving Iv daptomycin at home with infected finger.                        Overview/Hospital Course:  81 y.o. female patient with Hx of Depression, Diverticulosis of colon, thyroid disease, HLD and HTN who presente for evaluation of vomitting x 3 days. Patient states she was infected with COVID-19 3 weeks ago and reports being on Daptomycin. Associated sxs include dry cough and diahhrea. She  was evaluated in ER and found to have Covid pneumonia and placed In Observation for further evaluation and treatment. Patient also reports she has been receiving Iv daptomycin at home for infected finger.     2/7- still feels weak and has HA- treated with Fioricet but overall better. No diarrhea, " did PT/OT. K improved to 4.7, H/H 10.6/32. Getting Dexa and Remdesivir. Sats 92-93% on 2 L NC, encouraged to do IS and will try to get her OOB to chair and ambulate.     2/8/22: Patient reports she feels weak, able to participate with PT/OT today, walker with assistance 24'. Patient requesting additional snacks this afternoon. Vitals stable, PO2 stable on 4L, Labs stable. Encouraged patient to use IS, seems motivated to participate.     2/9/22: Patient reported to be lethargic this AM, evaluated patient and ordered Lactic, Procalcitonin- both negative. Nutritional studies ordered.  O2 increased to 5L, PO2: 91-92%, 87% overnight on 4L. Patient reports feeling tired and sleeping more. Appetite is increased from yesterday. Patient reports using IS as instructed.       Interval History: Appetite improved, continues to have severe fatigue. Antibiotics continued.     Review of Systems   Constitutional: Positive for activity change and fatigue. Negative for appetite change, chills, diaphoresis, fever and unexpected weight change.   HENT: Negative for congestion, hearing loss, mouth sores, postnasal drip, rhinorrhea, sore throat and trouble swallowing.    Eyes: Negative for discharge and visual disturbance.   Respiratory: Positive for cough and shortness of breath. Negative for chest tightness.    Cardiovascular: Negative for chest pain, palpitations and leg swelling.   Gastrointestinal: Positive for abdominal distention and nausea. Negative for blood in stool, constipation, diarrhea and vomiting.   Endocrine: Negative for cold intolerance and heat intolerance.   Genitourinary: Negative for difficulty urinating, dyspareunia, flank pain and hematuria.   Musculoskeletal: Positive for arthralgias. Negative for back pain and myalgias.   Skin: Negative.    Neurological: Positive for weakness and headaches. Negative for dizziness and light-headedness.   Hematological: Negative for adenopathy. Does not bruise/bleed easily.    Psychiatric/Behavioral: Negative for agitation, behavioral problems and confusion. The patient is not nervous/anxious.      Objective:     Vital Signs (Most Recent):  Temp: 98.3 °F (36.8 °C) (02/09/22 1530)  Pulse: 71 (02/09/22 1530)  Resp: 16 (02/09/22 1530)  BP: 138/63 (02/09/22 1530)  SpO2: (!) 92 % (02/09/22 1530) Vital Signs (24h Range):  Temp:  [97.8 °F (36.6 °C)-98.7 °F (37.1 °C)] 98.3 °F (36.8 °C)  Pulse:  [66-78] 71  Resp:  [16-20] 16  SpO2:  [86 %-94 %] 92 %  BP: (138-158)/(61-72) 138/63     Weight: 58 kg (127 lb 13.9 oz)  Body mass index is 25.83 kg/m².    Intake/Output Summary (Last 24 hours) at 2/9/2022 1705  Last data filed at 2/9/2022 1300  Gross per 24 hour   Intake 480 ml   Output 675 ml   Net -195 ml      Physical Exam  Vitals and nursing note reviewed.   Constitutional:       General: She is not in acute distress.     Appearance: She is well-developed. She is ill-appearing.   HENT:      Head: Normocephalic and atraumatic.      Right Ear: Hearing and external ear normal.      Left Ear: Hearing and external ear normal.      Nose: No rhinorrhea.      Right Sinus: No maxillary sinus tenderness or frontal sinus tenderness.      Left Sinus: No maxillary sinus tenderness or frontal sinus tenderness.      Mouth/Throat:      Mouth: No oral lesions.      Pharynx: Uvula midline.   Eyes:      General:         Right eye: No discharge.         Left eye: No discharge.      Conjunctiva/sclera: Conjunctivae normal.      Pupils: Pupils are equal, round, and reactive to light.   Neck:      Thyroid: No thyromegaly.      Vascular: No carotid bruit.      Trachea: No tracheal deviation.   Cardiovascular:      Rate and Rhythm: Normal rate and regular rhythm.      Pulses:           Dorsalis pedis pulses are 2+ on the right side and 2+ on the left side.      Heart sounds: Normal heart sounds, S1 normal and S2 normal. No murmur heard.      Pulmonary:      Effort: Pulmonary effort is normal. No respiratory distress.       Breath sounds: Examination of the right-lower field reveals rales. Examination of the left-lower field reveals rales. Rales present.   Chest:   Breasts:      Right: No supraclavicular adenopathy.      Left: No supraclavicular adenopathy.       Abdominal:      General: Bowel sounds are decreased. There is no distension.      Palpations: Abdomen is soft. There is no mass.      Tenderness: There is no abdominal tenderness.   Musculoskeletal:         General: Normal range of motion.      Cervical back: Normal range of motion.   Lymphadenopathy:      Cervical: No cervical adenopathy.      Upper Body:      Right upper body: No supraclavicular adenopathy.      Left upper body: No supraclavicular adenopathy.   Skin:     General: Skin is warm and dry.      Capillary Refill: Capillary refill takes less than 2 seconds.      Coloration: Skin is pale.      Findings: No rash.   Neurological:      Mental Status: She is alert and oriented to person, place, and time.      Sensory: No sensory deficit.      Coordination: Coordination normal.      Gait: Gait normal.   Psychiatric:         Mood and Affect: Mood is anxious. Mood is not depressed. Affect is not labile.         Speech: Speech normal.         Behavior: Behavior normal.         Thought Content: Thought content normal.         Judgment: Judgment normal.         Significant Labs:   All pertinent labs within the past 24 hours have been reviewed.  CBC:   Recent Labs   Lab 02/08/22  0508 02/09/22  0534   WBC 5.38 8.47   HGB 11.1* 11.7*   HCT 35.7* 38.4    449     CMP:   Recent Labs   Lab 02/09/22  1129      K 4.0      CO2 28   *   BUN 16   CREATININE 0.8   CALCIUM 9.6   PROT 7.0   ALBUMIN 2.5*   BILITOT 0.4   ALKPHOS 97   AST 31   ALT 26   ANIONGAP 11   EGFRNONAA >60       Significant Imaging: I have reviewed all pertinent imaging results/findings within the past 24 hours.      Assessment/Plan:      * Severe sepsis secondary to covid pneumonia  -  COVID-19 testing Collection Date: 12/31/2021 Collection Time:   2:17 PM   - Infection Control notified     - Isolation:   - Airborne, Contact and Droplet Precautions  - Cohort patients into COVID units  - N95 mask, wear eye protection  - 20 second hand hygiene              - Limit visitors per hospital policy              - Consolidating lab draws, nursing care, provider visits, and interventions    - Management:  Supplemental O2 to maintain SpO2 >92%  Telemetry  Continuous/intermittent Pulse Ox  Albuterol treatment PRN  Continue MVI, Qpm Zinc and Vitamin C, Vitamin D, Remdesivir, and dexamethasone  Encourage Incentive spirometer q 1 hour while awake    Advance Care Planning   Full Code       This patient does have evidence of infective focus  My overall impression is Severe  sepsis. Vital signs were reviewed and noted in progress note.  Antibiotics given-   Antibiotics (From admission, onward)            Start     Stop Route Frequency Ordered    02/07/22 0900  DAPTOmycin (CUBICIN) 350 mg in sodium chloride 0.9% IVPB         -- IV Daily 02/06/22 1535    02/06/22 2100  mupirocin 2 % ointment         -- Top 2 times daily 02/06/22 1535        Cultures were taken-   Microbiology Results (last 7 days)     Procedure Component Value Units Date/Time    Blood culture [[id number]]  (Abnormal) Collected: 02/06/22 1418    Order Status: Completed Specimen: Blood Updated: 02/09/22 1405     Blood Culture, Routine Gram stain aer bottle: Gram positive cocci in clusters resembling Staph      Positive results previously called 02/07/2022      STAPHYLOCOCCUS CAPITIS  Susceptibility pending      Blood culture [[id number]]  (Abnormal) Collected: 02/06/22 1418    Order Status: Completed Specimen: Blood Updated: 02/09/22 1405     Blood Culture, Routine Gram stain aer bottle: Gram positive cocci in clusters resembling Staph      Results called to and read back by:Brendan Villarreal LPN 02/07/2022  19:55      STAPHYLOCOCCUS CAPITIS  Susceptibility  pending          Latest lactate reviewed, they are-  Recent Labs   Lab 02/09/22  1129   LACTATE 1.5       Organ dysfunction indicated by Acute respiratory failure     Source- Covid pneumonia    Source control Achieved by- Symptomatic relief    Improving, afebrile- continue Remdesivir plus Dexa  Repeated Procal, Lactic on 2/9/22- normal    Debility  2/06 Fall and skin precautions  Turn Q 2 hours  Consult PT and OT        Hypoalbuminemia  Decreased from previous hospitalization    --add boost to all meals      Normocytic anemia  Chronic, likely secondary to chronic disease, normocytic    --Daily CBC  --Transfuse with 1 unit PRBC for Hgb <7.0 or <8.0 if symptomatic     Acute hypoxemic respiratory failure secondary to Covid pneumonia  Patient with Hypoxic Respiratory failure which is Acute.  she is not on home oxygen. Supplemental oxygen was provided and noted- Oxygen Concentration (%):  [36-40] 40.   Signs/symptoms of respiratory failure include- tachypnea and increased work of breathing. Contributing diagnoses includes - Covid  Pneumonia      --continue supportive O2  --Telemetry  --Vitals Q4H      Infected finger joint  Continue home Iv Daptomycin, expected to continue until 2/26/2022, managed by ID as outpatient  Patient reports she has orthopedic appointment for this week to get stitches out    --Continue Daptomycin        VTE Risk Mitigation (From admission, onward)         Ordered     enoxaparin injection 60 mg  2 times daily         02/06/22 1301                Discharge Planning   JUAN:      Code Status: Full Code   Is the patient medically ready for discharge?:     Reason for patient still in hospital (select all that apply): Patient unstable  Discharge Plan A: Home Health                  Cece Taylor NP  Department of Hospital Medicine   [facility] - Telemetry ([facility])

## 2022-02-09 NOTE — PLAN OF CARE
Aox4.    Able to verbalize needs & follow commands. Calm & cooperative throughout shift.   POC reviewed with pt. Interventions implemented as appropriate.    VSS; SR on tele-monitor.  Increased O2 to 5L NC to maintain sat >88%. Respiratory treatments as ordered.    DEISY PICC patent.     Receiving Remdesivir & IV abx. No adverse reactions noted.  Skin WDI.    No c/o pain.    C/O nausea; alleviated w/ PRN Zofran.  Incontinence of bladder. Incontinence pad changed as needed. External female catheter in place. No s/s of discomfort noted.  Lovenox for VTE.  Activity ad judson. Frequent position changes encouraged. Educated on s/sx of DTI & advised pt to turn often. She stated she would.  NADN. Resting quietly in bed.   Free of falls. Hourly rounding complete.   All safety measures remain in place. SR up x2; bed low & locked. Bed alarm on & audible. Call light w/in reach.   Will continue to monitor throughout shift.    Problem: Gas Exchange Impaired  Goal: Optimal Gas Exchange  Outcome: Ongoing, Not Progressing

## 2022-02-09 NOTE — SUBJECTIVE & OBJECTIVE
Interval History: Appetite improved, continues to have severe fatigue. Antibiotics continued.     Review of Systems   Constitutional: Positive for activity change and fatigue. Negative for appetite change, chills, diaphoresis, fever and unexpected weight change.   HENT: Negative for congestion, hearing loss, mouth sores, postnasal drip, rhinorrhea, sore throat and trouble swallowing.    Eyes: Negative for discharge and visual disturbance.   Respiratory: Positive for cough and shortness of breath. Negative for chest tightness.    Cardiovascular: Negative for chest pain, palpitations and leg swelling.   Gastrointestinal: Positive for abdominal distention and nausea. Negative for blood in stool, constipation, diarrhea and vomiting.   Endocrine: Negative for cold intolerance and heat intolerance.   Genitourinary: Negative for difficulty urinating, dyspareunia, flank pain and hematuria.   Musculoskeletal: Positive for arthralgias. Negative for back pain and myalgias.   Skin: Negative.    Neurological: Positive for weakness and headaches. Negative for dizziness and light-headedness.   Hematological: Negative for adenopathy. Does not bruise/bleed easily.   Psychiatric/Behavioral: Negative for agitation, behavioral problems and confusion. The patient is not nervous/anxious.      Objective:     Vital Signs (Most Recent):  Temp: 98.3 °F (36.8 °C) (02/09/22 1530)  Pulse: 71 (02/09/22 1530)  Resp: 16 (02/09/22 1530)  BP: 138/63 (02/09/22 1530)  SpO2: (!) 92 % (02/09/22 1530) Vital Signs (24h Range):  Temp:  [97.8 °F (36.6 °C)-98.7 °F (37.1 °C)] 98.3 °F (36.8 °C)  Pulse:  [66-78] 71  Resp:  [16-20] 16  SpO2:  [86 %-94 %] 92 %  BP: (138-158)/(61-72) 138/63     Weight: 58 kg (127 lb 13.9 oz)  Body mass index is 25.83 kg/m².    Intake/Output Summary (Last 24 hours) at 2/9/2022 1705  Last data filed at 2/9/2022 1300  Gross per 24 hour   Intake 480 ml   Output 675 ml   Net -195 ml      Physical Exam  Vitals and nursing note reviewed.    Constitutional:       General: She is not in acute distress.     Appearance: She is well-developed. She is ill-appearing.   HENT:      Head: Normocephalic and atraumatic.      Right Ear: Hearing and external ear normal.      Left Ear: Hearing and external ear normal.      Nose: No rhinorrhea.      Right Sinus: No maxillary sinus tenderness or frontal sinus tenderness.      Left Sinus: No maxillary sinus tenderness or frontal sinus tenderness.      Mouth/Throat:      Mouth: No oral lesions.      Pharynx: Uvula midline.   Eyes:      General:         Right eye: No discharge.         Left eye: No discharge.      Conjunctiva/sclera: Conjunctivae normal.      Pupils: Pupils are equal, round, and reactive to light.   Neck:      Thyroid: No thyromegaly.      Vascular: No carotid bruit.      Trachea: No tracheal deviation.   Cardiovascular:      Rate and Rhythm: Normal rate and regular rhythm.      Pulses:           Dorsalis pedis pulses are 2+ on the right side and 2+ on the left side.      Heart sounds: Normal heart sounds, S1 normal and S2 normal. No murmur heard.      Pulmonary:      Effort: Pulmonary effort is normal. No respiratory distress.      Breath sounds: Examination of the right-lower field reveals rales. Examination of the left-lower field reveals rales. Rales present.   Chest:   Breasts:      Right: No supraclavicular adenopathy.      Left: No supraclavicular adenopathy.       Abdominal:      General: Bowel sounds are decreased. There is no distension.      Palpations: Abdomen is soft. There is no mass.      Tenderness: There is no abdominal tenderness.   Musculoskeletal:         General: Normal range of motion.      Cervical back: Normal range of motion.   Lymphadenopathy:      Cervical: No cervical adenopathy.      Upper Body:      Right upper body: No supraclavicular adenopathy.      Left upper body: No supraclavicular adenopathy.   Skin:     General: Skin is warm and dry.      Capillary Refill: Capillary  refill takes less than 2 seconds.      Coloration: Skin is pale.      Findings: No rash.   Neurological:      Mental Status: She is alert and oriented to person, place, and time.      Sensory: No sensory deficit.      Coordination: Coordination normal.      Gait: Gait normal.   Psychiatric:         Mood and Affect: Mood is anxious. Mood is not depressed. Affect is not labile.         Speech: Speech normal.         Behavior: Behavior normal.         Thought Content: Thought content normal.         Judgment: Judgment normal.         Significant Labs:   All pertinent labs within the past 24 hours have been reviewed.  CBC:   Recent Labs   Lab 02/08/22  0508 02/09/22  0534   WBC 5.38 8.47   HGB 11.1* 11.7*   HCT 35.7* 38.4    449     CMP:   Recent Labs   Lab 02/09/22  1129      K 4.0      CO2 28   *   BUN 16   CREATININE 0.8   CALCIUM 9.6   PROT 7.0   ALBUMIN 2.5*   BILITOT 0.4   ALKPHOS 97   AST 31   ALT 26   ANIONGAP 11   EGFRNONAA >60       Significant Imaging: I have reviewed all pertinent imaging results/findings within the past 24 hours.

## 2022-02-09 NOTE — ASSESSMENT & PLAN NOTE
Patient with Hypoxic Respiratory failure which is Acute.  she is not on home oxygen. Supplemental oxygen was provided and noted- Oxygen Concentration (%):  [36-40] 40.   Signs/symptoms of respiratory failure include- tachypnea and increased work of breathing. Contributing diagnoses includes - Covid  Pneumonia      --continue supportive O2  --Telemetry  --Vitals Q4H

## 2022-02-09 NOTE — ASSESSMENT & PLAN NOTE
- COVID-19 testing Collection Date: 12/31/2021 Collection Time:   2:17 PM   - Infection Control notified     - Isolation:   - Airborne, Contact and Droplet Precautions  - Cohort patients into COVID units  - N95 mask, wear eye protection  - 20 second hand hygiene              - Limit visitors per hospital policy              - Consolidating lab draws, nursing care, provider visits, and interventions    - Management:  Supplemental O2 to maintain SpO2 >92%  Telemetry  Continuous/intermittent Pulse Ox  Albuterol treatment PRN  Continue MVI, Qpm Zinc and Vitamin C, Vitamin D, Remdesivir, and dexamethasone  Encourage Incentive spirometer q 1 hour while awake    Advance Care Planning   Full Code        This patient does have evidence of infective focus  My overall impression is Severe  sepsis. Vital signs were reviewed and noted in progress note.  Antibiotics given-   Antibiotics (From admission, onward)            Start     Stop Route Frequency Ordered    02/07/22 0900  DAPTOmycin (CUBICIN) 350 mg in sodium chloride 0.9% IVPB         -- IV Daily 02/06/22 1535    02/06/22 2100  mupirocin 2 % ointment         -- Top 2 times daily 02/06/22 1535        Cultures were taken-   Microbiology Results (last 7 days)     Procedure Component Value Units Date/Time    Blood culture [[id number]]  (Abnormal) Collected: 02/06/22 1418    Order Status: Completed Specimen: Blood Updated: 02/09/22 1405     Blood Culture, Routine Gram stain aer bottle: Gram positive cocci in clusters resembling Staph      Positive results previously called 02/07/2022      STAPHYLOCOCCUS CAPITIS  Susceptibility pending      Blood culture [[id number]]  (Abnormal) Collected: 02/06/22 1418    Order Status: Completed Specimen: Blood Updated: 02/09/22 1405     Blood Culture, Routine Gram stain aer bottle: Gram positive cocci in clusters resembling Staph      Results called to and read back by:Brendan Villarreal LPN 02/07/2022  19:55      STAPHYLOCOCCUS CAPITIS  Susceptibility  pending          Latest lactate reviewed, they are-  Recent Labs   Lab 02/09/22  1129   LACTATE 1.5       Organ dysfunction indicated by Acute respiratory failure     Source- Covid pneumonia    Source control Achieved by- Symptomatic relief    Improving, afebrile- continue Remdesivir plus Dexa  Repeated Procal, Lactic on 2/9/22- normal

## 2022-02-10 PROCEDURE — 97110 THERAPEUTIC EXERCISES: CPT

## 2022-02-10 PROCEDURE — 94640 AIRWAY INHALATION TREATMENT: CPT

## 2022-02-10 PROCEDURE — 99900035 HC TECH TIME PER 15 MIN (STAT)

## 2022-02-10 PROCEDURE — 25000003 PHARM REV CODE 250: Performed by: NURSE PRACTITIONER

## 2022-02-10 PROCEDURE — 63600175 PHARM REV CODE 636 W HCPCS: Performed by: NURSE PRACTITIONER

## 2022-02-10 PROCEDURE — 97535 SELF CARE MNGMENT TRAINING: CPT

## 2022-02-10 PROCEDURE — 94799 UNLISTED PULMONARY SVC/PX: CPT

## 2022-02-10 PROCEDURE — 21400001 HC TELEMETRY ROOM

## 2022-02-10 PROCEDURE — 97116 GAIT TRAINING THERAPY: CPT

## 2022-02-10 PROCEDURE — 25000003 PHARM REV CODE 250: Performed by: INTERNAL MEDICINE

## 2022-02-10 PROCEDURE — 27000221 HC OXYGEN, UP TO 24 HOURS

## 2022-02-10 PROCEDURE — 27000207 HC ISOLATION

## 2022-02-10 PROCEDURE — 94761 N-INVAS EAR/PLS OXIMETRY MLT: CPT

## 2022-02-10 RX ADMIN — PANTOPRAZOLE SODIUM 40 MG: 40 TABLET, DELAYED RELEASE ORAL at 09:02

## 2022-02-10 RX ADMIN — LEVOTHYROXINE SODIUM 88 MCG: 88 TABLET ORAL at 06:02

## 2022-02-10 RX ADMIN — REMDESIVIR 100 MG: 100 INJECTION, POWDER, LYOPHILIZED, FOR SOLUTION INTRAVENOUS at 08:02

## 2022-02-10 RX ADMIN — CLONIDINE HYDROCHLORIDE 0.1 MG: 0.1 TABLET ORAL at 09:02

## 2022-02-10 RX ADMIN — ENOXAPARIN SODIUM 60 MG: 100 INJECTION SUBCUTANEOUS at 08:02

## 2022-02-10 RX ADMIN — OXYCODONE HYDROCHLORIDE AND ACETAMINOPHEN 500 MG: 500 TABLET ORAL at 10:02

## 2022-02-10 RX ADMIN — THERA TABS 1 TABLET: TAB at 09:02

## 2022-02-10 RX ADMIN — DAPTOMYCIN 350 MG: 350 INJECTION, POWDER, LYOPHILIZED, FOR SOLUTION INTRAVENOUS at 08:02

## 2022-02-10 RX ADMIN — ASPIRIN 81 MG: 81 TABLET, COATED ORAL at 09:02

## 2022-02-10 RX ADMIN — ZINC SULFATE 220 MG (50 MG) CAPSULE 220 MG: CAPSULE at 10:02

## 2022-02-10 RX ADMIN — AMITRIPTYLINE HYDROCHLORIDE 75 MG: 50 TABLET, FILM COATED ORAL at 10:02

## 2022-02-10 RX ADMIN — SERTRALINE HYDROCHLORIDE 100 MG: 50 TABLET ORAL at 09:02

## 2022-02-10 RX ADMIN — ALBUTEROL SULFATE 2 PUFF: 90 AEROSOL, METERED RESPIRATORY (INHALATION) at 12:02

## 2022-02-10 RX ADMIN — CLONIDINE HYDROCHLORIDE 0.1 MG: 0.1 TABLET ORAL at 10:02

## 2022-02-10 RX ADMIN — ENOXAPARIN SODIUM 60 MG: 100 INJECTION SUBCUTANEOUS at 10:02

## 2022-02-10 RX ADMIN — ALBUTEROL SULFATE 2 PUFF: 90 AEROSOL, METERED RESPIRATORY (INHALATION) at 07:02

## 2022-02-10 RX ADMIN — MUPIROCIN: 20 OINTMENT TOPICAL at 09:02

## 2022-02-10 RX ADMIN — IRON SUCROSE 200 MG: 20 INJECTION, SOLUTION INTRAVENOUS at 04:02

## 2022-02-10 RX ADMIN — MUPIROCIN: 20 OINTMENT TOPICAL at 10:02

## 2022-02-10 RX ADMIN — PROPRANOLOL HYDROCHLORIDE 40 MG: 40 TABLET ORAL at 10:02

## 2022-02-10 RX ADMIN — DEXAMETHASONE 6 MG: 4 TABLET ORAL at 09:02

## 2022-02-10 RX ADMIN — ALBUTEROL SULFATE 2 PUFF: 90 AEROSOL, METERED RESPIRATORY (INHALATION) at 06:02

## 2022-02-10 RX ADMIN — ALBUTEROL SULFATE 2 PUFF: 90 AEROSOL, METERED RESPIRATORY (INHALATION) at 01:02

## 2022-02-10 RX ADMIN — ACETAMINOPHEN 650 MG: 325 TABLET ORAL at 10:02

## 2022-02-10 RX ADMIN — ONDANSETRON 4 MG: 2 INJECTION INTRAMUSCULAR; INTRAVENOUS at 02:02

## 2022-02-10 RX ADMIN — PROPRANOLOL HYDROCHLORIDE 40 MG: 40 TABLET ORAL at 09:02

## 2022-02-10 NOTE — ASSESSMENT & PLAN NOTE
- COVID-19 testing Collection Date: 12/31/2021 Collection Time:   2:17 PM   - Infection Control notified     - Isolation:   - Airborne, Contact and Droplet Precautions  - Cohort patients into COVID units  - N95 mask, wear eye protection  - 20 second hand hygiene              - Limit visitors per hospital policy              - Consolidating lab draws, nursing care, provider visits, and interventions    - Management:  Supplemental O2 to maintain SpO2 >92%  Telemetry  Continuous/intermittent Pulse Ox  Albuterol treatment PRN  Continue MVI, Qpm Zinc and Vitamin C, Vitamin D, Remdesivir, and dexamethasone  Encourage Incentive spirometer q 1 hour while awake    Advance Care Planning   Full Code        This patient does have evidence of infective focus  My overall impression is Severe  sepsis. Vital signs were reviewed and noted in progress note.  Antibiotics given-   Antibiotics (From admission, onward)            Start     Stop Route Frequency Ordered    02/07/22 0900  DAPTOmycin (CUBICIN) 350 mg in sodium chloride 0.9% IVPB         -- IV Daily 02/06/22 1535    02/06/22 2100  mupirocin 2 % ointment         -- Top 2 times daily 02/06/22 1535        Cultures were taken-   Microbiology Results (last 7 days)     Procedure Component Value Units Date/Time    Blood culture [[id number]]  (Abnormal) Collected: 02/06/22 1418    Order Status: Completed Specimen: Blood Updated: 02/10/22 1427     Blood Culture, Routine Gram stain aer bottle: Gram positive cocci in clusters resembling Staph      Results called to and read back by:Brendan Villarreal LPN 02/07/2022  19:55      STAPHYLOCOCCUS CAPITIS  Susceptibility pending      Blood culture [[id number]]  (Abnormal) Collected: 02/06/22 1418    Order Status: Completed Specimen: Blood Updated: 02/10/22 1427     Blood Culture, Routine Gram stain aer bottle: Gram positive cocci in clusters resembling Staph      Positive results previously called 02/07/2022      STAPHYLOCOCCUS CAPITIS  Susceptibility  pending          Latest lactate reviewed, they are-  Recent Labs   Lab 02/09/22  1129   LACTATE 1.5       Organ dysfunction indicated by Acute respiratory failure     Source- Covid pneumonia    Source control Achieved by- Symptomatic relief    Improving, afebrile- continue Remdesivir plus Dexa  Repeated Procal, Lactic on 2/9/22- normal

## 2022-02-10 NOTE — ASSESSMENT & PLAN NOTE
Chronic, likely secondary to chronic disease, normocytic    --Daily CBC  --Transfuse with 1 unit PRBC for Hgb <7.0 or <8.0 if symptomatic  --Iron deficient: replete with Venofer IV 200mg x5 days   --hem/Onc follow-up

## 2022-02-10 NOTE — PLAN OF CARE
POC reviewed patient verbalized understanding. Continued on 5 liters oxygen via nasal cannula. She ambulates with minimal assistance with c/o SOB and fatigue. PICC to DEISY intact. No other concerns. Will continue to monitor.

## 2022-02-10 NOTE — PT/OT/SLP PROGRESS
"Physical Therapy  Treatment    Haim Martin   MRN: [id number]   Admitting Diagnosis: Severe sepsis    PT Received On: 02/10/22  PT Start Time: 1050     PT Stop Time: 1113    PT Total Time (min): 23 min       Billable Minutes:  Gait Training 13 and Therapeutic Activity 10    Treatment Type: Treatment  PT/PTA: PT     PTA Visit Number: 0       General Precautions: Standard, airborne,droplet,fall,contact  Orthopedic Precautions: N/A   Braces: N/A  Respiratory Status: Nasal cannula, flow 5 L/min         Subjective:  Communicated with NURSE SMITH AND Psychiatric CHART REVIEW  prior to session.   PT AGREED TO TX     Pain/Comfort  Pain Rating 1: 0/10  Pain Rating Post-Intervention 1: 0/10    Objective:   Patient found with: peripheral IV,telemetry    Functional Mobility:  PT MET IN RM LETHARGIC. PT SUP>SIT EOB WITH SBA AND REPORTED , "IM SO TIRED." PT STOOD WITH RW AND GT TRAINED X 44' WITH STEP TO GT AND T/F TO CHAIR WITH RW AND MIN A. PT WITH INC FATIGUE AN CUED FOR PURSED LIP BREATHING. PT LEFT SEATED IN CHAIR WITH ALL NEEDS MET AND CALL BELL IN REACH.     AM-PAC 6 CLICK MOBILITY  How much help from another person does this patient currently need?   1 = Unable, Total/Dependent Assistance  2 = A lot, Maximum/Moderate Assistance  3 = A little, Minimum/Contact Guard/Supervision  4 = None, Modified [place]/Independent    Turning over in bed (including adjusting bedclothes, sheets and blankets)?: 3  Sitting down on and standing up from a chair with arms (e.g., wheelchair, bedside commode, etc.): 3  Moving from lying on back to sitting on the side of the bed?: 3  Moving to and from a bed to a chair (including a wheelchair)?: 3  Need to walk in hospital room?: 3  Climbing 3-5 steps with a railing?: 1  Basic Mobility Total Score: 16    AM-PAC Raw Score CMS G-Code Modifier Level of Impairment Assistance   6 % Total / Unable   7 - 9 CM 80 - 100% Maximal Assist   10 - 14 CL 60 - 80% Moderate Assist   15 - 19 CK 40 - 60% " Moderate Assist   20 - 22 CJ 20 - 40% Minimal Assist   23 CI 1-20% SBA / CGA   24 CH 0% Independent/ Mod I     Patient left up in chair with call button in reach.    Assessment:  PT PROGRESSING WITH GT DISTANCES.       Rehab identified problem list/impairments: Rehab identified problem list/impairments: weakness,impaired endurance,impaired self care skills,impaired functional mobilty,gait instability,impaired balance,decreased lower extremity function,decreased ROM,decreased safety awareness    Rehab potential is good.    Activity tolerance: Fair    Discharge recommendations: Discharge Facility/Level of Care Needs: home health PT     Barriers to discharge:      Equipment recommendations: Equipment Needed After Discharge: walker, rolling     GOALS:   Multidisciplinary Problems     Physical Therapy Goals        Problem: Physical Therapy Goal    Goal Priority Disciplines Outcome Goal Variances Interventions   Physical Therapy Goal     PT, PT/OT Ongoing, Progressing                     PLAN:    Patient to be seen 3 x/week  to address the above listed problems via gait training,therapeutic activities,therapeutic exercises  Plan of Care expires: 02/20/22  Plan of Care reviewed with: patient         02/10/2022

## 2022-02-10 NOTE — PLAN OF CARE
Aox4.    Able to verbalize needs & follow commands. Calm & cooperative throughout shift.   POC reviewed with pt. Interventions implemented as appropriate.    VSS; SR on tele-monitor.  5L NC to maintain sat >88%. Respiratory treatments as ordered.    DEISY PICC patent.     Receiving Remdesivir & IV abx. No adverse reactions noted.  Skin WDI.    No c/o pain.    C/O nausea; alleviated w/ PRN Zofran.  Incontinence of bladder. Incontinence pad changed as needed. External female catheter in place. No s/s of discomfort noted.  Lovenox for VTE.  Activity ad judson. Frequent position changes encouraged. Educated on s/sx of DTI & advised pt to turn often. She stated she would.  NADN. Resting quietly in bed.   Free of falls. Hourly rounding complete.   All safety measures remain in place. SR up x2; bed low & locked. Bed alarm on & audible. Call light w/in reach.   Will continue to monitor throughout shift.     Problem: Gas Exchange Impaired  Goal: Optimal Gas Exchange  Outcome: Ongoing, Not Progressing

## 2022-02-10 NOTE — PROGRESS NOTES
"[facility] Medicine  Progress Note    Patient Name: Haim Martin  MRN: [id number]  Patient Class: IP- Inpatient   Admission Date: 2/6/2022  Length of Stay: 3 days  Attending Physician: Alen Caal MD  Primary Care Provider: Kavya Mesa MD        Subjective:     Principal Problem:Severe sepsis        HPI:  Vomiting        PT states, "I have been vomiting since Thursday, feel very weak and have been running a fever."      Per ER- This  is a 81 y.o. female patient with a PMHx of Depression, Diverticulosis of colon, thyroid disease,  HLD and HTN who presents to the Emergency Department for evaluation of vomitting which onset gradually x 3 days pta. Patient states she was infected with COVID-19 3 weeks ago. She reports being on daptomycin. Symptoms are constant and moderate in severity. No mitigating or exacerbating factors reported. Associated sxs include dry cough and diahhrea. Patient denies any CP, leg swelling, abd pain, fever, chills, and all other sxs at this time. No further complaints or concerns at this time.     Patient was evaluated in ER and found to have Covid pneumonia and Placed In Observation for further evaluation and treatment.     Patient also reports she has been receiving Iv daptomycin at home with infected finger.                        Overview/Hospital Course:  81 y.o. female patient with Hx of Depression, Diverticulosis of colon, thyroid disease, HLD and HTN who presente for evaluation of vomitting x 3 days. Patient states she was infected with COVID-19 3 weeks ago and reports being on Daptomycin. Associated sxs include dry cough and diahhrea. She  was evaluated in ER and found to have Covid pneumonia and placed In Observation for further evaluation and treatment. Patient also reports she has been receiving Iv daptomycin at home for infected finger.     2/7- still feels weak and has HA- treated with Fioricet but overall better. No diarrhea, " did PT/OT. K improved to 4.7, H/H 10.6/32. Getting Dexa and Remdesivir. Sats 92-93% on 2 L NC, encouraged to do IS and will try to get her OOB to chair and ambulate.     2/8/22: Patient reports she feels weak, able to participate with PT/OT today, walker with assistance 24'. Patient requesting additional snacks this afternoon. Vitals stable, PO2 stable on 4L, Labs stable. Encouraged patient to use IS, seems motivated to participate.     2/9/22: Patient reported to be lethargic this AM, evaluated patient and ordered Lactic, Procalcitonin- both negative. Nutritional studies ordered.  O2 increased to 5L, PO2: 91-92%, 87% overnight on 4L. Patient reports feeling tired and sleeping more. Appetite is increased from yesterday. Patient reports using IS as instructed.     2/10/22: Patient stable on 5L O2 per NC. Patient continues to report fatigue. Labs reviewed, patient is iron deficient, will replete with Venofer IV while inpatient and plan for Hem/Onc follow-up for anemia at d/c. Recommend GI follow-up at d/c. Labs stable, Vitals stable.       Interval History: Continues on 5L O2 NC, stable.     Review of Systems   Constitutional: Positive for activity change and fatigue. Negative for appetite change, chills, diaphoresis, fever and unexpected weight change.   HENT: Negative for congestion, hearing loss, mouth sores, postnasal drip, rhinorrhea, sore throat and trouble swallowing.    Eyes: Negative for discharge and visual disturbance.   Respiratory: Positive for cough and shortness of breath. Negative for chest tightness.    Cardiovascular: Negative for chest pain, palpitations and leg swelling.   Gastrointestinal: Positive for abdominal distention and nausea. Negative for blood in stool, constipation, diarrhea and vomiting.   Endocrine: Negative for cold intolerance and heat intolerance.   Genitourinary: Negative for difficulty urinating, dyspareunia, flank pain and hematuria.   Musculoskeletal: Positive for arthralgias.  Negative for back pain and myalgias.   Skin: Negative.    Neurological: Positive for weakness and headaches. Negative for dizziness and light-headedness.   Hematological: Negative for adenopathy. Does not bruise/bleed easily.   Psychiatric/Behavioral: Negative for agitation, behavioral problems and confusion. The patient is not nervous/anxious.      Objective:     Vital Signs (Most Recent):  Temp: 98 °F (36.7 °C) (02/10/22 1210)  Pulse: 74 (02/10/22 1317)  Resp: 18 (02/10/22 1317)  BP: 136/64 (02/10/22 1210)  SpO2: (!) 93 % (02/10/22 1317) Vital Signs (24h Range):  Temp:  [97.5 °F (36.4 °C)-98.9 °F (37.2 °C)] 98 °F (36.7 °C)  Pulse:  [60-77] 74  Resp:  [16-20] 18  SpO2:  [89 %-93 %] 93 %  BP: (114-138)/(56-67) 136/64     Weight: 60 kg (132 lb 4.4 oz)  Body mass index is 26.72 kg/m².    Intake/Output Summary (Last 24 hours) at 2/10/2022 1442  Last data filed at 2/10/2022 1000  Gross per 24 hour   Intake 358 ml   Output 1500 ml   Net -1142 ml      Physical Exam  Vitals and nursing note reviewed.   Constitutional:       General: She is not in acute distress.     Appearance: She is well-developed. She is ill-appearing.   HENT:      Head: Normocephalic and atraumatic.      Right Ear: Hearing and external ear normal.      Left Ear: Hearing and external ear normal.      Nose: No rhinorrhea.      Right Sinus: No maxillary sinus tenderness or frontal sinus tenderness.      Left Sinus: No maxillary sinus tenderness or frontal sinus tenderness.      Mouth/Throat:      Mouth: No oral lesions.      Pharynx: Uvula midline.   Eyes:      General:         Right eye: No discharge.         Left eye: No discharge.      Conjunctiva/sclera: Conjunctivae normal.      Pupils: Pupils are equal, round, and reactive to light.   Neck:      Thyroid: No thyromegaly.      Vascular: No carotid bruit.      Trachea: No tracheal deviation.   Cardiovascular:      Rate and Rhythm: Normal rate and regular rhythm.      Pulses:           Dorsalis pedis  pulses are 2+ on the right side and 2+ on the left side.      Heart sounds: Normal heart sounds, S1 normal and S2 normal. No murmur heard.      Pulmonary:      Effort: Pulmonary effort is normal. No respiratory distress.      Breath sounds: No rales.   Chest:   Breasts:      Right: No supraclavicular adenopathy.      Left: No supraclavicular adenopathy.       Abdominal:      General: Bowel sounds are decreased. There is no distension.      Palpations: Abdomen is soft. There is no mass.      Tenderness: There is no abdominal tenderness.   Musculoskeletal:         General: Normal range of motion.      Cervical back: Normal range of motion.   Lymphadenopathy:      Cervical: No cervical adenopathy.      Upper Body:      Right upper body: No supraclavicular adenopathy.      Left upper body: No supraclavicular adenopathy.   Skin:     General: Skin is warm and dry.      Capillary Refill: Capillary refill takes less than 2 seconds.      Coloration: Skin is pale.      Findings: No rash.   Neurological:      Mental Status: She is alert and oriented to person, place, and time.      Sensory: No sensory deficit.      Coordination: Coordination normal.      Gait: Gait normal.   Psychiatric:         Mood and Affect: Mood is anxious. Mood is not depressed. Affect is not labile.         Speech: Speech normal.         Behavior: Behavior normal.         Thought Content: Thought content normal.         Judgment: Judgment normal.         Significant Labs:   All pertinent labs within the past 24 hours have been reviewed.  CBC:   Recent Labs   Lab 02/09/22  0534   WBC 8.47   HGB 11.7*   HCT 38.4        CMP:   Recent Labs   Lab 02/09/22  1129      K 4.0      CO2 28   *   BUN 16   CREATININE 0.8   CALCIUM 9.6   PROT 7.0   ALBUMIN 2.5*   BILITOT 0.4   ALKPHOS 97   AST 31   ALT 26   ANIONGAP 11   EGFRNONAA >60       Significant Imaging: I have reviewed all pertinent imaging results/findings within the past 24  hours.      Assessment/Plan:      * Severe sepsis secondary to covid pneumonia  - COVID-19 testing Collection Date: 12/31/2021 Collection Time:   2:17 PM   - Infection Control notified     - Isolation:   - Airborne, Contact and Droplet Precautions  - Cohort patients into COVID units  - N95 mask, wear eye protection  - 20 second hand hygiene              - Limit visitors per hospital policy              - Consolidating lab draws, nursing care, provider visits, and interventions    - Management:  Supplemental O2 to maintain SpO2 >92%  Telemetry  Continuous/intermittent Pulse Ox  Albuterol treatment PRN  Continue MVI, Qpm Zinc and Vitamin C, Vitamin D, Remdesivir, and dexamethasone  Encourage Incentive spirometer q 1 hour while awake    Advance Care Planning   Full Code       This patient does have evidence of infective focus  My overall impression is Severe  sepsis. Vital signs were reviewed and noted in progress note.  Antibiotics given-   Antibiotics (From admission, onward)            Start     Stop Route Frequency Ordered    02/07/22 0900  DAPTOmycin (CUBICIN) 350 mg in sodium chloride 0.9% IVPB         -- IV Daily 02/06/22 1535    02/06/22 2100  mupirocin 2 % ointment         -- Top 2 times daily 02/06/22 1535        Cultures were taken-   Microbiology Results (last 7 days)     Procedure Component Value Units Date/Time    Blood culture [[id number]]  (Abnormal) Collected: 02/06/22 1418    Order Status: Completed Specimen: Blood Updated: 02/10/22 1427     Blood Culture, Routine Gram stain aer bottle: Gram positive cocci in clusters resembling Staph      Results called to and read back by:Brendan Villarreal LPN 02/07/2022  19:55      STAPHYLOCOCCUS CAPITIS  Susceptibility pending      Blood culture [[id number]]  (Abnormal) Collected: 02/06/22 1418    Order Status: Completed Specimen: Blood Updated: 02/10/22 1427     Blood Culture, Routine Gram stain aer bottle: Gram positive cocci in clusters resembling Staph      Positive  results previously called 02/07/2022      STAPHYLOCOCCUS CAPITIS  Susceptibility pending          Latest lactate reviewed, they are-  Recent Labs   Lab 02/09/22  1129   LACTATE 1.5       Organ dysfunction indicated by Acute respiratory failure     Source- Covid pneumonia    Source control Achieved by- Symptomatic relief    Improving, afebrile- continue Remdesivir plus Dexa  Repeated Procal, Lactic on 2/9/22- normal    Debility  2/06 Fall and skin precautions  Turn Q 2 hours  Consult PT and OT        Hypoalbuminemia  Decreased from previous hospitalization    --add boost to all meals      Normocytic anemia  Chronic, likely secondary to chronic disease, normocytic    --Daily CBC  --Transfuse with 1 unit PRBC for Hgb <7.0 or <8.0 if symptomatic  --Iron deficient: replete with Venofer IV 200mg x5 days   --hem/Onc follow-up    Acute hypoxemic respiratory failure secondary to Covid pneumonia  Patient with Hypoxic Respiratory failure which is Acute.  she is not on home oxygen. Supplemental oxygen was provided and noted- Oxygen Concentration (%):  [40] 40.   Signs/symptoms of respiratory failure include- tachypnea and increased work of breathing. Contributing diagnoses includes - Covid  Pneumonia      --continue supportive O2  --Telemetry  --Vitals Q4H      Infected finger joint  Continue home Iv Daptomycin, expected to continue until 2/26/2022, managed by ID as outpatient  Patient reports she has orthopedic appointment for this week to get stitches out    --Continue Daptomycin        VTE Risk Mitigation (From admission, onward)         Ordered     enoxaparin injection 60 mg  2 times daily         02/06/22 1301                Discharge Planning   JUAN:      Code Status: Full Code   Is the patient medically ready for discharge?:     Reason for patient still in hospital (select all that apply): Patient trending condition  Discharge Plan A: Home Health                  April J JUSTIN Taylro  Department of Hospital Medicine    [facility] - Telemetry ([facility])

## 2022-02-10 NOTE — ASSESSMENT & PLAN NOTE
Patient with Hypoxic Respiratory failure which is Acute.  she is not on home oxygen. Supplemental oxygen was provided and noted- Oxygen Concentration (%):  [40] 40.   Signs/symptoms of respiratory failure include- tachypnea and increased work of breathing. Contributing diagnoses includes - Covid  Pneumonia      --continue supportive O2  --Telemetry  --Vitals Q4H

## 2022-02-10 NOTE — PT/OT/SLP PROGRESS
Occupational Therapy  Treatment    Haim Martin   MRN: [id number]   Admitting Diagnosis: Severe sepsis    OT Date of Treatment: 02/10/22   OT Start Time: 1030  OT Stop Time: 1055  OT Total Time (min): 25 min    Billable Minutes:  Self Care/Home Management 10 MINUTES and Therapeutic Activity 15 MINUTES    OT/NADEEM: OT          General Precautions: Standard, airborne,fall,droplet,contact  Orthopedic Precautions: N/A  Braces: N/A  Respiratory Status: Nasal cannula, flow 5 L/min         Subjective:  Communicated with NURSE AND Epic CHART REVIEW prior to session.    Pain/Comfort  Pain Rating 1: 0/10    Objective:        Functional Mobility:  Bed Mobility:  SBA WITH FORWARD SEATED SCOOTING  SBA WITH SUPINE<SIT TRANSFERS    Transfers:   MIN / CGA WITH SIT<>STAND TRANSFERS WITH ROLLING WALKER. PT REQ VC'S FOR HAND PLACEMENT AND SAFETY  Functional Ambulation:   MIN A / CGA X 44 FEET WITH ROLLING WALKER X 1 STANDING REST BREAK WITH POOR POSTURE    Activities of Daily Living:  G/H TASK MIN A WITH HAIR COMBING,ORAL HYGIENE AND SBA WITH FACE WASHING    Balance:   Static Sit: FAIR+: Able to take MINIMAL challenges from all directions  Dynamic Sit: FAIR+: Maintains balance through MINIMAL excursions of active trunk motion  Static Stand: POOR+: Needs MINIMAL assist to maintain  Dynamic stand: POOR+: Needs MIN (minimal ) assist during gait      AM-PAC 6 CLICK ADL   How much help from another person does this patient currently need?   1 = Unable, Total/Dependent Assistance  2 = A lot, Maximum/Moderate Assistance  3 = A little, Minimum/Contact Guard/Supervision  4 = None, Modified [place]/Independent    Putting on and taking off regular lower body clothing? : 2  Bathing (including washing, rinsing, drying)?: 2  Toileting, which includes using toilet, bedpan, or urinal? : 2  Putting on and taking off regular upper body clothing?: 3  Taking care of personal grooming such as brushing teeth?: 3  Eating meals?: 3  Daily Activity  "Total Score: 15     AM-PAC Raw Score CMS "G-Code Modifier Level of Impairment Assistance   6 % Total / Unable   7 - 8 CM 80 - 100% Maximal Assist   9-13 CL 60 - 80% Moderate Assist   14 - 19 CK 40 - 60% Moderate Assist   20 - 22 CJ 20 - 40% Minimal Assist   23 CI 1-20% SBA / CGA   24 CH 0% Independent/ Mod I       Patient left up in chair with all lines intact, call button in reach and NURSE notified    ASSESSMENT:  Haim Martin is a 81 y.o. female with a medical diagnosis of Severe sepsis and presents with DEBILITY AND GENERALIZED WEAKNESS  .    Rehab identified problem list/impairments: Rehab identified problem list/impairments: weakness,impaired functional mobilty,decreased safety awareness,impaired endurance,gait instability,impaired balance,decreased upper extremity function,impaired self care skills,decreased lower extremity function,decreased ROM    Rehab potential is good.    Activity tolerance: Good    Discharge recommendations: Discharge Facility/Level of Care Needs: home health OT     Barriers to discharge:      Equipment recommendations: walker, rolling     GOALS:   Multidisciplinary Problems     Occupational Therapy Goals        Problem: Occupational Therapy Goal    Goal Priority Disciplines Outcome Interventions   Occupational Therapy Goal     OT, PT/OT Ongoing, Progressing    Description: Goals to be met by: 2/21/22     Patient will increase functional independence with ADLs by performing:    UE Dressing with Modified [place].  Toileting from toilet with Supervision for hygiene and clothing management.   Toilet transfer to toilet with Supervision.  Increased functional strength in B UE to grossly WFL to increase independence with ADLs.                     Plan:  Patient to be seen 2 x/week to address the above listed problems via self-care/home management,therapeutic activities,therapeutic exercises  Plan of Care expires: 02/23/22  Plan of Care reviewed with: patient     "     02/10/2022

## 2022-02-11 LAB
BACTERIA BLD CULT: ABNORMAL

## 2022-02-11 PROCEDURE — 63600175 PHARM REV CODE 636 W HCPCS: Performed by: NURSE PRACTITIONER

## 2022-02-11 PROCEDURE — 94799 UNLISTED PULMONARY SVC/PX: CPT

## 2022-02-11 PROCEDURE — 63600175 PHARM REV CODE 636 W HCPCS: Performed by: EMERGENCY MEDICINE

## 2022-02-11 PROCEDURE — 97110 THERAPEUTIC EXERCISES: CPT

## 2022-02-11 PROCEDURE — 25000003 PHARM REV CODE 250: Performed by: NURSE PRACTITIONER

## 2022-02-11 PROCEDURE — 21400001 HC TELEMETRY ROOM

## 2022-02-11 PROCEDURE — 25000003 PHARM REV CODE 250: Performed by: INTERNAL MEDICINE

## 2022-02-11 PROCEDURE — 27000207 HC ISOLATION

## 2022-02-11 PROCEDURE — 97530 THERAPEUTIC ACTIVITIES: CPT

## 2022-02-11 PROCEDURE — 94640 AIRWAY INHALATION TREATMENT: CPT

## 2022-02-11 PROCEDURE — 94761 N-INVAS EAR/PLS OXIMETRY MLT: CPT

## 2022-02-11 PROCEDURE — 99900035 HC TECH TIME PER 15 MIN (STAT)

## 2022-02-11 PROCEDURE — 27000221 HC OXYGEN, UP TO 24 HOURS

## 2022-02-11 RX ORDER — CYANOCOBALAMIN 1000 UG/ML
1000 INJECTION, SOLUTION INTRAMUSCULAR; SUBCUTANEOUS ONCE
Status: COMPLETED | OUTPATIENT
Start: 2022-02-11 | End: 2022-02-11

## 2022-02-11 RX ADMIN — ALBUTEROL SULFATE 2 PUFF: 90 AEROSOL, METERED RESPIRATORY (INHALATION) at 08:02

## 2022-02-11 RX ADMIN — ALBUTEROL SULFATE 2 PUFF: 90 AEROSOL, METERED RESPIRATORY (INHALATION) at 01:02

## 2022-02-11 RX ADMIN — CYANOCOBALAMIN 1000 MCG: 1000 INJECTION, SOLUTION INTRAMUSCULAR; SUBCUTANEOUS at 10:02

## 2022-02-11 RX ADMIN — ALBUTEROL SULFATE 2 PUFF: 90 AEROSOL, METERED RESPIRATORY (INHALATION) at 12:02

## 2022-02-11 RX ADMIN — AMITRIPTYLINE HYDROCHLORIDE 75 MG: 50 TABLET, FILM COATED ORAL at 10:02

## 2022-02-11 RX ADMIN — IRON SUCROSE 200 MG: 20 INJECTION, SOLUTION INTRAVENOUS at 09:02

## 2022-02-11 RX ADMIN — THERA TABS 1 TABLET: TAB at 09:02

## 2022-02-11 RX ADMIN — MUPIROCIN: 20 OINTMENT TOPICAL at 09:02

## 2022-02-11 RX ADMIN — ENOXAPARIN SODIUM 60 MG: 100 INJECTION SUBCUTANEOUS at 10:02

## 2022-02-11 RX ADMIN — DEXAMETHASONE 6 MG: 4 TABLET ORAL at 09:02

## 2022-02-11 RX ADMIN — DAPTOMYCIN 350 MG: 350 INJECTION, POWDER, LYOPHILIZED, FOR SOLUTION INTRAVENOUS at 09:02

## 2022-02-11 RX ADMIN — OXYCODONE HYDROCHLORIDE AND ACETAMINOPHEN 500 MG: 500 TABLET ORAL at 10:02

## 2022-02-11 RX ADMIN — ZINC SULFATE 220 MG (50 MG) CAPSULE 220 MG: CAPSULE at 10:02

## 2022-02-11 RX ADMIN — ENOXAPARIN SODIUM 60 MG: 100 INJECTION SUBCUTANEOUS at 09:02

## 2022-02-11 RX ADMIN — PANTOPRAZOLE SODIUM 40 MG: 40 TABLET, DELAYED RELEASE ORAL at 09:02

## 2022-02-11 RX ADMIN — MUPIROCIN: 20 OINTMENT TOPICAL at 10:02

## 2022-02-11 RX ADMIN — SERTRALINE HYDROCHLORIDE 100 MG: 50 TABLET ORAL at 09:02

## 2022-02-11 RX ADMIN — PROPRANOLOL HYDROCHLORIDE 40 MG: 40 TABLET ORAL at 10:02

## 2022-02-11 RX ADMIN — PROPRANOLOL HYDROCHLORIDE 40 MG: 40 TABLET ORAL at 09:02

## 2022-02-11 RX ADMIN — CLONIDINE HYDROCHLORIDE 0.1 MG: 0.1 TABLET ORAL at 10:02

## 2022-02-11 RX ADMIN — LEVOTHYROXINE SODIUM 88 MCG: 88 TABLET ORAL at 06:02

## 2022-02-11 RX ADMIN — ASPIRIN 81 MG: 81 TABLET, COATED ORAL at 09:02

## 2022-02-11 RX ADMIN — CLONIDINE HYDROCHLORIDE 0.1 MG: 0.1 TABLET ORAL at 09:02

## 2022-02-11 NOTE — PLAN OF CARE
AAOX4   Able to verbalize needs & follow commands. Calm & cooperative throughout shift.   POC reviewed with PT. Interventions implemented as appropriate.    VSS; NSR on tele-monitor.  5L O2 NC.  Inhaler treatments as ordered. O2 sat declines w/ activity.  PIVx 1 and PICC patent.  C/O pain to head Alleviated w/ prn Tylenol 650 mg. Incontinence pad changed as needed.   Activity ad judson. Reposition independently. Frequent position changes encouraged.   Educated on Fall risk and importance of adhering to POC after dc;  verbalized understanding.  NADN. Resting quietly in bed.   Free of falls. Hourly rounding complete.   All safety measures remain in place. SR up x2; bed low & locked. Call light w/in reach.   Will continue to monitor throughout shift.

## 2022-02-11 NOTE — PLAN OF CARE
Received return call from ruth ann Villasenor is already back in town and will be available to assist upon d/c.

## 2022-02-11 NOTE — PLAN OF CARE
CM received phone call from [employer] insurance. Per insurance re, son (caretaker) is out of town in [place] and they are unsure when he will return. [employer] wants to ensure patient will have family support at home upon d/c. CM unsure of anticipated DC date at this time, patient requiring IV steroids and O2@5 liters.     Voicemail left with sonHamilton. Awaiting callback.

## 2022-02-11 NOTE — PLAN OF CARE
POC reviewed patient verbalized understanding.   Pt Awake, alert and oriented   Vital signs stable  Pt remained afebrile throughout this shift.   Pt remained free of falls this shift.   Pt moving/turing when needed. Frequent weight shifting encouraged.  Patient continue on tele monitor.   Bed low, side rails up x 2, wheels locked, call light in reach.   Patient instructed to call for assistance.   Hourly rounding completed.   Will continue to monitor.

## 2022-02-12 LAB
BASOPHILS # BLD AUTO: 0.03 K/UL (ref 0–0.2)
BASOPHILS NFR BLD: 0.2 % (ref 0–1.9)
DIFFERENTIAL METHOD: ABNORMAL
EOSINOPHIL # BLD AUTO: 0.4 K/UL (ref 0–0.5)
EOSINOPHIL NFR BLD: 2.9 % (ref 0–8)
ERYTHROCYTE [DISTWIDTH] IN BLOOD BY AUTOMATED COUNT: 15 % (ref 11.5–14.5)
HCT VFR BLD AUTO: 33.4 % (ref 37–48.5)
HGB BLD-MCNC: 10.4 G/DL (ref 12–16)
IMM GRANULOCYTES # BLD AUTO: 0.52 K/UL (ref 0–0.04)
IMM GRANULOCYTES NFR BLD AUTO: 3.8 % (ref 0–0.5)
LYMPHOCYTES # BLD AUTO: 1.5 K/UL (ref 1–4.8)
LYMPHOCYTES NFR BLD: 11.3 % (ref 18–48)
MCH RBC QN AUTO: 30.7 PG (ref 27–31)
MCHC RBC AUTO-ENTMCNC: 31.1 G/DL (ref 32–36)
MCV RBC AUTO: 99 FL (ref 82–98)
MONOCYTES # BLD AUTO: 0.4 K/UL (ref 0.3–1)
MONOCYTES NFR BLD: 3.2 % (ref 4–15)
NEUTROPHILS # BLD AUTO: 10.7 K/UL (ref 1.8–7.7)
NEUTROPHILS NFR BLD: 78.6 % (ref 38–73)
NRBC BLD-RTO: 0 /100 WBC
PLATELET # BLD AUTO: 403 K/UL (ref 150–450)
PMV BLD AUTO: 10.5 FL (ref 9.2–12.9)
RBC # BLD AUTO: 3.39 M/UL (ref 4–5.4)
WBC # BLD AUTO: 13.65 K/UL (ref 3.9–12.7)

## 2022-02-12 PROCEDURE — 63600175 PHARM REV CODE 636 W HCPCS: Performed by: NURSE PRACTITIONER

## 2022-02-12 PROCEDURE — 85025 COMPLETE CBC W/AUTO DIFF WBC: CPT | Performed by: EMERGENCY MEDICINE

## 2022-02-12 PROCEDURE — 94799 UNLISTED PULMONARY SVC/PX: CPT

## 2022-02-12 PROCEDURE — 36415 COLL VENOUS BLD VENIPUNCTURE: CPT | Performed by: EMERGENCY MEDICINE

## 2022-02-12 PROCEDURE — 25000003 PHARM REV CODE 250: Performed by: NURSE PRACTITIONER

## 2022-02-12 PROCEDURE — 97530 THERAPEUTIC ACTIVITIES: CPT | Mod: CQ

## 2022-02-12 PROCEDURE — 94761 N-INVAS EAR/PLS OXIMETRY MLT: CPT

## 2022-02-12 PROCEDURE — 21400001 HC TELEMETRY ROOM

## 2022-02-12 PROCEDURE — 97110 THERAPEUTIC EXERCISES: CPT | Mod: CQ

## 2022-02-12 PROCEDURE — 99223 1ST HOSP IP/OBS HIGH 75: CPT | Mod: NSCH,,, | Performed by: INTERNAL MEDICINE

## 2022-02-12 PROCEDURE — 94640 AIRWAY INHALATION TREATMENT: CPT

## 2022-02-12 PROCEDURE — 99900035 HC TECH TIME PER 15 MIN (STAT)

## 2022-02-12 PROCEDURE — 27000207 HC ISOLATION

## 2022-02-12 PROCEDURE — 99223 PR INITIAL HOSPITAL CARE,LEVL III: ICD-10-PCS | Mod: NSCH,,, | Performed by: INTERNAL MEDICINE

## 2022-02-12 PROCEDURE — 25000003 PHARM REV CODE 250: Performed by: INTERNAL MEDICINE

## 2022-02-12 RX ADMIN — DAPTOMYCIN 350 MG: 350 INJECTION, POWDER, LYOPHILIZED, FOR SOLUTION INTRAVENOUS at 09:02

## 2022-02-12 RX ADMIN — ENOXAPARIN SODIUM 60 MG: 100 INJECTION SUBCUTANEOUS at 09:02

## 2022-02-12 RX ADMIN — PANTOPRAZOLE SODIUM 40 MG: 40 TABLET, DELAYED RELEASE ORAL at 09:02

## 2022-02-12 RX ADMIN — ALBUTEROL SULFATE 2 PUFF: 90 AEROSOL, METERED RESPIRATORY (INHALATION) at 12:02

## 2022-02-12 RX ADMIN — PROPRANOLOL HYDROCHLORIDE 40 MG: 40 TABLET ORAL at 09:02

## 2022-02-12 RX ADMIN — MUPIROCIN: 20 OINTMENT TOPICAL at 09:02

## 2022-02-12 RX ADMIN — IRON SUCROSE 200 MG: 20 INJECTION, SOLUTION INTRAVENOUS at 09:02

## 2022-02-12 RX ADMIN — ALBUTEROL SULFATE 2 PUFF: 90 AEROSOL, METERED RESPIRATORY (INHALATION) at 07:02

## 2022-02-12 RX ADMIN — ZINC SULFATE 220 MG (50 MG) CAPSULE 220 MG: CAPSULE at 09:02

## 2022-02-12 RX ADMIN — ASPIRIN 81 MG: 81 TABLET, COATED ORAL at 09:02

## 2022-02-12 RX ADMIN — OXYCODONE HYDROCHLORIDE AND ACETAMINOPHEN 500 MG: 500 TABLET ORAL at 09:02

## 2022-02-12 RX ADMIN — ALBUTEROL SULFATE 2 PUFF: 90 AEROSOL, METERED RESPIRATORY (INHALATION) at 09:02

## 2022-02-12 RX ADMIN — THERA TABS 1 TABLET: TAB at 09:02

## 2022-02-12 RX ADMIN — AMITRIPTYLINE HYDROCHLORIDE 75 MG: 50 TABLET, FILM COATED ORAL at 09:02

## 2022-02-12 RX ADMIN — SERTRALINE HYDROCHLORIDE 100 MG: 50 TABLET ORAL at 09:02

## 2022-02-12 RX ADMIN — DEXAMETHASONE 6 MG: 4 TABLET ORAL at 09:02

## 2022-02-12 RX ADMIN — LEVOTHYROXINE SODIUM 88 MCG: 88 TABLET ORAL at 05:02

## 2022-02-12 RX ADMIN — CLONIDINE HYDROCHLORIDE 0.1 MG: 0.1 TABLET ORAL at 09:02

## 2022-02-12 NOTE — PLAN OF CARE
VSS. Fall precautions maintained.   No pain complaints.  Remains on O2 HF.  Tolerating IV antibiotic.  NSR on cardiac monitor.  Repositioned q2hrs and as needed.  On purwick. Remains on isolation for covid  All needs met. Will continue to monitor.

## 2022-02-12 NOTE — PLAN OF CARE
REQUEST TO SIT IN BS CHAIR    BED MOB SBA    SIT<-->STAND MIN A     EOB-->CHAIR CG    X15: LAQ AP,, HIP FLEXION AND X 5 ARM CHAIR PRESS UPS    ALL WITH O2 AT 7L

## 2022-02-12 NOTE — ASSESSMENT & PLAN NOTE
- COVID-19 testing Collection Date: 12/31/2021 Collection Time:   2:17 PM   - Infection Control notified     - Isolation:   - Airborne, Contact and Droplet Precautions  - Cohort patients into COVID units  - N95 mask, wear eye protection  - 20 second hand hygiene              - Limit visitors per hospital policy              - Consolidating lab draws, nursing care, provider visits, and interventions    - Management:  Supplemental O2 to maintain SpO2 >92%  Telemetry  Continuous/intermittent Pulse Ox  Albuterol treatment PRN  Continue MVI, Qpm Zinc and Vitamin C, Vitamin D, Remdesivir, and dexamethasone  Encourage Incentive spirometer q 1 hour while awake    Advance Care Planning   Full Code        This patient does have evidence of infective focus  My overall impression is Severe  sepsis. Vital signs were reviewed and noted in progress note.  Antibiotics given-   Antibiotics (From admission, onward)            Start     Stop Route Frequency Ordered    02/07/22 0900  DAPTOmycin (CUBICIN) 350 mg in sodium chloride 0.9% IVPB         -- IV Daily 02/06/22 1535    02/06/22 2100  mupirocin 2 % ointment         -- Top 2 times daily 02/06/22 1535        Cultures were taken-   Microbiology Results (last 7 days)     Procedure Component Value Units Date/Time    Blood culture [[id number]]  (Abnormal)  (Susceptibility) Collected: 02/06/22 1418    Order Status: Completed Specimen: Blood Updated: 02/11/22 1454     Blood Culture, Routine Gram stain aer bottle: Gram positive cocci in clusters resembling Staph      Positive results previously called 02/07/2022      STAPHYLOCOCCUS CAPITIS SUBSPECIES CAPITIS    Blood culture [[id number]]  (Abnormal)  (Susceptibility) Collected: 02/06/22 1418    Order Status: Completed Specimen: Blood Updated: 02/11/22 1454     Blood Culture, Routine Gram stain aer bottle: Gram positive cocci in clusters resembling Staph      Results called to and read back by:Brendan Villarreal LPN 02/07/2022  19:55       STAPHYLOCOCCUS CAPITIS SUBSPECIES CAPITIS        Latest lactate reviewed, they are-  Recent Labs   Lab 02/09/22  1129   LACTATE 1.5       Organ dysfunction indicated by Acute respiratory failure     Source- Covid pneumonia    Source control Achieved by- Symptomatic relief    Improving, afebrile- continue Remdesivir plus Dexa  Repeated Procal, Lactic on 2/9/22- normal    improving

## 2022-02-12 NOTE — PT/OT/SLP PROGRESS
Occupational Therapy  Treatment    Haim Martin   MRN: [id number]   Admitting Diagnosis: Severe sepsis    OT Date of Treatment: 02/11/22   OT Start Time: 1115  OT Stop Time: 1140  OT Total Time (min): 25 min    Billable Minutes:  Therapeutic Activity 10 MINUTES and Therapeutic Exercise 15 MINUTES    OT/NADEEM: OT          General Precautions: Standard, airborne,contact,droplet,fall  Orthopedic Precautions: N/A  Braces: N/A    Subjective:  Communicated with NURSE AND Epic CHART REVIEW prior to session.    Pain/Comfort  Pain Rating 1: 0/10    Objective:  Patient found with: peripheral IV     Functional Mobility:  Bed Mobility:  ROLLING L<>R SBA WITH BED RAIL  SUPINE<>SIT MIN A   SBA FORWARD SEATED SCOOTING  Transfers:   SIT<>STAND WITH ROLLING WALKER WITH MIN A  MIN A WITH STEP<PIVOT TRANSFERS    Functional Ambulation: NA    Balance:   Static Sit: FAIR+: Able to take MINIMAL challenges from all directions  Dynamic Sit: FAIR: Cannot move trunk without losing balance  Static Stand: POOR+: Needs MINIMAL assist to maintain  Dynamic stand: POOR+: Needs MIN (minimal ) assist during gait    Therapeutic Activities and Exercises:  PT EDUCATED ON HEP.PT VERBALIZED UNDERSTANDING AND DEMONSTRATION. PT PERFORMED 1 SET X 10 REPS OF B UE ROM EXERCISES SEATED IN BED SIDE CHAIR (HAND/DIGITS FLEX/EXT; SHOULDER FLEXION; ELBOW FLEX/EXTENSION)  AM-PAC 6 CLICK ADL   How much help from another person does this patient currently need?   1 = Unable, Total/Dependent Assistance  2 = A lot, Maximum/Moderate Assistance  3 = A little, Minimum/Contact Guard/Supervision  4 = None, Modified [place]/Independent    Putting on and taking off regular lower body clothing? : 2  Bathing (including washing, rinsing, drying)?: 2  Toileting, which includes using toilet, bedpan, or urinal? : 2  Putting on and taking off regular upper body clothing?: 2  Taking care of personal grooming such as brushing teeth?: 3  Eating meals?: 3  Daily Activity Total  "Score: 14     AM-PAC Raw Score CMS "G-Code Modifier Level of Impairment Assistance   6 % Total / Unable   7 - 8 CM 80 - 100% Maximal Assist   9-13 CL 60 - 80% Moderate Assist   14 - 19 CK 40 - 60% Moderate Assist   20 - 22 CJ 20 - 40% Minimal Assist   23 CI 1-20% SBA / CGA   24 CH 0% Independent/ Mod I       Patient left up in chair with all lines intact and call button in reach    ASSESSMENT:  Haim Martin is a 81 y.o. female with a medical diagnosis of Severe sepsis and presents with DEBILITY AND GENERALIZED WEAKNESS.    Rehab identified problem list/impairments: Rehab identified problem list/impairments: weakness,impaired functional mobilty,impaired balance,impaired self care skills,impaired endurance,decreased upper extremity function,decreased safety awareness,decreased lower extremity function,decreased ROM    Rehab potential is fair.    Activity tolerance: Fair    Discharge recommendations: Discharge Facility/Level of Care Needs: home health OT,nursing facility, skilled     Barriers to discharge: Barriers to Discharge: None    Equipment recommendations: walker, rolling     GOALS:   Multidisciplinary Problems     Occupational Therapy Goals        Problem: Occupational Therapy Goal    Goal Priority Disciplines Outcome Interventions   Occupational Therapy Goal     OT, PT/OT Ongoing, Progressing    Description: Goals to be met by: 2/21/22     Patient will increase functional independence with ADLs by performing:    UE Dressing with Modified [place].  Toileting from toilet with Supervision for hygiene and clothing management.   Toilet transfer to toilet with Supervision.  Increased functional strength in B UE to grossly WFL to increase independence with ADLs.                     Plan:  Patient to be seen 2 x/week to address the above listed problems via self-care/home management,therapeutic activities,therapeutic exercises  Plan of Care expires: 02/23/22  Plan of Care reviewed with: patient     "     02/11/2022

## 2022-02-12 NOTE — SUBJECTIVE & OBJECTIVE
Interval History: looks and feels much better, sitting up in chair, breathing improved. Fio2 had increased to 10 L yesterday from 5, now down to 7 L now. Repeat blood Cx are a contamination- continue Daptomycin. Encouraged to ambulate and do IS. Has KIERA- will replace venofer. Also inflammatory markers improving.     Review of Systems   Constitutional: Positive for activity change, appetite change and fatigue. Negative for diaphoresis and fever.   HENT: Negative for ear discharge, ear pain and facial swelling.    Eyes: Negative for pain and redness.   Respiratory: Positive for shortness of breath. Negative for cough.         No sputum   Cardiovascular: Negative for chest pain, palpitations and leg swelling.   Gastrointestinal: Negative for abdominal distention, abdominal pain, blood in stool, constipation, diarrhea, nausea and vomiting.   Endocrine: Negative for polydipsia and polyphagia.   Genitourinary: Negative for difficulty urinating, dyspareunia, dysuria, flank pain and hematuria.   Musculoskeletal: Negative for neck pain and neck stiffness.   Skin: Positive for wound. Negative for color change.        Stitches intact to right index finger   Allergic/Immunologic: Negative for food allergies.   Neurological: Positive for weakness. Negative for facial asymmetry and speech difficulty.   Hematological: Does not bruise/bleed easily.   Psychiatric/Behavioral: Negative for agitation, behavioral problems, confusion, dysphoric mood and suicidal ideas. The patient is not nervous/anxious.      Objective:     Vital Signs (Most Recent):  Temp: 97.9 °F (36.6 °C) (02/12/22 1701)  Pulse: 68 (02/12/22 1701)  Resp: 18 (02/12/22 1701)  BP: (!) 113/55 (02/12/22 1701)  SpO2: 99 % (02/12/22 1701) Vital Signs (24h Range):  Temp:  [97.7 °F (36.5 °C)-98.4 °F (36.9 °C)] 97.9 °F (36.6 °C)  Pulse:  [63-72] 68  Resp:  [18-20] 18  SpO2:  [88 %-99 %] 99 %  BP: (113-147)/(55-67) 113/55     Weight: 57.3 kg (126 lb 5.2 oz)  Body mass index is  25.51 kg/m².    Intake/Output Summary (Last 24 hours) at 2/12/2022 1715  Last data filed at 2/12/2022 1700  Gross per 24 hour   Intake 960 ml   Output 800 ml   Net 160 ml      Physical Exam  Vitals and nursing note reviewed.   Constitutional:       General: She is not in acute distress.     Appearance: She is well-developed. She is ill-appearing.   HENT:      Head: Normocephalic and atraumatic.      Right Ear: Hearing and external ear normal.      Left Ear: Hearing and external ear normal.      Nose: No rhinorrhea.      Right Sinus: No maxillary sinus tenderness or frontal sinus tenderness.      Left Sinus: No maxillary sinus tenderness or frontal sinus tenderness.      Mouth/Throat:      Mouth: No oral lesions.      Pharynx: Uvula midline.   Eyes:      General:         Right eye: No discharge.         Left eye: No discharge.      Conjunctiva/sclera: Conjunctivae normal.      Pupils: Pupils are equal, round, and reactive to light.   Neck:      Thyroid: No thyromegaly.      Vascular: No carotid bruit.      Trachea: No tracheal deviation.   Cardiovascular:      Rate and Rhythm: Normal rate and regular rhythm.      Pulses:           Dorsalis pedis pulses are 2+ on the right side and 2+ on the left side.      Heart sounds: Normal heart sounds, S1 normal and S2 normal. No murmur heard.      Pulmonary:      Effort: Pulmonary effort is normal. No respiratory distress.      Breath sounds: No rales.   Chest:   Breasts:      Right: No supraclavicular adenopathy.      Left: No supraclavicular adenopathy.       Abdominal:      General: Bowel sounds are decreased. There is no distension.      Palpations: Abdomen is soft. There is no mass.      Tenderness: There is no abdominal tenderness.   Musculoskeletal:         General: Normal range of motion.      Cervical back: Normal range of motion.   Lymphadenopathy:      Cervical: No cervical adenopathy.      Upper Body:      Right upper body: No supraclavicular adenopathy.      Left  upper body: No supraclavicular adenopathy.   Skin:     General: Skin is warm and dry.      Capillary Refill: Capillary refill takes less than 2 seconds.      Coloration: Skin is pale.      Findings: No rash.   Neurological:      Mental Status: She is alert and oriented to person, place, and time.      Sensory: No sensory deficit.      Coordination: Coordination normal.      Gait: Gait normal.   Psychiatric:         Mood and Affect: Mood is anxious. Mood is not depressed. Affect is not labile.         Speech: Speech normal.         Behavior: Behavior normal.         Thought Content: Thought content normal.         Judgment: Judgment normal.       Flow (L/min): (S) 7  Oxygen Concentration (%):  [44] 44  Temp:  [97.7 °F (36.5 °C)-98.4 °F (36.9 °C)] 97.9 °F (36.6 °C)  Pulse:  [63-72] 68  Resp:  [18-20] 18  SpO2:  [88 %-99 %] 99 %  BP: (113-147)/(55-67) 113/55      Lab Results   Component Value Date    QFQ32GAWORWG Positive (A) 02/06/2022    QIW20WXWYLGG Detected (A) 12/31/2021    UUD46XMESFRU Not Detected 07/13/2020      Recent Labs   Lab 02/06/22  0947 02/06/22  0947 02/06/22  1418 02/07/22  0630 02/07/22  0630 02/08/22  0508 02/08/22  0508 02/09/22  0534 02/09/22  1129 02/12/22  0454   PROCAL 0.17  --   --   --   --   --   --   --  0.13  --    FERRITIN 455*  --   --   --   --   --   --   --  783*  --    LACTATE 0.8  --   --   --   --   --   --   --  1.5  --    *  --   --   --   --  283*  --   --   --   --    .8*  --   --   --   --  266.4*  --   --   --   --    TROPONINI <0.006  --   --   --   --   --   --   --   --   --    DDIMER  --    < > 2.06*  --   --  1.84*  --   --   --   --      --   --  141  --   --   --   --  139  --    WBC 6.36   < >  --  6.67   < > 5.38  --  8.47  --  13.65*   GRAN 82.8*  5.3   < >  --  83.4*  5.6   < > 72.7  3.9   < > 75.6*  6.4  --  78.6*  10.7*   LYMPH 9.0*  0.6*   < >  --  9.1*  0.6*   < > 20.1  1.1   < > 16.1*  1.4  --  11.3*  1.5   HGB 9.9*   < >  --   10.6*   < > 11.1*   < > 11.7*  --  10.4*   HCT 31.0*   < >  --  33.0*   < > 35.7*   < > 38.4  --  33.4*   BUN 6*  --   --  9  --   --   --   --  16  --    CREATININE 0.7  --   --  0.7  --   --   --   --  0.8  --    ESTGFRAFRICA >60  --   --  >60  --   --   --   --  >60  --    EGFRNONAA >60  --   --  >60  --   --   --   --  >60  --    K 2.7*  --   --  4.7  --   --   --   --  4.0  --      --   --  107  --   --   --   --  100  --    CO2 23  --   --  23  --   --   --   --  28  --    MG  --   --  1.8 2.6  --   --   --   --   --   --     < > = values in this interval not displayed.     Microbiology Results (last 7 days)     Procedure Component Value Units Date/Time    Blood culture [[id number]]  (Abnormal)  (Susceptibility) Collected: 02/06/22 1418    Order Status: Completed Specimen: Blood Updated: 02/11/22 1454     Blood Culture, Routine Gram stain aer bottle: Gram positive cocci in clusters resembling Staph      Positive results previously called 02/07/2022      STAPHYLOCOCCUS CAPITIS SUBSPECIES CAPITIS    Blood culture [[id number]]  (Abnormal)  (Susceptibility) Collected: 02/06/22 1418    Order Status: Completed Specimen: Blood Updated: 02/11/22 1454     Blood Culture, Routine Gram stain aer bottle: Gram positive cocci in clusters resembling Staph      Results called to and read back by:Brendan Villarreal LPN 02/07/2022  19:55      STAPHYLOCOCCUS CAPITIS SUBSPECIES CAPITIS        Antibiotics (From admission, onward)            Start     Stop Route Frequency Ordered    02/07/22 0900  DAPTOmycin (CUBICIN) 350 mg in sodium chloride 0.9% IVPB         -- IV Daily 02/06/22 1535    02/06/22 2100  mupirocin 2 % ointment         -- Top 2 times daily 02/06/22 1535        Anticoagulants     Ordered     Route Frequency Start Stop    02/06/22 1301  enoxaparin         SubQ 2 times daily 02/06/22 1315 --        Echo Saline Bubble? No  · Normal systolic function.  · The estimated ejection fraction is 55%.  · Normal left ventricular  diastolic function.  · Mild pulmonic regurgitation.  · With normal right ventricular systolic function.  · No vegetation present.         Significant Labs:  All pertinent labs within the past 24 hours have been reviewed.    Significant Imaging: I have reviewed all pertinent imaging results/findings within the past 24 hours.

## 2022-02-12 NOTE — PT/OT/SLP PROGRESS
Physical Therapy  Treatment    Haim Martin   MRN: [id number]   Admitting Diagnosis: Severe sepsis       PT Start Time: 1500     PT Stop Time: 1525    PT Total Time (min): 25 min       Billable Minutes:  Therapeutic Activity 10 and Therapeutic Exercise 15    Treatment Type: Treatment  PT/PTA: PTA     PTA Visit Number: 1       General Precautions: Standard, fall  Orthopedic Precautions: N/A   Braces:           Subjective:  Communicated with OMAR   prior to session.      Pain/Comfort  Pain Rating 1: 0/10  Pain Rating Post-Intervention 1: 0/10    Objective:   Patient found with: oxygen    Therapeutic Activities and Exercises:    REQUEST TO SIT IN BS CHAIR    BED MOB SBA    SIT<-->STAND MIN A     EOB-->CHAIR CG    X15: LAQ AP,, HIP FLEXION AND X 5 ARM CHAIR PRESS UPS    ALL WITH O2 AT 7L     AM-PAC 6 CLICK MOBILITY  How much help from another person does this patient currently need?   1 = Unable, Total/Dependent Assistance  2 = A lot, Maximum/Moderate Assistance  3 = A little, Minimum/Contact Guard/Supervision  4 = None, Modified [place]/Independent    Turning over in bed (including adjusting bedclothes, sheets and blankets)?: 4  Sitting down on and standing up from a chair with arms (e.g., wheelchair, bedside commode, etc.): 3  Moving from lying on back to sitting on the side of the bed?: 4  Moving to and from a bed to a chair (including a wheelchair)?: 3  Need to walk in hospital room?:  (NA)  Climbing 3-5 steps with a railing?:  (NA)    AM-PAC Raw Score CMS G-Code Modifier Level of Impairment Assistance   6 % Total / Unable   7 - 9 CM 80 - 100% Maximal Assist   10 - 14 CL 60 - 80% Moderate Assist   15 - 19 CK 40 - 60% Moderate Assist   20 - 22 CJ 20 - 40% Minimal Assist   23 CI 1-20% SBA / CGA   24 CH 0% Independent/ Mod I     Patient left up in chair with all lines intact and call button in reach.    Assessment:  Haim Martin is a 81 y.o. female with a medical diagnosis of Severe sepsis and  presents with .    Rehab identified problem list/impairments: Rehab identified problem list/impairments: weakness,impaired endurance,impaired self care skills,impaired functional mobilty,decreased lower extremity function    Rehab potential is good.    Activity tolerance: Good    Discharge recommendations: Discharge Facility/Level of Care Needs: home health PT     Barriers to discharge:      Equipment recommendations: Equipment Needed After Discharge: bedside commode,walker, rolling,wheelchair,oxygen     GOALS:   Multidisciplinary Problems     Physical Therapy Goals        Problem: Physical Therapy Goal    Goal Priority Disciplines Outcome Goal Variances Interventions   Physical Therapy Goal     PT, PT/OT Ongoing, Progressing                     PLAN:    Patient to be seen 3 x/week  to address the above listed problems via therapeutic exercises,therapeutic activities,gait training  Plan of Care expires: 02/20/22  Plan of Care reviewed with: patient         02/12/2022

## 2022-02-12 NOTE — ASSESSMENT & PLAN NOTE
Continue home Iv Daptomycin, expected to continue until 2/26/2022, managed by ID as outpatient  Patient reports she has orthopedic appointment for this week to get stitches out    --Continue Daptomycin    Slowly improving

## 2022-02-12 NOTE — ASSESSMENT & PLAN NOTE
Chronic, likely secondary to chronic disease, normocytic    --Daily CBC  --Transfuse with 1 unit PRBC for Hgb <7.0 or <8.0 if symptomatic  --Iron deficient: replete with Venofer IV 200mg x5 days   --hem/Onc follow-up    Has KIERA, low B12- give Venofer plus B12 IM

## 2022-02-12 NOTE — PLAN OF CARE
Problem: Adult Inpatient Plan of Care  Goal: Plan of Care Review  Outcome: Ongoing, Progressing  Goal: Patient-Specific Goal (Individualized)  Outcome: Ongoing, Progressing  Goal: Absence of Hospital-Acquired Illness or Injury  Outcome: Ongoing, Progressing  Goal: Optimal Comfort and Wellbeing  Outcome: Ongoing, Progressing  Goal: Readiness for Transition of Care  Outcome: Ongoing, Progressing     Problem: Adjustment to Illness (Sepsis/Septic Shock)  Goal: Optimal Coping  Outcome: Ongoing, Progressing  Intervention: Optimize Psychosocial Adjustment to Illness  Flowsheets (Taken 2/12/2022 0125)  Supportive Measures:   active listening utilized   self-responsibility promoted   verbalization of feelings encouraged     Problem: Bleeding (Sepsis/Septic Shock)  Goal: Absence of Bleeding  Outcome: Ongoing, Progressing  Intervention: Monitor and Manage Bleeding  Flowsheets (Taken 2/12/2022 0125)  Bleeding Precautions:   foot protection facilitated   gentle oral care promoted   monitored for signs of bleeding     Problem: Glycemic Control Impaired (Sepsis/Septic Shock)  Goal: Blood Glucose Level Within Desired Range  Outcome: Ongoing, Progressing  Intervention: Optimize Glycemic Control  Flowsheets (Taken 2/12/2022 0125)  Glycemic Management: blood glucose monitored     Problem: Infection Progression (Sepsis/Septic Shock)  Goal: Absence of Infection Signs and Symptoms  Outcome: Ongoing, Progressing  Intervention: Initiate Sepsis Management  Flowsheets (Taken 2/12/2022 0125)  Infection Prevention:   equipment surfaces disinfected   hand hygiene promoted   personal protective equipment utilized   rest/sleep promoted   single patient room provided   visitors restricted/screened  Infection Management: aseptic technique maintained  Isolation Precautions:   precautions maintained   airborne   contact   droplet     Problem: Nutrition Impaired (Sepsis/Septic Shock)  Goal: Optimal Nutrition Intake  Outcome: Ongoing, Progressing      Problem: Infection  Goal: Absence of Infection Signs and Symptoms  Outcome: Ongoing, Progressing  Intervention: Prevent or Manage Infection  Flowsheets (Taken 2/12/2022 0125)  Fever Reduction/Comfort Measures:   lightweight bedding   lightweight clothing  Infection Management: aseptic technique maintained  Isolation Precautions:   precautions maintained   airborne   contact   droplet     Problem: Fall Injury Risk  Goal: Absence of Fall and Fall-Related Injury  Outcome: Ongoing, Progressing  Intervention: Identify and Manage Contributors  Flowsheets (Taken 2/12/2022 0125)  Self-Care Promotion:   BADL personal objects within reach   BADL personal routines maintained   independence encouraged   safe use of adaptive equipment encouraged  Medication Review/Management:   medications reviewed   dosing adjusted  Intervention: Promote Injury-Free Environment  Flowsheets (Taken 2/12/2022 0125)  Safety Promotion/Fall Prevention:   assistive device/personal item within reach   Fall Risk reviewed with patient/family   medications reviewed   nonskid shoes/socks when out of bed   room near unit station   bed alarm set     Problem: Gas Exchange Impaired  Goal: Optimal Gas Exchange  Outcome: Ongoing, Progressing  Intervention: Optimize Oxygenation and Ventilation  Flowsheets (Taken 2/12/2022 0125)  Airway/Ventilation Management:   calming measures promoted   humidification applied   airway patency maintained   pulmonary hygiene promoted     Problem: Skin Injury Risk Increased  Goal: Skin Health and Integrity  Outcome: Ongoing, Progressing  Intervention: Optimize Skin Protection  Flowsheets (Taken 2/12/2022 0125)  Pressure Reduction Techniques:   frequent weight shift encouraged   heels elevated off bed   positioned off wounds   pressure points protected   weight shift assistance provided  Pressure Reduction Devices: positioning supports utilized  Skin Protection:   adhesive use limited   incontinence pads utilized   tubing/devices  free from skin contact

## 2022-02-12 NOTE — ASSESSMENT & PLAN NOTE
Patient with Hypoxic Respiratory failure which is Acute.  she is not on home oxygen. Supplemental oxygen was provided and noted- Oxygen Concentration (%):  [44] 44.   Signs/symptoms of respiratory failure include- tachypnea and increased work of breathing. Contributing diagnoses includes - Covid  Pneumonia      --continue supportive O2  --Telemetry  --Vitals Q4H    Stable, encouraged to do deep breathing exercises    Gradually improving

## 2022-02-12 NOTE — PROGRESS NOTES
"[facility] Medicine  Progress Note    Patient Name: Haim Martin  MRN: [id number]  Patient Class: IP- Inpatient   Admission Date: 2/6/2022  Length of Stay: 5 days  Attending Physician: José Miguel Blankenship MD  Primary Care Provider: Kavya Mesa MD        Subjective:     Principal Problem:Severe sepsis        HPI:  Vomiting        PT states, "I have been vomiting since Thursday, feel very weak and have been running a fever."      Per ER- This  is a 81 y.o. female patient with a PMHx of Depression, Diverticulosis of colon, thyroid disease,  HLD and HTN who presents to the Emergency Department for evaluation of vomitting which onset gradually x 3 days pta. Patient states she was infected with COVID-19 3 weeks ago. She reports being on daptomycin. Symptoms are constant and moderate in severity. No mitigating or exacerbating factors reported. Associated sxs include dry cough and diahhrea. Patient denies any CP, leg swelling, abd pain, fever, chills, and all other sxs at this time. No further complaints or concerns at this time.     Patient was evaluated in ER and found to have Covid pneumonia and Placed In Observation for further evaluation and treatment.     Patient also reports she has been receiving Iv daptomycin at home with infected finger.                        Overview/Hospital Course:  81 y.o. female patient with Hx of Depression, Diverticulosis of colon, thyroid disease, HLD and HTN who presente for evaluation of vomitting x 3 days. Patient states she was infected with COVID-19 3 weeks ago and reports being on Daptomycin. Associated sxs include dry cough and diahhrea. She  was evaluated in ER and found to have Covid pneumonia and placed In Observation for further evaluation and treatment. Patient also reports she has been receiving Iv daptomycin at home for infected finger.     2/7- still feels weak and has HA- treated with Fioricet but overall better. No diarrhea, did " PT/OT. K improved to 4.7, H/H 10.6/32. Getting Dexa and Remdesivir. Sats 92-93% on 2 L NC, encouraged to do IS and will try to get her OOB to chair and ambulate.     2/8/22: Patient reports she feels weak, able to participate with PT/OT today, walker with assistance 24'. Patient requesting additional snacks this afternoon. Vitals stable, PO2 stable on 4L, Labs stable. Encouraged patient to use IS, seems motivated to participate.     2/9/22: Patient reported to be lethargic this AM, evaluated patient and ordered Lactic, Procalcitonin- both negative. Nutritional studies ordered.  O2 increased to 5L, PO2: 91-92%, 87% overnight on 4L. Patient reports feeling tired and sleeping more. Appetite is increased from yesterday. Patient reports using IS as instructed.     2/10/22: Patient stable on 5L O2 per NC. Patient continues to report fatigue. Labs reviewed, patient is iron deficient, will replete with Venofer IV while inpatient and plan for Hem/Onc follow-up for anemia at d/c. Recommend GI follow-up at d/c. Labs stable, Vitals stable.     2/11- looks and feels better, getting stronger, had transient urinary retention but resolved spontaneously, still on 5 L NC- sats 90%, able to get up and walk in the room, do IS. Encouraged to do deep breathing exercises, continue Daptomycin, CRP coming down. Getting Venofer daily for KIERA and Inj B12 IM.     2/12- looks and feels much better, sitting up in chair, breathing improved. Fio2 had increased to 10 L yesterday from 5, now down to 7 L now. Repeat blood Cx are a contamination- continue Daptomycin. Encouraged to ambulate and do IS. Has KIERA- will replace venofer. Also inflammatory markers improving.       Interval History: looks and feels much better, sitting up in chair, breathing improved. Fio2 had increased to 10 L yesterday from 5, now down to 7 L now. Repeat blood Cx are a contamination- continue Daptomycin. Encouraged to ambulate and do IS. Has KIERA- will replace venofer. Also  inflammatory markers improving.     Review of Systems   Constitutional: Positive for activity change, appetite change and fatigue. Negative for diaphoresis and fever.   HENT: Negative for ear discharge, ear pain and facial swelling.    Eyes: Negative for pain and redness.   Respiratory: Positive for shortness of breath. Negative for cough.         No sputum   Cardiovascular: Negative for chest pain, palpitations and leg swelling.   Gastrointestinal: Negative for abdominal distention, abdominal pain, blood in stool, constipation, diarrhea, nausea and vomiting.   Endocrine: Negative for polydipsia and polyphagia.   Genitourinary: Negative for difficulty urinating, dyspareunia, dysuria, flank pain and hematuria.   Musculoskeletal: Negative for neck pain and neck stiffness.   Skin: Positive for wound. Negative for color change.        Stitches intact to right index finger   Allergic/Immunologic: Negative for food allergies.   Neurological: Positive for weakness. Negative for facial asymmetry and speech difficulty.   Hematological: Does not bruise/bleed easily.   Psychiatric/Behavioral: Negative for agitation, behavioral problems, confusion, dysphoric mood and suicidal ideas. The patient is not nervous/anxious.      Objective:     Vital Signs (Most Recent):  Temp: 97.9 °F (36.6 °C) (02/12/22 1701)  Pulse: 68 (02/12/22 1701)  Resp: 18 (02/12/22 1701)  BP: (!) 113/55 (02/12/22 1701)  SpO2: 99 % (02/12/22 1701) Vital Signs (24h Range):  Temp:  [97.7 °F (36.5 °C)-98.4 °F (36.9 °C)] 97.9 °F (36.6 °C)  Pulse:  [63-72] 68  Resp:  [18-20] 18  SpO2:  [88 %-99 %] 99 %  BP: (113-147)/(55-67) 113/55     Weight: 57.3 kg (126 lb 5.2 oz)  Body mass index is 25.51 kg/m².    Intake/Output Summary (Last 24 hours) at 2/12/2022 1715  Last data filed at 2/12/2022 1700  Gross per 24 hour   Intake 960 ml   Output 800 ml   Net 160 ml      Physical Exam  Vitals and nursing note reviewed.   Constitutional:       General: She is not in acute  distress.     Appearance: She is well-developed. She is ill-appearing.   HENT:      Head: Normocephalic and atraumatic.      Right Ear: Hearing and external ear normal.      Left Ear: Hearing and external ear normal.      Nose: No rhinorrhea.      Right Sinus: No maxillary sinus tenderness or frontal sinus tenderness.      Left Sinus: No maxillary sinus tenderness or frontal sinus tenderness.      Mouth/Throat:      Mouth: No oral lesions.      Pharynx: Uvula midline.   Eyes:      General:         Right eye: No discharge.         Left eye: No discharge.      Conjunctiva/sclera: Conjunctivae normal.      Pupils: Pupils are equal, round, and reactive to light.   Neck:      Thyroid: No thyromegaly.      Vascular: No carotid bruit.      Trachea: No tracheal deviation.   Cardiovascular:      Rate and Rhythm: Normal rate and regular rhythm.      Pulses:           Dorsalis pedis pulses are 2+ on the right side and 2+ on the left side.      Heart sounds: Normal heart sounds, S1 normal and S2 normal. No murmur heard.      Pulmonary:      Effort: Pulmonary effort is normal. No respiratory distress.      Breath sounds: No rales.   Chest:   Breasts:      Right: No supraclavicular adenopathy.      Left: No supraclavicular adenopathy.       Abdominal:      General: Bowel sounds are decreased. There is no distension.      Palpations: Abdomen is soft. There is no mass.      Tenderness: There is no abdominal tenderness.   Musculoskeletal:         General: Normal range of motion.      Cervical back: Normal range of motion.   Lymphadenopathy:      Cervical: No cervical adenopathy.      Upper Body:      Right upper body: No supraclavicular adenopathy.      Left upper body: No supraclavicular adenopathy.   Skin:     General: Skin is warm and dry.      Capillary Refill: Capillary refill takes less than 2 seconds.      Coloration: Skin is pale.      Findings: No rash.   Neurological:      Mental Status: She is alert and oriented to  person, place, and time.      Sensory: No sensory deficit.      Coordination: Coordination normal.      Gait: Gait normal.   Psychiatric:         Mood and Affect: Mood is anxious. Mood is not depressed. Affect is not labile.         Speech: Speech normal.         Behavior: Behavior normal.         Thought Content: Thought content normal.         Judgment: Judgment normal.       Flow (L/min): (S) 7  Oxygen Concentration (%):  [44] 44  Temp:  [97.7 °F (36.5 °C)-98.4 °F (36.9 °C)] 97.9 °F (36.6 °C)  Pulse:  [63-72] 68  Resp:  [18-20] 18  SpO2:  [88 %-99 %] 99 %  BP: (113-147)/(55-67) 113/55      Lab Results   Component Value Date    [id number] Positive (A) 02/06/2022    URK45OPSFIRX Detected (A) 12/31/2021    TMH99DCYOCYX Not Detected 07/13/2020      Recent Labs   Lab 02/06/22  0947 02/06/22  0947 02/06/22  1418 02/07/22  0630 02/07/22  0630 02/08/22  0508 02/08/22  0508 02/09/22  0534 02/09/22  1129 02/12/22  0454   PROCAL 0.17  --   --   --   --   --   --   --  0.13  --    FERRITIN 455*  --   --   --   --   --   --   --  783*  --    LACTATE 0.8  --   --   --   --   --   --   --  1.5  --    *  --   --   --   --  283*  --   --   --   --    .8*  --   --   --   --  266.4*  --   --   --   --    TROPONINI <0.006  --   --   --   --   --   --   --   --   --    DDIMER  --    < > 2.06*  --   --  1.84*  --   --   --   --      --   --  141  --   --   --   --  139  --    WBC 6.36   < >  --  6.67   < > 5.38  --  8.47  --  13.65*   GRAN 82.8*  5.3   < >  --  83.4*  5.6   < > 72.7  3.9   < > 75.6*  6.4  --  78.6*  10.7*   LYMPH 9.0*  0.6*   < >  --  9.1*  0.6*   < > 20.1  1.1   < > 16.1*  1.4  --  11.3*  1.5   HGB 9.9*   < >  --  10.6*   < > 11.1*   < > 11.7*  --  10.4*   HCT 31.0*   < >  --  33.0*   < > 35.7*   < > 38.4  --  33.4*   BUN 6*  --   --  9  --   --   --   --  16  --    CREATININE 0.7  --   --  0.7  --   --   --   --  0.8  --    ESTGFRAFRICA >60  --   --  >60  --   --   --   --  >60  --     EGFRNONAA >60  --   --  >60  --   --   --   --  >60  --    K 2.7*  --   --  4.7  --   --   --   --  4.0  --      --   --  107  --   --   --   --  100  --    CO2 23  --   --  23  --   --   --   --  28  --    MG  --   --  1.8 2.6  --   --   --   --   --   --     < > = values in this interval not displayed.     Microbiology Results (last 7 days)     Procedure Component Value Units Date/Time    Blood culture [[id number]]  (Abnormal)  (Susceptibility) Collected: 02/06/22 1418    Order Status: Completed Specimen: Blood Updated: 02/11/22 1454     Blood Culture, Routine Gram stain aer bottle: Gram positive cocci in clusters resembling Staph      Positive results previously called 02/07/2022      STAPHYLOCOCCUS CAPITIS SUBSPECIES CAPITIS    Blood culture [[id number]]  (Abnormal)  (Susceptibility) Collected: 02/06/22 1418    Order Status: Completed Specimen: Blood Updated: 02/11/22 1454     Blood Culture, Routine Gram stain aer bottle: Gram positive cocci in clusters resembling Staph      Results called to and read back by:Brendan Villarreal LPN 02/07/2022  19:55      STAPHYLOCOCCUS CAPITIS SUBSPECIES CAPITIS        Antibiotics (From admission, onward)            Start     Stop Route Frequency Ordered    02/07/22 0900  DAPTOmycin (CUBICIN) 350 mg in sodium chloride 0.9% IVPB         -- IV Daily 02/06/22 1535    02/06/22 2100  mupirocin 2 % ointment         -- Top 2 times daily 02/06/22 1535        Anticoagulants     Ordered     Route Frequency Start Stop    02/06/22 1301  enoxaparin         SubQ 2 times daily 02/06/22 1315 --        Echo Saline Bubble? No  · Normal systolic function.  · The estimated ejection fraction is 55%.  · Normal left ventricular diastolic function.  · Mild pulmonic regurgitation.  · With normal right ventricular systolic function.  · No vegetation present.         Significant Labs:  All pertinent labs within the past 24 hours have been reviewed.    Significant Imaging: I have reviewed all pertinent  imaging results/findings within the past 24 hours.      Assessment/Plan:      * Severe sepsis secondary to covid pneumonia  - COVID-19 testing Collection Date: 12/31/2021 Collection Time:   2:17 PM   - Infection Control notified     - Isolation:   - Airborne, Contact and Droplet Precautions  - Cohort patients into COVID units  - N95 mask, wear eye protection  - 20 second hand hygiene              - Limit visitors per hospital policy              - Consolidating lab draws, nursing care, provider visits, and interventions    - Management:  Supplemental O2 to maintain SpO2 >92%  Telemetry  Continuous/intermittent Pulse Ox  Albuterol treatment PRN  Continue MVI, Qpm Zinc and Vitamin C, Vitamin D, Remdesivir, and dexamethasone  Encourage Incentive spirometer q 1 hour while awake    Advance Care Planning   Full Code       This patient does have evidence of infective focus  My overall impression is Severe  sepsis. Vital signs were reviewed and noted in progress note.  Antibiotics given-   Antibiotics (From admission, onward)            Start     Stop Route Frequency Ordered    02/07/22 0900  DAPTOmycin (CUBICIN) 350 mg in sodium chloride 0.9% IVPB         -- IV Daily 02/06/22 1535    02/06/22 2100  mupirocin 2 % ointment         -- Top 2 times daily 02/06/22 1535        Cultures were taken-   Microbiology Results (last 7 days)     Procedure Component Value Units Date/Time    Blood culture [[id number]]  (Abnormal)  (Susceptibility) Collected: 02/06/22 1418    Order Status: Completed Specimen: Blood Updated: 02/11/22 1454     Blood Culture, Routine Gram stain aer bottle: Gram positive cocci in clusters resembling Staph      Positive results previously called 02/07/2022      STAPHYLOCOCCUS CAPITIS SUBSPECIES CAPITIS    Blood culture [[id number]]  (Abnormal)  (Susceptibility) Collected: 02/06/22 1418    Order Status: Completed Specimen: Blood Updated: 02/11/22 1454     Blood Culture, Routine Gram stain aer bottle: Gram positive  cocci in clusters resembling Staph      Results called to and read back by:Brendan Villarreal LPN 02/07/2022  19:55      STAPHYLOCOCCUS CAPITIS SUBSPECIES CAPITIS        Latest lactate reviewed, they are-  No results for input(s): LACTATE in the last 72 hours.    Organ dysfunction indicated by Acute respiratory failure     Source- Covid pneumonia    Source control Achieved by- Symptomatic relief    Improving, afebrile- continue Remdesivir plus Dexa  Repeated Procal, Lactic on 2/9/22- normal    Improving    Continue Daptomycin  Check ESR and CRP    Acute hypoxemic respiratory failure secondary to Covid pneumonia  Patient with Hypoxic Respiratory failure which is Acute.  she is not on home oxygen. Supplemental oxygen was provided and noted- Oxygen Concentration (%):  [44] 44.   Signs/symptoms of respiratory failure include- tachypnea and increased work of breathing. Contributing diagnoses includes - Covid  Pneumonia      --continue supportive O2  --Telemetry  --Vitals Q4H    Stable, encouraged to do deep breathing exercises    Gradually improving    Infected finger joint  Continue home Iv Daptomycin, expected to continue until 2/26/2022, managed by ID as outpatient  Patient reports she has orthopedic appointment for this week to get stitches out    --Continue Daptomycin    Slowly improving      Hypoalbuminemia  Decreased from previous hospitalization    --add boost to all meals      Normocytic anemia  Chronic, likely secondary to chronic disease, normocytic    --Daily CBC  --Transfuse with 1 unit PRBC for Hgb <7.0 or <8.0 if symptomatic  --Iron deficient: replete with Venofer IV 200mg x5 days   --hem/Onc follow-up    Has KIERA, low B12- give Venofer plus B12 IM    Debility  2/06 Fall and skin precautions  Turn Q 2 hours  Consult PT and OT          VTE Risk Mitigation (From admission, onward)         Ordered     enoxaparin injection 60 mg  2 times daily         02/06/22 1301                Discharge Planning   JUAN:      Code  Status: Full Code   Is the patient medically ready for discharge?:     Reason for patient still in hospital (select all that apply): Patient trending condition, Laboratory test, Treatment, Imaging and PT / OT recommendations  Discharge Plan A: Home Health        José Miguel Blankenship MD  Department of Hospital Medicine   '[place] - Telemetry ([facility])

## 2022-02-12 NOTE — SUBJECTIVE & OBJECTIVE
Interval History: looks and feels better, getting stronger, had transient urinary retention but resolved spontaneously, still on 5 L NC- sats 90%, able to get up and walk in the room, do IS. Encouraged to do deep breathing exercises, continue Daptomycin, CRP coming down. Getting Venofer daily for KIERA and Inj B12 IM.     Review of Systems   Constitutional: Positive for activity change, appetite change and fatigue. Negative for diaphoresis and fever.   HENT: Negative for ear discharge, ear pain and facial swelling.    Eyes: Negative for pain and redness.   Respiratory: Positive for shortness of breath. Negative for cough.         No sputum   Cardiovascular: Negative for chest pain, palpitations and leg swelling.   Gastrointestinal: Negative for abdominal distention, abdominal pain, blood in stool, constipation, diarrhea, nausea and vomiting.   Endocrine: Negative for polydipsia and polyphagia.   Genitourinary: Negative for difficulty urinating, dyspareunia, dysuria, flank pain and hematuria.   Musculoskeletal: Negative for neck pain and neck stiffness.   Skin: Positive for wound. Negative for color change.        Stitches intact to right index finger   Allergic/Immunologic: Negative for food allergies.   Neurological: Positive for weakness. Negative for facial asymmetry and speech difficulty.   Hematological: Does not bruise/bleed easily.   Psychiatric/Behavioral: Negative for agitation, behavioral problems, confusion, dysphoric mood and suicidal ideas. The patient is not nervous/anxious.      Objective:     Vital Signs (Most Recent):  Temp: 97.8 °F (36.6 °C) (02/11/22 1705)  Pulse: 69 (02/11/22 1705)  Resp: 18 (02/11/22 1705)  BP: (!) 102/52 (02/11/22 1705)  SpO2: (!) 92 % (02/11/22 1705) Vital Signs (24h Range):  Temp:  [96.8 °F (36 °C)-98.1 °F (36.7 °C)] 97.8 °F (36.6 °C)  Pulse:  [60-73] 69  Resp:  [18-22] 18  SpO2:  [87 %-94 %] 92 %  BP: (102-154)/(52-67) 102/52     Weight: 57.7 kg (127 lb 3.3 oz)  Body mass  index is 25.69 kg/m².    Intake/Output Summary (Last 24 hours) at 2/11/2022 1812  Last data filed at 2/11/2022 1400  Gross per 24 hour   Intake 476 ml   Output 400 ml   Net 76 ml      Physical Exam  Vitals and nursing note reviewed.   Constitutional:       General: She is not in acute distress.     Appearance: She is well-developed. She is ill-appearing.   HENT:      Head: Normocephalic and atraumatic.      Right Ear: Hearing and external ear normal.      Left Ear: Hearing and external ear normal.      Nose: No rhinorrhea.      Right Sinus: No maxillary sinus tenderness or frontal sinus tenderness.      Left Sinus: No maxillary sinus tenderness or frontal sinus tenderness.      Mouth/Throat:      Mouth: No oral lesions.      Pharynx: Uvula midline.   Eyes:      General:         Right eye: No discharge.         Left eye: No discharge.      Conjunctiva/sclera: Conjunctivae normal.      Pupils: Pupils are equal, round, and reactive to light.   Neck:      Thyroid: No thyromegaly.      Vascular: No carotid bruit.      Trachea: No tracheal deviation.   Cardiovascular:      Rate and Rhythm: Normal rate and regular rhythm.      Pulses:           Dorsalis pedis pulses are 2+ on the right side and 2+ on the left side.      Heart sounds: Normal heart sounds, S1 normal and S2 normal. No murmur heard.      Pulmonary:      Effort: Pulmonary effort is normal. No respiratory distress.      Breath sounds: No rales.   Chest:   Breasts:      Right: No supraclavicular adenopathy.      Left: No supraclavicular adenopathy.       Abdominal:      General: Bowel sounds are decreased. There is no distension.      Palpations: Abdomen is soft. There is no mass.      Tenderness: There is no abdominal tenderness.   Musculoskeletal:         General: Normal range of motion.      Cervical back: Normal range of motion.   Lymphadenopathy:      Cervical: No cervical adenopathy.      Upper Body:      Right upper body: No supraclavicular adenopathy.       Left upper body: No supraclavicular adenopathy.   Skin:     General: Skin is warm and dry.      Capillary Refill: Capillary refill takes less than 2 seconds.      Coloration: Skin is pale.      Findings: No rash.   Neurological:      Mental Status: She is alert and oriented to person, place, and time.      Sensory: No sensory deficit.      Coordination: Coordination normal.      Gait: Gait normal.   Psychiatric:         Mood and Affect: Mood is anxious. Mood is not depressed. Affect is not labile.         Speech: Speech normal.         Behavior: Behavior normal.         Thought Content: Thought content normal.         Judgment: Judgment normal.       Flow (L/min): 5  Oxygen Concentration (%):  [40] 40  Temp:  [96.8 °F (36 °C)-98.1 °F (36.7 °C)] 97.8 °F (36.6 °C)  Pulse:  [60-73] 69  Resp:  [18-22] 18  SpO2:  [87 %-94 %] 92 %  BP: (102-154)/(52-67) 102/52      Lab Results   Component Value Date    [id number] Positive (A) 02/06/2022    NUQ17FNRKMSO Detected (A) 12/31/2021    [id number] Not Detected 07/13/2020      Recent Labs   Lab 02/06/22  0947 02/06/22  0947 02/06/22  1418 02/07/22  0630 02/07/22  0630 02/08/22  0508 02/09/22  0534 02/09/22  1129   PROCAL 0.17  --   --   --   --   --   --  0.13   FERRITIN 455*  --   --   --   --   --   --  783*   LACTATE 0.8  --   --   --   --   --   --  1.5   *  --   --   --   --  283*  --   --    .8*  --   --   --   --  266.4*  --   --    TROPONINI <0.006  --   --   --   --   --   --   --    DDIMER  --    < > 2.06*  --   --  1.84*  --   --      --   --  141  --   --   --  139   WBC 6.36   < >  --  6.67  --  5.38 8.47  --    GRAN 82.8*  5.3   < >  --  83.4*  5.6   < > 72.7  3.9 75.6*  6.4  --    LYMPH 9.0*  0.6*   < >  --  9.1*  0.6*   < > 20.1  1.1 16.1*  1.4  --    HGB 9.9*   < >  --  10.6*   < > 11.1* 11.7*  --    HCT 31.0*   < >  --  33.0*   < > 35.7* 38.4  --    BUN 6*  --   --  9  --   --   --  16   CREATININE 0.7  --   --  0.7  --   --   --   0.8   ESTGFRAFRICA >60  --   --  >60  --   --   --  >60   EGFRNONAA >60  --   --  >60  --   --   --  >60   K 2.7*  --   --  4.7  --   --   --  4.0     --   --  107  --   --   --  100   CO2 23  --   --  23  --   --   --  28   MG  --   --  1.8 2.6  --   --   --   --     < > = values in this interval not displayed.     Microbiology Results (last 7 days)     Procedure Component Value Units Date/Time    Blood culture [[id number]]  (Abnormal)  (Susceptibility) Collected: 02/06/22 1418    Order Status: Completed Specimen: Blood Updated: 02/11/22 1454     Blood Culture, Routine Gram stain aer bottle: Gram positive cocci in clusters resembling Staph      Positive results previously called 02/07/2022      STAPHYLOCOCCUS CAPITIS SUBSPECIES CAPITIS    Blood culture [[id number]]  (Abnormal)  (Susceptibility) Collected: 02/06/22 1418    Order Status: Completed Specimen: Blood Updated: 02/11/22 1454     Blood Culture, Routine Gram stain aer bottle: Gram positive cocci in clusters resembling Staph      Results called to and read back by:Brendan Villarreal LPN 02/07/2022  19:55      STAPHYLOCOCCUS CAPITIS SUBSPECIES CAPITIS        Antibiotics (From admission, onward)            Start     Stop Route Frequency Ordered    02/07/22 0900  DAPTOmycin (CUBICIN) 350 mg in sodium chloride 0.9% IVPB         -- IV Daily 02/06/22 1535    02/06/22 2100  mupirocin 2 % ointment         -- Top 2 times daily 02/06/22 1535        Anticoagulants     Ordered     Route Frequency Start Stop    02/06/22 1301  enoxaparin         SubQ 2 times daily 02/06/22 1315 --        Echo Saline Bubble? No  · Normal systolic function.  · The estimated ejection fraction is 55%.  · Normal left ventricular diastolic function.  · Mild pulmonic regurgitation.  · With normal right ventricular systolic function.  · No vegetation present.         Significant Labs: All pertinent labs within the past 24 hours have been reviewed.    Significant Imaging: I have reviewed all pertinent  imaging results/findings within the past 24 hours.

## 2022-02-12 NOTE — PROGRESS NOTES
"[facility] Medicine  Progress Note    Patient Name: Haim Martin  MRN: [id number]  Patient Class: IP- Inpatient   Admission Date: 2/6/2022  Length of Stay: 4 days  Attending Physician: José Miguel Blankenship MD  Primary Care Provider: Kavya Mesa MD        Subjective:     Principal Problem:Severe sepsis        HPI:  Vomiting        PT states, "I have been vomiting since Thursday, feel very weak and have been running a fever."      Per ER- This  is a 81 y.o. female patient with a PMHx of Depression, Diverticulosis of colon, thyroid disease,  HLD and HTN who presents to the Emergency Department for evaluation of vomitting which onset gradually x 3 days pta. Patient states she was infected with COVID-19 3 weeks ago. She reports being on daptomycin. Symptoms are constant and moderate in severity. No mitigating or exacerbating factors reported. Associated sxs include dry cough and diahhrea. Patient denies any CP, leg swelling, abd pain, fever, chills, and all other sxs at this time. No further complaints or concerns at this time.     Patient was evaluated in ER and found to have Covid pneumonia and Placed In Observation for further evaluation and treatment.     Patient also reports she has been receiving Iv daptomycin at home with infected finger.                        Overview/Hospital Course:  81 y.o. female patient with Hx of Depression, Diverticulosis of colon, thyroid disease, HLD and HTN who presente for evaluation of vomitting x 3 days. Patient states she was infected with COVID-19 3 weeks ago and reports being on Daptomycin. Associated sxs include dry cough and diahhrea. She  was evaluated in ER and found to have Covid pneumonia and placed In Observation for further evaluation and treatment. Patient also reports she has been receiving Iv daptomycin at home for infected finger.     2/7- still feels weak and has HA- treated with Fioricet but overall better. No diarrhea, did " PT/OT. K improved to 4.7, H/H 10.6/32. Getting Dexa and Remdesivir. Sats 92-93% on 2 L NC, encouraged to do IS and will try to get her OOB to chair and ambulate.     2/8/22: Patient reports she feels weak, able to participate with PT/OT today, walker with assistance 24'. Patient requesting additional snacks this afternoon. Vitals stable, PO2 stable on 4L, Labs stable. Encouraged patient to use IS, seems motivated to participate.     2/9/22: Patient reported to be lethargic this AM, evaluated patient and ordered Lactic, Procalcitonin- both negative. Nutritional studies ordered.  O2 increased to 5L, PO2: 91-92%, 87% overnight on 4L. Patient reports feeling tired and sleeping more. Appetite is increased from yesterday. Patient reports using IS as instructed.     2/10/22: Patient stable on 5L O2 per NC. Patient continues to report fatigue. Labs reviewed, patient is iron deficient, will replete with Venofer IV while inpatient and plan for Hem/Onc follow-up for anemia at d/c. Recommend GI follow-up at d/c. Labs stable, Vitals stable.     2/11- looks and feels better, getting stronger, had transient urinary retention but resolved spontaneously, still on 5 L NC- sats 90%, able to get up and walk in the room, do IS. Encouraged to do deep breathing exercises, continue Daptomycin, CRP coming down. Getting Venofer daily for KIERA and Inj B12 IM.       Interval History: looks and feels better, getting stronger, had transient urinary retention but resolved spontaneously, still on 5 L NC- sats 90%, able to get up and walk in the room, do IS. Encouraged to do deep breathing exercises, continue Daptomycin, CRP coming down. Getting Venofer daily for KIERA and Inj B12 IM.     Review of Systems   Constitutional: Positive for activity change, appetite change and fatigue. Negative for diaphoresis and fever.   HENT: Negative for ear discharge, ear pain and facial swelling.    Eyes: Negative for pain and redness.   Respiratory: Positive for  shortness of breath. Negative for cough.         No sputum   Cardiovascular: Negative for chest pain, palpitations and leg swelling.   Gastrointestinal: Negative for abdominal distention, abdominal pain, blood in stool, constipation, diarrhea, nausea and vomiting.   Endocrine: Negative for polydipsia and polyphagia.   Genitourinary: Negative for difficulty urinating, dyspareunia, dysuria, flank pain and hematuria.   Musculoskeletal: Negative for neck pain and neck stiffness.   Skin: Positive for wound. Negative for color change.        Stitches intact to right index finger   Allergic/Immunologic: Negative for food allergies.   Neurological: Positive for weakness. Negative for facial asymmetry and speech difficulty.   Hematological: Does not bruise/bleed easily.   Psychiatric/Behavioral: Negative for agitation, behavioral problems, confusion, dysphoric mood and suicidal ideas. The patient is not nervous/anxious.      Objective:     Vital Signs (Most Recent):  Temp: 97.8 °F (36.6 °C) (02/11/22 1705)  Pulse: 69 (02/11/22 1705)  Resp: 18 (02/11/22 1705)  BP: (!) 102/52 (02/11/22 1705)  SpO2: (!) 92 % (02/11/22 1705) Vital Signs (24h Range):  Temp:  [96.8 °F (36 °C)-98.1 °F (36.7 °C)] 97.8 °F (36.6 °C)  Pulse:  [60-73] 69  Resp:  [18-22] 18  SpO2:  [87 %-94 %] 92 %  BP: (102-154)/(52-67) 102/52     Weight: 57.7 kg (127 lb 3.3 oz)  Body mass index is 25.69 kg/m².    Intake/Output Summary (Last 24 hours) at 2/11/2022 1812  Last data filed at 2/11/2022 1400  Gross per 24 hour   Intake 476 ml   Output 400 ml   Net 76 ml      Physical Exam  Vitals and nursing note reviewed.   Constitutional:       General: She is not in acute distress.     Appearance: She is well-developed. She is ill-appearing.   HENT:      Head: Normocephalic and atraumatic.      Right Ear: Hearing and external ear normal.      Left Ear: Hearing and external ear normal.      Nose: No rhinorrhea.      Right Sinus: No maxillary sinus tenderness or frontal  sinus tenderness.      Left Sinus: No maxillary sinus tenderness or frontal sinus tenderness.      Mouth/Throat:      Mouth: No oral lesions.      Pharynx: Uvula midline.   Eyes:      General:         Right eye: No discharge.         Left eye: No discharge.      Conjunctiva/sclera: Conjunctivae normal.      Pupils: Pupils are equal, round, and reactive to light.   Neck:      Thyroid: No thyromegaly.      Vascular: No carotid bruit.      Trachea: No tracheal deviation.   Cardiovascular:      Rate and Rhythm: Normal rate and regular rhythm.      Pulses:           Dorsalis pedis pulses are 2+ on the right side and 2+ on the left side.      Heart sounds: Normal heart sounds, S1 normal and S2 normal. No murmur heard.      Pulmonary:      Effort: Pulmonary effort is normal. No respiratory distress.      Breath sounds: No rales.   Chest:   Breasts:      Right: No supraclavicular adenopathy.      Left: No supraclavicular adenopathy.       Abdominal:      General: Bowel sounds are decreased. There is no distension.      Palpations: Abdomen is soft. There is no mass.      Tenderness: There is no abdominal tenderness.   Musculoskeletal:         General: Normal range of motion.      Cervical back: Normal range of motion.   Lymphadenopathy:      Cervical: No cervical adenopathy.      Upper Body:      Right upper body: No supraclavicular adenopathy.      Left upper body: No supraclavicular adenopathy.   Skin:     General: Skin is warm and dry.      Capillary Refill: Capillary refill takes less than 2 seconds.      Coloration: Skin is pale.      Findings: No rash.   Neurological:      Mental Status: She is alert and oriented to person, place, and time.      Sensory: No sensory deficit.      Coordination: Coordination normal.      Gait: Gait normal.   Psychiatric:         Mood and Affect: Mood is anxious. Mood is not depressed. Affect is not labile.         Speech: Speech normal.         Behavior: Behavior normal.         Thought  Content: Thought content normal.         Judgment: Judgment normal.       Flow (L/min): 5  Oxygen Concentration (%):  [40] 40  Temp:  [96.8 °F (36 °C)-98.1 °F (36.7 °C)] 97.8 °F (36.6 °C)  Pulse:  [60-73] 69  Resp:  [18-22] 18  SpO2:  [87 %-94 %] 92 %  BP: (102-154)/(52-67) 102/52      Lab Results   Component Value Date    [id number] Positive (A) 02/06/2022    [id number] Detected (A) 12/31/2021    MRH69FYKEMSP Not Detected 07/13/2020      Recent Labs   Lab 02/06/22  0947 02/06/22  0947 02/06/22  1418 02/07/22  0630 02/07/22  0630 02/08/22  0508 02/09/22  0534 02/09/22  1129   PROCAL 0.17  --   --   --   --   --   --  0.13   FERRITIN 455*  --   --   --   --   --   --  783*   LACTATE 0.8  --   --   --   --   --   --  1.5   *  --   --   --   --  283*  --   --    .8*  --   --   --   --  266.4*  --   --    TROPONINI <0.006  --   --   --   --   --   --   --    DDIMER  --    < > 2.06*  --   --  1.84*  --   --      --   --  141  --   --   --  139   WBC 6.36   < >  --  6.67  --  5.38 8.47  --    GRAN 82.8*  5.3   < >  --  83.4*  5.6   < > 72.7  3.9 75.6*  6.4  --    LYMPH 9.0*  0.6*   < >  --  9.1*  0.6*   < > 20.1  1.1 16.1*  1.4  --    HGB 9.9*   < >  --  10.6*   < > 11.1* 11.7*  --    HCT 31.0*   < >  --  33.0*   < > 35.7* 38.4  --    BUN 6*  --   --  9  --   --   --  16   CREATININE 0.7  --   --  0.7  --   --   --  0.8   ESTGFRAFRICA >60  --   --  >60  --   --   --  >60   EGFRNONAA >60  --   --  >60  --   --   --  >60   K 2.7*  --   --  4.7  --   --   --  4.0     --   --  107  --   --   --  100   CO2 23  --   --  23  --   --   --  28   MG  --   --  1.8 2.6  --   --   --   --     < > = values in this interval not displayed.     Microbiology Results (last 7 days)     Procedure Component Value Units Date/Time    Blood culture [[id number]]  (Abnormal)  (Susceptibility) Collected: 02/06/22 1414    Order Status: Completed Specimen: Blood Updated: 02/11/22 5329     Blood Culture, Routine  Gram stain aer bottle: Gram positive cocci in clusters resembling Staph      Positive results previously called 02/07/2022      STAPHYLOCOCCUS CAPITIS SUBSPECIES CAPITIS    Blood culture [[id number]]  (Abnormal)  (Susceptibility) Collected: 02/06/22 1418    Order Status: Completed Specimen: Blood Updated: 02/11/22 1454     Blood Culture, Routine Gram stain aer bottle: Gram positive cocci in clusters resembling Staph      Results called to and read back by:Brendan Villarreal LPN 02/07/2022  19:55      STAPHYLOCOCCUS CAPITIS SUBSPECIES CAPITIS        Antibiotics (From admission, onward)            Start     Stop Route Frequency Ordered    02/07/22 0900  DAPTOmycin (CUBICIN) 350 mg in sodium chloride 0.9% IVPB         -- IV Daily 02/06/22 1535    02/06/22 2100  mupirocin 2 % ointment         -- Top 2 times daily 02/06/22 1535        Anticoagulants     Ordered     Route Frequency Start Stop    02/06/22 1301  enoxaparin         SubQ 2 times daily 02/06/22 1315 --        Echo Saline Bubble? No  · Normal systolic function.  · The estimated ejection fraction is 55%.  · Normal left ventricular diastolic function.  · Mild pulmonic regurgitation.  · With normal right ventricular systolic function.  · No vegetation present.         Significant Labs: All pertinent labs within the past 24 hours have been reviewed.    Significant Imaging: I have reviewed all pertinent imaging results/findings within the past 24 hours.      Assessment/Plan:      * Severe sepsis secondary to covid pneumonia  - COVID-19 testing Collection Date: 12/31/2021 Collection Time:   2:17 PM   - Infection Control notified     - Isolation:   - Airborne, Contact and Droplet Precautions  - Cohort patients into COVID units  - N95 mask, wear eye protection  - 20 second hand hygiene              - Limit visitors per hospital policy              - Consolidating lab draws, nursing care, provider visits, and interventions    - Management:  Supplemental O2 to maintain SpO2  >92%  Telemetry  Continuous/intermittent Pulse Ox  Albuterol treatment PRN  Continue MVI, Qpm Zinc and Vitamin C, Vitamin D, Remdesivir, and dexamethasone  Encourage Incentive spirometer q 1 hour while awake    Advance Care Planning   Full Code       This patient does have evidence of infective focus  My overall impression is Severe  sepsis. Vital signs were reviewed and noted in progress note.  Antibiotics given-   Antibiotics (From admission, onward)            Start     Stop Route Frequency Ordered    02/07/22 0900  DAPTOmycin (CUBICIN) 350 mg in sodium chloride 0.9% IVPB         -- IV Daily 02/06/22 1535    02/06/22 2100  mupirocin 2 % ointment         -- Top 2 times daily 02/06/22 1535        Cultures were taken-   Microbiology Results (last 7 days)     Procedure Component Value Units Date/Time    Blood culture [[id number]]  (Abnormal)  (Susceptibility) Collected: 02/06/22 1418    Order Status: Completed Specimen: Blood Updated: 02/11/22 1454     Blood Culture, Routine Gram stain aer bottle: Gram positive cocci in clusters resembling Staph      Positive results previously called 02/07/2022      STAPHYLOCOCCUS CAPITIS SUBSPECIES CAPITIS    Blood culture [[id number]]  (Abnormal)  (Susceptibility) Collected: 02/06/22 1418    Order Status: Completed Specimen: Blood Updated: 02/11/22 1454     Blood Culture, Routine Gram stain aer bottle: Gram positive cocci in clusters resembling Staph      Results called to and read back by:Brendan Villarreal LPN 02/07/2022  19:55      STAPHYLOCOCCUS CAPITIS SUBSPECIES CAPITIS        Latest lactate reviewed, they are-  Recent Labs   Lab 02/09/22  1129   LACTATE 1.5       Organ dysfunction indicated by Acute respiratory failure     Source- Covid pneumonia    Source control Achieved by- Symptomatic relief    Improving, afebrile- continue Remdesivir plus Dexa  Repeated Procal, Lactic on 2/9/22- normal    improving    Acute hypoxemic respiratory failure secondary to Covid pneumonia  Patient  with Hypoxic Respiratory failure which is Acute.  she is not on home oxygen. Supplemental oxygen was provided and noted- Oxygen Concentration (%):  [40] 40.   Signs/symptoms of respiratory failure include- tachypnea and increased work of breathing. Contributing diagnoses includes - Covid  Pneumonia      --continue supportive O2  --Telemetry  --Vitals Q4H    Stable, encouraged to do deep breathing exercises    Infected finger joint  Continue home Iv Daptomycin, expected to continue until 2/26/2022, managed by ID as outpatient  Patient reports she has orthopedic appointment for this week to get stitches out    --Continue Daptomycin      Hypoalbuminemia  Decreased from previous hospitalization    --add boost to all meals      Normocytic anemia  Chronic, likely secondary to chronic disease, normocytic    --Daily CBC  --Transfuse with 1 unit PRBC for Hgb <7.0 or <8.0 if symptomatic  --Iron deficient: replete with Venofer IV 200mg x5 days   --hem/Onc follow-up    Has KIERA, low B12- give Venofer plus B12 IM    Debility  2/06 Fall and skin precautions  Turn Q 2 hours  Consult PT and OT          VTE Risk Mitigation (From admission, onward)         Ordered     enoxaparin injection 60 mg  2 times daily         02/06/22 1301                Discharge Planning   JUAN:      Code Status: Full Code   Is the patient medically ready for discharge?:     Reason for patient still in hospital (select all that apply): Patient trending condition, Laboratory test, Treatment, Imaging, Consult recommendations, PT / OT recommendations and Pending disposition  Discharge Plan A: Home Health        José Miguel Blankenship MD  Department of Hospital Medicine   [facility])

## 2022-02-12 NOTE — ASSESSMENT & PLAN NOTE
- COVID-19 testing Collection Date: 12/31/2021 Collection Time:   2:17 PM   - Infection Control notified     - Isolation:   - Airborne, Contact and Droplet Precautions  - Cohort patients into COVID units  - N95 mask, wear eye protection  - 20 second hand hygiene              - Limit visitors per hospital policy              - Consolidating lab draws, nursing care, provider visits, and interventions    - Management:  Supplemental O2 to maintain SpO2 >92%  Telemetry  Continuous/intermittent Pulse Ox  Albuterol treatment PRN  Continue MVI, Qpm Zinc and Vitamin C, Vitamin D, Remdesivir, and dexamethasone  Encourage Incentive spirometer q 1 hour while awake    Advance Care Planning   Full Code        This patient does have evidence of infective focus  My overall impression is Severe  sepsis. Vital signs were reviewed and noted in progress note.  Antibiotics given-   Antibiotics (From admission, onward)            Start     Stop Route Frequency Ordered    02/07/22 0900  DAPTOmycin (CUBICIN) 350 mg in sodium chloride 0.9% IVPB         -- IV Daily 02/06/22 1535    02/06/22 2100  mupirocin 2 % ointment         -- Top 2 times daily 02/06/22 1535        Cultures were taken-   Microbiology Results (last 7 days)     Procedure Component Value Units Date/Time    Blood culture [[id number]]  (Abnormal)  (Susceptibility) Collected: 02/06/22 1418    Order Status: Completed Specimen: Blood Updated: 02/11/22 1454     Blood Culture, Routine Gram stain aer bottle: Gram positive cocci in clusters resembling Staph      Positive results previously called 02/07/2022      STAPHYLOCOCCUS CAPITIS SUBSPECIES CAPITIS    Blood culture [[id number]]  (Abnormal)  (Susceptibility) Collected: 02/06/22 1418    Order Status: Completed Specimen: Blood Updated: 02/11/22 1454     Blood Culture, Routine Gram stain aer bottle: Gram positive cocci in clusters resembling Staph      Results called to and read back by:Brendan Villarreal LPN 02/07/2022  19:55       STAPHYLOCOCCUS CAPITIS SUBSPECIES CAPITIS        Latest lactate reviewed, they are-  No results for input(s): LACTATE in the last 72 hours.    Organ dysfunction indicated by Acute respiratory failure     Source- Covid pneumonia    Source control Achieved by- Symptomatic relief    Improving, afebrile- continue Remdesivir plus Dexa  Repeated Procal, Lactic on 2/9/22- normal    Improving    Continue Daptomycin  Check ESR and CRP

## 2022-02-13 PROBLEM — R78.81 BACTEREMIA: Status: ACTIVE | Noted: 2022-02-13

## 2022-02-13 LAB
CRP SERPL-MCNC: 254.9 MG/L (ref 0–8.2)
ERYTHROCYTE [SEDIMENTATION RATE] IN BLOOD BY WESTERGREN METHOD: 100 MM/HR (ref 0–20)
LDH SERPL L TO P-CCNC: 335 U/L (ref 110–260)

## 2022-02-13 PROCEDURE — 94761 N-INVAS EAR/PLS OXIMETRY MLT: CPT

## 2022-02-13 PROCEDURE — 25000003 PHARM REV CODE 250: Performed by: NURSE PRACTITIONER

## 2022-02-13 PROCEDURE — 27100171 HC OXYGEN HIGH FLOW UP TO 24 HOURS

## 2022-02-13 PROCEDURE — 25000003 PHARM REV CODE 250: Performed by: EMERGENCY MEDICINE

## 2022-02-13 PROCEDURE — 94640 AIRWAY INHALATION TREATMENT: CPT

## 2022-02-13 PROCEDURE — 83615 LACTATE (LD) (LDH) ENZYME: CPT | Performed by: EMERGENCY MEDICINE

## 2022-02-13 PROCEDURE — 85651 RBC SED RATE NONAUTOMATED: CPT | Performed by: EMERGENCY MEDICINE

## 2022-02-13 PROCEDURE — 63600175 PHARM REV CODE 636 W HCPCS: Performed by: NURSE PRACTITIONER

## 2022-02-13 PROCEDURE — 86140 C-REACTIVE PROTEIN: CPT | Performed by: EMERGENCY MEDICINE

## 2022-02-13 PROCEDURE — 99900035 HC TECH TIME PER 15 MIN (STAT)

## 2022-02-13 PROCEDURE — 94799 UNLISTED PULMONARY SVC/PX: CPT

## 2022-02-13 PROCEDURE — 21400001 HC TELEMETRY ROOM

## 2022-02-13 PROCEDURE — 36415 COLL VENOUS BLD VENIPUNCTURE: CPT | Performed by: INTERNAL MEDICINE

## 2022-02-13 PROCEDURE — 87040 BLOOD CULTURE FOR BACTERIA: CPT | Mod: 59 | Performed by: INTERNAL MEDICINE

## 2022-02-13 PROCEDURE — 25000003 PHARM REV CODE 250: Performed by: INTERNAL MEDICINE

## 2022-02-13 PROCEDURE — 94760 N-INVAS EAR/PLS OXIMETRY 1: CPT

## 2022-02-13 PROCEDURE — 27000207 HC ISOLATION

## 2022-02-13 RX ORDER — SODIUM CHLORIDE 9 MG/ML
INJECTION, SOLUTION INTRAVENOUS
Status: DISCONTINUED | OUTPATIENT
Start: 2022-02-13 | End: 2022-02-14 | Stop reason: HOSPADM

## 2022-02-13 RX ORDER — SODIUM CHLORIDE 0.9 % (FLUSH) 0.9 %
3 SYRINGE (ML) INJECTION
Status: DISCONTINUED | OUTPATIENT
Start: 2022-02-13 | End: 2022-02-14 | Stop reason: HOSPADM

## 2022-02-13 RX ADMIN — OXYCODONE HYDROCHLORIDE AND ACETAMINOPHEN 500 MG: 500 TABLET ORAL at 09:02

## 2022-02-13 RX ADMIN — ASPIRIN 81 MG: 81 TABLET, COATED ORAL at 10:02

## 2022-02-13 RX ADMIN — ACETAMINOPHEN 650 MG: 325 TABLET ORAL at 10:02

## 2022-02-13 RX ADMIN — MUPIROCIN: 20 OINTMENT TOPICAL at 10:02

## 2022-02-13 RX ADMIN — ENOXAPARIN SODIUM 60 MG: 100 INJECTION SUBCUTANEOUS at 09:02

## 2022-02-13 RX ADMIN — SODIUM CHLORIDE: 0.9 INJECTION, SOLUTION INTRAVENOUS at 10:02

## 2022-02-13 RX ADMIN — LEVOTHYROXINE SODIUM 88 MCG: 88 TABLET ORAL at 05:02

## 2022-02-13 RX ADMIN — ALBUTEROL SULFATE 2 PUFF: 90 AEROSOL, METERED RESPIRATORY (INHALATION) at 07:02

## 2022-02-13 RX ADMIN — ENOXAPARIN SODIUM 60 MG: 100 INJECTION SUBCUTANEOUS at 10:02

## 2022-02-13 RX ADMIN — PANTOPRAZOLE SODIUM 40 MG: 40 TABLET, DELAYED RELEASE ORAL at 09:02

## 2022-02-13 RX ADMIN — ERGOCALCIFEROL 50000 UNITS: 1.25 CAPSULE ORAL at 09:02

## 2022-02-13 RX ADMIN — CLONIDINE HYDROCHLORIDE 0.1 MG: 0.1 TABLET ORAL at 10:02

## 2022-02-13 RX ADMIN — AMITRIPTYLINE HYDROCHLORIDE 75 MG: 50 TABLET, FILM COATED ORAL at 09:02

## 2022-02-13 RX ADMIN — MUPIROCIN: 20 OINTMENT TOPICAL at 09:02

## 2022-02-13 RX ADMIN — ALBUTEROL SULFATE 2 PUFF: 90 AEROSOL, METERED RESPIRATORY (INHALATION) at 08:02

## 2022-02-13 RX ADMIN — SERTRALINE HYDROCHLORIDE 100 MG: 50 TABLET ORAL at 10:02

## 2022-02-13 RX ADMIN — ZINC SULFATE 220 MG (50 MG) CAPSULE 220 MG: CAPSULE at 09:02

## 2022-02-13 RX ADMIN — ALBUTEROL SULFATE 2 PUFF: 90 AEROSOL, METERED RESPIRATORY (INHALATION) at 01:02

## 2022-02-13 RX ADMIN — DAPTOMYCIN 350 MG: 350 INJECTION, POWDER, LYOPHILIZED, FOR SOLUTION INTRAVENOUS at 10:02

## 2022-02-13 RX ADMIN — DEXAMETHASONE 6 MG: 4 TABLET ORAL at 09:02

## 2022-02-13 RX ADMIN — IRON SUCROSE 200 MG: 20 INJECTION, SOLUTION INTRAVENOUS at 09:02

## 2022-02-13 RX ADMIN — PROPRANOLOL HYDROCHLORIDE 40 MG: 40 TABLET ORAL at 09:02

## 2022-02-13 RX ADMIN — CLONIDINE HYDROCHLORIDE 0.1 MG: 0.1 TABLET ORAL at 09:02

## 2022-02-13 RX ADMIN — THERA TABS 1 TABLET: TAB at 09:02

## 2022-02-13 NOTE — PROGRESS NOTES
"[facility] Medicine  Progress Note    Patient Name: Haim Martin  MRN: [id number]  Patient Class: IP- Inpatient   Admission Date: 2/6/2022  Length of Stay: 6 days  Attending Physician: José Miguel Blankenship MD  Primary Care Provider: Kavya Mesa MD        Subjective:     Principal Problem:Severe sepsis        HPI:  Vomiting        PT states, "I have been vomiting since Thursday, feel very weak and have been running a fever."      Per ER- This  is a 81 y.o. female patient with a PMHx of Depression, Diverticulosis of colon, thyroid disease,  HLD and HTN who presents to the Emergency Department for evaluation of vomitting which onset gradually x 3 days pta. Patient states she was infected with COVID-19 3 weeks ago. She reports being on daptomycin. Symptoms are constant and moderate in severity. No mitigating or exacerbating factors reported. Associated sxs include dry cough and diahhrea. Patient denies any CP, leg swelling, abd pain, fever, chills, and all other sxs at this time. No further complaints or concerns at this time.     Patient was evaluated in ER and found to have Covid pneumonia and Placed In Observation for further evaluation and treatment.     Patient also reports she has been receiving Iv daptomycin at home with infected finger.                        Overview/Hospital Course:  81 y.o. female patient with Hx of Depression, Diverticulosis of colon, thyroid disease, HLD and HTN who presente for evaluation of vomitting x 3 days. Patient states she was infected with COVID-19 3 weeks ago and reports being on Daptomycin. Associated sxs include dry cough and diahhrea. She  was evaluated in ER and found to have Covid pneumonia and placed In Observation for further evaluation and treatment. Patient also reports she has been receiving Iv daptomycin at home for infected finger.     2/7- still feels weak and has HA- treated with Fioricet but overall better. No diarrhea, did " PT/OT. K improved to 4.7, H/H 10.6/32. Getting Dexa and Remdesivir. Sats 92-93% on 2 L NC, encouraged to do IS and will try to get her OOB to chair and ambulate.     2/8/22: Patient reports she feels weak, able to participate with PT/OT today, walker with assistance 24'. Patient requesting additional snacks this afternoon. Vitals stable, PO2 stable on 4L, Labs stable. Encouraged patient to use IS, seems motivated to participate.     2/9/22: Patient reported to be lethargic this AM, evaluated patient and ordered Lactic, Procalcitonin- both negative. Nutritional studies ordered.  O2 increased to 5L, PO2: 91-92%, 87% overnight on 4L. Patient reports feeling tired and sleeping more. Appetite is increased from yesterday. Patient reports using IS as instructed.     2/10/22: Patient stable on 5L O2 per NC. Patient continues to report fatigue. Labs reviewed, patient is iron deficient, will replete with Venofer IV while inpatient and plan for Hem/Onc follow-up for anemia at d/c. Recommend GI follow-up at d/c. Labs stable, Vitals stable.     2/11- looks and feels better, getting stronger, had transient urinary retention but resolved spontaneously, still on 5 L NC- sats 90%, able to get up and walk in the room, do IS. Encouraged to do deep breathing exercises, continue Daptomycin, CRP coming down. Getting Venofer daily for KIERA and Inj B12 IM.     2/12- looks and feels much better, sitting up in chair, breathing improved. Fio2 had increased to 10 L yesterday from 5, now down to 7 L now. Repeat blood Cx are a contamination- continue Daptomycin. Encouraged to ambulate and do IS. Has KIERA- will replace venofer. Also inflammatory markers improving.     2/13- comfortably sitting in chair, eating dinner, no SOB, walked in the room earlier. O2 down to 6 L and sats maintained at 96-99%- will wean O2 down further. Repeat Blood Cx NGTD, ESR, CRP coming down, LDH still rising. Continue same Tx.       Interval History: comfortably sitting  in chair, eating dinner, no SOB, walked in the room earlier. O2 down to 6 L and sats maintained at 96-99%- will wean O2 down further. Repeat Blood Cx NGTD, ESR, CRP coming down, LDH still rising. Continue same Tx.     Review of Systems   Constitutional: Negative for activity change, appetite change, diaphoresis, fatigue and fever.   HENT: Negative for ear discharge, ear pain and facial swelling.    Eyes: Negative for pain and redness.   Respiratory: Positive for shortness of breath. Negative for cough.         No sputum   Cardiovascular: Negative for chest pain, palpitations and leg swelling.   Gastrointestinal: Negative for abdominal distention, abdominal pain, blood in stool, constipation, diarrhea, nausea and vomiting.   Endocrine: Negative for polydipsia and polyphagia.   Genitourinary: Negative for difficulty urinating, dyspareunia, dysuria, flank pain and hematuria.   Musculoskeletal: Negative for neck pain and neck stiffness.   Skin: Positive for wound. Negative for color change.        Stitches intact to right index finger   Allergic/Immunologic: Negative for food allergies.   Neurological: Negative for facial asymmetry, speech difficulty and weakness.   Hematological: Does not bruise/bleed easily.   Psychiatric/Behavioral: Negative for agitation, behavioral problems, confusion, dysphoric mood and suicidal ideas. The patient is not nervous/anxious.      Objective:     Vital Signs (Most Recent):  Temp: 98.4 °F (36.9 °C) (02/13/22 1620)  Pulse: 71 (02/13/22 1720)  Resp: 18 (02/13/22 1620)  BP: (!) 108/52 (02/13/22 1620)  SpO2: 95 % (02/13/22 1720) Vital Signs (24h Range):  Temp:  [97.7 °F (36.5 °C)-98.4 °F (36.9 °C)] 98.4 °F (36.9 °C)  Pulse:  [61-87] 71  Resp:  [16-20] 18  SpO2:  [95 %-99 %] 95 %  BP: (108-151)/(52-67) 108/52     Weight: 57.3 kg (126 lb 5.2 oz)  Body mass index is 25.51 kg/m².    Intake/Output Summary (Last 24 hours) at 2/13/2022 1731  Last data filed at 2/13/2022 1200  Gross per 24 hour    Intake 650 ml   Output 300 ml   Net 350 ml      Physical Exam  Vitals and nursing note reviewed.   Constitutional:       General: She is not in acute distress.     Appearance: She is well-developed. She is ill-appearing.   HENT:      Head: Normocephalic and atraumatic.      Right Ear: Hearing and external ear normal.      Left Ear: Hearing and external ear normal.      Nose: No rhinorrhea.      Right Sinus: No maxillary sinus tenderness or frontal sinus tenderness.      Left Sinus: No maxillary sinus tenderness or frontal sinus tenderness.      Mouth/Throat:      Mouth: No oral lesions.      Pharynx: Uvula midline.   Eyes:      General:         Right eye: No discharge.         Left eye: No discharge.      Conjunctiva/sclera: Conjunctivae normal.      Pupils: Pupils are equal, round, and reactive to light.   Neck:      Thyroid: No thyromegaly.      Vascular: No carotid bruit.      Trachea: No tracheal deviation.   Cardiovascular:      Rate and Rhythm: Normal rate and regular rhythm.      Pulses:           Dorsalis pedis pulses are 2+ on the right side and 2+ on the left side.      Heart sounds: Normal heart sounds, S1 normal and S2 normal. No murmur heard.      Pulmonary:      Effort: Pulmonary effort is normal. No respiratory distress.      Breath sounds: No rales.   Chest:   Breasts:      Right: No supraclavicular adenopathy.      Left: No supraclavicular adenopathy.       Abdominal:      General: Bowel sounds are decreased. There is no distension.      Palpations: Abdomen is soft. There is no mass.      Tenderness: There is no abdominal tenderness.   Musculoskeletal:         General: Normal range of motion.      Cervical back: Normal range of motion.   Lymphadenopathy:      Cervical: No cervical adenopathy.      Upper Body:      Right upper body: No supraclavicular adenopathy.      Left upper body: No supraclavicular adenopathy.   Skin:     General: Skin is warm and dry.      Capillary Refill: Capillary refill  takes less than 2 seconds.      Coloration: Skin is pale.      Findings: No rash.   Neurological:      Mental Status: She is alert and oriented to person, place, and time.      Sensory: No sensory deficit.      Coordination: Coordination normal.      Gait: Gait normal.   Psychiatric:         Mood and Affect: Mood is anxious. Mood is not depressed. Affect is not labile.         Speech: Speech normal.         Behavior: Behavior normal.         Thought Content: Thought content normal.         Judgment: Judgment normal.       Flow (L/min): 6     Temp:  [97.7 °F (36.5 °C)-98.4 °F (36.9 °C)] 98.4 °F (36.9 °C)  Pulse:  [61-87] 71  Resp:  [16-20] 18  SpO2:  [95 %-99 %] 95 %  BP: (108-151)/(52-67) 108/52      Lab Results   Component Value Date    [id number] Positive (A) 02/06/2022    [id number] Detected (A) 12/31/2021    [id number] Not Detected 07/13/2020      Recent Labs   Lab 02/07/22  0630 02/07/22  0630 02/08/22  0508 02/08/22  0508 02/09/22  0534 02/09/22  1129 02/12/22  0454 02/13/22  0521   PROCAL  --   --   --   --   --  0.13  --   --    FERRITIN  --   --   --   --   --  783*  --   --    LACTATE  --   --   --   --   --  1.5  --   --    LDH  --   --  283*  --   --   --   --  335*   CRP  --   --  266.4*  --   --   --   --  254.9*   DDIMER  --   --  1.84*  --   --   --   --   --      --   --   --   --  139  --   --    WBC 6.67   < > 5.38  --  8.47  --  13.65*  --    GRAN 83.4*  5.6   < > 72.7  3.9   < > 75.6*  6.4  --  78.6*  10.7*  --    LYMPH 9.1*  0.6*   < > 20.1  1.1   < > 16.1*  1.4  --  11.3*  1.5  --    HGB 10.6*   < > 11.1*   < > 11.7*  --  10.4*  --    HCT 33.0*   < > 35.7*   < > 38.4  --  33.4*  --    BUN 9  --   --   --   --  16  --   --    CREATININE 0.7  --   --   --   --  0.8  --   --    ESTGFRAFRICA >60  --   --   --   --  >60  --   --    EGFRNONAA >60  --   --   --   --  >60  --   --    K 4.7  --   --   --   --  4.0  --   --      --   --   --   --  100  --   --    CO2 23  --    --   --   --  28  --   --    MG 2.6  --   --   --   --   --   --   --     < > = values in this interval not displayed.     Microbiology Results (last 7 days)     Procedure Component Value Units Date/Time    Blood culture [[id number]] Collected: 02/13/22 0521    Order Status: Completed Specimen: Blood Updated: 02/13/22 1545     Blood Culture, Routine No Growth to date    Blood culture [[id number]] Collected: 02/13/22 0521    Order Status: Completed Specimen: Blood Updated: 02/13/22 1545     Blood Culture, Routine No Growth to date    Blood culture [[id number]]  (Abnormal)  (Susceptibility) Collected: 02/06/22 1418    Order Status: Completed Specimen: Blood Updated: 02/11/22 1454     Blood Culture, Routine Gram stain aer bottle: Gram positive cocci in clusters resembling Staph      Positive results previously called 02/07/2022      STAPHYLOCOCCUS CAPITIS SUBSPECIES CAPITIS    Blood culture [[id number]]  (Abnormal)  (Susceptibility) Collected: 02/06/22 1418    Order Status: Completed Specimen: Blood Updated: 02/11/22 1454     Blood Culture, Routine Gram stain aer bottle: Gram positive cocci in clusters resembling Staph      Results called to and read back by:Brendan Villarreal LPN 02/07/2022  19:55      STAPHYLOCOCCUS CAPITIS SUBSPECIES CAPITIS        Antibiotics (From admission, onward)            Start     Stop Route Frequency Ordered    02/07/22 0900  DAPTOmycin (CUBICIN) 350 mg in sodium chloride 0.9% IVPB         -- IV Daily 02/06/22 1535    02/06/22 2100  mupirocin 2 % ointment         -- Top 2 times daily 02/06/22 1535        Anticoagulants     Ordered     Route Frequency Start Stop    02/06/22 1301  enoxaparin         SubQ 2 times daily 02/06/22 1315 --        Echo Saline Bubble? No  · Normal systolic function.  · The estimated ejection fraction is 55%.  · Normal left ventricular diastolic function.  · Mild pulmonic regurgitation.  · With normal right ventricular systolic function.  · No vegetation present.            Significant Labs: All pertinent labs within the past 24 hours have been reviewed.    Significant Imaging: I have reviewed all pertinent imaging results/findings within the past 24 hours.      Assessment/Plan:      * Severe sepsis secondary to covid pneumonia  - COVID-19 testing Collection Date: 12/31/2021 Collection Time:   2:17 PM   - Infection Control notified     - Isolation:   - Airborne, Contact and Droplet Precautions  - Cohort patients into COVID units  - N95 mask, wear eye protection  - 20 second hand hygiene              - Limit visitors per hospital policy              - Consolidating lab draws, nursing care, provider visits, and interventions    - Management:  Supplemental O2 to maintain SpO2 >92%  Telemetry  Continuous/intermittent Pulse Ox  Albuterol treatment PRN  Continue MVI, Qpm Zinc and Vitamin C, Vitamin D, Remdesivir, and dexamethasone  Encourage Incentive spirometer q 1 hour while awake    Advance Care Planning   Full Code       This patient does have evidence of infective focus  My overall impression is Severe  sepsis. Vital signs were reviewed and noted in progress note.  Antibiotics given-   Antibiotics (From admission, onward)            Start     Stop Route Frequency Ordered    02/07/22 0900  DAPTOmycin (CUBICIN) 350 mg in sodium chloride 0.9% IVPB         -- IV Daily 02/06/22 1535    02/06/22 2100  mupirocin 2 % ointment         -- Top 2 times daily 02/06/22 1535        Cultures were taken-   Microbiology Results (last 7 days)     Procedure Component Value Units Date/Time    Blood culture [[id number]] Collected: 02/13/22 0521    Order Status: Completed Specimen: Blood Updated: 02/13/22 1545     Blood Culture, Routine No Growth to date    Blood culture [[id number]] Collected: 02/13/22 0521    Order Status: Completed Specimen: Blood Updated: 02/13/22 1545     Blood Culture, Routine No Growth to date    Blood culture [[id number]]  (Abnormal)  (Susceptibility) Collected: 02/06/22 1418     Order Status: Completed Specimen: Blood Updated: 02/11/22 1454     Blood Culture, Routine Gram stain aer bottle: Gram positive cocci in clusters resembling Staph      Positive results previously called 02/07/2022      STAPHYLOCOCCUS CAPITIS SUBSPECIES CAPITIS    Blood culture [[id number]]  (Abnormal)  (Susceptibility) Collected: 02/06/22 1418    Order Status: Completed Specimen: Blood Updated: 02/11/22 1454     Blood Culture, Routine Gram stain aer bottle: Gram positive cocci in clusters resembling Staph      Results called to and read back by:Brendan Villarreal LPN 02/07/2022  19:55      STAPHYLOCOCCUS CAPITIS SUBSPECIES CAPITIS        Latest lactate reviewed, they are-  No results for input(s): LACTATE in the last 72 hours.    Organ dysfunction indicated by Acute respiratory failure     Source- Covid pneumonia    Source control Achieved by- Symptomatic relief    Improving, afebrile- continue Remdesivir plus Dexa  Repeated Procal, Lactic on 2/9/22- normal    Improving    Continue Daptomycin  Check ESR and CRP    Sepsis resolved, ESR/CRP improving- continue Daptomycin    Acute hypoxemic respiratory failure secondary to Covid pneumonia  Patient with Hypoxic Respiratory failure which is Acute.  she is not on home oxygen. Supplemental oxygen was provided and noted-  .   Signs/symptoms of respiratory failure include- tachypnea and increased work of breathing. Contributing diagnoses includes - Covid  Pneumonia      --continue supportive O2  --Telemetry  --Vitals Q4H    Stable, encouraged to do deep breathing exercises    Gradually improving    Improving, try to dial down the O2 further  D/C home soon    Infected finger joint  Continue home Iv Daptomycin, expected to continue until 2/26/2022, managed by ID as outpatient  Patient reports she has orthopedic appointment for this week to get stitches out    --Continue Daptomycin    Slowly improving        Hypoalbuminemia  Decreased from previous hospitalization    --add boost to all  meals      Normocytic anemia  Chronic, likely secondary to chronic disease, normocytic    --Daily CBC  --Transfuse with 1 unit PRBC for Hgb <7.0 or <8.0 if symptomatic  --Iron deficient: replete with Venofer IV 200mg x5 days   --hem/Onc follow-up    Has KIERA, low B12- give Venofer plus B12 IM    Debility  2/06 Fall and skin precautions  Turn Q 2 hours  Consult PT and OT    Continue PT/OT at home with         VTE Risk Mitigation (From admission, onward)         Ordered     enoxaparin injection 60 mg  2 times daily         02/06/22 1301                Discharge Planning   JUAN:      Code Status: Full Code   Is the patient medically ready for discharge?:     Reason for patient still in hospital (select all that apply): Patient trending condition, Laboratory test, Treatment, Imaging, Consult recommendations and PT / OT recommendations  Discharge Plan A: Home Health        José Miguel Blankenship MD  Department of Hospital Medicine   '[place] - Telemetry ([facility])

## 2022-02-13 NOTE — PLAN OF CARE
Problem: Adjustment to Illness (Sepsis/Septic Shock)  Goal: Optimal Coping  Intervention: Optimize Psychosocial Adjustment to Illness  Flowsheets (Taken 2/13/2022 0152)  Supportive Measures:   active listening utilized   relaxation techniques promoted     Problem: Glycemic Control Impaired (Sepsis/Septic Shock)  Goal: Blood Glucose Level Within Desired Range  Intervention: Optimize Glycemic Control  Flowsheets (Taken 2/13/2022 0152)  Glycemic Management: blood glucose monitored     Problem: Infection Progression (Sepsis/Septic Shock)  Goal: Absence of Infection Signs and Symptoms  Intervention: Initiate Sepsis Management  Flowsheets (Taken 2/13/2022 0152)  Infection Management: aseptic technique maintained  Isolation Precautions: precautions maintained     Problem: Infection  Goal: Absence of Infection Signs and Symptoms  Intervention: Prevent or Manage Infection  Flowsheets (Taken 2/13/2022 0152)  Infection Management: aseptic technique maintained  Isolation Precautions: precautions maintained     Problem: Fall Injury Risk  Goal: Absence of Fall and Fall-Related Injury  Intervention: Identify and Manage Contributors  Flowsheets (Taken 2/13/2022 0152)  Medication Review/Management: medications reviewed  Intervention: Promote Injury-Free Environment  Flowsheets (Taken 2/13/2022 0152)  Safety Promotion/Fall Prevention:   assistive device/personal item within reach   bed alarm set   Fall Risk reviewed with patient/family   instructed to call staff for mobility     Problem: Gas Exchange Impaired  Goal: Optimal Gas Exchange  Intervention: Optimize Oxygenation and Ventilation  Flowsheets (Taken 2/13/2022 0152)  Airway/Ventilation Management: airway patency maintained  Head of Bed (HOB) Positioning: HOB elevated     Problem: Skin Injury Risk Increased  Goal: Skin Health and Integrity  Intervention: Optimize Skin Protection  Flowsheets (Taken 2/13/2022 0152)  Pressure Reduction Techniques:   frequent weight shift  encouraged   weight shift assistance provided  Head of Bed (HOB) Positioning: HOB elevated

## 2022-02-13 NOTE — HPI
81 y.o. female patient with a PMHx of Depression, Diverticulosis of colon, thyroid disease,  HLD and HTN who was recently managed in the hospital for  right index finger osteomyelitis -she was discharged with IV Daptomycin 350mg daily for 6 weeks -EOC 02/26/22 . She was admitted at this time with vomiting.  Blood cultures are positive for staph capitis.Associated sxs include dry cough and diahhrea.   Component      Latest Ref Rng & Units 2/12/2022 2/9/2022 2/8/2022   WBC      3.90 - 12.70 K/uL 13.65 (H) 8.47 5.38   Chest x-ray -   Development of bilateral infiltrates.  Echo - no vegetation

## 2022-02-13 NOTE — ASSESSMENT & PLAN NOTE
Patient with Hypoxic Respiratory failure which is Acute.  she is not on home oxygen. Supplemental oxygen was provided and noted-  .   Signs/symptoms of respiratory failure include- tachypnea and increased work of breathing. Contributing diagnoses includes - Pneumonia Labs and images were reviewed. Patient Has recent ABG, which has been reviewed. Will treat underlying causes and adjust management of respiratory failure as follows- continue oxygen supplementation , follow primary team

## 2022-02-13 NOTE — SUBJECTIVE & OBJECTIVE
Interval History: comfortably sitting in chair, eating dinner, no SOB, walked in the room earlier. O2 down to 6 L and sats maintained at 96-99%- will wean O2 down further. Repeat Blood Cx NGTD, ESR, CRP coming down, LDH still rising. Continue same Tx.     Review of Systems   Constitutional: Negative for activity change, appetite change, diaphoresis, fatigue and fever.   HENT: Negative for ear discharge, ear pain and facial swelling.    Eyes: Negative for pain and redness.   Respiratory: Positive for shortness of breath. Negative for cough.         No sputum   Cardiovascular: Negative for chest pain, palpitations and leg swelling.   Gastrointestinal: Negative for abdominal distention, abdominal pain, blood in stool, constipation, diarrhea, nausea and vomiting.   Endocrine: Negative for polydipsia and polyphagia.   Genitourinary: Negative for difficulty urinating, dyspareunia, dysuria, flank pain and hematuria.   Musculoskeletal: Negative for neck pain and neck stiffness.   Skin: Positive for wound. Negative for color change.        Stitches intact to right index finger   Allergic/Immunologic: Negative for food allergies.   Neurological: Negative for facial asymmetry, speech difficulty and weakness.   Hematological: Does not bruise/bleed easily.   Psychiatric/Behavioral: Negative for agitation, behavioral problems, confusion, dysphoric mood and suicidal ideas. The patient is not nervous/anxious.      Objective:     Vital Signs (Most Recent):  Temp: 98.4 °F (36.9 °C) (02/13/22 1620)  Pulse: 71 (02/13/22 1720)  Resp: 18 (02/13/22 1620)  BP: (!) 108/52 (02/13/22 1620)  SpO2: 95 % (02/13/22 1720) Vital Signs (24h Range):  Temp:  [97.7 °F (36.5 °C)-98.4 °F (36.9 °C)] 98.4 °F (36.9 °C)  Pulse:  [61-87] 71  Resp:  [16-20] 18  SpO2:  [95 %-99 %] 95 %  BP: (108-151)/(52-67) 108/52     Weight: 57.3 kg (126 lb 5.2 oz)  Body mass index is 25.51 kg/m².    Intake/Output Summary (Last 24 hours) at 2/13/2022 0566  Last data filed at  2/13/2022 1200  Gross per 24 hour   Intake 650 ml   Output 300 ml   Net 350 ml      Physical Exam  Vitals and nursing note reviewed.   Constitutional:       General: She is not in acute distress.     Appearance: She is well-developed. She is ill-appearing.   HENT:      Head: Normocephalic and atraumatic.      Right Ear: Hearing and external ear normal.      Left Ear: Hearing and external ear normal.      Nose: No rhinorrhea.      Right Sinus: No maxillary sinus tenderness or frontal sinus tenderness.      Left Sinus: No maxillary sinus tenderness or frontal sinus tenderness.      Mouth/Throat:      Mouth: No oral lesions.      Pharynx: Uvula midline.   Eyes:      General:         Right eye: No discharge.         Left eye: No discharge.      Conjunctiva/sclera: Conjunctivae normal.      Pupils: Pupils are equal, round, and reactive to light.   Neck:      Thyroid: No thyromegaly.      Vascular: No carotid bruit.      Trachea: No tracheal deviation.   Cardiovascular:      Rate and Rhythm: Normal rate and regular rhythm.      Pulses:           Dorsalis pedis pulses are 2+ on the right side and 2+ on the left side.      Heart sounds: Normal heart sounds, S1 normal and S2 normal. No murmur heard.      Pulmonary:      Effort: Pulmonary effort is normal. No respiratory distress.      Breath sounds: No rales.   Chest:   Breasts:      Right: No supraclavicular adenopathy.      Left: No supraclavicular adenopathy.       Abdominal:      General: Bowel sounds are decreased. There is no distension.      Palpations: Abdomen is soft. There is no mass.      Tenderness: There is no abdominal tenderness.   Musculoskeletal:         General: Normal range of motion.      Cervical back: Normal range of motion.   Lymphadenopathy:      Cervical: No cervical adenopathy.      Upper Body:      Right upper body: No supraclavicular adenopathy.      Left upper body: No supraclavicular adenopathy.   Skin:     General: Skin is warm and dry.       Capillary Refill: Capillary refill takes less than 2 seconds.      Coloration: Skin is pale.      Findings: No rash.   Neurological:      Mental Status: She is alert and oriented to person, place, and time.      Sensory: No sensory deficit.      Coordination: Coordination normal.      Gait: Gait normal.   Psychiatric:         Mood and Affect: Mood is anxious. Mood is not depressed. Affect is not labile.         Speech: Speech normal.         Behavior: Behavior normal.         Thought Content: Thought content normal.         Judgment: Judgment normal.       Flow (L/min): 6     Temp:  [97.7 °F (36.5 °C)-98.4 °F (36.9 °C)] 98.4 °F (36.9 °C)  Pulse:  [61-87] 71  Resp:  [16-20] 18  SpO2:  [95 %-99 %] 95 %  BP: (108-151)/(52-67) 108/52      Lab Results   Component Value Date    [id number] Positive (A) 02/06/2022    [id number] Detected (A) 12/31/2021    [id number] Not Detected 07/13/2020      Recent Labs   Lab 02/07/22  0630 02/07/22  0630 02/08/22  0508 02/08/22  0508 02/09/22  0534 02/09/22  1129 02/12/22  0454 02/13/22  0521   PROCAL  --   --   --   --   --  0.13  --   --    FERRITIN  --   --   --   --   --  783*  --   --    LACTATE  --   --   --   --   --  1.5  --   --    LDH  --   --  283*  --   --   --   --  335*   CRP  --   --  266.4*  --   --   --   --  254.9*   DDIMER  --   --  1.84*  --   --   --   --   --      --   --   --   --  139  --   --    WBC 6.67   < > 5.38  --  8.47  --  13.65*  --    GRAN 83.4*  5.6   < > 72.7  3.9   < > 75.6*  6.4  --  78.6*  10.7*  --    LYMPH 9.1*  0.6*   < > 20.1  1.1   < > 16.1*  1.4  --  11.3*  1.5  --    HGB 10.6*   < > 11.1*   < > 11.7*  --  10.4*  --    HCT 33.0*   < > 35.7*   < > 38.4  --  33.4*  --    BUN 9  --   --   --   --  16  --   --    CREATININE 0.7  --   --   --   --  0.8  --   --    ESTGFRAFRICA >60  --   --   --   --  >60  --   --    EGFRNONAA >60  --   --   --   --  >60  --   --    K 4.7  --   --   --   --  4.0  --   --      --   --   --    --  100  --   --    CO2 23  --   --   --   --  28  --   --    MG 2.6  --   --   --   --   --   --   --     < > = values in this interval not displayed.     Microbiology Results (last 7 days)     Procedure Component Value Units Date/Time    Blood culture [[id number]] Collected: 02/13/22 0521    Order Status: Completed Specimen: Blood Updated: 02/13/22 1545     Blood Culture, Routine No Growth to date    Blood culture [[id number]] Collected: 02/13/22 0521    Order Status: Completed Specimen: Blood Updated: 02/13/22 1545     Blood Culture, Routine No Growth to date    Blood culture [[id number]]  (Abnormal)  (Susceptibility) Collected: 02/06/22 1418    Order Status: Completed Specimen: Blood Updated: 02/11/22 1454     Blood Culture, Routine Gram stain aer bottle: Gram positive cocci in clusters resembling Staph      Positive results previously called 02/07/2022      STAPHYLOCOCCUS CAPITIS SUBSPECIES CAPITIS    Blood culture [[id number]]  (Abnormal)  (Susceptibility) Collected: 02/06/22 1418    Order Status: Completed Specimen: Blood Updated: 02/11/22 1454     Blood Culture, Routine Gram stain aer bottle: Gram positive cocci in clusters resembling Staph      Results called to and read back by:Brendan Villarreal LPN 02/07/2022  19:55      STAPHYLOCOCCUS CAPITIS SUBSPECIES CAPITIS        Antibiotics (From admission, onward)            Start     Stop Route Frequency Ordered    02/07/22 0900  DAPTOmycin (CUBICIN) 350 mg in sodium chloride 0.9% IVPB         -- IV Daily 02/06/22 1535    02/06/22 2100  mupirocin 2 % ointment         -- Top 2 times daily 02/06/22 1535        Anticoagulants     Ordered     Route Frequency Start Stop    02/06/22 1301  enoxaparin         SubQ 2 times daily 02/06/22 1315 --        Echo Saline Bubble? No  · Normal systolic function.  · The estimated ejection fraction is 55%.  · Normal left ventricular diastolic function.  · Mild pulmonic regurgitation.  · With normal right ventricular systolic function.  · No  vegetation present.           Significant Labs: All pertinent labs within the past 24 hours have been reviewed.    Significant Imaging: I have reviewed all pertinent imaging results/findings within the past 24 hours.

## 2022-02-13 NOTE — CONSULTS
[facility])  Infectious Disease  Consult Note    Patient Name: Haim Martin  MRN: [id number]  Admission Date: 2/6/2022  Hospital Length of Stay: 6 days  Attending Physician: José Miguel Blankenship MD  Primary Care Provider: Kavya Mesa MD     Isolation Status: Airborne and Contact and Droplet    Patient information was obtained from patient, past medical records and ER records.      Consults  Assessment/Plan:     Bacteremia  Isolate is likely a contaminant-  Will follow repeat blood culture .  Already on daptomycin     Acute hypoxemic respiratory failure secondary to Covid pneumonia  Patient with Hypoxic Respiratory failure which is Acute.  she is not on home oxygen. Supplemental oxygen was provided and noted-  .   Signs/symptoms of respiratory failure include- tachypnea and increased work of breathing. Contributing diagnoses includes - Pneumonia Labs and images were reviewed. Patient Has recent ABG, which has been reviewed. Will treat underlying causes and adjust management of respiratory failure as follows- continue oxygen supplementation , follow primary team    Infected finger joint  Will continue Daptomycin -EOC 2/26/22        Thank you for your consult. I will follow-up with patient. Please contact us if you have any additional questions.    Kevin Flor MD  Infectious Disease  [facility])    Subjective:     Principal Problem: Severe sepsis    HPI:   81 y.o. female patient with a PMHx of Depression, Diverticulosis of colon, thyroid disease,  HLD and HTN who was recently managed in the hospital for  right index finger osteomyelitis -she was discharged with IV Daptomycin 350mg daily for 6 weeks -EOC 02/26/22 . She was admitted at this time with vomiting.  Blood cultures are positive for staph capitis.Associated sxs include dry cough and diahhrea.   Component      Latest Ref Rng & Units 2/12/2022 2/9/2022 2/8/2022   WBC      3.90 - 12.70 K/uL 13.65 (H) 8.47 5.38    Chest x-ray -   Development of bilateral infiltrates.  Echo - no vegetation      Past Medical History:   Diagnosis Date    Depression     Diverticulosis of colon (without mention of hemorrhage) 8/25/2014    Hyperlipidemia     Hypertension     Thyroid disease        Past Surgical History:   Procedure Laterality Date    APPENDECTOMY      CATARACT EXTRACTION EXTRACAPSULAR W/ INTRAOCULAR LENS IMPLANTATION Bilateral 4/2014    INCISION AND DRAINAGE OF HAND Right 1/21/2022    Procedure: INCISION AND DRAINAGE, HAND;  Surgeon: Ramirez Flores MD;  [facility];  Service: Orthopedics;  Laterality: Right;  Right Index Finger    INJECTION OF ANESTHETIC AGENT AROUND MEDIAL BRANCH NERVES INNERVATING LUMBAR FACET JOINT Bilateral 7/16/2020    Procedure: Bilateral L3-5 MBB with local;  Surgeon: Luis Ashraf MD;  Location: [facility];  Service: Pain Management;  Laterality: Bilateral;    INJECTION OF ANESTHETIC AGENT AROUND MEDIAL BRANCH NERVES INNERVATING LUMBAR FACET JOINT Bilateral 8/10/2020    Procedure: Bilateral L3-5 MBB with local;  Surgeon: Luis Ashraf MD;  Location: [facility];  Service: Pain Management;  Laterality: Bilateral;    INJECTION OF ANESTHETIC AGENT INTO SACROILIAC JOINT Bilateral 11/4/2021    Procedure: Bilateral SIJ Injection;  Surgeon: Luis Ashraf MD;  Location: [facility];  Service: Pain Management;  Laterality: Bilateral;    RADIOFREQUENCY THERMOCOAGULATION Left 9/28/2020    Procedure: Left L3-5 Lumbar RFA;  Surgeon: Luis Ashraf MD;  Location: [facility];  Service: Pain Management;  Laterality: Left;    RADIOFREQUENCY THERMOCOAGULATION Right 10/12/2020    Procedure: Right L3-5 Lumbar RFA;  Surgeon: Luis Ashraf MD;  Location: [facility];  Service: Pain Management;  Laterality: Right;    STOMACH SURGERY  1998    not sure what they did, possible bowel resection, partial gastrectomy    TONSILLECTOMY         Review of patient's allergies indicates:    Allergen Reactions    Clindamycin      Heaviness in chest      Penicillins Hives     Pt has tolerated cephalexin       Medications:  Medications Prior to Admission   Medication Sig    amitriptyline (ELAVIL) 75 MG tablet Take 1 tablet by mouth in the evening    aspirin (ECOTRIN) 81 MG EC tablet Take 81 mg by mouth once daily.    levothyroxine (SYNTHROID) 88 MCG tablet TAKE 1 TABLET BY MOUTH ONCE DAILY MONDAY - SATURDAY  AND ONE & ONE-HALF TABLET ON SUNDAY    mupirocin (BACTROBAN) 2 % ointment Apply topically 2 (two) times daily.    omeprazole (PRILOSEC) 20 MG capsule Take 1 capsule (20 mg total) by mouth as needed.    propranoloL (INDERAL) 40 MG tablet Take 40 mg by mouth 2 (two) times daily.    sertraline (ZOLOFT) 100 MG tablet Take 1 tablet by mouth once daily    sumatriptan (IMITREX) 100 MG tablet TAKE 1 TABLET BY MOUTH AT ONSET OF MIGRAINE    vit C/E/Zn/coppr/lutein/zeaxan (PRESERVISION AREDS-2 ORAL) Take 1 tablet by mouth 2 (two) times a day.    alendronate (FOSAMAX) 70 MG tablet Take 70 mg by mouth every 7 days.    atorvastatin (LIPITOR) 40 MG tablet Take 40 mg by mouth every evening.    ergocalciferol (ERGOCALCIFEROL) 50,000 unit Cap Take 1 capsule by mouth once a week (Patient taking differently: On Sundays)    sodium chloride 0.9% SolP 50 mL with DAPTOmycin 350 mg SolR 350 mg Inject 350 mg into the vein once daily. Ending 02/6/22     Antibiotics (From admission, onward)            Start     Stop Route Frequency Ordered    02/07/22 0900  DAPTOmycin (CUBICIN) 350 mg in sodium chloride 0.9% IVPB         -- IV Daily 02/06/22 1535    02/06/22 2100  mupirocin 2 % ointment         -- Top 2 times daily 02/06/22 1535        Antifungals (From admission, onward)            None        Antivirals (From admission, onward)    None           Immunization History   Administered Date(s) Administered    COVID-19, MRNA, LN-S, PF (Pfizer) (Purple Cap) 01/11/2021, 02/01/2021    Influenza 09/13/2013     Influenza - High Dose - PF (65 years and older) 10/10/2011, 10/02/2012, 09/22/2014, 08/21/2015, 10/21/2016, 09/26/2018    Influenza Split 10/18/2006, 10/07/2008, 10/07/2009, 10/12/2010    Pneumococcal Conjugate - 13 Valent 07/15/2015    Pneumococcal Polysaccharide - 23 Valent 05/16/2019    Tdap 07/15/2015, 09/28/2019       Family History     Problem Relation (Age of Onset)    Breast cancer Maternal Aunt    Coronary artery disease Father    Diabetes type II Father    Kidney cancer Maternal Aunt    Lupus Mother    Stroke Father        Social History     Socioeconomic History    Marital status:    Tobacco Use    Smoking status: Former Smoker    Smokeless tobacco: Never Used    Tobacco comment: quit 30 years ago   Substance and Sexual Activity    Alcohol use: No    Drug use: No    Sexual activity: Not Currently     Birth control/protection: None     Review of Systems   Constitutional: Positive for activity change, appetite change and fatigue. Negative for diaphoresis and fever.   HENT: Negative for ear discharge, ear pain and facial swelling.    Eyes: Negative for pain and redness.   Respiratory: Positive for shortness of breath. Negative for cough.         No sputum   Cardiovascular: Negative for chest pain, palpitations and leg swelling.   Gastrointestinal: Negative for abdominal distention, abdominal pain, blood in stool, constipation, diarrhea, nausea and vomiting.   Endocrine: Negative for polydipsia and polyphagia.   Genitourinary: Negative for difficulty urinating, dyspareunia, dysuria, flank pain and hematuria.   Musculoskeletal: Negative for neck pain and neck stiffness.   Skin: Positive for wound. Negative for color change.        -   Allergic/Immunologic: Negative for food allergies.   Neurological: Positive for weakness. Negative for facial asymmetry and speech difficulty.   Hematological: Does not bruise/bleed easily.   Psychiatric/Behavioral: Negative for agitation, behavioral problems,  confusion, dysphoric mood and suicidal ideas. The patient is not nervous/anxious.      Objective:     Vital Signs (Most Recent):  Temp: 98 °F (36.7 °C) (02/12/22 2302)  Pulse: 66 (02/12/22 2302)  Resp: 18 (02/12/22 2302)  BP: (!) 126/58 (02/12/22 2302)  SpO2: 97 % (02/12/22 2302) Vital Signs (24h Range):  Temp:  [97.7 °F (36.5 °C)-98.3 °F (36.8 °C)] 98 °F (36.7 °C)  Pulse:  [61-72] 66  Resp:  [18-20] 18  SpO2:  [94 %-99 %] 97 %  BP: (113-132)/(54-61) 126/58     Weight: 57.3 kg (126 lb 5.2 oz)  Body mass index is 25.51 kg/m².    Estimated Creatinine Clearance: 43.9 mL/min (based on SCr of 0.8 mg/dL).    Physical Exam  Vitals and nursing note reviewed.   Constitutional:       General: She is not in acute distress.     Appearance: She is well-developed. She is ill-appearing.   HENT:      Head: Normocephalic and atraumatic.      Right Ear: Hearing and external ear normal.      Left Ear: Hearing and external ear normal.      Nose: No rhinorrhea.      Right Sinus: No maxillary sinus tenderness or frontal sinus tenderness.      Left Sinus: No maxillary sinus tenderness or frontal sinus tenderness.      Mouth/Throat:      Mouth: No oral lesions.      Pharynx: Uvula midline.   Eyes:      General:         Right eye: No discharge.         Left eye: No discharge.      Conjunctiva/sclera: Conjunctivae normal.      Pupils: Pupils are equal, round, and reactive to light.   Neck:      Thyroid: No thyromegaly.      Vascular: No carotid bruit.      Trachea: No tracheal deviation.   Cardiovascular:      Rate and Rhythm: Normal rate and regular rhythm.      Pulses:           Dorsalis pedis pulses are 2+ on the right side and 2+ on the left side.      Heart sounds: Normal heart sounds, S1 normal and S2 normal. No murmur heard.      Pulmonary:      Effort: Pulmonary effort is normal. No respiratory distress.      Breath sounds: No rales.   Chest:   Breasts:      Right: No supraclavicular adenopathy.      Left: No supraclavicular  adenopathy.       Abdominal:      General: Bowel sounds are decreased. There is no distension.      Palpations: Abdomen is soft. There is no mass.      Tenderness: There is no abdominal tenderness.   Musculoskeletal:         General: Normal range of motion.      Cervical back: Normal range of motion.   Lymphadenopathy:      Cervical: No cervical adenopathy.      Upper Body:      Right upper body: No supraclavicular adenopathy.      Left upper body: No supraclavicular adenopathy.   Skin:     General: Skin is warm and dry.      Capillary Refill: Capillary refill takes less than 2 seconds.      Coloration: Skin is pale.      Findings: No rash.   Neurological:      Mental Status: She is alert and oriented to person, place, and time.      Sensory: No sensory deficit.      Coordination: Coordination normal.      Gait: Gait normal.   Psychiatric:         Mood and Affect: Mood is anxious. Mood is not depressed. Affect is not labile.         Speech: Speech normal.         Behavior: Behavior normal.         Thought Content: Thought content normal.         Judgment: Judgment normal.         Significant Labs:   CBC:   Recent Labs   Lab 02/12/22  0454   WBC 13.65*   HGB 10.4*   HCT 33.4*        CMP: No results for input(s): NA, K, CL, CO2, GLU, BUN, CREATININE, CALCIUM, PROT, ALBUMIN, BILITOT, ALKPHOS, AST, ALT, ANIONGAP, EGFRNONAA in the last 48 hours.    Invalid input(s): ESTGFAFRICA    Significant Imaging: I have reviewed all pertinent imaging results/findings within the past 24 hours.

## 2022-02-13 NOTE — SUBJECTIVE & OBJECTIVE
Past Medical History:   Diagnosis Date    Depression     Diverticulosis of colon (without mention of hemorrhage) 8/25/2014    Hyperlipidemia     Hypertension     Thyroid disease        Past Surgical History:   Procedure Laterality Date    APPENDECTOMY      CATARACT EXTRACTION EXTRACAPSULAR W/ INTRAOCULAR LENS IMPLANTATION Bilateral 4/2014    INCISION AND DRAINAGE OF HAND Right 1/21/2022    Procedure: INCISION AND DRAINAGE, HAND;  Surgeon: Ramirez Flores MD;  Location: [facility];  Service: Orthopedics;  Laterality: Right;  Right Index Finger    INJECTION OF ANESTHETIC AGENT AROUND MEDIAL BRANCH NERVES INNERVATING LUMBAR FACET JOINT Bilateral 7/16/2020    Procedure: Bilateral L3-5 MBB with local;  Surgeon: Luis Ashraf MD;  Location: [facility];  Service: Pain Management;  Laterality: Bilateral;    INJECTION OF ANESTHETIC AGENT AROUND MEDIAL BRANCH NERVES INNERVATING LUMBAR FACET JOINT Bilateral 8/10/2020    Procedure: Bilateral L3-5 MBB with local;  Surgeon: Luis Ashraf MD;  Location: [facility];  Service: Pain Management;  Laterality: Bilateral;    INJECTION OF ANESTHETIC AGENT INTO SACROILIAC JOINT Bilateral 11/4/2021    Procedure: Bilateral SIJ Injection;  Surgeon: Luis Ashraf MD;  Location: [facility];  Service: Pain Management;  Laterality: Bilateral;    RADIOFREQUENCY THERMOCOAGULATION Left 9/28/2020    Procedure: Left L3-5 Lumbar RFA;  Surgeon: Luis Ashraf MD;  Location: [facility];  Service: Pain Management;  Laterality: Left;    RADIOFREQUENCY THERMOCOAGULATION Right 10/12/2020    Procedure: Right L3-5 Lumbar RFA;  Surgeon: Luis Ashraf MD;  Location: [facility];  Service: Pain Management;  Laterality: Right;    STOMACH SURGERY  1998    not sure what they did, possible bowel resection, partial gastrectomy    TONSILLECTOMY         Review of patient's allergies indicates:   Allergen Reactions    Clindamycin      Heaviness in chest      Penicillins  Hives     Pt has tolerated cephalexin       Medications:  Medications Prior to Admission   Medication Sig    amitriptyline (ELAVIL) 75 MG tablet Take 1 tablet by mouth in the evening    aspirin (ECOTRIN) 81 MG EC tablet Take 81 mg by mouth once daily.    levothyroxine (SYNTHROID) 88 MCG tablet TAKE 1 TABLET BY MOUTH ONCE DAILY MONDAY - SATURDAY  AND ONE & ONE-HALF TABLET ON SUNDAY    mupirocin (BACTROBAN) 2 % ointment Apply topically 2 (two) times daily.    omeprazole (PRILOSEC) 20 MG capsule Take 1 capsule (20 mg total) by mouth as needed.    propranoloL (INDERAL) 40 MG tablet Take 40 mg by mouth 2 (two) times daily.    sertraline (ZOLOFT) 100 MG tablet Take 1 tablet by mouth once daily    sumatriptan (IMITREX) 100 MG tablet TAKE 1 TABLET BY MOUTH AT ONSET OF MIGRAINE    vit C/E/Zn/coppr/lutein/zeaxan (PRESERVISION AREDS-2 ORAL) Take 1 tablet by mouth 2 (two) times a day.    alendronate (FOSAMAX) 70 MG tablet Take 70 mg by mouth every 7 days.    atorvastatin (LIPITOR) 40 MG tablet Take 40 mg by mouth every evening.    ergocalciferol (ERGOCALCIFEROL) 50,000 unit Cap Take 1 capsule by mouth once a week (Patient taking differently: On Sundays)    sodium chloride 0.9% SolP 50 mL with DAPTOmycin 350 mg SolR 350 mg Inject 350 mg into the vein once daily. Ending 02/6/22     Antibiotics (From admission, onward)            Start     Stop Route Frequency Ordered    02/07/22 0900  DAPTOmycin (CUBICIN) 350 mg in sodium chloride 0.9% IVPB         -- IV Daily 02/06/22 1535    02/06/22 2100  mupirocin 2 % ointment         -- Top 2 times daily 02/06/22 1535        Antifungals (From admission, onward)            None        Antivirals (From admission, onward)    None           Immunization History   Administered Date(s) Administered    COVID-19, MRNA, LN-S, PF (Pfizer) (Purple Cap) 01/11/2021, 02/01/2021    Influenza 09/13/2013    Influenza - High Dose - PF (65 years and older) 10/10/2011, 10/02/2012, 09/22/2014,  08/21/2015, 10/21/2016, 09/26/2018    Influenza Split 10/18/2006, 10/07/2008, 10/07/2009, 10/12/2010    Pneumococcal Conjugate - 13 Valent 07/15/2015    Pneumococcal Polysaccharide - 23 Valent 05/16/2019    Tdap 07/15/2015, 09/28/2019       Family History     Problem Relation (Age of Onset)    Breast cancer Maternal Aunt    Coronary artery disease Father    Diabetes type II Father    Kidney cancer Maternal Aunt    Lupus Mother    Stroke Father        Social History     Socioeconomic History    Marital status:    Tobacco Use    Smoking status: Former Smoker    Smokeless tobacco: Never Used    Tobacco comment: quit 30 years ago   Substance and Sexual Activity    Alcohol use: No    Drug use: No    Sexual activity: Not Currently     Birth control/protection: None     Review of Systems   Constitutional: Positive for activity change, appetite change and fatigue. Negative for diaphoresis and fever.   HENT: Negative for ear discharge, ear pain and facial swelling.    Eyes: Negative for pain and redness.   Respiratory: Positive for shortness of breath. Negative for cough.         No sputum   Cardiovascular: Negative for chest pain, palpitations and leg swelling.   Gastrointestinal: Negative for abdominal distention, abdominal pain, blood in stool, constipation, diarrhea, nausea and vomiting.   Endocrine: Negative for polydipsia and polyphagia.   Genitourinary: Negative for difficulty urinating, dyspareunia, dysuria, flank pain and hematuria.   Musculoskeletal: Negative for neck pain and neck stiffness.   Skin: Positive for wound. Negative for color change.        -   Allergic/Immunologic: Negative for food allergies.   Neurological: Positive for weakness. Negative for facial asymmetry and speech difficulty.   Hematological: Does not bruise/bleed easily.   Psychiatric/Behavioral: Negative for agitation, behavioral problems, confusion, dysphoric mood and suicidal ideas. The patient is not nervous/anxious.       Objective:     Vital Signs (Most Recent):  Temp: 98 °F (36.7 °C) (02/12/22 2302)  Pulse: 66 (02/12/22 2302)  Resp: 18 (02/12/22 2302)  BP: (!) 126/58 (02/12/22 2302)  SpO2: 97 % (02/12/22 2302) Vital Signs (24h Range):  Temp:  [97.7 °F (36.5 °C)-98.3 °F (36.8 °C)] 98 °F (36.7 °C)  Pulse:  [61-72] 66  Resp:  [18-20] 18  SpO2:  [94 %-99 %] 97 %  BP: (113-132)/(54-61) 126/58     Weight: 57.3 kg (126 lb 5.2 oz)  Body mass index is 25.51 kg/m².    Estimated Creatinine Clearance: 43.9 mL/min (based on SCr of 0.8 mg/dL).    Physical Exam  Vitals and nursing note reviewed.   Constitutional:       General: She is not in acute distress.     Appearance: She is well-developed. She is ill-appearing.   HENT:      Head: Normocephalic and atraumatic.      Right Ear: Hearing and external ear normal.      Left Ear: Hearing and external ear normal.      Nose: No rhinorrhea.      Right Sinus: No maxillary sinus tenderness or frontal sinus tenderness.      Left Sinus: No maxillary sinus tenderness or frontal sinus tenderness.      Mouth/Throat:      Mouth: No oral lesions.      Pharynx: Uvula midline.   Eyes:      General:         Right eye: No discharge.         Left eye: No discharge.      Conjunctiva/sclera: Conjunctivae normal.      Pupils: Pupils are equal, round, and reactive to light.   Neck:      Thyroid: No thyromegaly.      Vascular: No carotid bruit.      Trachea: No tracheal deviation.   Cardiovascular:      Rate and Rhythm: Normal rate and regular rhythm.      Pulses:           Dorsalis pedis pulses are 2+ on the right side and 2+ on the left side.      Heart sounds: Normal heart sounds, S1 normal and S2 normal. No murmur heard.      Pulmonary:      Effort: Pulmonary effort is normal. No respiratory distress.      Breath sounds: No rales.   Chest:   Breasts:      Right: No supraclavicular adenopathy.      Left: No supraclavicular adenopathy.       Abdominal:      General: Bowel sounds are decreased. There is no  distension.      Palpations: Abdomen is soft. There is no mass.      Tenderness: There is no abdominal tenderness.   Musculoskeletal:         General: Normal range of motion.      Cervical back: Normal range of motion.   Lymphadenopathy:      Cervical: No cervical adenopathy.      Upper Body:      Right upper body: No supraclavicular adenopathy.      Left upper body: No supraclavicular adenopathy.   Skin:     General: Skin is warm and dry.      Capillary Refill: Capillary refill takes less than 2 seconds.      Coloration: Skin is pale.      Findings: No rash.   Neurological:      Mental Status: She is alert and oriented to person, place, and time.      Sensory: No sensory deficit.      Coordination: Coordination normal.      Gait: Gait normal.   Psychiatric:         Mood and Affect: Mood is anxious. Mood is not depressed. Affect is not labile.         Speech: Speech normal.         Behavior: Behavior normal.         Thought Content: Thought content normal.         Judgment: Judgment normal.         Significant Labs:   CBC:   Recent Labs   Lab 02/12/22  0454   WBC 13.65*   HGB 10.4*   HCT 33.4*        CMP: No results for input(s): NA, K, CL, CO2, GLU, BUN, CREATININE, CALCIUM, PROT, ALBUMIN, BILITOT, ALKPHOS, AST, ALT, ANIONGAP, EGFRNONAA in the last 48 hours.    Invalid input(s): ESTGF[place]    Significant Imaging: I have reviewed all pertinent imaging results/findings within the past 24 hours.

## 2022-02-13 NOTE — ASSESSMENT & PLAN NOTE
- COVID-19 testing Collection Date: 12/31/2021 Collection Time:   2:17 PM   - Infection Control notified     - Isolation:   - Airborne, Contact and Droplet Precautions  - Cohort patients into COVID units  - N95 mask, wear eye protection  - 20 second hand hygiene              - Limit visitors per hospital policy              - Consolidating lab draws, nursing care, provider visits, and interventions    - Management:  Supplemental O2 to maintain SpO2 >92%  Telemetry  Continuous/intermittent Pulse Ox  Albuterol treatment PRN  Continue MVI, Qpm Zinc and Vitamin C, Vitamin D, Remdesivir, and dexamethasone  Encourage Incentive spirometer q 1 hour while awake    Advance Care Planning   Full Code        This patient does have evidence of infective focus  My overall impression is Severe  sepsis. Vital signs were reviewed and noted in progress note.  Antibiotics given-   Antibiotics (From admission, onward)            Start     Stop Route Frequency Ordered    02/07/22 0900  DAPTOmycin (CUBICIN) 350 mg in sodium chloride 0.9% IVPB         -- IV Daily 02/06/22 1535    02/06/22 2100  mupirocin 2 % ointment         -- Top 2 times daily 02/06/22 1535        Cultures were taken-   Microbiology Results (last 7 days)     Procedure Component Value Units Date/Time    Blood culture [[id number]] Collected: 02/13/22 0521    Order Status: Completed Specimen: Blood Updated: 02/13/22 1545     Blood Culture, Routine No Growth to date    Blood culture [[id number]] Collected: 02/13/22 0521    Order Status: Completed Specimen: Blood Updated: 02/13/22 1545     Blood Culture, Routine No Growth to date    Blood culture [[id number]]  (Abnormal)  (Susceptibility) Collected: 02/06/22 1418    Order Status: Completed Specimen: Blood Updated: 02/11/22 1454     Blood Culture, Routine Gram stain aer bottle: Gram positive cocci in clusters resembling Staph      Positive results previously called 02/07/2022      STAPHYLOCOCCUS CAPITIS SUBSPECIES CAPITIS     Blood culture [[id number]]  (Abnormal)  (Susceptibility) Collected: 02/06/22 1418    Order Status: Completed Specimen: Blood Updated: 02/11/22 1454     Blood Culture, Routine Gram stain aer bottle: Gram positive cocci in clusters resembling Staph      Results called to and read back by:Brendan Villarreal LPN 02/07/2022  19:55      STAPHYLOCOCCUS CAPITIS SUBSPECIES CAPITIS        Latest lactate reviewed, they are-  No results for input(s): LACTATE in the last 72 hours.    Organ dysfunction indicated by Acute respiratory failure     Source- Covid pneumonia    Source control Achieved by- Symptomatic relief    Improving, afebrile- continue Remdesivir plus Dexa  Repeated Procal, Lactic on 2/9/22- normal    Improving    Continue Daptomycin  Check ESR and CRP    Sepsis resolved, ESR/CRP improving- continue Daptomycin

## 2022-02-13 NOTE — ASSESSMENT & PLAN NOTE
Patient with Hypoxic Respiratory failure which is Acute.  she is not on home oxygen. Supplemental oxygen was provided and noted-  .   Signs/symptoms of respiratory failure include- tachypnea and increased work of breathing. Contributing diagnoses includes - Covid  Pneumonia      --continue supportive O2  --Telemetry  --Vitals Q4H    Stable, encouraged to do deep breathing exercises    Gradually improving    Improving, try to dial down the O2 further  D/C home soon

## 2022-02-13 NOTE — PLAN OF CARE
"Care plan reviewed with patient.Up in the chair most of the shift. Verbalized "I am going home tomorrow according to the doctor." Encouraged deep breathing, turning and proning. Free from falls. No other complaints made.    "

## 2022-02-14 ENCOUNTER — PATIENT MESSAGE (OUTPATIENT)
Dept: ADMINISTRATIVE | Facility: CLINIC | Age: 82
End: 2022-02-14
Payer: MEDICARE

## 2022-02-14 VITALS
DIASTOLIC BLOOD PRESSURE: 56 MMHG | TEMPERATURE: 99 F | HEIGHT: 59 IN | HEART RATE: 82 BPM | SYSTOLIC BLOOD PRESSURE: 128 MMHG | OXYGEN SATURATION: 92 % | WEIGHT: 125.44 LBS | RESPIRATION RATE: 18 BRPM | BODY MASS INDEX: 25.29 KG/M2

## 2022-02-14 PROBLEM — M00.9: Status: RESOLVED | Noted: 2022-01-12 | Resolved: 2022-02-14

## 2022-02-14 PROBLEM — J96.01 ACUTE HYPOXEMIC RESPIRATORY FAILURE: Status: RESOLVED | Noted: 2022-02-06 | Resolved: 2022-02-14

## 2022-02-14 PROBLEM — R65.20 SEVERE SEPSIS: Status: RESOLVED | Noted: 2022-02-06 | Resolved: 2022-02-14

## 2022-02-14 PROBLEM — A41.9 SEVERE SEPSIS: Status: RESOLVED | Noted: 2022-02-06 | Resolved: 2022-02-14

## 2022-02-14 PROCEDURE — 27000221 HC OXYGEN, UP TO 24 HOURS

## 2022-02-14 PROCEDURE — 25000003 PHARM REV CODE 250: Performed by: NURSE PRACTITIONER

## 2022-02-14 PROCEDURE — 97530 THERAPEUTIC ACTIVITIES: CPT

## 2022-02-14 PROCEDURE — 99900035 HC TECH TIME PER 15 MIN (STAT)

## 2022-02-14 PROCEDURE — 94799 UNLISTED PULMONARY SVC/PX: CPT

## 2022-02-14 PROCEDURE — 94640 AIRWAY INHALATION TREATMENT: CPT

## 2022-02-14 PROCEDURE — 63600175 PHARM REV CODE 636 W HCPCS: Performed by: NURSE PRACTITIONER

## 2022-02-14 PROCEDURE — 25000003 PHARM REV CODE 250: Performed by: EMERGENCY MEDICINE

## 2022-02-14 PROCEDURE — 25000003 PHARM REV CODE 250: Performed by: INTERNAL MEDICINE

## 2022-02-14 PROCEDURE — 97116 GAIT TRAINING THERAPY: CPT

## 2022-02-14 PROCEDURE — 97535 SELF CARE MNGMENT TRAINING: CPT

## 2022-02-14 PROCEDURE — 94761 N-INVAS EAR/PLS OXIMETRY MLT: CPT

## 2022-02-14 RX ADMIN — ASPIRIN 81 MG: 81 TABLET, COATED ORAL at 09:02

## 2022-02-14 RX ADMIN — LEVOTHYROXINE SODIUM 88 MCG: 88 TABLET ORAL at 05:02

## 2022-02-14 RX ADMIN — PROPRANOLOL HYDROCHLORIDE 40 MG: 40 TABLET ORAL at 08:02

## 2022-02-14 RX ADMIN — THERA TABS 1 TABLET: TAB at 08:02

## 2022-02-14 RX ADMIN — IRON SUCROSE 200 MG: 20 INJECTION, SOLUTION INTRAVENOUS at 08:02

## 2022-02-14 RX ADMIN — SERTRALINE HYDROCHLORIDE 100 MG: 50 TABLET ORAL at 08:02

## 2022-02-14 RX ADMIN — PANTOPRAZOLE SODIUM 40 MG: 40 TABLET, DELAYED RELEASE ORAL at 08:02

## 2022-02-14 RX ADMIN — MUPIROCIN: 20 OINTMENT TOPICAL at 09:02

## 2022-02-14 RX ADMIN — SODIUM CHLORIDE: 0.9 INJECTION, SOLUTION INTRAVENOUS at 09:02

## 2022-02-14 RX ADMIN — CLONIDINE HYDROCHLORIDE 0.1 MG: 0.1 TABLET ORAL at 08:02

## 2022-02-14 RX ADMIN — DAPTOMYCIN 350 MG: 350 INJECTION, POWDER, LYOPHILIZED, FOR SOLUTION INTRAVENOUS at 09:02

## 2022-02-14 RX ADMIN — ALBUTEROL SULFATE 2 PUFF: 90 AEROSOL, METERED RESPIRATORY (INHALATION) at 07:02

## 2022-02-14 RX ADMIN — DEXAMETHASONE 6 MG: 4 TABLET ORAL at 08:02

## 2022-02-14 RX ADMIN — ALBUTEROL SULFATE 2 PUFF: 90 AEROSOL, METERED RESPIRATORY (INHALATION) at 02:02

## 2022-02-14 RX ADMIN — ENOXAPARIN SODIUM 60 MG: 100 INJECTION SUBCUTANEOUS at 08:02

## 2022-02-14 NOTE — RESPIRATORY THERAPY
Oxygen Evaluation    1) Patient's O2 Sat on room air while at rest: 88        If at rest Sat is 89% or above, continue.    2) Patient's O2 Sat on room air while exercising:          If exercise Sat is 88% or below, continue.    3) Patient's O2 Sat while exercising on O2: 95 at 3 LPM         (Must show improvement from #2 for patients to qualify)    If exercise Sat on O2 shows improvement from #2, this patient qualifies for portable Oxygen.  If not, the patient does not qualify.

## 2022-02-14 NOTE — PLAN OF CARE
Problem: Infection  Goal: Absence of Infection Signs and Symptoms  Intervention: Prevent or Manage Infection  Flowsheets (Taken 2/14/2022 0046)  Fever Reduction/Comfort Measures:   lightweight bedding   lightweight clothing  Infection Management: aseptic technique maintained  Isolation Precautions:   precautions maintained   airborne   contact   droplet     Problem: Fall Injury Risk  Goal: Absence of Fall and Fall-Related Injury  Intervention: Identify and Manage Contributors  Flowsheets (Taken 2/14/2022 0046)  Medication Review/Management: medications reviewed  Intervention: Promote Injury-Free Environment  Flowsheets (Taken 2/14/2022 0046)  Safety Promotion/Fall Prevention:   assistive device/personal item within reach   bed alarm set   Fall Risk reviewed with patient/family   nonskid shoes/socks when out of bed   medications reviewed   instructed to call staff for mobility     Problem: Gas Exchange Impaired  Goal: Optimal Gas Exchange  Intervention: Optimize Oxygenation and Ventilation  Flowsheets (Taken 2/14/2022 0046)  Airway/Ventilation Management: airway patency maintained  Head of Bed (HOB) Positioning: HOB elevated     Problem: Skin Injury Risk Increased  Goal: Skin Health and Integrity  Intervention: Optimize Skin Protection  Flowsheets (Taken 2/14/2022 0046)  Pressure Reduction Techniques:   frequent weight shift encouraged   weight shift assistance provided  Head of Bed (HOB) Positioning: HOB elevated     Problem: Infection (Pneumonia)  Goal: Resolution of Infection Signs and Symptoms  Intervention: Prevent Infection Progression  Flowsheets (Taken 2/14/2022 0046)  Fever Reduction/Comfort Measures:   lightweight bedding   lightweight clothing  Infection Management: aseptic technique maintained  Isolation Precautions:   precautions maintained   airborne   contact   droplet     Problem: Respiratory Compromise (Pneumonia)  Goal: Effective Oxygenation and Ventilation  Intervention: Optimize Oxygenation and  Ventilation  Flowsheets (Taken 2/14/2022 0046)  Airway/Ventilation Management: airway patency maintained  Head of Bed (HOB) Positioning: HOB elevated

## 2022-02-14 NOTE — PT/OT/SLP PROGRESS
Physical Therapy  Treatment    Haim Martin   MRN: [id number]   Admitting Diagnosis: Severe sepsis    PT Received On: 02/14/22  PT Start Time: 1100     PT Stop Time: 1123    PT Total Time (min): 23 min       Billable Minutes:  Gait Training 8 and Therapeutic Activity 13    Treatment Type: Treatment  PT/PTA: PT     PTA Visit Number: 0       General Precautions: Standard, airborne,contact,droplet,fall,respiratory  Orthopedic Precautions: N/A   Braces: N/A  Respiratory Status: 3L HIGH FLOW    Subjective:  Communicated with NURSE CURTIS prior to session.  Pain/Comfort  Pain Rating 1: 0/10    Objective:   Patient found with: oxygen,peripheral IV,telemetry    Functional Mobility:  Therapeutic Activities and Exercises:  PT FOUND SUPINE IN BED UPON ARRIVAL, AGREEABLE TO TX., SUP>SIT WITH SBA, SEATED SCOOT TO EOB WITH SBA, SIT>STAND WITH CGA, PT AMB 10' IN ROOM NO AD CGA/HHA, NO LOB OR SOB WITH O2 IN TOW, LIMITED DISTANCE DUE TO PT REQUEST TO USE BATHROOM, TF TO BEDSIDE COMMODE WITH CGA, PT REQUIRES CONNIE FOR CLEANING FRONT AND BOTTOM FROM BOWEL MOVEMENT, TF TO CHAIR WITH CGA, PT LEFT WITH ALL NEEDS MET, ENCOURAGED TO INCREASE TIME OOB IN CHAIR    AM-PAC 6 CLICK MOBILITY  How much help from another person does this patient currently need?   1 = Unable, Total/Dependent Assistance  2 = A lot, Maximum/Moderate Assistance  3 = A little, Minimum/Contact Guard/Supervision  4 = None, Modified [place]/Independent    Turning over in bed (including adjusting bedclothes, sheets and blankets)?: 4  Sitting down on and standing up from a chair with arms (e.g., wheelchair, bedside commode, etc.): 3  Moving from lying on back to sitting on the side of the bed?: 4  Moving to and from a bed to a chair (including a wheelchair)?: 3  Need to walk in hospital room?: 3  Climbing 3-5 steps with a railing?: 1  Basic Mobility Total Score: 18    AM-PAC Raw Score CMS G-Code Modifier Level of Impairment Assistance   6 % Total / Unable   7  - 9 CM 80 - 100% Maximal Assist   10 - 14 CL 60 - 80% Moderate Assist   15 - 19 CK 40 - 60% Moderate Assist   20 - 22 CJ 20 - 40% Minimal Assist   23 CI 1-20% SBA / CGA   24 CH 0% Independent/ Mod I     Patient left up in chair with all lines intact, call button in reach and NURSE notified.    Assessment:  Haim Martin is a 81 y.o. female with a medical diagnosis of Severe sepsis and presents with IMPAIRED FUNCTIONAL MOBILITY. PT WILL BENEFIT FROM CONT. SKILLED P.T. TO ADDRESS IMPAIRMENTS    Rehab identified problem list/impairments: Rehab identified problem list/impairments: weakness,impaired endurance,impaired functional mobilty,gait instability,impaired balance,decreased safety awareness,decreased lower extremity function    Rehab potential is good.    Activity tolerance: Good    Discharge recommendations: Discharge Facility/Level of Care Needs: home health PT     Barriers to discharge:      Equipment recommendations: Equipment Needed After Discharge: walker, rolling,bedside commode     GOALS:   Multidisciplinary Problems     Physical Therapy Goals        Problem: Physical Therapy Goal    Goal Priority Disciplines Outcome Goal Variances Interventions   Physical Therapy Goal     PT, PT/OT Ongoing, Progressing                     PLAN:    Patient to be seen 3 x/week  to address the above listed problems via gait training,therapeutic activities,therapeutic exercises  Plan of Care expires: 02/20/22  Plan of Care reviewed with: patient    02/14/2022

## 2022-02-14 NOTE — PLAN OF CARE
Home 02 approved, portable tank delivered to the nurses station.       02/14/22 1250   Post-Acute Status   Post-Acute Authorization HME   HME Status Set-up Complete/Auth obtained   Discharge Plan   Discharge Plan A [facility]

## 2022-02-14 NOTE — PT/OT/SLP PROGRESS
Occupational Therapy   Treatment    Name: Haim Martin  MRN: [id number]  Admitting Diagnosis:  Severe sepsis       Recommendations:     Discharge Recommendations: home health OT  Discharge Equipment Recommendations:  bedside commode,walker, rolling  Barriers to discharge:  None    Assessment:     Haim Martin is a 81 y.o. female with a medical diagnosis of Severe sepsis.  She presents with the following performance deficits affecting function are weakness,impaired endurance,impaired self care skills,impaired functional mobilty,impaired balance,impaired cardiopulmonary response to activity.     Rehab Prognosis:  Good; patient would benefit from acute skilled OT services to address these deficits and reach maximum level of function.       Plan:     Patient to be seen 2 x/week to address the above listed problems via self-care/home management,therapeutic activities,therapeutic exercises  · Plan of Care Expires: 02/23/22  · Plan of Care Reviewed with: patient    Subjective     Pain/Comfort:  · Pain Rating 1: 0/10    Objective:     Communicated with: NurseShirley, prior to session.  Patient found supine with oxygen,peripheral IV,telemetry upon OT entry to room.    General Precautions: Standard, airborne,contact,droplet,fall,respiratory   Orthopedic Precautions:N/A   Braces: N/A  Respiratory Status: Nasal cannula, flow 3 L/min    Bed Mobility:    · Patient completed Supine to Sit with stand by assistance and with side rail     Functional Mobility/Transfers:  · Patient completed Sit <> Stand Transfer with contact guard assistance  with  no assistive device   · Patient completed Bed <> Chair Transfer using Step Transfer technique with contact guard assistance with no assistive device  · Patient completed Toilet Transfer Step Transfer technique with contact guard assistance with  bedside commode  · Functional Mobility: Patient completed x6ft functional mobility with min HHA to increase dynamic standing balance and  activity tolerance needed for ADL completion.    Activities of Daily Living:  · Toileting: minimum assistance from BSC to improve quality. patient with rapid fatigue with completion      [facility] 6 Click ADL: 17    Treatment & Education:  Patient provided with cueing for technique for safe transfers at max independence level. Patient provided with education on energy conservation and activity pacing techniques to minimize over exertion. Encouraged to complete B UE AROM HEP BID to increase activity tolerance and strength. Reinforcement of all education required.    Patient left up in chair with all lines intact and call button in reachEducation:      GOALS:   Multidisciplinary Problems     Occupational Therapy Goals        Problem: Occupational Therapy Goal    Goal Priority Disciplines Outcome Interventions   Occupational Therapy Goal     OT, PT/OT Ongoing, Progressing    Description: Goals to be met by: 2/21/22     Patient will increase functional independence with ADLs by performing:    UE Dressing with Modified [place].  Toileting from toilet with Supervision for hygiene and clothing management.   Toilet transfer to toilet with Supervision.  Increased functional strength in B UE to grossly WFL to increase independence with ADLs.                     Time Tracking:     OT Date of Treatment: 02/14/22  OT Start Time: 1110  OT Stop Time: 1135  OT Total Time (min): 25 min    Billable Minutes:Self Care/Home Management 10  Therapeutic Activity 15    2/14/2022

## 2022-02-14 NOTE — PLAN OF CARE
Patient current with [facility] for IV Daptomycin.  Faxed resume orders with referral.  Patient current with [facility].  Notified Marilu with [facility] of discharge and resume orders for Daptomycin through 3-08-22.       02/14/22 1131   Post-Acute Status   Post-Acute Authorization Home Health;IV Infusion   Home Health Status Set-up Complete/Auth obtained   IV Infusion Status Set-up Complete/Auth obtained   Discharge Plan   Discharge Plan A [facility]

## 2022-02-14 NOTE — PLAN OF CARE
Patient with good participation in OT this date. Sup>sit with SBA, sit>stand CGA, functional mobility x6ft (limited by O2 line) with min HHA, BSC and bedside chair t/f with CGA, toileting hygiene with min A for quality. Recommending HHOT at d/c.

## 2022-02-14 NOTE — PLAN OF CARE
[facility] - Tele[facility] (Hospital)  Discharge Final Note    Primary Care Provider: Kavya Mesa MD    Expected Discharge Date: 2/14/2022    Final Discharge Note (most recent)     Final Note - 02/14/22 1307        Final Note    Assessment Type Final Discharge Note     Anticipated Discharge Disposition Home-Health Care Sv     What phone number can be called within the next 1-3 days to see how you are doing after discharge? [phone]     Hospital Resources/Appts/Education Provided Appointments scheduled by Navigator/Coordinator;Appointments scheduled and added to AVS        Post-Acute Status    Post-Acute Authorization Home Health;HME     HME Status Set-up Complete/Auth obtained   [facility]    Home Health Status Set-up Complete/Auth obtained   [facility]                Important Message from Medicare  Important Message from Medicare regarding Discharge Appeal Rights: Given to patient/caregiver,Explained to patient/caregiver,Other (comments) (spoke with [place] over telephone)     Date IMM was signed: 02/14/22  Time IMM was signed: 1012    Contact Info     Kevin Flor MD   Specialty: Infectious Diseases, Hospitalist    [address] [place] [id number]   Phone: [phone]       Next Steps: Schedule an appointment as soon as possible for a visit in 2 week(s)    Instructions: Hospital follow up, As needed - Call MD office if you need to be seen in the next 2 weeks    [facility]   Specialty: Home Health Services    [address] [place] [address]   Phone: [phone]       Next Steps: Follow up    Instructions: Home Health:  Agency will call and schedule a home visit    [facility]   Specialty: DME Provider    [facility] [place] [facility] [address]   Phone: [phone]       Next Steps: Follow up    Instructions: Provider of oxygen

## 2022-02-15 ENCOUNTER — LAB VISIT (OUTPATIENT)
Dept: LAB | Facility: HOSPITAL | Age: 82
End: 2022-02-15
Attending: INTERNAL MEDICINE
Payer: MEDICARE

## 2022-02-15 ENCOUNTER — NURSE TRIAGE (OUTPATIENT)
Dept: ADMINISTRATIVE | Facility: CLINIC | Age: 82
End: 2022-02-15
Payer: MEDICARE

## 2022-02-15 ENCOUNTER — PATIENT MESSAGE (OUTPATIENT)
Dept: ADMINISTRATIVE | Facility: OTHER | Age: 82
End: 2022-02-15
Payer: MEDICARE

## 2022-02-15 DIAGNOSIS — M86.9 INFLAMMATION OF BONE: Primary | ICD-10-CM

## 2022-02-15 LAB
ALBUMIN SERPL BCP-MCNC: 2.6 G/DL (ref 3.5–5.2)
ALP SERPL-CCNC: 103 U/L (ref 55–135)
ALT SERPL W/O P-5'-P-CCNC: 19 U/L (ref 10–44)
ANION GAP SERPL CALC-SCNC: 9 MMOL/L (ref 8–16)
AST SERPL-CCNC: 25 U/L (ref 10–40)
BASOPHILS NFR BLD: 0 % (ref 0–1.9)
BILIRUB SERPL-MCNC: 0.4 MG/DL (ref 0.1–1)
BUN SERPL-MCNC: 14 MG/DL (ref 8–23)
CALCIUM SERPL-MCNC: 8.4 MG/DL (ref 8.7–10.5)
CHLORIDE SERPL-SCNC: 102 MMOL/L (ref 95–110)
CK SERPL-CCNC: 40 U/L (ref 20–180)
CO2 SERPL-SCNC: 28 MMOL/L (ref 23–29)
CREAT SERPL-MCNC: 0.8 MG/DL (ref 0.5–1.4)
CRP SERPL-MCNC: 136.5 MG/L (ref 0–8.2)
DIFFERENTIAL METHOD: ABNORMAL
EOSINOPHIL NFR BLD: 3 % (ref 0–8)
ERYTHROCYTE [DISTWIDTH] IN BLOOD BY AUTOMATED COUNT: 14.9 % (ref 11.5–14.5)
ERYTHROCYTE [SEDIMENTATION RATE] IN BLOOD BY WESTERGREN METHOD: 128 MM/HR (ref 0–20)
EST. GFR  (AFRICAN AMERICAN): >60 ML/MIN/1.73 M^2
EST. GFR  (NON AFRICAN AMERICAN): >60 ML/MIN/1.73 M^2
GLUCOSE SERPL-MCNC: 98 MG/DL (ref 70–110)
HCT VFR BLD AUTO: 35.8 % (ref 37–48.5)
HGB BLD-MCNC: 10.9 G/DL (ref 12–16)
IMM GRANULOCYTES # BLD AUTO: ABNORMAL K/UL (ref 0–0.04)
IMM GRANULOCYTES NFR BLD AUTO: ABNORMAL % (ref 0–0.5)
LYMPHOCYTES NFR BLD: 13 % (ref 18–48)
MCH RBC QN AUTO: 30.9 PG (ref 27–31)
MCHC RBC AUTO-ENTMCNC: 30.4 G/DL (ref 32–36)
MCV RBC AUTO: 101 FL (ref 82–98)
MONOCYTES NFR BLD: 3 % (ref 4–15)
NEUTROPHILS NFR BLD: 81 % (ref 38–73)
NRBC BLD-RTO: 0 /100 WBC
PLATELET # BLD AUTO: 353 K/UL (ref 150–450)
PMV BLD AUTO: 11 FL (ref 9.2–12.9)
POTASSIUM SERPL-SCNC: 4.2 MMOL/L (ref 3.5–5.1)
PROT SERPL-MCNC: 6.2 G/DL (ref 6–8.4)
RBC # BLD AUTO: 3.53 M/UL (ref 4–5.4)
SODIUM SERPL-SCNC: 139 MMOL/L (ref 136–145)
WBC # BLD AUTO: 14.99 K/UL (ref 3.9–12.7)

## 2022-02-15 PROCEDURE — 36415 COLL VENOUS BLD VENIPUNCTURE: CPT | Performed by: INTERNAL MEDICINE

## 2022-02-15 PROCEDURE — 82550 ASSAY OF CK (CPK): CPT | Performed by: INTERNAL MEDICINE

## 2022-02-15 PROCEDURE — 80053 COMPREHEN METABOLIC PANEL: CPT | Performed by: INTERNAL MEDICINE

## 2022-02-15 PROCEDURE — 86140 C-REACTIVE PROTEIN: CPT | Performed by: INTERNAL MEDICINE

## 2022-02-15 PROCEDURE — 85651 RBC SED RATE NONAUTOMATED: CPT | Performed by: INTERNAL MEDICINE

## 2022-02-15 PROCEDURE — 85007 BL SMEAR W/DIFF WBC COUNT: CPT | Performed by: INTERNAL MEDICINE

## 2022-02-15 PROCEDURE — 85027 COMPLETE CBC AUTOMATED: CPT | Performed by: INTERNAL MEDICINE

## 2022-02-15 NOTE — TELEPHONE ENCOUNTER
2nd attempt to reach pt for enrollment in Covid Surveillance program. No answer. Left voicemail. MyChart message sent. Due to 2 unsuccessful attempts to reach pt for enrollment, enrolled in home symptom monitoring. Surveillance tasks remain for self-enrollment.      Reason for Disposition   Message left on unidentified voice mail. Phone number verified.    Protocols used: NO CONTACT OR DUPLICATE CONTACT CALL-A-[place]

## 2022-02-15 NOTE — TELEPHONE ENCOUNTER
1st attempt to contact pt for enrollment in Covid Surveillance program. Pt was about to eat so she requested call back this afternoon. Sent [employer] message.      Reason for Disposition   Information only question and nurse able to answer    Protocols used: INFORMATION ONLY CALL - NO TRIAGE-A-[place]

## 2022-02-16 NOTE — TELEPHONE ENCOUNTER
Pt escalated for SpO2 of 90 and HR of 0. Spoke to daughter who states that pt is sleeping and son had not been recording HR so she just entered 0. Tried explaining program to daughter and advised how anything less than 94% of SpO2 will escalate for us to call and that recommendation is to go to ED for evaluation for SpO2 of less than 94 that will not increase after deep breaths and daughter said they would rather just opt out because pt's SpO2 even in the hospital on supplemental O2 was only around 90-92 and the pt would not be going back to the hospital. They thought that participation was more for statistical data. Advised the program is for us to monitor the pt to make sure that she is doing okay since discharge. Daughter states they know how to monitor her and if she needs to return to the hospital and requested again to opt out of surveillance. Removed surveillance tasks. Gave number to OOC for further concerns.    Reason for Disposition   Health Information question, no triage required and triager able to answer question    Protocols used: INFORMATION ONLY CALL - NO TRIAGE-A-

## 2022-02-16 NOTE — DISCHARGE SUMMARY
"[facility] - Tele[facility] ([facility] Medicine  Discharge Summary      Patient Name: Haim Martin  MRN: [id number]  Patient Class: IP- Inpatient  Admission Date: 2/6/2022  Hospital Length of Stay: 7 days  Discharge Date and Time:  02/15/2022 9:59 PM  Attending Physician: No att. providers found   Discharging Provider: José Miguel Blankenship MD  Primary Care Provider: Kavya Mesa MD      HPI:   Vomiting        PT states, "I have been vomiting since Thursday, feel very weak and have been running a fever."      Per ER- This  is a 81 y.o. female patient with a PMHx of Depression, Diverticulosis of colon, thyroid disease,  HLD and HTN who presents to the Emergency Department for evaluation of vomitting which onset gradually x 3 days pta. Patient states she was infected with COVID-19 3 weeks ago. She reports being on daptomycin. Symptoms are constant and moderate in severity. No mitigating or exacerbating factors reported. Associated sxs include dry cough and diahhrea. Patient denies any CP, leg swelling, abd pain, fever, chills, and all other sxs at this time. No further complaints or concerns at this time.     Patient was evaluated in ER and found to have Covid pneumonia and Placed In Observation for further evaluation and treatment.     Patient also reports she has been receiving Iv daptomycin at home with infected finger.                        * No surgery found *      Hospital Course:   81 y.o. female patient with Hx of Depression, Diverticulosis of colon, thyroid disease, HLD and HTN who presente for evaluation of vomitting x 3 days. Patient states she was infected with COVID-19 3 weeks ago and reports being on Daptomycin. Associated sxs include dry cough and diahhrea. She  was evaluated in ER and found to have Covid pneumonia and placed In Observation for further evaluation and treatment. Patient also reports she has been receiving Iv daptomycin at home for infected finger.     2/7- still feels weak and " has HA- treated with Fioricet but overall better. No diarrhea, did PT/OT. K improved to 4.7, H/H 10.6/32. Getting Dexa and Remdesivir. Sats 92-93% on 2 L NC, encouraged to do IS and will try to get her OOB to chair and ambulate.     2/8/22: Patient reports she feels weak, able to participate with PT/OT today, walker with assistance 24'. Patient requesting additional snacks this afternoon. Vitals stable, PO2 stable on 4L, Labs stable. Encouraged patient to use IS, seems motivated to participate.     2/9/22: Patient reported to be lethargic this AM, evaluated patient and ordered Lactic, Procalcitonin- both negative. Nutritional studies ordered.  O2 increased to 5L, PO2: 91-92%, 87% overnight on 4L. Patient reports feeling tired and sleeping more. Appetite is increased from yesterday. Patient reports using IS as instructed.     2/10/22: Patient stable on 5L O2 per NC. Patient continues to report fatigue. Labs reviewed, patient is iron deficient, will replete with Venofer IV while inpatient and plan for Hem/Onc follow-up for anemia at d/c. Recommend GI follow-up at d/c. Labs stable, Vitals stable.     2/11- looks and feels better, getting stronger, had transient urinary retention but resolved spontaneously, still on 5 L NC- sats 90%, able to get up and walk in the room, do IS. Encouraged to do deep breathing exercises, continue Daptomycin, CRP coming down. Getting Venofer daily for KIERA and Inj B12 IM.     2/12- looks and feels much better, sitting up in chair, breathing improved. Fio2 had increased to 10 L yesterday from 5, now down to 7 L now. Repeat blood Cx are a contamination- continue Daptomycin. Encouraged to ambulate and do IS. Has KIERA- will replace venofer. Also inflammatory markers improving.     2/13- comfortably sitting in chair, eating dinner, no SOB, walked in the room earlier. O2 down to 6 L and sats maintained at 96-99%- will wean O2 down further. Repeat Blood Cx NGTD, ESR, CRP coming down, LDH still  rising. Continue same Tx.     2/14- looks much better, comfortably lying in bed, excited about going home. Eating drinking well, walking around well. Repeat Cx remain NGTD. She qualified for home O2 which was arranged and she was seen and examined and deemed stable for discharge home today with HH and will continue Daptomycin as before till 3/9/22 with Stephanie and f/u with Dr. Flor.        Goals of Care Treatment Preferences:  Code Status: Full Code      Consults:   Consults (From admission, onward)        Status Ordering Provider     Inpatient consult to Social Work  Once        Provider:  (Not yet assigned)    ROSHNI Read          No new Assessment & Plan notes have been filed under this hospital service since the last note was generated.  Service: Hospital Medicine    Final Active Diagnoses:    Diagnosis Date Noted POA    Hypoalbuminemia [E88.09] 02/06/2022 Yes    Normocytic anemia [D64.9] 02/06/2022 Yes    Debility [R53.81] 02/06/2022 Yes      Problems Resolved During this Admission:    Diagnosis Date Noted Date Resolved POA    PRINCIPAL PROBLEM:  Severe sepsis secondary to covid pneumonia [A41.9, R65.20] 02/06/2022 02/14/2022 Yes    Acute hypoxemic respiratory failure secondary to Covid pneumonia [J96.01] 02/06/2022 02/14/2022 Yes    Hypokalemia [E87.6] 02/06/2022 02/07/2022 Yes    Infected finger joint [M00.9] 01/12/2022 02/14/2022 Yes       Discharged Condition: stable    Disposition: Home-Health Care [facility]    Follow Up:   Follow-up Information     Kevin Flor MD. Schedule an appointment as soon as possible for a visit in 2 weeks.    Specialties: Infectious Diseases, Hospitalist  Why: Hospital follow up, As needed - Call MD office if you need to be seen in the next 2 weeks  Contact information:  [address] [facility] [address] [phone]             [facility].    Specialty: Home Health Services  Why: Home Health:  Agency will call and schedule a home  "visit  Contact information:  [address] [facility]  [place] [address]  [phone]             [facility]    Specialty: DME Provider  Why: Provider of oxygen  Contact information:  [address]  [facility] [address]  [phone]                       Patient Instructions:      OXYGEN FOR HOME USE     Order Specific Question Answer Comments   Liter Flow 3    Duration Continuous    Qualifying Test Performed at: Rest    Oxygen saturation: 88    Portable mode: pulse dose acceptable    Mode: Portable concentrator    Route nasal cannula    Device: home concentrator with portable tanks    Length of need (in months): 3 mos    Patient condition with qualifying saturation Other - List qualifying diagnosis and code    Select a diagnosis & list the code in the comments Acute hypoxemic respiratory failure due to COVID-19 [6488550]    Height: 4' 11" (1.499 m)    Weight: 56.9 kg (125 lb 7.1 oz)    Alternative treatment measures have been tried or considered and deemed clinically ineffective. Yes      WALKER FOR HOME USE     Order Specific Question Answer Comments   Type of Walker: Moi (4'4"-5'6")    With wheels? Yes    Height: 4' 11" (1.499 m)    Weight: 56.9 kg (125 lb 7.1 oz)    Length of need (1-99 months): 99    Does patient have medical equipment at home? none    Please check all that apply: Patient is unable to safely ambulate without equipment.      Ambulatory referral/consult to Home Health   Standing Status: Future   Referral Priority: Routine Referral Type: Home Health   Referral Reason: Specialty Services Required   Requested Specialty: Home Health Services   Number of Visits Requested: 1     Diet Cardiac     COVID-19 Surveillance Program     Order Specific Question Answer Comments   Does patient have a smartphone? Yes    Does patient have the [facility] on their smartphone? Yes    While in surveillance program, will patient be using home oxygen? Yes      Activity as tolerated "       Significant Diagnostic Studies: Labs: All labs within the past 24 hours have been reviewed    Pending Diagnostic Studies:     None         Medications:  Reconciled Home Medications:      Medication List      START taking these medications    pulse oximeter device  Commonly known as: pulse oximeter  Use twice daily at 8 AM and 3 PM and record the value in MyChart as directed.        CHANGE how you take these medications    ergocalciferol 50,000 unit Cap  Commonly known as: ERGOCALCIFEROL  Take 1 capsule by mouth once a week  What changed:   · how much to take  · how to take this  · when to take this  · additional instructions        CONTINUE taking these medications    alendronate 70 MG tablet  Commonly known as: FOSAMAX  Take 70 mg by mouth every 7 days.     amitriptyline 75 MG tablet  Commonly known as: ELAVIL  Take 1 tablet by mouth in the evening     aspirin 81 MG EC tablet  Commonly known as: ECOTRIN  Take 81 mg by mouth once daily.     atorvastatin 40 MG tablet  Commonly known as: LIPITOR  Take 40 mg by mouth every evening.     levothyroxine 88 MCG tablet  Commonly known as: SYNTHROID  TAKE 1 TABLET BY MOUTH ONCE DAILY MONDAY - SATURDAY  AND ONE & ONE-HALF TABLET ON SUNDAY     mupirocin 2 % ointment  Commonly known as: BACTROBAN  Apply topically 2 (two) times daily.     omeprazole 20 MG capsule  Commonly known as: PRILOSEC  Take 1 capsule (20 mg total) by mouth as needed.     PRESERVISION AREDS-2 ORAL  Take 1 tablet by mouth 2 (two) times a day.     propranoloL 40 MG tablet  Commonly known as: INDERAL  Take 40 mg by mouth 2 (two) times daily.     sertraline 100 MG tablet  Commonly known as: ZOLOFT  Take 1 tablet by mouth once daily     sodium chloride 0.9% SolP 50 mL with DAPTOmycin 350 mg SolR 350 mg  Inject 350 mg into the vein once daily. Ending 02/6/22     sumatriptan 100 MG tablet  Commonly known as: IMITREX  TAKE 1 TABLET BY MOUTH AT ONSET OF MIGRAINE            Indwelling Lines/Drains at time of  discharge:   Lines/Drains/Airways     Peripherally Inserted Central Catheter Line            PICC Double Lumen 01/22/22 1323 left basilic 24 days                Time spent on the discharge of patient: 43 minutes         José Miguel Blankenship MD  Department of Hospital Medicine  [facility] - Telemetry ([facility])

## 2022-02-18 LAB
BACTERIA BLD CULT: NORMAL
BACTERIA BLD CULT: NORMAL

## 2022-02-21 ENCOUNTER — HOSPITAL ENCOUNTER (OUTPATIENT)
Dept: RADIOLOGY | Facility: HOSPITAL | Age: 82
Discharge: HOME | End: 2022-02-21
Attending: FAMILY MEDICINE
Payer: MEDICARE

## 2022-02-21 ENCOUNTER — OFFICE VISIT (OUTPATIENT)
Dept: FAMILY MEDICINE | Facility: CLINIC | Age: 82
End: 2022-02-21
Attending: FAMILY MEDICINE
Payer: MEDICARE

## 2022-02-21 VITALS
SYSTOLIC BLOOD PRESSURE: 138 MMHG | HEIGHT: 59 IN | DIASTOLIC BLOOD PRESSURE: 76 MMHG | TEMPERATURE: 97 F | BODY MASS INDEX: 24.03 KG/M2 | WEIGHT: 119.19 LBS | HEART RATE: 85 BPM | OXYGEN SATURATION: 100 %

## 2022-02-21 DIAGNOSIS — L08.9 INFECTED FINGER: ICD-10-CM

## 2022-02-21 DIAGNOSIS — U07.1 COVID: ICD-10-CM

## 2022-02-21 DIAGNOSIS — R65.20 SEVERE SEPSIS: ICD-10-CM

## 2022-02-21 DIAGNOSIS — J12.82 PNEUMONIA DUE TO COVID-19 VIRUS: ICD-10-CM

## 2022-02-21 DIAGNOSIS — D64.9 ANEMIA, UNSPECIFIED TYPE: Primary | ICD-10-CM

## 2022-02-21 DIAGNOSIS — U07.1 PNEUMONIA DUE TO COVID-19 VIRUS: ICD-10-CM

## 2022-02-21 DIAGNOSIS — A41.9 SEVERE SEPSIS: ICD-10-CM

## 2022-02-21 PROBLEM — R53.81 DEBILITY: Status: RESOLVED | Noted: 2022-02-06 | Resolved: 2022-02-21

## 2022-02-21 PROBLEM — E88.09 HYPOALBUMINEMIA: Status: RESOLVED | Noted: 2022-02-06 | Resolved: 2022-02-21

## 2022-02-21 LAB — FUNGUS SPEC CULT: NORMAL

## 2022-02-21 PROCEDURE — 3078F PR MOST RECENT DIASTOLIC BLOOD PRESSURE < 80 MM HG: ICD-10-PCS | Mod: CPTII,S$GLB,, | Performed by: FAMILY MEDICINE

## 2022-02-21 PROCEDURE — 99214 OFFICE O/P EST MOD 30 MIN: CPT | Mod: S$GLB,,, | Performed by: FAMILY MEDICINE

## 2022-02-21 PROCEDURE — 1101F PT FALLS ASSESS-DOCD LE1/YR: CPT | Mod: CPTII,S$GLB,, | Performed by: FAMILY MEDICINE

## 2022-02-21 PROCEDURE — 99999 PR PBB SHADOW E&M-EST. PATIENT-LVL V: CPT | Mod: PBBFAC,,, | Performed by: FAMILY MEDICINE

## 2022-02-21 PROCEDURE — 1125F PR PAIN SEVERITY QUANTIFIED, PAIN PRESENT: ICD-10-PCS | Mod: CPTII,S$GLB,, | Performed by: FAMILY MEDICINE

## 2022-02-21 PROCEDURE — 71046 X-RAY EXAM CHEST 2 VIEWS: CPT | Mod: 26,,, | Performed by: RADIOLOGY

## 2022-02-21 PROCEDURE — 71046 XR CHEST PA AND LATERAL: ICD-10-PCS | Mod: 26,,, | Performed by: RADIOLOGY

## 2022-02-21 PROCEDURE — 3078F DIAST BP <80 MM HG: CPT | Mod: CPTII,S$GLB,, | Performed by: FAMILY MEDICINE

## 2022-02-21 PROCEDURE — 1159F PR MEDICATION LIST DOCUMENTED IN MEDICAL RECORD: ICD-10-PCS | Mod: CPTII,S$GLB,, | Performed by: FAMILY MEDICINE

## 2022-02-21 PROCEDURE — 1101F PR PT FALLS ASSESS DOC 0-1 FALLS W/OUT INJ PAST YR: ICD-10-PCS | Mod: CPTII,S$GLB,, | Performed by: FAMILY MEDICINE

## 2022-02-21 PROCEDURE — 1125F AMNT PAIN NOTED PAIN PRSNT: CPT | Mod: CPTII,S$GLB,, | Performed by: FAMILY MEDICINE

## 2022-02-21 PROCEDURE — 99999 PR PBB SHADOW E&M-EST. PATIENT-LVL V: ICD-10-PCS | Mod: PBBFAC,,, | Performed by: FAMILY MEDICINE

## 2022-02-21 PROCEDURE — 3075F PR MOST RECENT SYSTOLIC BLOOD PRESS GE 130-139MM HG: ICD-10-PCS | Mod: CPTII,S$GLB,, | Performed by: FAMILY MEDICINE

## 2022-02-21 PROCEDURE — 3075F SYST BP GE 130 - 139MM HG: CPT | Mod: CPTII,S$GLB,, | Performed by: FAMILY MEDICINE

## 2022-02-21 PROCEDURE — 71046 X-RAY EXAM CHEST 2 VIEWS: CPT | Mod: TC,PO

## 2022-02-21 PROCEDURE — 1160F RVW MEDS BY RX/DR IN RCRD: CPT | Mod: CPTII,S$GLB,, | Performed by: FAMILY MEDICINE

## 2022-02-21 PROCEDURE — 3288F PR FALLS RISK ASSESSMENT DOCUMENTED: ICD-10-PCS | Mod: CPTII,S$GLB,, | Performed by: FAMILY MEDICINE

## 2022-02-21 PROCEDURE — 99214 PR OFFICE/OUTPT VISIT, EST, LEVL IV, 30-39 MIN: ICD-10-PCS | Mod: S$GLB,,, | Performed by: FAMILY MEDICINE

## 2022-02-21 PROCEDURE — 3288F FALL RISK ASSESSMENT DOCD: CPT | Mod: CPTII,S$GLB,, | Performed by: FAMILY MEDICINE

## 2022-02-21 PROCEDURE — 1159F MED LIST DOCD IN RCRD: CPT | Mod: CPTII,S$GLB,, | Performed by: FAMILY MEDICINE

## 2022-02-21 PROCEDURE — 1160F PR REVIEW ALL MEDS BY PRESCRIBER/CLIN PHARMACIST DOCUMENTED: ICD-10-PCS | Mod: CPTII,S$GLB,, | Performed by: FAMILY MEDICINE

## 2022-02-21 RX ORDER — PROPRANOLOL HYDROCHLORIDE 40 MG/1
40 TABLET ORAL 2 TIMES DAILY
Qty: 180 TABLET | Refills: 0 | Status: SHIPPED | OUTPATIENT
Start: 2022-02-21 | End: 2022-06-14

## 2022-02-21 RX ORDER — ALBUTEROL SULFATE 90 UG/1
2 AEROSOL, METERED RESPIRATORY (INHALATION) EVERY 6 HOURS PRN
Qty: 18 G | Refills: 0 | Status: SHIPPED | OUTPATIENT
Start: 2022-02-21 | End: 2023-05-16

## 2022-02-21 RX ORDER — SUMATRIPTAN SUCCINATE 100 MG/1
TABLET ORAL
Qty: 9 TABLET | Refills: 0 | Status: SHIPPED | OUTPATIENT
Start: 2022-02-21 | End: 2022-08-25

## 2022-02-21 NOTE — PROGRESS NOTES
Subjective:       Patient ID: Haim Martin is a 81 y.o. female.    Chief Complaint: Hospital Follow Up    Transitional Care Note    Family and/or Caretaker present at visit? Yes   Diagnostic tests reviewed/disposition: yes   Disease/illness education: yes  Home health/community services discussion/referrals: Yes  Establishment or re-establishment of referral orders for community resources:Yes   Discussion with other health care providers:  No   .  81 y old female with hx of depression , hypothyroidism  , Osteoporosis f.u on Hospitalization on 2/6 due to vomit , dry cough and diarrhea . She was diagnosed with COVID 3 w prior.  CXR at ER did demonstrate pneumonia . She was Also on IV daptomycin for R index finger Osteomyelitis .   She was found to have hypokalemia  and anemia . 02 Mikhail were in the 92, 93% range . Started on 2 lts f o2. SHe was able to participate in PT , 02 demand did increase to 7lts on day no 3 of hospitalization .  She was found to be iron deficient . She did received Venofer . Blood culture were determined to be contaminated, oxygen demand  Improved on 2/13. Repeated blood culture did not grow any bacteria .  She did qualify for Home oxygen . Discharged on 2/15 . Feels fatigue . Son is staying  with her . Will continue daptomycin until 3/9/22                          Review of Systems   Constitutional: Positive for fatigue.   HENT: Negative.    Eyes: Negative.    Respiratory: Negative.    Cardiovascular: Negative.    Gastrointestinal: Negative.    Genitourinary: Negative.    Musculoskeletal: Negative.    Skin: Negative.    Hematological: Negative.        Objective:      Physical Exam  Constitutional:       General: She is not in acute distress.     Appearance: She is well-developed. She is not diaphoretic.   HENT:      Head: Normocephalic and atraumatic.      Right Ear: External ear normal.      Left Ear: External ear normal.      Mouth/Throat:      Pharynx: No oropharyngeal exudate.   Eyes:       General: No scleral icterus.        Right eye: No discharge.         Left eye: No discharge.      Conjunctiva/sclera: Conjunctivae normal.      Pupils: Pupils are equal, round, and reactive to light.   Neck:      Thyroid: No thyromegaly.      Vascular: No JVD.      Trachea: No tracheal deviation.   Cardiovascular:      Rate and Rhythm: Normal rate and regular rhythm.      Heart sounds: Normal heart sounds. No murmur heard.    No friction rub. No gallop.   Pulmonary:      Effort: Pulmonary effort is normal. No respiratory distress.      Breath sounds: Normal breath sounds. No stridor. No wheezing or rales.   Chest:      Chest wall: No tenderness.   Abdominal:      General: Bowel sounds are normal. There is no distension.      Palpations: Abdomen is soft.      Tenderness: There is no abdominal tenderness. There is no guarding or rebound.   Musculoskeletal:         General: Normal range of motion.      Cervical back: Normal range of motion and neck supple.   Lymphadenopathy:      Cervical: No cervical adenopathy.   Skin:     General: Skin is warm and dry.   Neurological:      Mental Status: She is alert and oriented to person, place, and time.   Psychiatric:         Behavior: Behavior normal.         Thought Content: Thought content normal.         Judgment: Judgment normal.         Assessment:     Haim was seen today for hospital follow up.    Diagnoses and all orders for this visit:    Anemia, unspecified type  -     Ambulatory referral/consult to Hematology / Oncology; Future    COVID  -     Comprehensive Metabolic Panel; Future  -     CBC Auto Differential; Future  -     X-Ray Chest PA And Lateral; Future  -     Ambulatory referral/consult to Pulmonology; Future    Infected finger  -     Ambulatory referral/consult to Infectious Disease; Future    Severe sepsis    Pneumonia due to COVID-19 virus    Other orders  -     propranoloL (INDERAL) 40 MG tablet; Take 1 tablet (40 mg total) by mouth 2 (two) times  daily.  -     sumatriptan (IMITREX) 100 MG tablet; TAKE 1 TABLET BY MOUTH AT ONSET OF MIGRAINE  -     albuterol (PROVENTIL/VENTOLIN HFA) 90 mcg/actuation inhaler; Inhale 2 puffs into the lungs every 6 (six) hours as needed for Wheezing.          Plan:     Haim was seen today for hospital follow up.    Diagnoses and all orders for this visit:    Anemia, unspecified type  -     Ambulatory referral/consult to Hematology / Oncology; Future    COVID  -     Comprehensive Metabolic Panel; Future  -     CBC Auto Differential; Future  -     X-Ray Chest PA And Lateral; Future  -     Ambulatory referral/consult to Pulmonology; Future    Infected finger  -     Ambulatory referral/consult to Infectious Disease; Future    Other orders  -     propranoloL (INDERAL) 40 MG tablet; Take 1 tablet (40 mg total) by mouth 2 (two) times daily.  -     sumatriptan (IMITREX) 100 MG tablet; TAKE 1 TABLET BY MOUTH AT ONSET OF MIGRAINE  -     albuterol (PROVENTIL/VENTOLIN HFA) 90 mcg/actuation inhaler; Inhale 2 puffs into the lungs every 6 (six) hours as needed for Wheezing.     hem   F.u with Pulm, Cont o2 .   F.u with ID   Resolved

## 2022-02-22 ENCOUNTER — OFFICE VISIT (OUTPATIENT)
Dept: PULMONOLOGY | Facility: CLINIC | Age: 82
End: 2022-02-22
Attending: FAMILY MEDICINE
Payer: MEDICARE

## 2022-02-22 ENCOUNTER — OFFICE VISIT (OUTPATIENT)
Dept: HEMATOLOGY/ONCOLOGY | Facility: CLINIC | Age: 82
End: 2022-02-22
Attending: FAMILY MEDICINE
Payer: MEDICARE

## 2022-02-22 VITALS
RESPIRATION RATE: 17 BRPM | OXYGEN SATURATION: 100 % | SYSTOLIC BLOOD PRESSURE: 126 MMHG | WEIGHT: 118.81 LBS | BODY MASS INDEX: 23.95 KG/M2 | DIASTOLIC BLOOD PRESSURE: 74 MMHG | HEIGHT: 59 IN | HEART RATE: 121 BPM

## 2022-02-22 VITALS
SYSTOLIC BLOOD PRESSURE: 108 MMHG | BODY MASS INDEX: 23.95 KG/M2 | TEMPERATURE: 98 F | WEIGHT: 118.81 LBS | OXYGEN SATURATION: 100 % | HEIGHT: 59 IN | DIASTOLIC BLOOD PRESSURE: 60 MMHG | HEART RATE: 134 BPM

## 2022-02-22 DIAGNOSIS — U07.1 COVID-19: ICD-10-CM

## 2022-02-22 DIAGNOSIS — M86.9 FINGER OSTEOMYELITIS, RIGHT: ICD-10-CM

## 2022-02-22 DIAGNOSIS — R06.02 SOB (SHORTNESS OF BREATH): Primary | ICD-10-CM

## 2022-02-22 DIAGNOSIS — J96.11 CHRONIC RESPIRATORY FAILURE WITH HYPOXIA: ICD-10-CM

## 2022-02-22 DIAGNOSIS — D64.9 ANEMIA, UNSPECIFIED TYPE: ICD-10-CM

## 2022-02-22 DIAGNOSIS — D64.9 NORMOCYTIC ANEMIA: Primary | ICD-10-CM

## 2022-02-22 DIAGNOSIS — U07.1 COVID: ICD-10-CM

## 2022-02-22 PROBLEM — J96.10 CHRONIC RESPIRATORY FAILURE: Status: ACTIVE | Noted: 2022-02-22

## 2022-02-22 PROCEDURE — 99999 PR PBB SHADOW E&M-EST. PATIENT-LVL IV: ICD-10-PCS | Mod: PBBFAC,,, | Performed by: INTERNAL MEDICINE

## 2022-02-22 PROCEDURE — 1159F MED LIST DOCD IN RCRD: CPT | Mod: CPTII,S$GLB,, | Performed by: INTERNAL MEDICINE

## 2022-02-22 PROCEDURE — 3288F FALL RISK ASSESSMENT DOCD: CPT | Mod: CPTII,S$GLB,, | Performed by: INTERNAL MEDICINE

## 2022-02-22 PROCEDURE — 99499 UNLISTED E&M SERVICE: CPT | Mod: S$GLB,,, | Performed by: INTERNAL MEDICINE

## 2022-02-22 PROCEDURE — 3074F SYST BP LT 130 MM HG: CPT | Mod: CPTII,S$GLB,, | Performed by: INTERNAL MEDICINE

## 2022-02-22 PROCEDURE — 1101F PT FALLS ASSESS-DOCD LE1/YR: CPT | Mod: CPTII,S$GLB,, | Performed by: INTERNAL MEDICINE

## 2022-02-22 PROCEDURE — 1126F AMNT PAIN NOTED NONE PRSNT: CPT | Mod: CPTII,S$GLB,, | Performed by: INTERNAL MEDICINE

## 2022-02-22 PROCEDURE — 1159F PR MEDICATION LIST DOCUMENTED IN MEDICAL RECORD: ICD-10-PCS | Mod: CPTII,S$GLB,, | Performed by: INTERNAL MEDICINE

## 2022-02-22 PROCEDURE — 3288F PR FALLS RISK ASSESSMENT DOCUMENTED: ICD-10-PCS | Mod: CPTII,S$GLB,, | Performed by: INTERNAL MEDICINE

## 2022-02-22 PROCEDURE — 3078F PR MOST RECENT DIASTOLIC BLOOD PRESSURE < 80 MM HG: ICD-10-PCS | Mod: CPTII,S$GLB,, | Performed by: INTERNAL MEDICINE

## 2022-02-22 PROCEDURE — 3074F PR MOST RECENT SYSTOLIC BLOOD PRESSURE < 130 MM HG: ICD-10-PCS | Mod: CPTII,S$GLB,, | Performed by: INTERNAL MEDICINE

## 2022-02-22 PROCEDURE — 1101F PR PT FALLS ASSESS DOC 0-1 FALLS W/OUT INJ PAST YR: ICD-10-PCS | Mod: CPTII,S$GLB,, | Performed by: INTERNAL MEDICINE

## 2022-02-22 PROCEDURE — 99999 PR PBB SHADOW E&M-EST. PATIENT-LVL IV: CPT | Mod: PBBFAC,,, | Performed by: INTERNAL MEDICINE

## 2022-02-22 PROCEDURE — 3078F DIAST BP <80 MM HG: CPT | Mod: CPTII,S$GLB,, | Performed by: INTERNAL MEDICINE

## 2022-02-22 PROCEDURE — 99205 PR OFFICE/OUTPT VISIT, NEW, LEVL V, 60-74 MIN: ICD-10-PCS | Mod: S$GLB,,, | Performed by: INTERNAL MEDICINE

## 2022-02-22 PROCEDURE — 99204 OFFICE O/P NEW MOD 45 MIN: CPT | Mod: S$GLB,,, | Performed by: INTERNAL MEDICINE

## 2022-02-22 PROCEDURE — 99499 RISK ADDL DX/OHS AUDIT: ICD-10-PCS | Mod: S$GLB,,, | Performed by: INTERNAL MEDICINE

## 2022-02-22 PROCEDURE — 99204 PR OFFICE/OUTPT VISIT, NEW, LEVL IV, 45-59 MIN: ICD-10-PCS | Mod: S$GLB,,, | Performed by: INTERNAL MEDICINE

## 2022-02-22 PROCEDURE — 1111F DSCHRG MED/CURRENT MED MERGE: CPT | Mod: CPTII,S$GLB,, | Performed by: INTERNAL MEDICINE

## 2022-02-22 PROCEDURE — 99205 OFFICE O/P NEW HI 60 MIN: CPT | Mod: S$GLB,,, | Performed by: INTERNAL MEDICINE

## 2022-02-22 PROCEDURE — 1111F PR DISCHARGE MEDS RECONCILED W/ CURRENT OUTPATIENT MED LIST: ICD-10-PCS | Mod: CPTII,S$GLB,, | Performed by: INTERNAL MEDICINE

## 2022-02-22 PROCEDURE — 1126F PR PAIN SEVERITY QUANTIFIED, NO PAIN PRESENT: ICD-10-PCS | Mod: CPTII,S$GLB,, | Performed by: INTERNAL MEDICINE

## 2022-02-22 NOTE — PROGRESS NOTES
Subjective:      DATE OF VISIT: 2/22/22     ?  Patient ID:?Haim Martin is a 81 y.o. female.?? MR#: [id number]   ?   REFERRING PROVIDER: Kavya Mesa MD  [address] [place] [id number]     ? Primary Care Providers:  Kavya Mesa MD, MD (General)     CHIEF COMPLAINT: ?  Anemia??   ?   HPI     I had the pleasure meeting Ms. Martin a 81-year-old woman referred for anemia evaluation.      She had recent hospitalization for COVID and pneumonia as well as finger bone infection with recent discharge.  She is on home health antibiotics as well as heparin prophylaxis.  She is doing well.  She denies any evidence of bleeding.  No history of thrombosis.    Review of Systems    ?   A comprehensive 14-point review of systems was reviewed with patient and was negative other than as specified above.   ?   PAST MEDICAL HISTORY:   Past Medical History:   Diagnosis Date    Depression     Diverticulosis of colon (without mention of hemorrhage) 8/25/2014    Hyperlipidemia     Hypertension     Thyroid disease     ?     PAST SURGICAL HISTORY:   Past Surgical History:   Procedure Laterality Date    APPENDECTOMY      CATARACT EXTRACTION EXTRACAPSULAR W/ INTRAOCULAR LENS IMPLANTATION Bilateral 4/2014    INCISION AND DRAINAGE OF HAND Right 1/21/2022    Procedure: INCISION AND DRAINAGE, HAND;  Surgeon: Ramirez Flores MD;  [facility];  Service: Orthopedics;  Laterality: Right;  Right Index Finger    INJECTION OF ANESTHETIC AGENT AROUND MEDIAL BRANCH NERVES INNERVATING LUMBAR FACET JOINT Bilateral 7/16/2020    Procedure: Bilateral L3-5 MBB with local;  Surgeon: Luis Ashraf MD;  Location: [facility];  Service: Pain Management;  Laterality: Bilateral;    INJECTION OF ANESTHETIC AGENT AROUND MEDIAL BRANCH NERVES INNERVATING LUMBAR FACET JOINT Bilateral 8/10/2020    Procedure: Bilateral L3-5 MBB with local;  Surgeon: Luis Ashraf MD;  [facility];   Service: Pain Management;  Laterality: Bilateral;    INJECTION OF ANESTHETIC AGENT INTO SACROILIAC JOINT Bilateral 11/4/2021    Procedure: Bilateral SIJ Injection;  Surgeon: Luis Ashraf MD;  Location: [facility];  Service: Pain Management;  Laterality: Bilateral;    RADIOFREQUENCY THERMOCOAGULATION Left 9/28/2020    Procedure: Left L3-5 Lumbar RFA;  Surgeon: Luis Ashraf MD;  Location: [facility];  Service: Pain Management;  Laterality: Left;    RADIOFREQUENCY THERMOCOAGULATION Right 10/12/2020    Procedure: Right L3-5 Lumbar RFA;  Surgeon: Luis Ashraf MD;  Location: [facility];  Service: Pain Management;  Laterality: Right;    STOMACH SURGERY  1998    not sure what they did, possible bowel resection, partial gastrectomy    TONSILLECTOMY        ?   ALLERGIES:   Allergies as of 02/22/2022 - Reviewed 02/22/2022   Allergen Reaction Noted    Clindamycin  03/10/2014    Penicillins Hives 03/10/2014      ?   MEDICATIONS:?   Outpatient Medications Marked as Taking for the 2/22/22 encounter (Office Visit) with Yaima Bergman MD   Medication Sig Dispense Refill    albuterol (PROVENTIL/VENTOLIN HFA) 90 mcg/actuation inhaler Inhale 2 puffs into the lungs every 6 (six) hours as needed for Wheezing. 18 g 0    alendronate (FOSAMAX) 70 MG tablet Take 70 mg by mouth every 7 days.      amitriptyline (ELAVIL) 75 MG tablet Take 1 tablet by mouth in the evening 90 tablet 0    aspirin (ECOTRIN) 81 MG EC tablet Take 81 mg by mouth once daily.      atorvastatin (LIPITOR) 40 MG tablet Take 40 mg by mouth every evening.      ergocalciferol (ERGOCALCIFEROL) 50,000 unit Cap Take 1 capsule by mouth once a week (Patient taking differently: On Sundays) 8 capsule 0    levothyroxine (SYNTHROID) 88 MCG tablet TAKE 1 TABLET BY MOUTH ONCE DAILY MONDAY - SATURDAY  AND ONE & ONE-HALF TABLET ON SUNDAY 90 tablet 0    mupirocin (BACTROBAN) 2 % ointment Apply topically 2 (two) times daily. 15 g 1    omeprazole  (PRILOSEC) 20 MG capsule Take 1 capsule (20 mg total) by mouth as needed. 90 capsule 4    propranoloL (INDERAL) 40 MG tablet Take 1 tablet (40 mg total) by mouth 2 (two) times daily. 180 tablet 0    pulse oximeter (PULSE OXIMETER) device Use twice daily at 8 AM and 3 PM and record the value in [facility] as directed. 1 each 0    sertraline (ZOLOFT) 100 MG tablet Take 1 tablet by mouth once daily 90 tablet 3    sodium chloride 0.9% SolP 50 mL with DAPTOmycin 350 mg SolR 350 mg Inject 350 mg into the vein once daily. Ending 02/6/22      sumatriptan (IMITREX) 100 MG tablet TAKE 1 TABLET BY MOUTH AT ONSET OF MIGRAINE 9 tablet 0    vit C/E/Zn/coppr/lutein/zeaxan (PRESERVISION AREDS-2 ORAL) Take 1 tablet by mouth 2 (two) times a day.        ?   SOCIAL HISTORY:?   Social History     Tobacco Use    Smoking status: Former Smoker    Smokeless tobacco: Never Used    Tobacco comment: quit 30 years ago   Substance Use Topics    Alcohol use: No      ?      ?   FAMILY HISTORY:   family history includes Breast cancer in her maternal aunt; Coronary artery disease in her father; Diabetes type II in her father; Kidney cancer in her maternal aunt; Lupus in her mother; Stroke in her father.   ?        Objective:      Physical Exam      ?   Vitals:    02/22/22 0927   BP: 108/60   Pulse: (!) 134   Temp: 98.2 °F (36.8 °C)      ?   ECOG:?2  General appearance: Generally well appearing, in no acute distress , supplemental oxygen use.   Head, eyes, ears, nose, and throat: moist mucous membranes.   Respiratory:  Normal work of breathing with supplemental oxygen use  Abdomen: nontender, nondistended.   Extremities: Warm, without edema.   Neurologic: Alert and oriented.   Skin: No rashes, ecchymoses or petechial lesion.   Psychiatric:  Normal mood and affect.    ?   Laboratory:    Lab Results   Component Value Date    WBC 11.49 02/21/2022    RBC 3.78 (L) 02/21/2022    HGB 11.8 (L) 02/21/2022    HCT 39.8 02/21/2022     (H)  02/21/2022    MCH 31.2 (H) 02/21/2022    MCHC 29.6 (L) 02/21/2022    RDW 14.8 (H) 02/21/2022     02/21/2022    MPV 11.3 02/21/2022    GRAN 8.5 (H) 02/21/2022    GRAN 73.9 (H) 02/21/2022    LYMPH 1.3 02/21/2022    LYMPH 10.9 (L) 02/21/2022    MONO 1.0 02/21/2022    MONO 8.3 02/21/2022    EOS 0.6 (H) 02/21/2022    BASO 0.05 02/21/2022    EOSINOPHIL 5.5 02/21/2022    BASOPHIL 0.4 02/21/2022       Sodium   Date Value Ref Range Status   02/21/2022 141 136 - 145 mmol/L Final     Potassium   Date Value Ref Range Status   02/21/2022 3.7 3.5 - 5.1 mmol/L Final     Chloride   Date Value Ref Range Status   02/21/2022 102 95 - 110 mmol/L Final     CO2   Date Value Ref Range Status   02/21/2022 27 23 - 29 mmol/L Final     Glucose   Date Value Ref Range Status   02/21/2022 119 (H) 70 - 110 mg/dL Final     BUN   Date Value Ref Range Status   02/21/2022 7 (L) 8 - 23 mg/dL Final     Creatinine   Date Value Ref Range Status   02/21/2022 0.8 0.5 - 1.4 mg/dL Final     Calcium   Date Value Ref Range Status   02/21/2022 9.7 8.7 - 10.5 mg/dL Final     Total Protein   Date Value Ref Range Status   02/21/2022 7.3 6.0 - 8.4 g/dL Final     Albumin   Date Value Ref Range Status   02/21/2022 2.5 (L) 3.5 - 5.2 g/dL Final     Total Bilirubin   Date Value Ref Range Status   02/21/2022 0.5 0.1 - 1.0 mg/dL Final     Comment:     For infants and newborns, interpretation of results should be based  on gestational age, weight and in agreement with clinical  observations.    Premature Infant recommended reference ranges:  Up to 24 hours.............<8.0 mg/dL  Up to 48 hours............<12.0 mg/dL  3-5 days..................<15.0 mg/dL  6-29 days.................<15.0 mg/dL       Alkaline Phosphatase   Date Value Ref Range Status   02/21/2022 97 55 - 135 U/L Final     AST   Date Value Ref Range Status   02/21/2022 34 10 - 40 U/L Final     ALT   Date Value Ref Range Status   02/21/2022 26 10 - 44 U/L Final     Anion Gap   Date Value Ref Range  Status   02/21/2022 12 8 - 16 mmol/L Final     eGFR if    Date Value Ref Range Status   02/21/2022 >60.0 >60 mL/min/1.73 m^2 Final     eGFR if non    Date Value Ref Range Status   02/21/2022 >60.0 >60 mL/min/1.73 m^2 Final     Comment:     Calculation used to obtain the estimated glomerular filtration  rate (eGFR) is the CKD-EPI equation.          Iron   Date Value Ref Range Status   02/09/2022 18 (L) 30 - 160 ug/dL Final     TIBC   Date Value Ref Range Status   02/09/2022 238 (L) 250 - 450 ug/dL Final     Saturated Iron   Date Value Ref Range Status   02/09/2022 8 (L) 20 - 50 % Final     Ferritin   Date Value Ref Range Status   02/09/2022 783 (H) 20.0 - 300.0 ng/mL Final     Folate   Date Value Ref Range Status   07/28/2015 15.0 4.0 - 24.0 ng/mL Final     TSH   Date Value Ref Range Status   08/12/2021 2.644 0.400 - 4.000 uIU/mL Final           ?   Assessment/Plan:   Normocytic anemia    Anemia, unspecified type  -     Ambulatory referral/consult to Hematology / Oncology  -     CBC auto differential; Future; Expected date: 05/22/2022    Finger osteomyelitis, right    COVID-19       1. Normocytic anemia    2. Anemia, unspecified type    3. Finger osteomyelitis, right    4. COVID-19          Plan:     Problem List Items Addressed This Visit        ID    Finger osteomyelitis, right       Oncology    Normocytic anemia - Primary       Other    COVID-19      Other Visit Diagnoses     Anemia, unspecified type            Hemoglobin/ hematocrit normal until mid February 2022 with mild anemia/ mild macrocytosis , 11.7 hemoglobin most recently.  We discussed differential diagnosis for anemia including nutritional disorder, anemia of chronic inflammation, bone marrow disorder / neoplasia.  In her clinical context given recent  COVID infection and pneumonia with bone infection on antibiotics mild anemia may be most consistent with anemia of inflammation, mild macrocytosis possible  reticulocytosis recovery from anemia.  Iron indices early February 2022 also consistent with inflammation no clear iron deficiency.  Recommend rechecking lab work in a couple months; if worsen cytopenias further evaluation would be recommended at the time.    Follow-Up:   Patient Instructions   RV in 3 mo with cbc prior, npok

## 2022-02-22 NOTE — PROGRESS NOTES
Pulmonary Outpatient   Visit     Subjective:       Patient ID: Haim Martin is a 81 y.o. female.    Chief Complaint: Covid pneumonia     The following portions of the patient's history were reviewed and updated as appropriate:   She  has a past medical history of Depression, Diverticulosis of colon (without mention of hemorrhage) (8/25/2014), Hyperlipidemia, Hypertension, and Thyroid disease.  She does not have any pertinent problems on file.  She  has a past surgical history that includes Tonsillectomy; Stomach surgery (1998); Cataract extraction, extracapsular w/ intraocular lens implant (Bilateral, 4/2014); Appendectomy; Injection of anesthetic agent around medial branch nerves innervating lumbar facet joint (Bilateral, 7/16/2020); Injection of anesthetic agent around medial branch nerves innervating lumbar facet joint (Bilateral, 8/10/2020); Radiofrequency thermocoagulation (Left, 9/28/2020); Radiofrequency thermocoagulation (Right, 10/12/2020); Injection of anesthetic agent into sacroiliac joint (Bilateral, 11/4/2021); and Incision and drainage of hand (Right, 1/21/2022).  Her family history includes Breast cancer in her maternal aunt; Coronary artery disease in her father; Diabetes type II in her father; Kidney cancer in her maternal aunt; Lupus in her mother; Stroke in her father.  She  reports that she has quit smoking. She has never used smokeless tobacco. She reports that she does not drink alcohol and does not use drugs.  She has a current medication list which includes the following prescription(s): albuterol, alendronate, amitriptyline, aspirin, atorvastatin, ergocalciferol, levothyroxine, mupirocin, omeprazole, propranolol, pulse oximeter, sertraline, sodium chloride 0.9% SolP 50 mL with DAPTOmycin 350 mg SolR 350 mg, sumatriptan, and vit c/e/zn/coppr/lutein/zeaxan.  Current Outpatient Medications on File Prior to Visit   Medication Sig Dispense Refill     albuterol (PROVENTIL/VENTOLIN HFA) 90 mcg/actuation inhaler Inhale 2 puffs into the lungs every 6 (six) hours as needed for Wheezing. 18 g 0    alendronate (FOSAMAX) 70 MG tablet Take 70 mg by mouth every 7 days.      amitriptyline (ELAVIL) 75 MG tablet Take 1 tablet by mouth in the evening 90 tablet 0    aspirin (ECOTRIN) 81 MG EC tablet Take 81 mg by mouth once daily.      atorvastatin (LIPITOR) 40 MG tablet Take 40 mg by mouth every evening.      ergocalciferol (ERGOCALCIFEROL) 50,000 unit Cap Take 1 capsule by mouth once a week (Patient taking differently: On Sundays) 8 capsule 0    levothyroxine (SYNTHROID) 88 MCG tablet TAKE 1 TABLET BY MOUTH ONCE DAILY MONDAY - SATURDAY  AND ONE & ONE-HALF TABLET ON SUNDAY 90 tablet 0    mupirocin (BACTROBAN) 2 % ointment Apply topically 2 (two) times daily. 15 g 1    omeprazole (PRILOSEC) 20 MG capsule Take 1 capsule (20 mg total) by mouth as needed. 90 capsule 4    propranoloL (INDERAL) 40 MG tablet Take 1 tablet (40 mg total) by mouth 2 (two) times daily. 180 tablet 0    pulse oximeter (PULSE OXIMETER) device Use twice daily at 8 AM and 3 PM and record the value in [facility] as directed. 1 each 0    sertraline (ZOLOFT) 100 MG tablet Take 1 tablet by mouth once daily 90 tablet 3    sodium chloride 0.9% SolP 50 mL with DAPTOmycin 350 mg SolR 350 mg Inject 350 mg into the vein once daily. Ending 02/6/22      sumatriptan (IMITREX) 100 MG tablet TAKE 1 TABLET BY MOUTH AT ONSET OF MIGRAINE 9 tablet 0    vit C/E/Zn/coppr/lutein/zeaxan (PRESERVISION AREDS-2 ORAL) Take 1 tablet by mouth 2 (two) times a day.       No current facility-administered medications on file prior to visit.     She is allergic to clindamycin and penicillins..     Immunization History   Administered Date(s) Administered    COVID-19, MRNA, LN-S, PF (Pfizer) (Purple Cap) 01/11/2021, 02/01/2021    Influenza 09/13/2013    Influenza - High Dose - PF (65 years and older) 10/10/2011, 10/02/2012,  "09/22/2014, 08/21/2015, 10/21/2016, 09/26/2018    Influenza Split 10/18/2006, 10/07/2008, 10/07/2009, 10/12/2010    Pneumococcal Conjugate - 13 Valent 07/15/2015    Pneumococcal Polysaccharide - 23 Valent 05/16/2019    Tdap 07/15/2015, 09/28/2019        Tobacco Use: Medium Risk    Smoking Tobacco Use: Former Smoker    Smokeless Tobacco Use: Never Used      [facility] - Telemetry ([facility])  [facility] Medicine  Discharge Summary        Patient Name: Haim Martin  MRN: [id number]  Patient Class: IP- Inpatient  Admission Date: 2/6/2022  Hospital Length of Stay: 7 days  Discharge Date and Time:  02/15/2022 9:59 PM  Attending Physician: No att. providers found   Discharging Provider: José Miguel Blankenship MD  Primary Care Provider: Kavya Mesa MD        HPI:   Vomiting         PT states, "I have been vomiting since Thursday, feel very weak and have been running a fever."      Per ER- This  is a 81 y.o. female patient with a PMHx of Depression, Diverticulosis of colon, thyroid disease,  HLD and HTN who presents to the Emergency Department for evaluation of vomitting which onset gradually x 3 days pta. Patient states she was infected with COVID-19 3 weeks ago. She reports being on daptomycin. Symptoms are constant and moderate in severity. No mitigating or exacerbating factors reported. Associated sxs include dry cough and diahhrea. Patient denies any CP, leg swelling, abd pain, fever, chills, and all other sxs at this time. No further complaints or concerns at this time.      Patient was evaluated in ER and found to have Covid pneumonia and Placed In Observation for further evaluation and treatment.      Patient also reports she has been receiving Iv daptomycin at home with infected finger.                             * No surgery found *       Hospital Course:   81 y.o. female patient with Hx of Depression, Diverticulosis of colon, thyroid disease, HLD and HTN who presente for evaluation of vomitting x 3 days. " Patient states she was infected with COVID-19 3 weeks ago and reports being on Daptomycin. Associated sxs include dry cough and diahhrea. She  was evaluated in ER and found to have Covid pneumonia and placed In Observation for further evaluation and treatment. Patient also reports she has been receiving Iv daptomycin at home for infected finger.      2/7- still feels weak and has HA- treated with Fioricet but overall better. No diarrhea, did PT/OT. K improved to 4.7, H/H 10.6/32. Getting Dexa and Remdesivir. Sats 92-93% on 2 L NC, encouraged to do IS and will try to get her OOB to chair and ambulate.      2/8/22: Patient reports she feels weak, able to participate with PT/OT today, walker with assistance 24'. Patient requesting additional snacks this afternoon. Vitals stable, PO2 stable on 4L, Labs stable. Encouraged patient to use IS, seems motivated to participate.      2/9/22: Patient reported to be lethargic this AM, evaluated patient and ordered Lactic, Procalcitonin- both negative. Nutritional studies ordered.  O2 increased to 5L, PO2: 91-92%, 87% overnight on 4L. Patient reports feeling tired and sleeping more. Appetite is increased from yesterday. Patient reports using IS as instructed.      2/10/22: Patient stable on 5L O2 per NC. Patient continues to report fatigue. Labs reviewed, patient is iron deficient, will replete with Venofer IV while inpatient and plan for Hem/Onc follow-up for anemia at d/c. Recommend GI follow-up at d/c. Labs stable, Vitals stable.      2/11- looks and feels better, getting stronger, had transient urinary retention but resolved spontaneously, still on 5 L NC- sats 90%, able to get up and walk in the room, do IS. Encouraged to do deep breathing exercises, continue Daptomycin, CRP coming down. Getting Venofer daily for KIERA and Inj B12 IM.      2/12- looks and feels much better, sitting up in chair, breathing improved. Fio2 had increased to 10 L yesterday from 5, now down to 7 L  now. Repeat blood Cx are a contamination- continue Daptomycin. Encouraged to ambulate and do IS. Has KIERA- will replace venofer. Also inflammatory markers improving.      2/13- comfortably sitting in chair, eating dinner, no SOB, walked in the room earlier. O2 down to 6 L and sats maintained at 96-99%- will wean O2 down further. Repeat Blood Cx NGTD, ESR, CRP coming down, LDH still rising. Continue same Tx.      2/14- looks much better, comfortably lying in bed, excited about going home. Eating drinking well, walking around well. Repeat Cx remain NGTD. She qualified for home O2 which was arranged and she was seen and examined and deemed stable for discharge home today with HH and will continue Daptomycin as before till 3/9/22 with Stephanie and f/u with Dr. Flor.         Goals of Care Treatment Preferences:  Code Status: Full Code        Consults:           Consults (From admission, onward)         Status Ordering Provider       Inpatient consult to Social Work  Once        Provider:  (Not yet assigned)    ROSHNI Read new Assessment & Plan notes have been filed under this hospital service since the last note was generated.  Service: Hospital Medicine            Final Active Diagnoses:     Diagnosis Date Noted POA    Hypoalbuminemia [E88.09] 02/06/2022 Yes    Normocytic anemia [D64.9] 02/06/2022 Yes    Debility [R53.81] 02/06/2022 Yes       Problems Resolved During this Admission:     Diagnosis Date Noted Date Resolved POA    PRINCIPAL PROBLEM:  Severe sepsis secondary to covid pneumonia [A41.9, R65.20] 02/06/2022 02/14/2022 Yes    Acute hypoxemic respiratory failure secondary to Covid pneumonia [J96.01] 02/06/2022 02/14/2022 Yes    Hypokalemia [E87.6] 02/06/2022 02/07/2022 Yes    Infected finger joint [M00.9] 01/12/2022 02/14/2022 Yes          Haim Martin is 81 y.o.  [facility] [place] [facility]  Asked to see by Kavya Mesa MD  Accompanied by Son  Hx  COVID  REMDESIVIR, DEXA  Still on oxygen  Still gets SOB and palpitation  Also has bone infection: Daptomycin  Still has fatigue, in wheel chair            Review of Systems   Constitutional: Positive for fatigue.   All other systems reviewed and are negative.      Outpatient Encounter Medications as of 2/22/2022   Medication Sig Dispense Refill    albuterol (PROVENTIL/VENTOLIN HFA) 90 mcg/actuation inhaler Inhale 2 puffs into the lungs every 6 (six) hours as needed for Wheezing. 18 g 0    alendronate (FOSAMAX) 70 MG tablet Take 70 mg by mouth every 7 days.      amitriptyline (ELAVIL) 75 MG tablet Take 1 tablet by mouth in the evening 90 tablet 0    aspirin (ECOTRIN) 81 MG EC tablet Take 81 mg by mouth once daily.      atorvastatin (LIPITOR) 40 MG tablet Take 40 mg by mouth every evening.      ergocalciferol (ERGOCALCIFEROL) 50,000 unit Cap Take 1 capsule by mouth once a week (Patient taking differently: On Sundays) 8 capsule 0    levothyroxine (SYNTHROID) 88 MCG tablet TAKE 1 TABLET BY MOUTH ONCE DAILY MONDAY - SATURDAY  AND ONE & ONE-HALF TABLET ON SUNDAY 90 tablet 0    mupirocin (BACTROBAN) 2 % ointment Apply topically 2 (two) times daily. 15 g 1    omeprazole (PRILOSEC) 20 MG capsule Take 1 capsule (20 mg total) by mouth as needed. 90 capsule 4    propranoloL (INDERAL) 40 MG tablet Take 1 tablet (40 mg total) by mouth 2 (two) times daily. 180 tablet 0    pulse oximeter (PULSE OXIMETER) device Use twice daily at 8 AM and 3 PM and record the value in [facility] as directed. 1 each 0    sertraline (ZOLOFT) 100 MG tablet Take 1 tablet by mouth once daily 90 tablet 3    sodium chloride 0.9% SolP 50 mL with DAPTOmycin 350 mg SolR 350 mg Inject 350 mg into the vein once daily. Ending 02/6/22      sumatriptan (IMITREX) 100 MG tablet TAKE 1 TABLET BY MOUTH AT ONSET OF MIGRAINE 9 tablet 0    vit C/E/Zn/coppr/lutein/zeaxan (PRESERVISION AREDS-2 ORAL) Take 1 tablet by mouth 2 (two) times a day.       "[DISCONTINUED] propranoloL (INDERAL) 40 MG tablet Take 40 mg by mouth 2 (two) times daily.      [DISCONTINUED] sumatriptan (IMITREX) 100 MG tablet TAKE 1 TABLET BY MOUTH AT ONSET OF MIGRAINE 9 tablet 0     No facility-administered encounter medications on file as of 2/22/2022.       Objective:     Vital Signs (Most Recent)  Vital Signs  Pulse: (!) 121  Resp: 17  SpO2: 100 % (3 LPM)  BP: 126/74  Height and Weight  Height: 4' 11" (149.9 cm)  Weight: 53.9 kg (118 lb 13.3 oz)  BSA (Calculated - sq m): 1.5 sq meters  BMI (Calculated): 24  Weight in (lb) to have BMI = 25: 123.5]  Wt Readings from Last 2 Encounters:   02/22/22 53.9 kg (118 lb 13.3 oz)   02/22/22 53.9 kg (118 lb 13.3 oz)       Physical Exam   Constitutional: She is oriented to person, place, and time. She appears well-developed and well-nourished.   HENT:   Head: Normocephalic.   Mouth/Throat: Mallampati Score: II.   Neck: No JVD present.   Cardiovascular: Normal rate and intact distal pulses.   No murmur heard.  Pulmonary/Chest: Normal expansion, effort normal and breath sounds normal.   Abdominal: Soft. Bowel sounds are normal.   Musculoskeletal:         General: No edema. Normal range of motion.      Cervical back: Normal range of motion and neck supple.   Lymphadenopathy:     She has no axillary adenopathy.   Neurological: She is alert and oriented to person, place, and time.   Skin: Skin is warm and dry.   Psychiatric: She has a normal mood and affect.   Nursing note and vitals reviewed.      Laboratory  Lab Results   Component Value Date    WBC 11.49 02/21/2022    RBC 3.78 (L) 02/21/2022    HGB 11.8 (L) 02/21/2022    HCT 39.8 02/21/2022     (H) 02/21/2022    MCH 31.2 (H) 02/21/2022    MCHC 29.6 (L) 02/21/2022    RDW 14.8 (H) 02/21/2022     02/21/2022    MPV 11.3 02/21/2022    GRAN 8.5 (H) 02/21/2022    GRAN 73.9 (H) 02/21/2022    LYMPH 1.3 02/21/2022    LYMPH 10.9 (L) 02/21/2022    MONO 1.0 02/21/2022    MONO 8.3 02/21/2022    EOS 0.6 " (H) 02/21/2022    BASO 0.05 02/21/2022    EOSINOPHIL 5.5 02/21/2022    BASOPHIL 0.4 02/21/2022       BMP  Lab Results   Component Value Date     02/21/2022    K 3.7 02/21/2022     02/21/2022    CO2 27 02/21/2022    BUN 7 (L) 02/21/2022    CREATININE 0.8 02/21/2022    CALCIUM 9.7 02/21/2022    ANIONGAP 12 02/21/2022    ESTGFRAFRICA >60.0 02/21/2022    EGFRNONAA >60.0 02/21/2022    AST 34 02/21/2022    ALT 26 02/21/2022    PROT 7.3 02/21/2022       Lab Results   Component Value Date    BNP 38 02/06/2022       Lab Results   Component Value Date    TSH 2.644 08/12/2021       Lab Results   Component Value Date    SEDRATE 128 (H) 02/15/2022       Lab Results   Component Value Date    .5 (H) 02/15/2022     No results found for: IGE     No results found for: ASPERGILLUS  No results found for: AFUMIGATUSCL     No results found for: ACE     Diagnostic Results:  I have personally reviewed today the following studies:          X-Ray Chest PA And Lateral  Narrative: EXAMINATION:  XR CHEST PA AND LATERAL    CLINICAL HISTORY:  COVID-19    TECHNIQUE:  PA and lateral views of the chest were performed.    COMPARISON:  02/06/2022    FINDINGS:  There are some patchy interstitial/airspace opacities seen scattered throughout either lungs.  The previously noted more dense area of opacification within the left mid to lower lung zone and the periphery of the right midlung zone is less dense when compared to the prior exam.  Continued follow-up is recommended.  The heart is not enlarged. A left-sided MediPort catheter is noted with the tip overlying the mid to distal SVC.  Impression: 1. Improved appearance of the chest when compared to the prior study.  Continued follow-up to resolution is recommended.    Electronically signed by: Soy Brown DO  Date:    02/21/2022  Time:    12:44       Assessment/Plan:     Problem List Items Addressed This Visit     COVID-19    Relevant Orders    X-Ray Chest PA And Lateral     Spirometry without Bronchodilator    Stress test, pulmonary    Chronic respiratory failure     Adherence with Oxygen             Other Visit Diagnoses     SOB (shortness of breath)    -  Primary    Relevant Orders    X-Ray Chest PA And Lateral    Spirometry without Bronchodilator    Stress test, pulmonary    COVID              Follow up in about 3 months (around 5/22/2022), or cxr, echo, 6mwd.    This note was prepared using voice recognition system and is likely to have sound alike errors that may have been overlooked even after proof reading.  Please call me with any questions    Discussed diagnosis, its evaluation, treatment and usual course. All questions answered.      Alan Holland MD

## 2022-02-23 ENCOUNTER — PATIENT MESSAGE (OUTPATIENT)
Dept: FAMILY MEDICINE | Facility: CLINIC | Age: 82
End: 2022-02-23
Payer: MEDICARE

## 2022-02-23 ENCOUNTER — LAB VISIT (OUTPATIENT)
Dept: LAB | Facility: HOSPITAL | Age: 82
End: 2022-02-23
Attending: INTERNAL MEDICINE
Payer: MEDICARE

## 2022-02-23 DIAGNOSIS — M86.9 INFLAMMATION OF BONE: Primary | ICD-10-CM

## 2022-02-23 LAB
ALBUMIN SERPL BCP-MCNC: 2.5 G/DL (ref 3.5–5.2)
ALP SERPL-CCNC: 100 U/L (ref 55–135)
ALT SERPL W/O P-5'-P-CCNC: 27 U/L (ref 10–44)
ANION GAP SERPL CALC-SCNC: 15 MMOL/L (ref 8–16)
AST SERPL-CCNC: 38 U/L (ref 10–40)
BASOPHILS # BLD AUTO: 0.05 K/UL (ref 0–0.2)
BASOPHILS NFR BLD: 0.6 % (ref 0–1.9)
BILIRUB SERPL-MCNC: 0.3 MG/DL (ref 0.1–1)
BUN SERPL-MCNC: 6 MG/DL (ref 8–23)
CALCIUM SERPL-MCNC: 8.4 MG/DL (ref 8.7–10.5)
CHLORIDE SERPL-SCNC: 103 MMOL/L (ref 95–110)
CK SERPL-CCNC: 468 U/L (ref 20–180)
CO2 SERPL-SCNC: 25 MMOL/L (ref 23–29)
CREAT SERPL-MCNC: 0.7 MG/DL (ref 0.5–1.4)
CRP SERPL-MCNC: 136.2 MG/L (ref 0–8.2)
DIFFERENTIAL METHOD: ABNORMAL
EOSINOPHIL # BLD AUTO: 0.4 K/UL (ref 0–0.5)
EOSINOPHIL NFR BLD: 4.8 % (ref 0–8)
ERYTHROCYTE [DISTWIDTH] IN BLOOD BY AUTOMATED COUNT: 14.9 % (ref 11.5–14.5)
ERYTHROCYTE [SEDIMENTATION RATE] IN BLOOD BY WESTERGREN METHOD: 125 MM/HR (ref 0–20)
EST. GFR  (AFRICAN AMERICAN): >60 ML/MIN/1.73 M^2
EST. GFR  (NON AFRICAN AMERICAN): >60 ML/MIN/1.73 M^2
GLUCOSE SERPL-MCNC: 99 MG/DL (ref 70–110)
HCT VFR BLD AUTO: 36.2 % (ref 37–48.5)
HGB BLD-MCNC: 10.8 G/DL (ref 12–16)
IMM GRANULOCYTES # BLD AUTO: 0.06 K/UL (ref 0–0.04)
IMM GRANULOCYTES NFR BLD AUTO: 0.8 % (ref 0–0.5)
LYMPHOCYTES # BLD AUTO: 1 K/UL (ref 1–4.8)
LYMPHOCYTES NFR BLD: 12.2 % (ref 18–48)
MCH RBC QN AUTO: 30.9 PG (ref 27–31)
MCHC RBC AUTO-ENTMCNC: 29.8 G/DL (ref 32–36)
MCV RBC AUTO: 104 FL (ref 82–98)
MONOCYTES # BLD AUTO: 0.8 K/UL (ref 0.3–1)
MONOCYTES NFR BLD: 10.6 % (ref 4–15)
NEUTROPHILS # BLD AUTO: 5.5 K/UL (ref 1.8–7.7)
NEUTROPHILS NFR BLD: 71 % (ref 38–73)
NRBC BLD-RTO: 0 /100 WBC
PLATELET # BLD AUTO: 321 K/UL (ref 150–450)
PMV BLD AUTO: 10.6 FL (ref 9.2–12.9)
POTASSIUM SERPL-SCNC: 3.8 MMOL/L (ref 3.5–5.1)
PROT SERPL-MCNC: 6.3 G/DL (ref 6–8.4)
RBC # BLD AUTO: 3.49 M/UL (ref 4–5.4)
SODIUM SERPL-SCNC: 143 MMOL/L (ref 136–145)
WBC # BLD AUTO: 7.77 K/UL (ref 3.9–12.7)

## 2022-02-23 PROCEDURE — 82550 ASSAY OF CK (CPK): CPT | Performed by: INTERNAL MEDICINE

## 2022-02-23 PROCEDURE — 86140 C-REACTIVE PROTEIN: CPT | Performed by: INTERNAL MEDICINE

## 2022-02-23 PROCEDURE — 80053 COMPREHEN METABOLIC PANEL: CPT | Performed by: INTERNAL MEDICINE

## 2022-02-23 PROCEDURE — 85651 RBC SED RATE NONAUTOMATED: CPT | Performed by: INTERNAL MEDICINE

## 2022-02-23 PROCEDURE — 85025 COMPLETE CBC W/AUTO DIFF WBC: CPT | Performed by: INTERNAL MEDICINE

## 2022-02-23 PROCEDURE — 36415 COLL VENOUS BLD VENIPUNCTURE: CPT | Performed by: INTERNAL MEDICINE

## 2022-02-24 ENCOUNTER — PATIENT MESSAGE (OUTPATIENT)
Dept: FAMILY MEDICINE | Facility: CLINIC | Age: 82
End: 2022-02-24
Payer: MEDICARE

## 2022-02-24 LAB — FUNGUS SPEC CULT: NORMAL

## 2022-02-24 NOTE — TELEPHONE ENCOUNTER
Please discuss with pulm the inhaler issues, CBC is being monitored by Hematology .As far as the CPK elevation : Any muscle pain ? Recent falls? Is she taking atorvastatin ?

## 2022-02-25 NOTE — TELEPHONE ENCOUNTER
We can have her see Rheumatology to discuss elevated sed rate , CPK and back pain. Also , she supposed to f.u with rheum since BD did not improved with fosamax(  From DXA result note on 10/19/21   ). If she declines we can repeat labs in 1 w . Make sure to stay  hydrated .  Does she want to try nebulizer instead ?

## 2022-02-28 ENCOUNTER — LAB VISIT (OUTPATIENT)
Dept: LAB | Facility: HOSPITAL | Age: 82
End: 2022-02-28
Attending: INTERNAL MEDICINE
Payer: MEDICARE

## 2022-02-28 DIAGNOSIS — M86.9 INFLAMMATION OF BONE: Primary | ICD-10-CM

## 2022-02-28 LAB
ALBUMIN SERPL BCP-MCNC: 2.6 G/DL (ref 3.5–5.2)
ALP SERPL-CCNC: 110 U/L (ref 55–135)
ALT SERPL W/O P-5'-P-CCNC: 26 U/L (ref 10–44)
ANION GAP SERPL CALC-SCNC: 15 MMOL/L (ref 8–16)
AST SERPL-CCNC: 25 U/L (ref 10–40)
BASOPHILS # BLD AUTO: 0.06 K/UL (ref 0–0.2)
BASOPHILS NFR BLD: 1 % (ref 0–1.9)
BILIRUB SERPL-MCNC: 0.4 MG/DL (ref 0.1–1)
BUN SERPL-MCNC: 6 MG/DL (ref 8–23)
CALCIUM SERPL-MCNC: 8.8 MG/DL (ref 8.7–10.5)
CHLORIDE SERPL-SCNC: 103 MMOL/L (ref 95–110)
CK SERPL-CCNC: 225 U/L (ref 20–180)
CO2 SERPL-SCNC: 25 MMOL/L (ref 23–29)
CREAT SERPL-MCNC: 0.7 MG/DL (ref 0.5–1.4)
CRP SERPL-MCNC: 43.9 MG/L (ref 0–8.2)
DIFFERENTIAL METHOD: ABNORMAL
EOSINOPHIL # BLD AUTO: 0.6 K/UL (ref 0–0.5)
EOSINOPHIL NFR BLD: 9.6 % (ref 0–8)
ERYTHROCYTE [DISTWIDTH] IN BLOOD BY AUTOMATED COUNT: 14.8 % (ref 11.5–14.5)
ERYTHROCYTE [SEDIMENTATION RATE] IN BLOOD BY WESTERGREN METHOD: 121 MM/HR (ref 0–20)
EST. GFR  (AFRICAN AMERICAN): >60 ML/MIN/1.73 M^2
EST. GFR  (NON AFRICAN AMERICAN): >60 ML/MIN/1.73 M^2
GLUCOSE SERPL-MCNC: 95 MG/DL (ref 70–110)
HCT VFR BLD AUTO: 37.9 % (ref 37–48.5)
HGB BLD-MCNC: 11.3 G/DL (ref 12–16)
IMM GRANULOCYTES # BLD AUTO: 0.05 K/UL (ref 0–0.04)
IMM GRANULOCYTES NFR BLD AUTO: 0.8 % (ref 0–0.5)
LYMPHOCYTES # BLD AUTO: 1.2 K/UL (ref 1–4.8)
LYMPHOCYTES NFR BLD: 18.9 % (ref 18–48)
MCH RBC QN AUTO: 30.9 PG (ref 27–31)
MCHC RBC AUTO-ENTMCNC: 29.8 G/DL (ref 32–36)
MCV RBC AUTO: 104 FL (ref 82–98)
MONOCYTES # BLD AUTO: 0.5 K/UL (ref 0.3–1)
MONOCYTES NFR BLD: 8.5 % (ref 4–15)
NEUTROPHILS # BLD AUTO: 3.8 K/UL (ref 1.8–7.7)
NEUTROPHILS NFR BLD: 61.2 % (ref 38–73)
NRBC BLD-RTO: 0 /100 WBC
PLATELET # BLD AUTO: 355 K/UL (ref 150–450)
PMV BLD AUTO: 10.5 FL (ref 9.2–12.9)
POTASSIUM SERPL-SCNC: 3.5 MMOL/L (ref 3.5–5.1)
PROT SERPL-MCNC: 6.4 G/DL (ref 6–8.4)
RBC # BLD AUTO: 3.66 M/UL (ref 4–5.4)
SODIUM SERPL-SCNC: 143 MMOL/L (ref 136–145)
WBC # BLD AUTO: 6.15 K/UL (ref 3.9–12.7)

## 2022-02-28 PROCEDURE — 85651 RBC SED RATE NONAUTOMATED: CPT | Performed by: INTERNAL MEDICINE

## 2022-02-28 PROCEDURE — 80053 COMPREHEN METABOLIC PANEL: CPT | Performed by: INTERNAL MEDICINE

## 2022-02-28 PROCEDURE — 86140 C-REACTIVE PROTEIN: CPT | Performed by: INTERNAL MEDICINE

## 2022-02-28 PROCEDURE — 36415 COLL VENOUS BLD VENIPUNCTURE: CPT | Performed by: INTERNAL MEDICINE

## 2022-02-28 PROCEDURE — 82550 ASSAY OF CK (CPK): CPT | Performed by: INTERNAL MEDICINE

## 2022-02-28 PROCEDURE — 85025 COMPLETE CBC W/AUTO DIFF WBC: CPT | Performed by: INTERNAL MEDICINE

## 2022-03-04 ENCOUNTER — DOCUMENT SCAN (OUTPATIENT)
Dept: HOME HEALTH SERVICES | Facility: HOSPITAL | Age: 82
End: 2022-03-04
Payer: MEDICARE

## 2022-03-07 ENCOUNTER — TELEPHONE (OUTPATIENT)
Dept: ORTHOPEDICS | Facility: CLINIC | Age: 82
End: 2022-03-07
Payer: MEDICARE

## 2022-03-07 ENCOUNTER — LAB VISIT (OUTPATIENT)
Dept: LAB | Facility: HOSPITAL | Age: 82
End: 2022-03-07
Attending: INTERNAL MEDICINE
Payer: MEDICARE

## 2022-03-07 DIAGNOSIS — M86.9 INFLAMMATION OF BONE: Primary | ICD-10-CM

## 2022-03-07 LAB
ALBUMIN SERPL BCP-MCNC: 2.8 G/DL (ref 3.5–5.2)
ALP SERPL-CCNC: 113 U/L (ref 55–135)
ALT SERPL W/O P-5'-P-CCNC: 21 U/L (ref 10–44)
ANION GAP SERPL CALC-SCNC: 12 MMOL/L (ref 8–16)
AST SERPL-CCNC: 24 U/L (ref 10–40)
BASOPHILS # BLD AUTO: 0.09 K/UL (ref 0–0.2)
BASOPHILS NFR BLD: 1.5 % (ref 0–1.9)
BILIRUB SERPL-MCNC: 0.3 MG/DL (ref 0.1–1)
BUN SERPL-MCNC: 7 MG/DL (ref 8–23)
CALCIUM SERPL-MCNC: 9.2 MG/DL (ref 8.7–10.5)
CHLORIDE SERPL-SCNC: 104 MMOL/L (ref 95–110)
CK SERPL-CCNC: 260 U/L (ref 20–180)
CO2 SERPL-SCNC: 26 MMOL/L (ref 23–29)
CREAT SERPL-MCNC: 0.8 MG/DL (ref 0.5–1.4)
CRP SERPL-MCNC: 11.6 MG/L (ref 0–8.2)
DIFFERENTIAL METHOD: ABNORMAL
EOSINOPHIL # BLD AUTO: 0.4 K/UL (ref 0–0.5)
EOSINOPHIL NFR BLD: 6.4 % (ref 0–8)
ERYTHROCYTE [DISTWIDTH] IN BLOOD BY AUTOMATED COUNT: 15.2 % (ref 11.5–14.5)
ERYTHROCYTE [SEDIMENTATION RATE] IN BLOOD BY WESTERGREN METHOD: 104 MM/HR (ref 0–20)
EST. GFR  (AFRICAN AMERICAN): >60 ML/MIN/1.73 M^2
EST. GFR  (NON AFRICAN AMERICAN): >60 ML/MIN/1.73 M^2
GLUCOSE SERPL-MCNC: 114 MG/DL (ref 70–110)
HCT VFR BLD AUTO: 36.9 % (ref 37–48.5)
HGB BLD-MCNC: 10.8 G/DL (ref 12–16)
IMM GRANULOCYTES # BLD AUTO: 0.05 K/UL (ref 0–0.04)
IMM GRANULOCYTES NFR BLD AUTO: 0.8 % (ref 0–0.5)
LYMPHOCYTES # BLD AUTO: 1.2 K/UL (ref 1–4.8)
LYMPHOCYTES NFR BLD: 20.6 % (ref 18–48)
MCH RBC QN AUTO: 30.9 PG (ref 27–31)
MCHC RBC AUTO-ENTMCNC: 29.3 G/DL (ref 32–36)
MCV RBC AUTO: 105 FL (ref 82–98)
MONOCYTES # BLD AUTO: 0.6 K/UL (ref 0.3–1)
MONOCYTES NFR BLD: 9.5 % (ref 4–15)
NEUTROPHILS # BLD AUTO: 3.6 K/UL (ref 1.8–7.7)
NEUTROPHILS NFR BLD: 61.2 % (ref 38–73)
NRBC BLD-RTO: 0 /100 WBC
PLATELET # BLD AUTO: 348 K/UL (ref 150–450)
PMV BLD AUTO: 10.7 FL (ref 9.2–12.9)
POTASSIUM SERPL-SCNC: 4 MMOL/L (ref 3.5–5.1)
PROT SERPL-MCNC: 6.3 G/DL (ref 6–8.4)
RBC # BLD AUTO: 3.5 M/UL (ref 4–5.4)
SODIUM SERPL-SCNC: 142 MMOL/L (ref 136–145)
WBC # BLD AUTO: 5.91 K/UL (ref 3.9–12.7)

## 2022-03-07 PROCEDURE — 85025 COMPLETE CBC W/AUTO DIFF WBC: CPT | Performed by: INTERNAL MEDICINE

## 2022-03-07 PROCEDURE — 85651 RBC SED RATE NONAUTOMATED: CPT | Performed by: INTERNAL MEDICINE

## 2022-03-07 PROCEDURE — 82550 ASSAY OF CK (CPK): CPT | Performed by: INTERNAL MEDICINE

## 2022-03-07 PROCEDURE — 86140 C-REACTIVE PROTEIN: CPT | Performed by: INTERNAL MEDICINE

## 2022-03-07 PROCEDURE — 36415 COLL VENOUS BLD VENIPUNCTURE: CPT | Performed by: INTERNAL MEDICINE

## 2022-03-07 PROCEDURE — 80053 COMPREHEN METABOLIC PANEL: CPT | Performed by: INTERNAL MEDICINE

## 2022-03-07 NOTE — TELEPHONE ENCOUNTER
Spoke with patient and scheduled her post op appointment. Patient verbalized understanding of appointment date, time and location.

## 2022-03-07 NOTE — TELEPHONE ENCOUNTER
----- Message from Abbey Jones sent at 3/7/2022 10:31 AM CST -----  Regarding: questions  Contact: [phone]  Pt Questions    Questions: Pt calling with questions about her hand she needs to know if she should come in to see an orthopedic or infectious disease dr for the infection with her finger she had surgery with for DR Flores     Call Back number: [phone]

## 2022-03-10 ENCOUNTER — TELEPHONE (OUTPATIENT)
Dept: FAMILY MEDICINE | Facility: CLINIC | Age: 82
End: 2022-03-10
Payer: MEDICARE

## 2022-03-10 ENCOUNTER — LAB VISIT (OUTPATIENT)
Dept: LAB | Facility: HOSPITAL | Age: 82
End: 2022-03-10
Attending: FAMILY MEDICINE
Payer: MEDICARE

## 2022-03-10 DIAGNOSIS — R30.0 DYSURIA: Primary | ICD-10-CM

## 2022-03-10 DIAGNOSIS — R30.0 DYSURIA: ICD-10-CM

## 2022-03-10 LAB
BACTERIA #/AREA URNS AUTO: ABNORMAL /HPF
HYALINE CASTS UR QL AUTO: 1 /LPF
MICROSCOPIC COMMENT: ABNORMAL
RBC #/AREA URNS AUTO: 1 /HPF (ref 0–4)
SQUAMOUS #/AREA URNS AUTO: 0 /HPF
WBC #/AREA URNS AUTO: 62 /HPF (ref 0–5)
WBC CLUMPS UR QL AUTO: ABNORMAL

## 2022-03-10 PROCEDURE — 87077 CULTURE AEROBIC IDENTIFY: CPT | Mod: 59 | Performed by: FAMILY MEDICINE

## 2022-03-10 PROCEDURE — 81001 URINALYSIS AUTO W/SCOPE: CPT | Performed by: FAMILY MEDICINE

## 2022-03-10 PROCEDURE — 87086 URINE CULTURE/COLONY COUNT: CPT | Performed by: FAMILY MEDICINE

## 2022-03-10 PROCEDURE — 87088 URINE BACTERIA CULTURE: CPT | Performed by: FAMILY MEDICINE

## 2022-03-10 PROCEDURE — 87186 SC STD MICRODIL/AGAR DIL: CPT | Performed by: FAMILY MEDICINE

## 2022-03-10 NOTE — TELEPHONE ENCOUNTER
----- Message from Kimmy Yao sent at 3/10/2022 10:38 AM CST -----  Type:  Needs Medical Advice    Who Called: patient  Symptoms (please be specific): pain when urinating   How long has patient had these symptoms:  2days/pt has a pic on  Would the patient rather a call back or a response via [employer]? Call back  Best Call Back Number:[phone]  Additional Information: na

## 2022-03-10 NOTE — TELEPHONE ENCOUNTER
Spoke to pt. She states she started having pain when she urinates x 2 days ago. She states she has not noticed any blood in urine just pain in pelvis. Can pt come in to do a urine sample?

## 2022-03-11 ENCOUNTER — HOSPITAL ENCOUNTER (OUTPATIENT)
Dept: RADIOLOGY | Facility: HOSPITAL | Age: 82
Discharge: HOME | End: 2022-03-11
Attending: PHYSICIAN ASSISTANT
Payer: MEDICARE

## 2022-03-11 ENCOUNTER — OFFICE VISIT (OUTPATIENT)
Dept: ORTHOPEDICS | Facility: CLINIC | Age: 82
End: 2022-03-11
Payer: MEDICARE

## 2022-03-11 ENCOUNTER — TELEPHONE (OUTPATIENT)
Dept: FAMILY MEDICINE | Facility: CLINIC | Age: 82
End: 2022-03-11
Payer: MEDICARE

## 2022-03-11 ENCOUNTER — TELEPHONE (OUTPATIENT)
Dept: INFECTIOUS DISEASES | Facility: CLINIC | Age: 82
End: 2022-03-11
Payer: MEDICARE

## 2022-03-11 VITALS
DIASTOLIC BLOOD PRESSURE: 77 MMHG | HEART RATE: 91 BPM | HEIGHT: 59 IN | WEIGHT: 118.81 LBS | SYSTOLIC BLOOD PRESSURE: 158 MMHG | TEMPERATURE: 97 F | BODY MASS INDEX: 23.95 KG/M2

## 2022-03-11 DIAGNOSIS — M00.9: Primary | ICD-10-CM

## 2022-03-11 DIAGNOSIS — M00.9: ICD-10-CM

## 2022-03-11 PROCEDURE — 1160F PR REVIEW ALL MEDS BY PRESCRIBER/CLIN PHARMACIST DOCUMENTED: ICD-10-PCS | Mod: CPTII,S$GLB,, | Performed by: PHYSICIAN ASSISTANT

## 2022-03-11 PROCEDURE — 3288F FALL RISK ASSESSMENT DOCD: CPT | Mod: CPTII,S$GLB,, | Performed by: PHYSICIAN ASSISTANT

## 2022-03-11 PROCEDURE — 1159F MED LIST DOCD IN RCRD: CPT | Mod: CPTII,S$GLB,, | Performed by: PHYSICIAN ASSISTANT

## 2022-03-11 PROCEDURE — 1101F PT FALLS ASSESS-DOCD LE1/YR: CPT | Mod: CPTII,S$GLB,, | Performed by: PHYSICIAN ASSISTANT

## 2022-03-11 PROCEDURE — 73130 XR HAND COMPLETE 3 VIEW RIGHT: ICD-10-PCS | Mod: 26,RT,, | Performed by: RADIOLOGY

## 2022-03-11 PROCEDURE — 1160F RVW MEDS BY RX/DR IN RCRD: CPT | Mod: CPTII,S$GLB,, | Performed by: PHYSICIAN ASSISTANT

## 2022-03-11 PROCEDURE — 1125F AMNT PAIN NOTED PAIN PRSNT: CPT | Mod: CPTII,S$GLB,, | Performed by: PHYSICIAN ASSISTANT

## 2022-03-11 PROCEDURE — 3078F PR MOST RECENT DIASTOLIC BLOOD PRESSURE < 80 MM HG: ICD-10-PCS | Mod: CPTII,S$GLB,, | Performed by: PHYSICIAN ASSISTANT

## 2022-03-11 PROCEDURE — 1159F PR MEDICATION LIST DOCUMENTED IN MEDICAL RECORD: ICD-10-PCS | Mod: CPTII,S$GLB,, | Performed by: PHYSICIAN ASSISTANT

## 2022-03-11 PROCEDURE — 73130 X-RAY EXAM OF HAND: CPT | Mod: TC,RT

## 2022-03-11 PROCEDURE — 73130 X-RAY EXAM OF HAND: CPT | Mod: 26,RT,, | Performed by: RADIOLOGY

## 2022-03-11 PROCEDURE — 3077F PR MOST RECENT SYSTOLIC BLOOD PRESSURE >= 140 MM HG: ICD-10-PCS | Mod: CPTII,S$GLB,, | Performed by: PHYSICIAN ASSISTANT

## 2022-03-11 PROCEDURE — 1125F PR PAIN SEVERITY QUANTIFIED, PAIN PRESENT: ICD-10-PCS | Mod: CPTII,S$GLB,, | Performed by: PHYSICIAN ASSISTANT

## 2022-03-11 PROCEDURE — 3078F DIAST BP <80 MM HG: CPT | Mod: CPTII,S$GLB,, | Performed by: PHYSICIAN ASSISTANT

## 2022-03-11 PROCEDURE — 99024 PR POST-OP FOLLOW-UP VISIT: ICD-10-PCS | Mod: S$GLB,,, | Performed by: PHYSICIAN ASSISTANT

## 2022-03-11 PROCEDURE — 3077F SYST BP >= 140 MM HG: CPT | Mod: CPTII,S$GLB,, | Performed by: PHYSICIAN ASSISTANT

## 2022-03-11 PROCEDURE — 3288F PR FALLS RISK ASSESSMENT DOCUMENTED: ICD-10-PCS | Mod: CPTII,S$GLB,, | Performed by: PHYSICIAN ASSISTANT

## 2022-03-11 PROCEDURE — 99999 PR PBB SHADOW E&M-EST. PATIENT-LVL V: CPT | Mod: PBBFAC,,, | Performed by: PHYSICIAN ASSISTANT

## 2022-03-11 PROCEDURE — 1101F PR PT FALLS ASSESS DOC 0-1 FALLS W/OUT INJ PAST YR: ICD-10-PCS | Mod: CPTII,S$GLB,, | Performed by: PHYSICIAN ASSISTANT

## 2022-03-11 PROCEDURE — 99999 PR PBB SHADOW E&M-EST. PATIENT-LVL V: ICD-10-PCS | Mod: PBBFAC,,, | Performed by: PHYSICIAN ASSISTANT

## 2022-03-11 PROCEDURE — 99024 POSTOP FOLLOW-UP VISIT: CPT | Mod: S$GLB,,, | Performed by: PHYSICIAN ASSISTANT

## 2022-03-11 RX ORDER — SULFAMETHOXAZOLE AND TRIMETHOPRIM 800; 160 MG/1; MG/1
1 TABLET ORAL 2 TIMES DAILY
Qty: 14 TABLET | Refills: 0 | Status: SHIPPED | OUTPATIENT
Start: 2022-03-11 | End: 2022-03-18

## 2022-03-11 RX ORDER — NITROFURANTOIN 25; 75 MG/1; MG/1
100 CAPSULE ORAL 2 TIMES DAILY
Qty: 14 CAPSULE | Refills: 0 | Status: SHIPPED | OUTPATIENT
Start: 2022-03-11 | End: 2022-06-02

## 2022-03-11 NOTE — TELEPHONE ENCOUNTER
S/w pt.  She saw her surgeon today.  Pt thinks she may need more antibiotics.  Please see dr. Kenny's note.

## 2022-03-11 NOTE — TELEPHONE ENCOUNTER
----- Message from Maisha Victoria sent at 3/11/2022  3:41 PM CST -----  Contact: Haim Whitmore would like a call back at [phone] or [phone], Regards to her Pic line and the antibiotics she was told to stop taken.    Thanks  Td

## 2022-03-12 LAB
BACTERIA UR CULT: ABNORMAL
BACTERIA UR CULT: ABNORMAL

## 2022-03-14 ENCOUNTER — TELEPHONE (OUTPATIENT)
Dept: INFECTIOUS DISEASES | Facility: CLINIC | Age: 82
End: 2022-03-14
Payer: MEDICARE

## 2022-03-14 RX ORDER — CEPHALEXIN 500 MG/1
500 CAPSULE ORAL 4 TIMES DAILY
Qty: 48 CAPSULE | Refills: 0 | Status: SHIPPED | OUTPATIENT
Start: 2022-03-14 | End: 2022-03-26

## 2022-03-14 NOTE — TELEPHONE ENCOUNTER
----- Message from Kevin Flor MD sent at 3/14/2022  6:52 AM CDT -----  ESR is still high , let us see next week ,   Will send 2 weeks of Keflex

## 2022-03-15 ENCOUNTER — OFFICE VISIT (OUTPATIENT)
Dept: ORTHOPEDICS | Facility: CLINIC | Age: 82
End: 2022-03-15
Payer: MEDICARE

## 2022-03-15 VITALS — BODY MASS INDEX: 23.79 KG/M2 | WEIGHT: 118 LBS | HEIGHT: 59 IN

## 2022-03-15 DIAGNOSIS — M86.9 FINGER OSTEOMYELITIS, RIGHT: Primary | ICD-10-CM

## 2022-03-15 PROCEDURE — 1160F RVW MEDS BY RX/DR IN RCRD: CPT | Mod: CPTII,S$GLB,, | Performed by: ORTHOPAEDIC SURGERY

## 2022-03-15 PROCEDURE — 1101F PR PT FALLS ASSESS DOC 0-1 FALLS W/OUT INJ PAST YR: ICD-10-PCS | Mod: CPTII,S$GLB,, | Performed by: ORTHOPAEDIC SURGERY

## 2022-03-15 PROCEDURE — 1111F DSCHRG MED/CURRENT MED MERGE: CPT | Mod: CPTII,S$GLB,, | Performed by: ORTHOPAEDIC SURGERY

## 2022-03-15 PROCEDURE — 99203 PR OFFICE/OUTPT VISIT, NEW, LEVL III, 30-44 MIN: ICD-10-PCS | Mod: S$GLB,,, | Performed by: ORTHOPAEDIC SURGERY

## 2022-03-15 PROCEDURE — 99999 PR PBB SHADOW E&M-EST. PATIENT-LVL III: ICD-10-PCS | Mod: PBBFAC,,, | Performed by: ORTHOPAEDIC SURGERY

## 2022-03-15 PROCEDURE — 1159F MED LIST DOCD IN RCRD: CPT | Mod: CPTII,S$GLB,, | Performed by: ORTHOPAEDIC SURGERY

## 2022-03-15 PROCEDURE — 1101F PT FALLS ASSESS-DOCD LE1/YR: CPT | Mod: CPTII,S$GLB,, | Performed by: ORTHOPAEDIC SURGERY

## 2022-03-15 PROCEDURE — 1159F PR MEDICATION LIST DOCUMENTED IN MEDICAL RECORD: ICD-10-PCS | Mod: CPTII,S$GLB,, | Performed by: ORTHOPAEDIC SURGERY

## 2022-03-15 PROCEDURE — 1160F PR REVIEW ALL MEDS BY PRESCRIBER/CLIN PHARMACIST DOCUMENTED: ICD-10-PCS | Mod: CPTII,S$GLB,, | Performed by: ORTHOPAEDIC SURGERY

## 2022-03-15 PROCEDURE — 99203 OFFICE O/P NEW LOW 30 MIN: CPT | Mod: S$GLB,,, | Performed by: ORTHOPAEDIC SURGERY

## 2022-03-15 PROCEDURE — 1126F PR PAIN SEVERITY QUANTIFIED, NO PAIN PRESENT: ICD-10-PCS | Mod: CPTII,S$GLB,, | Performed by: ORTHOPAEDIC SURGERY

## 2022-03-15 PROCEDURE — 1126F AMNT PAIN NOTED NONE PRSNT: CPT | Mod: CPTII,S$GLB,, | Performed by: ORTHOPAEDIC SURGERY

## 2022-03-15 PROCEDURE — 3288F PR FALLS RISK ASSESSMENT DOCUMENTED: ICD-10-PCS | Mod: CPTII,S$GLB,, | Performed by: ORTHOPAEDIC SURGERY

## 2022-03-15 PROCEDURE — 3288F FALL RISK ASSESSMENT DOCD: CPT | Mod: CPTII,S$GLB,, | Performed by: ORTHOPAEDIC SURGERY

## 2022-03-15 PROCEDURE — 99999 PR PBB SHADOW E&M-EST. PATIENT-LVL III: CPT | Mod: PBBFAC,,, | Performed by: ORTHOPAEDIC SURGERY

## 2022-03-15 PROCEDURE — 1111F PR DISCHARGE MEDS RECONCILED W/ CURRENT OUTPATIENT MED LIST: ICD-10-PCS | Mod: CPTII,S$GLB,, | Performed by: ORTHOPAEDIC SURGERY

## 2022-03-15 NOTE — PROGRESS NOTES
Subjective:     Patient ID: Haim Martin is a 81 y.o. female.    Chief Complaint: Post-op Evaluation of the Right Hand      HPI:  The patient is a 81-year-old female who apparently had a right index mucous cyst that became infected 12/31/2021.  She went to the emergency room and had incision and drainage procedure.  She was placed on antibiotics.  She had a 2nd surgery in the emergency room with incision and drainage procedure on January 9, 2021. She had a subsequent formal incision and drainage by Dr. Flores in the operating room done 01/21/2022.  Several cultures have grown out Staph sensitive.  She has been seen in the Infectious Disease service and has been treated with 7 days of daptomycin.  At this time she has an osteomyelitis of the right index middle phalanx at the distal interphalangeal joint.  She apparently had a septic arthritis of the right index distal interphalangeal joint.  Currently she has a 30 degree extensor lag right index distal interphalangeal joint and apparent loss of extensor mechanism.  She has pain when she bumps it.  I told her she had 2 choices either to continue medical treatment or to have an amputation at the level of the proximal interphalangeal joint.  The advantages and disadvantages of each modality of treatment were discussed with her.  At this point she has failed 3 and half months of conservative therapy and 3 previous surgeries.  She wishes to have an amputation at the right index proximal interphalangeal joint right index finger.      Past Medical History:   Diagnosis Date    Depression     Diverticulosis of colon (without mention of hemorrhage) 8/25/2014    Hyperlipidemia     Hypertension     Thyroid disease      Past Surgical History:   Procedure Laterality Date    APPENDECTOMY      CATARACT EXTRACTION EXTRACAPSULAR W/ INTRAOCULAR LENS IMPLANTATION Bilateral 4/2014    INCISION AND DRAINAGE OF HAND Right 1/21/2022    Procedure: INCISION AND DRAINAGE, HAND;   Surgeon: Ramirez Flores MD;  Location: [facility] OR;  Service: Orthopedics;  Laterality: Right;  Right Index Finger    INJECTION OF ANESTHETIC AGENT AROUND MEDIAL BRANCH NERVES INNERVATING LUMBAR FACET JOINT Bilateral 7/16/2020    Procedure: Bilateral L3-5 MBB with local;  Surgeon: Luis Ashraf MD;  Location: [facility]IN [facility];  Service: Pain Management;  Laterality: Bilateral;    INJECTION OF ANESTHETIC AGENT AROUND MEDIAL BRANCH NERVES INNERVATING LUMBAR FACET JOINT Bilateral 8/10/2020    Procedure: Bilateral L3-5 MBB with local;  Surgeon: Luis Ashraf MD;  Location: [facility]IN [facility];  Service: Pain Management;  Laterality: Bilateral;    INJECTION OF ANESTHETIC AGENT INTO SACROILIAC JOINT Bilateral 11/4/2021    Procedure: Bilateral SIJ Injection;  Surgeon: Luis Ashraf MD;  Location: [facility]IN [facility];  Service: Pain Management;  Laterality: Bilateral;    RADIOFREQUENCY THERMOCOAGULATION Left 9/28/2020    Procedure: Left L3-5 Lumbar RFA;  Surgeon: Luis Ashraf MD;  [facility];  Service: Pain Management;  Laterality: Left;    RADIOFREQUENCY THERMOCOAGULATION Right 10/12/2020    Procedure: Right L3-5 Lumbar RFA;  Surgeon: Luis Ashraf MD;  Location: [facility];  Service: Pain Management;  Laterality: Right;    STOMACH SURGERY  1998    not sure what they did, possible bowel resection, partial gastrectomy    TONSILLECTOMY       Family History   Problem Relation Age of Onset    Lupus Mother     Stroke Father     Coronary artery disease Father     Diabetes type II Father     Kidney cancer Maternal Aunt     Breast cancer Maternal Aunt      Social History     Socioeconomic History    Marital status:    Tobacco Use    Smoking status: Former Smoker    Smokeless tobacco: Never Used    Tobacco comment: quit 30 years ago   Substance and Sexual Activity    Alcohol use: No    Drug use: No    Sexual activity: Not Currently     Birth control/protection: None     Medication List  with Changes/Refills   Current Medications    ALBUTEROL (PROVENTIL/VENTOLIN HFA) 90 MCG/ACTUATION INHALER    Inhale 2 puffs into the lungs every 6 (six) hours as needed for Wheezing.    ALENDRONATE (FOSAMAX) 70 MG TABLET    Take 70 mg by mouth every 7 days.    AMITRIPTYLINE (ELAVIL) 75 MG TABLET    Take 1 tablet by mouth in the evening    ASPIRIN (ECOTRIN) 81 MG EC TABLET    Take 81 mg by mouth once daily.    ATORVASTATIN (LIPITOR) 40 MG TABLET    Take 40 mg by mouth every evening.    CEPHALEXIN (KEFLEX) 500 MG CAPSULE    Take 1 capsule (500 mg total) by mouth 4 (four) times daily. for 12 days    ERGOCALCIFEROL (ERGOCALCIFEROL) 50,000 UNIT CAP    Take 1 capsule by mouth once a week    LEVOTHYROXINE (SYNTHROID) 88 MCG TABLET    TAKE 1 TABLET BY MOUTH ONCE DAILY MONDAY - SATURDAY  AND ONE & ONE-HALF TABLET ON SUNDAY    MUPIROCIN (BACTROBAN) 2 % OINTMENT    Apply topically 2 (two) times daily.    NITROFURANTOIN, MACROCRYSTAL-MONOHYDRATE, (MACROBID) 100 MG CAPSULE    Take 1 capsule (100 mg total) by mouth 2 (two) times daily.    OMEPRAZOLE (PRILOSEC) 20 MG CAPSULE    Take 1 capsule (20 mg total) by mouth as needed.    PROPRANOLOL (INDERAL) 40 MG TABLET    Take 1 tablet (40 mg total) by mouth 2 (two) times daily.    PULSE OXIMETER (PULSE OXIMETER) DEVICE    Use twice daily at 8 AM and 3 PM and [facility].    SERTRALINE (ZOLOFT) 100 MG TABLET    Take 1 tablet by mouth once daily    SODIUM CHLORIDE 0.9% SOLP 50 ML WITH DAPTOMYCIN 350 MG SOLR 350 MG    Inject 350 mg into the vein once daily. Ending 02/6/22    SULFAMETHOXAZOLE-TRIMETHOPRIM 800-160MG (BACTRIM DS) 800-160 MG TAB    Take 1 tablet by mouth 2 (two) times daily. for 7 days    SUMATRIPTAN (IMITREX) 100 MG TABLET    TAKE 1 TABLET BY MOUTH AT ONSET OF MIGRAINE    VIT C/E/ZN/COPPR/LUTEIN/ZEAXAN (PRESERVISION AREDS-2 ORAL)    Take 1 tablet by mouth 2 (two) times a day.     Review of patient's allergies indicates:   Allergen Reactions     Clindamycin      Heaviness in chest      Penicillins Hives     Pt has tolerated cephalexin     Review of Systems   Constitutional: Negative for malaise/fatigue.   HENT: Negative for hearing loss.    Eyes: Negative for double vision and visual disturbance.   Cardiovascular: Negative for chest pain.   Respiratory: Positive for shortness of breath.    Endocrine: Negative for cold intolerance.   Hematologic/Lymphatic: Does not bruise/bleed easily.   Skin: Positive for poor wound healing. Negative for suspicious lesions.   Musculoskeletal: Positive for arthritis, back pain, joint pain and joint swelling. Negative for gout.   Gastrointestinal: Positive for abdominal pain. Negative for nausea and vomiting.   Genitourinary: Negative for dysuria.   Neurological: Positive for headaches. Negative for numbness, paresthesias and sensory change.   Psychiatric/Behavioral: Positive for depression. Negative for memory loss and substance abuse. The patient is nervous/anxious.    Allergic/Immunologic: Negative for persistent infections.       Objective:   Body mass index is 23.83 kg/m².  There were no vitals filed for this visit.             General    Constitutional: She is oriented to person, place, and time. She appears well-developed and well-nourished. No distress.   HENT:   Head: Normocephalic.   Mouth/Throat: Oropharynx is clear and moist.   Eyes: EOM are normal.   Cardiovascular: Normal rate.    Pulmonary/Chest: Effort normal.   Abdominal: Soft.   Neurological: She is alert and oriented to person, place, and time. No cranial nerve deficit.   Psychiatric: She has a normal mood and affect.             Right Hand/Wrist Exam     Inspection   Scars: Wrist - absent Hand -  present  Effusion: Wrist - absent Hand -  present  Deformity: Hand -  deformity    Pain   Hand - The patient exhibits pain of the index IP.    Other     Neuorologic Exam    Median Distribution: normal  Ulnar Distribution: normal  Radial Distribution:  normal    Comments:  The patient has an open wound right index distal interphalangeal joint.  She has a 30 degree extensor lag of the distal interphalangeal joint.  She has pain on passive range of motion right index distal interphalangeal joint.  There is no lymphangitis lymphadenopathy.  There is local erythema about the distal interphalangeal joint.          Vascular Exam       Capillary Refill  Right Hand: normal capillary refill      Relevant imaging results reviewed and interpreted by me, discussed with the patient and / or family today radiographs right index finger showed a lytic lesion in the volar supracondylar area at the distal interphalangeal joint of the middle phalanx.  Assessment:     Encounter Diagnosis   Name Primary?    Finger osteomyelitis, right Yes    Index supracondylar middle phalanx and status post septic arthritis right index distal interphalangeal joint    Plan:     At this point the patient has a painful stiff right index finger.  I think she would benefit from a amputation right index finger at the level of proximal interphalangeal joint.  Risk complications and alternatives were discussed including the risk of infection, anesthetic risk, injury to nerves and vessels, loss of motion, and possible need for additional surgeries were discussed.  She will discuss this with her son.  All questions were answered.  She seems to understand and agree that surgery.                Disclaimer: This note was prepared using a voice recognition system and is likely to have sound alike errors within the text.

## 2022-03-15 NOTE — H&P (VIEW-ONLY)
Subjective:     Patient ID: Haim Martin is a 81 y.o. female.    Chief Complaint: Post-op Evaluation of the Right Hand      HPI:  The patient is a 81-year-old female who apparently had a right index mucous cyst that became infected 12/31/2021.  She went to the emergency room and had incision and drainage procedure.  She was placed on antibiotics.  She had a 2nd surgery in the emergency room with incision and drainage procedure on January 9, 2021. She had a subsequent formal incision and drainage by Dr. Flores in the operating room done 01/21/2022.  Several cultures have grown out Staph sensitive.  She has been seen in the Infectious Disease service and has been treated with 7 days of daptomycin.  At this time she has an osteomyelitis of the right index middle phalanx at the distal interphalangeal joint.  She apparently had a septic arthritis of the right index distal interphalangeal joint.  Currently she has a 30 degree extensor lag right index distal interphalangeal joint and apparent loss of extensor mechanism.  She has pain when she bumps it.  I told her she had 2 choices either to continue medical treatment or to have an amputation at the level of the proximal interphalangeal joint.  The advantages and disadvantages of each modality of treatment were discussed with her.  At this point she has failed 3 and half months of conservative therapy and 3 previous surgeries.  She wishes to have an amputation at the right index proximal interphalangeal joint right index finger.      Past Medical History:   Diagnosis Date    Depression     Diverticulosis of colon (without mention of hemorrhage) 8/25/2014    Hyperlipidemia     Hypertension     Thyroid disease      Past Surgical History:   Procedure Laterality Date    APPENDECTOMY      CATARACT EXTRACTION EXTRACAPSULAR W/ INTRAOCULAR LENS IMPLANTATION Bilateral 4/2014    INCISION AND DRAINAGE OF HAND Right 1/21/2022    Procedure: INCISION AND DRAINAGE, HAND;   Surgeon: Ramirez Flores MD;  Location: [facility] OR;  Service: Orthopedics;  Laterality: Right;  Right Index Finger    INJECTION OF ANESTHETIC AGENT AROUND MEDIAL BRANCH NERVES INNERVATING LUMBAR FACET JOINT Bilateral 7/16/2020    Procedure: Bilateral L3-5 MBB with local;  Surgeon: Luis Ashraf MD;  Location: [facility] PAIN [facility]GT;  Service: Pain Management;  Laterality: Bilateral;    INJECTION OF ANESTHETIC AGENT AROUND MEDIAL BRANCH NERVES INNERVATING LUMBAR FACET JOINT Bilateral 8/10/2020    Procedure: Bilateral L3-5 MBB with local;  Surgeon: Luis Ashraf MD;  Location: [facility]IN [facility]GT;  Service: Pain Management;  Laterality: Bilateral;    INJECTION OF ANESTHETIC AGENT INTO SACROILIAC JOINT Bilateral 11/4/2021    Procedure: Bilateral SIJ Injection;  Surgeon: Luis Ashraf MD;  Location: [facility] PAIN [facility];  Service: Pain Management;  Laterality: Bilateral;    RADIOFREQUENCY THERMOCOAGULATION Left 9/28/2020    Procedure: Left L3-5 Lumbar RFA;  Surgeon: Luis Ashraf MD;  [facility];  Service: Pain Management;  Laterality: Left;    RADIOFREQUENCY THERMOCOAGULATION Right 10/12/2020    Procedure: Right L3-5 Lumbar RFA;  Surgeon: Luis Ashraf MD;  Location: [facility];  Service: Pain Management;  Laterality: Right;    STOMACH SURGERY  1998    not sure what they did, possible bowel resection, partial gastrectomy    TONSILLECTOMY       Family History   Problem Relation Age of Onset    Lupus Mother     Stroke Father     Coronary artery disease Father     Diabetes type II Father     Kidney cancer Maternal Aunt     Breast cancer Maternal Aunt      Social History     Socioeconomic History    Marital status:    Tobacco Use    Smoking status: Former Smoker    Smokeless tobacco: Never Used    Tobacco comment: quit 30 years ago   Substance and Sexual Activity    Alcohol use: No    Drug use: No    Sexual activity: Not Currently     Birth control/protection: None     Medication List  with Changes/Refills   Current Medications    ALBUTEROL (PROVENTIL/VENTOLIN HFA) 90 MCG/ACTUATION INHALER    Inhale 2 puffs into the lungs every 6 (six) hours as needed for Wheezing.    ALENDRONATE (FOSAMAX) 70 MG TABLET    Take 70 mg by mouth every 7 days.    AMITRIPTYLINE (ELAVIL) 75 MG TABLET    Take 1 tablet by mouth in the evening    ASPIRIN (ECOTRIN) 81 MG EC TABLET    Take 81 mg by mouth once daily.    ATORVASTATIN (LIPITOR) 40 MG TABLET    Take 40 mg by mouth every evening.    CEPHALEXIN (KEFLEX) 500 MG CAPSULE    Take 1 capsule (500 mg total) by mouth 4 (four) times daily. for 12 days    ERGOCALCIFEROL (ERGOCALCIFEROL) 50,000 UNIT CAP    Take 1 capsule by mouth once a week    LEVOTHYROXINE (SYNTHROID) 88 MCG TABLET    TAKE 1 TABLET BY MOUTH ONCE DAILY MONDAY - SATURDAY  AND ONE & ONE-HALF TABLET ON SUNDAY    MUPIROCIN (BACTROBAN) 2 % OINTMENT    Apply topically 2 (two) times daily.    NITROFURANTOIN, MACROCRYSTAL-MONOHYDRATE, (MACROBID) 100 MG CAPSULE    Take 1 capsule (100 mg total) by mouth 2 (two) times daily.    OMEPRAZOLE (PRILOSEC) 20 MG CAPSULE    Take 1 capsule (20 mg total) by mouth as needed.    PROPRANOLOL (INDERAL) 40 MG TABLET    Take 1 tablet (40 mg total) by mouth 2 (two) times daily.    PULSE OXIMETER (PULSE OXIMETER) DEVICE    Use twice daily at 8 AM and 3 PM and [facility].    SERTRALINE (ZOLOFT) 100 MG TABLET    Take 1 tablet by mouth once daily    SODIUM CHLORIDE 0.9% SOLP 50 ML WITH DAPTOMYCIN 350 MG SOLR 350 MG    Inject 350 mg into the vein once daily. Ending 02/6/22    SULFAMETHOXAZOLE-TRIMETHOPRIM 800-160MG (BACTRIM DS) 800-160 MG TAB    Take 1 tablet by mouth 2 (two) times daily. for 7 days    SUMATRIPTAN (IMITREX) 100 MG TABLET    TAKE 1 TABLET BY MOUTH AT ONSET OF MIGRAINE    VIT C/E/ZN/COPPR/LUTEIN/ZEAXAN (PRESERVISION AREDS-2 ORAL)    Take 1 tablet by mouth 2 (two) times a day.     Review of patient's allergies indicates:   Allergen Reactions     Clindamycin      Heaviness in chest      Penicillins Hives     Pt has tolerated cephalexin     Review of Systems   Constitutional: Negative for malaise/fatigue.   HENT: Negative for hearing loss.    Eyes: Negative for double vision and visual disturbance.   Cardiovascular: Negative for chest pain.   Respiratory: Positive for shortness of breath.    Endocrine: Negative for cold intolerance.   Hematologic/Lymphatic: Does not bruise/bleed easily.   Skin: Positive for poor wound healing. Negative for suspicious lesions.   Musculoskeletal: Positive for arthritis, back pain, joint pain and joint swelling. Negative for gout.   Gastrointestinal: Positive for abdominal pain. Negative for nausea and vomiting.   Genitourinary: Negative for dysuria.   Neurological: Positive for headaches. Negative for numbness, paresthesias and sensory change.   Psychiatric/Behavioral: Positive for depression. Negative for memory loss and substance abuse. The patient is nervous/anxious.    Allergic/Immunologic: Negative for persistent infections.       Objective:   Body mass index is 23.83 kg/m².  There were no vitals filed for this visit.             General    Constitutional: She is oriented to person, place, and time. She appears well-developed and well-nourished. No distress.   HENT:   Head: Normocephalic.   Mouth/Throat: Oropharynx is clear and moist.   Eyes: EOM are normal.   Cardiovascular: Normal rate.    Pulmonary/Chest: Effort normal.   Abdominal: Soft.   Neurological: She is alert and oriented to person, place, and time. No cranial nerve deficit.   Psychiatric: She has a normal mood and affect.             Right Hand/Wrist Exam     Inspection   Scars: Wrist - absent Hand -  present  Effusion: Wrist - absent Hand -  present  Deformity: Hand -  deformity    Pain   Hand - The patient exhibits pain of the index IP.    Other     Neuorologic Exam    Median Distribution: normal  Ulnar Distribution: normal  Radial Distribution:  normal    Comments:  The patient has an open wound right index distal interphalangeal joint.  She has a 30 degree extensor lag of the distal interphalangeal joint.  She has pain on passive range of motion right index distal interphalangeal joint.  There is no lymphangitis lymphadenopathy.  There is local erythema about the distal interphalangeal joint.          Vascular Exam       Capillary Refill  Right Hand: normal capillary refill      Relevant imaging results reviewed and interpreted by me, discussed with the patient and / or family today radiographs right index finger showed a lytic lesion in the volar supracondylar area at the distal interphalangeal joint of the middle phalanx.  Assessment:     Encounter Diagnosis   Name Primary?    Finger osteomyelitis, right Yes    Index supracondylar middle phalanx and status post septic arthritis right index distal interphalangeal joint    Plan:     At this point the patient has a painful stiff right index finger.  I think she would benefit from a amputation right index finger at the level of proximal interphalangeal joint.  Risk complications and alternatives were discussed including the risk of infection, anesthetic risk, injury to nerves and vessels, loss of motion, and possible need for additional surgeries were discussed.  She will discuss this with her son.  All questions were answered.  She seems to understand and agree that surgery.                Disclaimer: This note was prepared using a voice recognition system and is likely to have sound alike errors within the text.

## 2022-03-17 ENCOUNTER — EXTERNAL HOME HEALTH (OUTPATIENT)
Dept: HOME HEALTH SERVICES | Facility: HOSPITAL | Age: 82
End: 2022-03-17
Payer: MEDICARE

## 2022-03-17 NOTE — PROGRESS NOTES
Subjective:      Patient ID: Haim Martin is a 81 y.o. female.    Chief Complaint: Post-op Evaluation and Pain of the Right Hand      HPI: Haim Martin is a 81-year-old female in clinic today for postoperative follow-up.  Patient is 6 weeks status post right index finger irrigation and debridement.  Patient has been on IV antibiotics for 6 weeks managed by Dr. Flor.  She is concerned of worsening pain and redness in the digit    Past Medical History:   Diagnosis Date    Depression     Diverticulosis of colon (without mention of hemorrhage) 8/25/2014    Hyperlipidemia     Hypertension     Thyroid disease        Current Outpatient Medications:     albuterol (PROVENTIL/VENTOLIN HFA) 90 mcg/actuation inhaler, Inhale 2 puffs into the lungs every 6 (six) hours as needed for Wheezing., Disp: 18 g, Rfl: 0    alendronate (FOSAMAX) 70 MG tablet, Take 70 mg by mouth every 7 days., Disp: , Rfl:     amitriptyline (ELAVIL) 75 MG tablet, Take 1 tablet by mouth in the evening, Disp: 90 tablet, Rfl: 0    aspirin (ECOTRIN) 81 MG EC tablet, Take 81 mg by mouth once daily., Disp: , Rfl:     atorvastatin (LIPITOR) 40 MG tablet, Take 40 mg by mouth every evening., Disp: , Rfl:     ergocalciferol (ERGOCALCIFEROL) 50,000 unit Cap, Take 1 capsule by mouth once a week (Patient taking differently: On Sundays), Disp: 8 capsule, Rfl: 0    levothyroxine (SYNTHROID) 88 MCG tablet, TAKE 1 TABLET BY MOUTH ONCE DAILY MONDAY - SATURDAY  AND ONE & ONE-HALF TABLET ON SUNDAY, Disp: 90 tablet, Rfl: 0    mupirocin (BACTROBAN) 2 % ointment, Apply topically 2 (two) times daily., Disp: 15 g, Rfl: 1    nitrofurantoin, macrocrystal-monohydrate, (MACROBID) 100 MG capsule, Take 1 capsule (100 mg total) by mouth 2 (two) times daily., Disp: 14 capsule, Rfl: 0    omeprazole (PRILOSEC) 20 MG capsule, Take 1 capsule (20 mg total) by mouth as needed., Disp: 90 capsule, Rfl: 4    propranoloL (INDERAL) 40 MG tablet, Take 1 tablet (40 mg  "total) by mouth 2 (two) times daily., Disp: 180 tablet, Rfl: 0    pulse oximeter (PULSE OXIMETER) device, Use twice daily at 8 AM and 3 PM and [facility] as directed., Disp: 1 each, Rfl: 0    sertraline (ZOLOFT) 100 MG tablet, Take 1 tablet by mouth once daily, Disp: 90 tablet, Rfl: 3    sodium chloride 0.9% SolP 50 mL with DAPTOmycin 350 mg SolR 350 mg, Inject 350 mg into the vein once daily. Ending 02/6/22, Disp: , Rfl:     sumatriptan (IMITREX) 100 MG tablet, TAKE 1 TABLET BY MOUTH AT ONSET OF MIGRAINE, Disp: 9 tablet, Rfl: 0    vit C/E/Zn/coppr/lutein/zeaxan (PRESERVISION AREDS-2 ORAL), Take 1 tablet by mouth 2 (two) times a day., Disp: , Rfl:     cephALEXin (KEFLEX) 500 MG capsule, Take 1 capsule (500 mg total) by mouth 4 (four) times daily. for 12 days, Disp: 48 capsule, Rfl: 0    sulfamethoxazole-trimethoprim 800-160mg (BACTRIM DS) 800-160 mg Tab, Take 1 tablet by mouth 2 (two) times daily. for 7 days, Disp: 14 tablet, Rfl: 0  Review of patient's allergies indicates:   Allergen Reactions    Clindamycin      Heaviness in chest      Penicillins Hives     Pt has tolerated cephalexin       BP (!) 158/77 (BP Location: Right arm, Patient Position: Sitting, BP Method: Medium (Automatic))   Pulse 91   Temp 97 °F (36.1 °C)   Ht 4' 11" (1.499 m)   Wt 53.9 kg (118 lb 13.3 oz)   BMI 24.00 kg/m²     ROS      Objective:    Ortho Exam   Right index finger:  Open wound to the DIP joint  ROM decreased secondary to swelling and pain  Pain with passive range of motion  Motor exam normal  Sensation and pulses intact    GEN: Well developed, well nourished female. AAOX3. No acute distress.   Normocephalic, atraumatic.   FARRAH  Breathing unlabored.  Mood and affect appropriate.        Assessment:     Imaging:  X-ray right hand obtained today shows diffuse degenerative changes        1. Infected finger joint          Plan:       Discussed the case with Dr. Arias.  He would like us to give the patient " a week of oral antibiotics since her IV antibiotics are going to be discontinued soon in sent her to see Dr. Alatorre for evaluation.    Orders Placed This Encounter    X-Ray Hand Complete Right    sulfamethoxazole-trimethoprim 800-160mg (BACTRIM DS) 800-160 mg Tab     Follow up if symptoms worsen or fail to improve.          Patient note was created using MModal Dictation.  Any errors in syntax or even information may not have been identified and edited on initial review prior to signing this note.

## 2022-03-18 ENCOUNTER — DOCUMENT SCAN (OUTPATIENT)
Dept: HOME HEALTH SERVICES | Facility: HOSPITAL | Age: 82
End: 2022-03-18
Payer: MEDICARE

## 2022-03-22 ENCOUNTER — TELEPHONE (OUTPATIENT)
Dept: ORTHOPEDICS | Facility: CLINIC | Age: 82
End: 2022-03-22
Payer: MEDICARE

## 2022-03-22 DIAGNOSIS — M86.9 FINGER OSTEOMYELITIS, RIGHT: Primary | ICD-10-CM

## 2022-03-22 NOTE — TELEPHONE ENCOUNTER
Patient scheduled with Dr. Alatorre for finger amputation on 3/31/22          ----- Message from Lotus Espinal sent at 3/21/2022  3:46 PM CDT -----  Contact: self/[phone]  Pt called in regard to checking to see when the dr will schedule her finger surgery. Pt would like a call back.     Please advise

## 2022-03-23 ENCOUNTER — TELEPHONE (OUTPATIENT)
Dept: PREADMISSION TESTING | Facility: HOSPITAL | Age: 82
End: 2022-03-23
Payer: MEDICARE

## 2022-03-23 NOTE — TELEPHONE ENCOUNTER
Pre op instructions reviewed with patient per phone.    Spoke about pre op process and surgery instructions, verbalized understanding.    To confirm, Your surgeon has instructed you:  Surgery is scheduled on 3/31/22.      Please report to [facility], 1st floor.    The address is [address].  [place]  [phone]       The Pre Admissions will call you the day prior to surgery with your arrival time.        INSTRUCTIONS IMPORTANT!!!  Do Not Eat, Drink, or Smoke after 12 midnight! NO WATER after midnight! OK to brush teeth, no gum, candy or mints!        *Take only these medicines with a small swallow of water-morning of surgery.    Inderal, Prilosec, Synthroid        ____  Do Not wear makeup, mascara nail polish or artificial nails  ____  NO powder, lotions, deodorants or creams to surgical area.  ____  Please remove all jewelry, including piercings and leave at home.  SURGERY WILL BE CANCELLED IF PIERCINGS ARE PRESENT!!!  ____  Dentures, Hearing Aids and Contact Lens will need to be removed prior to the start of surgery.  ____  Please bring photo ID and insurance information to hospital (Leave Valuables at Home)  ____  If going home the same day, arrange for a ride home. You will not be able to            drive if Anesthesia was used.    ____  Wear clean, loose fitting clothing. Allow for dressings, bandages.  ____  Stop Aspirin, Ibuprofen, Motrin and Aleve at least 5-7 days before surgery, unless otherwise instructed by your doctor, or the nurse. You MAY use Tylenol/acetaminophen until day of               surgery.  ____  If you take diabetic medication, do NOT take morning of surgery unless instructed by            Doctor. Metformin to be stopped 24 hrs prior to surgery time.   ____ Stop taking any Fish Oil supplements or Vitamins at least 5 days prior to surgery, unless instructed otherwise by your Doctor.         Bathing Instructions: The night before surgery and  the morning prior to coming to the hospital:              -Do not shave your face.  -Do not shave pubic hair 7 days prior to surgery (gyn pt's).  -Do not shave legs/underarms 3 days prior to surgery.              -Shower & Rinse your body as usual with anti-bacterial Soap (Dial, Lever 2000, or Hibiclens)              -Do not use hibiclens on your head, face, or genitals.              -Do not wash with anti-bacterial soap after you use the hibiclens.              -Rinse your body thoroughly.       Pediatric patients do not need to use anti-bacterial soap or Hibiclens.            [facility] Visitor/Ride Policy:  Only 1 adult allowed (over the age of 18) to accompany you into Pre-op/Recovery Surgery Dept and must stay through the entire length of admission.    Must have a ride home from a responsible adult that you know and trust.   Pediatric Patients are allowed 2 adult visitors.    Medical Transport, Uber or Lyft can only be used if patient has a responsible adult to accompany them during ride home.     Post-Op Instructions: You will receive surgery post-op instructions by your Discharge Nurse prior to going home.     Surgical Site Infection:     Prevention of surgical site infections:                 -Keep incisions clean and dry.              -Do not soak/submerge incisions in water until completely healed.              -Do not apply lotions, powders, creams, or deodorants to site.              -Always make sure hands are cleaned with antibacterial soap/ alcohol-based   prior to touching the surgical site.       Signs and symptoms:              -Redness and pain around the area where you had surgery              -Drainage of cloudy fluid from your surgical wound              -Fever over 100.4 or chills  >>>Call Surgeon office/on-call Surgeon if you experience any of these signs & symptoms post-surgery.        *Please Call [facility] Pre-[facility] Department with surgery instruction questions at [phone].      *Insurance Questions, please call [phone].    Pre admit office to call afternoon prior to surgery with final arrival time.

## 2022-03-30 ENCOUNTER — ANESTHESIA EVENT (OUTPATIENT)
Dept: SURGERY | Facility: HOSPITAL | Age: 82
End: 2022-03-30
Payer: MEDICARE

## 2022-03-30 ENCOUNTER — TELEPHONE (OUTPATIENT)
Dept: PREADMISSION TESTING | Facility: HOSPITAL | Age: 82
End: 2022-03-30
Payer: MEDICARE

## 2022-03-30 NOTE — TELEPHONE ENCOUNTER
Called and spoke with the patient about the following:    Your Surgery arrival time is at 6:15 on 3/31/22 at [facility] location.    The address is [address] [place]  [address].     Only one adult (over 18) is to accompany you to surgery, unless it is a Pediatric patient, then 2 adults are encouraged to accompany them to the surgery center.    Your ride MUST STAY the entire time until you are discharged.      Please come in the main lobby (located on the 1st floor).     Be prepared to show your photo ID and insurance card.     Masks should be worn by ALL persons entering the building.     Nothing to eat or drink after after midnight, unless you were instructed to take specific medications discussed with the Pre-admit Nurse.     Please call [phone] with any questions or concerns.     Thanks.

## 2022-03-30 NOTE — ANESTHESIA PREPROCEDURE EVALUATION
03/30/2022  Haim Martin is a 81 y.o., female.  Patient Active Problem List   Diagnosis    Hypothyroidism    Depressive disorder, not elsewhere classified    Anxiety    Other and unspecified hyperlipidemia    Migraine    Special screening for malignant neoplasms, colon    Diverticulosis of colon (without mention of hemorrhage)    Lumbar spondylosis    Osteopenia    Pre-diabetes    Major depressive disorder with single episode, in partial remission    Spondylosis without myelopathy or radiculopathy, lumbar region    Sacroiliitis    Finger osteomyelitis, right    Traumatic subarachnoid hemorrhage without loss of consciousness, initial encounter    Normocytic anemia    COVID-19    Chronic respiratory failure     Past Surgical History:   Procedure Laterality Date    APPENDECTOMY      CATARACT EXTRACTION EXTRACAPSULAR W/ INTRAOCULAR LENS IMPLANTATION Bilateral 4/2014    INCISION AND DRAINAGE OF HAND Right 1/21/2022    Procedure: INCISION AND DRAINAGE, HAND;  Surgeon: Ramirez Flores MD;  Location: [facility];  Service: Orthopedics;  Laterality: Right;  Right Index Finger    INJECTION OF ANESTHETIC AGENT AROUND MEDIAL BRANCH NERVES INNERVATING LUMBAR FACET JOINT Bilateral 7/16/2020    Procedure: Bilateral L3-5 MBB with local;  Surgeon: Luis Ashraf MD;  Location: [facility];  Service: Pain Management;  Laterality: Bilateral;    INJECTION OF ANESTHETIC AGENT AROUND MEDIAL BRANCH NERVES INNERVATING LUMBAR FACET JOINT Bilateral 8/10/2020    Procedure: Bilateral L3-5 MBB with local;  Surgeon: Luis Ashraf MD;  Location: [facility];  Service: Pain Management;  Laterality: Bilateral;    INJECTION OF ANESTHETIC AGENT INTO SACROILIAC JOINT Bilateral 11/4/2021    Procedure: Bilateral SIJ Injection;  Surgeon: Luis Ashraf MD;  Location: [facility];  Service: Pain Management;   Laterality: Bilateral;    RADIOFREQUENCY THERMOCOAGULATION Left 9/28/2020    Procedure: Left L3-5 Lumbar RFA;  Surgeon: Luis Ashraf MD;  Location: [facility];  Service: Pain Management;  Laterality: Left;    RADIOFREQUENCY THERMOCOAGULATION Right 10/12/2020    Procedure: Right L3-5 Lumbar RFA;  Surgeon: Luis Ashraf MD;  Location: [facility];  Service: Pain Management;  Laterality: Right;    STOMACH SURGERY  1998    not sure what they did, possible bowel resection, partial gastrectomy    TONSILLECTOMY         Pre-op Assessment    I have reviewed the Patient Summary Reports.    I have reviewed the Nursing Notes. I have reviewed the NPO Status.   I have reviewed the Medications.     Review of Systems  Anesthesia Hx:  No problems with previous Anesthesia    Social:  Non-Smoker    Hematology/Oncology:  Hematology Normal        Cardiovascular:   Hypertension hyperlipidemia Recent echo with NL EF.   Pulmonary:  Pulmonary Normal    Renal/:  Renal/ Normal     Hepatic/GI:  Hepatic/GI Normal    Musculoskeletal:   Arthritis  Spondylosis without myelopathy or radiculopathy, lumbar region  Sacroiliitis  Infected finger joint     Neurological:   Headaches    Endocrine:   Hypothyroidism Pre-diabetes   Psych:   depression          Physical Exam  General:  Oriented      Airway/Jaw/Neck:  Airway Findings: Mouth Opening: Small, but > 3cm   Tongue: Normal   General Airway Assessment: Adult Mallampati: II  TM Distance: 4 - 6 cm   Jaw/Neck Findings:  Neck ROM: Normal ROM       Dental:  Dental Findings: Upper Dentures, Lower Dentures     Chest/Lungs:  Chest/Lungs Findings: Clear to auscultation, Normal Respiratory Rate      Heart/Vascular:  Heart Findings: Rate: Normal  Rhythm: Regular Rhythm  Sounds: Normal        Mental Status:  Mental Status Findings:  Cooperative, Alert and Oriented         Anesthesia Plan  Type of Anesthesia, risks & benefits discussed:  Anesthesia Type:  general    Patient's Preference:    Plan Factors:          Intra-op Monitoring Plan: standard ASA monitors  Intra-op Monitoring Plan Comments:   Post Op Pain Control Plan: multimodal analgesia and per primary service following discharge from PACU  Post Op Pain Control Plan Comments:     Induction:   IV  Beta Blocker:  Patient is on a Beta-Blocker and has received one dose within the past 24 hours (No further documentation required).       Informed Consent: Informed consent signed with the Patient and all parties understand the risks and agree with anesthesia plan.  All questions answered.  Anesthesia consent signed with patient.  ASA Score: 2     Day of Surgery Review of History & Physical:  There are no significant changes.  H&P Update referred to the surgeon/provider.          Ready For Surgery From Anesthesia Perspective.         Chemistry        Component Value Date/Time     03/07/2022 1020    K 4.0 03/07/2022 1020     03/07/2022 1020    CO2 26 03/07/2022 1020    BUN 7 (L) 03/07/2022 1020    CREATININE 0.8 03/07/2022 1020     (H) 03/07/2022 1020        Component Value Date/Time    CALCIUM 9.2 03/07/2022 1020    ALKPHOS 113 03/07/2022 1020    AST 24 03/07/2022 1020    ALT 21 03/07/2022 1020    BILITOT 0.3 03/07/2022 1020    ESTGFRAFRICA >60 03/07/2022 1020    EGFRNONAA >60 03/07/2022 1020        Lab Results   Component Value Date    WBC 5.91 03/07/2022    HGB 10.8 (L) 03/07/2022    HCT 36.9 (L) 03/07/2022     (H) 03/07/2022     03/07/2022           Physical Exam  General: Oriented    Airway:  Mallampati: II   Mouth Opening: Small, but > 3cm  TM Distance: 4 - 6 cm  Tongue: Normal  Neck ROM: Normal ROM    Dental:  Upper Dentures, Lower Dentures    Chest/Lungs:  Clear to auscultation, Normal Respiratory Rate    Heart:  Rate: Normal  Rhythm: Regular Rhythm  Sounds: Normal          Anesthesia Plan  Type of Anesthesia, risks & benefits discussed:    Anesthesia Type: general  Intra-op Monitoring Plan: standard ASA  monitors  Post Op Pain Control Plan: multimodal analgesia and per primary service following discharge from PACU  Induction:  IV  Informed Consent: Informed consent signed with the Patient and all parties understand the risks and agree with anesthesia plan.  All questions answered.   ASA Score: 2  Day of Surgery Review of History & Physical: H&P Update referred to the surgeon/provider.    Ready For Surgery From Anesthesia Perspective.       .

## 2022-03-31 ENCOUNTER — TELEPHONE (OUTPATIENT)
Dept: ADMINISTRATIVE | Facility: HOSPITAL | Age: 82
End: 2022-03-31
Payer: MEDICARE

## 2022-03-31 ENCOUNTER — HOSPITAL ENCOUNTER (OUTPATIENT)
Facility: HOSPITAL | Age: 82
Discharge: HOME | End: 2022-03-31
Attending: ORTHOPAEDIC SURGERY | Admitting: ORTHOPAEDIC SURGERY
Payer: MEDICARE

## 2022-03-31 ENCOUNTER — ANESTHESIA (OUTPATIENT)
Dept: SURGERY | Facility: HOSPITAL | Age: 82
End: 2022-03-31
Payer: MEDICARE

## 2022-03-31 VITALS
RESPIRATION RATE: 16 BRPM | BODY MASS INDEX: 24.78 KG/M2 | DIASTOLIC BLOOD PRESSURE: 59 MMHG | HEART RATE: 72 BPM | WEIGHT: 122.94 LBS | HEIGHT: 59 IN | TEMPERATURE: 97 F | OXYGEN SATURATION: 97 % | SYSTOLIC BLOOD PRESSURE: 125 MMHG

## 2022-03-31 DIAGNOSIS — M86.9 OSTEOMYELITIS OF FINGER OF RIGHT HAND: Primary | ICD-10-CM

## 2022-03-31 DIAGNOSIS — M86.9 FINGER OSTEOMYELITIS, RIGHT: ICD-10-CM

## 2022-03-31 PROCEDURE — 88311 DECALCIFY TISSUE: CPT | Performed by: PATHOLOGY

## 2022-03-31 PROCEDURE — 37000009 HC ANESTHESIA EA ADD 15 MINS: Performed by: ORTHOPAEDIC SURGERY

## 2022-03-31 PROCEDURE — D9220A PRA ANESTHESIA: ICD-10-PCS | Mod: CRNA,,, | Performed by: NURSE ANESTHETIST, CERTIFIED REGISTERED

## 2022-03-31 PROCEDURE — 71000033 HC RECOVERY, INTIAL HOUR: Performed by: ORTHOPAEDIC SURGERY

## 2022-03-31 PROCEDURE — 88305 TISSUE EXAM BY PATHOLOGIST: ICD-10-PCS | Mod: 26,,, | Performed by: PATHOLOGY

## 2022-03-31 PROCEDURE — 63600175 PHARM REV CODE 636 W HCPCS: Performed by: PHYSICIAN ASSISTANT

## 2022-03-31 PROCEDURE — 25000003 PHARM REV CODE 250: Performed by: ORTHOPAEDIC SURGERY

## 2022-03-31 PROCEDURE — 26951 PR AMPUTATION FINGER/THUMB: ICD-10-PCS | Mod: F6,,, | Performed by: ORTHOPAEDIC SURGERY

## 2022-03-31 PROCEDURE — 37000008 HC ANESTHESIA 1ST 15 MINUTES: Performed by: ORTHOPAEDIC SURGERY

## 2022-03-31 PROCEDURE — 88311 DECALCIFY TISSUE: CPT | Mod: 26,,, | Performed by: PATHOLOGY

## 2022-03-31 PROCEDURE — 36000706: Performed by: ORTHOPAEDIC SURGERY

## 2022-03-31 PROCEDURE — D9220A PRA ANESTHESIA: ICD-10-PCS | Mod: ANES,,, | Performed by: ANESTHESIOLOGY

## 2022-03-31 PROCEDURE — 88311 PR  DECALCIFY TISSUE: ICD-10-PCS | Mod: 26,,, | Performed by: PATHOLOGY

## 2022-03-31 PROCEDURE — 26951 AMPUTATION OF FINGER/THUMB: CPT | Mod: F6,,, | Performed by: ORTHOPAEDIC SURGERY

## 2022-03-31 PROCEDURE — D9220A PRA ANESTHESIA: Mod: ANES,,, | Performed by: ANESTHESIOLOGY

## 2022-03-31 PROCEDURE — 88305 TISSUE EXAM BY PATHOLOGIST: CPT | Performed by: PATHOLOGY

## 2022-03-31 PROCEDURE — 71000015 HC POSTOP RECOV 1ST HR: Performed by: ORTHOPAEDIC SURGERY

## 2022-03-31 PROCEDURE — 63600175 PHARM REV CODE 636 W HCPCS: Performed by: NURSE ANESTHETIST, CERTIFIED REGISTERED

## 2022-03-31 PROCEDURE — D9220A PRA ANESTHESIA: Mod: CRNA,,, | Performed by: NURSE ANESTHETIST, CERTIFIED REGISTERED

## 2022-03-31 PROCEDURE — 88305 TISSUE EXAM BY PATHOLOGIST: CPT | Mod: 26,,, | Performed by: PATHOLOGY

## 2022-03-31 PROCEDURE — 36000707: Performed by: ORTHOPAEDIC SURGERY

## 2022-03-31 RX ORDER — ALBUTEROL SULFATE 0.83 MG/ML
2.5 SOLUTION RESPIRATORY (INHALATION) EVERY 4 HOURS PRN
Status: DISCONTINUED | OUTPATIENT
Start: 2022-03-31 | End: 2022-03-31 | Stop reason: HOSPADM

## 2022-03-31 RX ORDER — HYDROCODONE BITARTRATE AND ACETAMINOPHEN 5; 325 MG/1; MG/1
1 TABLET ORAL EVERY 6 HOURS PRN
Qty: 15 TABLET | Refills: 0 | Status: SHIPPED | OUTPATIENT
Start: 2022-03-31 | End: 2023-05-16

## 2022-03-31 RX ORDER — BUPIVACAINE HYDROCHLORIDE 2.5 MG/ML
INJECTION, SOLUTION EPIDURAL; INFILTRATION; INTRACAUDAL
Status: DISCONTINUED | OUTPATIENT
Start: 2022-03-31 | End: 2022-03-31 | Stop reason: HOSPADM

## 2022-03-31 RX ORDER — MEPERIDINE HYDROCHLORIDE 25 MG/ML
12.5 INJECTION INTRAMUSCULAR; INTRAVENOUS; SUBCUTANEOUS ONCE
Status: DISCONTINUED | OUTPATIENT
Start: 2022-03-31 | End: 2022-03-31 | Stop reason: HOSPADM

## 2022-03-31 RX ORDER — SODIUM CHLORIDE, SODIUM LACTATE, POTASSIUM CHLORIDE, CALCIUM CHLORIDE 600; 310; 30; 20 MG/100ML; MG/100ML; MG/100ML; MG/100ML
INJECTION, SOLUTION INTRAVENOUS
Status: ACTIVE | OUTPATIENT
Start: 2022-03-31

## 2022-03-31 RX ORDER — CIPROFLOXACIN 2 MG/ML
INJECTION, SOLUTION INTRAVENOUS
Status: COMPLETED
Start: 2022-03-31 | End: 2022-03-31

## 2022-03-31 RX ORDER — ONDANSETRON 2 MG/ML
4 INJECTION INTRAMUSCULAR; INTRAVENOUS ONCE AS NEEDED
Status: DISCONTINUED | OUTPATIENT
Start: 2022-03-31 | End: 2022-03-31 | Stop reason: HOSPADM

## 2022-03-31 RX ORDER — BUPIVACAINE HYDROCHLORIDE 2.5 MG/ML
INJECTION, SOLUTION EPIDURAL; INFILTRATION; INTRACAUDAL
Status: DISCONTINUED
Start: 2022-03-31 | End: 2022-03-31 | Stop reason: HOSPADM

## 2022-03-31 RX ORDER — MIDAZOLAM HYDROCHLORIDE 1 MG/ML
INJECTION INTRAMUSCULAR; INTRAVENOUS
Status: DISCONTINUED | OUTPATIENT
Start: 2022-03-31 | End: 2022-03-31

## 2022-03-31 RX ORDER — CIPROFLOXACIN 2 MG/ML
INJECTION, SOLUTION INTRAVENOUS
Status: DISCONTINUED | OUTPATIENT
Start: 2022-03-31 | End: 2022-03-31

## 2022-03-31 RX ORDER — CHLORHEXIDINE GLUCONATE ORAL RINSE 1.2 MG/ML
10 SOLUTION DENTAL 2 TIMES DAILY
Status: DISCONTINUED | OUTPATIENT
Start: 2022-03-31 | End: 2022-03-31 | Stop reason: HOSPADM

## 2022-03-31 RX ORDER — PROPOFOL 10 MG/ML
VIAL (ML) INTRAVENOUS
Status: DISCONTINUED | OUTPATIENT
Start: 2022-03-31 | End: 2022-03-31

## 2022-03-31 RX ORDER — FENTANYL CITRATE 50 UG/ML
INJECTION, SOLUTION INTRAMUSCULAR; INTRAVENOUS
Status: DISCONTINUED | OUTPATIENT
Start: 2022-03-31 | End: 2022-03-31

## 2022-03-31 RX ORDER — SODIUM CHLORIDE, SODIUM LACTATE, POTASSIUM CHLORIDE, CALCIUM CHLORIDE 600; 310; 30; 20 MG/100ML; MG/100ML; MG/100ML; MG/100ML
INJECTION, SOLUTION INTRAVENOUS CONTINUOUS
Status: DISCONTINUED | OUTPATIENT
Start: 2022-03-31 | End: 2022-03-31 | Stop reason: HOSPADM

## 2022-03-31 RX ORDER — VANCOMYCIN HCL IN 5 % DEXTROSE 1G/250ML
1000 PLASTIC BAG, INJECTION (ML) INTRAVENOUS ONCE
Status: DISCONTINUED | OUTPATIENT
Start: 2022-03-31 | End: 2022-03-31 | Stop reason: HOSPADM

## 2022-03-31 RX ORDER — LIDOCAINE HCL/PF 100 MG/5ML
SYRINGE (ML) INTRAVENOUS
Status: DISCONTINUED | OUTPATIENT
Start: 2022-03-31 | End: 2022-03-31

## 2022-03-31 RX ORDER — HYDROCODONE BITARTRATE AND ACETAMINOPHEN 5; 325 MG/1; MG/1
1 TABLET ORAL EVERY 4 HOURS PRN
Status: DISCONTINUED | OUTPATIENT
Start: 2022-03-31 | End: 2022-03-31 | Stop reason: HOSPADM

## 2022-03-31 RX ORDER — FENTANYL CITRATE 50 UG/ML
25 INJECTION, SOLUTION INTRAMUSCULAR; INTRAVENOUS EVERY 5 MIN PRN
Status: DISCONTINUED | OUTPATIENT
Start: 2022-03-31 | End: 2022-03-31 | Stop reason: HOSPADM

## 2022-03-31 RX ORDER — DIPHENHYDRAMINE HYDROCHLORIDE 50 MG/ML
25 INJECTION INTRAMUSCULAR; INTRAVENOUS EVERY 6 HOURS PRN
Status: DISCONTINUED | OUTPATIENT
Start: 2022-03-31 | End: 2022-03-31 | Stop reason: HOSPADM

## 2022-03-31 RX ORDER — HYDROCODONE BITARTRATE AND ACETAMINOPHEN 5; 325 MG/1; MG/1
1 TABLET ORAL
Status: DISCONTINUED | OUTPATIENT
Start: 2022-03-31 | End: 2022-03-31 | Stop reason: HOSPADM

## 2022-03-31 RX ADMIN — FENTANYL CITRATE 25 MCG: 50 INJECTION, SOLUTION INTRAMUSCULAR; INTRAVENOUS at 08:03

## 2022-03-31 RX ADMIN — PROPOFOL 10 MG: 10 INJECTION, EMULSION INTRAVENOUS at 08:03

## 2022-03-31 RX ADMIN — Medication 10 MG: at 08:03

## 2022-03-31 RX ADMIN — PROPOFOL 20 MG: 10 INJECTION, EMULSION INTRAVENOUS at 08:03

## 2022-03-31 RX ADMIN — SODIUM CHLORIDE, SODIUM LACTATE, POTASSIUM CHLORIDE, AND CALCIUM CHLORIDE: 600; 310; 30; 20 INJECTION, SOLUTION INTRAVENOUS at 08:03

## 2022-03-31 RX ADMIN — MIDAZOLAM HYDROCHLORIDE 1 MG: 1 INJECTION INTRAMUSCULAR; INTRAVENOUS at 08:03

## 2022-03-31 RX ADMIN — CIPROFLOXACIN 400 MG: 2 INJECTION INTRAVENOUS at 08:03

## 2022-03-31 NOTE — DISCHARGE INSTRUCTIONS
Nozin Instructions  Goal: the goal of Nozin is to reduce the risk of post-procedural infections by bacteria in the nasal cavity. Think of it as hand  for your nose.    How to use:    1. Shake Nozin bottle well    2. Take a cotton swab and apply 4 drops to one tip    3. Insert cotton tip into one nostril, being sure not to go deeper into nose than tip of the swab.    4. Swab nostril 6 times counterclockwise and 6 times clockwise. Make sure to swab the inside front pocket of the nostril.    5. Take swab out and apply 2 drops to the same cotton tip. Repeat steps 3 and 4 in the other nostril.        Do steps 1-5 twice a day for 7 days.      After Hand Surgery  After surgery, the better you take care of yourself--especially your hand--the sooner it will heal. Follow your surgeons instructions. Try not to bump your hand, and dont move or lift anything while youre still wearing bandages, a splint, or a cast.  Care for your hand    Keep your hand elevated above heart level as much as possible for the first several days after surgery. This helps reduce swelling and pain.  To help prevent infection and speed healing, take care not to get your cast or bandages wet.  Relieve pain as directed  Your surgeon may prescribe pain medicine or suggest you take an anti-inflammatory medicine. You might also be instructed to apply ice (or another cold source) to your hand. If you use ice cubes, put them in a plastic bag and rest it on top of your bandages. Leave the cold source on your hand for as long as its comfortable. Do this several times a day for the first few days after surgery. It may take several minutes before you can feel the cold through the cast or bandages.  Follow up with your surgeon  During a follow-up visit after surgery, your surgeon will check your progress. The stitches, bandages, splint, or cast may be removed. A new cast or splint may be placed. If your hand has healed enough, your surgeon may  prescribe exercises.  Do prescribed hand exercises  Your surgeon may recommend that you do exercises. These may be done under the guidance of a physical or occupational therapist. The exercises strengthen your hand, help you regain flexibility, and restore proper function. Do the exercises as advised.  Call your surgeon if you have...  A fever higher than 100.4°F (38.0°C) taken by mouth  Side effects from your medicine, such as prolonged nausea  A wet or loose dressing, or a dressing that is too tight  Excessive bleeding  Increased, ongoing pain or numbness  Signs of infection (such as drainage, warmth, or redness) at the incision site   Date Last Reviewed: 11/11/2015  © 9581-6061 [employer]. [address] [place] [address], [place] [address]. All rights reserved. This information is not intended as a substitute for professional medical care. Always follow your healthcare professional's instructions.

## 2022-03-31 NOTE — TRANSFER OF CARE
"Anesthesia Transfer of Care Note    Patient: Haim Martin    Procedure(s) Performed: Procedure(s) (LRB):  AMPUTATION, FINGER (Right)    Patient location: PACU    Anesthesia Type: MAC    Transport from OR: Transported from OR on room air with adequate spontaneous ventilation    Post pain: adequate analgesia    Post assessment: no apparent anesthetic complications    Post vital signs: stable    Level of consciousness: awake    Nausea/Vomiting: no nausea/vomiting    Complications: none    Transfer of care protocol was followed      Last vitals:   Visit Vitals  BP (!) 140/63 (BP Location: Left arm, Patient Position: Lying)   Pulse 70   Temp 36.1 °C (96.9 °F) (Temporal)   Resp (!) 22   Ht 4' 11" (1.499 m)   Wt 55.7 kg (122 lb 14.5 oz)   SpO2 98%   Breastfeeding No   BMI 24.82 kg/m²     "

## 2022-03-31 NOTE — ANESTHESIA POSTPROCEDURE EVALUATION
Anesthesia Post Evaluation    Patient: Haim Martin    Procedure(s) Performed: Procedure(s) (LRB):  AMPUTATION, FINGER (Right)    Final Anesthesia Type: general      Patient location during evaluation: PACU  Patient participation: Yes- Able to Participate  Level of consciousness: awake and alert and oriented  Post-procedure vital signs: reviewed and stable  Pain management: adequate  Airway patency: patent    PONV status at discharge: No PONV  Anesthetic complications: no      Cardiovascular status: blood pressure returned to baseline, stable and hemodynamically stable  Respiratory status: unassisted  Hydration status: euvolemic  Follow-up not needed.          Vitals Value Taken Time   /59 03/31/22 0930   Temp 36.3 °C (97.3 °F) 03/31/22 0930   Pulse 69 03/31/22 0935   Resp 30 03/31/22 0935   SpO2 87 % 03/31/22 0935   Vitals shown include unvalidated device data.      Event Time   Out of Recovery 09:17:10         Pain/Rachel Score: Rachel Score: 10 (3/31/2022  9:30 AM)

## 2022-03-31 NOTE — DISCHARGE SUMMARY
[facility]  Discharge Note  Short Stay    Procedure(s) (LRB):  AMPUTATION, FINGER (Right) index finger at the level the proximal interphalangeal joint    OUTCOME: Patient tolerated treatment/procedure well without complication and is now ready for discharge.    DISPOSITION: Home or Self Care    FINAL DIAGNOSIS:  Right index finger septic distal interphalangeal joint with osteomyelitis    FOLLOWUP: In clinic    DISCHARGE INSTRUCTIONS:    Discharge Procedure Orders   Diet general     Call MD for:  temperature >100.4     Call MD for:  persistent nausea and vomiting     Call MD for:  severe uncontrolled pain     Call MD for:  difficulty breathing, headache or visual disturbances     Call MD for:  redness, tenderness, or signs of infection (pain, swelling, redness, odor or green/yellow discharge around incision site)     Call MD for:  hives     Call MD for:  persistent dizziness or light-headedness     Call MD for:  extreme fatigue        TIME SPENT ON DISCHARGE:  20 minutes

## 2022-03-31 NOTE — OP NOTE
[facility]  General Surgery  Operative Note    SUMMARY     Date of Procedure: 3/31/2022     Procedure: Procedure(s) (LRB):  AMPUTATION, FINGER (Right)  index finger at the level of the proximal interphalangeal joint     Surgeon(s) and Role:     * Kareem Alatorre MD - Primary    Assisting Surgeon: None    Pre-Operative Diagnosis: Finger osteomyelitis, right [M86.9] index finger septic arthritis distal interphalangeal joint    Post-Operative Diagnosis: Post-Op Diagnosis Codes:     * Finger osteomyelitis, right [M86.9] index finger septic arthritis distal interphalangeal joint    Anesthesia:  Local plus sedation    Description:  Patient was taken to the operating room where 10 cc of 0.25% plain Marcaine were used for a local anesthetic about the base the right index finger.  Satisfactory anesthesia had been achieved the right hand was prepped and draped in the usual sterile fashion.  A finger of a glove was used for a digital tourniquet about the base of the right index finger.  A fishmouth incision was made centered over the middle phalanx and at the level of the proximal interphalangeal joint medially and laterally.  The patient did receive 400 mg of Cipro intravenously preoperatively.  The skin flaps were elevated.  The finger was disarticulated at the level of the proximal interphalangeal joint.  A large towel clip was used in the distal phalanx for manipulation.  Satisfactory disarticulation had been achieved the radial and ulnar neurovascular bundles were identified and section as far proximally as possible.  No unusual bleeding was encountered.  Skin was closed using horizontal mattress 4-0 nylon suture.  Xeroform gauze 4x4s and 2 in gauze dressing was applied with sterile cast padding.  The patient tolerated the procedure well was transferred to the recovery room in satisfactory condition.    Significant Surgical Tasks Conducted by the Assistant(s), if Applicable:  No  assistant    Complications:  None     Estimated Blood Loss (EBL):  5 mL           Implants:  None    Specimens: Right index finger amputation at the level distal interphalangeal joint           Condition: Good    Disposition: PACU - hemodynamically stable.    Attestation: I was present and scrubbed for the entire procedure.

## 2022-04-05 ENCOUNTER — OFFICE VISIT (OUTPATIENT)
Dept: ORTHOPEDICS | Facility: CLINIC | Age: 82
End: 2022-04-05
Payer: MEDICARE

## 2022-04-05 VITALS
SYSTOLIC BLOOD PRESSURE: 150 MMHG | BODY MASS INDEX: 24.76 KG/M2 | TEMPERATURE: 98 F | HEART RATE: 78 BPM | HEIGHT: 59 IN | WEIGHT: 122.81 LBS | DIASTOLIC BLOOD PRESSURE: 75 MMHG

## 2022-04-05 DIAGNOSIS — Z89.021: Primary | ICD-10-CM

## 2022-04-05 PROCEDURE — 99999 PR PBB SHADOW E&M-EST. PATIENT-LVL IV: CPT | Mod: PBBFAC,,, | Performed by: PHYSICIAN ASSISTANT

## 2022-04-05 PROCEDURE — 1101F PT FALLS ASSESS-DOCD LE1/YR: CPT | Mod: CPTII,S$GLB,, | Performed by: PHYSICIAN ASSISTANT

## 2022-04-05 PROCEDURE — 1125F PR PAIN SEVERITY QUANTIFIED, PAIN PRESENT: ICD-10-PCS | Mod: CPTII,S$GLB,, | Performed by: PHYSICIAN ASSISTANT

## 2022-04-05 PROCEDURE — 3288F PR FALLS RISK ASSESSMENT DOCUMENTED: ICD-10-PCS | Mod: CPTII,S$GLB,, | Performed by: PHYSICIAN ASSISTANT

## 2022-04-05 PROCEDURE — 3078F DIAST BP <80 MM HG: CPT | Mod: CPTII,S$GLB,, | Performed by: PHYSICIAN ASSISTANT

## 2022-04-05 PROCEDURE — 3288F FALL RISK ASSESSMENT DOCD: CPT | Mod: CPTII,S$GLB,, | Performed by: PHYSICIAN ASSISTANT

## 2022-04-05 PROCEDURE — 1125F AMNT PAIN NOTED PAIN PRSNT: CPT | Mod: CPTII,S$GLB,, | Performed by: PHYSICIAN ASSISTANT

## 2022-04-05 PROCEDURE — 1160F PR REVIEW ALL MEDS BY PRESCRIBER/CLIN PHARMACIST DOCUMENTED: ICD-10-PCS | Mod: CPTII,S$GLB,, | Performed by: PHYSICIAN ASSISTANT

## 2022-04-05 PROCEDURE — 1101F PR PT FALLS ASSESS DOC 0-1 FALLS W/OUT INJ PAST YR: ICD-10-PCS | Mod: CPTII,S$GLB,, | Performed by: PHYSICIAN ASSISTANT

## 2022-04-05 PROCEDURE — 3078F PR MOST RECENT DIASTOLIC BLOOD PRESSURE < 80 MM HG: ICD-10-PCS | Mod: CPTII,S$GLB,, | Performed by: PHYSICIAN ASSISTANT

## 2022-04-05 PROCEDURE — 3077F SYST BP >= 140 MM HG: CPT | Mod: CPTII,S$GLB,, | Performed by: PHYSICIAN ASSISTANT

## 2022-04-05 PROCEDURE — 99999 PR PBB SHADOW E&M-EST. PATIENT-LVL IV: ICD-10-PCS | Mod: PBBFAC,,, | Performed by: PHYSICIAN ASSISTANT

## 2022-04-05 PROCEDURE — 99024 POSTOP FOLLOW-UP VISIT: CPT | Mod: S$GLB,,, | Performed by: PHYSICIAN ASSISTANT

## 2022-04-05 PROCEDURE — 99024 PR POST-OP FOLLOW-UP VISIT: ICD-10-PCS | Mod: S$GLB,,, | Performed by: PHYSICIAN ASSISTANT

## 2022-04-05 PROCEDURE — 1159F PR MEDICATION LIST DOCUMENTED IN MEDICAL RECORD: ICD-10-PCS | Mod: CPTII,S$GLB,, | Performed by: PHYSICIAN ASSISTANT

## 2022-04-05 PROCEDURE — 1159F MED LIST DOCD IN RCRD: CPT | Mod: CPTII,S$GLB,, | Performed by: PHYSICIAN ASSISTANT

## 2022-04-05 PROCEDURE — 1160F RVW MEDS BY RX/DR IN RCRD: CPT | Mod: CPTII,S$GLB,, | Performed by: PHYSICIAN ASSISTANT

## 2022-04-05 PROCEDURE — 3077F PR MOST RECENT SYSTOLIC BLOOD PRESSURE >= 140 MM HG: ICD-10-PCS | Mod: CPTII,S$GLB,, | Performed by: PHYSICIAN ASSISTANT

## 2022-04-05 NOTE — PROGRESS NOTES
Subjective:      Patient ID: Haim Martin is a 81 y.o. female.    Chief Complaint: Post-op Evaluation and Pain of the Right Hand      HPI: Haim Martin is a 81-year-old female in clinic today for postoperative follow-up.  Patient is 5 days status post right index finger amputation at the level of the proximal interphalangeal joint.  Patient is doing well at this time.  No new complaints    Past Medical History:   Diagnosis Date    Depression     Diverticulosis of colon (without mention of hemorrhage) 8/25/2014    Hyperlipidemia     Hypertension     Thyroid disease        Current Outpatient Medications:     albuterol (PROVENTIL/VENTOLIN HFA) 90 mcg/actuation inhaler, Inhale 2 puffs into the lungs every 6 (six) hours as needed for Wheezing., Disp: 18 g, Rfl: 0    alendronate (FOSAMAX) 70 MG tablet, Take 70 mg by mouth every 7 days., Disp: , Rfl:     amitriptyline (ELAVIL) 75 MG tablet, Take 1 tablet by mouth in the evening, Disp: 90 tablet, Rfl: 0    aspirin (ECOTRIN) 81 MG EC tablet, Take 81 mg by mouth once daily., Disp: , Rfl:     atorvastatin (LIPITOR) 40 MG tablet, Take 40 mg by mouth every evening., Disp: , Rfl:     ergocalciferol (ERGOCALCIFEROL) 50,000 unit Cap, Take 1 capsule by mouth once a week (Patient taking differently: On Sundays), Disp: 8 capsule, Rfl: 0    HYDROcodone-acetaminophen (NORCO) 5-325 mg per tablet, Take 1 tablet by mouth every 6 (six) hours as needed for Pain., Disp: 15 tablet, Rfl: 0    levothyroxine (SYNTHROID) 88 MCG tablet, TAKE 1 TABLET BY MOUTH ONCE DAILY MONDAY - SATURDAY  AND ONE & ONE-HALF TABLET ON SUNDAY, Disp: 90 tablet, Rfl: 0    mupirocin (BACTROBAN) 2 % ointment, Apply topically 2 (two) times daily., Disp: 15 g, Rfl: 1    nitrofurantoin, macrocrystal-monohydrate, (MACROBID) 100 MG capsule, Take 1 capsule (100 mg total) by mouth 2 (two) times daily., Disp: 14 capsule, Rfl: 0    omeprazole (PRILOSEC) 20 MG capsule, Take 1 capsule (20 mg total) by  "mouth as needed., Disp: 90 capsule, Rfl: 4    propranoloL (INDERAL) 40 MG tablet, Take 1 tablet (40 mg total) by mouth 2 (two) times daily., Disp: 180 tablet, Rfl: 0    pulse oximeter (PULSE OXIMETER) device, Use twice daily at 8 AM and 3 PM and record the value in MyChart as directed., Disp: 1 each, Rfl: 0    sertraline (ZOLOFT) 100 MG tablet, Take 1 tablet by mouth once daily, Disp: 90 tablet, Rfl: 3    sodium chloride 0.9% SolP 50 mL with DAPTOmycin 350 mg SolR 350 mg, Inject 350 mg into the vein once daily. Ending 02/6/22, Disp: , Rfl:     sumatriptan (IMITREX) 100 MG tablet, TAKE 1 TABLET BY MOUTH AT ONSET OF MIGRAINE, Disp: 9 tablet, Rfl: 0    vit C/E/Zn/coppr/lutein/zeaxan (PRESERVISION AREDS-2 ORAL), Take 1 tablet by mouth 2 (two) times a day., Disp: , Rfl:   No current facility-administered medications for this visit.    Facility-Administered Medications Ordered in Other Visits:     lactated ringers infusion, , Intravenous, On Call Procedure, QUE Allen, Last Rate: 100 mL/hr at 03/31/22 0821, New Bag at 03/31/22 0821  Review of patient's allergies indicates:   Allergen Reactions    Clindamycin      Heaviness in chest      Penicillins Hives     Pt has tolerated cephalexin       BP (!) 150/75 (BP Location: Left arm, Patient Position: Sitting, BP Method: Medium (Automatic))   Pulse 78   Temp 97.5 °F (36.4 °C)   Ht 4' 11" (1.499 m)   Wt 55.7 kg (122 lb 12.7 oz)   BMI 24.80 kg/m²     ROS      Objective:    Ortho Exam   Right index finger:  Amputation site at the level of the proximal interphalangeal site has sutures intact with wound margins well approximated, no signs of infection  Mild edema  Moderate TTP  Motor exam normal  Sensation and pulses intact    GEN: Well developed, well nourished female. AAOX3. No acute distress.   Normocephalic, atraumatic.   FARRHA  Breathing unlabored.  Mood and affect appropriate.        Assessment:     Imaging:  No new imaging ordered today        1. " Status post amputation of finger, right          Plan:       Incision was cleaned and dressed with sterile, waterproof bandage.  Patient instructed to perform wound cleanings with hydrogen peroxide and dressing changes daily.  Patient instructed to keep the incision clean and dry until sutures removed.  Return to clinic in about 10 days for suture removal     Follow up in about 10 days (around 4/15/2022).          Patient note was created using [employer].  Any errors in syntax or even information may not have been identified and edited on initial review prior to signing this note.

## 2022-04-08 LAB
FINAL PATHOLOGIC DIAGNOSIS: NORMAL
GROSS: NORMAL
Lab: NORMAL

## 2022-04-11 ENCOUNTER — PATIENT MESSAGE (OUTPATIENT)
Dept: RESEARCH | Facility: HOSPITAL | Age: 82
End: 2022-04-11
Payer: MEDICARE

## 2022-04-11 RX ORDER — ALENDRONATE SODIUM 70 MG/1
TABLET ORAL
Qty: 12 TABLET | Refills: 1 | OUTPATIENT
Start: 2022-04-11

## 2022-04-11 NOTE — TELEPHONE ENCOUNTER
From last  DEXA results note : Worsening  Low BD with high fracture risk. Please offer RHeum consult

## 2022-04-14 ENCOUNTER — OFFICE VISIT (OUTPATIENT)
Dept: ORTHOPEDICS | Facility: CLINIC | Age: 82
End: 2022-04-14
Payer: MEDICARE

## 2022-04-14 VITALS
DIASTOLIC BLOOD PRESSURE: 77 MMHG | WEIGHT: 122.81 LBS | SYSTOLIC BLOOD PRESSURE: 116 MMHG | HEIGHT: 59 IN | TEMPERATURE: 97 F | HEART RATE: 94 BPM | BODY MASS INDEX: 24.76 KG/M2

## 2022-04-14 DIAGNOSIS — Z89.021: Primary | ICD-10-CM

## 2022-04-14 PROCEDURE — 1125F PR PAIN SEVERITY QUANTIFIED, PAIN PRESENT: ICD-10-PCS | Mod: CPTII,S$GLB,, | Performed by: PHYSICIAN ASSISTANT

## 2022-04-14 PROCEDURE — 99024 PR POST-OP FOLLOW-UP VISIT: ICD-10-PCS | Mod: S$GLB,,, | Performed by: PHYSICIAN ASSISTANT

## 2022-04-14 PROCEDURE — 3288F FALL RISK ASSESSMENT DOCD: CPT | Mod: CPTII,S$GLB,, | Performed by: PHYSICIAN ASSISTANT

## 2022-04-14 PROCEDURE — 99999 PR PBB SHADOW E&M-EST. PATIENT-LVL IV: ICD-10-PCS | Mod: PBBFAC,,, | Performed by: PHYSICIAN ASSISTANT

## 2022-04-14 PROCEDURE — 3078F DIAST BP <80 MM HG: CPT | Mod: CPTII,S$GLB,, | Performed by: PHYSICIAN ASSISTANT

## 2022-04-14 PROCEDURE — 1125F AMNT PAIN NOTED PAIN PRSNT: CPT | Mod: CPTII,S$GLB,, | Performed by: PHYSICIAN ASSISTANT

## 2022-04-14 PROCEDURE — 1160F RVW MEDS BY RX/DR IN RCRD: CPT | Mod: CPTII,S$GLB,, | Performed by: PHYSICIAN ASSISTANT

## 2022-04-14 PROCEDURE — 99024 POSTOP FOLLOW-UP VISIT: CPT | Mod: S$GLB,,, | Performed by: PHYSICIAN ASSISTANT

## 2022-04-14 PROCEDURE — 3078F PR MOST RECENT DIASTOLIC BLOOD PRESSURE < 80 MM HG: ICD-10-PCS | Mod: CPTII,S$GLB,, | Performed by: PHYSICIAN ASSISTANT

## 2022-04-14 PROCEDURE — 99999 PR PBB SHADOW E&M-EST. PATIENT-LVL IV: CPT | Mod: PBBFAC,,, | Performed by: PHYSICIAN ASSISTANT

## 2022-04-14 PROCEDURE — 3288F PR FALLS RISK ASSESSMENT DOCUMENTED: ICD-10-PCS | Mod: CPTII,S$GLB,, | Performed by: PHYSICIAN ASSISTANT

## 2022-04-14 PROCEDURE — 1159F PR MEDICATION LIST DOCUMENTED IN MEDICAL RECORD: ICD-10-PCS | Mod: CPTII,S$GLB,, | Performed by: PHYSICIAN ASSISTANT

## 2022-04-14 PROCEDURE — 3074F PR MOST RECENT SYSTOLIC BLOOD PRESSURE < 130 MM HG: ICD-10-PCS | Mod: CPTII,S$GLB,, | Performed by: PHYSICIAN ASSISTANT

## 2022-04-14 PROCEDURE — 3074F SYST BP LT 130 MM HG: CPT | Mod: CPTII,S$GLB,, | Performed by: PHYSICIAN ASSISTANT

## 2022-04-14 PROCEDURE — 1101F PR PT FALLS ASSESS DOC 0-1 FALLS W/OUT INJ PAST YR: ICD-10-PCS | Mod: CPTII,S$GLB,, | Performed by: PHYSICIAN ASSISTANT

## 2022-04-14 PROCEDURE — 1160F PR REVIEW ALL MEDS BY PRESCRIBER/CLIN PHARMACIST DOCUMENTED: ICD-10-PCS | Mod: CPTII,S$GLB,, | Performed by: PHYSICIAN ASSISTANT

## 2022-04-14 PROCEDURE — 1101F PT FALLS ASSESS-DOCD LE1/YR: CPT | Mod: CPTII,S$GLB,, | Performed by: PHYSICIAN ASSISTANT

## 2022-04-14 PROCEDURE — 1159F MED LIST DOCD IN RCRD: CPT | Mod: CPTII,S$GLB,, | Performed by: PHYSICIAN ASSISTANT

## 2022-04-14 NOTE — PROGRESS NOTES
Subjective:      Patient ID: Haim Martin is a 81 y.o. female.    Chief Complaint: Post-op Evaluation and Pain of the Right Hand      HPI: Haim Martin is a 81-year-old female in clinic today for postoperative follow-up.  Patient is 2 weeks status post right index finger amputation at the level of the proximal interphalangeal joint.  Patient is doing well at this time.  No new complaints    Past Medical History:   Diagnosis Date    Depression     Diverticulosis of colon (without mention of hemorrhage) 8/25/2014    Hyperlipidemia     Hypertension     Thyroid disease        Current Outpatient Medications:     albuterol (PROVENTIL/VENTOLIN HFA) 90 mcg/actuation inhaler, Inhale 2 puffs into the lungs every 6 (six) hours as needed for Wheezing., Disp: 18 g, Rfl: 0    alendronate (FOSAMAX) 70 MG tablet, Take 70 mg by mouth every 7 days., Disp: , Rfl:     amitriptyline (ELAVIL) 75 MG tablet, Take 1 tablet by mouth in the evening, Disp: 90 tablet, Rfl: 0    aspirin (ECOTRIN) 81 MG EC tablet, Take 81 mg by mouth once daily., Disp: , Rfl:     atorvastatin (LIPITOR) 40 MG tablet, Take 40 mg by mouth every evening., Disp: , Rfl:     ergocalciferol (ERGOCALCIFEROL) 50,000 unit Cap, Take 1 capsule by mouth once a week (Patient taking differently: On Sundays), Disp: 8 capsule, Rfl: 0    HYDROcodone-acetaminophen (NORCO) 5-325 mg per tablet, Take 1 tablet by mouth every 6 (six) hours as needed for Pain., Disp: 15 tablet, Rfl: 0    levothyroxine (SYNTHROID) 88 MCG tablet, TAKE 1 TABLET BY MOUTH ONCE DAILY MONDAY - SATURDAY  AND ONE & ONE-HALF TABLET ON SUNDAY, Disp: 90 tablet, Rfl: 0    mupirocin (BACTROBAN) 2 % ointment, Apply topically 2 (two) times daily., Disp: 15 g, Rfl: 1    nitrofurantoin, macrocrystal-monohydrate, (MACROBID) 100 MG capsule, Take 1 capsule (100 mg total) by mouth 2 (two) times daily., Disp: 14 capsule, Rfl: 0    omeprazole (PRILOSEC) 20 MG capsule, Take 1 capsule (20 mg total) by  "mouth as needed., Disp: 90 capsule, Rfl: 4    propranoloL (INDERAL) 40 MG tablet, Take 1 tablet (40 mg total) by mouth 2 (two) times daily., Disp: 180 tablet, Rfl: 0    pulse oximeter (PULSE OXIMETER) device, Use twice daily at 8 AM and 3 PM and record the value in MyChart as directed., Disp: 1 each, Rfl: 0    sertraline (ZOLOFT) 100 MG tablet, Take 1 tablet by mouth once daily, Disp: 90 tablet, Rfl: 3    sodium chloride 0.9% SolP 50 mL with DAPTOmycin 350 mg SolR 350 mg, Inject 350 mg into the vein once daily. Ending 02/6/22, Disp: , Rfl:     sumatriptan (IMITREX) 100 MG tablet, TAKE 1 TABLET BY MOUTH AT ONSET OF MIGRAINE, Disp: 9 tablet, Rfl: 0    vit C/E/Zn/coppr/lutein/zeaxan (PRESERVISION AREDS-2 ORAL), Take 1 tablet by mouth 2 (two) times a day., Disp: , Rfl:   No current facility-administered medications for this visit.    Facility-Administered Medications Ordered in Other Visits:     lactated ringers infusion, , Intravenous, On Call Procedure, QUE [facility], Last Rate: 100 mL/hr at 03/31/22 0821, New Bag at 03/31/22 0821  Review of patient's allergies indicates:   Allergen Reactions    Clindamycin      Heaviness in chest      Penicillins Hives     Pt has tolerated cephalexin       /77 (BP Location: Left arm, Patient Position: Sitting, BP Method: Medium (Automatic))   Pulse 94   Temp 96.9 °F (36.1 °C)   Ht 4' 11" (1.499 m)   Wt 55.7 kg (122 lb 12.7 oz)   BMI 24.80 kg/m²     ROS      Objective:    Ortho Exam   Right index finger:  Amputation site at the level of the proximal interphalangeal joint has sutures intact with wound margins well healed, no signs of infection  Mild edema  Moderate TTP  Motor exam normal  Sensation and pulses intact    GEN: Well developed, well nourished female. AAOX3. No acute distress.   Normocephalic, atraumatic.   FARRAH  Breathing unlabored.  Mood and affect appropriate.        Assessment:     Imaging:  No new imaging ordered today        1. Status post " amputation of finger, right          Plan:       Sutures removed, patient instructed on normal wound care with soap and water.  Patient instructed to continue to use the hand as much as possible.  Elevate the surgical site to reduce swelling as needed.  Return to clinic in about 1 month for further evaluation     Follow up in about 1 month (around 5/14/2022).          Patient note was created using MModal Dictation.  Any errors in syntax or even information may not have been identified and edited on initial review prior to signing this note.

## 2022-04-22 RX ORDER — AMITRIPTYLINE HYDROCHLORIDE 75 MG/1
TABLET ORAL
Qty: 90 TABLET | Refills: 3 | Status: SHIPPED | OUTPATIENT
Start: 2022-04-22 | End: 2023-03-02

## 2022-04-22 NOTE — TELEPHONE ENCOUNTER
No new care gaps identified.  Powered by [email or web address]. Reference number: [id number].   4/22/2022 8:26:28 AM CDT

## 2022-04-22 NOTE — TELEPHONE ENCOUNTER
Refill Routing Note   Medication(s) are not appropriate for processing by [facility] for the following reason(s):      - Patient has been seen in the ED/Hospital since the last PCP visit               Medication reconciliation completed: No     Appointments  past 12m or future 3m with PCP    Date Provider   Last Visit   2/21/2022 Kavya Mesa MD   Next Visit   Visit date not found Kavya Mesa MD

## 2022-04-26 ENCOUNTER — TELEPHONE (OUTPATIENT)
Dept: FAMILY MEDICINE | Facility: CLINIC | Age: 82
End: 2022-04-26
Payer: MEDICARE

## 2022-04-26 NOTE — TELEPHONE ENCOUNTER
----- Message from Dana Ramirez sent at 4/26/2022 11:56 AM CDT -----  Regarding: pt advice  Contact: Pt  Pt is calling to speak to nurse regarding her referral to rheumatology. Also needs to speak to nurse regarding when she can start back on the Lipitor. Please call back at [phone]//thank you acc

## 2022-04-26 NOTE — TELEPHONE ENCOUNTER
Pt would like to see rheumatology sooner than later, she will see how her insurance approves and will call back.   She also wanted to know if she should resume taking her lipitor. Please review and advise.

## 2022-05-04 ENCOUNTER — PES CALL (OUTPATIENT)
Dept: ADMINISTRATIVE | Facility: CLINIC | Age: 82
End: 2022-05-04
Payer: MEDICARE

## 2022-05-12 ENCOUNTER — TELEPHONE (OUTPATIENT)
Dept: ADMINISTRATIVE | Facility: HOSPITAL | Age: 82
End: 2022-05-12
Payer: MEDICARE

## 2022-05-17 ENCOUNTER — OFFICE VISIT (OUTPATIENT)
Dept: ORTHOPEDICS | Facility: CLINIC | Age: 82
End: 2022-05-17
Payer: MEDICARE

## 2022-05-17 ENCOUNTER — HOSPITAL ENCOUNTER (OUTPATIENT)
Dept: RADIOLOGY | Facility: HOSPITAL | Age: 82
Discharge: HOME | End: 2022-05-17
Attending: ORTHOPAEDIC SURGERY
Payer: MEDICARE

## 2022-05-17 VITALS — HEIGHT: 59 IN | BODY MASS INDEX: 24.6 KG/M2 | WEIGHT: 122 LBS

## 2022-05-17 DIAGNOSIS — M67.449 MUCOUS CYST OF FINGER: ICD-10-CM

## 2022-05-17 DIAGNOSIS — M67.449 MUCOUS CYST OF FINGER: Primary | ICD-10-CM

## 2022-05-17 DIAGNOSIS — Z01.818 PREOP TESTING: Primary | ICD-10-CM

## 2022-05-17 DIAGNOSIS — M79.642 LEFT HAND PAIN: ICD-10-CM

## 2022-05-17 DIAGNOSIS — M79.642 LEFT HAND PAIN: Primary | ICD-10-CM

## 2022-05-17 PROCEDURE — 73130 X-RAY EXAM OF HAND: CPT | Mod: 26,LT,, | Performed by: RADIOLOGY

## 2022-05-17 PROCEDURE — 1160F PR REVIEW ALL MEDS BY PRESCRIBER/CLIN PHARMACIST DOCUMENTED: ICD-10-PCS | Mod: CPTII,S$GLB,, | Performed by: ORTHOPAEDIC SURGERY

## 2022-05-17 PROCEDURE — 1101F PR PT FALLS ASSESS DOC 0-1 FALLS W/OUT INJ PAST YR: ICD-10-PCS | Mod: CPTII,S$GLB,, | Performed by: ORTHOPAEDIC SURGERY

## 2022-05-17 PROCEDURE — 1159F MED LIST DOCD IN RCRD: CPT | Mod: CPTII,S$GLB,, | Performed by: ORTHOPAEDIC SURGERY

## 2022-05-17 PROCEDURE — 73130 X-RAY EXAM OF HAND: CPT | Mod: TC,LT

## 2022-05-17 PROCEDURE — 99999 PR PBB SHADOW E&M-EST. PATIENT-LVL III: CPT | Mod: PBBFAC,,, | Performed by: ORTHOPAEDIC SURGERY

## 2022-05-17 PROCEDURE — 1160F RVW MEDS BY RX/DR IN RCRD: CPT | Mod: CPTII,S$GLB,, | Performed by: ORTHOPAEDIC SURGERY

## 2022-05-17 PROCEDURE — 73130 XR HAND COMPLETE 3 VIEW LEFT: ICD-10-PCS | Mod: 26,LT,, | Performed by: RADIOLOGY

## 2022-05-17 PROCEDURE — 3288F FALL RISK ASSESSMENT DOCD: CPT | Mod: CPTII,S$GLB,, | Performed by: ORTHOPAEDIC SURGERY

## 2022-05-17 PROCEDURE — 1159F PR MEDICATION LIST DOCUMENTED IN MEDICAL RECORD: ICD-10-PCS | Mod: CPTII,S$GLB,, | Performed by: ORTHOPAEDIC SURGERY

## 2022-05-17 PROCEDURE — 99213 PR OFFICE/OUTPT VISIT, EST, LEVL III, 20-29 MIN: ICD-10-PCS | Mod: 24,S$GLB,, | Performed by: ORTHOPAEDIC SURGERY

## 2022-05-17 PROCEDURE — 99024 PR POST-OP FOLLOW-UP VISIT: ICD-10-PCS | Mod: S$GLB,,, | Performed by: ORTHOPAEDIC SURGERY

## 2022-05-17 PROCEDURE — 99999 PR PBB SHADOW E&M-EST. PATIENT-LVL III: ICD-10-PCS | Mod: PBBFAC,,, | Performed by: ORTHOPAEDIC SURGERY

## 2022-05-17 PROCEDURE — 99024 POSTOP FOLLOW-UP VISIT: CPT | Mod: S$GLB,,, | Performed by: ORTHOPAEDIC SURGERY

## 2022-05-17 PROCEDURE — 1125F AMNT PAIN NOTED PAIN PRSNT: CPT | Mod: CPTII,S$GLB,, | Performed by: ORTHOPAEDIC SURGERY

## 2022-05-17 PROCEDURE — 1101F PT FALLS ASSESS-DOCD LE1/YR: CPT | Mod: CPTII,S$GLB,, | Performed by: ORTHOPAEDIC SURGERY

## 2022-05-17 PROCEDURE — 99213 OFFICE O/P EST LOW 20 MIN: CPT | Mod: 24,S$GLB,, | Performed by: ORTHOPAEDIC SURGERY

## 2022-05-17 PROCEDURE — 1125F PR PAIN SEVERITY QUANTIFIED, PAIN PRESENT: ICD-10-PCS | Mod: CPTII,S$GLB,, | Performed by: ORTHOPAEDIC SURGERY

## 2022-05-17 PROCEDURE — 3288F PR FALLS RISK ASSESSMENT DOCUMENTED: ICD-10-PCS | Mod: CPTII,S$GLB,, | Performed by: ORTHOPAEDIC SURGERY

## 2022-05-17 NOTE — PROGRESS NOTES
Subjective:     Patient ID: Haim Martin is a 82 y.o. female.    Chief Complaint: No chief complaint on file.      HPI:  The patient is an 82-year-old female with a left long finger mucous cyst that she wishes to have excised.  Her amputation right index finger proximal interphalangeal joint is doing well    Past Medical History:   Diagnosis Date    Depression     Diverticulosis of colon (without mention of hemorrhage) 8/25/2014    Hyperlipidemia     Hypertension     Thyroid disease      Past Surgical History:   Procedure Laterality Date    APPENDECTOMY      CATARACT EXTRACTION EXTRACAPSULAR W/ INTRAOCULAR LENS IMPLANTATION Bilateral 4/2014    FINGER AMPUTATION Right 3/31/2022    Procedure: AMPUTATION, FINGER;  Surgeon: Kareem Alatorre MD;  Location: [facility] OR;  Service: Orthopedics;  Laterality: Right;  amputation right index finger at the level of proximal interphalangeal joint    INCISION AND DRAINAGE OF HAND Right 1/21/2022    Procedure: INCISION AND DRAINAGE, HAND;  Surgeon: Ramirez Flores MD;  Location: [facility] OR;  Service: Orthopedics;  Laterality: Right;  Right Index Finger    INJECTION OF ANESTHETIC AGENT AROUND MEDIAL BRANCH NERVES INNERVATING LUMBAR FACET JOINT Bilateral 7/16/2020    Procedure: Bilateral L3-5 MBB with local;  Surgeon: Luis Ashraf MD;  Location: [facility] PAIN MGT;  Service: Pain Management;  Laterality: Bilateral;    INJECTION OF ANESTHETIC AGENT AROUND MEDIAL BRANCH NERVES INNERVATING LUMBAR FACET JOINT Bilateral 8/10/2020    Procedure: Bilateral L3-5 MBB with local;  Surgeon: Luis Ashraf MD;  Location: [facility] PAIN [facility]GT;  Service: Pain Management;  Laterality: Bilateral;    INJECTION OF ANESTHETIC AGENT INTO SACROILIAC JOINT Bilateral 11/4/2021    Procedure: Bilateral SIJ Injection;  Surgeon: Luis Ashraf MD;  Location: [facility] PAIN MGT;  Service: Pain Management;  Laterality: Bilateral;    RADIOFREQUENCY THERMOCOAGULATION Left 9/28/2020    Procedure: Left  L3-5 Lumbar RFA;  Surgeon: Luis Ashraf MD;  Location: [facility] PAIN MGT;  Service: Pain Management;  Laterality: Left;    RADIOFREQUENCY THERMOCOAGULATION Right 10/12/2020    Procedure: Right L3-5 Lumbar RFA;  Surgeon: Luis Ashraf MD;  Location: [facility] PAIN [facility]GT;  Service: Pain Management;  Laterality: Right;    STOMACH SURGERY  1998    not sure what they did, possible bowel resection, partial gastrectomy    TONSILLECTOMY       Family History   Problem Relation Age of Onset    Lupus Mother     Stroke Father     Coronary artery disease Father     Diabetes type II Father     Kidney cancer Maternal Aunt     Breast cancer Maternal Aunt      Social History     Socioeconomic History    Marital status:    Tobacco Use    Smoking status: Former Smoker    Smokeless tobacco: Never Used    Tobacco comment: quit 30 years ago   Substance and Sexual Activity    Alcohol use: No    Drug use: No    Sexual activity: Not Currently     Birth control/protection: None     Medication List with Changes/Refills   Current Medications    ALBUTEROL (PROVENTIL/VENTOLIN HFA) 90 MCG/ACTUATION INHALER    Inhale 2 puffs into the lungs every 6 (six) hours as needed for Wheezing.    ALENDRONATE (FOSAMAX) 70 MG TABLET    Take 70 mg by mouth every 7 days.    AMITRIPTYLINE (ELAVIL) 75 MG TABLET    Take 1 tablet by mouth in the evening    ASPIRIN (ECOTRIN) 81 MG EC TABLET    Take 81 mg by mouth once daily.    ATORVASTATIN (LIPITOR) 40 MG TABLET    Take 40 mg by mouth every evening.    ERGOCALCIFEROL (ERGOCALCIFEROL) 50,000 UNIT CAP    Take 1 capsule by mouth once a week    HYDROCODONE-ACETAMINOPHEN (NORCO) 5-325 MG PER TABLET    Take 1 tablet by mouth every 6 (six) hours as needed for Pain.    LEVOTHYROXINE (SYNTHROID) 88 MCG TABLET    TAKE 1 TABLET BY MOUTH ONCE DAILY MONDAY - SATURDAY  AND ONE & ONE-HALF TABLET ON SUNDAY    MUPIROCIN (BACTROBAN) 2 % OINTMENT    Apply topically 2 (two) times daily.    NITROFURANTOIN,  MACROCRYSTAL-MONOHYDRATE, (MACROBID) 100 MG CAPSULE    Take 1 capsule (100 mg total) by mouth 2 (two) times daily.    OMEPRAZOLE (PRILOSEC) 20 MG CAPSULE    Take 1 capsule (20 mg total) by mouth as needed.    PROPRANOLOL (INDERAL) 40 MG TABLET    Take 1 tablet (40 mg total) by mouth 2 (two) times daily.    SERTRALINE (ZOLOFT) 100 MG TABLET    Take 1 tablet by mouth once daily    SUMATRIPTAN (IMITREX) 100 MG TABLET    TAKE 1 TABLET BY MOUTH AT ONSET OF MIGRAINE    VIT C/E/ZN/COPPR/LUTEIN/ZEAXAN (PRESERVISION AREDS-2 ORAL)    Take 1 tablet by mouth 2 (two) times a day.   Discontinued Medications    PULSE OXIMETER (PULSE OXIMETER) DEVICE    Use twice daily at 8 AM and 3 PM and record the value in MyChart as directed.    SODIUM CHLORIDE 0.9% SOLP 50 ML WITH DAPTOMYCIN 350 MG SOLR 350 MG    Inject 350 mg into the vein once daily. Ending 02/6/22     Review of patient's allergies indicates:   Allergen Reactions    Clindamycin      Heaviness in chest      Penicillins Hives     Pt has tolerated cephalexin     Review of Systems   Constitutional: Negative for malaise/fatigue.   HENT: Negative for hearing loss.    Eyes: Negative for double vision and visual disturbance.   Cardiovascular: Negative for chest pain.   Respiratory: Positive for shortness of breath.    Endocrine: Negative for cold intolerance.   Hematologic/Lymphatic: Does not bruise/bleed easily.   Skin: Negative for poor wound healing and suspicious lesions.   Musculoskeletal: Positive for arthritis, back pain, joint pain and joint swelling. Negative for gout.   Gastrointestinal: Negative for nausea and vomiting.   Genitourinary: Negative for dysuria.   Neurological: Positive for headaches. Negative for numbness, paresthesias and sensory change.   Psychiatric/Behavioral: Positive for depression. Negative for memory loss and substance abuse. The patient is nervous/anxious.    Allergic/Immunologic: Negative for persistent infections.       Objective:   Body mass  index is 24.64 kg/m².  There were no vitals filed for this visit.             General    Constitutional: She is oriented to person, place, and time. She appears well-developed and well-nourished. No distress.   HENT:   Head: Normocephalic.   Mouth/Throat: Oropharynx is clear and moist.   Eyes: EOM are normal.   Cardiovascular: Normal rate.    Pulmonary/Chest: Effort normal.   Abdominal: Soft.   Neurological: She is alert and oriented to person, place, and time. No cranial nerve deficit.   Psychiatric: She has a normal mood and affect.         Left Hand/Wrist Exam     Inspection   Scars: Wrist - absent Hand -  absent  Effusion: Wrist - absent Hand -  present    Other     Sensory Exam  Median Distribution: normal  Ulnar Distribution: normal  Radial Distribution: normal    Comments:  The patient is a 5 mm mucous cyst left long finger distal interphalangeal joint.  There is no sign of infection.  There are no motor or sensory deficits.          Vascular Exam       Capillary Refill  Left Hand: normal capillary refill            Relevant imaging results reviewed and interpreted by me, discussed with the patient and / or family today radiographs left hand showed small osteophyte distal interphalangeal joint left long finger  Assessment:     Encounter Diagnosis   Name Primary?   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  Risk complications and alternatives were discussed including the risk of infection, anesthetic risk, injury to nerves and vessels, loss of motion, and possible need for additional surgeries were discussed.  She seems to understand and agree that surgery.  All questions were answered.                Disclaimer: This note was prepared using a voice recognition system and is likely to have sound alike errors within the text.

## 2022-05-17 NOTE — H&P (VIEW-ONLY)
Subjective:     Patient ID: Haim Martin is a 82 y.o. female.    Chief Complaint: No chief complaint on file.      HPI:  The patient is an 82-year-old female with a left long finger mucous cyst that she wishes to have excised.  Her amputation right index finger proximal interphalangeal joint is doing well    Past Medical History:   Diagnosis Date    Depression     Diverticulosis of colon (without mention of hemorrhage) 8/25/2014    Hyperlipidemia     Hypertension     Thyroid disease      Past Surgical History:   Procedure Laterality Date    APPENDECTOMY      CATARACT EXTRACTION EXTRACAPSULAR W/ INTRAOCULAR LENS IMPLANTATION Bilateral 4/2014    FINGER AMPUTATION Right 3/31/2022    Procedure: AMPUTATION, FINGER;  Surgeon: Kareem Alatorre MD;  Location: [facility] OR;  Service: Orthopedics;  Laterality: Right;  amputation right index finger at the level of proximal interphalangeal joint    INCISION AND DRAINAGE OF HAND Right 1/21/2022    Procedure: INCISION AND DRAINAGE, HAND;  Surgeon: Ramirez Flores MD;  Location: [facility] OR;  Service: Orthopedics;  Laterality: Right;  Right Index Finger    INJECTION OF ANESTHETIC AGENT AROUND MEDIAL BRANCH NERVES INNERVATING LUMBAR FACET JOINT Bilateral 7/16/2020    Procedure: Bilateral L3-5 MBB with local;  Surgeon: Luis Ashraf MD;  Location: [facility] PAIN [facility]GT;  Service: Pain Management;  Laterality: Bilateral;    INJECTION OF ANESTHETIC AGENT AROUND MEDIAL BRANCH NERVES INNERVATING LUMBAR FACET JOINT Bilateral 8/10/2020    Procedure: Bilateral L3-5 MBB with local;  Surgeon: Luis Ashraf MD;  Location: [facility] PAIN [facility]GT;  Service: Pain Management;  Laterality: Bilateral;    INJECTION OF ANESTHETIC AGENT INTO SACROILIAC JOINT Bilateral 11/4/2021    Procedure: Bilateral SIJ Injection;  Surgeon: Luis Ashraf MD;  Location: [facility] PAIN [facility]GT;  Service: Pain Management;  Laterality: Bilateral;    RADIOFREQUENCY THERMOCOAGULATION Left 9/28/2020    Procedure: Left  L3-5 Lumbar RFA;  Surgeon: Luis Ashraf MD;  Location: [facility];  Service: Pain Management;  Laterality: Left;    RADIOFREQUENCY THERMOCOAGULATION Right 10/12/2020    Procedure: Right L3-5 Lumbar RFA;  Surgeon: Luis Ashraf MD;  Location: [facility];  Service: Pain Management;  Laterality: Right;    STOMACH SURGERY  1998    not sure what they did, possible bowel resection, partial gastrectomy    TONSILLECTOMY       Family History   Problem Relation Age of Onset    Lupus Mother     Stroke Father     Coronary artery disease Father     Diabetes type II Father     Kidney cancer Maternal Aunt     Breast cancer Maternal Aunt      Social History     Socioeconomic History    Marital status:    Tobacco Use    Smoking status: Former Smoker    Smokeless tobacco: Never Used    Tobacco comment: quit 30 years ago   Substance and Sexual Activity    Alcohol use: No    Drug use: No    Sexual activity: Not Currently     Birth control/protection: None     Medication List with Changes/Refills   Current Medications    ALBUTEROL (PROVENTIL/VENTOLIN HFA) 90 MCG/ACTUATION INHALER    Inhale 2 puffs into the lungs every 6 (six) hours as needed for Wheezing.    ALENDRONATE (FOSAMAX) 70 MG TABLET    Take 70 mg by mouth every 7 days.    AMITRIPTYLINE (ELAVIL) 75 MG TABLET    Take 1 tablet by mouth in the evening    ASPIRIN (ECOTRIN) 81 MG EC TABLET    Take 81 mg by mouth once daily.    ATORVASTATIN (LIPITOR) 40 MG TABLET    Take 40 mg by mouth every evening.    ERGOCALCIFEROL (ERGOCALCIFEROL) 50,000 UNIT CAP    Take 1 capsule by mouth once a week    HYDROCODONE-ACETAMINOPHEN (NORCO) 5-325 MG PER TABLET    Take 1 tablet by mouth every 6 (six) hours as needed for Pain.    LEVOTHYROXINE (SYNTHROID) 88 MCG TABLET    TAKE 1 TABLET BY MOUTH ONCE DAILY MONDAY - SATURDAY  AND ONE & ONE-HALF TABLET ON SUNDAY    MUPIROCIN (BACTROBAN) 2 % OINTMENT    Apply topically 2 (two) times daily.    NITROFURANTOIN,  MACROCRYSTAL-MONOHYDRATE, (MACROBID) 100 MG CAPSULE    Take 1 capsule (100 mg total) by mouth 2 (two) times daily.    OMEPRAZOLE (PRILOSEC) 20 MG CAPSULE    Take 1 capsule (20 mg total) by mouth as needed.    PROPRANOLOL (INDERAL) 40 MG TABLET    Take 1 tablet (40 mg total) by mouth 2 (two) times daily.    SERTRALINE (ZOLOFT) 100 MG TABLET    Take 1 tablet by mouth once daily    SUMATRIPTAN (IMITREX) 100 MG TABLET    TAKE 1 TABLET BY MOUTH AT ONSET OF MIGRAINE    VIT C/E/ZN/COPPR/LUTEIN/ZEAXAN (PRESERVISION AREDS-2 ORAL)    Take 1 tablet by mouth 2 (two) times a day.   Discontinued Medications    PULSE OXIMETER (PULSE OXIMETER) DEVICE    Use twice daily at 8 AM and 3 PM and record the value in MyChart as directed.    SODIUM CHLORIDE 0.9% SOLP 50 ML WITH DAPTOMYCIN 350 MG SOLR 350 MG    Inject 350 mg into the vein once daily. Ending 02/6/22     Review of patient's allergies indicates:   Allergen Reactions    Clindamycin      Heaviness in chest      Penicillins Hives     Pt has tolerated cephalexin     Review of Systems   Constitutional: Negative for malaise/fatigue.   HENT: Negative for hearing loss.    Eyes: Negative for double vision and visual disturbance.   Cardiovascular: Negative for chest pain.   Respiratory: Positive for shortness of breath.    Endocrine: Negative for cold intolerance.   Hematologic/Lymphatic: Does not bruise/bleed easily.   Skin: Negative for poor wound healing and suspicious lesions.   Musculoskeletal: Positive for arthritis, back pain, joint pain and joint swelling. Negative for gout.   Gastrointestinal: Negative for nausea and vomiting.   Genitourinary: Negative for dysuria.   Neurological: Positive for headaches. Negative for numbness, paresthesias and sensory change.   Psychiatric/Behavioral: Positive for depression. Negative for memory loss and substance abuse. The patient is nervous/anxious.    Allergic/Immunologic: Negative for persistent infections.       Objective:   Body mass  index is 24.64 kg/m².  There were no vitals filed for this visit.             General    Constitutional: She is oriented to person, place, and time. She appears well-developed and well-nourished. No distress.   HENT:   Head: Normocephalic.   Mouth/Throat: Oropharynx is clear and moist.   Eyes: EOM are normal.   Cardiovascular: Normal rate.    Pulmonary/Chest: Effort normal.   Abdominal: Soft.   Neurological: She is alert and oriented to person, place, and time. No cranial nerve deficit.   Psychiatric: She has a normal mood and affect.         Left Hand/Wrist Exam     Inspection   Scars: Wrist - absent Hand -  absent  Effusion: Wrist - absent Hand -  present    Other     Sensory Exam  Median Distribution: normal  Ulnar Distribution: normal  Radial Distribution: normal    Comments:  The patient is a 5 mm mucous cyst left long finger distal interphalangeal joint.  There is no sign of infection.  There are no motor or sensory deficits.          Vascular Exam       Capillary Refill  Left Hand: normal capillary refill            Relevant imaging results reviewed and interpreted by me, discussed with the patient and / or family today radiographs left hand showed small osteophyte distal interphalangeal joint left long finger  Assessment:     Encounter Diagnosis   Name Primary?   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  Risk complications and alternatives were discussed including the risk of infection, anesthetic risk, injury to nerves and vessels, loss of motion, and possible need for additional surgeries were discussed.  She seems to understand and agree that surgery.  All questions were answered.                Disclaimer: This note was prepared using a voice recognition system and is likely to have sound alike errors within the text.

## 2022-05-18 ENCOUNTER — ANESTHESIA EVENT (OUTPATIENT)
Dept: SURGERY | Facility: HOSPITAL | Age: 82
End: 2022-05-18
Payer: MEDICARE

## 2022-05-18 ENCOUNTER — TELEPHONE (OUTPATIENT)
Dept: PREADMISSION TESTING | Facility: HOSPITAL | Age: 82
End: 2022-05-18
Payer: MEDICARE

## 2022-05-18 NOTE — ANESTHESIA PREPROCEDURE EVALUATION
05/18/2022  Haim Martin is a 82 y.o., female.  Patient Active Problem List   Diagnosis    Hypothyroidism    Depressive disorder, not elsewhere classified    Anxiety    Other and unspecified hyperlipidemia    Migraine    Special screening for malignant neoplasms, colon    Diverticulosis of colon (without mention of hemorrhage)    Lumbar spondylosis    Osteopenia    Pre-diabetes    Major depressive disorder with single episode, in partial remission    Spondylosis without myelopathy or radiculopathy, lumbar region    Sacroiliitis    Finger osteomyelitis, right    Traumatic subarachnoid hemorrhage without loss of consciousness, initial encounter    Normocytic anemia    COVID-19    Chronic respiratory failure     Past Surgical History:   Procedure Laterality Date    APPENDECTOMY      CATARACT EXTRACTION EXTRACAPSULAR W/ INTRAOCULAR LENS IMPLANTATION Bilateral 4/2014    FINGER AMPUTATION Right 3/31/2022    Procedure: AMPUTATION, FINGER;  Surgeon: Kareem Alatorre MD;  Location: [facility];  Service: Orthopedics;  Laterality: Right;  amputation right index finger at the level of proximal interphalangeal joint    INCISION AND DRAINAGE OF HAND Right 1/21/2022    Procedure: INCISION AND DRAINAGE, HAND;  Surgeon: Ramirez Flores MD;  Location: [facility];  Service: Orthopedics;  Laterality: Right;  Right Index Finger    INJECTION OF ANESTHETIC AGENT AROUND MEDIAL BRANCH NERVES INNERVATING LUMBAR FACET JOINT Bilateral 7/16/2020    Procedure: Bilateral L3-5 MBB with local;  Surgeon: Luis Ashraf MD;  Location: [facility];  Service: Pain Management;  Laterality: Bilateral;    INJECTION OF ANESTHETIC AGENT AROUND MEDIAL BRANCH NERVES INNERVATING LUMBAR FACET JOINT Bilateral 8/10/2020    Procedure: Bilateral L3-5 MBB with local;  Surgeon: Luis Ashraf MD;  Location: [facility];   Service: Pain Management;  Laterality: Bilateral;    INJECTION OF ANESTHETIC AGENT INTO SACROILIAC JOINT Bilateral 11/4/2021    Procedure: Bilateral SIJ Injection;  Surgeon: Luis Ashraf MD;  Location: [facility];  Service: Pain Management;  Laterality: Bilateral;    RADIOFREQUENCY THERMOCOAGULATION Left 9/28/2020    Procedure: Left L3-5 Lumbar RFA;  Surgeon: Luis Ashraf MD;  Location: [facility];  Service: Pain Management;  Laterality: Left;    RADIOFREQUENCY THERMOCOAGULATION Right 10/12/2020    Procedure: Right L3-5 Lumbar RFA;  Surgeon: Luis Ashraf MD;  Location: [facility];  Service: Pain Management;  Laterality: Right;    STOMACH SURGERY  1998    not sure what they did, possible bowel resection, partial gastrectomy    TONSILLECTOMY         Pre-op Assessment    I have reviewed the Patient Summary Reports.    I have reviewed the Nursing Notes. I have reviewed the NPO Status.   I have reviewed the Medications.     Review of Systems  Anesthesia Hx:  No problems with previous Anesthesia    Social:  Non-Smoker    Hematology/Oncology:  Hematology Normal        Cardiovascular:   Hypertension hyperlipidemia Recent echo with NL EF.   Pulmonary:  Pulmonary Normal    Renal/:  Renal/ Normal     Hepatic/GI:  Hepatic/GI Normal    Musculoskeletal:   Arthritis  Spondylosis without myelopathy or radiculopathy, lumbar region  Sacroiliitis  Infected finger joint     Neurological:   Headaches    Endocrine:   Hypothyroidism Pre-diabetes   Psych:   depression          Physical Exam  General:  Oriented      Airway/Jaw/Neck:  Airway Findings: Mouth Opening: Small, but > 3cm   Tongue: Normal   General Airway Assessment: Adult Mallampati: II  TM Distance: 4 - 6 cm   Jaw/Neck Findings:  Neck ROM: Normal ROM       Dental:  Dental Findings: Upper Dentures, Lower Dentures     Chest/Lungs:  Chest/Lungs Findings: Clear to auscultation, Normal Respiratory Rate      Heart/Vascular:  Heart Findings: Rate:  Normal  Rhythm: Regular Rhythm  Sounds: Normal        Mental Status:  Mental Status Findings:  Cooperative, Alert and Oriented         Anesthesia Plan  Type of Anesthesia, risks & benefits discussed:  Anesthesia Type:  MAC    Patient's Preference:   Plan Factors:          Intra-op Monitoring Plan: standard ASA monitors  Intra-op Monitoring Plan Comments:   Post Op Pain Control Plan: multimodal analgesia and per primary service following discharge from PACU  Post Op Pain Control Plan Comments:     Induction:   IV  Beta Blocker:  Patient is on a Beta-Blocker and has received one dose within the past 24 hours (No further documentation required).       Informed Consent: Informed consent signed with the Patient and all parties understand the risks and agree with anesthesia plan.  All questions answered.  Anesthesia consent signed with patient.  ASA Score: 2     Day of Surgery Review of History & Physical:    H&P Update referred to the surgeon/provider.          Ready For Surgery From Anesthesia Perspective.         Chemistry        Component Value Date/Time     05/17/2022 1200    K 4.0 05/17/2022 1200     05/17/2022 1200    CO2 21 (L) 05/17/2022 1200    BUN 16 05/17/2022 1200    CREATININE 0.7 05/17/2022 1200     05/17/2022 1200        Component Value Date/Time    CALCIUM 9.1 05/17/2022 1200    ALKPHOS 91 05/17/2022 1200    AST 21 05/17/2022 1200    ALT 20 05/17/2022 1200    BILITOT 0.4 05/17/2022 1200    ESTGFRAFRICA >60.0 05/17/2022 1200    EGFRNONAA >60.0 05/17/2022 1200        Lab Results   Component Value Date    WBC 6.07 05/17/2022    HGB 11.9 (L) 05/17/2022    HCT 39.8 05/17/2022     (H) 05/17/2022     05/17/2022           Physical Exam  General: Oriented    Airway:  Mallampati: II   Mouth Opening: Small, but > 3cm  TM Distance: 4 - 6 cm  Tongue: Normal  Neck ROM: Normal ROM    Dental:  Upper Dentures, Lower Dentures    Chest/Lungs:  Clear to auscultation, Normal Respiratory  Rate    Heart:  Rate: Normal  Rhythm: Regular Rhythm  Sounds: Normal          Anesthesia Plan  Type of Anesthesia, risks & benefits discussed:    Anesthesia Type: MAC  Intra-op Monitoring Plan: standard ASA monitors  Post Op Pain Control Plan: multimodal analgesia and per primary service following discharge from PACU  Induction:  IV  Informed Consent: Informed consent signed with the Patient and all parties understand the risks and agree with anesthesia plan.  All questions answered.   ASA Score: 2  Day of Surgery Review of History & Physical: H&P Update referred to the surgeon/provider.    Ready For Surgery From Anesthesia Perspective.       .

## 2022-05-18 NOTE — TELEPHONE ENCOUNTER
Pre op instructions reviewed with patient per phone.    Spoke about pre op process and surgery instructions, verbalized understanding.    To confirm, Your surgeon has instructed you:  Surgery is scheduled on 5/19/22.      Please report to [facility], 1st floor  at 6am    The address is [address] [facility].  [place]  [id number]               INSTRUCTIONS IMPORTANT!!!  Do Not Eat, Drink, or Smoke after 12 midnight! NO WATER after midnight! OK to brush teeth, no gum, candy or mints!        *Take only these medicines with a small swallow of water-morning of surgery.    Synthroid        ____  Do Not wear makeup, mascara nail polish or artificial nails  ____  NO powder, lotions, deodorants or creams to surgical area.  ____  Please remove all jewelry, including piercings and leave at home.  SURGERY WILL BE CANCELLED IF PIERCINGS ARE PRESENT!!!  ____  Dentures, Hearing Aids and Contact Lens will need to be removed prior to the start of surgery.  ____  Please bring photo ID and insurance information to hospital (Leave Valuables at Home)  ____  If going home the same day, arrange for a ride home. You will not be able to            drive if Anesthesia was used.    ____  Wear clean, loose fitting clothing. Allow for dressings, bandages.  ____  Stop Aspirin, Ibuprofen, Motrin and Aleve at least 5-7 days before surgery, unless otherwise instructed by your doctor, or the nurse. You MAY use Tylenol/acetaminophen until day of               surgery.  ____  If you take diabetic medication, do NOT take morning of surgery unless instructed by            Doctor. Metformin to be stopped 24 hrs prior to surgery time.   ____ Stop taking any Fish Oil supplements or Vitamins at least 5 days prior to surgery, unless instructed otherwise by your Doctor.         Bathing Instructions: The night before surgery and the morning prior to coming to the hospital:              -Do not shave your face.  -Do not  shave pubic hair 7 days prior to surgery (gyn pt's).  -Do not shave legs/underarms 3 days prior to surgery.              -Shower & Rinse your body as usual with anti-bacterial Soap (Dial, Lever 2000, or Hibiclens)              -Do not use hibiclens on your head, face, or genitals.              -Do not wash with anti-bacterial soap after you use the hibiclens.              -Rinse your body thoroughly.       Pediatric patients do not need to use anti-bacterial soap or Hibiclens.            [facility] Visitor/Ride Policy:  Only 1 adult allowed (over the age of 18) to accompany you into Pre-op/Recovery Surgery Dept and must stay through the entire length of admission.    Must have a ride home from a responsible adult that you know and trust.   Pediatric Patients are allowed 2 adult visitors.    Medical Transport, Uber or Lyft can only be used if patient has a responsible adult to accompany them during ride home.     Post-Op Instructions: You will receive surgery post-op instructions by your Discharge Nurse prior to going home.     Surgical Site Infection:     Prevention of surgical site infections:                 -Keep incisions clean and dry.              -Do not soak/submerge incisions in water until completely healed.              -Do not apply lotions, powders, creams, or deodorants to site.              -Always make sure hands are cleaned with antibacterial soap/ alcohol-based   prior to touching the surgical site.       Signs and symptoms:              -Redness and pain around the area where you had surgery              -Drainage of cloudy fluid from your surgical wound              -Fever over 100.4 or chills  >>>Call Surgeon office/on-call Surgeon if you experience any of these signs & symptoms post-surgery.        *Please Call [facility] with surgery instruction questions at [phone].     *Insurance Questions, please call [phone].

## 2022-05-18 NOTE — TELEPHONE ENCOUNTER
No new care gaps identified.  [facility] Embedded Care Gaps. Reference number: [id number]. 5/18/2022   10:07:57 AM NISSAT

## 2022-05-18 NOTE — TELEPHONE ENCOUNTER
Refill Routing Note   Medication(s) are not appropriate for processing by [facility] for the following reason(s):      - Patient has been seen in the ED/Hospital since the last PCP visit    [facility] action(s):  Route          Medication reconciliation completed: No     Appointments  past 12m or future 3m with PCP    Date Provider   Last Visit   2/21/2022 Kavya Mesa MD   Next Visit   Visit date not found Kavya Mesa MD   ED visits in past 90 days: 0        Note composed:12:57 PM 05/18/2022

## 2022-05-19 ENCOUNTER — HOSPITAL ENCOUNTER (OUTPATIENT)
Facility: HOSPITAL | Age: 82
Discharge: HOME | End: 2022-05-19
Attending: ORTHOPAEDIC SURGERY | Admitting: ORTHOPAEDIC SURGERY
Payer: MEDICARE

## 2022-05-19 ENCOUNTER — ANESTHESIA (OUTPATIENT)
Dept: SURGERY | Facility: HOSPITAL | Age: 82
End: 2022-05-19
Payer: MEDICARE

## 2022-05-19 VITALS
HEART RATE: 76 BPM | DIASTOLIC BLOOD PRESSURE: 60 MMHG | TEMPERATURE: 98 F | WEIGHT: 121.5 LBS | BODY MASS INDEX: 24.49 KG/M2 | OXYGEN SATURATION: 98 % | RESPIRATION RATE: 18 BRPM | SYSTOLIC BLOOD PRESSURE: 123 MMHG | HEIGHT: 59 IN

## 2022-05-19 DIAGNOSIS — M67.449 MUCOUS CYST OF FINGER: Primary | ICD-10-CM

## 2022-05-19 PROCEDURE — 71000015 HC POSTOP RECOV 1ST HR: Performed by: ORTHOPAEDIC SURGERY

## 2022-05-19 PROCEDURE — D9220A PRA ANESTHESIA: ICD-10-PCS | Mod: ANES,,, | Performed by: ANESTHESIOLOGY

## 2022-05-19 PROCEDURE — 63600175 PHARM REV CODE 636 W HCPCS: Performed by: NURSE ANESTHETIST, CERTIFIED REGISTERED

## 2022-05-19 PROCEDURE — 25000003 PHARM REV CODE 250: Performed by: NURSE ANESTHETIST, CERTIFIED REGISTERED

## 2022-05-19 PROCEDURE — D9220A PRA ANESTHESIA: Mod: CRNA,,, | Performed by: NURSE ANESTHETIST, CERTIFIED REGISTERED

## 2022-05-19 PROCEDURE — 25000003 PHARM REV CODE 250: Performed by: ORTHOPAEDIC SURGERY

## 2022-05-19 PROCEDURE — 37000008 HC ANESTHESIA 1ST 15 MINUTES: Performed by: ORTHOPAEDIC SURGERY

## 2022-05-19 PROCEDURE — D9220A PRA ANESTHESIA: ICD-10-PCS | Mod: CRNA,,, | Performed by: NURSE ANESTHETIST, CERTIFIED REGISTERED

## 2022-05-19 PROCEDURE — D9220A PRA ANESTHESIA: Mod: ANES,,, | Performed by: ANESTHESIOLOGY

## 2022-05-19 PROCEDURE — 63600175 PHARM REV CODE 636 W HCPCS: Performed by: ANESTHESIOLOGY

## 2022-05-19 PROCEDURE — 88305 TISSUE EXAM BY PATHOLOGIST: CPT | Performed by: PATHOLOGY

## 2022-05-19 PROCEDURE — 88305 TISSUE EXAM BY PATHOLOGIST: ICD-10-PCS | Mod: 26,,, | Performed by: PATHOLOGY

## 2022-05-19 PROCEDURE — 26160 PR EXCIS TENDON SHEATH LESION, HAND/FINGER: ICD-10-PCS | Mod: 79,LT,, | Performed by: ORTHOPAEDIC SURGERY

## 2022-05-19 PROCEDURE — 88305 TISSUE EXAM BY PATHOLOGIST: CPT | Mod: 26,,, | Performed by: PATHOLOGY

## 2022-05-19 PROCEDURE — 37000009 HC ANESTHESIA EA ADD 15 MINS: Performed by: ORTHOPAEDIC SURGERY

## 2022-05-19 PROCEDURE — 26160 REMOVE TENDON SHEATH LESION: CPT | Mod: 79,LT,, | Performed by: ORTHOPAEDIC SURGERY

## 2022-05-19 PROCEDURE — 36000706: Performed by: ORTHOPAEDIC SURGERY

## 2022-05-19 PROCEDURE — 71000033 HC RECOVERY, INTIAL HOUR: Performed by: ORTHOPAEDIC SURGERY

## 2022-05-19 PROCEDURE — 36000707: Performed by: ORTHOPAEDIC SURGERY

## 2022-05-19 RX ORDER — LIDOCAINE HYDROCHLORIDE 20 MG/ML
INJECTION, SOLUTION EPIDURAL; INFILTRATION; INTRACAUDAL; PERINEURAL
Status: DISCONTINUED | OUTPATIENT
Start: 2022-05-19 | End: 2022-05-19 | Stop reason: HOSPADM

## 2022-05-19 RX ORDER — LIDOCAINE HYDROCHLORIDE 20 MG/ML
INJECTION, SOLUTION EPIDURAL; INFILTRATION; INTRACAUDAL; PERINEURAL
Status: DISCONTINUED | OUTPATIENT
Start: 2022-05-19 | End: 2022-05-19

## 2022-05-19 RX ORDER — LIDOCAINE HYDROCHLORIDE 20 MG/ML
INJECTION, SOLUTION INFILTRATION; PERINEURAL
Status: DISCONTINUED
Start: 2022-05-19 | End: 2022-05-19 | Stop reason: HOSPADM

## 2022-05-19 RX ORDER — CIPROFLOXACIN 2 MG/ML
INJECTION, SOLUTION INTRAVENOUS
Status: DISCONTINUED | OUTPATIENT
Start: 2022-05-19 | End: 2022-05-19

## 2022-05-19 RX ORDER — FENTANYL CITRATE 50 UG/ML
25 INJECTION, SOLUTION INTRAMUSCULAR; INTRAVENOUS EVERY 5 MIN PRN
Status: DISCONTINUED | OUTPATIENT
Start: 2022-05-19 | End: 2022-05-19 | Stop reason: HOSPADM

## 2022-05-19 RX ORDER — HYDROCODONE BITARTRATE AND ACETAMINOPHEN 5; 325 MG/1; MG/1
1 TABLET ORAL
Status: DISCONTINUED | OUTPATIENT
Start: 2022-05-19 | End: 2022-05-19 | Stop reason: HOSPADM

## 2022-05-19 RX ORDER — LEVOTHYROXINE SODIUM 88 UG/1
TABLET ORAL
Qty: 90 TABLET | Refills: 0 | Status: SHIPPED | OUTPATIENT
Start: 2022-05-19 | End: 2022-08-25

## 2022-05-19 RX ORDER — ONDANSETRON 2 MG/ML
INJECTION INTRAMUSCULAR; INTRAVENOUS
Status: DISCONTINUED | OUTPATIENT
Start: 2022-05-19 | End: 2022-05-19

## 2022-05-19 RX ORDER — SODIUM CHLORIDE, SODIUM LACTATE, POTASSIUM CHLORIDE, CALCIUM CHLORIDE 600; 310; 30; 20 MG/100ML; MG/100ML; MG/100ML; MG/100ML
INJECTION, SOLUTION INTRAVENOUS CONTINUOUS
Status: ACTIVE | OUTPATIENT
Start: 2022-05-19

## 2022-05-19 RX ORDER — HYDROCODONE BITARTRATE AND ACETAMINOPHEN 5; 325 MG/1; MG/1
1 TABLET ORAL EVERY 6 HOURS PRN
Qty: 10 TABLET | Refills: 0 | Status: SHIPPED | OUTPATIENT
Start: 2022-05-19 | End: 2023-05-16

## 2022-05-19 RX ORDER — ONDANSETRON 2 MG/ML
4 INJECTION INTRAMUSCULAR; INTRAVENOUS ONCE AS NEEDED
Status: DISCONTINUED | OUTPATIENT
Start: 2022-05-19 | End: 2022-05-19 | Stop reason: HOSPADM

## 2022-05-19 RX ORDER — ALBUTEROL SULFATE 0.83 MG/ML
2.5 SOLUTION RESPIRATORY (INHALATION) EVERY 4 HOURS PRN
Status: DISCONTINUED | OUTPATIENT
Start: 2022-05-19 | End: 2022-05-19 | Stop reason: HOSPADM

## 2022-05-19 RX ORDER — DIPHENHYDRAMINE HYDROCHLORIDE 50 MG/ML
25 INJECTION INTRAMUSCULAR; INTRAVENOUS EVERY 6 HOURS PRN
Status: DISCONTINUED | OUTPATIENT
Start: 2022-05-19 | End: 2022-05-19 | Stop reason: HOSPADM

## 2022-05-19 RX ORDER — MEPERIDINE HYDROCHLORIDE 25 MG/ML
12.5 INJECTION INTRAMUSCULAR; INTRAVENOUS; SUBCUTANEOUS ONCE
Status: DISCONTINUED | OUTPATIENT
Start: 2022-05-19 | End: 2022-05-19 | Stop reason: HOSPADM

## 2022-05-19 RX ORDER — CIPROFLOXACIN 2 MG/ML
INJECTION, SOLUTION INTRAVENOUS
Status: DISCONTINUED
Start: 2022-05-19 | End: 2022-05-19 | Stop reason: HOSPADM

## 2022-05-19 RX ORDER — SODIUM CHLORIDE 0.9 % (FLUSH) 0.9 %
10 SYRINGE (ML) INJECTION
Status: DISCONTINUED | OUTPATIENT
Start: 2022-05-19 | End: 2022-05-19 | Stop reason: HOSPADM

## 2022-05-19 RX ORDER — PROPOFOL 10 MG/ML
VIAL (ML) INTRAVENOUS
Status: DISCONTINUED | OUTPATIENT
Start: 2022-05-19 | End: 2022-05-19

## 2022-05-19 RX ORDER — HYDROCODONE BITARTRATE AND ACETAMINOPHEN 5; 325 MG/1; MG/1
1 TABLET ORAL EVERY 4 HOURS PRN
Status: DISCONTINUED | OUTPATIENT
Start: 2022-05-19 | End: 2022-05-19 | Stop reason: HOSPADM

## 2022-05-19 RX ORDER — CHLORHEXIDINE GLUCONATE ORAL RINSE 1.2 MG/ML
10 SOLUTION DENTAL 2 TIMES DAILY
Status: DISCONTINUED | OUTPATIENT
Start: 2022-05-19 | End: 2022-05-19 | Stop reason: HOSPADM

## 2022-05-19 RX ADMIN — ONDANSETRON 4 MG: 2 INJECTION, SOLUTION INTRAMUSCULAR; INTRAVENOUS at 08:05

## 2022-05-19 RX ADMIN — CIPROFLOXACIN 400 MG: 2 INJECTION INTRAVENOUS at 08:05

## 2022-05-19 RX ADMIN — SODIUM CHLORIDE, SODIUM LACTATE, POTASSIUM CHLORIDE, AND CALCIUM CHLORIDE: 600; 310; 30; 20 INJECTION, SOLUTION INTRAVENOUS at 08:05

## 2022-05-19 RX ADMIN — LIDOCAINE HYDROCHLORIDE 60 MG: 20 INJECTION, SOLUTION EPIDURAL; INFILTRATION; INTRACAUDAL; PERINEURAL at 08:05

## 2022-05-19 RX ADMIN — PROPOFOL 40 MG: 10 INJECTION, EMULSION INTRAVENOUS at 08:05

## 2022-05-19 NOTE — PLAN OF CARE
Discussed Plan of Care with patient, IV ABX to finish infusing prior to discharge, patient aware.  Reassurance provided.

## 2022-05-19 NOTE — PLAN OF CARE
Patient prepped for surgery. Daughter at bedside.Call bell in reach. Instructed to call for any needs.

## 2022-05-19 NOTE — OP NOTE
[facility]  General Surgery  Operative Note    SUMMARY     Date of Procedure: 5/19/2022     Procedure: Procedure(s) (LRB):  EXCISION, CYST (Left)   mucous cyst left long finger    Surgeon(s) and Role:     * Kareem Alatorre MD - Primary    Assisting Surgeon: None    Pre-Operative Diagnosis: Mucous cyst of left long finger    Post-Operative Diagnosis: Post-Op Diagnosis Codes:     * Mucous cyst of left long finger    Anesthesia: Monitor Anesthesia Care    Description:  The patient was taken the operating room where 10 cc of 2% plain lidocaine use local anesthetic about the base of the left long finger.  Satisfactory anesthesia had been achieved the left hand was prepped and draped usual sterile fashion.  She did receive 400 mg of Cipro intravenously preoperatively.  A digital tourniquet was fashion from finger of a glove and applied.  At this time a v-shaped incision was made over the mucous cyst dorsal distal interphalangeal joint left long finger.  The area of thinning of the skin was elliptically excised.  The skin flap was elevated.  The cyst was found be coming from the dorsal radial distal interphalangeal joint.  The cyst was excised and submitted pathology for examination.  A rongeur and curette were used to remove the osteophyte from distal interphalangeal joint.  No additional pathology was encountered skin was closed using horizontal mattress 4-0 nylon suture.  Xeroform gauze 4x4s and 2 in gauze dressing was applied.  The digital tourniquet was removed.  The patient tolerated the procedure well was transferred to the recovery room in satisfactory condition.    Significant Surgical Tasks Conducted by the Assistant(s), if Applicable:  No assistant    Complications:  None     Estimated Blood Loss (EBL):  5 mL           Implants: None    Specimens: Mucous cyst and osteophyte left long finger dorsal interphalangeal joint           Condition: Good    Disposition: PACU - hemodynamically  stable.    Attestation: I was present and scrubbed for the entire procedure.

## 2022-05-19 NOTE — TRANSFER OF CARE
"Anesthesia Transfer of Care Note    Patient: Haim Martin    Procedure(s) Performed: Procedure(s) (LRB):  EXCISION, CYST (Left)    Patient location: PACU    Anesthesia Type: MAC    Transport from OR: Transported from OR on room air with adequate spontaneous ventilation    Post pain: adequate analgesia    Post assessment: no apparent anesthetic complications    Post vital signs: stable    Level of consciousness: awake and alert    Nausea/Vomiting: no nausea/vomiting    Complications: none    Transfer of care protocol was followed      Last vitals:   Visit Vitals  BP (!) 161/69 (BP Location: Right arm, Patient Position: Sitting)   Pulse 78   Temp 36.4 °C (97.5 °F) (Temporal)   Resp 18   Ht 4' 11" (1.499 m)   Wt 55.1 kg (121 lb 7.6 oz)   SpO2 99%   Breastfeeding No   BMI 24.53 kg/m²     "

## 2022-05-19 NOTE — DISCHARGE INSTRUCTIONS
After Hand Surgery  After surgery, the better you take care of yourself--especially your hand--the sooner it will heal. Follow your surgeons instructions. Try not to bump your hand, and dont move or lift anything while youre still wearing bandages, a splint, or a cast.  Care for your hand    Keep your hand elevated above heart level as much as possible for the first several days after surgery. This helps reduce swelling and pain.  To help prevent infection and speed healing, take care not to get your cast or bandages wet.  Keep dressing intact until your follow up with Dr. Alatorre.  Relieve pain as directed  Your surgeon may prescribe pain medicine or suggest you take an anti-inflammatory medicine. You might also be instructed to apply ice (or another cold source) to your hand. If you use ice cubes, put them in a plastic bag and rest it on top of your bandages. Leave the cold source on your hand for as long as its comfortable. Do this several times a day for the first few days after surgery. It may take several minutes before you can feel the cold through the cast or bandages.  Follow up with your surgeon  During a follow-up visit after surgery, your surgeon will check your progress. The stitches, bandages, splint, or cast may be removed. A new cast or splint may be placed. If your hand has healed enough, your surgeon may prescribe exercises.  Do prescribed hand exercises  Your surgeon may recommend that you do exercises. These may be done under the guidance of a physical or occupational therapist. The exercises strengthen your hand, help you regain flexibility, and restore proper function. Do the exercises as advised.  Call your surgeon if you have...  A fever higher than 100.4°F (38.0°C) taken by mouth  Side effects from your medicine, such as prolonged nausea  A wet or loose dressing, or a dressing that is too tight  Excessive bleeding  Increased, ongoing pain or numbness  Signs of infection (such as  drainage, warmth, or redness) at the incision site   Date Last Reviewed: 11/11/2015  © 3077-4042 [employer]. [address] [place] [address], [place] [address]. All rights reserved. This information is not intended as a substitute for professional medical care. Always follow your healthcare professional's instructions.

## 2022-05-19 NOTE — DISCHARGE SUMMARY
[facility]  Discharge Note  Short Stay    Procedure(s) (LRB):  EXCISION, CYST (Left) mucous cyst left long finger    OUTCOME: Patient tolerated treatment/procedure well without complication and is now ready for discharge.    DISPOSITION: Home or Self Care    FINAL DIAGNOSIS:  Mucous cyst left long finger    FOLLOWUP: In clinic    DISCHARGE INSTRUCTIONS:    Discharge Procedure Orders   Diet general     Call MD for:  temperature >100.4     Call MD for:  persistent nausea and vomiting     Call MD for:  severe uncontrolled pain     Call MD for:  difficulty breathing, headache or visual disturbances     Call MD for:  redness, tenderness, or signs of infection (pain, swelling, redness, odor or green/yellow discharge around incision site)     Call MD for:  hives     Call MD for:  persistent dizziness or light-headedness     Call MD for:  extreme fatigue        TIME SPENT ON DISCHARGE:  20 minutes

## 2022-05-19 NOTE — ANESTHESIA POSTPROCEDURE EVALUATION
Anesthesia Post Evaluation    Patient: Haim Martin    Procedure(s) Performed: Procedure(s) (LRB):  EXCISION, CYST (Left)    Final Anesthesia Type: MAC      Patient location during evaluation: PACU  Patient participation: Yes- Able to Participate  Level of consciousness: awake and alert and oriented  Post-procedure vital signs: reviewed and stable  Pain management: adequate  Airway patency: patent    PONV status at discharge: No PONV  Anesthetic complications: no      Cardiovascular status: hemodynamically stable  Respiratory status: unassisted  Hydration status: euvolemic  Follow-up not needed.          Vitals Value Taken Time   /60 05/19/22 0930   Temp 36.7 °C (98 °F) 05/19/22 0847   Pulse 76 05/19/22 0930   Resp 18 05/19/22 0930   SpO2 98 % 05/19/22 0930         Event Time   Out of Recovery 09:15:00         Pain/Rachel Score: Rachel Score: 9 (5/19/2022  9:30 AM)

## 2022-05-20 ENCOUNTER — TELEPHONE (OUTPATIENT)
Dept: ORTHOPEDICS | Facility: CLINIC | Age: 82
End: 2022-05-20
Payer: MEDICARE

## 2022-05-20 NOTE — TELEPHONE ENCOUNTER
Spoke to the patient an was able to get her on [facility] wolf scheduled for 5/24 @ 11:20 Patient verbalized understanding       ----- Message from Kaushik Palafox sent at 5/20/2022  1:10 PM CDT -----  Contact: self  Haim Martin would like a call back at [phone], in regards if she can get her post op appointment rescheduled to 5/24/22.

## 2022-05-24 ENCOUNTER — OFFICE VISIT (OUTPATIENT)
Dept: PULMONOLOGY | Facility: CLINIC | Age: 82
End: 2022-05-24
Payer: MEDICARE

## 2022-05-24 ENCOUNTER — CLINICAL SUPPORT (OUTPATIENT)
Dept: PULMONOLOGY | Facility: CLINIC | Age: 82
End: 2022-05-24
Payer: MEDICARE

## 2022-05-24 ENCOUNTER — OFFICE VISIT (OUTPATIENT)
Dept: ORTHOPEDICS | Facility: CLINIC | Age: 82
End: 2022-05-24
Payer: MEDICARE

## 2022-05-24 ENCOUNTER — HOSPITAL ENCOUNTER (OUTPATIENT)
Dept: RADIOLOGY | Facility: HOSPITAL | Age: 82
Discharge: HOME | End: 2022-05-24
Attending: INTERNAL MEDICINE
Payer: MEDICARE

## 2022-05-24 VITALS — BODY MASS INDEX: 24.68 KG/M2 | HEIGHT: 59 IN | WEIGHT: 122.44 LBS

## 2022-05-24 VITALS
HEART RATE: 104 BPM | SYSTOLIC BLOOD PRESSURE: 125 MMHG | BODY MASS INDEX: 24.71 KG/M2 | BODY MASS INDEX: 24.71 KG/M2 | HEIGHT: 59 IN | SYSTOLIC BLOOD PRESSURE: 151 MMHG | DIASTOLIC BLOOD PRESSURE: 79 MMHG | RESPIRATION RATE: 16 BRPM | WEIGHT: 122.56 LBS | HEIGHT: 59 IN | HEART RATE: 95 BPM | OXYGEN SATURATION: 98 % | DIASTOLIC BLOOD PRESSURE: 66 MMHG | TEMPERATURE: 98 F | WEIGHT: 122.56 LBS

## 2022-05-24 DIAGNOSIS — R06.02 SOB (SHORTNESS OF BREATH): ICD-10-CM

## 2022-05-24 DIAGNOSIS — E03.9 HYPOTHYROIDISM, UNSPECIFIED TYPE: ICD-10-CM

## 2022-05-24 DIAGNOSIS — E78.2 MIXED HYPERLIPIDEMIA: ICD-10-CM

## 2022-05-24 DIAGNOSIS — M67.449 MUCOUS CYST OF FINGER: Primary | ICD-10-CM

## 2022-05-24 DIAGNOSIS — U07.1 COVID-19: ICD-10-CM

## 2022-05-24 DIAGNOSIS — J98.4 SCARRING OF LUNG: ICD-10-CM

## 2022-05-24 DIAGNOSIS — J96.11 CHRONIC RESPIRATORY FAILURE WITH HYPOXIA: Primary | ICD-10-CM

## 2022-05-24 LAB
BRPFT: NORMAL
FEF 25 75 LLN: 0.58
FEF 25 75 PRE REF: 83.7 %
FEF 25 75 REF: 1.39
FEV1 FVC LLN: 63
FEV1 FVC PRE REF: 99.1 %
FEV1 FVC REF: 78
FEV1 LLN: 1.14
FEV1 PRE REF: 77.8 %
FEV1 REF: 1.63
FVC LLN: 1.48
FVC PRE REF: 77.7 %
FVC REF: 2.12
PEF LLN: 2.6
PEF PRE REF: 76.9 %
PEF REF: 4.06
PRE FEF 25 75: 1.16 L/S (ref 0.58–2.2)
PRE FET 100: 8.22 SEC
PRE FEV1 FVC: 76.99 % (ref 62.89–92.44)
PRE FEV1: 1.27 L (ref 1.14–2.12)
PRE FVC: 1.65 L (ref 1.48–2.75)
PRE PEF: 3.13 L/S (ref 2.6–5.52)

## 2022-05-24 PROCEDURE — 99999 PR PBB SHADOW E&M-EST. PATIENT-LVL V: ICD-10-PCS | Mod: PBBFAC,,, | Performed by: INTERNAL MEDICINE

## 2022-05-24 PROCEDURE — 94010 BREATHING CAPACITY TEST: CPT | Mod: 59,S$GLB,, | Performed by: INTERNAL MEDICINE

## 2022-05-24 PROCEDURE — 3288F FALL RISK ASSESSMENT DOCD: CPT | Mod: CPTII,S$GLB,, | Performed by: PHYSICIAN ASSISTANT

## 2022-05-24 PROCEDURE — 3077F SYST BP >= 140 MM HG: CPT | Mod: CPTII,S$GLB,, | Performed by: PHYSICIAN ASSISTANT

## 2022-05-24 PROCEDURE — 3288F PR FALLS RISK ASSESSMENT DOCUMENTED: ICD-10-PCS | Mod: CPTII,S$GLB,, | Performed by: PHYSICIAN ASSISTANT

## 2022-05-24 PROCEDURE — 3078F DIAST BP <80 MM HG: CPT | Mod: CPTII,S$GLB,, | Performed by: INTERNAL MEDICINE

## 2022-05-24 PROCEDURE — 94010 BREATHING CAPACITY TEST: ICD-10-PCS | Mod: 59,S$GLB,, | Performed by: INTERNAL MEDICINE

## 2022-05-24 PROCEDURE — 1159F PR MEDICATION LIST DOCUMENTED IN MEDICAL RECORD: ICD-10-PCS | Mod: CPTII,S$GLB,, | Performed by: PHYSICIAN ASSISTANT

## 2022-05-24 PROCEDURE — 1159F MED LIST DOCD IN RCRD: CPT | Mod: CPTII,S$GLB,, | Performed by: PHYSICIAN ASSISTANT

## 2022-05-24 PROCEDURE — 99024 POSTOP FOLLOW-UP VISIT: CPT | Mod: S$GLB,,, | Performed by: PHYSICIAN ASSISTANT

## 2022-05-24 PROCEDURE — 1101F PT FALLS ASSESS-DOCD LE1/YR: CPT | Mod: CPTII,S$GLB,, | Performed by: INTERNAL MEDICINE

## 2022-05-24 PROCEDURE — 99024 PR POST-OP FOLLOW-UP VISIT: ICD-10-PCS | Mod: S$GLB,,, | Performed by: PHYSICIAN ASSISTANT

## 2022-05-24 PROCEDURE — 3288F PR FALLS RISK ASSESSMENT DOCUMENTED: ICD-10-PCS | Mod: CPTII,S$GLB,, | Performed by: INTERNAL MEDICINE

## 2022-05-24 PROCEDURE — 94618 PULMONARY STRESS TESTING: ICD-10-PCS | Mod: S$GLB,,, | Performed by: INTERNAL MEDICINE

## 2022-05-24 PROCEDURE — 99499 UNLISTED E&M SERVICE: CPT | Mod: S$GLB,,, | Performed by: INTERNAL MEDICINE

## 2022-05-24 PROCEDURE — 99999 PR PBB SHADOW E&M-EST. PATIENT-LVL IV: CPT | Mod: PBBFAC,,, | Performed by: PHYSICIAN ASSISTANT

## 2022-05-24 PROCEDURE — 99999 PR PBB SHADOW E&M-EST. PATIENT-LVL IV: ICD-10-PCS | Mod: PBBFAC,,, | Performed by: PHYSICIAN ASSISTANT

## 2022-05-24 PROCEDURE — 3074F PR MOST RECENT SYSTOLIC BLOOD PRESSURE < 130 MM HG: ICD-10-PCS | Mod: CPTII,S$GLB,, | Performed by: INTERNAL MEDICINE

## 2022-05-24 PROCEDURE — 1126F AMNT PAIN NOTED NONE PRSNT: CPT | Mod: CPTII,S$GLB,, | Performed by: PHYSICIAN ASSISTANT

## 2022-05-24 PROCEDURE — 71046 XR CHEST PA AND LATERAL: ICD-10-PCS | Mod: 26,,, | Performed by: RADIOLOGY

## 2022-05-24 PROCEDURE — 1101F PR PT FALLS ASSESS DOC 0-1 FALLS W/OUT INJ PAST YR: ICD-10-PCS | Mod: CPTII,S$GLB,, | Performed by: PHYSICIAN ASSISTANT

## 2022-05-24 PROCEDURE — 99999 PR PBB SHADOW E&M-EST. PATIENT-LVL I: ICD-10-PCS | Mod: PBBFAC,,,

## 2022-05-24 PROCEDURE — 3077F PR MOST RECENT SYSTOLIC BLOOD PRESSURE >= 140 MM HG: ICD-10-PCS | Mod: CPTII,S$GLB,, | Performed by: PHYSICIAN ASSISTANT

## 2022-05-24 PROCEDURE — 99999 PR PBB SHADOW E&M-EST. PATIENT-LVL V: CPT | Mod: PBBFAC,,, | Performed by: INTERNAL MEDICINE

## 2022-05-24 PROCEDURE — 1126F PR PAIN SEVERITY QUANTIFIED, NO PAIN PRESENT: ICD-10-PCS | Mod: CPTII,S$GLB,, | Performed by: PHYSICIAN ASSISTANT

## 2022-05-24 PROCEDURE — 71046 X-RAY EXAM CHEST 2 VIEWS: CPT | Mod: 26,,, | Performed by: RADIOLOGY

## 2022-05-24 PROCEDURE — 3078F PR MOST RECENT DIASTOLIC BLOOD PRESSURE < 80 MM HG: ICD-10-PCS | Mod: CPTII,S$GLB,, | Performed by: PHYSICIAN ASSISTANT

## 2022-05-24 PROCEDURE — 94618 PULMONARY STRESS TESTING: CPT | Mod: S$GLB,,, | Performed by: INTERNAL MEDICINE

## 2022-05-24 PROCEDURE — 99214 PR OFFICE/OUTPT VISIT, EST, LEVL IV, 30-39 MIN: ICD-10-PCS | Mod: 25,S$GLB,, | Performed by: INTERNAL MEDICINE

## 2022-05-24 PROCEDURE — 71046 X-RAY EXAM CHEST 2 VIEWS: CPT | Mod: TC

## 2022-05-24 PROCEDURE — 99999 PR PBB SHADOW E&M-EST. PATIENT-LVL I: CPT | Mod: PBBFAC,,,

## 2022-05-24 PROCEDURE — 99499 RISK ADDL DX/OHS AUDIT: ICD-10-PCS | Mod: S$GLB,,, | Performed by: INTERNAL MEDICINE

## 2022-05-24 PROCEDURE — 3288F FALL RISK ASSESSMENT DOCD: CPT | Mod: CPTII,S$GLB,, | Performed by: INTERNAL MEDICINE

## 2022-05-24 PROCEDURE — 3078F PR MOST RECENT DIASTOLIC BLOOD PRESSURE < 80 MM HG: ICD-10-PCS | Mod: CPTII,S$GLB,, | Performed by: INTERNAL MEDICINE

## 2022-05-24 PROCEDURE — 1101F PR PT FALLS ASSESS DOC 0-1 FALLS W/OUT INJ PAST YR: ICD-10-PCS | Mod: CPTII,S$GLB,, | Performed by: INTERNAL MEDICINE

## 2022-05-24 PROCEDURE — 3078F DIAST BP <80 MM HG: CPT | Mod: CPTII,S$GLB,, | Performed by: PHYSICIAN ASSISTANT

## 2022-05-24 PROCEDURE — 3074F SYST BP LT 130 MM HG: CPT | Mod: CPTII,S$GLB,, | Performed by: INTERNAL MEDICINE

## 2022-05-24 PROCEDURE — 1101F PT FALLS ASSESS-DOCD LE1/YR: CPT | Mod: CPTII,S$GLB,, | Performed by: PHYSICIAN ASSISTANT

## 2022-05-24 PROCEDURE — 99214 OFFICE O/P EST MOD 30 MIN: CPT | Mod: 25,S$GLB,, | Performed by: INTERNAL MEDICINE

## 2022-05-24 NOTE — PROGRESS NOTES
Pulmonary Outpatient   Visit     Subjective:       Patient ID: Haim Martin is a 82 y.o. female.    Chief Complaint: review tests    The following portions of the patient's history were reviewed and updated as appropriate:   She  has a past medical history of Depression, Diverticulosis of colon (without mention of hemorrhage) (8/25/2014), Hyperlipidemia, Hypertension, and Thyroid disease.  She does not have any pertinent problems on file.  She  has a past surgical history that includes Tonsillectomy; Stomach surgery (1998); Cataract extraction, extracapsular w/ intraocular lens implant (Bilateral, 4/2014); Appendectomy; Injection of anesthetic agent around medial branch nerves innervating lumbar facet joint (Bilateral, 7/16/2020); Injection of anesthetic agent around medial branch nerves innervating lumbar facet joint (Bilateral, 8/10/2020); Radiofrequency thermocoagulation (Left, 9/28/2020); Radiofrequency thermocoagulation (Right, 10/12/2020); Injection of anesthetic agent into sacroiliac joint (Bilateral, 11/4/2021); Incision and drainage of hand (Right, 1/21/2022); and Finger amputation (Right, 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  Her family history includes Breast cancer in her maternal aunt; Coronary artery disease in her father; Diabetes type II in her father; Kidney cancer in her maternal aunt; Lupus in her mother; Stroke in her father.  She  reports that she has quit smoking. She has never used smokeless tobacco. She reports that she does not drink alcohol and does not use drugs.  She has a current medication list which includes the following prescription(s): albuterol, alendronate, amitriptyline, aspirin, atorvastatin, ergocalciferol, hydrocodone-acetaminophen, hydrocodone-acetaminophen, levothyroxine, mupirocin, nitrofurantoin (macrocrystal-monohydrate), omeprazole, propranolol, sertraline, sumatriptan, and vit c/e/zn/coppr/lutein/zeaxan, and the following Facility-Administered  Medications: lactated ringers and lactated ringers.  Current Outpatient Medications on File Prior to Visit   Medication Sig Dispense Refill    albuterol (PROVENTIL/VENTOLIN HFA) 90 mcg/actuation inhaler Inhale 2 puffs into the lungs every 6 (six) hours as needed for Wheezing. 18 g 0    alendronate (FOSAMAX) 70 MG tablet Take 70 mg by mouth every 7 days.      amitriptyline (ELAVIL) 75 MG tablet Take 1 tablet by mouth in the evening 90 tablet 3    aspirin (ECOTRIN) 81 MG EC tablet Take 81 mg by mouth once daily.      atorvastatin (LIPITOR) 40 MG tablet Take 40 mg by mouth every evening.      ergocalciferol (ERGOCALCIFEROL) 50,000 unit Cap Take 1 capsule by mouth once a week (Patient taking differently: On Sundays) 8 capsule 0    HYDROcodone-acetaminophen (NORCO) 5-325 mg per tablet Take 1 tablet by mouth every 6 (six) hours as needed for Pain. 15 tablet 0    HYDROcodone-acetaminophen (NORCO) 5-325 mg per tablet Take 1 tablet by mouth every 6 (six) hours as needed for Pain. 10 tablet 0    levothyroxine (SYNTHROID) 88 MCG tablet TAKE 1 TABLET BY MOUTH ONCE DAILY ON MONDAY THROUGH SATURDAY, AND 1.5 TABLETS ON SUNDAY 90 tablet 0    mupirocin (BACTROBAN) 2 % ointment Apply topically 2 (two) times daily. 15 g 1    nitrofurantoin, macrocrystal-monohydrate, (MACROBID) 100 MG capsule Take 1 capsule (100 mg total) by mouth 2 (two) times daily. 14 capsule 0    omeprazole (PRILOSEC) 20 MG capsule Take 1 capsule (20 mg total) by mouth as needed. 90 capsule 4    propranoloL (INDERAL) 40 MG tablet Take 1 tablet (40 mg total) by mouth 2 (two) times daily. 180 tablet 0    sertraline (ZOLOFT) 100 MG tablet Take 1 tablet by mouth once daily 90 tablet 3    sumatriptan (IMITREX) 100 MG tablet TAKE 1 TABLET BY MOUTH AT ONSET OF MIGRAINE 9 tablet 0    vit C/E/Zn/coppr/lutein/zeaxan (PRESERVISION AREDS-2 ORAL) Take 1 tablet by mouth 2 (two) times a day.       Current Facility-Administered Medications on File Prior to Visit    Medication Dose Route Frequency Provider Last Rate Last Admin    lactated ringers infusion   Intravenous On Call Procedure QUE Allen 100 mL/hr at 03/31/22 0821 New Bag at 03/31/22 0821    lactated ringers infusion   Intravenous Continuous Marzena Ybarra MD   Stopped at 05/19/22 0844     She is allergic to clindamycin and penicillins..     Immunization History   Administered Date(s) Administered    COVID-19, MRNA, LN-S, PF (Pfizer) (Purple Cap) 01/11/2021, 02/01/2021    Influenza 09/13/2013    Influenza - High Dose - PF (65 years and older) 10/10/2011, 10/02/2012, 09/22/2014, 08/21/2015, 10/21/2016, 09/26/2018, 10/04/2019    Influenza - Quadrivalent - High Dose - PF (65 years and older) 10/02/2020, 10/22/2021    Influenza Split 10/18/2006, 10/07/2008, 10/07/2009, 10/12/2010    Pneumococcal Conjugate - 13 Valent 07/15/2015    Pneumococcal Polysaccharide - 23 Valent 05/16/2019    Tdap 07/15/2015, 09/28/2019        Tobacco Use: Medium Risk    Smoking Tobacco Use: Former Smoker    Smokeless Tobacco Use: Never Used               Haim Martin is 81 y.o.  Recent Dc from [facility]  Asked to see by Kavya Mesa MD  Accompanied by Son  Hx COVID  REMDESIVIR, DEXJALEESA  Still on oxygen  Still gets SOB and palpitation  Also has bone infection: Daptomycin  Still has fatigue, in wheel chair    05/24/2022  Last seen was 02/22/2022  No respiratory symptoms  Here  to reviewed CXR and Wilder  CXR improved  Needs O2 to be D'cd            Review of Systems   Constitutional: Positive for fatigue.   All other systems reviewed and are negative.      Outpatient Encounter Medications as of 5/24/2022   Medication Sig Dispense Refill    albuterol (PROVENTIL/VENTOLIN HFA) 90 mcg/actuation inhaler Inhale 2 puffs into the lungs every 6 (six) hours as needed for Wheezing. 18 g 0    alendronate (FOSAMAX) 70 MG tablet Take 70 mg by mouth every 7 days.      amitriptyline (ELAVIL) 75 MG tablet Take 1 tablet by  mouth in the evening 90 tablet 3    aspirin (ECOTRIN) 81 MG EC tablet Take 81 mg by mouth once daily.      atorvastatin (LIPITOR) 40 MG tablet Take 40 mg by mouth every evening.      ergocalciferol (ERGOCALCIFEROL) 50,000 unit Cap Take 1 capsule by mouth once a week (Patient taking differently: On Sundays) 8 capsule 0    HYDROcodone-acetaminophen (NORCO) 5-325 mg per tablet Take 1 tablet by mouth every 6 (six) hours as needed for Pain. 15 tablet 0    HYDROcodone-acetaminophen (NORCO) 5-325 mg per tablet Take 1 tablet by mouth every 6 (six) hours as needed for Pain. 10 tablet 0    levothyroxine (SYNTHROID) 88 MCG tablet TAKE 1 TABLET BY MOUTH ONCE DAILY ON MONDAY THROUGH SATURDAY, AND 1.5 TABLETS ON SUNDAY 90 tablet 0    mupirocin (BACTROBAN) 2 % ointment Apply topically 2 (two) times daily. 15 g 1    nitrofurantoin, macrocrystal-monohydrate, (MACROBID) 100 MG capsule Take 1 capsule (100 mg total) by mouth 2 (two) times daily. 14 capsule 0    omeprazole (PRILOSEC) 20 MG capsule Take 1 capsule (20 mg total) by mouth as needed. 90 capsule 4    propranoloL (INDERAL) 40 MG tablet Take 1 tablet (40 mg total) by mouth 2 (two) times daily. 180 tablet 0    sertraline (ZOLOFT) 100 MG tablet Take 1 tablet by mouth once daily 90 tablet 3    sumatriptan (IMITREX) 100 MG tablet TAKE 1 TABLET BY MOUTH AT ONSET OF MIGRAINE 9 tablet 0    vit C/E/Zn/coppr/lutein/zeaxan (PRESERVISION AREDS-2 ORAL) Take 1 tablet by mouth 2 (two) times a day.       Facility-Administered Encounter Medications as of 5/24/2022   Medication Dose Route Frequency Provider Last Rate Last Admin    lactated ringers infusion   Intravenous On Call Procedure QUE Allen 100 mL/hr at 03/31/22 0821 New Bag at 03/31/22 0821    lactated ringers infusion   Intravenous Continuous Marzena Ybarra MD   Stopped at 05/19/22 0844       Objective:     Vital Signs (Most Recent)  Vital Signs  Pulse: 104  Resp: 16  SpO2: 98 %  BP: 125/66  Height and  "Weight  Height: 4' 11" (149.9 cm)  Weight: 55.6 kg (122 lb 9.2 oz)  BSA (Calculated - sq m): 1.52 sq meters  BMI (Calculated): 24.7  Weight in (lb) to have BMI = 25: 123.5]  Wt Readings from Last 2 Encounters:   05/24/22 55.6 kg (122 lb 9.2 oz)   05/24/22 55.6 kg (122 lb 9.2 oz)       Physical Exam   Constitutional: She is oriented to person, place, and time. She appears well-developed and well-nourished.   HENT:   Head: Normocephalic.   Mouth/Throat: Mallampati Score: II.   Neck: No JVD present.   Cardiovascular: Normal rate and intact distal pulses.   No murmur heard.  Pulmonary/Chest: Normal expansion, effort normal and breath sounds normal.   Abdominal: Soft. Bowel sounds are normal.   Musculoskeletal:         General: No edema. Normal range of motion.      Cervical back: Normal range of motion and neck supple.   Lymphadenopathy:     She has no axillary adenopathy.   Neurological: She is alert and oriented to person, place, and time.   Skin: Skin is warm and dry.   Psychiatric: She has a normal mood and affect.   Nursing note and vitals reviewed.      Laboratory  Lab Results   Component Value Date    WBC 6.07 05/17/2022    RBC 3.74 (L) 05/17/2022    HGB 11.9 (L) 05/17/2022    HCT 39.8 05/17/2022     (H) 05/17/2022    MCH 31.8 (H) 05/17/2022    MCHC 29.9 (L) 05/17/2022    RDW 13.4 05/17/2022     05/17/2022    MPV 11.7 05/17/2022    GRAN 3.5 05/17/2022    GRAN 57.2 05/17/2022    LYMPH 1.7 05/17/2022    LYMPH 28.5 05/17/2022    MONO 0.6 05/17/2022    MONO 10.2 05/17/2022    EOS 0.1 05/17/2022    BASO 0.09 05/17/2022    EOSINOPHIL 2.3 05/17/2022    BASOPHIL 1.5 05/17/2022       BMP  Lab Results   Component Value Date     05/17/2022    K 4.0 05/17/2022     05/17/2022    CO2 21 (L) 05/17/2022    BUN 16 05/17/2022    CREATININE 0.7 05/17/2022    CALCIUM 9.1 05/17/2022    ANIONGAP 10 05/17/2022    ESTGFRAFRICA >60.0 05/17/2022    EGFRNONAA >60.0 05/17/2022    AST 21 05/17/2022    ALT 20 " "05/17/2022    PROT 6.9 05/17/2022       Lab Results   Component Value Date    BNP 38 02/06/2022       Lab Results   Component Value Date    TSH 2.644 08/12/2021       Lab Results   Component Value Date    SEDRATE 104 (H) 03/07/2022       Lab Results   Component Value Date    CRP 11.6 (H) 03/07/2022     No results found for: IGE     No results found for: ASPERGILLUS  No results found for: AFUMIGATUSCL     No results found for: ACE     Diagnostic Results:  I have personally reviewed today the following studies:    KLEVER  FEV1 1.27L( 77.8%), FVC 1.65L( 77.7%0  FEV1/FVC 77      6MW Test  Height: 4' 11" (149.9 cm)  Weight: 55.6 kg (122 lb 9.2 oz)  BMI (Calculated): 24.7  Predicted Distance: 257.47  Interpretation  Predicted Distance Meters (Calculated): 393.57 meters     Ordering Provider:            Interpreting Provider: Dr. Holland  Performing nurse/tech/RT: AMANDA Velasquez  Diagnosis: Shortness of Breath (Covid-19)  Height: 4' 11" (149.9 cm)  Weight: 55.6 kg (122 lb 7.5 oz)  BMI (Calculated): 24.7              Patient Race:                                                                Phase Oxygen Assessment Supplemental O2 Heart   Rate Blood Pressure Rasheeda Dyspnea Scale Rating   Resting 98 % Room Air 107 bpm 134/63 0   Exercise             Minute             1 100 % Room Air 116 bpm       2 100 % Room Air 117 bpm       3 100 % Room Air 122 bpm       4 100 % Room Air 122 bpm       5 100 % Room Air 125 bpm       6  98 % Room Air 125 bpm 180/74 0   Recovery             Minute             1 99 % Room Air 120 bpm       2 99 % Room Air 109 bpm       3 99 % Room Air 106 bpm       4 98 % Room Air 104 bpm 125/66 0      Six Minute Walk Summary  6MWT Status: completed without stopping  Patient Reported:  (Back Pain)                Interpretation:  Did the patient stop or pause?: No  Total Time Walked (Calculated): 360 seconds  Final Partial Lap Distance (feet): 0 feet  Total Distance Meters (Calculated): 304.8 " meters  Predicted Distance Meters (Calculated): 393.68 meters  Percentage of Predicted (Calculated): 77.42  Peak VO2 (Calculated): 13.12  Mets: 3.75  Has The Patient Had a Previous Six Minute Walk Test?: No     Previous 6MWT Results  Has The Patient Had a Previous Six Minute Walk Test?: No            X-Ray Chest PA And Lateral  Narrative: EXAMINATION:  XR CHEST PA AND LATERAL    CLINICAL HISTORY:  COVID-19    TECHNIQUE:  PA and lateral views of the chest were performed.    COMPARISON:  02/21/2022    FINDINGS:  Coarsened interstitial markings in the lung bases.  No airspace disease.  Normal size heart.  No pleural effusion or pneumothorax.  Osteopenia.  Impression: No new pulmonary opacities.  Query mild basilar scar.    Electronically signed by: Alex Ventura  Date:    05/24/2022  Time:    11:10       Assessment/Plan:     Problem List Items Addressed This Visit     Hypothyroidism     STABLE ON SYNTHROID           Chronic respiratory failure - Primary      Adherence with Oxygen    Check ONSAT to validate need    Normal 6MWD: no indication for resting or exercise oxygen           Relevant Orders    PULSE OXIMETRY OVERNIGHT    Hyperlipidemia     Stable on LIPITOR             Other Visit Diagnoses     Scarring of lung        Relevant Orders    X-Ray Chest PA And Lateral    Spirometry with/without bronchodilator    PULSE OXIMETRY OVERNIGHT          Follow up in about 1 year (around 5/24/2023), or CXR, KLEVER, onsat THIS WEEK TO DC O2, needs covid booster ( 3rd).    This note was prepared using voice recognition system and is likely to have sound alike errors that may have been overlooked even after proof reading.  Please call me with any questions    Discussed diagnosis, its evaluation, treatment and usual course. All questions answered.      Alan Holland MD

## 2022-05-24 NOTE — PROCEDURES
"[facility] - Pulmonary Function  Six Minute Walk     SUMMARY     Ordering Provider:    Interpreting Provider: Dr. Holland  Performing nurse/tech/RT: Ron RRT  Diagnosis: Shortness of Breath (Covid-19)  Height: 4' 11" (149.9 cm)  Weight: 55.6 kg (122 lb 7.5 oz)  BMI (Calculated): 24.7   Patient Race:             Phase Oxygen Assessment Supplemental O2 Heart   Rate Blood Pressure Rasheeda Dyspnea Scale Rating   Resting 98 % Room Air 107 bpm 134/63 0   Exercise        Minute        1 100 % Room Air 116 bpm     2 100 % Room Air 117 bpm     3 100 % Room Air 122 bpm     4 100 % Room Air 122 bpm     5 100 % Room Air 125 bpm     6  98 % Room Air 125 bpm 180/74 0   Recovery        Minute        1 99 % Room Air 120 bpm     2 99 % Room Air 109 bpm     3 99 % Room Air 106 bpm     4 98 % Room Air 104 bpm 125/66 0     Six Minute Walk Summary  6MWT Status: completed without stopping  Patient Reported:  (Back Pain)     Interpretation:  Did the patient stop or pause?: No            Total Time Walked (Calculated): 360 seconds  Final Partial Lap Distance (feet): 0 feet  Total Distance Meters (Calculated): 304.8 meters  Predicted Distance Meters (Calculated): 393.68 meters  Percentage of Predicted (Calculated): 77.42  Peak VO2 (Calculated): 13.12  Mets: 3.75  Has The Patient Had a Previous Six Minute Walk Test?: No       Previous 6MWT Results  Has The Patient Had a Previous Six Minute Walk Test?: No           CLINICAL INTERPRETATION:  Six minute walk distance is 304.8m (77.42 % predicted) with no dyspnea.  During exercise, there was no significant desaturation while breathing room air.  Blood pressure increased significantly and Heart rate increased significantly with walking.  The patient reported non-pulmonary symptoms during exercise.  The patient did complete the study, walking 360 seconds of the 360 second test.  Based upon age and body mass index, exercise capacity is normal.      [] Mild exercise-induced " hypoxemia described as an arterial oxygen saturation of 93-95% (or 3-4% less than at rest)  []  Moderate exercise-induced hypoxemia as 89-93%  []  Severe exercise induced hypoxemia as < 89% O2 saturation.  Medicare Criteria for oxygen prescription comments:   []  When arterial oxygen saturation is at or below 88% during exercise (severe exercise induced hypoxemia) then the patient falls under Medicare Group 1 criteria for supplemental oxygen

## 2022-05-24 NOTE — PROGRESS NOTES
Subjective:      Patient ID: Haim Martin is a 82 y.o. female.    Chief Complaint: No chief complaint on file.      HPI: Haim Martin is an 82-year-old female in clinic today for postoperative follow-up.  Patient is 5 days status post excision of a mucous cyst of the left long finger.  Patient is doing very well this time.  No new complaints    Past Medical History:   Diagnosis Date    Depression     Diverticulosis of colon (without mention of hemorrhage) 8/25/2014    Hyperlipidemia     Hypertension     Thyroid disease        Current Outpatient Medications:     albuterol (PROVENTIL/VENTOLIN HFA) 90 mcg/actuation inhaler, Inhale 2 puffs into the lungs every 6 (six) hours as needed for Wheezing., Disp: 18 g, Rfl: 0    alendronate (FOSAMAX) 70 MG tablet, Take 70 mg by mouth every 7 days., Disp: , Rfl:     amitriptyline (ELAVIL) 75 MG tablet, Take 1 tablet by mouth in the evening, Disp: 90 tablet, Rfl: 3    aspirin (ECOTRIN) 81 MG EC tablet, Take 81 mg by mouth once daily., Disp: , Rfl:     atorvastatin (LIPITOR) 40 MG tablet, Take 40 mg by mouth every evening., Disp: , Rfl:     ergocalciferol (ERGOCALCIFEROL) 50,000 unit Cap, Take 1 capsule by mouth once a week (Patient taking differently: On Sundays), Disp: 8 capsule, Rfl: 0    HYDROcodone-acetaminophen (NORCO) 5-325 mg per tablet, Take 1 tablet by mouth every 6 (six) hours as needed for Pain., Disp: 15 tablet, Rfl: 0    HYDROcodone-acetaminophen (NORCO) 5-325 mg per tablet, Take 1 tablet by mouth every 6 (six) hours as needed for Pain., Disp: 10 tablet, Rfl: 0    levothyroxine (SYNTHROID) 88 MCG tablet, TAKE 1 TABLET BY MOUTH ONCE DAILY ON MONDAY THROUGH SATURDAY, AND 1.5 TABLETS ON SUNDAY, Disp: 90 tablet, Rfl: 0    mupirocin (BACTROBAN) 2 % ointment, Apply topically 2 (two) times daily., Disp: 15 g, Rfl: 1    nitrofurantoin, macrocrystal-monohydrate, (MACROBID) 100 MG capsule, Take 1 capsule (100 mg total) by mouth 2 (two) times daily.,  Disp: 14 capsule, Rfl: 0    omeprazole (PRILOSEC) 20 MG capsule, Take 1 capsule (20 mg total) by mouth as needed., Disp: 90 capsule, Rfl: 4    propranoloL (INDERAL) 40 MG tablet, Take 1 tablet (40 mg total) by mouth 2 (two) times daily., Disp: 180 tablet, Rfl: 0    sertraline (ZOLOFT) 100 MG tablet, Take 1 tablet by mouth once daily, Disp: 90 tablet, Rfl: 3    sumatriptan (IMITREX) 100 MG tablet, TAKE 1 TABLET BY MOUTH AT ONSET OF MIGRAINE, Disp: 9 tablet, Rfl: 0    vit C/E/Zn/coppr/lutein/zeaxan (PRESERVISION AREDS-2 ORAL), Take 1 tablet by mouth 2 (two) times a day., Disp: , Rfl:   No current facility-administered medications for this visit.    Facility-Administered Medications Ordered in Other Visits:     lactated ringers infusion, , Intravenous, On Call Procedure, QUE Allen, Last Rate: 100 mL/hr at 03/31/22 0821, New Bag at 03/31/22 0821    lactated ringers infusion, , Intravenous, Continuous, Marzena Ybarra MD, Stopped at 05/19/22 0844  Review of patient's allergies indicates:   Allergen Reactions    Clindamycin      Heaviness in chest      Penicillins Hives     Pt has tolerated cephalexin       There were no vitals taken for this visit.    ROS      Objective:    Ortho Exam   Left long finger:  Sutures intact, wound margins well approximated, no signs of infection  No edema  Mild TTP  ROM normal  Motor exam normal  Sensation and pulses intact  Cap refill brisk    GEN: Well developed, well nourished female. AAOX3. No acute distress.   Normocephalic, atraumatic.   FARRAH  Breathing unlabored.  Mood and affect appropriate.        Assessment:     Imaging:  No new imaging ordered today        1. Mucous cyst of finger          Plan:       Incision cleaned with hydrogen peroxide and covered with sterile bandage.  Patient instructed to perform similar wound cleanings and dressing changes daily.  Patient instructed to keep the sutures clean and dry until they were removed.  Patient will follow up in  about 10 days for suture removal.     Follow up in about 10 days (around 6/3/2022).          Patient note was created using MModal Dictation.  Any errors in syntax or even information may not have been identified and edited on initial review prior to signing this note.

## 2022-05-24 NOTE — ASSESSMENT & PLAN NOTE
Adherence with Oxygen    Check ONSAT to validate need    Normal 6MWD: no indication for resting or exercise oxygen

## 2022-05-26 LAB
FINAL PATHOLOGIC DIAGNOSIS: NORMAL
GROSS: NORMAL
Lab: NORMAL

## 2022-06-02 ENCOUNTER — CLINICAL SUPPORT (OUTPATIENT)
Dept: PULMONOLOGY | Facility: CLINIC | Age: 82
End: 2022-06-02
Payer: MEDICARE

## 2022-06-02 ENCOUNTER — OFFICE VISIT (OUTPATIENT)
Dept: ORTHOPEDICS | Facility: CLINIC | Age: 82
End: 2022-06-02
Payer: MEDICARE

## 2022-06-02 VITALS — HEIGHT: 59 IN | WEIGHT: 122 LBS | BODY MASS INDEX: 24.6 KG/M2

## 2022-06-02 DIAGNOSIS — M67.449 MUCOUS CYST OF FINGER: Primary | ICD-10-CM

## 2022-06-02 DIAGNOSIS — J98.4 SCARRING OF LUNG: ICD-10-CM

## 2022-06-02 DIAGNOSIS — J96.11 CHRONIC RESPIRATORY FAILURE WITH HYPOXIA: ICD-10-CM

## 2022-06-02 PROCEDURE — 3288F PR FALLS RISK ASSESSMENT DOCUMENTED: ICD-10-PCS | Mod: CPTII,S$GLB,, | Performed by: PHYSICIAN ASSISTANT

## 2022-06-02 PROCEDURE — 99024 POSTOP FOLLOW-UP VISIT: CPT | Mod: S$GLB,,, | Performed by: PHYSICIAN ASSISTANT

## 2022-06-02 PROCEDURE — 99999 PR PBB SHADOW E&M-EST. PATIENT-LVL III: CPT | Mod: PBBFAC,,, | Performed by: PHYSICIAN ASSISTANT

## 2022-06-02 PROCEDURE — 1101F PT FALLS ASSESS-DOCD LE1/YR: CPT | Mod: CPTII,S$GLB,, | Performed by: PHYSICIAN ASSISTANT

## 2022-06-02 PROCEDURE — 1101F PR PT FALLS ASSESS DOC 0-1 FALLS W/OUT INJ PAST YR: ICD-10-PCS | Mod: CPTII,S$GLB,, | Performed by: PHYSICIAN ASSISTANT

## 2022-06-02 PROCEDURE — 1160F PR REVIEW ALL MEDS BY PRESCRIBER/CLIN PHARMACIST DOCUMENTED: ICD-10-PCS | Mod: CPTII,S$GLB,, | Performed by: PHYSICIAN ASSISTANT

## 2022-06-02 PROCEDURE — 99024 PR POST-OP FOLLOW-UP VISIT: ICD-10-PCS | Mod: S$GLB,,, | Performed by: PHYSICIAN ASSISTANT

## 2022-06-02 PROCEDURE — 1125F PR PAIN SEVERITY QUANTIFIED, PAIN PRESENT: ICD-10-PCS | Mod: CPTII,S$GLB,, | Performed by: PHYSICIAN ASSISTANT

## 2022-06-02 PROCEDURE — 99999 PR PBB SHADOW E&M-EST. PATIENT-LVL III: ICD-10-PCS | Mod: PBBFAC,,, | Performed by: PHYSICIAN ASSISTANT

## 2022-06-02 PROCEDURE — 1159F PR MEDICATION LIST DOCUMENTED IN MEDICAL RECORD: ICD-10-PCS | Mod: CPTII,S$GLB,, | Performed by: PHYSICIAN ASSISTANT

## 2022-06-02 PROCEDURE — 1125F AMNT PAIN NOTED PAIN PRSNT: CPT | Mod: CPTII,S$GLB,, | Performed by: PHYSICIAN ASSISTANT

## 2022-06-02 PROCEDURE — 3288F FALL RISK ASSESSMENT DOCD: CPT | Mod: CPTII,S$GLB,, | Performed by: PHYSICIAN ASSISTANT

## 2022-06-02 PROCEDURE — 1160F RVW MEDS BY RX/DR IN RCRD: CPT | Mod: CPTII,S$GLB,, | Performed by: PHYSICIAN ASSISTANT

## 2022-06-02 PROCEDURE — 1159F MED LIST DOCD IN RCRD: CPT | Mod: CPTII,S$GLB,, | Performed by: PHYSICIAN ASSISTANT

## 2022-06-02 RX ORDER — SULFAMETHOXAZOLE AND TRIMETHOPRIM 800; 160 MG/1; MG/1
1 TABLET ORAL 2 TIMES DAILY
Qty: 20 TABLET | Refills: 0 | Status: SHIPPED | OUTPATIENT
Start: 2022-06-02 | End: 2023-05-16

## 2022-06-02 RX ORDER — GABAPENTIN 100 MG/1
100 CAPSULE ORAL NIGHTLY
Qty: 30 CAPSULE | Refills: 1 | Status: SHIPPED | OUTPATIENT
Start: 2022-06-02 | End: 2023-01-26 | Stop reason: SDUPTHER

## 2022-06-02 NOTE — PROGRESS NOTES
Patient ID: Haim Martin is a 82 y.o. female.    Chief Complaint: Pain and Post-op Evaluation of the Left Hand      HPI: Haim Martin  is a 82 y.o. female who c/o Pain and Post-op Evaluation of the Left Hand     Post op visit 2  Pain is at worse a 5/10  The patient is doing okay since surgery and is pleased with the results  She notes pain is increased with use and activity over the course of the day as she tries increased gentle range of motion  The patient states she was following postop instructions up until yesterday where she did get in the shower without a Band-Aid or glove on and got it wet  Additionally in our conversation she notes that she has and was at home for which she sleeps with both of these combined put her at a higher risk of potential infection  She notes increased erythema as well as hypersensitivity over the last day or so    Additionally she notes she is starting with numbness tingling and phantom shocks to the right hand status post amputation.  She knows this will take up to a year to heal but is noticing it is interfering with activity of daily living such as driving and the way she holds supple objects.      Patient is presently denying any shortness of breath, chest pain, fever/chills, nausea/vomiting, loss of taste or smell, numbness/tingling or sensation changes, loss of bladder or bowel function.      Procedure: Left long finger mucous cyst excision  DOS:  05/19/2022    Past Medical History:   Diagnosis Date    Depression     Diverticulosis of colon (without mention of hemorrhage) 8/25/2014    Hyperlipidemia     Hypertension     Thyroid disease      Past Surgical History:   Procedure Laterality Date    APPENDECTOMY      CATARACT EXTRACTION EXTRACAPSULAR W/ INTRAOCULAR LENS IMPLANTATION Bilateral 4/2014    CYST REMOVAL Left 5/19/2022    Procedure: EXCISION, CYST;  Surgeon: Kareem Alatorre MD;  Location: [facility];  Service: Orthopedics;  Laterality: Left;   excision mucous cyst left long finger    FINGER AMPUTATION Right 3/31/2022    Procedure: AMPUTATION, FINGER;  Surgeon: Kareem Alatorre MD;  Location: [facility] OR;  Service: Orthopedics;  Laterality: Right;  amputation right index finger at the level of proximal interphalangeal joint    INCISION AND DRAINAGE OF HAND Right 1/21/2022    Procedure: INCISION AND DRAINAGE, HAND;  Surgeon: Ramirez Flores MD;  Location: [facility];  Service: Orthopedics;  Laterality: Right;  Right Index Finger    INJECTION OF ANESTHETIC AGENT AROUND MEDIAL BRANCH NERVES INNERVATING LUMBAR FACET JOINT Bilateral 7/16/2020    Procedure: Bilateral L3-5 MBB with local;  Surgeon: Luis Ashraf MD;  Location: [facility] PAIN [facility]GT;  Service: Pain Management;  Laterality: Bilateral;    INJECTION OF ANESTHETIC AGENT AROUND MEDIAL BRANCH NERVES INNERVATING LUMBAR FACET JOINT Bilateral 8/10/2020    Procedure: Bilateral L3-5 MBB with local;  Surgeon: Luis Ashraf MD;  Location: [facility];  Service: Pain Management;  Laterality: Bilateral;    INJECTION OF ANESTHETIC AGENT INTO SACROILIAC JOINT Bilateral 11/4/2021    Procedure: Bilateral SIJ Injection;  Surgeon: Luis Ashraf MD;  Location: [facility];  Service: Pain Management;  Laterality: Bilateral;    RADIOFREQUENCY THERMOCOAGULATION Left 9/28/2020    Procedure: Left L3-5 Lumbar RFA;  Surgeon: Luis Ashraf MD;  Location: [facility];  Service: Pain Management;  Laterality: Left;    RADIOFREQUENCY THERMOCOAGULATION Right 10/12/2020    Procedure: Right L3-5 Lumbar RFA;  Surgeon: Luis Ashraf MD;  Location: [facility];  Service: Pain Management;  Laterality: Right;    STOMACH SURGERY  1998    not sure what they did, possible bowel resection, partial gastrectomy    TONSILLECTOMY       Family History   Problem Relation Age of Onset    Lupus Mother     Stroke Father     Coronary artery disease Father     Diabetes type II Father     Kidney cancer Maternal Aunt      Breast cancer Maternal Aunt      Social History     Socioeconomic History    Marital status:    Tobacco Use    Smoking status: Former Smoker    Smokeless tobacco: Never Used    Tobacco comment: quit 30 years ago   Substance and Sexual Activity    Alcohol use: No    Drug use: No    Sexual activity: Not Currently     Birth control/protection: None     Medication List with Changes/Refills   New Medications    GABAPENTIN (NEURONTIN) 100 MG CAPSULE    Take 1 capsule (100 mg total) by mouth every evening.    SULFAMETHOXAZOLE-TRIMETHOPRIM 800-160MG (BACTRIM DS) 800-160 MG TAB    Take 1 tablet by mouth 2 (two) times daily.   Current Medications    ALBUTEROL (PROVENTIL/VENTOLIN HFA) 90 MCG/ACTUATION INHALER    Inhale 2 puffs into the lungs every 6 (six) hours as needed for Wheezing.    ALENDRONATE (FOSAMAX) 70 MG TABLET    Take 70 mg by mouth every 7 days.    AMITRIPTYLINE (ELAVIL) 75 MG TABLET    Take 1 tablet by mouth in the evening    ASPIRIN (ECOTRIN) 81 MG EC TABLET    Take 81 mg by mouth once daily.    ATORVASTATIN (LIPITOR) 40 MG TABLET    Take 40 mg by mouth every evening.    ERGOCALCIFEROL (ERGOCALCIFEROL) 50,000 UNIT CAP    Take 1 capsule by mouth once a week    HYDROCODONE-ACETAMINOPHEN (NORCO) 5-325 MG PER TABLET    Take 1 tablet by mouth every 6 (six) hours as needed for Pain.    HYDROCODONE-ACETAMINOPHEN (NORCO) 5-325 MG PER TABLET    Take 1 tablet by mouth every 6 (six) hours as needed for Pain.    LEVOTHYROXINE (SYNTHROID) 88 MCG TABLET    TAKE 1 TABLET BY MOUTH ONCE DAILY ON MONDAY THROUGH SATURDAY, AND 1.5 TABLETS ON SUNDAY    MUPIROCIN (BACTROBAN) 2 % OINTMENT    Apply topically 2 (two) times daily.    OMEPRAZOLE (PRILOSEC) 20 MG CAPSULE    Take 1 capsule (20 mg total) by mouth as needed.    PROPRANOLOL (INDERAL) 40 MG TABLET    Take 1 tablet (40 mg total) by mouth 2 (two) times daily.    SERTRALINE (ZOLOFT) 100 MG TABLET    Take 1 tablet by mouth once daily    SUMATRIPTAN (IMITREX) 100 MG  TABLET    TAKE 1 TABLET BY MOUTH AT ONSET OF MIGRAINE    VIT C/E/ZN/COPPR/LUTEIN/ZEAXAN (PRESERVISION AREDS-2 ORAL)    Take 1 tablet by mouth 2 (two) times a day.   Discontinued Medications    NITROFURANTOIN, MACROCRYSTAL-MONOHYDRATE, (MACROBID) 100 MG CAPSULE    Take 1 capsule (100 mg total) by mouth 2 (two) times daily.     Review of patient's allergies indicates:   Allergen Reactions    Clindamycin      Heaviness in chest      Penicillins Hives     Pt has tolerated cephalexin       Objective:     Left Hand  2+ rad pulse  Comp soft  Sensation intact  Inc C/D/I  Very mild erythema noted with increased sensitivity to light palpation  No SOI  Cap refill < 2 sec  Motor intact to hand and wrist    Assessment:       Encounter Diagnosis   Name Primary?    Mucous cyst of finger Yes          Plan:       Haim was seen today for pain and post-op evaluation.    Diagnoses and all orders for this visit:    Mucous cyst of finger    Other orders  -     sulfamethoxazole-trimethoprim 800-160mg (BACTRIM DS) 800-160 mg Tab; Take 1 tablet by mouth 2 (two) times daily.  -     gabapentin (NEURONTIN) 100 MG capsule; Take 1 capsule (100 mg total) by mouth every evening.        Haim Martin is an established pt here for postop follow-up.   The incision was cleaned with hydrogen peroxide and normal saline. All sutures were removed and steri- strips were placed over the incision. These are to remain on for 1-2 weeks and will fall off on their own. Patient is now allowed to let water run over the incision but should continue to refrain from soaking the hand in standing water like a tub or doing dishes without a protective layer. Patient may clean the incision daily as above.  She was instructed to keep the incision clean and dry until follow-up.  Due to the presentation and symptoms as stated above I will provide her with a script of Bactrim.  She is to take 1 pill twice a day for 10 days.  Patient should notify the office of any  signs or symptoms of infection including fevers, erythema, purulent drainage, increasing pain.  Patient will follow up p.r.n.  She verbalizes understanding and agrees.    No follow-ups on file.    The patient understands, chooses and consents to this plan and accepts all   the risks which include but are not limited to the risks mentioned above.     Disclaimer: This note was prepared using a voice recognition system and is likely to have sound alike errors within the text.

## 2022-06-13 NOTE — TELEPHONE ENCOUNTER
No new care gaps identified.  [facility] Embedded Care Gaps. Reference number: [id number]. 6/13/2022   10:58:20 AM CDT

## 2022-06-14 RX ORDER — PROPRANOLOL HYDROCHLORIDE 40 MG/1
40 TABLET ORAL 2 TIMES DAILY
Qty: 180 TABLET | Refills: 2 | Status: SHIPPED | OUTPATIENT
Start: 2022-06-14 | End: 2023-04-04

## 2022-06-14 NOTE — TELEPHONE ENCOUNTER
Refill Routing Note   Medication(s) are not appropriate for processing by [facility] for the following reason(s):      - Required vitals are abnormal  - Patient has been seen in the ED/Hospital since the last PCP visit    [facility] action(s):  Defer          Medication reconciliation completed: No     Appointments  past 12m or future 3m with PCP    Date Provider   Last Visit   2/21/2022 Kavya Mesa MD   Next Visit   Visit date not found Kavya Mesa MD   ED visits in past 90 days: 0        Note composed:10:10 AM 06/14/2022         2

## 2022-06-15 NOTE — SUBJECTIVE & OBJECTIVE
Nephrology Progress Note    No new complains    ROS:  Resp:negative  CV:negative  GI:negative  Gu:negative    MEDICATIONS:  Current Facility-Administered Medications   Medication Dose Route Frequency Provider Last Rate Last Admin   • insulin glargine (LANTUS) injection 10 Units  10 Units Subcutaneous Nightly Cesar Rodriguez MD       • insulin lispro (ADMELOG,HumaLOG) - Correction Dose   Subcutaneous Nightly Cesar Rodriguez MD       • insulin lispro (ADMELOG,HumaLOG) - Correction Dose   Subcutaneous TID WC Cesar Rodriguez MD   4 Units at 06/15/22 1231   • sodium chloride 0.9% infusion   Intravenous Continuous Elise Sevilla  mL/hr at 06/15/22 0933 New Bag at 06/15/22 0933   • ciprofloxacin (CIPRO) tablet 500 mg  500 mg Oral Daily Eki DORIS Garrison, CNP   500 mg at 06/15/22 0932   • enoxaparin (LOVENOX) injection 30 mg  30 mg Subcutaneous Q24H Marnie Shell MD   30 mg at 06/14/22 2207   • DAPTOmycin (CUBICIN) injection 360 mg  6 mg/kg (Adjusted) Intravenous Q48H Eki O Bladimir, CNP   360 mg at 06/13/22 1650   • ondansetron (ZOFRAN) injection 4 mg  4 mg Intravenous Q12H PRN Kelly Ovalles MD   4 mg at 06/12/22 0450   • melatonin tablet 6 mg  6 mg Oral Nightly PRN Judd Ordoñez MD   6 mg at 06/11/22 2253   • pantoprazole (PROTONIX) EC tablet 40 mg  40 mg Oral BID AC Benito Landon MD   40 mg at 06/15/22 0624   • sodium chloride 0.9% infusion   Intravenous Continuous PRN Rosa Chapman MD       • fentaNYL (SUBLIMAZE) injection 25 mcg  25 mcg Intravenous Q5 Min PRN Dalia Mann MD       • HYDROmorphone (DILAUDID) injection 0.2 mg  0.2 mg Intravenous Q5 Min PRN Dalia Mann MD       • HYDROmorphone (DILAUDID) injection 0.4 mg  0.4 mg Intravenous Q5 Min PRN Dalia Mann MD       • meperidine (DEMEROL) injection 25 mg  25 mg Intravenous Q10 Min PRN Dalia Mann MD       • sodium chloride 0.9% infusion   Intravenous Continuous PRN Deep Rivera MD       • acetaminophen (TYLENOL) tablet 650  Interval History: Continues on 5L O2 NC, stable.     Review of Systems   Constitutional: Positive for activity change and fatigue. Negative for appetite change, chills, diaphoresis, fever and unexpected weight change.   HENT: Negative for congestion, hearing loss, mouth sores, postnasal drip, rhinorrhea, sore throat and trouble swallowing.    Eyes: Negative for discharge and visual disturbance.   Respiratory: Positive for cough and shortness of breath. Negative for chest tightness.    Cardiovascular: Negative for chest pain, palpitations and leg swelling.   Gastrointestinal: Positive for abdominal distention and nausea. Negative for blood in stool, constipation, diarrhea and vomiting.   Endocrine: Negative for cold intolerance and heat intolerance.   Genitourinary: Negative for difficulty urinating, dyspareunia, flank pain and hematuria.   Musculoskeletal: Positive for arthralgias. Negative for back pain and myalgias.   Skin: Negative.    Neurological: Positive for weakness and headaches. Negative for dizziness and light-headedness.   Hematological: Negative for adenopathy. Does not bruise/bleed easily.   Psychiatric/Behavioral: Negative for agitation, behavioral problems and confusion. The patient is not nervous/anxious.      Objective:     Vital Signs (Most Recent):  Temp: 98 °F (36.7 °C) (02/10/22 1210)  Pulse: 74 (02/10/22 1317)  Resp: 18 (02/10/22 1317)  BP: 136/64 (02/10/22 1210)  SpO2: (!) 93 % (02/10/22 1317) Vital Signs (24h Range):  Temp:  [97.5 °F (36.4 °C)-98.9 °F (37.2 °C)] 98 °F (36.7 °C)  Pulse:  [60-77] 74  Resp:  [16-20] 18  SpO2:  [89 %-93 %] 93 %  BP: (114-138)/(56-67) 136/64     Weight: 60 kg (132 lb 4.4 oz)  Body mass index is 26.72 kg/m².    Intake/Output Summary (Last 24 hours) at 2/10/2022 1442  Last data filed at 2/10/2022 1000  Gross per 24 hour   Intake 358 ml   Output 1500 ml   Net -1142 ml      Physical Exam  Vitals and nursing note reviewed.   Constitutional:       General: She is not  in acute distress.     Appearance: She is well-developed. She is ill-appearing.   HENT:      Head: Normocephalic and atraumatic.      Right Ear: Hearing and external ear normal.      Left Ear: Hearing and external ear normal.      Nose: No rhinorrhea.      Right Sinus: No maxillary sinus tenderness or frontal sinus tenderness.      Left Sinus: No maxillary sinus tenderness or frontal sinus tenderness.      Mouth/Throat:      Mouth: No oral lesions.      Pharynx: Uvula midline.   Eyes:      General:         Right eye: No discharge.         Left eye: No discharge.      Conjunctiva/sclera: Conjunctivae normal.      Pupils: Pupils are equal, round, and reactive to light.   Neck:      Thyroid: No thyromegaly.      Vascular: No carotid bruit.      Trachea: No tracheal deviation.   Cardiovascular:      Rate and Rhythm: Normal rate and regular rhythm.      Pulses:           Dorsalis pedis pulses are 2+ on the right side and 2+ on the left side.      Heart sounds: Normal heart sounds, S1 normal and S2 normal. No murmur heard.      Pulmonary:      Effort: Pulmonary effort is normal. No respiratory distress.      Breath sounds: No rales.   Chest:   Breasts:      Right: No supraclavicular adenopathy.      Left: No supraclavicular adenopathy.       Abdominal:      General: Bowel sounds are decreased. There is no distension.      Palpations: Abdomen is soft. There is no mass.      Tenderness: There is no abdominal tenderness.   Musculoskeletal:         General: Normal range of motion.      Cervical back: Normal range of motion.   Lymphadenopathy:      Cervical: No cervical adenopathy.      Upper Body:      Right upper body: No supraclavicular adenopathy.      Left upper body: No supraclavicular adenopathy.   Skin:     General: Skin is warm and dry.      Capillary Refill: Capillary refill takes less than 2 seconds.      Coloration: Skin is pale.      Findings: No rash.   Neurological:      Mental Status: She is alert and oriented  mg  650 mg Oral Q6H PRN Kayley Mcginnis DO       • gabapentin (NEURONTIN) capsule 300 mg  300 mg Oral TID Kayley Mcginnis DO   300 mg at 06/15/22 1313   • guaifenesin 100 MG/5ML solution 200 mg  200 mg Oral Q4H PRN Kayley Mcginnis DO       • loperamide (IMODIUM) capsule 2 mg  2 mg Oral 4x Daily PRN Kayley Mcginnis DO       • sodium chloride 0.9 % flush bag 25 mL  25 mL Intravenous PRN Kayley Mcginnis DO       • sodium chloride (PF) 0.9 % injection 2 mL  2 mL Intracatheter 2 times per day Kayley Mcginnis DO   2 mL at 06/15/22 0933   • dextrose 50 % injection 25 g  25 g Intravenous PRN Kayley Mcginnis DO       • dextrose 50 % injection 12.5 g  12.5 g Intravenous PRN Kayley Mcginnis DO       • glucagon (GLUCAGEN) injection 1 mg  1 mg Intramuscular PRN Kayley Mcginnis DO       • dextrose (GLUTOSE) 40 % gel 15 g  15 g Oral PRN Kayley Mcginnis DO       • dextrose (GLUTOSE) 40 % gel 30 g  30 g Oral PRN Kayley Mcginnis DO       • alteplase (CATHFLO ACTIVASE) injection 2 mg  2 mg Intracatheter PRN Kayley Mcginnis DO   2 mg at 06/08/22 0126          Physical Examination:  Vital Signs:    Visit Vitals  /68 (Patient Position: Supine)   Pulse 67   Temp 98 °F (36.7 °C) (Oral)   Resp 18   Ht 5' 2\" (1.575 m)   Wt 82 kg (180 lb 12.4 oz)   SpO2 94%   BMI 33.06 kg/m²     General:  No acute distress.  Conversant.  Head:  Normocephalic-atraumatic.   Neck:  Trachea is midline. No JVD.  Eyes:  Normal conjunctivae.    ENT:   Mucous membranes are moist.    Cardiovascular:  S1, S2 auscultated.  Regular rate and rhythm.  Bilateral peripheral pulses are intact.  No edema.  Respiratory:   Bilateral breath sounds heard.   Symmetric chest wall expansion.  Respirations are non-labored.    Gastrointestinal:  Soft and nontender.  Non-distended.  Positive bowel sounds.    Genitourinary: No CVA tenderness.    Musculoskeletal:  No joint swelling.  Skin: Warm and dry.  No rash noted.  Neurologic:   CN II-XII grossly  intact.  Psychiatric:   Alert and oriented X 3.  Calm.  Cooperative.       I/O's    Intake/Output Summary (Last 24 hours) at 6/15/2022 1523  Last data filed at 6/15/2022 1200  Gross per 24 hour   Intake 850 ml   Output --   Net 850 ml       Labs:    Recent Labs   Lab 06/15/22  0523 06/14/22  0651 06/13/22  0628 06/12/22  0614 06/11/22  0615   SODIUM 138 135 134* 134* 136   POTASSIUM 4.0 4.0 4.1 4.6 4.3   CHLORIDE 106 102 100 99 102   CO2 27 29 29 31 31   BUN 19 19 16 12 12   CREATININE 1.83* 2.10* 1.96* 1.26* 1.11*   GLUCOSE 234* 192* 171* 263* 228*   CALCIUM 8.4 8.6 8.3* 8.9 8.8      Recent Labs   Lab 06/15/22  0523   WBC 7.4   RBC 3.12*   HGB 8.9*   HCT 27.8*           Imaging  EGD   Final Result      XR CHEST PA OR AP 1 VIEW   Final Result   FINDINGS/IMPRESSION:        Right-sided PICC line has been placed, with its tip in the SVC.         No focal consolidation, pneumothorax or pleural effusion.        Normal cardiac size.      Electronically Signed by: JAS DE SANTIAGO M.D.    Signed on: 6/7/2022 3:42 PM              Assessment and Plan:     1.  SEGUN.    Differential diagnosis AIN from cephalosporins versus ATN.  -Serum creatinine downtrending  -Continue IV fluids  -Monitor I's and O's, chemistries     2.  Hyponatremia.  Improved, sodium within normal limits     3.  Hypertension.  BP is low normal.  Amlodipine on hold.     4.  Diabetic foot infection.    Antibiotics per ID service      Lucille Ordoñez MD     to person, place, and time.      Sensory: No sensory deficit.      Coordination: Coordination normal.      Gait: Gait normal.   Psychiatric:         Mood and Affect: Mood is anxious. Mood is not depressed. Affect is not labile.         Speech: Speech normal.         Behavior: Behavior normal.         Thought Content: Thought content normal.         Judgment: Judgment normal.         Significant Labs:   All pertinent labs within the past 24 hours have been reviewed.  CBC:   Recent Labs   Lab 02/09/22  0534   WBC 8.47   HGB 11.7*   HCT 38.4        CMP:   Recent Labs   Lab 02/09/22  1129      K 4.0      CO2 28   *   BUN 16   CREATININE 0.8   CALCIUM 9.6   PROT 7.0   ALBUMIN 2.5*   BILITOT 0.4   ALKPHOS 97   AST 31   ALT 26   ANIONGAP 11   EGFRNONAA >60       Significant Imaging: I have reviewed all pertinent imaging results/findings within the past 24 hours.

## 2022-10-05 ENCOUNTER — TELEPHONE (OUTPATIENT)
Dept: ADMINISTRATIVE | Facility: HOSPITAL | Age: 82
End: 2022-10-05
Payer: MEDICARE

## 2022-11-09 ENCOUNTER — PES CALL (OUTPATIENT)
Dept: ADMINISTRATIVE | Facility: CLINIC | Age: 82
End: 2022-11-09
Payer: MEDICARE

## 2022-11-17 ENCOUNTER — OFFICE VISIT (OUTPATIENT)
Dept: FAMILY MEDICINE | Facility: CLINIC | Age: 82
End: 2022-11-17
Payer: MEDICARE

## 2022-11-17 ENCOUNTER — HOSPITAL ENCOUNTER (OUTPATIENT)
Dept: RADIOLOGY | Facility: HOSPITAL | Age: 82
Discharge: HOME | End: 2022-11-17
Attending: FAMILY MEDICINE
Payer: MEDICARE

## 2022-11-17 VITALS
BODY MASS INDEX: 25.46 KG/M2 | OXYGEN SATURATION: 96 % | HEIGHT: 59 IN | DIASTOLIC BLOOD PRESSURE: 84 MMHG | HEART RATE: 102 BPM | WEIGHT: 126.31 LBS | TEMPERATURE: 98 F | SYSTOLIC BLOOD PRESSURE: 144 MMHG

## 2022-11-17 DIAGNOSIS — J12.82 PNEUMONIA DUE TO COVID-19 VIRUS: ICD-10-CM

## 2022-11-17 DIAGNOSIS — J20.9 ACUTE BRONCHITIS, UNSPECIFIED ORGANISM: ICD-10-CM

## 2022-11-17 DIAGNOSIS — U07.1 PNEUMONIA DUE TO COVID-19 VIRUS: ICD-10-CM

## 2022-11-17 DIAGNOSIS — J20.9 ACUTE BRONCHITIS, UNSPECIFIED ORGANISM: Primary | ICD-10-CM

## 2022-11-17 PROCEDURE — 1101F PT FALLS ASSESS-DOCD LE1/YR: CPT | Mod: CPTII,S$GLB,, | Performed by: FAMILY MEDICINE

## 2022-11-17 PROCEDURE — 71046 X-RAY EXAM CHEST 2 VIEWS: CPT | Mod: TC,PO

## 2022-11-17 PROCEDURE — 3079F PR MOST RECENT DIASTOLIC BLOOD PRESSURE 80-89 MM HG: ICD-10-PCS | Mod: CPTII,S$GLB,, | Performed by: FAMILY MEDICINE

## 2022-11-17 PROCEDURE — 1101F PR PT FALLS ASSESS DOC 0-1 FALLS W/OUT INJ PAST YR: ICD-10-PCS | Mod: CPTII,S$GLB,, | Performed by: FAMILY MEDICINE

## 2022-11-17 PROCEDURE — 3079F DIAST BP 80-89 MM HG: CPT | Mod: CPTII,S$GLB,, | Performed by: FAMILY MEDICINE

## 2022-11-17 PROCEDURE — 71046 X-RAY EXAM CHEST 2 VIEWS: CPT | Mod: 26,,, | Performed by: RADIOLOGY

## 2022-11-17 PROCEDURE — 1159F PR MEDICATION LIST DOCUMENTED IN MEDICAL RECORD: ICD-10-PCS | Mod: CPTII,S$GLB,, | Performed by: FAMILY MEDICINE

## 2022-11-17 PROCEDURE — 99999 PR PBB SHADOW E&M-EST. PATIENT-LVL IV: ICD-10-PCS | Mod: PBBFAC,,, | Performed by: FAMILY MEDICINE

## 2022-11-17 PROCEDURE — 71046 XR CHEST PA AND LATERAL: ICD-10-PCS | Mod: 26,,, | Performed by: RADIOLOGY

## 2022-11-17 PROCEDURE — 1159F MED LIST DOCD IN RCRD: CPT | Mod: CPTII,S$GLB,, | Performed by: FAMILY MEDICINE

## 2022-11-17 PROCEDURE — 99214 PR OFFICE/OUTPT VISIT, EST, LEVL IV, 30-39 MIN: ICD-10-PCS | Mod: S$GLB,,, | Performed by: FAMILY MEDICINE

## 2022-11-17 PROCEDURE — 3288F FALL RISK ASSESSMENT DOCD: CPT | Mod: CPTII,S$GLB,, | Performed by: FAMILY MEDICINE

## 2022-11-17 PROCEDURE — 1126F PR PAIN SEVERITY QUANTIFIED, NO PAIN PRESENT: ICD-10-PCS | Mod: CPTII,S$GLB,, | Performed by: FAMILY MEDICINE

## 2022-11-17 PROCEDURE — 1126F AMNT PAIN NOTED NONE PRSNT: CPT | Mod: CPTII,S$GLB,, | Performed by: FAMILY MEDICINE

## 2022-11-17 PROCEDURE — 3077F PR MOST RECENT SYSTOLIC BLOOD PRESSURE >= 140 MM HG: ICD-10-PCS | Mod: CPTII,S$GLB,, | Performed by: FAMILY MEDICINE

## 2022-11-17 PROCEDURE — 3288F PR FALLS RISK ASSESSMENT DOCUMENTED: ICD-10-PCS | Mod: CPTII,S$GLB,, | Performed by: FAMILY MEDICINE

## 2022-11-17 PROCEDURE — 99214 OFFICE O/P EST MOD 30 MIN: CPT | Mod: S$GLB,,, | Performed by: FAMILY MEDICINE

## 2022-11-17 PROCEDURE — 3077F SYST BP >= 140 MM HG: CPT | Mod: CPTII,S$GLB,, | Performed by: FAMILY MEDICINE

## 2022-11-17 PROCEDURE — 99999 PR PBB SHADOW E&M-EST. PATIENT-LVL IV: CPT | Mod: PBBFAC,,, | Performed by: FAMILY MEDICINE

## 2022-11-17 RX ORDER — CEFDINIR 300 MG/1
300 CAPSULE ORAL 2 TIMES DAILY
Qty: 20 CAPSULE | Refills: 0 | Status: SHIPPED | OUTPATIENT
Start: 2022-11-17 | End: 2022-11-27

## 2022-11-17 RX ORDER — PROMETHAZINE HYDROCHLORIDE AND DEXTROMETHORPHAN HYDROBROMIDE 6.25; 15 MG/5ML; MG/5ML
5 SYRUP ORAL 3 TIMES DAILY PRN
Qty: 118 ML | Refills: 0 | Status: SHIPPED | OUTPATIENT
Start: 2022-11-17 | End: 2022-11-27

## 2022-11-17 NOTE — PROGRESS NOTES
Subjective:       Patient ID: Haim Martin is a 82 y.o. female.    Chief Complaint: Nasal Congestion and Cough      HPI Comments:       Current Outpatient Medications:     albuterol (PROVENTIL/VENTOLIN HFA) 90 mcg/actuation inhaler, Inhale 2 puffs into the lungs every 6 (six) hours as needed for Wheezing., Disp: 18 g, Rfl: 0    alendronate (FOSAMAX) 70 MG tablet, Take 70 mg by mouth every 7 days., Disp: , Rfl:     amitriptyline (ELAVIL) 75 MG tablet, Take 1 tablet by mouth in the evening, Disp: 90 tablet, Rfl: 3    aspirin (ECOTRIN) 81 MG EC tablet, Take 81 mg by mouth once daily., Disp: , Rfl:     atorvastatin (LIPITOR) 40 MG tablet, Take 40 mg by mouth every evening., Disp: , Rfl:     ergocalciferol (ERGOCALCIFEROL) 50,000 unit Cap, Take 1 capsule by mouth once a week (Patient taking differently: On Sundays), Disp: 8 capsule, Rfl: 0    gabapentin (NEURONTIN) 100 MG capsule, Take 1 capsule (100 mg total) by mouth every evening., Disp: 30 capsule, Rfl: 1    HYDROcodone-acetaminophen (NORCO) 5-325 mg per tablet, Take 1 tablet by mouth every 6 (six) hours as needed for Pain., Disp: 15 tablet, Rfl: 0    HYDROcodone-acetaminophen (NORCO) 5-325 mg per tablet, Take 1 tablet by mouth every 6 (six) hours as needed for Pain., Disp: 10 tablet, Rfl: 0    levothyroxine (SYNTHROID) 88 MCG tablet, TAKE 1 TABLET BY MOUTH ONCE DAILY ON MONDAY THROUGH SATURDAY AND 1.5 TABLETS ON SUNDAY, Disp: 90 tablet, Rfl: 0    mupirocin (BACTROBAN) 2 % ointment, Apply topically 2 (two) times daily., Disp: 15 g, Rfl: 1    omeprazole (PRILOSEC) 20 MG capsule, Take 1 capsule (20 mg total) by mouth as needed., Disp: 90 capsule, Rfl: 4    propranoloL (INDERAL) 40 MG tablet, Take 1 tablet (40 mg total) by mouth 2 (two) times daily., Disp: 180 tablet, Rfl: 2    sertraline (ZOLOFT) 100 MG tablet, Take 1 tablet by mouth once daily, Disp: 90 tablet, Rfl: 0    sulfamethoxazole-trimethoprim 800-160mg (BACTRIM DS) 800-160 mg Tab, Take 1 tablet by mouth  2 (two) times daily., Disp: 20 tablet, Rfl: 0    sumatriptan (IMITREX) 100 MG tablet, TAKE ONE TABLET BY MOUTH AT ONSET OF MIGRAINE, Disp: 9 tablet, Rfl: 0    vit C/E/Zn/coppr/lutein/zeaxan (PRESERVISION AREDS-2 ORAL), Take 1 tablet by mouth 2 (two) times a day., Disp: , Rfl:     cefdinir (OMNICEF) 300 MG capsule, Take 1 capsule (300 mg total) by mouth 2 (two) times daily. for 10 days, Disp: 20 capsule, Rfl: 0    promethazine-dextromethorphan (PROMETHAZINE-DM) 6.25-15 mg/5 mL Syrp, Take 5 mLs by mouth 3 (three) times daily as needed., Disp: 118 mL, Rfl: 0  No current facility-administered medications for this visit.    Facility-Administered Medications Ordered in Other Visits:     lactated ringers infusion, , Intravenous, On Call Procedure, QUE Allen, Last Rate: 100 mL/hr at 03/31/22 0821, New Bag at 03/31/22 0821    lactated ringers infusion, , Intravenous, Continuous, Marzena Ybarra MD, Stopped at 05/19/22 0844      This my 1st time seeing this patient.  She is fairly healthy but has some post COVID pneumonia lung disease.      Complains of a one-week history of bad cough.  Sputum turning green recently.  Tired but no shortness of breath.  No fevers or chills.  Headaches but no body aches.  No recent sick exposures    Lives alone.  Nonsmoker.  Good appetite    Review of Systems   Constitutional:  Positive for fatigue. Negative for activity change, appetite change, chills and fever.   HENT:  Negative for sore throat.    Respiratory:  Positive for cough. Negative for shortness of breath and wheezing.    Cardiovascular:  Negative for chest pain.   Gastrointestinal:  Negative for abdominal pain, diarrhea and nausea.   Genitourinary:  Negative for difficulty urinating.   Musculoskeletal:  Negative for arthralgias and myalgias.   Neurological:  Negative for dizziness and headaches.     Objective:      Vitals:    11/17/22 1448   BP: (!) 144/84   Pulse: 102   Temp: 97.8 °F (36.6 °C)   SpO2: 96%   Weight: 57.3  "kg (126 lb 5.2 oz)   Height: 4' 11" (1.499 m)   PainSc: 0-No pain     Physical Exam  Vitals and nursing note reviewed.   Constitutional:       General: She is not in acute distress.     Appearance: She is well-developed. She is ill-appearing. She is not diaphoretic.      Comments: Frequent loose cough.  Copious green sputum   HENT:      Head: Normocephalic.      Mouth/Throat:      Pharynx: No oropharyngeal exudate.   Neck:      Thyroid: No thyromegaly.   Cardiovascular:      Rate and Rhythm: Normal rate and regular rhythm.      Heart sounds: Normal heart sounds. No murmur heard.  Pulmonary:      Effort: Pulmonary effort is normal.      Breath sounds: Normal breath sounds. No wheezing or rales.   Abdominal:      General: There is no distension.      Palpations: Abdomen is soft.   Musculoskeletal:      Cervical back: Neck supple.      Right lower leg: No edema.      Left lower leg: No edema.   Lymphadenopathy:      Cervical: No cervical adenopathy.   Skin:     General: Skin is warm and dry.   Neurological:      Mental Status: She is alert and oriented to person, place, and time.   Psychiatric:         Mood and Affect: Mood normal.         Behavior: Behavior normal.         Thought Content: Thought content normal.         Judgment: Judgment normal.       Assessment:       1. Acute bronchitis, unspecified organism    2. Pneumonia due to COVID-19 virus          Plan:   Acute bronchitis, unspecified organism  Comments:  Penicillin allergy.  Omnicef.  Chest x-ray.  Follow-up precautions given  Orders:  -     X-Ray Chest PA And Lateral; Future; Expected date: 11/17/2022  -     promethazine-dextromethorphan (PROMETHAZINE-DM) 6.25-15 mg/5 mL Syrp; Take 5 mLs by mouth 3 (three) times daily as needed.  Dispense: 118 mL; Refill: 0    Pneumonia due to COVID-19 virus  Comments:  Earlier this year.  Subsequent chronic respiratory failure.  No O2 use.  Followed by pulmonology    Other orders  -     cefdinir (OMNICEF) 300 MG " capsule; Take 1 capsule (300 mg total) by mouth 2 (two) times daily. for 10 days  Dispense: 20 capsule; Refill: 0

## 2023-01-25 ENCOUNTER — HOSPITAL ENCOUNTER (OUTPATIENT)
Dept: RADIOLOGY | Facility: HOSPITAL | Age: 83
Discharge: HOME | End: 2023-01-25
Attending: INTERNAL MEDICINE
Payer: MEDICARE

## 2023-01-25 ENCOUNTER — PATIENT MESSAGE (OUTPATIENT)
Dept: RHEUMATOLOGY | Facility: CLINIC | Age: 83
End: 2023-01-25

## 2023-01-25 ENCOUNTER — OFFICE VISIT (OUTPATIENT)
Dept: RHEUMATOLOGY | Facility: CLINIC | Age: 83
End: 2023-01-25
Payer: MEDICARE

## 2023-01-25 VITALS
BODY MASS INDEX: 25.69 KG/M2 | DIASTOLIC BLOOD PRESSURE: 67 MMHG | HEART RATE: 77 BPM | SYSTOLIC BLOOD PRESSURE: 138 MMHG | HEIGHT: 59 IN | WEIGHT: 127.44 LBS

## 2023-01-25 DIAGNOSIS — R20.2 PARESTHESIAS: ICD-10-CM

## 2023-01-25 DIAGNOSIS — M81.0 AGE-RELATED OSTEOPOROSIS WITHOUT CURRENT PATHOLOGICAL FRACTURE: ICD-10-CM

## 2023-01-25 DIAGNOSIS — M15.4 EROSIVE OSTEOARTHRITIS: ICD-10-CM

## 2023-01-25 DIAGNOSIS — M15.9 PRIMARY OSTEOARTHRITIS INVOLVING MULTIPLE JOINTS: ICD-10-CM

## 2023-01-25 DIAGNOSIS — M81.0 AGE-RELATED OSTEOPOROSIS WITHOUT CURRENT PATHOLOGICAL FRACTURE: Primary | ICD-10-CM

## 2023-01-25 DIAGNOSIS — Z71.89 OTHER SPECIFIED COUNSELING: ICD-10-CM

## 2023-01-25 DIAGNOSIS — M79.641 PAIN IN BOTH HANDS: ICD-10-CM

## 2023-01-25 DIAGNOSIS — M79.642 PAIN IN BOTH HANDS: ICD-10-CM

## 2023-01-25 DIAGNOSIS — M54.9 BACK PAIN, UNSPECIFIED BACK LOCATION, UNSPECIFIED BACK PAIN LATERALITY, UNSPECIFIED CHRONICITY: ICD-10-CM

## 2023-01-25 PROCEDURE — 3078F PR MOST RECENT DIASTOLIC BLOOD PRESSURE < 80 MM HG: ICD-10-PCS | Mod: CPTII,S$GLB,, | Performed by: INTERNAL MEDICINE

## 2023-01-25 PROCEDURE — 72100 XR LUMBAR SPINE AP AND LATERAL: ICD-10-PCS | Mod: 26,,, | Performed by: RADIOLOGY

## 2023-01-25 PROCEDURE — 1101F PT FALLS ASSESS-DOCD LE1/YR: CPT | Mod: CPTII,S$GLB,, | Performed by: INTERNAL MEDICINE

## 2023-01-25 PROCEDURE — 1159F PR MEDICATION LIST DOCUMENTED IN MEDICAL RECORD: ICD-10-PCS | Mod: CPTII,S$GLB,, | Performed by: INTERNAL MEDICINE

## 2023-01-25 PROCEDURE — 99204 PR OFFICE/OUTPT VISIT, NEW, LEVL IV, 45-59 MIN: ICD-10-PCS | Mod: S$GLB,,, | Performed by: INTERNAL MEDICINE

## 2023-01-25 PROCEDURE — 3078F DIAST BP <80 MM HG: CPT | Mod: CPTII,S$GLB,, | Performed by: INTERNAL MEDICINE

## 2023-01-25 PROCEDURE — 99204 OFFICE O/P NEW MOD 45 MIN: CPT | Mod: S$GLB,,, | Performed by: INTERNAL MEDICINE

## 2023-01-25 PROCEDURE — 99499 RISK ADDL DX/OHS AUDIT: ICD-10-PCS | Mod: S$GLB,,, | Performed by: INTERNAL MEDICINE

## 2023-01-25 PROCEDURE — 72100 X-RAY EXAM L-S SPINE 2/3 VWS: CPT | Mod: TC

## 2023-01-25 PROCEDURE — 1125F AMNT PAIN NOTED PAIN PRSNT: CPT | Mod: CPTII,S$GLB,, | Performed by: INTERNAL MEDICINE

## 2023-01-25 PROCEDURE — 3288F FALL RISK ASSESSMENT DOCD: CPT | Mod: CPTII,S$GLB,, | Performed by: INTERNAL MEDICINE

## 2023-01-25 PROCEDURE — 3075F PR MOST RECENT SYSTOLIC BLOOD PRESS GE 130-139MM HG: ICD-10-PCS | Mod: CPTII,S$GLB,, | Performed by: INTERNAL MEDICINE

## 2023-01-25 PROCEDURE — 72100 X-RAY EXAM L-S SPINE 2/3 VWS: CPT | Mod: 26,,, | Performed by: RADIOLOGY

## 2023-01-25 PROCEDURE — 99999 PR PBB SHADOW E&M-EST. PATIENT-LVL V: CPT | Mod: PBBFAC,,, | Performed by: INTERNAL MEDICINE

## 2023-01-25 PROCEDURE — 99499 UNLISTED E&M SERVICE: CPT | Mod: S$GLB,,, | Performed by: INTERNAL MEDICINE

## 2023-01-25 PROCEDURE — 1101F PR PT FALLS ASSESS DOC 0-1 FALLS W/OUT INJ PAST YR: ICD-10-PCS | Mod: CPTII,S$GLB,, | Performed by: INTERNAL MEDICINE

## 2023-01-25 PROCEDURE — 3288F PR FALLS RISK ASSESSMENT DOCUMENTED: ICD-10-PCS | Mod: CPTII,S$GLB,, | Performed by: INTERNAL MEDICINE

## 2023-01-25 PROCEDURE — 3075F SYST BP GE 130 - 139MM HG: CPT | Mod: CPTII,S$GLB,, | Performed by: INTERNAL MEDICINE

## 2023-01-25 PROCEDURE — 1125F PR PAIN SEVERITY QUANTIFIED, PAIN PRESENT: ICD-10-PCS | Mod: CPTII,S$GLB,, | Performed by: INTERNAL MEDICINE

## 2023-01-25 PROCEDURE — 99999 PR PBB SHADOW E&M-EST. PATIENT-LVL V: ICD-10-PCS | Mod: PBBFAC,,, | Performed by: INTERNAL MEDICINE

## 2023-01-25 PROCEDURE — 1159F MED LIST DOCD IN RCRD: CPT | Mod: CPTII,S$GLB,, | Performed by: INTERNAL MEDICINE

## 2023-01-25 RX ORDER — MULTIVITAMIN
1 TABLET ORAL 2 TIMES DAILY
Qty: 60 TABLET | Refills: 3 | Status: SHIPPED | OUTPATIENT
Start: 2023-01-25 | End: 2023-05-16

## 2023-01-25 RX ORDER — HYDROXYCHLOROQUINE SULFATE 200 MG/1
200 TABLET, FILM COATED ORAL 2 TIMES DAILY
Qty: 180 TABLET | Refills: 1 | Status: SHIPPED | OUTPATIENT
Start: 2023-01-25 | End: 2023-04-25

## 2023-01-25 NOTE — PROGRESS NOTES
RHEUMATOLOGY OUTPATIENT CLINIC NOTE    1/25/2023    Attending Rheumatologist: Jovanny Park  Primary Care Provider/Physician Requesting Consultation: Kavya Mesa MD   Chief Complaint/Reason For Consultation:  No chief complaint on file.      Subjective:     Haim Martin is a 82 y.o. White female with joint pain for evaluation.    Main concern of hand arthralgias and back pain.    Review of Systems   Constitutional:  Negative for fever.   HENT:          Moderate sicca. Denies jaw claudication   Eyes:  Negative for photophobia and pain.   Respiratory:  Negative for cough and shortness of breath.    Gastrointestinal:  Positive for heartburn. Negative for blood in stool and melena.        Denies dysphagia. History of diverticulosis   Genitourinary:  Negative for hematuria.        No hx of nephrolithiasis   Musculoskeletal:  Positive for back pain and joint pain.        No hx of gout. Denies past fragility fractures.   Skin:  Negative for rash.        No hx of PsO. Denies RP   Neurological:  Positive for tingling. Negative for focal weakness, weakness and headaches.   Endo/Heme/Allergies:         No hx of dvt/pe     Chronic comorbid conditions affecting medical decision making today:  Past Medical History:   Diagnosis Date    Depression     Diverticulosis of colon (without mention of hemorrhage) 8/25/2014    Hyperlipidemia     Hypertension     Thyroid disease      Past Surgical History:   Procedure Laterality Date    APPENDECTOMY      CATARACT EXTRACTION EXTRACAPSULAR W/ INTRAOCULAR LENS IMPLANTATION Bilateral 4/2014    CYST REMOVAL Left 5/19/2022    Procedure: EXCISION, CYST;  Surgeon: Kareem Alatorre MD;  Location: [facility];  Service: Orthopedics;  Laterality: Left;  excision mucous cyst left long finger    FINGER AMPUTATION Right 3/31/2022    Procedure: AMPUTATION, FINGER;  Surgeon: Kareem Alatorre MD;  Location: [facility] OR;  Service: Orthopedics;  Laterality: Right;  amputation right  index finger at the level of proximal interphalangeal joint    INCISION AND DRAINAGE OF HAND Right 1/21/2022    Procedure: INCISION AND DRAINAGE, HAND;  Surgeon: Ramirez Flores MD;  Location: [facility] OR;  Service: Orthopedics;  Laterality: Right;  Right Index Finger    INJECTION OF ANESTHETIC AGENT AROUND MEDIAL BRANCH NERVES INNERVATING LUMBAR FACET JOINT Bilateral 7/16/2020    Procedure: Bilateral L3-5 MBB with local;  Surgeon: Luis Ashraf MD;  Location: [facility];  Service: Pain Management;  Laterality: Bilateral;    INJECTION OF ANESTHETIC AGENT AROUND MEDIAL BRANCH NERVES INNERVATING LUMBAR FACET JOINT Bilateral 8/10/2020    Procedure: Bilateral L3-5 MBB with local;  Surgeon: Luis Ashraf MD;  Location: [facility];  Service: Pain Management;  Laterality: Bilateral;    INJECTION OF ANESTHETIC AGENT INTO SACROILIAC JOINT Bilateral 11/4/2021    Procedure: Bilateral SIJ Injection;  Surgeon: Luis Ashraf MD;  Location: [facility];  Service: Pain Management;  Laterality: Bilateral;    RADIOFREQUENCY THERMOCOAGULATION Left 9/28/2020    Procedure: Left L3-5 Lumbar RFA;  Surgeon: Luis Ashraf MD;  Location: [facility];  Service: Pain Management;  Laterality: Left;    RADIOFREQUENCY THERMOCOAGULATION Right 10/12/2020    Procedure: Right L3-5 Lumbar RFA;  Surgeon: Luis Ashraf MD;  Location: [facility];  Service: Pain Management;  Laterality: Right;    STOMACH SURGERY  1998    not sure what they did, possible bowel resection, partial gastrectomy    TONSILLECTOMY       Family History   Problem Relation Age of Onset    Lupus Mother     Stroke Father     Coronary artery disease Father     Diabetes type II Father     Kidney cancer Maternal Aunt     Breast cancer Maternal Aunt      Social History     Tobacco Use   Smoking Status Former   Smokeless Tobacco Never   Tobacco Comments    quit 30 years ago       Current Outpatient Medications:     amitriptyline (ELAVIL) 75 MG tablet, Take 1  tablet by mouth in the evening, Disp: 90 tablet, Rfl: 3    aspirin (ECOTRIN) 81 MG EC tablet, Take 81 mg by mouth once daily., Disp: , Rfl:     beta-carotene,A,-vits C,E/mins (OCUVITE ORAL), Take by mouth., Disp: , Rfl:     HAIR, SKIN AND NAILS, BIOTIN, ORAL, Take by mouth., Disp: , Rfl:     levothyroxine (SYNTHROID) 88 MCG tablet, TAKE 1 TABLET BY MOUTH ONCE DAILY ON  MONDAY  THROUGH  SATURDAY  AND  1.5  TABLETS  ON  SUNDAY, Disp: 90 tablet, Rfl: 0    omeprazole (PRILOSEC) 20 MG capsule, Take 1 capsule (20 mg total) by mouth as needed., Disp: 90 capsule, Rfl: 4    propranoloL (INDERAL) 40 MG tablet, Take 1 tablet (40 mg total) by mouth 2 (two) times daily., Disp: 180 tablet, Rfl: 2    sertraline (ZOLOFT) 100 MG tablet, Take 1 tablet by mouth once daily, Disp: 90 tablet, Rfl: 0    vit C/E/Zn/coppr/lutein/zeaxan (PRESERVISION AREDS-2 ORAL), Take 1 tablet by mouth 2 (two) times a day., Disp: , Rfl:     albuterol (PROVENTIL/VENTOLIN HFA) 90 mcg/actuation inhaler, Inhale 2 puffs into the lungs every 6 (six) hours as needed for Wheezing., Disp: 18 g, Rfl: 0    alendronate (FOSAMAX) 70 MG tablet, Take 70 mg by mouth every 7 days., Disp: , Rfl:     atorvastatin (LIPITOR) 40 MG tablet, Take 40 mg by mouth every evening., Disp: , Rfl:     ergocalciferol (ERGOCALCIFEROL) 50,000 unit Cap, Take 1 capsule by mouth once a week (Patient taking differently: On Sundays), Disp: 8 capsule, Rfl: 0    gabapentin (NEURONTIN) 100 MG capsule, Take 1 capsule (100 mg total) by mouth every evening., Disp: 30 capsule, Rfl: 1    HYDROcodone-acetaminophen (NORCO) 5-325 mg per tablet, Take 1 tablet by mouth every 6 (six) hours as needed for Pain., Disp: 15 tablet, Rfl: 0    HYDROcodone-acetaminophen (NORCO) 5-325 mg per tablet, Take 1 tablet by mouth every 6 (six) hours as needed for Pain., Disp: 10 tablet, Rfl: 0    mupirocin (BACTROBAN) 2 % ointment, Apply topically 2 (two) times daily., Disp: 15 g, Rfl: 1    sulfamethoxazole-trimethoprim  800-160mg (BACTRIM DS) 800-160 mg Tab, Take 1 tablet by mouth 2 (two) times daily., Disp: 20 tablet, Rfl: 0    sumatriptan (IMITREX) 100 MG tablet, TAKE ONE TABLET BY MOUTH AT ONSET OF MIGRAINE, Disp: 9 tablet, Rfl: 0  No current facility-administered medications for this visit.    Facility-Administered Medications Ordered in Other Visits:     lactated ringers infusion, , Intravenous, On Call Procedure, QUE Allen, Last Rate: 100 mL/hr at 03/31/22 0821, New Bag at 03/31/22 0821    lactated ringers infusion, , Intravenous, Continuous, Marzena Ybarra MD, Stopped at 05/19/22 0844     Objective:     Vitals:    01/25/23 0717   BP: 138/67   Pulse: 77     Physical Exam   Eyes: Conjunctivae are normal.   Pulmonary/Chest: Effort normal. No respiratory distress.   Musculoskeletal:         General: Deformity present. No swelling or tenderness. Normal range of motion.   Neurological: She displays no weakness.     Reviewed available old and all outside pertinent medical records available.    All lab results personally reviewed and interpreted by me.       ASSESSMENT:     Erosive OA    Back pain    Osteoporosis    CKD3a    GERD    Vitamin D insufficiency    Medication monitoring enconter    PLAN:     Arthralgias from OA w/ erosive features and mechanical back pain.  No sqeueeze tenderness on exam.  Intermittent prolonged stiffness / gelling phenomena.  Paresthesias median nerve distribution.  Elevation of ESR/CRP, hx of osteomyelitis s/p finger amputation.  Remote BAILEY and RF negative.  Degenerative findings on imaging.  No compression deformities or marginal erosive changes.  Osteopenia w/ high FRAX, GI intolerance to Fosamax.  Suggest trial of HCQ for erosive OA.  Side effects discussed.  F/u eye clinic to monitor for absolute CI to therapy.  Might get benefit from anti convulsivant.  Referral to PT.  Reclast to decrease fragility fracture risk.  If CrCl drops below 40 will switch to Prolia.  Planning 3 infusions  then repeating DXA. C/w Ca/Vit D.  Labs today. Vit D and RA labs.  Repeat cbc/cmp prior f/u visit.      Disclaimer: This note is prepared using voice recognition software and as such is likely to have errors and has not been proof read. Please contact me for questions.         Jovanny Park M.D.

## 2023-01-26 DIAGNOSIS — M81.0 AGE-RELATED OSTEOPOROSIS WITHOUT CURRENT PATHOLOGICAL FRACTURE: Primary | ICD-10-CM

## 2023-01-26 DIAGNOSIS — E55.9 VITAMIN D INSUFFICIENCY: ICD-10-CM

## 2023-01-26 DIAGNOSIS — M15.9 PRIMARY OSTEOARTHRITIS INVOLVING MULTIPLE JOINTS: ICD-10-CM

## 2023-01-26 RX ORDER — HEPARIN 100 UNIT/ML
500 SYRINGE INTRAVENOUS
Status: CANCELLED | OUTPATIENT
Start: 2023-01-26

## 2023-01-26 RX ORDER — ZOLEDRONIC ACID 5 MG/100ML
5 INJECTION, SOLUTION INTRAVENOUS
Status: CANCELLED | OUTPATIENT
Start: 2023-01-26

## 2023-01-26 RX ORDER — ERGOCALCIFEROL 1.25 MG/1
50000 CAPSULE ORAL
Qty: 8 CAPSULE | Refills: 0 | Status: SHIPPED | OUTPATIENT
Start: 2023-01-26 | End: 2023-05-16

## 2023-01-26 RX ORDER — SODIUM CHLORIDE 0.9 % (FLUSH) 0.9 %
10 SYRINGE (ML) INJECTION
Status: CANCELLED | OUTPATIENT
Start: 2023-01-26

## 2023-01-26 RX ORDER — GABAPENTIN 100 MG/1
100 CAPSULE ORAL NIGHTLY
Qty: 30 CAPSULE | Refills: 1 | Status: SHIPPED | OUTPATIENT
Start: 2023-01-26 | End: 2023-05-16

## 2023-01-27 DIAGNOSIS — E55.9 VITAMIN D INSUFFICIENCY: ICD-10-CM

## 2023-01-27 DIAGNOSIS — M15.9 PRIMARY OSTEOARTHRITIS INVOLVING MULTIPLE JOINTS: ICD-10-CM

## 2023-01-27 RX ORDER — GABAPENTIN 100 MG/1
100 CAPSULE ORAL NIGHTLY
Qty: 30 CAPSULE | Refills: 1 | Status: CANCELLED | OUTPATIENT
Start: 2023-01-27 | End: 2024-01-27

## 2023-01-27 NOTE — TELEPHONE ENCOUNTER
Called pt to discuss reclast infusion protocol and schedule. She requested I call her daughter on Monday to discuss and schedule. [phone]

## 2023-01-30 ENCOUNTER — TELEPHONE (OUTPATIENT)
Dept: RHEUMATOLOGY | Facility: CLINIC | Age: 83
End: 2023-01-30
Payer: MEDICARE

## 2023-01-30 NOTE — TELEPHONE ENCOUNTER
Per pt's request, left message for her daughter, Verenice Wall, to call Jaqueline in Rheumatology to discuss reclast infusion and schedule.

## 2023-01-31 ENCOUNTER — TELEPHONE (OUTPATIENT)
Dept: RHEUMATOLOGY | Facility: CLINIC | Age: 83
End: 2023-01-31
Payer: MEDICARE

## 2023-01-31 NOTE — TELEPHONE ENCOUNTER
----- Message from Obdulia Steven sent at 1/31/2023 10:06 AM CST -----  .Type:  Patient Returning Call    Who Called:.Pts Daughter Verenice Wall  Who Left Message for Patient:MIRELLA CHAVEZ  Does the patient know what this is regarding?:Prolius Infusion for Osteoprosis  Would the patient rather a call back or a response via [facility]? Call back  Best Call Back Number:.[phone]  Additional Information: Please call pts daughter to schedule Infusion. Elia FERGUSON        No

## 2023-01-31 NOTE — TELEPHONE ENCOUNTER
"Jaqueline, this patients daughter is returning your phone call to schedule Reclast infusion.    Melody Ospina (Allye), [facility]  Rheumatology Department   "

## 2023-02-06 ENCOUNTER — INFUSION (OUTPATIENT)
Dept: INFUSION THERAPY | Facility: HOSPITAL | Age: 83
End: 2023-02-06
Attending: INTERNAL MEDICINE
Payer: MEDICARE

## 2023-02-06 VITALS
HEART RATE: 83 BPM | RESPIRATION RATE: 18 BRPM | DIASTOLIC BLOOD PRESSURE: 72 MMHG | HEIGHT: 59 IN | OXYGEN SATURATION: 96 % | BODY MASS INDEX: 26.07 KG/M2 | WEIGHT: 129.31 LBS | TEMPERATURE: 98 F | SYSTOLIC BLOOD PRESSURE: 142 MMHG

## 2023-02-06 DIAGNOSIS — M81.0 AGE-RELATED OSTEOPOROSIS WITHOUT CURRENT PATHOLOGICAL FRACTURE: Primary | ICD-10-CM

## 2023-02-06 PROCEDURE — 96365 THER/PROPH/DIAG IV INF INIT: CPT

## 2023-02-06 PROCEDURE — 25000003 PHARM REV CODE 250: Performed by: INTERNAL MEDICINE

## 2023-02-06 PROCEDURE — 63600175 PHARM REV CODE 636 W HCPCS: Performed by: INTERNAL MEDICINE

## 2023-02-06 RX ORDER — SODIUM CHLORIDE 0.9 % (FLUSH) 0.9 %
10 SYRINGE (ML) INJECTION
Status: CANCELLED | OUTPATIENT
Start: 2023-02-06

## 2023-02-06 RX ORDER — ZOLEDRONIC ACID 5 MG/100ML
5 INJECTION, SOLUTION INTRAVENOUS
Status: COMPLETED | OUTPATIENT
Start: 2023-02-06 | End: 2023-02-06

## 2023-02-06 RX ORDER — ZOLEDRONIC ACID 5 MG/100ML
5 INJECTION, SOLUTION INTRAVENOUS
Status: CANCELLED | OUTPATIENT
Start: 2023-02-06

## 2023-02-06 RX ORDER — HEPARIN 100 UNIT/ML
500 SYRINGE INTRAVENOUS
Status: CANCELLED | OUTPATIENT
Start: 2023-02-06

## 2023-02-06 RX ADMIN — ZOLEDRONIC ACID 5 MG: 5 INJECTION, SOLUTION INTRAVENOUS at 10:02

## 2023-02-06 RX ADMIN — SODIUM CHLORIDE: 9 INJECTION, SOLUTION INTRAVENOUS at 10:02

## 2023-02-06 NOTE — PLAN OF CARE
Patient reports feeling a little tired today, but states she did not sleep well last night. Tolerated reclast infusion well with no adverse reactions. Patient to have MD follow-up in August.

## 2023-03-02 DIAGNOSIS — E03.9 HYPOTHYROIDISM, UNSPECIFIED TYPE: Primary | ICD-10-CM

## 2023-03-02 RX ORDER — SERTRALINE HYDROCHLORIDE 100 MG/1
TABLET, FILM COATED ORAL
Qty: 90 TABLET | Refills: 0 | Status: SHIPPED | OUTPATIENT
Start: 2023-03-02 | End: 2023-05-30 | Stop reason: SDUPTHER

## 2023-03-02 RX ORDER — AMITRIPTYLINE HYDROCHLORIDE 75 MG/1
TABLET ORAL
Qty: 90 TABLET | Refills: 0 | Status: SHIPPED | OUTPATIENT
Start: 2023-03-02 | End: 2023-05-30 | Stop reason: SDUPTHER

## 2023-03-02 RX ORDER — LEVOTHYROXINE SODIUM 88 UG/1
TABLET ORAL
Qty: 30 TABLET | Refills: 0 | Status: SHIPPED | OUTPATIENT
Start: 2023-03-02 | End: 2023-05-16 | Stop reason: SDUPTHER

## 2023-03-02 NOTE — TELEPHONE ENCOUNTER
Refill Routing Note   Medication(s) are not appropriate for processing by [facility] for the following reason(s):       Required labs outdated    [facility] action(s):  Defer  Approve         Appointments  past 12m or future 3m with PCP    Date Provider   Last Visit   2/21/2022 Kavya Mesa MD   Next Visit   Visit date not found Kavya Mesa MD   ED visits in past 90 days: 0        Note composed:12:53 PM 03/02/2023

## 2023-03-02 NOTE — TELEPHONE ENCOUNTER
No new care gaps identified.  [facility] Embedded Care Gaps. Reference number: [id number]. 3/02/2023   12:25:15 PM CST

## 2023-03-03 ENCOUNTER — PATIENT MESSAGE (OUTPATIENT)
Dept: FAMILY MEDICINE | Facility: CLINIC | Age: 83
End: 2023-03-03
Payer: MEDICARE

## 2023-04-04 RX ORDER — PROPRANOLOL HYDROCHLORIDE 40 MG/1
TABLET ORAL
Qty: 180 TABLET | Refills: 0 | Status: SHIPPED | OUTPATIENT
Start: 2023-04-04 | End: 2023-07-27

## 2023-04-04 RX ORDER — LEVOTHYROXINE SODIUM 88 UG/1
TABLET ORAL
Qty: 96 TABLET | Refills: 0 | OUTPATIENT
Start: 2023-04-04

## 2023-04-04 NOTE — TELEPHONE ENCOUNTER
No new care gaps identified.  [facility] Embedded Care Gaps. Reference number: [id number]. 4/04/2023   5:31:46 AM NISSAT

## 2023-04-04 NOTE — TELEPHONE ENCOUNTER
Refill Routing Note   Medication(s) are not appropriate for processing by [facility] for the following reason(s):      Required labs outdated: synthroid  Required vitals abnormal: inderal    [facility] action(s):  Defer            Appointments  past 12m or future 3m with PCP    Date Provider   Last Visit   2/21/2022 Kavya Mesa MD   Next Visit   Visit date not found Kavya Mesa MD   ED visits in past 90 days: 0        Note composed:12:25 PM 04/04/2023

## 2023-04-06 NOTE — TELEPHONE ENCOUNTER
No new care gaps identified.  [facility] Embedded Care Gaps. Reference number: [id number]. 4/06/2023   12:35:54 PM CDT

## 2023-04-07 RX ORDER — LEVOTHYROXINE SODIUM 88 UG/1
TABLET ORAL
Qty: 30 TABLET | Refills: 0 | OUTPATIENT
Start: 2023-04-07

## 2023-04-07 NOTE — TELEPHONE ENCOUNTER
Refill Decision Note   Haim Martin  is requesting a refill authorization.  Brief Assessment and Rationale for Refill:  Quick Discontinue     Medication Therapy Plan:  Request refused: Request already responded to by other means (e.g. phone or fax) by Ordering User:  Kavya Mesa MD.  Patient labs outdated.      Comments:     No Care Gaps recommended.     Note composed:11:47 AM 04/07/2023        Pharmacy Call Documentation    Pharmacy Called:  [facility] Call Time: 9:15   Spoke with: Kirsty 04/07/2023      Called to verify that the script that was sent on: 4/4/23.  Called to see if there was a prescription available for fill.  Kirsty verified that last refill was submitted 1 month ago.       Current Medication Requested: (refill encounter)   Requested Prescriptions     Pending Prescriptions Disp Refills    levothyroxine (SYNTHROID) 88 MCG tablet [Pharmacy Med Name: Levothyroxine Sodium 88 MCG Oral Tablet] 30 tablet 0     Sig: TAKE 1 TABLET BY MOUTH ONCE DAILY MONDAY THROUGH SATURDAY AND 1.5 TABS ON SUNDAY      [facility]     Note composed: 04/07/2023 11:44 AM

## 2023-04-14 RX ORDER — LEVOTHYROXINE SODIUM 88 UG/1
TABLET ORAL
Qty: 30 TABLET | Refills: 0 | OUTPATIENT
Start: 2023-04-14

## 2023-04-14 NOTE — TELEPHONE ENCOUNTER
Provider Staff:  Please note Refusal of medication.     Action required for this patient.      Requested Prescriptions     Pending Prescriptions Disp Refills    levothyroxine (SYNTHROID) 88 MCG tablet [Pharmacy Med Name: Levothyroxine Sodium 88 MCG Oral Tablet] 30 tablet 0     Sig: TAKE 1 TABLET BY MOUTH ONCE DAILY MONDAY THROUGH SATURDAY AND 1.5 TABS ON SUNDAY      Thanks!  [facility]   Note composed: 04/14/2023 4:46 PM

## 2023-04-14 NOTE — TELEPHONE ENCOUNTER
No new care gaps identified.  [facility] Embedded Care Gaps. Reference number: [id number]. 4/14/2023   2:04:56 PM CDT

## 2023-04-26 ENCOUNTER — TELEPHONE (OUTPATIENT)
Dept: FAMILY MEDICINE | Facility: CLINIC | Age: 83
End: 2023-04-26
Payer: MEDICARE

## 2023-04-26 NOTE — TELEPHONE ENCOUNTER
----- Message from Zina Yeung sent at 4/26/2023 11:21 AM CDT -----  Contact: Haim Whitmore is calling in regards to  refill for levothyroxine (SYNTHROID) 88 MCG tablet. Please call her back at [phone]    Thanks  CF

## 2023-04-26 NOTE — TELEPHONE ENCOUNTER
Spoke to pt, informed her that she needs to have TSH done before Dr. Scott will refill med. She verbalized understanding and will come do labs.

## 2023-04-28 ENCOUNTER — LAB VISIT (OUTPATIENT)
Dept: LAB | Facility: HOSPITAL | Age: 83
End: 2023-04-28
Attending: FAMILY MEDICINE
Payer: MEDICARE

## 2023-04-28 DIAGNOSIS — E03.9 HYPOTHYROIDISM, UNSPECIFIED TYPE: ICD-10-CM

## 2023-04-28 LAB
T4 FREE SERPL-MCNC: <0.42 NG/DL (ref 0.71–1.51)
TSH SERPL DL<=0.005 MIU/L-ACNC: 50.83 UIU/ML (ref 0.4–4)

## 2023-04-28 PROCEDURE — 84439 ASSAY OF FREE THYROXINE: CPT | Performed by: FAMILY MEDICINE

## 2023-04-28 PROCEDURE — 36415 COLL VENOUS BLD VENIPUNCTURE: CPT | Mod: PO | Performed by: FAMILY MEDICINE

## 2023-04-28 PROCEDURE — 84443 ASSAY THYROID STIM HORMONE: CPT | Performed by: FAMILY MEDICINE

## 2023-05-02 ENCOUNTER — PES CALL (OUTPATIENT)
Dept: ADMINISTRATIVE | Facility: CLINIC | Age: 83
End: 2023-05-02
Payer: MEDICARE

## 2023-05-15 ENCOUNTER — TELEPHONE (OUTPATIENT)
Dept: FAMILY MEDICINE | Facility: CLINIC | Age: 83
End: 2023-05-15
Payer: MEDICARE

## 2023-05-15 NOTE — TELEPHONE ENCOUNTER
----- Message from Kang Lamb sent at 5/15/2023  9:20 AM CDT -----  Contact: Haim Rg is requesting a call back from the nurse to discuss what she needs to do next after completing lab work. Please call .[phone]        Thanks

## 2023-05-16 ENCOUNTER — OFFICE VISIT (OUTPATIENT)
Dept: FAMILY MEDICINE | Facility: CLINIC | Age: 83
End: 2023-05-16
Attending: FAMILY MEDICINE
Payer: MEDICARE

## 2023-05-16 VITALS
WEIGHT: 129 LBS | SYSTOLIC BLOOD PRESSURE: 124 MMHG | DIASTOLIC BLOOD PRESSURE: 74 MMHG | HEIGHT: 59 IN | BODY MASS INDEX: 26 KG/M2 | TEMPERATURE: 99 F | HEART RATE: 78 BPM | OXYGEN SATURATION: 98 %

## 2023-05-16 DIAGNOSIS — R15.9 INCONTINENCE OF FECES WITH FECAL URGENCY: ICD-10-CM

## 2023-05-16 DIAGNOSIS — R73.03 PRE-DIABETES: ICD-10-CM

## 2023-05-16 DIAGNOSIS — R15.2 INCONTINENCE OF FECES WITH FECAL URGENCY: ICD-10-CM

## 2023-05-16 DIAGNOSIS — R26.9 ABNORMAL GAIT: Primary | ICD-10-CM

## 2023-05-16 DIAGNOSIS — E03.1 CONGENITAL HYPOTHYROIDISM WITHOUT GOITER: ICD-10-CM

## 2023-05-16 PROBLEM — M86.9 FINGER OSTEOMYELITIS, RIGHT: Status: RESOLVED | Noted: 2022-01-21 | Resolved: 2023-05-16

## 2023-05-16 PROBLEM — U07.1 COVID-19: Status: RESOLVED | Noted: 2022-02-22 | Resolved: 2023-05-16

## 2023-05-16 PROCEDURE — 1160F PR REVIEW ALL MEDS BY PRESCRIBER/CLIN PHARMACIST DOCUMENTED: ICD-10-PCS | Mod: CPTII,,, | Performed by: FAMILY MEDICINE

## 2023-05-16 PROCEDURE — 3078F PR MOST RECENT DIASTOLIC BLOOD PRESSURE < 80 MM HG: ICD-10-PCS | Mod: CPTII,,, | Performed by: FAMILY MEDICINE

## 2023-05-16 PROCEDURE — 1101F PT FALLS ASSESS-DOCD LE1/YR: CPT | Mod: CPTII,,, | Performed by: FAMILY MEDICINE

## 2023-05-16 PROCEDURE — 1126F PR PAIN SEVERITY QUANTIFIED, NO PAIN PRESENT: ICD-10-PCS | Mod: CPTII,,, | Performed by: FAMILY MEDICINE

## 2023-05-16 PROCEDURE — 1159F MED LIST DOCD IN RCRD: CPT | Mod: CPTII,,, | Performed by: FAMILY MEDICINE

## 2023-05-16 PROCEDURE — 3074F PR MOST RECENT SYSTOLIC BLOOD PRESSURE < 130 MM HG: ICD-10-PCS | Mod: CPTII,,, | Performed by: FAMILY MEDICINE

## 2023-05-16 PROCEDURE — 3288F FALL RISK ASSESSMENT DOCD: CPT | Mod: CPTII,,, | Performed by: FAMILY MEDICINE

## 2023-05-16 PROCEDURE — 3078F DIAST BP <80 MM HG: CPT | Mod: CPTII,,, | Performed by: FAMILY MEDICINE

## 2023-05-16 PROCEDURE — 99214 PR OFFICE/OUTPT VISIT, EST, LEVL IV, 30-39 MIN: ICD-10-PCS | Mod: ,,, | Performed by: FAMILY MEDICINE

## 2023-05-16 PROCEDURE — 3074F SYST BP LT 130 MM HG: CPT | Mod: CPTII,,, | Performed by: FAMILY MEDICINE

## 2023-05-16 PROCEDURE — 1159F PR MEDICATION LIST DOCUMENTED IN MEDICAL RECORD: ICD-10-PCS | Mod: CPTII,,, | Performed by: FAMILY MEDICINE

## 2023-05-16 PROCEDURE — 3288F PR FALLS RISK ASSESSMENT DOCUMENTED: ICD-10-PCS | Mod: CPTII,,, | Performed by: FAMILY MEDICINE

## 2023-05-16 PROCEDURE — 99999 PR PBB SHADOW E&M-EST. PATIENT-LVL IV: ICD-10-PCS | Mod: PBBFAC,,, | Performed by: FAMILY MEDICINE

## 2023-05-16 PROCEDURE — 1126F AMNT PAIN NOTED NONE PRSNT: CPT | Mod: CPTII,,, | Performed by: FAMILY MEDICINE

## 2023-05-16 PROCEDURE — 1160F RVW MEDS BY RX/DR IN RCRD: CPT | Mod: CPTII,,, | Performed by: FAMILY MEDICINE

## 2023-05-16 PROCEDURE — 1101F PR PT FALLS ASSESS DOC 0-1 FALLS W/OUT INJ PAST YR: ICD-10-PCS | Mod: CPTII,,, | Performed by: FAMILY MEDICINE

## 2023-05-16 PROCEDURE — 99999 PR PBB SHADOW E&M-EST. PATIENT-LVL IV: CPT | Mod: PBBFAC,,, | Performed by: FAMILY MEDICINE

## 2023-05-16 PROCEDURE — 99214 OFFICE O/P EST MOD 30 MIN: CPT | Mod: ,,, | Performed by: FAMILY MEDICINE

## 2023-05-16 RX ORDER — LEVOTHYROXINE SODIUM 88 UG/1
TABLET ORAL
Qty: 96 TABLET | Refills: 0 | Status: SHIPPED | OUTPATIENT
Start: 2023-05-16 | End: 2023-07-05

## 2023-05-16 NOTE — PROGRESS NOTES
Subjective:       Patient ID: Haim Martin is a 83 y.o. female.    Chief Complaint: Follow-up    83 y old female here to discuss abnormal TSH . She has been out of levothyroxine for 1 m . She feels fatigue . Also bothered  by her gait ( stooped over) . No mayor back pain . Lastly , she has been having loose stool for months . At times she doesn't make it to the commode in time .     Review of Systems   Constitutional:  Positive for fatigue.   HENT: Negative.     Eyes: Negative.    Respiratory: Negative.     Cardiovascular: Negative.    Gastrointestinal: Negative.    Genitourinary: Negative.    Musculoskeletal: Negative.    Skin: Negative.    Hematological: Negative.      Objective:      Physical Exam  Constitutional:       General: She is not in acute distress.     Appearance: She is well-developed. She is not diaphoretic.   HENT:      Head: Normocephalic and atraumatic.      Right Ear: External ear normal.      Left Ear: External ear normal.      Mouth/Throat:      Pharynx: No oropharyngeal exudate.   Eyes:      General: No scleral icterus.        Right eye: No discharge.         Left eye: No discharge.      Conjunctiva/sclera: Conjunctivae normal.      Pupils: Pupils are equal, round, and reactive to light.   Neck:      Thyroid: No thyromegaly.      Vascular: No JVD.      Trachea: No tracheal deviation.   Cardiovascular:      Rate and Rhythm: Normal rate and regular rhythm.      Heart sounds: Normal heart sounds. No murmur heard.    No friction rub. No gallop.   Pulmonary:      Effort: Pulmonary effort is normal. No respiratory distress.      Breath sounds: Normal breath sounds. No stridor. No wheezing or rales.   Chest:      Chest wall: No tenderness.   Abdominal:      General: Bowel sounds are normal. There is no distension.      Palpations: Abdomen is soft.      Tenderness: There is no abdominal tenderness. There is no guarding or rebound.   Musculoskeletal:         General: Normal range of motion.       Cervical back: Normal range of motion and neck supple.   Lymphadenopathy:      Cervical: No cervical adenopathy.   Skin:     General: Skin is warm and dry.   Neurological:      Mental Status: She is alert and oriented to person, place, and time.   Psychiatric:         Behavior: Behavior normal.         Thought Content: Thought content normal.         Judgment: Judgment normal.       Assessment:       1. Abnormal gait    2. Congenital hypothyroidism without goiter    3. Incontinence of feces with fecal urgency        Plan:     Haim was seen today for follow-up.    Diagnoses and all orders for this visit:    Abnormal gait  -     Ambulatory referral/consult to Neurology; Future    Congenital hypothyroidism without goiter  -     TSH; Future    Incontinence of feces with fecal urgency  -     Ambulatory referral/consult to Gastroenterology; Future    Other orders  -     levothyroxine (SYNTHROID) 88 MCG tablet; TAKE ONE TABLET BY MOUTH ONCE DAILY ON MONDAY THROUGH SATURDAY AND 1.5 TABLETS ON SUNDAY     F.u with Spine clinic as well   Labs in 8 w   GI

## 2023-05-30 RX ORDER — AMITRIPTYLINE HYDROCHLORIDE 75 MG/1
75 TABLET ORAL NIGHTLY
Qty: 90 TABLET | Refills: 0 | Status: SHIPPED | OUTPATIENT
Start: 2023-05-30 | End: 2023-07-05

## 2023-05-30 RX ORDER — SUMATRIPTAN SUCCINATE 100 MG/1
TABLET ORAL
Qty: 10 TABLET | Refills: 0 | Status: SHIPPED | OUTPATIENT
Start: 2023-05-30 | End: 2023-07-05

## 2023-05-30 RX ORDER — ATORVASTATIN CALCIUM 40 MG/1
40 TABLET, FILM COATED ORAL NIGHTLY
Qty: 90 TABLET | Refills: 0 | Status: SHIPPED | OUTPATIENT
Start: 2023-05-30 | End: 2023-07-05

## 2023-05-30 RX ORDER — SERTRALINE HYDROCHLORIDE 100 MG/1
100 TABLET, FILM COATED ORAL DAILY
Qty: 90 TABLET | Refills: 0 | Status: SHIPPED | OUTPATIENT
Start: 2023-05-30 | End: 2023-07-05

## 2023-05-30 NOTE — TELEPHONE ENCOUNTER
----- Message from Cindy Tsai sent at 5/30/2023 12:12 PM CDT -----  Regarding: RX Concerns  Contact: Haim  Would the patient rather a call back or a response via [facility]? call  Best Call Back Number: [phone]   Additional Information:  Haim is requesting a refill of her medications and is confused in identifying which need filling

## 2023-05-30 NOTE — TELEPHONE ENCOUNTER
No care due was identified.  [facility] Embedded Care Due Messages. Reference number: [id number].   5/30/2023 1:25:13 PM CDT

## 2023-07-05 ENCOUNTER — PATIENT MESSAGE (OUTPATIENT)
Dept: FAMILY MEDICINE | Facility: CLINIC | Age: 83
End: 2023-07-05
Payer: MEDICARE

## 2023-07-05 RX ORDER — ATORVASTATIN CALCIUM 40 MG/1
TABLET, FILM COATED ORAL
Qty: 90 TABLET | Refills: 0 | Status: SHIPPED | OUTPATIENT
Start: 2023-07-05 | End: 2023-08-31

## 2023-07-05 RX ORDER — AMITRIPTYLINE HYDROCHLORIDE 75 MG/1
TABLET ORAL
Qty: 90 TABLET | Refills: 3 | Status: SHIPPED | OUTPATIENT
Start: 2023-07-05 | End: 2023-10-31 | Stop reason: SDUPTHER

## 2023-07-05 RX ORDER — LEVOTHYROXINE SODIUM 88 UG/1
TABLET ORAL
Qty: 96 TABLET | Refills: 0 | Status: SHIPPED | OUTPATIENT
Start: 2023-07-05 | End: 2023-08-23

## 2023-07-05 RX ORDER — SUMATRIPTAN SUCCINATE 100 MG/1
TABLET ORAL
Qty: 27 TABLET | Refills: 3 | Status: SHIPPED | OUTPATIENT
Start: 2023-07-05

## 2023-07-05 RX ORDER — SERTRALINE HYDROCHLORIDE 100 MG/1
TABLET, FILM COATED ORAL
Qty: 90 TABLET | Refills: 3 | Status: SHIPPED | OUTPATIENT
Start: 2023-07-05

## 2023-07-05 NOTE — TELEPHONE ENCOUNTER
Refill Routing Note   Medication(s) are not appropriate for processing by [facility] for the following reason(s):      Required labs outdated  Required labs abnormal    [facility] action(s):  Defer  Approve Labs due            Appointments  past 12m or future 3m with PCP    Date Provider   Last Visit   5/16/2023 Kavya Mesa MD   Next Visit   Visit date not found Kavya Mesa MD   ED visits in past 90 days: 0        Note composed:3:18 PM 07/05/2023

## 2023-07-05 NOTE — TELEPHONE ENCOUNTER
Care Due:                  Date            Visit Type   Department     Provider  --------------------------------------------------------------------------------                                EP -                              PRIMARY      [facility]  Kavya CANALES  Last Visit: 05-      CARE ([facility])   [facility]       [place]  Next Visit: None Scheduled  None         None Found                                                            Last  Test          Frequency    Reason                     Performed    Due Date  --------------------------------------------------------------------------------    Lipid Panel.  12 months..  atorvastatin.............  08- 08-    [facility] Embedded Care Due Messages. Reference number: [id number].   7/05/2023 11:10:25 AM CDT

## 2023-07-11 ENCOUNTER — PES CALL (OUTPATIENT)
Dept: ADMINISTRATIVE | Facility: CLINIC | Age: 83
End: 2023-07-11
Payer: MEDICARE

## 2023-07-27 RX ORDER — PROPRANOLOL HYDROCHLORIDE 40 MG/1
TABLET ORAL
Qty: 180 TABLET | Refills: 3 | Status: SHIPPED | OUTPATIENT
Start: 2023-07-27

## 2023-07-27 NOTE — TELEPHONE ENCOUNTER
Refill Decision Note   Haim Martin  is requesting a refill authorization.  Brief Assessment and Rationale for Refill:  Approve     Medication Therapy Plan:         Comments:     Note composed:5:28 PM 07/27/2023             Appointments     Last Visit   5/16/2023 Kavya Mesa MD   Next Visit   Visit date not found Kavya Mesa MD

## 2023-07-27 NOTE — TELEPHONE ENCOUNTER
No care due was identified.  [facility] Embedded Care Due Messages. Reference number: [id number].   7/27/2023 1:05:28 PM CDT

## 2023-08-09 ENCOUNTER — LAB VISIT (OUTPATIENT)
Dept: LAB | Facility: HOSPITAL | Age: 83
End: 2023-08-09
Attending: FAMILY MEDICINE
Payer: MEDICARE

## 2023-08-09 DIAGNOSIS — M81.0 AGE-RELATED OSTEOPOROSIS WITHOUT CURRENT PATHOLOGICAL FRACTURE: ICD-10-CM

## 2023-08-09 DIAGNOSIS — M79.642 PAIN IN BOTH HANDS: ICD-10-CM

## 2023-08-09 DIAGNOSIS — M79.641 PAIN IN BOTH HANDS: ICD-10-CM

## 2023-08-09 LAB
ALBUMIN SERPL BCP-MCNC: 3.9 G/DL (ref 3.5–5.2)
ALP SERPL-CCNC: 93 U/L (ref 55–135)
ALT SERPL W/O P-5'-P-CCNC: 29 U/L (ref 10–44)
ANION GAP SERPL CALC-SCNC: 10 MMOL/L (ref 8–16)
AST SERPL-CCNC: 27 U/L (ref 10–40)
BASOPHILS # BLD AUTO: 0.07 K/UL (ref 0–0.2)
BASOPHILS NFR BLD: 1.2 % (ref 0–1.9)
BILIRUB SERPL-MCNC: 0.5 MG/DL (ref 0.1–1)
BUN SERPL-MCNC: 16 MG/DL (ref 8–23)
CALCIUM SERPL-MCNC: 9.5 MG/DL (ref 8.7–10.5)
CHLORIDE SERPL-SCNC: 106 MMOL/L (ref 95–110)
CO2 SERPL-SCNC: 25 MMOL/L (ref 23–29)
CREAT SERPL-MCNC: 0.9 MG/DL (ref 0.5–1.4)
CRP SERPL-MCNC: 1.3 MG/L (ref 0–8.2)
DIFFERENTIAL METHOD: ABNORMAL
EOSINOPHIL # BLD AUTO: 0.2 K/UL (ref 0–0.5)
EOSINOPHIL NFR BLD: 3.7 % (ref 0–8)
ERYTHROCYTE [DISTWIDTH] IN BLOOD BY AUTOMATED COUNT: 12.1 % (ref 11.5–14.5)
ERYTHROCYTE [SEDIMENTATION RATE] IN BLOOD BY WESTERGREN METHOD: 33 MM/HR (ref 0–20)
EST. GFR  (NO RACE VARIABLE): >60 ML/MIN/1.73 M^2
GLUCOSE SERPL-MCNC: 117 MG/DL (ref 70–110)
HCT VFR BLD AUTO: 38.4 % (ref 37–48.5)
HGB BLD-MCNC: 12.1 G/DL (ref 12–16)
IMM GRANULOCYTES # BLD AUTO: 0.02 K/UL (ref 0–0.04)
IMM GRANULOCYTES NFR BLD AUTO: 0.4 % (ref 0–0.5)
LYMPHOCYTES # BLD AUTO: 1.2 K/UL (ref 1–4.8)
LYMPHOCYTES NFR BLD: 21.5 % (ref 18–48)
MCH RBC QN AUTO: 33.6 PG (ref 27–31)
MCHC RBC AUTO-ENTMCNC: 31.5 G/DL (ref 32–36)
MCV RBC AUTO: 107 FL (ref 82–98)
MONOCYTES # BLD AUTO: 0.5 K/UL (ref 0.3–1)
MONOCYTES NFR BLD: 8.5 % (ref 4–15)
NEUTROPHILS # BLD AUTO: 3.6 K/UL (ref 1.8–7.7)
NEUTROPHILS NFR BLD: 64.7 % (ref 38–73)
NRBC BLD-RTO: 0 /100 WBC
PLATELET # BLD AUTO: 194 K/UL (ref 150–450)
PMV BLD AUTO: 10.4 FL (ref 9.2–12.9)
POTASSIUM SERPL-SCNC: 3.8 MMOL/L (ref 3.5–5.1)
PROT SERPL-MCNC: 7 G/DL (ref 6–8.4)
RBC # BLD AUTO: 3.6 M/UL (ref 4–5.4)
SODIUM SERPL-SCNC: 141 MMOL/L (ref 136–145)
WBC # BLD AUTO: 5.62 K/UL (ref 3.9–12.7)

## 2023-08-09 PROCEDURE — 85025 COMPLETE CBC W/AUTO DIFF WBC: CPT | Performed by: INTERNAL MEDICINE

## 2023-08-09 PROCEDURE — 86140 C-REACTIVE PROTEIN: CPT | Performed by: INTERNAL MEDICINE

## 2023-08-09 PROCEDURE — 80053 COMPREHEN METABOLIC PANEL: CPT | Performed by: INTERNAL MEDICINE

## 2023-08-09 PROCEDURE — 36415 COLL VENOUS BLD VENIPUNCTURE: CPT | Mod: PO | Performed by: INTERNAL MEDICINE

## 2023-08-09 PROCEDURE — 85651 RBC SED RATE NONAUTOMATED: CPT | Performed by: INTERNAL MEDICINE

## 2023-08-16 ENCOUNTER — OFFICE VISIT (OUTPATIENT)
Dept: RHEUMATOLOGY | Facility: CLINIC | Age: 83
End: 2023-08-16
Payer: MEDICARE

## 2023-08-16 VITALS
HEART RATE: 76 BPM | SYSTOLIC BLOOD PRESSURE: 119 MMHG | BODY MASS INDEX: 25.69 KG/M2 | HEIGHT: 59 IN | WEIGHT: 127.44 LBS | DIASTOLIC BLOOD PRESSURE: 72 MMHG

## 2023-08-16 DIAGNOSIS — M15.4 EROSIVE OSTEOARTHRITIS: ICD-10-CM

## 2023-08-16 DIAGNOSIS — M51.36 DDD (DEGENERATIVE DISC DISEASE), LUMBAR: ICD-10-CM

## 2023-08-16 DIAGNOSIS — M81.0 AGE-RELATED OSTEOPOROSIS WITHOUT CURRENT PATHOLOGICAL FRACTURE: Primary | ICD-10-CM

## 2023-08-16 DIAGNOSIS — E55.9 VITAMIN D INSUFFICIENCY: ICD-10-CM

## 2023-08-16 DIAGNOSIS — R73.03 PRE-DIABETES: ICD-10-CM

## 2023-08-16 DIAGNOSIS — G43.809 OTHER MIGRAINE WITHOUT STATUS MIGRAINOSUS, NOT INTRACTABLE: ICD-10-CM

## 2023-08-16 DIAGNOSIS — M15.9 PRIMARY OSTEOARTHRITIS INVOLVING MULTIPLE JOINTS: ICD-10-CM

## 2023-08-16 DIAGNOSIS — M47.816 LUMBAR SPONDYLOSIS: ICD-10-CM

## 2023-08-16 DIAGNOSIS — M54.2 NECK PAIN: ICD-10-CM

## 2023-08-16 DIAGNOSIS — M85.80 OSTEOPENIA, UNSPECIFIED LOCATION: ICD-10-CM

## 2023-08-16 DIAGNOSIS — F41.9 ANXIETY: ICD-10-CM

## 2023-08-16 PROCEDURE — 3074F PR MOST RECENT SYSTOLIC BLOOD PRESSURE < 130 MM HG: ICD-10-PCS | Mod: CPTII,S$GLB,, | Performed by: INTERNAL MEDICINE

## 2023-08-16 PROCEDURE — 1159F MED LIST DOCD IN RCRD: CPT | Mod: CPTII,S$GLB,, | Performed by: INTERNAL MEDICINE

## 2023-08-16 PROCEDURE — 3078F DIAST BP <80 MM HG: CPT | Mod: CPTII,S$GLB,, | Performed by: INTERNAL MEDICINE

## 2023-08-16 PROCEDURE — 99214 PR OFFICE/OUTPT VISIT, EST, LEVL IV, 30-39 MIN: ICD-10-PCS | Mod: S$GLB,,, | Performed by: INTERNAL MEDICINE

## 2023-08-16 PROCEDURE — 3288F FALL RISK ASSESSMENT DOCD: CPT | Mod: CPTII,S$GLB,, | Performed by: INTERNAL MEDICINE

## 2023-08-16 PROCEDURE — 1159F PR MEDICATION LIST DOCUMENTED IN MEDICAL RECORD: ICD-10-PCS | Mod: CPTII,S$GLB,, | Performed by: INTERNAL MEDICINE

## 2023-08-16 PROCEDURE — 3074F SYST BP LT 130 MM HG: CPT | Mod: CPTII,S$GLB,, | Performed by: INTERNAL MEDICINE

## 2023-08-16 PROCEDURE — 99999 PR PBB SHADOW E&M-EST. PATIENT-LVL III: CPT | Mod: PBBFAC,,, | Performed by: INTERNAL MEDICINE

## 2023-08-16 PROCEDURE — 99214 OFFICE O/P EST MOD 30 MIN: CPT | Mod: S$GLB,,, | Performed by: INTERNAL MEDICINE

## 2023-08-16 PROCEDURE — 1101F PR PT FALLS ASSESS DOC 0-1 FALLS W/OUT INJ PAST YR: ICD-10-PCS | Mod: CPTII,S$GLB,, | Performed by: INTERNAL MEDICINE

## 2023-08-16 PROCEDURE — 3288F PR FALLS RISK ASSESSMENT DOCUMENTED: ICD-10-PCS | Mod: CPTII,S$GLB,, | Performed by: INTERNAL MEDICINE

## 2023-08-16 PROCEDURE — 1101F PT FALLS ASSESS-DOCD LE1/YR: CPT | Mod: CPTII,S$GLB,, | Performed by: INTERNAL MEDICINE

## 2023-08-16 PROCEDURE — 99999 PR PBB SHADOW E&M-EST. PATIENT-LVL III: ICD-10-PCS | Mod: PBBFAC,,, | Performed by: INTERNAL MEDICINE

## 2023-08-16 PROCEDURE — 1126F PR PAIN SEVERITY QUANTIFIED, NO PAIN PRESENT: ICD-10-PCS | Mod: CPTII,S$GLB,, | Performed by: INTERNAL MEDICINE

## 2023-08-16 PROCEDURE — 1126F AMNT PAIN NOTED NONE PRSNT: CPT | Mod: CPTII,S$GLB,, | Performed by: INTERNAL MEDICINE

## 2023-08-16 PROCEDURE — 3078F PR MOST RECENT DIASTOLIC BLOOD PRESSURE < 80 MM HG: ICD-10-PCS | Mod: CPTII,S$GLB,, | Performed by: INTERNAL MEDICINE

## 2023-08-16 RX ORDER — METHOCARBAMOL 500 MG/1
500 TABLET, FILM COATED ORAL NIGHTLY PRN
Qty: 40 TABLET | Refills: 0 | Status: SHIPPED | OUTPATIENT
Start: 2023-08-16 | End: 2023-10-15

## 2023-08-16 RX ORDER — MULTIVITAMIN
1 TABLET ORAL 2 TIMES DAILY
Qty: 60 TABLET | Refills: 3 | Status: SHIPPED | OUTPATIENT
Start: 2023-08-16

## 2023-08-16 NOTE — PROGRESS NOTES
RHEUMATOLOGY OUTPATIENT CLINIC NOTE    8/16/2023    Attending Rheumatologist: Jovanny Park  Primary Care Provider/Physician Requesting Consultation: Kavya Mesa MD   Chief Complaint/Reason For Consultation:  Osteoporosis      Subjective:     Haim Martin is a 83 y.o. White female with erosive OA and Reclast for f/u    GI intolerance to HCQ, self discontinued.  Received Reclast on 2/2023 w/o side effects.      Review of Systems   Constitutional:  Negative for fever.   Eyes:  Negative for photophobia and pain.   Genitourinary:  Negative for hematuria.   Musculoskeletal:  Positive for joint pain. Negative for back pain and falls.   Skin:  Negative for rash.   Neurological:  Negative for weakness.     Chronic comorbid conditions affecting medical decision making today:  Past Medical History:   Diagnosis Date    Depression     Diverticulosis of colon (without mention of hemorrhage) 8/25/2014    Hyperlipidemia     Hypertension     Thyroid disease      Past Surgical History:   Procedure Laterality Date    APPENDECTOMY      CATARACT EXTRACTION EXTRACAPSULAR W/ INTRAOCULAR LENS IMPLANTATION Bilateral 4/2014    CYST REMOVAL Left 5/19/2022    Procedure: EXCISION, CYST;  Surgeon: Kareem Alatorre MD;  Location: [facility] OR;  Service: Orthopedics;  Laterality: Left;  excision mucous cyst left long finger    FINGER AMPUTATION Right 3/31/2022    Procedure: AMPUTATION, FINGER;  Surgeon: Kareem Alatorre MD;  Location: [facility] OR;  Service: Orthopedics;  Laterality: Right;  amputation right index finger at the level of proximal interphalangeal joint    INCISION AND DRAINAGE OF HAND Right 1/21/2022    Procedure: INCISION AND DRAINAGE, HAND;  Surgeon: Ramirez Flores MD;  Location: [facility] OR;  Service: Orthopedics;  Laterality: Right;  Right Index Finger    INJECTION OF ANESTHETIC AGENT AROUND MEDIAL BRANCH NERVES INNERVATING LUMBAR FACET JOINT Bilateral 7/16/2020    Procedure: Bilateral L3-5 MBB with  local;  Surgeon: Luis Ashraf MD;  Location: [facility];  Service: Pain Management;  Laterality: Bilateral;    INJECTION OF ANESTHETIC AGENT AROUND MEDIAL BRANCH NERVES INNERVATING LUMBAR FACET JOINT Bilateral 8/10/2020    Procedure: Bilateral L3-5 MBB with local;  Surgeon: Luis Ashraf MD;  Location: [facility];  Service: Pain Management;  Laterality: Bilateral;    INJECTION OF ANESTHETIC AGENT INTO SACROILIAC JOINT Bilateral 11/4/2021    Procedure: Bilateral SIJ Injection;  Surgeon: Luis Ashraf MD;  Location: [facility];  Service: Pain Management;  Laterality: Bilateral;    RADIOFREQUENCY THERMOCOAGULATION Left 9/28/2020    Procedure: Left L3-5 Lumbar RFA;  Surgeon: Luis Ashraf MD;  Location: [facility];  Service: Pain Management;  Laterality: Left;    RADIOFREQUENCY THERMOCOAGULATION Right 10/12/2020    Procedure: Right L3-5 Lumbar RFA;  Surgeon: Luis Ashraf MD;  Location: [facility];  Service: Pain Management;  Laterality: Right;    STOMACH SURGERY  1998    not sure what they did, possible bowel resection, partial gastrectomy    TONSILLECTOMY       Family History   Problem Relation Age of Onset    Lupus Mother     Stroke Father     Coronary artery disease Father     Diabetes type II Father     Kidney cancer Maternal Aunt     Breast cancer Maternal Aunt      Social History     Tobacco Use   Smoking Status Former    Current packs/day: 0.00   Smokeless Tobacco Never   Tobacco Comments    quit 30 years ago       Current Outpatient Medications:     amitriptyline (ELAVIL) 75 MG tablet, Take 1 tablet by mouth in the evening, Disp: 90 tablet, Rfl: 3    aspirin (ECOTRIN) 81 MG EC tablet, Take 81 mg by mouth once daily., Disp: , Rfl:     atorvastatin (LIPITOR) 40 MG tablet, TAKE 1 TABLET BY MOUTH ONCE DAILY IN THE EVENING, Disp: 90 tablet, Rfl: 0    beta-carotene,A,-vits C,E/mins (OCUVITE ORAL), Take 1 tablet by mouth once daily., Disp: , Rfl:     omeprazole (PRILOSEC) 20 MG  capsule, Take 1 capsule (20 mg total) by mouth as needed., Disp: 90 capsule, Rfl: 4    propranoloL (INDERAL) 40 MG tablet, Take 1 tablet by mouth twice daily, Disp: 180 tablet, Rfl: 3    sertraline (ZOLOFT) 100 MG tablet, Take 1 tablet by mouth once daily, Disp: 90 tablet, Rfl: 3    sumatriptan (IMITREX) 100 MG tablet, TAKE ONE TABLET BY MOUTH AT ONSET OF MIGRAINE, Disp: 27 tablet, Rfl: 3    SYNTHROID 88 mcg tablet, TAKE 1 TABLET BY MOUTH ONCE DAILY ON MONDAY THROUGH SATURDAY AND 1.5 TABLETS ON SUNDAY, Disp: 96 tablet, Rfl: 0    vit C/E/Zn/coppr/lutein/zeaxan (PRESERVISION AREDS-2 ORAL), Take 1 tablet by mouth 2 (two) times a day., Disp: , Rfl:   No current facility-administered medications for this visit.    Facility-Administered Medications Ordered in Other Visits:     lactated ringers infusion, , Intravenous, On Call Procedure, Estefania Quach PA, Last Rate: 100 mL/hr at 03/31/22 0821, New Bag at 03/31/22 0821    lactated ringers infusion, , Intravenous, Continuous, Marzena Ybarra MD, Stopped at 05/19/22 0844     Objective:     Vitals:    08/16/23 1137   BP: 119/72   Pulse: 76     Physical Exam   Eyes: Conjunctivae are normal.   Pulmonary/Chest: Effort normal. No respiratory distress.   Musculoskeletal:         General: No swelling or tenderness. Normal range of motion.   Neurological: She displays no weakness.   Skin: No rash noted.       Reviewed available old and all outside pertinent medical records available.    All lab results personally reviewed and interpreted by me.       ASSESSMENT      Encounter Diagnoses   Name Primary?    Vitamin D insufficiency     Age-related osteoporosis without current pathological fracture Yes    Erosive osteoarthritis     Primary osteoarthritis involving multiple joints     DDD (degenerative disc disease), lumbar     Neck pain     Lumbar spondylosis     Other migraine without status migrainosus, not intractable     Anxiety     Pre-diabetes     Osteopenia, unspecified  location       PLAN     No efficacy and intolerance to HCQ prescribed as a trial of OA w/ erosive features.  Tolerated REclast #1 w/o side effects.  Denies squeeze tenderness on exam.  TTP some DIP joints.  No weakness or active inflammatory rashes.  Negative RA serologies.  BAILEY negative.  DJD/DDD on XR.  No marginal erosive changes or vertebral compression deformities reported.  Osteopenia w/ high FRAX at the hip.  Might get sx relief w/ alternative antiseizure, antidepressant, or muscle relaxant for OA sx.  Robaxin prescribed.  Side effects of therapy discussed.  Follow w/ PMD for non inflammatory pain medication monitoring / adjustments.  PT referral provided.  Plan to complete 3 total doses of yearly reclast.  Repeat labs close to f/u visit.  Ca/vit D supp.    Jovanny Park M.D.

## 2023-08-22 ENCOUNTER — OFFICE VISIT (OUTPATIENT)
Dept: FAMILY MEDICINE | Facility: CLINIC | Age: 83
End: 2023-08-22
Attending: FAMILY MEDICINE
Payer: MEDICARE

## 2023-08-22 ENCOUNTER — LAB VISIT (OUTPATIENT)
Dept: LAB | Facility: HOSPITAL | Age: 83
End: 2023-08-22
Attending: FAMILY MEDICINE
Payer: MEDICARE

## 2023-08-22 VITALS
HEART RATE: 77 BPM | DIASTOLIC BLOOD PRESSURE: 70 MMHG | SYSTOLIC BLOOD PRESSURE: 114 MMHG | WEIGHT: 126.75 LBS | HEIGHT: 59 IN | BODY MASS INDEX: 25.55 KG/M2 | OXYGEN SATURATION: 97 % | TEMPERATURE: 98 F

## 2023-08-22 DIAGNOSIS — E03.9 HYPOTHYROIDISM, UNSPECIFIED TYPE: ICD-10-CM

## 2023-08-22 DIAGNOSIS — R73.03 PRE-DIABETES: Primary | ICD-10-CM

## 2023-08-22 DIAGNOSIS — R73.03 PRE-DIABETES: ICD-10-CM

## 2023-08-22 DIAGNOSIS — M51.36 DDD (DEGENERATIVE DISC DISEASE), LUMBAR: ICD-10-CM

## 2023-08-22 PROCEDURE — 99999 PR PBB SHADOW E&M-EST. PATIENT-LVL IV: ICD-10-PCS | Mod: PBBFAC,,, | Performed by: FAMILY MEDICINE

## 2023-08-22 PROCEDURE — 1126F PR PAIN SEVERITY QUANTIFIED, NO PAIN PRESENT: ICD-10-PCS | Mod: CPTII,S$GLB,, | Performed by: FAMILY MEDICINE

## 2023-08-22 PROCEDURE — 3078F PR MOST RECENT DIASTOLIC BLOOD PRESSURE < 80 MM HG: ICD-10-PCS | Mod: CPTII,S$GLB,, | Performed by: FAMILY MEDICINE

## 2023-08-22 PROCEDURE — 3078F DIAST BP <80 MM HG: CPT | Mod: CPTII,S$GLB,, | Performed by: FAMILY MEDICINE

## 2023-08-22 PROCEDURE — 3074F PR MOST RECENT SYSTOLIC BLOOD PRESSURE < 130 MM HG: ICD-10-PCS | Mod: CPTII,S$GLB,, | Performed by: FAMILY MEDICINE

## 2023-08-22 PROCEDURE — 1126F AMNT PAIN NOTED NONE PRSNT: CPT | Mod: CPTII,S$GLB,, | Performed by: FAMILY MEDICINE

## 2023-08-22 PROCEDURE — 36415 COLL VENOUS BLD VENIPUNCTURE: CPT | Mod: PO | Performed by: FAMILY MEDICINE

## 2023-08-22 PROCEDURE — 99999 PR PBB SHADOW E&M-EST. PATIENT-LVL IV: CPT | Mod: PBBFAC,,, | Performed by: FAMILY MEDICINE

## 2023-08-22 PROCEDURE — 1159F PR MEDICATION LIST DOCUMENTED IN MEDICAL RECORD: ICD-10-PCS | Mod: CPTII,S$GLB,, | Performed by: FAMILY MEDICINE

## 2023-08-22 PROCEDURE — 83036 HEMOGLOBIN GLYCOSYLATED A1C: CPT | Performed by: FAMILY MEDICINE

## 2023-08-22 PROCEDURE — 1101F PR PT FALLS ASSESS DOC 0-1 FALLS W/OUT INJ PAST YR: ICD-10-PCS | Mod: CPTII,S$GLB,, | Performed by: FAMILY MEDICINE

## 2023-08-22 PROCEDURE — 3288F FALL RISK ASSESSMENT DOCD: CPT | Mod: CPTII,S$GLB,, | Performed by: FAMILY MEDICINE

## 2023-08-22 PROCEDURE — 84439 ASSAY OF FREE THYROXINE: CPT | Performed by: FAMILY MEDICINE

## 2023-08-22 PROCEDURE — 3288F PR FALLS RISK ASSESSMENT DOCUMENTED: ICD-10-PCS | Mod: CPTII,S$GLB,, | Performed by: FAMILY MEDICINE

## 2023-08-22 PROCEDURE — 1160F PR REVIEW ALL MEDS BY PRESCRIBER/CLIN PHARMACIST DOCUMENTED: ICD-10-PCS | Mod: CPTII,S$GLB,, | Performed by: FAMILY MEDICINE

## 2023-08-22 PROCEDURE — 1101F PT FALLS ASSESS-DOCD LE1/YR: CPT | Mod: CPTII,S$GLB,, | Performed by: FAMILY MEDICINE

## 2023-08-22 PROCEDURE — 99214 PR OFFICE/OUTPT VISIT, EST, LEVL IV, 30-39 MIN: ICD-10-PCS | Mod: S$GLB,,, | Performed by: FAMILY MEDICINE

## 2023-08-22 PROCEDURE — 1159F MED LIST DOCD IN RCRD: CPT | Mod: CPTII,S$GLB,, | Performed by: FAMILY MEDICINE

## 2023-08-22 PROCEDURE — 84443 ASSAY THYROID STIM HORMONE: CPT | Performed by: FAMILY MEDICINE

## 2023-08-22 PROCEDURE — 1160F RVW MEDS BY RX/DR IN RCRD: CPT | Mod: CPTII,S$GLB,, | Performed by: FAMILY MEDICINE

## 2023-08-22 PROCEDURE — 3074F SYST BP LT 130 MM HG: CPT | Mod: CPTII,S$GLB,, | Performed by: FAMILY MEDICINE

## 2023-08-22 PROCEDURE — 99214 OFFICE O/P EST MOD 30 MIN: CPT | Mod: S$GLB,,, | Performed by: FAMILY MEDICINE

## 2023-08-22 NOTE — PROGRESS NOTES
Subjective:       Patient ID: Haim Martin is a 83 y.o. female.    Chief Complaint: No chief complaint on file.    83 y old female here for f.u . Doing well . She feels more energetic since increasing levothyroxine dose. No falls. Keeps a healthful diet .       Review of Systems   Constitutional: Negative.    HENT: Negative.     Eyes: Negative.    Respiratory: Negative.     Cardiovascular: Negative.    Gastrointestinal: Negative.    Genitourinary: Negative.    Musculoskeletal: Negative.    Skin: Negative.    Hematological: Negative.        Objective:      Physical Exam  Constitutional:       General: She is not in acute distress.     Appearance: She is well-developed. She is not diaphoretic.   HENT:      Head: Normocephalic and atraumatic.      Right Ear: External ear normal.      Left Ear: External ear normal.      Mouth/Throat:      Pharynx: No oropharyngeal exudate.   Eyes:      General: No scleral icterus.        Right eye: No discharge.         Left eye: No discharge.      Conjunctiva/sclera: Conjunctivae normal.      Pupils: Pupils are equal, round, and reactive to light.   Neck:      Thyroid: No thyromegaly.      Vascular: No JVD.      Trachea: No tracheal deviation.   Cardiovascular:      Rate and Rhythm: Normal rate and regular rhythm.      Heart sounds: Normal heart sounds. No murmur heard.     No friction rub. No gallop.   Pulmonary:      Effort: Pulmonary effort is normal. No respiratory distress.      Breath sounds: Normal breath sounds. No stridor. No wheezing or rales.   Chest:      Chest wall: No tenderness.   Abdominal:      General: Bowel sounds are normal. There is no distension.      Palpations: Abdomen is soft.      Tenderness: There is no abdominal tenderness. There is no guarding or rebound.   Musculoskeletal:         General: Normal range of motion.      Cervical back: Normal range of motion and neck supple.   Lymphadenopathy:      Cervical: No cervical adenopathy.   Skin:     General:  Skin is warm and dry.   Neurological:      Mental Status: She is alert and oriented to person, place, and time.   Psychiatric:         Behavior: Behavior normal.         Thought Content: Thought content normal.         Judgment: Judgment normal.         Assessment:     Diagnoses and all orders for this visit:    Pre-diabetes  -     Hemoglobin A1C; Future    Hypothyroidism, unspecified type  -     TSH; Future    DDD (degenerative disc disease), lumbar           Plan:     Diagnoses and all orders for this visit:    Pre-diabetes  -     Hemoglobin A1C; Future    Hypothyroidism, unspecified type  -     TSH; Future     Labs    F.u with Spine clinic

## 2023-08-23 ENCOUNTER — TELEPHONE (OUTPATIENT)
Dept: FAMILY MEDICINE | Facility: CLINIC | Age: 83
End: 2023-08-23
Payer: MEDICARE

## 2023-08-23 DIAGNOSIS — E03.9 HYPOTHYROIDISM, UNSPECIFIED TYPE: Primary | ICD-10-CM

## 2023-08-23 LAB
ESTIMATED AVG GLUCOSE: 105 MG/DL (ref 68–131)
HBA1C MFR BLD: 5.3 % (ref 4–5.6)
T4 FREE SERPL-MCNC: 0.84 NG/DL (ref 0.71–1.51)
TSH SERPL DL<=0.005 MIU/L-ACNC: 9.64 UIU/ML (ref 0.4–4)

## 2023-08-23 RX ORDER — LEVOTHYROXINE SODIUM 100 UG/1
100 TABLET ORAL
Qty: 30 TABLET | Refills: 1 | Status: SHIPPED | OUTPATIENT
Start: 2023-08-23 | End: 2023-10-31 | Stop reason: SDUPTHER

## 2023-08-31 RX ORDER — ATORVASTATIN CALCIUM 40 MG/1
TABLET, FILM COATED ORAL
Qty: 90 TABLET | Refills: 0 | Status: SHIPPED | OUTPATIENT
Start: 2023-08-31 | End: 2023-11-01 | Stop reason: SDUPTHER

## 2023-08-31 NOTE — TELEPHONE ENCOUNTER
Refill Routing Note   Medication(s) are not appropriate for processing by [facility] for the following reason(s):      Required labs outdated  Required labs abnormal    [facility] action(s):  Defer Care Due:  Labs due            Appointments  past 12m or future 3m with PCP    Date Provider   Last Visit   8/22/2023 Kavya Mesa MD   Next Visit   Visit date not found Kavya Mesa MD   ED visits in past 90 days: 0        Note composed:2:40 PM 08/31/2023

## 2023-08-31 NOTE — TELEPHONE ENCOUNTER
Care Due:                  Date            Visit Type   Department     Provider  --------------------------------------------------------------------------------                                EP -                              PRIMARY      [facility]  Kavya CANALES  Last Visit: 08-      CARE ([facility])   MEDICINE       [place]  Next Visit: None Scheduled  None         None Found                                                            Last  Test          Frequency    Reason                     Performed    Due Date  --------------------------------------------------------------------------------    Lipid Panel.  12 months..  atorvastatin.............  Not Found    Overdue    [employer] Embedded Care Due Messages. Reference number: [id number].   8/31/2023 12:11:47 PM CDT

## 2023-09-01 ENCOUNTER — LAB VISIT (OUTPATIENT)
Dept: LAB | Facility: HOSPITAL | Age: 83
End: 2023-09-01
Attending: NURSE PRACTITIONER
Payer: MEDICARE

## 2023-09-01 ENCOUNTER — OFFICE VISIT (OUTPATIENT)
Dept: NEUROLOGY | Facility: CLINIC | Age: 83
End: 2023-09-01
Attending: FAMILY MEDICINE
Payer: MEDICARE

## 2023-09-01 VITALS
HEIGHT: 59 IN | DIASTOLIC BLOOD PRESSURE: 80 MMHG | SYSTOLIC BLOOD PRESSURE: 140 MMHG | BODY MASS INDEX: 25.82 KG/M2 | HEART RATE: 71 BPM | WEIGHT: 128.06 LBS

## 2023-09-01 DIAGNOSIS — R41.82 ALTERED MENTAL STATUS, UNSPECIFIED ALTERED MENTAL STATUS TYPE: ICD-10-CM

## 2023-09-01 DIAGNOSIS — R29.3 POOR POSTURE: ICD-10-CM

## 2023-09-01 DIAGNOSIS — M47.816 LUMBAR SPONDYLOSIS: ICD-10-CM

## 2023-09-01 DIAGNOSIS — R29.818 OTHER SYMPTOMS AND SIGNS INVOLVING THE NERVOUS SYSTEM: ICD-10-CM

## 2023-09-01 DIAGNOSIS — M47.816 SPONDYLOSIS WITHOUT MYELOPATHY OR RADICULOPATHY, LUMBAR REGION: ICD-10-CM

## 2023-09-01 DIAGNOSIS — R26.9 ABNORMAL GAIT: ICD-10-CM

## 2023-09-01 DIAGNOSIS — M54.2 CERVICALGIA: ICD-10-CM

## 2023-09-01 DIAGNOSIS — M54.9 DORSALGIA, UNSPECIFIED: ICD-10-CM

## 2023-09-01 DIAGNOSIS — M85.80 OSTEOPENIA, UNSPECIFIED LOCATION: ICD-10-CM

## 2023-09-01 DIAGNOSIS — R15.9 INCONTINENCE OF FECES, UNSPECIFIED FECAL INCONTINENCE TYPE: ICD-10-CM

## 2023-09-01 DIAGNOSIS — G43.809 OTHER MIGRAINE WITHOUT STATUS MIGRAINOSUS, NOT INTRACTABLE: ICD-10-CM

## 2023-09-01 DIAGNOSIS — R26.9 ABNORMAL GAIT: Primary | ICD-10-CM

## 2023-09-01 PROCEDURE — 99999 PR PBB SHADOW E&M-EST. PATIENT-LVL V: CPT | Mod: PBBFAC,,, | Performed by: NURSE PRACTITIONER

## 2023-09-01 PROCEDURE — 1159F PR MEDICATION LIST DOCUMENTED IN MEDICAL RECORD: ICD-10-PCS | Mod: CPTII,S$GLB,, | Performed by: NURSE PRACTITIONER

## 2023-09-01 PROCEDURE — 82607 VITAMIN B-12: CPT | Performed by: NURSE PRACTITIONER

## 2023-09-01 PROCEDURE — 99205 PR OFFICE/OUTPT VISIT, NEW, LEVL V, 60-74 MIN: ICD-10-PCS | Mod: S$GLB,,, | Performed by: NURSE PRACTITIONER

## 2023-09-01 PROCEDURE — 3288F PR FALLS RISK ASSESSMENT DOCUMENTED: ICD-10-PCS | Mod: CPTII,S$GLB,, | Performed by: NURSE PRACTITIONER

## 2023-09-01 PROCEDURE — 1101F PT FALLS ASSESS-DOCD LE1/YR: CPT | Mod: CPTII,S$GLB,, | Performed by: NURSE PRACTITIONER

## 2023-09-01 PROCEDURE — 82746 ASSAY OF FOLIC ACID SERUM: CPT | Performed by: NURSE PRACTITIONER

## 2023-09-01 PROCEDURE — 1159F MED LIST DOCD IN RCRD: CPT | Mod: CPTII,S$GLB,, | Performed by: NURSE PRACTITIONER

## 2023-09-01 PROCEDURE — 3077F SYST BP >= 140 MM HG: CPT | Mod: CPTII,S$GLB,, | Performed by: NURSE PRACTITIONER

## 2023-09-01 PROCEDURE — 3079F DIAST BP 80-89 MM HG: CPT | Mod: CPTII,S$GLB,, | Performed by: NURSE PRACTITIONER

## 2023-09-01 PROCEDURE — 1160F PR REVIEW ALL MEDS BY PRESCRIBER/CLIN PHARMACIST DOCUMENTED: ICD-10-PCS | Mod: CPTII,S$GLB,, | Performed by: NURSE PRACTITIONER

## 2023-09-01 PROCEDURE — 3079F PR MOST RECENT DIASTOLIC BLOOD PRESSURE 80-89 MM HG: ICD-10-PCS | Mod: CPTII,S$GLB,, | Performed by: NURSE PRACTITIONER

## 2023-09-01 PROCEDURE — 36415 COLL VENOUS BLD VENIPUNCTURE: CPT | Performed by: NURSE PRACTITIONER

## 2023-09-01 PROCEDURE — 99417 PROLNG OP E/M EACH 15 MIN: CPT | Mod: S$GLB,,, | Performed by: NURSE PRACTITIONER

## 2023-09-01 PROCEDURE — 3288F FALL RISK ASSESSMENT DOCD: CPT | Mod: CPTII,S$GLB,, | Performed by: NURSE PRACTITIONER

## 2023-09-01 PROCEDURE — 1125F AMNT PAIN NOTED PAIN PRSNT: CPT | Mod: CPTII,S$GLB,, | Performed by: NURSE PRACTITIONER

## 2023-09-01 PROCEDURE — 1101F PR PT FALLS ASSESS DOC 0-1 FALLS W/OUT INJ PAST YR: ICD-10-PCS | Mod: CPTII,S$GLB,, | Performed by: NURSE PRACTITIONER

## 2023-09-01 PROCEDURE — 1160F RVW MEDS BY RX/DR IN RCRD: CPT | Mod: CPTII,S$GLB,, | Performed by: NURSE PRACTITIONER

## 2023-09-01 PROCEDURE — 3077F PR MOST RECENT SYSTOLIC BLOOD PRESSURE >= 140 MM HG: ICD-10-PCS | Mod: CPTII,S$GLB,, | Performed by: NURSE PRACTITIONER

## 2023-09-01 PROCEDURE — 99417 PR PROLONGED SVC, OUTPT, W/WO DIRECT PT CONTACT,  EA ADDTL 15 MIN: ICD-10-PCS | Mod: S$GLB,,, | Performed by: NURSE PRACTITIONER

## 2023-09-01 PROCEDURE — 84446 ASSAY OF VITAMIN E: CPT | Performed by: NURSE PRACTITIONER

## 2023-09-01 PROCEDURE — 99205 OFFICE O/P NEW HI 60 MIN: CPT | Mod: S$GLB,,, | Performed by: NURSE PRACTITIONER

## 2023-09-01 PROCEDURE — 99999 PR PBB SHADOW E&M-EST. PATIENT-LVL V: ICD-10-PCS | Mod: PBBFAC,,, | Performed by: NURSE PRACTITIONER

## 2023-09-01 PROCEDURE — 1125F PR PAIN SEVERITY QUANTIFIED, PAIN PRESENT: ICD-10-PCS | Mod: CPTII,S$GLB,, | Performed by: NURSE PRACTITIONER

## 2023-09-01 RX ORDER — ERGOCALCIFEROL 1.25 MG/1
50000 CAPSULE ORAL
Qty: 4 CAPSULE | Refills: 5 | Status: SHIPPED | OUTPATIENT
Start: 2023-09-01 | End: 2024-02-26

## 2023-09-01 NOTE — PROGRESS NOTES
Subjective:       Patient ID: Haim Martin is a 83 y.o. female.    Chief Complaint: Abnormal Gait          HPIThe patient is new to me. Accompanied by daughter. Patient is present related to progressive gait problems, reports worsening since 2021.  Patient has poor stuporous posture. Patient denies weakness, gait is unsteady. The patient complains of neck and back pains. Chronic DDD of spine.  The pain worsens with standing and increased activity. Patient has issues with urine bowel incontinence. No falls. Hx migraines takes Amitriplyine 75 mg po, Imitrex, Propranolol for B/P manamgnet.  Zoloft 100 mg po daily (Depression and Migraine).  No history of strokes. History of head injury in 2019 she fell and hit her head, NO LOC, had broken nose and had a cracked skull.  Denies dizziness or lightheadedness.  Denies hearing loss. The gait problems do get worse with eyes closed/dark. No difficulty picking up feet from the floor. Normal arm swing. No tremors or shaking.         Review of Systems   Constitutional: Negative.    HENT: Negative.     Eyes: Negative.    Gastrointestinal:  Positive for diarrhea.   Endocrine: Negative.    Genitourinary:  Positive for urgency.   Musculoskeletal:  Positive for arthralgias, back pain, gait problem, myalgias and neck pain.   Allergic/Immunologic: Negative.    Hematological: Negative.    Psychiatric/Behavioral:  Positive for decreased concentration and dysphoric mood. Negative for agitation, behavioral problems and confusion.                  Current Outpatient Medications:     amitriptyline (ELAVIL) 75 MG tablet, Take 1 tablet by mouth in the evening, Disp: 90 tablet, Rfl: 3    aspirin (ECOTRIN) 81 MG EC tablet, Take 81 mg by mouth once daily., Disp: , Rfl:     atorvastatin (LIPITOR) 40 MG tablet, TAKE 1 TABLET BY MOUTH ONCE DAILY IN THE EVENING, Disp: 90 tablet, Rfl: 0    beta-carotene,A,-vits C,E/mins (OCUVITE ORAL), Take 1 tablet by mouth once daily., Disp: , Rfl:      calcium-vitamin D (CALCIUM 600 + D,3,) 600 mg-10 mcg (400 unit) Tab, Take 1 tablet by mouth 2 (two) times daily., Disp: 60 tablet, Rfl: 3    levothyroxine (SYNTHROID) 100 MCG tablet, Take 1 tablet (100 mcg total) by mouth before breakfast., Disp: 30 tablet, Rfl: 1    methocarbamoL (ROBAXIN) 500 MG Tab, Take 1 tablet (500 mg total) by mouth nightly as needed (pain / stiffness / cramps)., Disp: 40 tablet, Rfl: 0    omeprazole (PRILOSEC) 20 MG capsule, Take 1 capsule (20 mg total) by mouth as needed., Disp: 90 capsule, Rfl: 4    propranoloL (INDERAL) 40 MG tablet, Take 1 tablet by mouth twice daily, Disp: 180 tablet, Rfl: 3    sertraline (ZOLOFT) 100 MG tablet, Take 1 tablet by mouth once daily, Disp: 90 tablet, Rfl: 3    sumatriptan (IMITREX) 100 MG tablet, TAKE ONE TABLET BY MOUTH AT ONSET OF MIGRAINE, Disp: 27 tablet, Rfl: 3    vit C/E/Zn/coppr/lutein/zeaxan (PRESERVISION AREDS-2 ORAL), Take 1 tablet by mouth 2 (two) times a day., Disp: , Rfl:     ergocalciferol (VITAMIN D2) 50,000 unit Cap, Take 1 capsule (50,000 Units total) by mouth every 7 days., Disp: 4 capsule, Rfl: 5  No current facility-administered medications for this visit.    Facility-Administered Medications Ordered in Other Visits:     lactated ringers infusion, , Intravenous, On Call Procedure, Estefania Quach PA, Last Rate: 100 mL/hr at 03/31/22 0821, New Bag at 03/31/22 0821    lactated ringers infusion, , Intravenous, Continuous, Marzena Ybarra MD, Stopped at 05/19/22 0844  Past Medical History:   Diagnosis Date    Depression     Diverticulosis of colon (without mention of hemorrhage) 8/25/2014    Hyperlipidemia     Hypertension     Thyroid disease      Past Surgical History:   Procedure Laterality Date    APPENDECTOMY      CATARACT EXTRACTION EXTRACAPSULAR W/ INTRAOCULAR LENS IMPLANTATION Bilateral 4/2014    CYST REMOVAL Left 5/19/2022    Procedure: EXCISION, CYST;  Surgeon: Kareem Alatorre MD;  Location: [facility];  Service:  Orthopedics;  Laterality: Left;  excision mucous cyst left long finger    FINGER AMPUTATION Right 3/31/2022    Procedure: AMPUTATION, FINGER;  Surgeon: Kareem Alatorre MD;  Location: [facility] OR;  Service: Orthopedics;  Laterality: Right;  amputation right index finger at the level of proximal interphalangeal joint    INCISION AND DRAINAGE OF HAND Right 1/21/2022    Procedure: INCISION AND DRAINAGE, HAND;  Surgeon: Ramirez Flores MD;  Location: [facility] OR;  Service: Orthopedics;  Laterality: Right;  Right Index Finger    INJECTION OF ANESTHETIC AGENT AROUND MEDIAL BRANCH NERVES INNERVATING LUMBAR FACET JOINT Bilateral 7/16/2020    Procedure: Bilateral L3-5 MBB with local;  Surgeon: Luis Ashraf MD;  Location: [facility];  Service: Pain Management;  Laterality: Bilateral;    INJECTION OF ANESTHETIC AGENT AROUND MEDIAL BRANCH NERVES INNERVATING LUMBAR FACET JOINT Bilateral 8/10/2020    Procedure: Bilateral L3-5 MBB with local;  Surgeon: Luis Ashraf MD;  Location: [facility];  Service: Pain Management;  Laterality: Bilateral;    INJECTION OF ANESTHETIC AGENT INTO SACROILIAC JOINT Bilateral 11/4/2021    Procedure: Bilateral SIJ Injection;  Surgeon: Luis Ashraf MD;  Location: [facility];  Service: Pain Management;  Laterality: Bilateral;    RADIOFREQUENCY THERMOCOAGULATION Left 9/28/2020    Procedure: Left L3-5 Lumbar RFA;  Surgeon: Luis Ashraf MD;  Location: [facility];  Service: Pain Management;  Laterality: Left;    RADIOFREQUENCY THERMOCOAGULATION Right 10/12/2020    Procedure: Right L3-5 Lumbar RFA;  Surgeon: Luis Ashraf MD;  Location: [facility];  Service: Pain Management;  Laterality: Right;    STOMACH SURGERY  1998    not sure what they did, possible bowel resection, partial gastrectomy    TONSILLECTOMY       Social History     Socioeconomic History    Marital status:    Tobacco Use    Smoking status: Former    Smokeless tobacco: Never    Tobacco comments:      quit 30 years ago   Substance and Sexual Activity    Alcohol use: No    Drug use: No    Sexual activity: Not Currently     Birth control/protection: None             Past/Current Medical/Surgical History, Past/Current Social History, Past/Current Family History and Past/Current Medications were reviewed in detail.        Objective:           VITAL SIGNS WERE REVIEWED      GENERAL APPEARANCE:     The patient looks comfortable.    Body habitus is normal BMI 25.87 KG     No signs of respiratory distress.    Normal breathing pattern.    No dysmorphic features    Normal eye contact.     GENERAL MEDICAL EXAM:    HEENT:  Head is atraumatic normocephalic.     No tender temporal arteries. Fundoscopic (Ophthalmoscopic) exam showed no disc edema.      Neck and Axillae: No JVD. No visible lesions.    No carotid bruits. No thyromegaly. No lymphadenopathy.    Cardiopulmonary: No cyanosis. No tachypnea. Normal respiratory effort.    Gastrointestinal/Urogenital:  No jaundice. No stomas or lesions. No visible hernias. No catheters.     Abdomen is soft non-tender. No masses or organomegaly.    Skin, Hair and Nails: No pathognonomic skin rash. No neurofibromatosis. No visible lesions.No stigmata of autoimmune disease. No clubbing.    Skin is warm and moist. No palpable masses.    Limbs: No varicose veins. No visible swelling.    No palpable edema. Pulses are symmetric. Pedal pulses are palpable.      Muskoskeletal: + OA visible deformities.No visible lesions.    No spine tenderness. + signs of longstanding neuropathy.Poor posture.  No dislocations or fractures.            Neurologic Exam     Mental Status   Oriented to person, place, and time.   Registration: recalls 3 of 3 objects. Recall at 5 minutes: recalls 3 of 3 objects. Follows 3 step commands.   Attention: normal. Concentration: normal.   Speech: speech is normal and not slurred   Level of consciousness: alert  Knowledge: good and consistent with education. Able to perform  simple calculations.   Able to name object. Able to read. Able to repeat. Able to write. Normal comprehension.     Cranial Nerves     CN II   Visual fields full to confrontation.   Visual acuity: normal with correction  Right visual field deficit: none  Left visual field deficit: none     CN III, IV, VI   Pupils are equal, round, and reactive to light.  Extraocular motions are normal.   Right pupil: Size: 2 mm. Shape: regular. Reactivity: brisk. Consensual response: intact. Accommodation: intact.   Left pupil: Size: 2 mm. Shape: regular. Reactivity: brisk. Consensual response: intact. Accommodation: intact.   CN III: no CN III palsy  CN VI: no CN VI palsy  Nystagmus: none   Diplopia: none  Ophthalmoparesis: none  Upgaze: normal  Downgaze: normal  Conjugate gaze: present  Vestibulo-ocular reflex: present    CN V   Facial sensation intact.   Right facial sensation deficit: none  Left facial sensation deficit: none  Right corneal reflex: normal  Left corneal reflex: normal    CN VII   Right facial weakness: none  Left facial weakness: none  Right taste: normal  Left taste: normal    CN VIII   CN VIII normal.   Hearing: intact    CN IX, X   CN IX normal.   CN X normal.   Palate: symmetric  Right gag reflex: normal  Left gag reflex: normal    CN XI   CN XI normal.   Right sternocleidomastoid strength: normal  Left sternocleidomastoid strength: normal  Right trapezius strength: normal  Left trapezius strength: normal    CN XII   CN XII normal.   Tongue: not atrophic  Fasciculations: absent  Tongue deviation: none    Motor Exam   Muscle bulk: normal  Overall muscle tone: normal  Right arm tone: normal  Left arm tone: normal  Right arm pronator drift: absent  Left arm pronator drift: absent  Right leg tone: normal  Left leg tone: normal    Strength   Strength 5/5 throughout.   Right neck flexion: 5/5  Left neck flexion: 5/5  Right neck extension: 5/5  Left neck extension: 5/5  Right deltoid: 5/5  Left deltoid: 5/5  Right  biceps: 5/5  Left biceps: 5/5  Right triceps: 5/5  Left triceps: 5/5  Right wrist flexion: 5/5  Left wrist flexion: 5/5  Right wrist extension: 5/5  Left wrist extension: 5/5  Right interossei: 5/5  Left interossei: 5/5  Right iliopsoas: 5/5  Left iliopsoas: 5/5  Right quadriceps: 5/5  Left quadriceps: 5/5  Right hamstrin/5  Left hamstrin/5  Right glutei: 5/5  Left glutei: 5/5  Right anterior tibial: 5/5  Left anterior tibial: 5/5  Right posterior tibial: 5/5  Left posterior tibial: 5/5  Right peroneal: 5/5  Left peroneal: 5/5  Right gastroc: 5/5  Left gastroc: 5/5    Sensory Exam   Light touch normal.   Right arm light touch: normal  Left arm light touch: normal  Right leg light touch: normal  Left leg light touch: normal  Vibration normal.   Right arm vibration: normal  Left arm vibration: normal  Right leg vibration: normal  Left leg vibration: normal  Proprioception normal.   Right arm proprioception: normal  Left arm proprioception: normal  Right leg proprioception: normal  Left leg proprioception: normal  Pinprick normal.   Right arm pinprick: normal  Left arm pinprick: normal  Right leg pinprick: normal  Left leg pinprick: normal  Sensory deficit distribution on left: lateral cutaneous thigh    Gait, Coordination, and Reflexes     Gait  Gait: wide-based    Coordination   Romberg: negative  Finger to nose coordination: normal    Reflexes   Right brachioradialis: 2+  Left brachioradialis: 2+  Right biceps: 2+  Left biceps: 2+  Right triceps: 2+  Left triceps: 2+  Right patellar: 2+  Left patellar: 2+  Right achilles: 0  Left achilles: 0  Right plantar: normal  Left plantar: normal  Right Baker: absent  Left Baker: absent  Right ankle clonus: absent  Left ankle clonus: absent  Right pendular knee jerk: absent  Left pendular knee jerk: absent    Poor posture hunch-back        Lab Results   Component Value Date    WBC 5.62 2023    HGB 12.1 2023    HCT 38.4 2023     (H)  08/09/2023     08/09/2023     Sodium   Date Value Ref Range Status   08/09/2023 141 136 - 145 mmol/L Final     Potassium   Date Value Ref Range Status   08/09/2023 3.8 3.5 - 5.1 mmol/L Final     Chloride   Date Value Ref Range Status   08/09/2023 106 95 - 110 mmol/L Final     CO2   Date Value Ref Range Status   08/09/2023 25 23 - 29 mmol/L Final     Glucose   Date Value Ref Range Status   08/09/2023 117 (H) 70 - 110 mg/dL Final     BUN   Date Value Ref Range Status   08/09/2023 16 8 - 23 mg/dL Final     Creatinine   Date Value Ref Range Status   08/09/2023 0.9 0.5 - 1.4 mg/dL Final     Calcium   Date Value Ref Range Status   08/09/2023 9.5 8.7 - 10.5 mg/dL Final     Total Protein   Date Value Ref Range Status   08/09/2023 7.0 6.0 - 8.4 g/dL Final     Albumin   Date Value Ref Range Status   08/09/2023 3.9 3.5 - 5.2 g/dL Final     Total Bilirubin   Date Value Ref Range Status   08/09/2023 0.5 0.1 - 1.0 mg/dL Final     Comment:     For infants and newborns, interpretation of results should be based  on gestational age, weight and in agreement with clinical  observations.    Premature Infant recommended reference ranges:  Up to 24 hours.............<8.0 mg/dL  Up to 48 hours............<12.0 mg/dL  3-5 days..................<15.0 mg/dL  6-29 days.................<15.0 mg/dL       Alkaline Phosphatase   Date Value Ref Range Status   08/09/2023 93 55 - 135 U/L Final     AST   Date Value Ref Range Status   08/09/2023 27 10 - 40 U/L Final     ALT   Date Value Ref Range Status   08/09/2023 29 10 - 44 U/L Final     Anion Gap   Date Value Ref Range Status   08/09/2023 10 8 - 16 mmol/L Final     eGFR if    Date Value Ref Range Status   05/17/2022 >60.0 >60 mL/min/1.73 m^2 Final     eGFR if non    Date Value Ref Range Status   05/17/2022 >60.0 >60 mL/min/1.73 m^2 Final     Comment:     Calculation used to obtain the estimated glomerular filtration  rate (eGFR) is the CKD-EPI equation.         Lab Results   Component Value Date    YNJDNDPB95 502 02/09/2022     Lab Results   Component Value Date    TSH 9.645 (H) 08/22/2023    FREET4 0.84 08/22/2023     06-    MRI Lumbar WO    Mild lumbar degenerative disease with prominent L4-5 facet arthropathy.  Sacral perineural root sleeve cysts.  Otherwise negative.      Reviewed the neuroimaging independently       Assessment:       1. Abnormal gait    2. Lumbar spondylosis    3. Incontinence of feces, unspecified fecal incontinence type    4. Other symptoms and signs involving the nervous system    5. Dorsalgia, unspecified    6. Poor posture    7. Cervicalgia    8. Altered mental status, unspecified altered mental status type    9. Other migraine without status migrainosus, not intractable    10. Spondylosis without myelopathy or radiculopathy, lumbar region    11. Osteopenia, unspecified location        Plan:         ABNORMAL GAIT RELATED MULTIFACTORIAL FACTORS: SPONDYLOSIS WITHOUT MYELOPATHY/ LUMBAR SPONDYLOSIS/ CHRONIC NECK AND BACK PAIN/POOR POSTURE    EVALUATE:     B12-FA-Vitamin E    Brain MRI WO    C-spine MRI WO    L-spine MRI WO    Refer Occasional and Physical therapy     Falling down precautions     Slow down and avoid rushing    Look where you're going    Avoid walking in the dark    Think consciously about walking      BOWEL INCONTINENCE     Refer to GI       VITAMIN D DEFICIENCY    Refill Vitamins D 50,000 units weekly           MEDICAL/SURGICAL COMORBIDITIES     All relevant medical comorbidities noted and managed by primary care physician and medical care team.          MISCELLANEOUS MEDICAL PROBLEMS       HEALTHY LIFESTYLE AND PREVENTATIVE CARE    Encouraged the patient to adhere to the age-appropriate health maintenance guidelines including screening tests and vaccinations.     Discussed the overall importance of healthy lifestyle, optimal weight, exercise, healthy diet, good sleep hygiene and avoiding drugs including smoking, alcohol  and recreational drugs. The patient verbalized full understanding.       Advised the patient to follow COVID-19 prevention measures.       I spent 120 minutes face to face with the patient    Time spent in counseling and coordination of care including discussions etiology of diagnosis, pathogenesis of diagnosis, prognosis of diagnosis,, diagnostic results, impression and recommendations, diagnostic studies, management, risks and benefits of treatment, instructions of disease self-management, treatment instructions, follow up requirements, patient and family counseling/involvement in care compliance with treatment regimen. All of the patient's questions were answered during this discussion.           Cliff Hawkins, MSN NP      Collaborating Provider: Katya Jonas MD, FAAN Neurologist/Epileptologist

## 2023-09-02 LAB
FOLATE SERPL-MCNC: 10.1 NG/ML (ref 4–24)
VIT B12 SERPL-MCNC: 319 PG/ML (ref 210–950)

## 2023-09-12 LAB — A-TOCOPHEROL VIT E SERPL-MCNC: 2225 UG/DL (ref 500–1800)

## 2023-09-25 DIAGNOSIS — M79.642 LEFT HAND PAIN: Primary | ICD-10-CM

## 2023-09-27 ENCOUNTER — TELEPHONE (OUTPATIENT)
Dept: NEUROLOGY | Facility: CLINIC | Age: 83
End: 2023-09-27
Payer: MEDICARE

## 2023-09-27 ENCOUNTER — OFFICE VISIT (OUTPATIENT)
Dept: ORTHOPEDICS | Facility: CLINIC | Age: 83
End: 2023-09-27
Payer: MEDICARE

## 2023-09-27 ENCOUNTER — HOSPITAL ENCOUNTER (OUTPATIENT)
Dept: RADIOLOGY | Facility: HOSPITAL | Age: 83
Discharge: HOME | End: 2023-09-27
Attending: NURSE PRACTITIONER
Payer: MEDICARE

## 2023-09-27 ENCOUNTER — HOSPITAL ENCOUNTER (OUTPATIENT)
Dept: RADIOLOGY | Facility: HOSPITAL | Age: 83
Discharge: HOME | End: 2023-09-27
Attending: ORTHOPAEDIC SURGERY
Payer: MEDICARE

## 2023-09-27 VITALS — WEIGHT: 128 LBS | HEIGHT: 59 IN | BODY MASS INDEX: 25.8 KG/M2

## 2023-09-27 DIAGNOSIS — M67.449 MUCOUS CYST OF FINGER: Primary | ICD-10-CM

## 2023-09-27 DIAGNOSIS — Z01.818 PREOPERATIVE EXAMINATION: ICD-10-CM

## 2023-09-27 DIAGNOSIS — M54.2 CERVICALGIA: ICD-10-CM

## 2023-09-27 DIAGNOSIS — M54.9 DORSALGIA, UNSPECIFIED: ICD-10-CM

## 2023-09-27 DIAGNOSIS — R29.818 OTHER SYMPTOMS AND SIGNS INVOLVING THE NERVOUS SYSTEM: ICD-10-CM

## 2023-09-27 DIAGNOSIS — M79.642 LEFT HAND PAIN: ICD-10-CM

## 2023-09-27 PROCEDURE — 73130 XR HAND COMPLETE 3 VIEW LEFT: ICD-10-PCS | Mod: 26,LT,, | Performed by: RADIOLOGY

## 2023-09-27 PROCEDURE — 1160F RVW MEDS BY RX/DR IN RCRD: CPT | Mod: CPTII,S$GLB,, | Performed by: ORTHOPAEDIC SURGERY

## 2023-09-27 PROCEDURE — 72148 MRI LUMBAR SPINE WITHOUT CONTRAST: ICD-10-PCS | Mod: 26,,, | Performed by: RADIOLOGY

## 2023-09-27 PROCEDURE — 72148 MRI LUMBAR SPINE W/O DYE: CPT | Mod: TC

## 2023-09-27 PROCEDURE — 70551 MRI BRAIN STEM W/O DYE: CPT | Mod: TC

## 2023-09-27 PROCEDURE — 72148 MRI LUMBAR SPINE W/O DYE: CPT | Mod: 26,,, | Performed by: RADIOLOGY

## 2023-09-27 PROCEDURE — 1101F PT FALLS ASSESS-DOCD LE1/YR: CPT | Mod: CPTII,S$GLB,, | Performed by: ORTHOPAEDIC SURGERY

## 2023-09-27 PROCEDURE — 1126F AMNT PAIN NOTED NONE PRSNT: CPT | Mod: CPTII,S$GLB,, | Performed by: ORTHOPAEDIC SURGERY

## 2023-09-27 PROCEDURE — 1159F MED LIST DOCD IN RCRD: CPT | Mod: CPTII,S$GLB,, | Performed by: ORTHOPAEDIC SURGERY

## 2023-09-27 PROCEDURE — 1160F PR REVIEW ALL MEDS BY PRESCRIBER/CLIN PHARMACIST DOCUMENTED: ICD-10-PCS | Mod: CPTII,S$GLB,, | Performed by: ORTHOPAEDIC SURGERY

## 2023-09-27 PROCEDURE — 70551 MRI BRAIN WITHOUT CONTRAST: ICD-10-PCS | Mod: 26,,, | Performed by: RADIOLOGY

## 2023-09-27 PROCEDURE — 72141 MRI CERVICAL SPINE WITHOUT CONTRAST: ICD-10-PCS | Mod: 26,,, | Performed by: RADIOLOGY

## 2023-09-27 PROCEDURE — 70551 MRI BRAIN STEM W/O DYE: CPT | Mod: 26,,, | Performed by: RADIOLOGY

## 2023-09-27 PROCEDURE — 99999 PR PBB SHADOW E&M-EST. PATIENT-LVL III: ICD-10-PCS | Mod: PBBFAC,,, | Performed by: ORTHOPAEDIC SURGERY

## 2023-09-27 PROCEDURE — 99214 PR OFFICE/OUTPT VISIT, EST, LEVL IV, 30-39 MIN: ICD-10-PCS | Mod: S$GLB,,, | Performed by: ORTHOPAEDIC SURGERY

## 2023-09-27 PROCEDURE — 99214 OFFICE O/P EST MOD 30 MIN: CPT | Mod: S$GLB,,, | Performed by: ORTHOPAEDIC SURGERY

## 2023-09-27 PROCEDURE — 99999 PR PBB SHADOW E&M-EST. PATIENT-LVL III: CPT | Mod: PBBFAC,,, | Performed by: ORTHOPAEDIC SURGERY

## 2023-09-27 PROCEDURE — 73130 X-RAY EXAM OF HAND: CPT | Mod: TC,LT

## 2023-09-27 PROCEDURE — 3288F FALL RISK ASSESSMENT DOCD: CPT | Mod: CPTII,S$GLB,, | Performed by: ORTHOPAEDIC SURGERY

## 2023-09-27 PROCEDURE — 1101F PR PT FALLS ASSESS DOC 0-1 FALLS W/OUT INJ PAST YR: ICD-10-PCS | Mod: CPTII,S$GLB,, | Performed by: ORTHOPAEDIC SURGERY

## 2023-09-27 PROCEDURE — 3288F PR FALLS RISK ASSESSMENT DOCUMENTED: ICD-10-PCS | Mod: CPTII,S$GLB,, | Performed by: ORTHOPAEDIC SURGERY

## 2023-09-27 PROCEDURE — 72141 MRI NECK SPINE W/O DYE: CPT | Mod: TC

## 2023-09-27 PROCEDURE — 72141 MRI NECK SPINE W/O DYE: CPT | Mod: 26,,, | Performed by: RADIOLOGY

## 2023-09-27 PROCEDURE — 1159F PR MEDICATION LIST DOCUMENTED IN MEDICAL RECORD: ICD-10-PCS | Mod: CPTII,S$GLB,, | Performed by: ORTHOPAEDIC SURGERY

## 2023-09-27 PROCEDURE — 73130 X-RAY EXAM OF HAND: CPT | Mod: 26,LT,, | Performed by: RADIOLOGY

## 2023-09-27 PROCEDURE — 1126F PR PAIN SEVERITY QUANTIFIED, NO PAIN PRESENT: ICD-10-PCS | Mod: CPTII,S$GLB,, | Performed by: ORTHOPAEDIC SURGERY

## 2023-09-27 NOTE — TELEPHONE ENCOUNTER
Attempted to reach out to patient to discuss results, no answer unable to leave a VM due to MB full

## 2023-09-27 NOTE — PROGRESS NOTES
Subjective:     Patient ID: Haim Martin is a 83 y.o. female.    Chief Complaint: Pain of the Left Hand      HPI:  The patient has a 3-year-old female status post excision mucous cyst left long finger 05/19/2022.  The mucous cyst has recurred left long finger and she wishes to have it excised.  She lost her right index finger from an infected distal interphalangeal joint secondary to mucous cyst rupture infection.  She has neuroma pain at the end of the amputation stump right index finger but does not wish to have that addressed at this time    Past Medical History:   Diagnosis Date    Depression     Diverticulosis of colon (without mention of hemorrhage) 8/25/2014    Hyperlipidemia     Hypertension     Thyroid disease      Past Surgical History:   Procedure Laterality Date    APPENDECTOMY      CATARACT EXTRACTION EXTRACAPSULAR W/ INTRAOCULAR LENS IMPLANTATION Bilateral 4/2014    CYST REMOVAL Left 5/19/2022    Procedure: EXCISION, CYST;  Surgeon: Kareem Alatorre MD;  Location: [facility] OR;  Service: Orthopedics;  Laterality: Left;  excision mucous cyst left long finger    FINGER AMPUTATION Right 3/31/2022    Procedure: AMPUTATION, FINGER;  Surgeon: Kareem Alatorre MD;  Location: [facility] OR;  Service: Orthopedics;  Laterality: Right;  amputation right index finger at the level of proximal interphalangeal joint    INCISION AND DRAINAGE OF HAND Right 1/21/2022    Procedure: INCISION AND DRAINAGE, HAND;  Surgeon: Ramirez Flores MD;  Location: [facility] OR;  Service: Orthopedics;  Laterality: Right;  Right Index Finger    INJECTION OF ANESTHETIC AGENT AROUND MEDIAL BRANCH NERVES INNERVATING LUMBAR FACET JOINT Bilateral 7/16/2020    Procedure: Bilateral L3-5 MBB with local;  Surgeon: Luis Ashraf MD;  Location: [facility] PAIN MGT;  Service: Pain Management;  Laterality: Bilateral;    INJECTION OF ANESTHETIC AGENT AROUND MEDIAL BRANCH NERVES INNERVATING LUMBAR FACET JOINT Bilateral 8/10/2020    Procedure:  Bilateral L3-5 MBB with local;  Surgeon: Luis Ashraf MD;  Location: [facility] PAIN MGT;  Service: Pain Management;  Laterality: Bilateral;    INJECTION OF ANESTHETIC AGENT INTO SACROILIAC JOINT Bilateral 11/4/2021    Procedure: Bilateral SIJ Injection;  Surgeon: Luis Ashraf MD;  Location: [facility] PAIN MGT;  Service: Pain Management;  Laterality: Bilateral;    RADIOFREQUENCY THERMOCOAGULATION Left 9/28/2020    Procedure: Left L3-5 Lumbar RFA;  Surgeon: Luis Ashraf MD;  Location: [facility] PAIN MGT;  Service: Pain Management;  Laterality: Left;    RADIOFREQUENCY THERMOCOAGULATION Right 10/12/2020    Procedure: Right L3-5 Lumbar RFA;  Surgeon: Luis Ashraf MD;  Location: [facility] PAIN MGT;  Service: Pain Management;  Laterality: Right;    STOMACH SURGERY  1998    not sure what they did, possible bowel resection, partial gastrectomy    TONSILLECTOMY       Family History   Problem Relation Age of Onset    Lupus Mother     Stroke Father     Coronary artery disease Father     Diabetes type II Father     Kidney cancer Maternal Aunt     Breast cancer Maternal Aunt      Social History     Socioeconomic History    Marital status:    Tobacco Use    Smoking status: Former    Smokeless tobacco: Never    Tobacco comments:     quit 30 years ago   Substance and Sexual Activity    Alcohol use: No    Drug use: No    Sexual activity: Not Currently     Birth control/protection: None     Medication List with Changes/Refills   Current Medications    AMITRIPTYLINE (ELAVIL) 75 MG TABLET    Take 1 tablet by mouth in the evening    ASPIRIN (ECOTRIN) 81 MG EC TABLET    Take 81 mg by mouth once daily.    ATORVASTATIN (LIPITOR) 40 MG TABLET    TAKE 1 TABLET BY MOUTH ONCE DAILY IN THE EVENING    BETA-CAROTENE,A,-VITS C,E/MINS (OCUVITE ORAL)    Take 1 tablet by mouth once daily.    CALCIUM-VITAMIN D (CALCIUM 600 + D,3,) 600 MG-10 MCG (400 UNIT) TAB    Take 1 tablet by mouth 2 (two) times daily.    ERGOCALCIFEROL (VITAMIN D2) 50,000  UNIT CAP    Take 1 capsule (50,000 Units total) by mouth every 7 days.    LEVOTHYROXINE (SYNTHROID) 100 MCG TABLET    Take 1 tablet (100 mcg total) by mouth before breakfast.    METHOCARBAMOL (ROBAXIN) 500 MG TAB    Take 1 tablet (500 mg total) by mouth nightly as needed (pain / stiffness / cramps).    OMEPRAZOLE (PRILOSEC) 20 MG CAPSULE    Take 1 capsule (20 mg total) by mouth as needed.    PROPRANOLOL (INDERAL) 40 MG TABLET    Take 1 tablet by mouth twice daily    SERTRALINE (ZOLOFT) 100 MG TABLET    Take 1 tablet by mouth once daily    SUMATRIPTAN (IMITREX) 100 MG TABLET    TAKE ONE TABLET BY MOUTH AT ONSET OF MIGRAINE    VIT C/E/ZN/COPPR/LUTEIN/ZEAXAN (PRESERVISION AREDS-2 ORAL)    Take 1 tablet by mouth 2 (two) times a day.     Review of patient's allergies indicates:   Allergen Reactions    Clindamycin      Heaviness in chest      Penicillins Hives     Pt has tolerated cephalexin     Review of Systems   Constitutional: Negative for malaise/fatigue.   HENT:  Negative for hearing loss.    Eyes:  Negative for double vision and visual disturbance.   Cardiovascular:  Negative for chest pain.   Respiratory:  Positive for shortness of breath.    Endocrine: Negative for cold intolerance.   Hematologic/Lymphatic: Does not bruise/bleed easily.   Skin:  Negative for poor wound healing and suspicious lesions.   Musculoskeletal:  Positive for arthritis, back pain, joint pain and joint swelling. Negative for gout.   Gastrointestinal:  Positive for abdominal pain. Negative for nausea and vomiting.   Genitourinary:  Negative for dysuria.   Neurological:  Positive for headaches. Negative for numbness, paresthesias and sensory change.   Psychiatric/Behavioral:  Positive for depression. Negative for memory loss and substance abuse. The patient is nervous/anxious.    Allergic/Immunologic: Negative for persistent infections.       Objective:   Body mass index is 25.85 kg/m².  There were no vitals filed for this visit.              General    Constitutional: She is oriented to person, place, and time. She appears well-developed and well-nourished. No distress.   HENT:   Head: Normocephalic.   Mouth/Throat: Oropharynx is clear and moist.   Eyes: EOM are normal.   Cardiovascular:  Normal rate.            Pulmonary/Chest: Effort normal.   Abdominal: Soft.   Neurological: She is alert and oriented to person, place, and time. No cranial nerve deficit.   Psychiatric: She has a normal mood and affect.             Right Hand/Wrist Exam     Inspection   Scars: Wrist - absent Hand -  present  Effusion: Wrist - absent Hand -  absent  Deformity:  Hand -  deformity    Pain   Hand - The patient exhibits pain of the index IP.    Other     Neuorologic Exam    Median Distribution: normal  Ulnar Distribution: normal  Radial Distribution: normal    Comments:  The patient has neuroma pain at the stump of the right index finger amputation at the level of the proximal phalanx both radially and ulnarly.      Left Hand/Wrist Exam     Inspection   Scars: Wrist - present Hand -  present  Effusion: Wrist - absent Hand -  absent    Other     Sensory Exam  Median Distribution: normal  Ulnar Distribution: normal  Radial Distribution: normal    Comments:  The patient has a mucous cyst left long finger distal interphalangeal joint about 3 mm in diameter.  There is no sign of infection.  The patient has Heberden's nodes in multiple digits          Vascular Exam       Capillary Refill  Right Hand: normal capillary refill  Left Hand: normal capillary refill          Relevant imaging results reviewed and interpreted by me, discussed with the patient and / or family today the patient has significant osteoarthritic changes distal interphalangeal joints in multiple fingers  Assessment:     Encounter Diagnosis   Name Primary?    Mucous cyst of finger Yes    Left long finger, recurrent    Plan:       The patient wishes to have excision mucous cyst left long finger.  Risk  complications and alternatives were discussed including the risk of infection, anesthetic risk, injury to nerves and vessels, loss of motion, and possible need for additional surgeries were discussed.  A 5% recurrence rate was quoted.  The patient and her daughter seem to understand and agree that surgery.              Disclaimer: This note was prepared using a voice recognition system and is likely to have sound alike errors within the text.

## 2023-09-27 NOTE — TELEPHONE ENCOUNTER
----- Message from Katya Jonas MD sent at 9/27/2023  2:09 PM CDT -----    09-    Brain MRI age-related atrophy and C/L Spine MRI Mild DDD. Overall, unremarkable

## 2023-09-29 DIAGNOSIS — Z01.818 PRE-OP TESTING: Primary | ICD-10-CM

## 2023-10-02 ENCOUNTER — TELEPHONE (OUTPATIENT)
Dept: NEUROLOGY | Facility: CLINIC | Age: 83
End: 2023-10-02
Payer: MEDICARE

## 2023-10-02 NOTE — TELEPHONE ENCOUNTER
----- Message from Mireya Grady sent at 10/2/2023  4:40 PM CDT -----  Contact: Haim Garcia was returning the phone call. Please call her back at [phone].    Thanks  TS

## 2023-10-17 ENCOUNTER — OFFICE VISIT (OUTPATIENT)
Dept: FAMILY MEDICINE | Facility: CLINIC | Age: 83
End: 2023-10-17
Payer: MEDICARE

## 2023-10-17 VITALS
TEMPERATURE: 98 F | DIASTOLIC BLOOD PRESSURE: 80 MMHG | WEIGHT: 122.56 LBS | OXYGEN SATURATION: 96 % | SYSTOLIC BLOOD PRESSURE: 132 MMHG | HEIGHT: 59 IN | HEART RATE: 100 BPM | BODY MASS INDEX: 24.71 KG/M2

## 2023-10-17 DIAGNOSIS — J01.40 ACUTE NON-RECURRENT PANSINUSITIS: Primary | ICD-10-CM

## 2023-10-17 DIAGNOSIS — M46.1 SACROILIITIS: ICD-10-CM

## 2023-10-17 DIAGNOSIS — I70.0 AORTIC ATHEROSCLEROSIS: ICD-10-CM

## 2023-10-17 DIAGNOSIS — F32.4 MAJOR DEPRESSIVE DISORDER, SINGLE EPISODE, IN PARTIAL REMISSION: ICD-10-CM

## 2023-10-17 LAB
CTP QC/QA: YES
SARS-COV-2 RDRP RESP QL NAA+PROBE: NEGATIVE

## 2023-10-17 PROCEDURE — 99214 PR OFFICE/OUTPT VISIT, EST, LEVL IV, 30-39 MIN: ICD-10-PCS | Mod: 25,S$GLB,, | Performed by: NURSE PRACTITIONER

## 2023-10-17 PROCEDURE — 87635: ICD-10-PCS | Mod: QW,S$GLB,, | Performed by: NURSE PRACTITIONER

## 2023-10-17 PROCEDURE — 87635 SARS-COV-2 COVID-19 AMP PRB: CPT | Mod: QW,S$GLB,, | Performed by: NURSE PRACTITIONER

## 2023-10-17 PROCEDURE — 3288F FALL RISK ASSESSMENT DOCD: CPT | Mod: CPTII,S$GLB,, | Performed by: NURSE PRACTITIONER

## 2023-10-17 PROCEDURE — 99214 OFFICE O/P EST MOD 30 MIN: CPT | Mod: 25,S$GLB,, | Performed by: NURSE PRACTITIONER

## 2023-10-17 PROCEDURE — 3079F DIAST BP 80-89 MM HG: CPT | Mod: CPTII,S$GLB,, | Performed by: NURSE PRACTITIONER

## 2023-10-17 PROCEDURE — 99999 PR PBB SHADOW E&M-EST. PATIENT-LVL III: ICD-10-PCS | Mod: PBBFAC,,, | Performed by: NURSE PRACTITIONER

## 2023-10-17 PROCEDURE — 1159F PR MEDICATION LIST DOCUMENTED IN MEDICAL RECORD: ICD-10-PCS | Mod: CPTII,S$GLB,, | Performed by: NURSE PRACTITIONER

## 2023-10-17 PROCEDURE — 1101F PR PT FALLS ASSESS DOC 0-1 FALLS W/OUT INJ PAST YR: ICD-10-PCS | Mod: CPTII,S$GLB,, | Performed by: NURSE PRACTITIONER

## 2023-10-17 PROCEDURE — 96372 PR INJECTION,THERAP/PROPH/DIAG2ST, IM OR SUBCUT: ICD-10-PCS | Mod: S$GLB,,, | Performed by: NURSE PRACTITIONER

## 2023-10-17 PROCEDURE — 1126F AMNT PAIN NOTED NONE PRSNT: CPT | Mod: CPTII,S$GLB,, | Performed by: NURSE PRACTITIONER

## 2023-10-17 PROCEDURE — 3075F SYST BP GE 130 - 139MM HG: CPT | Mod: CPTII,S$GLB,, | Performed by: NURSE PRACTITIONER

## 2023-10-17 PROCEDURE — 1101F PT FALLS ASSESS-DOCD LE1/YR: CPT | Mod: CPTII,S$GLB,, | Performed by: NURSE PRACTITIONER

## 2023-10-17 PROCEDURE — 1159F MED LIST DOCD IN RCRD: CPT | Mod: CPTII,S$GLB,, | Performed by: NURSE PRACTITIONER

## 2023-10-17 PROCEDURE — 1126F PR PAIN SEVERITY QUANTIFIED, NO PAIN PRESENT: ICD-10-PCS | Mod: CPTII,S$GLB,, | Performed by: NURSE PRACTITIONER

## 2023-10-17 PROCEDURE — 96372 THER/PROPH/DIAG INJ SC/IM: CPT | Mod: S$GLB,,, | Performed by: NURSE PRACTITIONER

## 2023-10-17 PROCEDURE — 3288F PR FALLS RISK ASSESSMENT DOCUMENTED: ICD-10-PCS | Mod: CPTII,S$GLB,, | Performed by: NURSE PRACTITIONER

## 2023-10-17 PROCEDURE — 99999 PR PBB SHADOW E&M-EST. PATIENT-LVL III: CPT | Mod: PBBFAC,,, | Performed by: NURSE PRACTITIONER

## 2023-10-17 PROCEDURE — 3079F PR MOST RECENT DIASTOLIC BLOOD PRESSURE 80-89 MM HG: ICD-10-PCS | Mod: CPTII,S$GLB,, | Performed by: NURSE PRACTITIONER

## 2023-10-17 PROCEDURE — 3075F PR MOST RECENT SYSTOLIC BLOOD PRESS GE 130-139MM HG: ICD-10-PCS | Mod: CPTII,S$GLB,, | Performed by: NURSE PRACTITIONER

## 2023-10-17 RX ORDER — CEFDINIR 300 MG/1
300 CAPSULE ORAL 2 TIMES DAILY
Qty: 20 CAPSULE | Refills: 0 | Status: SHIPPED | OUTPATIENT
Start: 2023-10-17 | End: 2023-10-27

## 2023-10-17 RX ORDER — METHYLPREDNISOLONE ACETATE 80 MG/ML
80 INJECTION, SUSPENSION INTRA-ARTICULAR; INTRALESIONAL; INTRAMUSCULAR; SOFT TISSUE
Status: COMPLETED | OUTPATIENT
Start: 2023-10-17 | End: 2023-10-17

## 2023-10-17 RX ORDER — PROMETHAZINE HYDROCHLORIDE AND DEXTROMETHORPHAN HYDROBROMIDE 6.25; 15 MG/5ML; MG/5ML
5 SYRUP ORAL 3 TIMES DAILY PRN
Qty: 118 ML | Refills: 0 | Status: SHIPPED | OUTPATIENT
Start: 2023-10-17 | End: 2023-10-27

## 2023-10-17 RX ADMIN — METHYLPREDNISOLONE ACETATE 80 MG: 80 INJECTION, SUSPENSION INTRA-ARTICULAR; INTRALESIONAL; INTRAMUSCULAR; SOFT TISSUE at 09:10

## 2023-10-17 NOTE — PROGRESS NOTES
Subjective:       Patient ID: Haim Martin is a 83 y.o. female.    Chief Complaint: Cough and Nasal Congestion  Pt reports to clinic with chief complaint of cough, fever x4 days.  Using OTC medication without relief.  No SOB or chest pain.  Cough is productive of green sputum.    Past Medical History:   Diagnosis Date    Depression     Diverticulosis of colon (without mention of hemorrhage) 8/25/2014    Hyperlipidemia     Hypertension     Thyroid disease       Current Outpatient Medications on File Prior to Visit   Medication Sig Dispense Refill    amitriptyline (ELAVIL) 75 MG tablet Take 1 tablet by mouth in the evening 90 tablet 3    aspirin (ECOTRIN) 81 MG EC tablet Take 81 mg by mouth once daily.      atorvastatin (LIPITOR) 40 MG tablet TAKE 1 TABLET BY MOUTH ONCE DAILY IN THE EVENING 90 tablet 0    beta-carotene,A,-vits C,E/mins (OCUVITE ORAL) Take 1 tablet by mouth once daily.      calcium-vitamin D (CALCIUM 600 + D,3,) 600 mg-10 mcg (400 unit) Tab Take 1 tablet by mouth 2 (two) times daily. 60 tablet 3    ergocalciferol (VITAMIN D2) 50,000 unit Cap Take 1 capsule (50,000 Units total) by mouth every 7 days. 4 capsule 5    levothyroxine (SYNTHROID) 100 MCG tablet Take 1 tablet (100 mcg total) by mouth before breakfast. 30 tablet 1    omeprazole (PRILOSEC) 20 MG capsule Take 1 capsule (20 mg total) by mouth as needed. 90 capsule 4    propranoloL (INDERAL) 40 MG tablet Take 1 tablet by mouth twice daily 180 tablet 3    sertraline (ZOLOFT) 100 MG tablet Take 1 tablet by mouth once daily 90 tablet 3    sumatriptan (IMITREX) 100 MG tablet TAKE ONE TABLET BY MOUTH AT ONSET OF MIGRAINE 27 tablet 3    vit C/E/Zn/coppr/lutein/zeaxan (PRESERVISION AREDS-2 ORAL) Take 1 tablet by mouth 2 (two) times a day.       Current Facility-Administered Medications on File Prior to Visit   Medication Dose Route Frequency Provider Last Rate Last Admin    lactated ringers infusion   Intravenous On Call Procedure Estefania Quach  QUE FRANCO 100 mL/hr at 03/31/22 0821 New Bag at 03/31/22 0821    lactated ringers infusion   Intravenous Continuous Marzena Ybarra MD   Stopped at 05/19/22 0844      Cough  This is a new problem. The current episode started in the past 7 days. The problem has been gradually worsening. The cough is Productive of sputum. Associated symptoms include headaches, nasal congestion, postnasal drip, rhinorrhea and a sore throat. Pertinent negatives include no chills or fever. Nothing aggravates the symptoms. She has tried OTC cough suppressant for the symptoms. The treatment provided no relief.     Review of Systems   Constitutional:  Positive for fatigue. Negative for chills and fever.   HENT:  Positive for postnasal drip, rhinorrhea and sore throat.    Respiratory:  Positive for cough.    Cardiovascular: Negative.    Gastrointestinal: Negative.    Genitourinary: Negative.    Neurological:  Positive for headaches.   Psychiatric/Behavioral: Negative.         Objective:      Physical Exam  Vitals reviewed.   HENT:      Head: Normocephalic.      Right Ear: Tympanic membrane and external ear normal.      Left Ear: Tympanic membrane and external ear normal.      Nose: Congestion and rhinorrhea present.      Right Sinus: Maxillary sinus tenderness and frontal sinus tenderness present.      Left Sinus: Maxillary sinus tenderness and frontal sinus tenderness present.      Mouth/Throat:      Pharynx: Posterior oropharyngeal erythema present.   Eyes:      Extraocular Movements: Extraocular movements intact.   Cardiovascular:      Rate and Rhythm: Normal rate.      Heart sounds: Normal heart sounds.   Pulmonary:      Effort: Pulmonary effort is normal. No respiratory distress.      Breath sounds: No wheezing.      Comments: Deep barking cough  Musculoskeletal:      Cervical back: Normal range of motion.   Skin:     Coloration: Skin is not jaundiced.   Neurological:      Mental Status: She is alert and oriented to person, place, and  time.   Psychiatric:         Behavior: Behavior normal.         Assessment:       1. Acute non-recurrent pansinusitis    2. Aortic atherosclerosis    3. Sacroiliitis    4. Major depressive disorder, single episode, in partial remission        Plan:   Acute non-recurrent pansinusitis  -     POCT COVID-19 Rapid Screening  -     promethazine-dextromethorphan (PROMETHAZINE-DM) 6.25-15 mg/5 mL Syrp; Take 5 mLs by mouth 3 (three) times daily as needed (cough).  Dispense: 118 mL; Refill: 0  -     cefdinir (OMNICEF) 300 MG capsule; Take 1 capsule (300 mg total) by mouth 2 (two) times daily. for 10 days  Dispense: 20 capsule; Refill: 0  -     methylPREDNISolone acetate injection 80 mg  Per documentation pt has tolerated cephalosporins without reaction  Aortic atherosclerosis  Stable continue statin therapy  Sacroiliitis  Stable managed per PM continue treatment plan   Major depressive disorder, single episode, in partial remission  Stable continue zoloft and elavil     No follow-ups on file.

## 2023-10-30 RX ORDER — LEVOTHYROXINE SODIUM 100 UG/1
100 TABLET ORAL
Qty: 30 TABLET | Refills: 0 | OUTPATIENT
Start: 2023-10-30

## 2023-10-30 NOTE — TELEPHONE ENCOUNTER
No care due was identified.  Health [facility] Embedded Care Due Messages. Reference number: [id number].   10/30/2023 11:47:55 AM CDT

## 2023-10-30 NOTE — TELEPHONE ENCOUNTER
Refill Routing Note   Medication(s) are not appropriate for processing by [facility] for the following reason(s):      New or recently adjusted medication    [facility] action(s):  Defer Care Due:  None identified              Appointments  past 12m or future 3m with PCP    Date Provider   Last Visit   8/22/2023 Kavya Mesa MD   Next Visit   Visit date not found Kavya Mesa MD   ED visits in past 90 days: 0        Note composed:4:16 PM 10/30/2023

## 2023-10-31 ENCOUNTER — TELEPHONE (OUTPATIENT)
Dept: FAMILY MEDICINE | Facility: CLINIC | Age: 83
End: 2023-10-31
Payer: MEDICARE

## 2023-10-31 RX ORDER — AMITRIPTYLINE HYDROCHLORIDE 75 MG/1
75 TABLET ORAL NIGHTLY
Qty: 90 TABLET | Refills: 3 | Status: SHIPPED | OUTPATIENT
Start: 2023-10-31

## 2023-10-31 RX ORDER — LEVOTHYROXINE SODIUM 100 UG/1
100 TABLET ORAL
Qty: 7 TABLET | Refills: 0 | Status: SHIPPED | OUTPATIENT
Start: 2023-10-31 | End: 2023-11-02 | Stop reason: SDUPTHER

## 2023-10-31 NOTE — TELEPHONE ENCOUNTER
----- Message from Swapna Sloan sent at 10/31/2023  2:04 PM CDT -----  Regarding: concerns  Name of who is calling:   Haim      What is the request in detail: Pt is requesting a call back in ref to thyroid medications clarification / Pt missed call      Can the clinic reply by [facility]:      What number to call back if not [facility]:[phone]

## 2023-10-31 NOTE — TELEPHONE ENCOUNTER
----- Message from Swapna Sloan sent at 10/31/2023  2:04 PM CDT -----  Regarding: concerns  Name of who is calling:   Haim      What is the request in detail: Pt is requesting a call back in ref to thyroid medications clarification / Pt missed call      Can the clinic reply by [facility]:      What number to call back if not [facility]:[phone]

## 2023-10-31 NOTE — TELEPHONE ENCOUNTER
Spoke with pt and she stated that she needs a medication refill on her levothyroxine and amitriptyline.  I told the pt I will forward the message to Dr. Scott.

## 2023-10-31 NOTE — TELEPHONE ENCOUNTER
Amitriptyline sent . This msg was sent to her  back in august . I also denied refill yesterday for the same reason :  Your  thyroid  level is still low . I will send a higher dose of levothyroxine to your pharmacy .We will repeat the labs in 2 months .   EVELYN Scott        I will send 7 tab of levothyroxine to her pharmacy . MUST COME TO HAVE TSH DONE

## 2023-10-31 NOTE — TELEPHONE ENCOUNTER
Spoke with pt. And explained that Dr. Scott would like for her to come in and complete blood work.  Pt. Stated she'll be in on  tomorrow to complete all lab work.

## 2023-11-01 ENCOUNTER — LAB VISIT (OUTPATIENT)
Dept: LAB | Facility: HOSPITAL | Age: 83
End: 2023-11-01
Attending: FAMILY MEDICINE
Payer: MEDICARE

## 2023-11-01 DIAGNOSIS — E03.9 HYPOTHYROIDISM, UNSPECIFIED TYPE: ICD-10-CM

## 2023-11-01 LAB — TSH SERPL DL<=0.005 MIU/L-ACNC: 1.11 UIU/ML (ref 0.4–4)

## 2023-11-01 PROCEDURE — 84443 ASSAY THYROID STIM HORMONE: CPT | Performed by: FAMILY MEDICINE

## 2023-11-01 PROCEDURE — 36415 COLL VENOUS BLD VENIPUNCTURE: CPT | Mod: PO | Performed by: FAMILY MEDICINE

## 2023-11-01 RX ORDER — ATORVASTATIN CALCIUM 40 MG/1
40 TABLET, FILM COATED ORAL NIGHTLY
Qty: 90 TABLET | Refills: 0 | Status: SHIPPED | OUTPATIENT
Start: 2023-11-01

## 2023-11-01 NOTE — TELEPHONE ENCOUNTER
Care Due:                  Date            Visit Type   Department     Provider  --------------------------------------------------------------------------------                                EP -                              PRIMARY      [facility] INTERNAL  Kavya CANALES  Last Visit: 08-      CARE ([facility])   [facility]       [place]  Next Visit: None Scheduled  None         None Found                                                            Last  Test          Frequency    Reason                     Performed    Due Date  --------------------------------------------------------------------------------    Lipid Panel.  12 months..  atorvastatin.............  Not Found    Overdue    [employer] Embedded Care Due Messages. Reference number: [id number].   11/01/2023 12:32:48 PM CDT

## 2023-11-02 RX ORDER — LEVOTHYROXINE SODIUM 100 UG/1
100 TABLET ORAL
Qty: 90 TABLET | Refills: 1 | Status: SHIPPED | OUTPATIENT
Start: 2023-11-02 | End: 2024-11-01

## 2023-11-02 NOTE — TELEPHONE ENCOUNTER
----- Message from Kavya Mesa MD sent at 11/2/2023 12:36 PM CDT -----  Normal TSH . Continue current levothyroxine dose .

## 2023-11-02 NOTE — TELEPHONE ENCOUNTER
No care due was identified.  Health [facility] Embedded Care Due Messages. Reference number: [id number].   11/02/2023 1:33:04 PM CDT

## 2023-12-20 ENCOUNTER — HOSPITAL ENCOUNTER (OUTPATIENT)
Dept: CARDIOLOGY | Facility: HOSPITAL | Age: 83
Discharge: HOME | End: 2023-12-20
Payer: MEDICARE

## 2023-12-20 ENCOUNTER — OFFICE VISIT (OUTPATIENT)
Dept: INTERNAL MEDICINE | Facility: CLINIC | Age: 83
End: 2023-12-20
Payer: MEDICARE

## 2023-12-20 VITALS
SYSTOLIC BLOOD PRESSURE: 163 MMHG | DIASTOLIC BLOOD PRESSURE: 87 MMHG | HEART RATE: 82 BPM | TEMPERATURE: 98 F | OXYGEN SATURATION: 98 %

## 2023-12-20 DIAGNOSIS — E03.9 HYPOTHYROIDISM, UNSPECIFIED TYPE: ICD-10-CM

## 2023-12-20 DIAGNOSIS — M67.442 MUCOUS CYST OF DIGIT OF LEFT HAND: Primary | ICD-10-CM

## 2023-12-20 DIAGNOSIS — Z01.818 PRE-OP TESTING: ICD-10-CM

## 2023-12-20 DIAGNOSIS — Z01.818 PREOPERATIVE EXAMINATION: ICD-10-CM

## 2023-12-20 PROBLEM — M67.40 GANGLION CYST: Status: RESOLVED | Noted: 2023-12-20 | Resolved: 2023-12-20

## 2023-12-20 PROBLEM — M67.40 GANGLION CYST: Status: ACTIVE | Noted: 2023-12-20

## 2023-12-20 PROCEDURE — 93010 EKG 12-LEAD: ICD-10-PCS | Mod: ,,, | Performed by: STUDENT IN AN ORGANIZED HEALTH CARE EDUCATION/TRAINING PROGRAM

## 2023-12-20 PROCEDURE — 3079F DIAST BP 80-89 MM HG: CPT | Mod: CPTII,S$GLB,, | Performed by: SPECIALIST

## 2023-12-20 PROCEDURE — 99999 PR PBB SHADOW E&M-EST. PATIENT-LVL III: CPT | Mod: PBBFAC,,,

## 2023-12-20 PROCEDURE — 1160F RVW MEDS BY RX/DR IN RCRD: CPT | Mod: CPTII,S$GLB,, | Performed by: SPECIALIST

## 2023-12-20 PROCEDURE — 3077F PR MOST RECENT SYSTOLIC BLOOD PRESSURE >= 140 MM HG: ICD-10-PCS | Mod: CPTII,S$GLB,, | Performed by: SPECIALIST

## 2023-12-20 PROCEDURE — 3077F SYST BP >= 140 MM HG: CPT | Mod: CPTII,S$GLB,, | Performed by: SPECIALIST

## 2023-12-20 PROCEDURE — 93010 ELECTROCARDIOGRAM REPORT: CPT | Mod: ,,, | Performed by: STUDENT IN AN ORGANIZED HEALTH CARE EDUCATION/TRAINING PROGRAM

## 2023-12-20 PROCEDURE — 93005 ELECTROCARDIOGRAM TRACING: CPT

## 2023-12-20 PROCEDURE — 1159F PR MEDICATION LIST DOCUMENTED IN MEDICAL RECORD: ICD-10-PCS | Mod: CPTII,S$GLB,, | Performed by: SPECIALIST

## 2023-12-20 PROCEDURE — 99999 PR PBB SHADOW E&M-EST. PATIENT-LVL III: ICD-10-PCS | Mod: PBBFAC,,,

## 2023-12-20 PROCEDURE — 99214 PR OFFICE/OUTPT VISIT, EST, LEVL IV, 30-39 MIN: ICD-10-PCS | Mod: S$GLB,,, | Performed by: SPECIALIST

## 2023-12-20 PROCEDURE — 1159F MED LIST DOCD IN RCRD: CPT | Mod: CPTII,S$GLB,, | Performed by: SPECIALIST

## 2023-12-20 PROCEDURE — 1160F PR REVIEW ALL MEDS BY PRESCRIBER/CLIN PHARMACIST DOCUMENTED: ICD-10-PCS | Mod: CPTII,S$GLB,, | Performed by: SPECIALIST

## 2023-12-20 PROCEDURE — 99214 OFFICE O/P EST MOD 30 MIN: CPT | Mod: S$GLB,,, | Performed by: SPECIALIST

## 2023-12-20 PROCEDURE — 3079F PR MOST RECENT DIASTOLIC BLOOD PRESSURE 80-89 MM HG: ICD-10-PCS | Mod: CPTII,S$GLB,, | Performed by: SPECIALIST

## 2023-12-20 NOTE — PRE-PROCEDURE INSTRUCTIONS
To confirm, Surgery is scheduled on 1/12/24. We will call you late afternoon the business day prior to surgery with your arrival time.    *Please report to the [facility] (1st Floor) located off of [facility] (2nd Entrance/Building on the left, in front of the flag pole).  Address: [address] [place] [address] [place]. [id number]        INSTRUCTIONS IMPORTANT!!!  DO NOT Eat, Drink, or Smoke after 12 midnight unless instructed otherwise by your Surgeon. OK to brush teeth, no gum, candy or mints!    MORNING OF SURGERY, drink small sip of water with the following medications instructed by Pre-Admit Provider:  LEVOTHYROXINE  PROPRANOLOL  OMEPRAZOLE    Diabetic Patients: If you take diabetic or weight loss medication, Do NOT take morning of surgery unless instructed by Doctor. Metformin to be stopped 24 hrs prior to surgery. Ozempic/ Mounjaro/ Wegovy/ Trulicity/ Semaglutide or any weight loss injections to be stopped 7 days prior to surgery. DO NOT take long-acting insulin the evening before surgery. Blood sugars will be checked in pre-op by Nurse.    *Patients should HOLD all vitamins, herbal supplements, weight loss medication, aspirin products & NSAIDS 7 days prior to surgery, as these can thin the blood. Ok to take Tylenol.    ____  Avoid Alcoholic beverages 3 days prior to surgery, as it can thin the blood.  ____  NO Acrylic/fake nails or nail polish worn day of surgery (specifically hand/arm & foot surgeries).  ____  NO powder, lotions, deodorants, oils or cream on body.  ____  Remove all jewelry, piercings, & foreign objects prior to arrival and leave at home.  ____  Remove Dentures, Hearing Aids & Contact Lens prior to surgery.  ____  Bring photo ID and insurance information to hospital (Leave Valuables at Home).  ____  If going home the same day, arrange for a ride home. You will not be able to drive for 24 hrs if Anesthesia was used.   ____  Females (ages 11-60): may need to give a urine sample the  morning of surgery; please see Pre op Nurse prior to using the restroom.  ____  Males: Stop ED medications (Viagra, Cialis) 24 hrs prior to surgery.  ____  Wear clean, loose fitting clothing to allow for dressings/ bandages.      Bathing Instructions:    -Shower with anti-bacterial Soap (Hibiclens or Dial) the night before surgery and the morning of.   -Do not use Hibiclens on your face or genitals.   -Apply clean clothes after shower.  -Do not shave your face or body 2 days prior to surgery unless instructed otherwise by your Surgeon.  -Do not shave pubic hair 7 days prior to surgery (gyn pt's).    [facility] Vis[facility]/Ride Policy:  Only 2 adults allowed in pre op/recovery area during your procedure. You MUST HAVE A RIDE HOME from a responsible adult that you know and trust. Medical Transport, Uber or Lyft can ONLY be used if patient has a responsible adult to accompany them during ride home.    Discharge Instructions: You will receive Post-op/Discharge instructions by your Discharge Nurse prior to going home.   *Prevention of surgical site infections:   -Keep incisions clean and dry.   -Do not soak/submerge incisions in water until completely healed.   -Do not apply lotions, powders, creams, or deodorants to site.   -Always make sure hands are cleaned with antibacterial soap/ alcohol-based  prior to touching the surgical site.        *Signs and symptoms of Infection Before or After Surgery:               !!!If you experience any fever, chills, nausea/ vomiting, foul odor/ excessive drainage from surgical site, flu-like symptoms, new wounds or cuts, PLEASE CALL THE SURGEON OFFICE at [phone] or SEND MESSAGE THROUGH [facility] [employer] [employer] PORTAL!!!       *If you are running late day of surgery, please call the Surgery Dept @ [phone].    *Billing question, please call  [phone]       Thank you,  -[facility] Pre Admit Dept.  [phone] or [phone]  M-F 7:30 am-4:00 pm (Closed Major  Holidays)    Additional Tests Scheduled Today:  EKG (4th Floor) Check in at the !    Additional Tests Scheduled Today:  LABS (1ST Floor) Check in at the !

## 2023-12-20 NOTE — ASSESSMENT & PLAN NOTE
Planned for EXCISION, CYST, left with Dr. Kareem Alatorre on 1/12/24.     Known risk factors for perioperative complications: None    Difficulty with intubation is not anticipated.    Cardiac Risk Estimation: Based on the Revised Cardiac Risk index, patient is a Class 1 risk with a 3.9% risk of a major cardiac event in a low risk procedure.    1.) Preoperative workup as follows: ECG, hemoglobin, hematocrit, electrolytes, creatinine, glucose, liver function studies.  2.) Change in medication regimen before surgery: discontinue ASA, NSAIDs 7 days before surgery, hold Metformin 24 hours prior to surgery.  3.) Prophylaxis for cardiac events with perioperative beta-blockers: Continue BB as prescribed.  4.) Invasive hemodynamic monitoring perioperatively: at the discretion of anesthesiologist.  5.) Deep vein thrombosis prophylaxis postoperatively: intermittent pneumatic compression boots and regimen to be chosen by surgical team.  6.) Surveillance for postoperative MI with ECG immediately postoperatively and on postoperati ve days 1 and 2 AND troponin levels 24 hours postoperatively and on day 4 or hospital discharge (whichever comes first): not indicated.  7.) Current medications which may produce withdrawal symptoms if withheld perioperatively: None  8.) Other measures: None       --Morning of Procedure medications: levothyroxine, propanolol  --Hold all other medications morning of surgery   --Resume all medications post-operatively  --Hold ASA, NSAIDs and all vitamins/supplements 7 days prior to procedure

## 2023-12-20 NOTE — PROGRESS NOTES
Preoperative History and Physical                                                              Hospital Medicine                                                                      Chief Complaint: Preoperative evaluation     History of Present Illness:      Haim Martin is a 83 y.o. female who presents to the office today for a preoperative consultation at the request of Dr. Kareem Alatorre who plans on performing EXCISION, CYST, Left on January 12.     Functional Status:      The patient is able to climb a flight of stairs. The patient is able to ambulate with walker at times, intermittent difficulty due to back pain. The patient's functional status is not affected by the surgical problem. The patient's functional status is not affected by shortness of breath, chest pain, dyspnea on exertion and fatigue.    MET score greater than 4    Past Medical History:      Past Medical History:   Diagnosis Date    Depression     Diverticulosis of colon (without mention of hemorrhage) 08/25/2014    Hyperlipidemia     Hypertension     Thyroid disease         Past Surgical History:      Past Surgical History:   Procedure Laterality Date    APPENDECTOMY      CATARACT EXTRACTION EXTRACAPSULAR W/ INTRAOCULAR LENS IMPLANTATION Bilateral 4/2014    CYST REMOVAL Left 5/19/2022    Procedure: EXCISION, CYST;  Surgeon: Kareem Alatorre MD;  Location: [facility] OR;  Service: Orthopedics;  Laterality: Left;  excision mucous cyst left long finger    FINGER AMPUTATION Right 3/31/2022    Procedure: AMPUTATION, FINGER;  Surgeon: Kareem Alatorre MD;  Location: [facility] OR;  Service: Orthopedics;  Laterality: Right;  amputation right index finger at the level of proximal interphalangeal joint    INCISION AND DRAINAGE OF HAND Right 1/21/2022    Procedure: INCISION AND DRAINAGE, HAND;  Surgeon: Ramirez Flores MD;  Location: [facility] OR;  Service: Orthopedics;  Laterality: Right;  Right Index  Finger    INJECTION OF ANESTHETIC AGENT AROUND MEDIAL BRANCH NERVES INNERVATING LUMBAR FACET JOINT Bilateral 7/16/2020    Procedure: Bilateral L3-5 MBB with local;  Surgeon: Luis Ashraf MD;  Location: [facility];  Service: Pain Management;  Laterality: Bilateral;    INJECTION OF ANESTHETIC AGENT AROUND MEDIAL BRANCH NERVES INNERVATING LUMBAR FACET JOINT Bilateral 8/10/2020    Procedure: Bilateral L3-5 MBB with local;  Surgeon: Luis Ashraf MD;  Location: [facility];  Service: Pain Management;  Laterality: Bilateral;    INJECTION OF ANESTHETIC AGENT INTO SACROILIAC JOINT Bilateral 11/4/2021    Procedure: Bilateral SIJ Injection;  Surgeon: Luis Ashraf MD;  Location: [facility];  Service: Pain Management;  Laterality: Bilateral;    RADIOFREQUENCY THERMOCOAGULATION Left 9/28/2020    Procedure: Left L3-5 Lumbar RFA;  Surgeon: Luis Ashraf MD;  Location: [facility];  Service: Pain Management;  Laterality: Left;    RADIOFREQUENCY THERMOCOAGULATION Right 10/12/2020    Procedure: Right L3-5 Lumbar RFA;  Surgeon: Luis Ashraf MD;  Location: [facility];  Service: Pain Management;  Laterality: Right;    STOMACH SURGERY  1998    not sure what they did, possible bowel resection, partial gastrectomy    TONSILLECTOMY          Social History:      Social History     Socioeconomic History    Marital status:    Tobacco Use    Smoking status: Former     Passive exposure: Never    Smokeless tobacco: Never    Tobacco comments:     quit 30 years ago   Substance and Sexual Activity    Alcohol use: No    Drug use: No    Sexual activity: Not Currently     Birth control/protection: None        Family History:      Family History   Problem Relation Age of Onset    Lupus Mother     Stroke Father     Coronary artery disease Father     Diabetes type II Father     Kidney cancer Maternal Aunt     Breast cancer Maternal Aunt        Allergies:      Review of patient's allergies indicates:   Allergen  Reactions    Clindamycin      Heaviness in chest      Penicillins Hives     Pt has tolerated cephalexin       Medications:      Current Outpatient Medications   Medication Sig    amitriptyline (ELAVIL) 75 MG tablet Take 1 tablet (75 mg total) by mouth every evening.    aspirin (ECOTRIN) 81 MG EC tablet Take 81 mg by mouth once daily.    atorvastatin (LIPITOR) 40 MG tablet Take 1 tablet (40 mg total) by mouth every evening.    beta-carotene,A,-vits C,E/mins (OCUVITE ORAL) Take 1 tablet by mouth once daily.    calcium-vitamin D (CALCIUM 600 + D,3,) 600 mg-10 mcg (400 unit) Tab Take 1 tablet by mouth 2 (two) times daily.    ergocalciferol (VITAMIN D2) 50,000 unit Cap Take 1 capsule (50,000 Units total) by mouth every 7 days.    levothyroxine (SYNTHROID) 100 MCG tablet Take 1 tablet (100 mcg total) by mouth before breakfast.    omeprazole (PRILOSEC) 20 MG capsule Take 1 capsule (20 mg total) by mouth as needed.    propranoloL (INDERAL) 40 MG tablet Take 1 tablet by mouth twice daily    sumatriptan (IMITREX) 100 MG tablet TAKE ONE TABLET BY MOUTH AT ONSET OF MIGRAINE    vit C/E/Zn/coppr/lutein/zeaxan (PRESERVISION AREDS-2 ORAL) Take 1 tablet by mouth 2 (two) times a day.    sertraline (ZOLOFT) 100 MG tablet Take 1 tablet by mouth once daily     No current facility-administered medications for this visit.     Facility-Administered Medications Ordered in Other Visits   Medication    lactated ringers infusion    lactated ringers infusion       Vitals:      Vitals:    12/20/23 1106   BP: (!) 163/87   Pulse: 82   Temp: 97.8 °F (36.6 °C)       Review of Systems:        Constitutional: Negative for fever, chills, weight loss, malaise/fatigue and diaphoresis.   HENT: Negative for hearing loss, ear pain, nosebleeds, congestion, sore throat, neck pain, tinnitus and ear discharge.    Eyes: Negative for blurred vision, double vision, photophobia, pain, discharge and redness.   Respiratory: Negative for cough, hemoptysis, sputum  production, shortness of breath, wheezing and stridor.    Cardiovascular: Negative for chest pain, palpitations, orthopnea, claudication, leg swelling and PND.   Gastrointestinal: Negative for heartburn, nausea, vomiting, abdominal pain, diarrhea, constipation, blood in stool and melena.   Genitourinary: Negative for dysuria, urgency, frequency, hematuria and flank pain.   Musculoskeletal: Negative for myalgias, back pain, joint pain and falls. +hand pain  Skin: Negative for itching and rash.   Neurological: Negative for dizziness, tingling, tremors, sensory change, speech change, focal weakness, seizures, loss of consciousness, weakness and headaches.   Endo/Heme/Allergies: Negative for environmental allergies and polydipsia. Does not bruise/bleed easily.   Psychiatric/Behavioral: Negative for depression, suicidal ideas, hallucinations, memory loss and substance abuse. The patient is not nervous/anxious and does not have insomnia.    All 14 systems reviewed and negative except as noted above.    Physical Exam:      Constitutional: Appears well-developed, well-nourished and in no acute distress.  Patient is oriented to person, place, and time.   Head: Normocephalic and atraumatic. Mucous membranes moist.  Neck: Neck supple no mass.   Cardiovascular: Normal rate and regular rhythm.  S1 S2 appreciated by ascultation.  Pulmonary/Chest: Effort normal and clear to auscultation bilaterally. No respiratory distress.   Abdomen: Soft. Non-tender and non-distended. Bowel sounds are normal.   Neurological: Patient is alert and oriented to person, place and time. Moves all extremities.  Skin: Warm and dry. No lesions.  Extremities: No clubbing, cyanosis or edema.    Laboratory data:      Reviewed and noted in plan where applicable. Please see chart for full laboratory data.          Lab Results   Component Value Date    WBC 6.03 12/20/2023    HGB 12.2 12/20/2023    HCT 38.9 12/20/2023     (H) 12/20/2023      12/20/2023           Predictors of intubation difficulty:       Morbid obesity? no   Anatomically abnormal facies? no   Prominent incisors? no   Receding mandible? no   Short, thick neck? no   Neck range of motion: normal   Dentition:  Full Dentures  Based on the Modified Mallampati, patient is a mallampati score: II (hard and soft palate, upper portion of tonsils anduvula visible)    Cardiographics:      ECG: Normal sinus rhythm   Normal ECG   When compared with ECG of 06-FEB-2022 09:07,   ST no longer depressed in Inferior leads   Confirmed by Mandy Crowe MD ([id number]) on 12/21/2023 5:15:47 AM     Echocardiogram: not indicated    Imaging:      Chest x-ray: not indicated    Assessment and Plan:      Mucous cyst of digit of left hand  Planned for EXCISION, CYST, left with Dr. Kareem Alatorre on 1/12/24.     Known risk factors for perioperative complications: None    Difficulty with intubation is not anticipated.    Cardiac Risk Estimation: Based on the Revised Cardiac Risk index, patient is a Class 1 risk with a 3.9% risk of a major cardiac event in a low risk procedure.    1.) Preoperative workup as follows: ECG, hemoglobin, hematocrit, electrolytes, creatinine, glucose, liver function studies.  2.) Change in medication regimen before surgery: discontinue ASA, NSAIDs 7 days before surgery, hold Metformin 24 hours prior to surgery.  3.) Prophylaxis for cardiac events with perioperative beta-blockers: Continue BB as prescribed.  4.) Invasive hemodynamic monitoring perioperatively: at the discretion of anesthesiologist.  5.) Deep vein thrombosis prophylaxis postoperatively: intermittent pneumatic compression boots and regimen to be chosen by surgical team.  6.) Surveillance for postoperative MI with ECG immediately postoperatively and on postoperati ve days 1 and 2 AND troponin levels 24 hours postoperatively and on day 4 or hospital discharge (whichever comes first): not indicated.  7.) Current medications which  may produce withdrawal symptoms if withheld perioperatively: None  8.) Other measures: None       --Morning of Procedure medications: levothyroxine, propanolol  --Hold all other medications morning of surgery   --Resume all medications post-operatively  --Hold ASA, NSAIDs and all vitamins/supplements 7 days prior to procedure    Hypothyroidism  Chronic, well controlled.     --See medication recommendations as above         Electronically signed by Cece Taylor NP on 12/21/2023 at 8:59 AM.

## 2023-12-20 NOTE — ASSESSMENT & PLAN NOTE
Planned for EXCISION, CYST, left with Dr. Kareem Alatorre on 1/12/24.     Known risk factors for perioperative complications: None    Difficulty with intubation is not anticipated.    Cardiac Risk Estimation: Based on the Revised Cardiac Risk index, patient is a Class 1 risk with a 3.9% risk of a major cardiac event in a low risk procedure.    1.) Preoperative workup as follows: ECG, hemoglobin, hematocrit, electrolytes, creatinine, glucose, liver function studies.  2.) Change in medication regimen before surgery: discontinue ASA, NSAIDs 7 days before surgery, hold Metformin 24 hours prior to surgery.  3.) Prophylaxis for cardiac events with perioperative beta-blockers: Continue BB as prescribed.  4.) Invasive hemodynamic monitoring perioperatively: at the discretion of anesthesiologist.  5.) Deep vein thrombosis prophylaxis postoperatively: intermittent pneumatic compression boots and regimen to be chosen by surgical team.  6.) Surveillance for postoperative MI with ECG immediately postoperatively and on postoperati ve days 1 and 2 AND troponin levels 24 hours postoperatively and on day 4 or hospital discharge (whichever comes first): not indicated.  7.) Current medications which may produce withdrawal symptoms if withheld perioperatively: None  8.) Other measures:  None        --Morning of Procedure medications: levothyroxine, propanolol, sertraline  --Hold all other medications morning of surgery   --Resume all medications post-operatively  --Hold ASA, NSAIDs and all vitamins/supplements 7 days prior to procedure

## 2023-12-20 NOTE — DISCHARGE INSTRUCTIONS
Pre op instructions reviewed with patient during Clinic Visit with Provider, verbalized understanding.    To confirm, Surgery is scheduled on 1/12/24. We will call you late afternoon the business day prior to surgery with your arrival time.    *Please report to the [facility]obby (1st Floor) located off of [facility] (2nd Entrance/Building on the left, in front of the flag pole).  Address: [address] [place] [address] [place]. [id number]        INSTRUCTIONS IMPORTANT!!!  DO NOT Eat, Drink, or Smoke after 12 midnight unless instructed otherwise by your Surgeon. OK to brush teeth, no gum, candy or mints!    MORNING OF SURGERY, drink small sip of water with the following medications instructed by Pre-Admit Provider:  LEVOTHYROXINE  PROPRANOLOL  OMEPRAZOLE    Diabetic Patients: If you take diabetic or weight loss medication, Do NOT take morning of surgery unless instructed by Doctor. Metformin to be stopped 24 hrs prior to surgery. Ozempic/ Mounjaro/ Wegovy/ Trulicity/ Semaglutide or any weight loss injections to be stopped 7 days prior to surgery. DO NOT take long-acting insulin the evening before surgery. Blood sugars will be checked in pre-op by Nurse.    *Patients should HOLD all vitamins, herbal supplements, weight loss medication, aspirin products & NSAIDS 7 days prior to surgery, as these can thin the blood. Ok to take Tylenol.    ____  Avoid Alcoholic beverages 3 days prior to surgery, as it can thin the blood.  ____  NO Acrylic/fake nails or nail polish worn day of surgery (specifically hand/arm & foot surgeries).  ____  NO powder, lotions, deodorants, oils or cream on body.  ____  Remove all jewelry, piercings, & foreign objects prior to arrival and leave at home.  ____  Remove Dentures, Hearing Aids & Contact Lens prior to surgery.  ____  Bring photo ID and insurance information to hospital (Leave Valuables at Home).  ____  If going home the same day, arrange for a ride home. You will not be able to  drive for 24 hrs if Anesthesia was used.   ____  Females (ages 11-60): may need to give a urine sample the morning of surgery; please see Pre op Nurse prior to using the restroom.  ____  Males: Stop ED medications (Viagra, Cialis) 24 hrs prior to surgery.  ____  Wear clean, loose fitting clothing to allow for dressings/ bandages.      Bathing Instructions:    -Shower with anti-bacterial Soap (Hibiclens or Dial) the night before surgery and the morning of.   -Do not use Hibiclens on your face or genitals.   -Apply clean clothes after shower.  -Do not shave your face or body 2 days prior to surgery unless instructed otherwise by your Surgeon.  -Do not shave pubic hair 7 days prior to surgery (gyn pt's).    [facility] Visitor/Ride Policy:  Only 2 adults allowed in pre op/recovery area during your procedure. You MUST HAVE A RIDE HOME from a responsible adult that you know and trust. Medical Transport, Uber or Lyft can ONLY be used if patient has a responsible adult to accompany them during ride home.    Discharge Instructions: You will receive Post-op/Discharge instructions by your Discharge Nurse prior to going home.   *Prevention of surgical site infections:   -Keep incisions clean and dry.   -Do not soak/submerge incisions in water until completely healed.   -Do not apply lotions, powders, creams, or deodorants to site.   -Always make sure hands are cleaned with antibacterial soap/ alcohol-based  prior to touching the surgical site.        *Signs and symptoms of Infection Before or After Surgery:               !!!If you experience any fever, chills, nausea/ vomiting, foul odor/ excessive drainage from surgical site, flu-like symptoms, new wounds or cuts, PLEASE CALL THE SURGEON OFFICE at [phone] or SEND MESSAGE THROUGH [employer] PORTAL!!!       *If you are running late day of surgery, please call the Surgery Dept @ [phone].    *Billing question, please call  [phone]       Thank  you,  -[facility] Pre Admit Dept.  [phone] or [phone]  M-F 7:30 am-4:00 pm (Closed Major Holidays)    Additional Tests Scheduled Today:  EKG (4th Floor) Check in at the !    Additional Tests Scheduled Today:  LABS (1ST Floor) Check in at the !

## 2023-12-26 NOTE — H&P
Subjective:     Patient ID: Haim Martin is a 83 y.o. female.    Chief Complaint: Pain of the Left Hand      HPI:  The patient has a 3-year-old female status post excision mucous cyst left long finger 05/19/2022.  The mucous cyst has recurred left long finger and she wishes to have it excised.  She lost her right index finger from an infected distal interphalangeal joint secondary to mucous cyst rupture infection.  She has neuroma pain at the end of the amputation stump right index finger but does not wish to have that addressed at this time    Past Medical History:   Diagnosis Date    Depression     Diverticulosis of colon (without mention of hemorrhage) 8/25/2014    Hyperlipidemia     Hypertension     Thyroid disease      Past Surgical History:   Procedure Laterality Date    APPENDECTOMY      CATARACT EXTRACTION EXTRACAPSULAR W/ INTRAOCULAR LENS IMPLANTATION Bilateral 4/2014    CYST REMOVAL Left 5/19/2022    Procedure: EXCISION, CYST;  Surgeon: Kareem Alatorre MD;  Location: [facility] OR;  Service: Orthopedics;  Laterality: Left;  excision mucous cyst left long finger    FINGER AMPUTATION Right 3/31/2022    Procedure: AMPUTATION, FINGER;  Surgeon: Kareem Alatorre MD;  Location: [facility] OR;  Service: Orthopedics;  Laterality: Right;  amputation right index finger at the level of proximal interphalangeal joint    INCISION AND DRAINAGE OF HAND Right 1/21/2022    Procedure: INCISION AND DRAINAGE, HAND;  Surgeon: Ramirez Flores MD;  Location: [facility] OR;  Service: Orthopedics;  Laterality: Right;  Right Index Finger    INJECTION OF ANESTHETIC AGENT AROUND MEDIAL BRANCH NERVES INNERVATING LUMBAR FACET JOINT Bilateral 7/16/2020    Procedure: Bilateral L3-5 MBB with local;  Surgeon: Luis Ashraf MD;  Location: [facility] PAIN MGT;  Service: Pain Management;  Laterality: Bilateral;    INJECTION OF ANESTHETIC AGENT AROUND MEDIAL BRANCH NERVES INNERVATING LUMBAR FACET JOINT Bilateral 8/10/2020    Procedure:  Bilateral L3-5 MBB with local;  Surgeon: Luis Ashraf MD;  Location: [facility] PAIN MGT;  Service: Pain Management;  Laterality: Bilateral;    INJECTION OF ANESTHETIC AGENT INTO SACROILIAC JOINT Bilateral 11/4/2021    Procedure: Bilateral SIJ Injection;  Surgeon: Luis Ashraf MD;  Location: [facility] PAIN [facility]GT;  Service: Pain Management;  Laterality: Bilateral;    RADIOFREQUENCY THERMOCOAGULATION Left 9/28/2020    Procedure: Left L3-5 Lumbar RFA;  Surgeon: Luis Ashraf MD;  Location: [facility] PAIN MGT;  Service: Pain Management;  Laterality: Left;    RADIOFREQUENCY THERMOCOAGULATION Right 10/12/2020    Procedure: Right L3-5 Lumbar RFA;  Surgeon: Luis Ashraf MD;  Location: [facility] PAIN [facility]GT;  Service: Pain Management;  Laterality: Right;    STOMACH SURGERY  1998    not sure what they did, possible bowel resection, partial gastrectomy    TONSILLECTOMY       Family History   Problem Relation Age of Onset    Lupus Mother     Stroke Father     Coronary artery disease Father     Diabetes type II Father     Kidney cancer Maternal Aunt     Breast cancer Maternal Aunt      Social History     Socioeconomic History    Marital status:    Tobacco Use    Smoking status: Former    Smokeless tobacco: Never    Tobacco comments:     quit 30 years ago   Substance and Sexual Activity    Alcohol use: No    Drug use: No    Sexual activity: Not Currently     Birth control/protection: None     Medication List with Changes/Refills   Current Medications    AMITRIPTYLINE (ELAVIL) 75 MG TABLET    Take 1 tablet by mouth in the evening    ASPIRIN (ECOTRIN) 81 MG EC TABLET    Take 81 mg by mouth once daily.    ATORVASTATIN (LIPITOR) 40 MG TABLET    TAKE 1 TABLET BY MOUTH ONCE DAILY IN THE EVENING    BETA-CAROTENE,A,-VITS C,E/MINS (OCUVITE ORAL)    Take 1 tablet by mouth once daily.    CALCIUM-VITAMIN D (CALCIUM 600 + D,3,) 600 MG-10 MCG (400 UNIT) TAB    Take 1 tablet by mouth 2 (two) times daily.    ERGOCALCIFEROL (VITAMIN D2) 50,000  UNIT CAP    Take 1 capsule (50,000 Units total) by mouth every 7 days.    LEVOTHYROXINE (SYNTHROID) 100 MCG TABLET    Take 1 tablet (100 mcg total) by mouth before breakfast.    METHOCARBAMOL (ROBAXIN) 500 MG TAB    Take 1 tablet (500 mg total) by mouth nightly as needed (pain / stiffness / cramps).    OMEPRAZOLE (PRILOSEC) 20 MG CAPSULE    Take 1 capsule (20 mg total) by mouth as needed.    PROPRANOLOL (INDERAL) 40 MG TABLET    Take 1 tablet by mouth twice daily    SERTRALINE (ZOLOFT) 100 MG TABLET    Take 1 tablet by mouth once daily    SUMATRIPTAN (IMITREX) 100 MG TABLET    TAKE ONE TABLET BY MOUTH AT ONSET OF MIGRAINE    VIT C/E/ZN/COPPR/LUTEIN/ZEAXAN (PRESERVISION AREDS-2 ORAL)    Take 1 tablet by mouth 2 (two) times a day.     Review of patient's allergies indicates:   Allergen Reactions    Clindamycin      Heaviness in chest      Penicillins Hives     Pt has tolerated cephalexin     Review of Systems   Constitutional: Negative for malaise/fatigue.   HENT:  Negative for hearing loss.    Eyes:  Negative for double vision and visual disturbance.   Cardiovascular:  Negative for chest pain.   Respiratory:  Positive for shortness of breath.    Endocrine: Negative for cold intolerance.   Hematologic/Lymphatic: Does not bruise/bleed easily.   Skin:  Negative for poor wound healing and suspicious lesions.   Musculoskeletal:  Positive for arthritis, back pain, joint pain and joint swelling. Negative for gout.   Gastrointestinal:  Positive for abdominal pain. Negative for nausea and vomiting.   Genitourinary:  Negative for dysuria.   Neurological:  Positive for headaches. Negative for numbness, paresthesias and sensory change.   Psychiatric/Behavioral:  Positive for depression. Negative for memory loss and substance abuse. The patient is nervous/anxious.    Allergic/Immunologic: Negative for persistent infections.       Objective:   Body mass index is 25.85 kg/m².  There were no vitals filed for this visit.              General    Constitutional: She is oriented to person, place, and time. She appears well-developed and well-nourished. No distress.   HENT:   Head: Normocephalic.   Mouth/Throat: Oropharynx is clear and moist.   Eyes: EOM are normal.   Cardiovascular:  Normal rate.            Pulmonary/Chest: Effort normal.   Abdominal: Soft.   Neurological: She is alert and oriented to person, place, and time. No cranial nerve deficit.   Psychiatric: She has a normal mood and affect.             Right Hand/Wrist Exam     Inspection   Scars: Wrist - absent Hand -  present  Effusion: Wrist - absent Hand -  absent  Deformity:  Hand -  deformity    Pain   Hand - The patient exhibits pain of the index IP.    Other     Neuorologic Exam    Median Distribution: normal  Ulnar Distribution: normal  Radial Distribution: normal    Comments:  The patient has neuroma pain at the stump of the right index finger amputation at the level of the proximal phalanx both radially and ulnarly.      Left Hand/Wrist Exam     Inspection   Scars: Wrist - present Hand -  present  Effusion: Wrist - absent Hand -  absent    Other     Sensory Exam  Median Distribution: normal  Ulnar Distribution: normal  Radial Distribution: normal    Comments:  The patient has a mucous cyst left long finger distal interphalangeal joint about 3 mm in diameter.  There is no sign of infection.  The patient has Heberden's nodes in multiple digits          Vascular Exam       Capillary Refill  Right Hand: normal capillary refill  Left Hand: normal capillary refill          Relevant imaging results reviewed and interpreted by me, discussed with the patient and / or family today the patient has significant osteoarthritic changes distal interphalangeal joints in multiple fingers  Assessment:     Encounter Diagnosis   Name Primary?    Mucous cyst of finger Yes    Left long finger, recurrent    Plan:       The patient wishes to have excision mucous cyst left long finger.  Risk  complications and alternatives were discussed including the risk of infection, anesthetic risk, injury to nerves and vessels, loss of motion, and possible need for additional surgeries were discussed.  A 5% recurrence rate was quoted.  The patient and her daughter seem to understand and agree that surgery.              Disclaimer: This note was prepared using a voice recognition system and is likely to have sound alike errors within the text.

## 2024-01-11 ENCOUNTER — TELEPHONE (OUTPATIENT)
Dept: PREADMISSION TESTING | Facility: HOSPITAL | Age: 84
End: 2024-01-11
Payer: MEDICARE

## 2024-01-11 ENCOUNTER — ANESTHESIA EVENT (OUTPATIENT)
Dept: SURGERY | Facility: HOSPITAL | Age: 84
End: 2024-01-11
Payer: MEDICARE

## 2024-01-11 NOTE — TELEPHONE ENCOUNTER
Called and spoke with pt about the following:     Please arrive to [facility] ([facility] at 8 am on 1/12/24 for your scheduled procedure.  Address: [address] [place] [address] [place] [address] (2nd Building on left, 1st Floor Lobby)  >>>NO eating or drinking after midnight unless instructed otherwise by your Surgeon<<<    Thank you,  -[facility]mit [facility].  Mon-Fri 7:30 am - 4 pm [phone]

## 2024-01-12 ENCOUNTER — HOSPITAL ENCOUNTER (OUTPATIENT)
Facility: HOSPITAL | Age: 84
Discharge: HOME | End: 2024-01-12
Attending: ORTHOPAEDIC SURGERY | Admitting: ORTHOPAEDIC SURGERY
Payer: MEDICARE

## 2024-01-12 ENCOUNTER — ANESTHESIA (OUTPATIENT)
Dept: SURGERY | Facility: HOSPITAL | Age: 84
End: 2024-01-12
Payer: MEDICARE

## 2024-01-12 DIAGNOSIS — M67.442 MUCOUS CYST OF DIGIT OF LEFT HAND: Primary | ICD-10-CM

## 2024-01-12 DIAGNOSIS — M67.449 MUCOUS CYST OF FINGER: ICD-10-CM

## 2024-01-12 PROCEDURE — 63600175 PHARM REV CODE 636 W HCPCS

## 2024-01-12 PROCEDURE — 99499 UNLISTED E&M SERVICE: CPT | Mod: ,,, | Performed by: ORTHOPAEDIC SURGERY

## 2024-01-12 PROCEDURE — 63600175 PHARM REV CODE 636 W HCPCS: Performed by: NURSE ANESTHETIST, CERTIFIED REGISTERED

## 2024-01-12 PROCEDURE — 25000003 PHARM REV CODE 250: Performed by: NURSE ANESTHETIST, CERTIFIED REGISTERED

## 2024-01-12 RX ORDER — CHLORHEXIDINE GLUCONATE ORAL RINSE 1.2 MG/ML
10 SOLUTION DENTAL
Status: DISCONTINUED | OUTPATIENT
Start: 2024-01-12 | End: 2024-01-12

## 2024-01-12 RX ORDER — PROPOFOL 10 MG/ML
VIAL (ML) INTRAVENOUS
Status: DISCONTINUED | OUTPATIENT
Start: 2024-01-12 | End: 2024-01-12

## 2024-01-12 RX ORDER — CEFAZOLIN SODIUM 2 G/50ML
2 SOLUTION INTRAVENOUS
Status: DISCONTINUED | OUTPATIENT
Start: 2024-01-12 | End: 2024-01-12

## 2024-01-12 RX ORDER — LIDOCAINE HYDROCHLORIDE 20 MG/ML
INJECTION INTRAVENOUS
Status: DISCONTINUED | OUTPATIENT
Start: 2024-01-12 | End: 2024-01-12

## 2024-01-12 RX ADMIN — SODIUM CHLORIDE, SODIUM LACTATE, POTASSIUM CHLORIDE, AND CALCIUM CHLORIDE: .6; .31; .03; .02 INJECTION, SOLUTION INTRAVENOUS at 12:01

## 2024-01-12 RX ADMIN — PROPOFOL 10 MG: 10 INJECTION, EMULSION INTRAVENOUS at 12:01

## 2024-01-12 RX ADMIN — PROPOFOL 50 MG: 10 INJECTION, EMULSION INTRAVENOUS at 12:01

## 2024-01-12 RX ADMIN — LIDOCAINE HYDROCHLORIDE 100 MG: 20 INJECTION INTRAVENOUS at 12:01

## 2024-01-12 RX ADMIN — CEFAZOLIN SODIUM 2 G: 2 SOLUTION INTRAVENOUS at 12:01

## 2024-01-12 NOTE — ANESTHESIA PREPROCEDURE EVALUATION
01/12/2024  Haim Martin is a 83 y.o., female    **Patient tripped and fell in [place] on way to pre-op area this AM; suffered a busted/bruised lip and chin; sent to ED for assessment: head, neck imaging Neg; sutures in lip laceration x2    Patient denies any dizziness, LOC or hitting her head during the fall -- patient stable and AAOx3 at this time; patient and surgeon wish to proceed with left hand cyst excision    Patient Active Problem List   Diagnosis    Hypothyroidism    Depressive disorder, not elsewhere classified    Anxiety    Other and unspecified hyperlipidemia    Migraine    Special screening for malignant neoplasms, colon    Diverticulosis of colon (without mention of hemorrhage)    Lumbar spondylosis    Osteopenia    Pre-diabetes    Major depressive disorder with single episode, in partial remission    Spondylosis without myelopathy or radiculopathy, lumbar region    Sacroiliitis    Traumatic subarachnoid hemorrhage without loss of consciousness, initial encounter    Normocytic anemia    Chronic respiratory failure    Hyperlipidemia    Age-related osteoporosis without current pathological fracture    Aortic atherosclerosis    Mucous cyst of digit of left hand     Past Medical History:   Diagnosis Date    Depression     Diverticulosis of colon (without mention of hemorrhage) 08/25/2014    Hyperlipidemia     Hypertension     Thyroid disease      Past Surgical History:   Procedure Laterality Date    APPENDECTOMY      CATARACT EXTRACTION EXTRACAPSULAR W/ INTRAOCULAR LENS IMPLANTATION Bilateral 4/2014    CYST REMOVAL Left 5/19/2022    Procedure: EXCISION, CYST;  Surgeon: Kareem Alatorre MD;  Location: [facility];  Service: Orthopedics;  Laterality: Left;  excision mucous cyst left long finger    FINGER AMPUTATION Right 3/31/2022    Procedure: AMPUTATION, FINGER;  Surgeon: Kareem Lowry  MD Celi;  Location: [facility] OR;  Service: Orthopedics;  Laterality: Right;  amputation right index finger at the level of proximal interphalangeal joint    INCISION AND DRAINAGE OF HAND Right 1/21/2022    Procedure: INCISION AND DRAINAGE, HAND;  Surgeon: Ramirez Flores MD;  Location: [facility] OR;  Service: Orthopedics;  Laterality: Right;  Right Index Finger    INJECTION OF ANESTHETIC AGENT AROUND MEDIAL BRANCH NERVES INNERVATING LUMBAR FACET JOINT Bilateral 7/16/2020    Procedure: Bilateral L3-5 MBB with local;  Surgeon: Luis Ashraf MD;  Location: [facility] PAIN MGT;  Service: Pain Management;  Laterality: Bilateral;    INJECTION OF ANESTHETIC AGENT AROUND MEDIAL BRANCH NERVES INNERVATING LUMBAR FACET JOINT Bilateral 8/10/2020    Procedure: Bilateral L3-5 MBB with local;  Surgeon: Luis Ashraf MD;  Location: [facility] PAIN MGT;  Service: Pain Management;  Laterality: Bilateral;    INJECTION OF ANESTHETIC AGENT INTO SACROILIAC JOINT Bilateral 11/4/2021    Procedure: Bilateral SIJ Injection;  Surgeon: Luis Ashraf MD;  Location: [facility] PAIN MGT;  Service: Pain Management;  Laterality: Bilateral;    RADIOFREQUENCY THERMOCOAGULATION Left 9/28/2020    Procedure: Left L3-5 Lumbar RFA;  Surgeon: Luis Ashraf MD;  Location: [facility] PAIN MGT;  Service: Pain Management;  Laterality: Left;    RADIOFREQUENCY THERMOCOAGULATION Right 10/12/2020    Procedure: Right L3-5 Lumbar RFA;  Surgeon: Luis Ashraf MD;  Location: [facility] PAIN MGT;  Service: Pain Management;  Laterality: Right;    STOMACH SURGERY  1998    not sure what they did, possible bowel resection, partial gastrectomy    TONSILLECTOMY       ECHO: (2022)  Summary    Normal systolic function.  The estimated ejection fraction is 55%.  Normal left ventricular diastolic function.  Mild pulmonic regurgitation.  With normal right ventricular systolic function.  No vegetation present.      Chemistry        Component Value Date/Time     12/20/2023 1050    K 4.8  12/20/2023 1050     12/20/2023 1050    CO2 25 12/20/2023 1050    BUN 14 12/20/2023 1050    CREATININE 0.8 12/20/2023 1050     (H) 12/20/2023 1050        Component Value Date/Time    CALCIUM 9.6 12/20/2023 1050    ALKPHOS 105 12/20/2023 1050    AST 26 12/20/2023 1050    ALT 26 12/20/2023 1050    BILITOT 0.7 12/20/2023 1050    ESTGFRAFRICA >60.0 05/17/2022 1200    EGFRNONAA >60.0 05/17/2022 1200        Lab Results   Component Value Date    WBC 6.03 12/20/2023    HGB 12.2 12/20/2023    HCT 38.9 12/20/2023     (H) 12/20/2023     12/20/2023     Pre-op Assessment    I have reviewed the Patient Summary Reports.    I have reviewed the NPO Status.   I have reviewed the Medications.     Review of Systems  Anesthesia Hx:  No problems with previous Anesthesia   History of prior surgery of interest to airway management or planning:  Previous anesthesia: General          Denies Personal Hx of Anesthesia complications.                    Social:  Non-Smoker       Hematology/Oncology:  Hematology Normal   Oncology Normal                                   Cardiovascular:     Hypertension              ECG has been reviewed. Normal sinus rhythm   Normal ECG   When compared with ECG of 06-FEB-2022 09:07,   ST no longer depressed in Inferior leads   Confirmed by Mandy Crowe MD ([id number]) on 12/21/2023 5:15:47 AM                            Pulmonary:  Pulmonary Normal                       Renal/:  Renal/ Normal                 Musculoskeletal:  Arthritis               Neurological:      Headaches                                 Endocrine:   Hypothyroidism  BMI 25    Well-controlled on Levothyroxine and propanolol        Psych:  Psychiatric History                  Physical Exam  General: Cooperative, Alert, Oriented and Cachexia    Airway:  Mallampati: III   Mouth Opening: Small, but > 3cm  TM Distance: Normal  Tongue: Normal  Neck ROM: Normal ROM  Laceration at upper left lip (sutures in place)'  bruising to Rt lower chin and bilateral knees  Dental:  Dentures, Edentulous    Musculoskeletal:  Swollen Joint  Bilateral knees      Anesthesia Plan  Type of Anesthesia, risks & benefits discussed:    Anesthesia Type: MAC  Intra-op Monitoring Plan: Standard ASA Monitors  Post Op Pain Control Plan: multimodal analgesia and IV/PO Opioids PRN  Induction:  IV  Informed Consent: Informed consent signed with the Patient and all parties understand the risks and agree with anesthesia plan.  All questions answered.   ASA Score: 3  Day of Surgery Review of History & Physical: H&P Update referred to the surgeon/provider.    Ready For Surgery From Anesthesia Perspective.     .

## 2024-01-12 NOTE — TRANSFER OF CARE
"Anesthesia Transfer of Care Note    Patient: Haim Martin    Procedure(s) Performed: Procedure(s) (LRB):  EXCISION, CYST (Left)    Patient location: PACU    Anesthesia Type: MAC    Transport from OR: Transported from OR on room air with adequate spontaneous ventilation    Post pain: adequate analgesia    Post assessment: no apparent anesthetic complications    Post vital signs: stable    Level of consciousness: awake    Nausea/Vomiting: no nausea/vomiting    Complications: none    Transfer of care protocol was followed      Last vitals: Visit Vitals  BP (!) 198/84   Pulse 86   Temp 36.6 °C (97.9 °F) (Temporal)   Resp 16   Ht 4' 11" (1.499 m)   Wt 55.8 kg (123 lb)   SpO2 99%   Breastfeeding No   BMI 24.84 kg/m²     "

## 2024-01-12 NOTE — ANESTHESIA RELEASE NOTE
"Anesthesia Release from PACU Note    Patient: Haim Martin    Procedure(s) Performed: Procedure(s) (LRB):  EXCISION, CYST (Left)    Anesthesia type: MAC    Post pain: Adequate analgesia    Post assessment: no apparent anesthetic complications    Last Vitals: Visit Vitals  BP (!) 198/84   Pulse 86   Temp 36.6 °C (97.9 °F) (Temporal)   Resp 16   Ht 4' 11" (1.499 m)   Wt 55.8 kg (123 lb)   SpO2 99%   Breastfeeding No   BMI 24.84 kg/m²       Post vital signs: stable    Level of consciousness: awake    Nausea/Vomiting: no nausea/no vomiting    Complications: none    Airway Patency: patent    Respiratory: unassisted    Cardiovascular: stable and blood pressure at baseline    Hydration: euvolemic  "

## 2024-01-12 NOTE — OP NOTE
[facility] - [facility] ([facility])  General Surgery  Operative Note    SUMMARY     Date of Procedure: 1/12/2024     Procedure: Procedure(s) (LRB):  EXCISION, CYST (Left)  mucous cyst left long finger     Surgeon(s) and Role:     * Kareem Alatorre MD - Primary    Assisting Surgeon: None    Pre-Operative Diagnosis:  Mucous cyst left long finger    Post-Operative Diagnosis:  Mucous cyst left long finger    Anesthesia:  Local plus sedation    Description:  The patient is taken to the operating room where 10 cc of 2% plain lidocaine use local anesthetic about the base of the left long finger.  Satisfactory anesthesia had been achieved left hand was prepped and draped usual sterile fashion.  A digital tourniquet was fashion from the finger of a glove.  She did receive 2 g of Ancef intravenously preoperatively.  This time the previous v-shaped scar was opened.  The skin flap was elevated.  A mucous cyst was found to be coming from the dorsal D IP joint.  A small osteophyte was removed with a rongeur.  The cyst was excised and submitted to pathology for examination.  It was about 5 mm in diameter.  No additional pathology was encountered satisfactory excision had been achieved skin was closed using horizontal mattress 4-0 nylon suture.  Xeroform gauze 4x4s and a 2 in gauze dressing was applied.  The patient tolerated the procedure well was transferred to the recovery room in satisfactory condition    Significant Surgical Tasks Conducted by the Assistant(s), if Applicable:  No assistant    Complications:  None     Estimated Blood Loss (EBL):  5 mL           Implants: None    Specimens:  Mucous cyst left long finger dorsal distal interphalangeal joint           Condition: Good    Disposition: PACU - hemodynamically stable.    Attestation: I was present and scrubbed for the entire procedure.

## 2024-01-12 NOTE — PLAN OF CARE
Pt was walking with Mr. Espinoza, the waiting , to preop and fell in the [place] outside of preop.  The floor was clear of any obstacles and dangers.   contacted, surgeon aware, patient brought via wheelchair to the emergency department.

## 2024-01-17 NOTE — DISCHARGE SUMMARY
[facility] - Surgery (Hospital)  Discharge Note  Short Stay    Procedure(s) (LRB):  EXCISION, CYST (Left) mucous cyst left long finger      OUTCOME: Patient tolerated treatment/procedure well without complication and is now ready for discharge.    DISPOSITION: Home or Self Care    FINAL DIAGNOSIS:  Mucous cyst left long finger    FOLLOWUP: In clinic    DISCHARGE INSTRUCTIONS:  No discharge procedures on file.     TIME SPENT ON DISCHARGE:  20 minutes

## 2024-01-18 NOTE — ANESTHESIA POSTPROCEDURE EVALUATION
Anesthesia Post Evaluation    Patient: Haim Martin    Procedure(s) Performed: Procedure(s) (LRB):  EXCISION, CYST (Left)    Final Anesthesia Type: MAC      Patient location during evaluation: PACU  Patient participation: Yes- Able to Participate  Level of consciousness: awake and alert and oriented  Post-procedure vital signs: reviewed and stable  Pain management: adequate  Airway patency: patent  RADHA mitigation strategies: Multimodal analgesia  PONV status at discharge: No PONV  Anesthetic complications: no      Cardiovascular status: blood pressure returned to baseline and hemodynamically stable  Respiratory status: unassisted  Hydration status: euvolemic  Follow-up not needed.              Vitals Value Taken Time   /76 01/18/24 1005   Temp 37 01/18/24 1005   Pulse 75 01/18/24 1005   Resp 18 01/18/24 1005   SpO2 94% 01/18/24 1005         No case tracking events are documented in the log.      Pain/Rachel Score: No data recorded

## 2024-01-19 ENCOUNTER — OFFICE VISIT (OUTPATIENT)
Dept: ORTHOPEDICS | Facility: CLINIC | Age: 84
End: 2024-01-19
Payer: MEDICARE

## 2024-01-19 VITALS — BODY MASS INDEX: 24.8 KG/M2 | HEIGHT: 59 IN | WEIGHT: 123 LBS

## 2024-01-19 DIAGNOSIS — M67.449 MUCOUS CYST OF FINGER: Primary | ICD-10-CM

## 2024-01-19 PROCEDURE — 99024 POSTOP FOLLOW-UP VISIT: CPT | Mod: S$GLB,,, | Performed by: ORTHOPAEDIC SURGERY

## 2024-01-19 PROCEDURE — 99999 PR PBB SHADOW E&M-EST. PATIENT-LVL III: CPT | Mod: PBBFAC,,, | Performed by: ORTHOPAEDIC SURGERY

## 2024-01-19 NOTE — PROGRESS NOTES
Subjective:     Patient ID: aHim Martin is a 83 y.o. female.    Chief Complaint: Pain and Post-op Evaluation of the Left Hand      HPI:  The patient is an 83-year-old female status post left long finger mucous cyst excision 01/12/2023.  She fell in the hospital and sustained a laceration to her upper lip which was sutured in the emergency room.  These were absorbable sutures and may or may not need to be removed next week    Past Medical History:   Diagnosis Date    Depression     Diverticulosis of colon (without mention of hemorrhage) 08/25/2014    Hyperlipidemia     Hypertension     Thyroid disease      Past Surgical History:   Procedure Laterality Date    APPENDECTOMY      CATARACT EXTRACTION EXTRACAPSULAR W/ INTRAOCULAR LENS IMPLANTATION Bilateral 4/2014    CYST REMOVAL Left 5/19/2022    Procedure: EXCISION, CYST;  Surgeon: Kareem Alatorre MD;  Location: [facility] OR;  Service: Orthopedics;  Laterality: Left;  excision mucous cyst left long finger    CYST REMOVAL Left 1/12/2024    Procedure: EXCISION, CYST;  Surgeon: Kareem Alatorre MD;  Location: [facility] OR;  Service: Orthopedics;  Laterality: Left;    FINGER AMPUTATION Right 3/31/2022    Procedure: AMPUTATION, FINGER;  Surgeon: Kareem Alatorre MD;  Location: [facility] OR;  Service: Orthopedics;  Laterality: Right;  amputation right index finger at the level of proximal interphalangeal joint    INCISION AND DRAINAGE OF HAND Right 1/21/2022    Procedure: INCISION AND DRAINAGE, HAND;  Surgeon: Ramirez Flores MD;  Location: [facility] OR;  Service: Orthopedics;  Laterality: Right;  Right Index Finger    INJECTION OF ANESTHETIC AGENT AROUND MEDIAL BRANCH NERVES INNERVATING LUMBAR FACET JOINT Bilateral 7/16/2020    Procedure: Bilateral L3-5 MBB with local;  Surgeon: Luis Ashraf MD;  Location: [facility] PAIN [facility]GT;  Service: Pain Management;  Laterality: Bilateral;    INJECTION OF ANESTHETIC AGENT AROUND MEDIAL BRANCH NERVES INNERVATING LUMBAR FACET JOINT  Bilateral 8/10/2020    Procedure: Bilateral L3-5 MBB with local;  Surgeon: Luis Ashraf MD;  Location: [facility] PAIN [facility]GT;  Service: Pain Management;  Laterality: Bilateral;    INJECTION OF ANESTHETIC AGENT INTO SACROILIAC JOINT Bilateral 11/4/2021    Procedure: Bilateral SIJ Injection;  Surgeon: Luis Ashraf MD;  Location: [facility] PAIN [facility]GT;  Service: Pain Management;  Laterality: Bilateral;    RADIOFREQUENCY THERMOCOAGULATION Left 9/28/2020    Procedure: Left L3-5 Lumbar RFA;  Surgeon: Luis Ashraf MD;  Location: [facility] PAIN [facility]GT;  Service: Pain Management;  Laterality: Left;    RADIOFREQUENCY THERMOCOAGULATION Right 10/12/2020    Procedure: Right L3-5 Lumbar RFA;  Surgeon: Luis Ashraf MD;  Location: [facility] PAIN [facility]GT;  Service: Pain Management;  Laterality: Right;    STOMACH SURGERY  1998    not sure what they did, possible bowel resection, partial gastrectomy    TONSILLECTOMY       Family History   Problem Relation Age of Onset    Lupus Mother     Stroke Father     Coronary artery disease Father     Diabetes type II Father     Kidney cancer Maternal Aunt     Breast cancer Maternal Aunt      Social History     Socioeconomic History    Marital status:    Tobacco Use    Smoking status: Former     Passive exposure: Never    Smokeless tobacco: Never    Tobacco comments:     quit 30 years ago   Substance and Sexual Activity    Alcohol use: No    Drug use: No    Sexual activity: Not Currently     Birth control/protection: None     Medication List with Changes/Refills   Current Medications    AMITRIPTYLINE (ELAVIL) 75 MG TABLET    Take 1 tablet (75 mg total) by mouth every evening.    ASPIRIN (ECOTRIN) 81 MG EC TABLET    Take 81 mg by mouth once daily.    ATORVASTATIN (LIPITOR) 40 MG TABLET    Take 1 tablet (40 mg total) by mouth every evening.    BETA-CAROTENE,A,-VITS C,E/MINS (OCUVITE ORAL)    Take 1 tablet by mouth once daily.    CALCIUM-VITAMIN D (CALCIUM 600 + D,3,) 600 MG-10 MCG (400 UNIT) TAB    Take  1 tablet by mouth 2 (two) times daily.    ERGOCALCIFEROL (VITAMIN D2) 50,000 UNIT CAP    Take 1 capsule (50,000 Units total) by mouth every 7 days.    IBUPROFEN (ADVIL,MOTRIN) 600 MG TABLET    Take 1 tablet (600 mg total) by mouth every 6 (six) hours as needed for Pain.    LEVOTHYROXINE (SYNTHROID) 100 MCG TABLET    Take 1 tablet (100 mcg total) by mouth before breakfast.    OMEPRAZOLE (PRILOSEC) 20 MG CAPSULE    Take 1 capsule (20 mg total) by mouth as needed.    PROPRANOLOL (INDERAL) 40 MG TABLET    Take 1 tablet by mouth twice daily    SERTRALINE (ZOLOFT) 100 MG TABLET    Take 1 tablet by mouth once daily    SUMATRIPTAN (IMITREX) 100 MG TABLET    TAKE ONE TABLET BY MOUTH AT ONSET OF MIGRAINE    VIT C/E/ZN/COPPR/LUTEIN/ZEAXAN (PRESERVISION AREDS-2 ORAL)    Take 1 tablet by mouth 2 (two) times a day.     Review of patient's allergies indicates:   Allergen Reactions    Clindamycin      Heaviness in chest      Penicillins Hives     Pt has tolerated cephalexin     Review of Systems   Constitutional: Negative for malaise/fatigue.   HENT:  Negative for hearing loss.    Eyes:  Negative for double vision and visual disturbance.   Cardiovascular:  Negative for chest pain.   Respiratory:  Positive for shortness of breath.    Endocrine: Negative for cold intolerance.   Hematologic/Lymphatic: Does not bruise/bleed easily.   Skin:  Negative for poor wound healing and suspicious lesions.   Musculoskeletal:  Positive for arthritis, joint pain and joint swelling. Negative for gout.   Gastrointestinal:  Positive for abdominal pain. Negative for nausea and vomiting.   Genitourinary:  Negative for dysuria.   Neurological:  Positive for headaches. Negative for numbness, paresthesias and sensory change.   Psychiatric/Behavioral:  Positive for depression. Negative for memory loss and substance abuse. The patient is nervous/anxious.    Allergic/Immunologic: Negative for persistent infections.     Objective:   Body mass index is 24.84  kg/m².  There were no vitals filed for this visit.             General    Constitutional: She is oriented to person, place, and time. She appears well-developed and well-nourished. No distress.   HENT:   Head: Normocephalic.   Eyes: EOM are normal.   Pulmonary/Chest: Effort normal.   Neurological: She is oriented to person, place, and time.   Psychiatric: She has a normal mood and affect.         Left Hand/Wrist Exam     Inspection   Scars: Wrist - absent Hand -  present  Effusion: Wrist - absent Hand -  absent    Other     Sensory Exam  Median Distribution: normal  Ulnar Distribution: normal  Radial Distribution: normal    Comments:  The wound left long finger dorsal D IP joint looks good.  The suture lines intact.  There is no sign of infection.  The sutures on her upper lip may or may not be ready for removal next week          Vascular Exam       Capillary Refill  Left Hand: normal capillary refill       radiographs were not obtained today  Assessment:     Encounter Diagnosis   Name Primary?    Mucous cyst of finger Yes        Plan:       The patient will return in 1 week for suture removal.            Disclaimer: This note was prepared using a voice recognition system and is likely to have sound alike errors within the text.

## 2024-01-23 ENCOUNTER — OFFICE VISIT (OUTPATIENT)
Dept: ORTHOPEDICS | Facility: CLINIC | Age: 84
End: 2024-01-23
Payer: MEDICARE

## 2024-01-23 VITALS — WEIGHT: 123 LBS | HEIGHT: 59 IN | BODY MASS INDEX: 24.8 KG/M2

## 2024-01-23 DIAGNOSIS — Z98.890 POST-OPERATIVE STATE: ICD-10-CM

## 2024-01-23 DIAGNOSIS — Z48.02 VISIT FOR SUTURE REMOVAL: ICD-10-CM

## 2024-01-23 DIAGNOSIS — M67.449 MUCOUS CYST OF FINGER: Primary | ICD-10-CM

## 2024-01-23 PROCEDURE — 99024 POSTOP FOLLOW-UP VISIT: CPT | Mod: S$GLB,,,

## 2024-01-23 PROCEDURE — 99999 PR PBB SHADOW E&M-EST. PATIENT-LVL III: CPT | Mod: PBBFAC,,,

## 2024-01-23 NOTE — PROGRESS NOTES
SUBJECTIVE:      Patient ID: Haim Martin is a 83 y.o. female.    HPI: Ms. Martin is here today for post-operative visit #2.  She is 11 days status post left long finger mucous cyst excision done on 01/12/2024. She reports that she is doing well.  Pain is 0/10.  She is taking no medication for pain.  She has been compliant with postop instructions and keeping the extremity dry. She denies fever, chills, and sweats since the time of the surgery.   She also reports for suture removal to the upper lip. It was sutured in the ER 1/12/24. They have not yet dissolved and she requests removal of them today.    Interval hx 1/19/24 (Dr. Alatorre): The patient is an 83-year-old female status post left long finger mucous cyst excision 01/12/2023.  She fell in the hospital and sustained a laceration to her upper lip which was sutured in the emergency room.  These were absorbable sutures and may or may not need to be removed next week     Past Medical History:   Diagnosis Date    Depression     Diverticulosis of colon (without mention of hemorrhage) 08/25/2014    Hyperlipidemia     Hypertension     Thyroid disease      Past Surgical History:   Procedure Laterality Date    APPENDECTOMY      CATARACT EXTRACTION EXTRACAPSULAR W/ INTRAOCULAR LENS IMPLANTATION Bilateral 4/2014    CYST REMOVAL Left 5/19/2022    Procedure: EXCISION, CYST;  Surgeon: Kareem Alatorre MD;  Location: [facility] OR;  Service: Orthopedics;  Laterality: Left;  excision mucous cyst left long finger    CYST REMOVAL Left 1/12/2024    Procedure: EXCISION, CYST;  Surgeon: Kareem Alatorre MD;  Location: [facility] OR;  Service: Orthopedics;  Laterality: Left;    FINGER AMPUTATION Right 3/31/2022    Procedure: AMPUTATION, FINGER;  Surgeon: Kareem Alatorre MD;  Location: [facility] OR;  Service: Orthopedics;  Laterality: Right;  amputation right index finger at the level of proximal interphalangeal joint    INCISION AND DRAINAGE OF HAND Right 1/21/2022     Procedure: INCISION AND DRAINAGE, HAND;  Surgeon: Ramirez Flores MD;  Location: [facility] OR;  Service: Orthopedics;  Laterality: Right;  Right Index Finger    INJECTION OF ANESTHETIC AGENT AROUND MEDIAL BRANCH NERVES INNERVATING LUMBAR FACET JOINT Bilateral 7/16/2020    Procedure: Bilateral L3-5 MBB with local;  Surgeon: Luis Ashraf MD;  Location: [facility];  Service: Pain Management;  Laterality: Bilateral;    INJECTION OF ANESTHETIC AGENT AROUND MEDIAL BRANCH NERVES INNERVATING LUMBAR FACET JOINT Bilateral 8/10/2020    Procedure: Bilateral L3-5 MBB with local;  Surgeon: Luis Ashraf MD;  Location: [facility]GT;  Service: Pain Management;  Laterality: Bilateral;    INJECTION OF ANESTHETIC AGENT INTO SACROILIAC JOINT Bilateral 11/4/2021    Procedure: Bilateral SIJ Injection;  Surgeon: Luis Ashraf MD;  Location: [facility]GT;  Service: Pain Management;  Laterality: Bilateral;    RADIOFREQUENCY THERMOCOAGULATION Left 9/28/2020    Procedure: Left L3-5 Lumbar RFA;  Surgeon: Luis Ashraf MD;  [facility];  Service: Pain Management;  Laterality: Left;    RADIOFREQUENCY THERMOCOAGULATION Right 10/12/2020    Procedure: Right L3-5 Lumbar RFA;  Surgeon: Luis Ashraf MD;  Location: [facility];  Service: Pain Management;  Laterality: Right;    STOMACH SURGERY  1998    not sure what they did, possible bowel resection, partial gastrectomy    TONSILLECTOMY       Family History   Problem Relation Age of Onset    Lupus Mother     Stroke Father     Coronary artery disease Father     Diabetes type II Father     Kidney cancer Maternal Aunt     Breast cancer Maternal Aunt      Social History     Socioeconomic History    Marital status:    Tobacco Use    Smoking status: Former     Passive exposure: Never    Smokeless tobacco: Never    Tobacco comments:     quit 30 years ago   Substance and Sexual Activity    Alcohol use: No    Drug use: No    Sexual activity: Not Currently     Birth  "control/protection: None     Medication List with Changes/Refills   Current Medications    AMITRIPTYLINE (ELAVIL) 75 MG TABLET    Take 1 tablet (75 mg total) by mouth every evening.    ASPIRIN (ECOTRIN) 81 MG EC TABLET    Take 81 mg by mouth once daily.    ATORVASTATIN (LIPITOR) 40 MG TABLET    Take 1 tablet (40 mg total) by mouth every evening.    BETA-CAROTENE,A,-VITS C,E/MINS (OCUVITE ORAL)    Take 1 tablet by mouth once daily.    CALCIUM-VITAMIN D (CALCIUM 600 + D,3,) 600 MG-10 MCG (400 UNIT) TAB    Take 1 tablet by mouth 2 (two) times daily.    ERGOCALCIFEROL (VITAMIN D2) 50,000 UNIT CAP    Take 1 capsule (50,000 Units total) by mouth every 7 days.    IBUPROFEN (ADVIL,MOTRIN) 600 MG TABLET    Take 1 tablet (600 mg total) by mouth every 6 (six) hours as needed for Pain.    LEVOTHYROXINE (SYNTHROID) 100 MCG TABLET    Take 1 tablet (100 mcg total) by mouth before breakfast.    OMEPRAZOLE (PRILOSEC) 20 MG CAPSULE    Take 1 capsule (20 mg total) by mouth as needed.    PROPRANOLOL (INDERAL) 40 MG TABLET    Take 1 tablet by mouth twice daily    SERTRALINE (ZOLOFT) 100 MG TABLET    Take 1 tablet by mouth once daily    SUMATRIPTAN (IMITREX) 100 MG TABLET    TAKE ONE TABLET BY MOUTH AT ONSET OF MIGRAINE    VIT C/E/ZN/COPPR/LUTEIN/ZEAXAN (PRESERVISION AREDS-2 ORAL)    Take 1 tablet by mouth 2 (two) times a day.     Review of patient's allergies indicates:   Allergen Reactions    Clindamycin      Heaviness in chest      Penicillins Hives     Pt has tolerated cephalexin     OBJECTIVE:     Physical exam:    Vitals:    01/23/24 1128   Weight: 55.8 kg (123 lb)   Height: 4' 11" (1.499 m)   PainSc: 0-No pain   PainLoc: Hand     Vital signs are stable, patient is afebrile.  Patient is well dressed and well groomed, no acute distress.  Alert and oriented to person, place, and time.    Left UE:  Incision is clean, dry, and intact. Wound margins well approximated.   There is no erythema or exudate. There is no sign of any infection. "   She is NVI.   Sutures in place.   2+ pulses noted.  Cap refill <2 seconds  Motor intact to hand    Upper lip:  Incision c/d/i. 2 sutures intact with scab overlying. No signs of infection.    ASSESSMENT         Encounter Diagnoses   Name Primary?    Mucous cyst of finger Yes    Post-operative state     Visit for suture removal             11 days status post left long finger mucous cyst excision    PLAN:           Haim was seen today for pain and post-op evaluation.    Diagnoses and all orders for this visit:    Mucous cyst of finger    Post-operative state    Visit for suture removal      - PO instruction reviewed and provided to patient  - The incision was cleaned with hydrogen peroxide. Sutures on L long finger removed with no difficulty. Steri-Strips applied.   - Upper lip sutures removed without difficulty.    - Patient should notify the office of any signs or symptoms of infection including fevers, erythema, purulent drainage, increasing pain.    - Follow up PRN     POST OPERATIVE VISIT INSTRUCTIONS - Visit #2    1. No soaking the incision for at least 7-10 days. You may get it wet in the shower and use regular soap.    2. To avoid a hard, painful scar, we recommend you use Mederma and/or Silicone Scar patches. You may also use Cocoa Butter, Vitamin E oil, Coconut oil, etc.    You may start this 5-7 days after stitches are removed and when wound is completely closed.    - Mederma scar cream: scar massage over incision 1-2 times per day. You may use topical lotion or Vitamin E oil. Massage an additional few times a day to help the scar become soft and less sensitive.    - Silicone Scar Patches: cut and place over the incision, then massage over the patch to help with scarring and sensitivity. Can be reused up to 10 days if you rinse it clean, let it dry out, then reapply.    Brands: Mepiform Silicone Scar Sheets (recommended), Scar Away, [employer] brand or generic pharmacy brand ([facility], [facility], [facility])    3. Continue range of motion exercises as instructed at todays visit.    4. You may take Tylenol 500mg and/or Ibuprofen 400mg every 4-6 hours with food for pain as tolerated as long as you do not have an allergy or medical condition that prevents you from taking them.    5. Therapy recommended: none at this time    6. Immobilization: none needed    7. Lifting restrictions: start light at about 10lb and increase gradually as tolerated    [facility]. [facility]

## 2024-02-22 ENCOUNTER — LAB VISIT (OUTPATIENT)
Dept: LAB | Facility: HOSPITAL | Age: 84
End: 2024-02-22
Attending: INTERNAL MEDICINE
Payer: MEDICARE

## 2024-02-22 DIAGNOSIS — M81.0 AGE-RELATED OSTEOPOROSIS WITHOUT CURRENT PATHOLOGICAL FRACTURE: ICD-10-CM

## 2024-02-22 DIAGNOSIS — E55.9 VITAMIN D INSUFFICIENCY: ICD-10-CM

## 2024-02-22 LAB
25(OH)D3+25(OH)D2 SERPL-MCNC: 27 NG/ML (ref 30–96)
ALBUMIN SERPL BCP-MCNC: 3.8 G/DL (ref 3.5–5.2)
ALP SERPL-CCNC: 97 U/L (ref 55–135)
ALT SERPL W/O P-5'-P-CCNC: 24 U/L (ref 10–44)
ANION GAP SERPL CALC-SCNC: 9 MMOL/L (ref 8–16)
AST SERPL-CCNC: 22 U/L (ref 10–40)
BILIRUB SERPL-MCNC: 0.5 MG/DL (ref 0.1–1)
BUN SERPL-MCNC: 21 MG/DL (ref 8–23)
CALCIUM SERPL-MCNC: 9.4 MG/DL (ref 8.7–10.5)
CHLORIDE SERPL-SCNC: 105 MMOL/L (ref 95–110)
CO2 SERPL-SCNC: 28 MMOL/L (ref 23–29)
CREAT SERPL-MCNC: 0.8 MG/DL (ref 0.5–1.4)
EST. GFR  (NO RACE VARIABLE): >60 ML/MIN/1.73 M^2
GLUCOSE SERPL-MCNC: 113 MG/DL (ref 70–110)
POTASSIUM SERPL-SCNC: 4.4 MMOL/L (ref 3.5–5.1)
PROT SERPL-MCNC: 7 G/DL (ref 6–8.4)
SODIUM SERPL-SCNC: 142 MMOL/L (ref 136–145)

## 2024-02-22 PROCEDURE — 82306 VITAMIN D 25 HYDROXY: CPT | Performed by: INTERNAL MEDICINE

## 2024-02-22 PROCEDURE — 36415 COLL VENOUS BLD VENIPUNCTURE: CPT | Mod: PO | Performed by: INTERNAL MEDICINE

## 2024-02-22 PROCEDURE — 80053 COMPREHEN METABOLIC PANEL: CPT | Performed by: INTERNAL MEDICINE

## 2024-02-23 ENCOUNTER — TELEPHONE (OUTPATIENT)
Dept: INFUSION THERAPY | Facility: HOSPITAL | Age: 84
End: 2024-02-23
Payer: MEDICARE

## 2024-02-23 NOTE — TELEPHONE ENCOUNTER
I tried to contact patient to reschedule her reclast to 2/27   I can book her for 1 pm.  Left message with voicemail

## 2024-02-26 RX ORDER — ERGOCALCIFEROL 1.25 MG/1
50000 CAPSULE ORAL
Qty: 4 CAPSULE | Refills: 0 | Status: SHIPPED | OUTPATIENT
Start: 2024-02-26 | End: 2024-03-21 | Stop reason: SDUPTHER

## 2024-03-01 ENCOUNTER — TELEPHONE (OUTPATIENT)
Dept: INFUSION THERAPY | Facility: HOSPITAL | Age: 84
End: 2024-03-01
Payer: MEDICARE

## 2024-03-01 RX ORDER — HEPARIN 100 UNIT/ML
500 SYRINGE INTRAVENOUS
Status: CANCELLED | OUTPATIENT
Start: 2024-03-01

## 2024-03-01 RX ORDER — ZOLEDRONIC ACID 5 MG/100ML
5 INJECTION, SOLUTION INTRAVENOUS
Status: CANCELLED | OUTPATIENT
Start: 2024-03-01

## 2024-03-01 RX ORDER — SODIUM CHLORIDE 0.9 % (FLUSH) 0.9 %
10 SYRINGE (ML) INJECTION
Status: CANCELLED | OUTPATIENT
Start: 2024-03-01

## 2024-03-01 NOTE — TELEPHONE ENCOUNTER
Spoke with pt regarding rescheduled appointment for reclast infusion, time changed from 3/1/24 to 3/21/24 following her 10:30 appointment with Dr. Vazquez.

## 2024-03-04 ENCOUNTER — TELEPHONE (OUTPATIENT)
Dept: RHEUMATOLOGY | Facility: CLINIC | Age: 84
End: 2024-03-04
Payer: MEDICARE

## 2024-03-04 ENCOUNTER — OFFICE VISIT (OUTPATIENT)
Dept: NEUROLOGY | Facility: CLINIC | Age: 84
End: 2024-03-04
Payer: MEDICARE

## 2024-03-04 VITALS
BODY MASS INDEX: 25.11 KG/M2 | HEIGHT: 59 IN | WEIGHT: 124.56 LBS | SYSTOLIC BLOOD PRESSURE: 140 MMHG | HEART RATE: 81 BPM | RESPIRATION RATE: 16 BRPM | DIASTOLIC BLOOD PRESSURE: 80 MMHG

## 2024-03-04 DIAGNOSIS — R29.3 POOR POSTURE: ICD-10-CM

## 2024-03-04 DIAGNOSIS — M47.816 SPONDYLOSIS WITHOUT MYELOPATHY OR RADICULOPATHY, LUMBAR REGION: ICD-10-CM

## 2024-03-04 DIAGNOSIS — R29.818 OTHER SYMPTOMS AND SIGNS INVOLVING THE NERVOUS SYSTEM: ICD-10-CM

## 2024-03-04 DIAGNOSIS — M54.2 CERVICALGIA: ICD-10-CM

## 2024-03-04 DIAGNOSIS — G43.809 OTHER MIGRAINE WITHOUT STATUS MIGRAINOSUS, NOT INTRACTABLE: ICD-10-CM

## 2024-03-04 DIAGNOSIS — M54.9 DORSALGIA, UNSPECIFIED: ICD-10-CM

## 2024-03-04 DIAGNOSIS — M47.816 LUMBAR SPONDYLOSIS: ICD-10-CM

## 2024-03-04 DIAGNOSIS — R26.9 ABNORMAL GAIT: Primary | ICD-10-CM

## 2024-03-04 PROCEDURE — 99215 OFFICE O/P EST HI 40 MIN: CPT | Mod: S$GLB,,, | Performed by: NURSE PRACTITIONER

## 2024-03-04 PROCEDURE — 99999 PR PBB SHADOW E&M-EST. PATIENT-LVL IV: CPT | Mod: PBBFAC,,, | Performed by: NURSE PRACTITIONER

## 2024-03-04 NOTE — TELEPHONE ENCOUNTER
"Returned patients phone call. All questions answered.      Melody Ospina (Allye), [facility]  Rheumatology Department    "

## 2024-03-04 NOTE — PROGRESS NOTES
Subjective:       Patient ID: Haim Martin is a 83 y.o. female.    Chief Complaint: Abnormal gait  and Results          HPIThe patient is new to me. Accompanied by daughter. Patient is present related to progressive gait problems, reports worsening since 2021.  Patient has poor stuporous posture. Patient denies weakness, gait is unsteady. The patient complains of neck and back pains. Chronic DDD of spine.  The pain worsens with standing and increased activity. Patient has issues with urine bowel incontinence. No falls. Hx migraines takes Amitriplyine 75 mg po, Imitrex, Propranolol for B/P manamgnet.  Zoloft 100 mg po daily (Depression and Migraine).  No history of strokes. History of head injury in 2019 she fell and hit her head, NO LOC, had broken nose and had a cracked skull.  Denies dizziness or lightheadedness.  Denies hearing loss. The gait problems do get worse with eyes closed/dark. No difficulty picking up feet from the floor. Normal arm swing. No tremors or shaking.         INTERVAL HISTORY 03-:  Patient present for follow up for gait changes and test results. Patient accompanied by daughter. Patient doing well. She continues to have gait changes and poor posture. Patient completed PT with dry needling no significant improvement. Patient encouraged to continue home exercises.     Patient reprots that she had a fall on January 12, 2024, she fell while in hospital. Patient was walking down victro to fast and fell. She had some facial laceration and lip contusion. Patient AYALA for fall included  01- CTH WO CTH NL and 01- CT maxillofacial wo No acute abnormality. No new neurological complaints noted. No syncope, no cognitive changes noted. Patient continues to live alone and is independent. Patient no longer drives.          Review of Systems   Constitutional: Negative.    HENT: Negative.     Eyes: Negative.    Gastrointestinal:  Positive for diarrhea.   Endocrine: Negative.     Genitourinary:  Positive for urgency.   Musculoskeletal:  Positive for arthralgias, back pain, gait problem, myalgias and neck pain.   Allergic/Immunologic: Negative.    Hematological: Negative.    Psychiatric/Behavioral:  Positive for decreased concentration and dysphoric mood. Negative for agitation, behavioral problems and confusion.                  Current Outpatient Medications:     amitriptyline (ELAVIL) 75 MG tablet, Take 1 tablet (75 mg total) by mouth every evening., Disp: 90 tablet, Rfl: 3    aspirin (ECOTRIN) 81 MG EC tablet, Take 81 mg by mouth once daily., Disp: , Rfl:     atorvastatin (LIPITOR) 40 MG tablet, Take 1 tablet (40 mg total) by mouth every evening., Disp: 90 tablet, Rfl: 0    beta-carotene,A,-vits C,E/mins (OCUVITE ORAL), Take 1 tablet by mouth once daily., Disp: , Rfl:     calcium-vitamin D (CALCIUM 600 + D,3,) 600 mg-10 mcg (400 unit) Tab, Take 1 tablet by mouth 2 (two) times daily., Disp: 60 tablet, Rfl: 3    ergocalciferol (ERGOCALCIFEROL) 50,000 unit Cap, Take 1 capsule by mouth once a week, Disp: 4 capsule, Rfl: 0    ibuprofen (ADVIL,MOTRIN) 600 MG tablet, Take 1 tablet (600 mg total) by mouth every 6 (six) hours as needed for Pain., Disp: 30 tablet, Rfl: 0    levothyroxine (SYNTHROID) 100 MCG tablet, Take 1 tablet (100 mcg total) by mouth before breakfast., Disp: 90 tablet, Rfl: 1    omeprazole (PRILOSEC) 20 MG capsule, Take 1 capsule (20 mg total) by mouth as needed., Disp: 90 capsule, Rfl: 4    propranoloL (INDERAL) 40 MG tablet, Take 1 tablet by mouth twice daily, Disp: 180 tablet, Rfl: 3    sertraline (ZOLOFT) 100 MG tablet, Take 1 tablet by mouth once daily, Disp: 90 tablet, Rfl: 3    sumatriptan (IMITREX) 100 MG tablet, TAKE ONE TABLET BY MOUTH AT ONSET OF MIGRAINE, Disp: 27 tablet, Rfl: 3    vit C/E/Zn/coppr/lutein/zeaxan (PRESERVISION AREDS-2 ORAL), Take 1 tablet by mouth 2 (two) times a day., Disp: , Rfl:   No current facility-administered medications for this  visit.    Facility-Administered Medications Ordered in Other Visits:     lactated ringers infusion, , Intravenous, On Call Procedure, Estefania Quach PA, Last Rate: 100 mL/hr at 03/31/22 0821, New Bag at 03/31/22 0821    lactated ringers infusion, , Intravenous, Continuous, Marzena Ybarra MD, Stopped at 05/19/22 0844  Past Medical History:   Diagnosis Date    Depression     Diverticulosis of colon (without mention of hemorrhage) 08/25/2014    Hyperlipidemia     Hypertension     Thyroid disease      Past Surgical History:   Procedure Laterality Date    APPENDECTOMY      CATARACT EXTRACTION EXTRACAPSULAR W/ INTRAOCULAR LENS IMPLANTATION Bilateral 4/2014    CYST REMOVAL Left 5/19/2022    Procedure: EXCISION, CYST;  Surgeon: Kareem Alatorre MD;  Location: [facility] OR;  Service: Orthopedics;  Laterality: Left;  excision mucous cyst left long finger    CYST REMOVAL Left 1/12/2024    Procedure: EXCISION, CYST;  Surgeon: Kareem Alatorre MD;  Location: [facility] OR;  Service: Orthopedics;  Laterality: Left;    FINGER AMPUTATION Right 3/31/2022    Procedure: AMPUTATION, FINGER;  Surgeon: Kareem Alatorre MD;  Location: [facility] OR;  Service: Orthopedics;  Laterality: Right;  amputation right index finger at the level of proximal interphalangeal joint    INCISION AND DRAINAGE OF HAND Right 1/21/2022    Procedure: INCISION AND DRAINAGE, HAND;  Surgeon: Ramirez Flores MD;  Location: [facility] OR;  Service: Orthopedics;  Laterality: Right;  Right Index Finger    INJECTION OF ANESTHETIC AGENT AROUND MEDIAL BRANCH NERVES INNERVATING LUMBAR FACET JOINT Bilateral 7/16/2020    Procedure: Bilateral L3-5 MBB with local;  Surgeon: Luis Ashraf MD;  Location: [facility] PAIN MGT;  Service: Pain Management;  Laterality: Bilateral;    INJECTION OF ANESTHETIC AGENT AROUND MEDIAL BRANCH NERVES INNERVATING LUMBAR FACET JOINT Bilateral 8/10/2020    Procedure: Bilateral L3-5 MBB with local;  Surgeon: Luis Ashraf MD;  Location:  [facility] PAIN MGT;  Service: Pain Management;  Laterality: Bilateral;    INJECTION OF ANESTHETIC AGENT INTO SACROILIAC JOINT Bilateral 11/4/2021    Procedure: Bilateral SIJ Injection;  Surgeon: Luis Ashraf MD;  Location: [facility] PAIN MGT;  Service: Pain Management;  Laterality: Bilateral;    RADIOFREQUENCY THERMOCOAGULATION Left 9/28/2020    Procedure: Left L3-5 Lumbar RFA;  Surgeon: Luis Ashraf MD;  Location: [facility] PAIN MGT;  Service: Pain Management;  Laterality: Left;    RADIOFREQUENCY THERMOCOAGULATION Right 10/12/2020    Procedure: Right L3-5 Lumbar RFA;  Surgeon: Luis Ashraf MD;  Location: [facility] PAIN MGT;  Service: Pain Management;  Laterality: Right;    STOMACH SURGERY  1998    not sure what they did, possible bowel resection, partial gastrectomy    TONSILLECTOMY       Social History     Socioeconomic History    Marital status:    Tobacco Use    Smoking status: Former     Passive exposure: Never    Smokeless tobacco: Never    Tobacco comments:     quit 30 years ago   Substance and Sexual Activity    Alcohol use: No    Drug use: No    Sexual activity: Not Currently     Birth control/protection: None             Past/Current Medical/Surgical History, Past/Current Social History, Past/Current Family History and Past/Current Medications were reviewed in detail.        Objective:           VITAL SIGNS WERE REVIEWED      GENERAL APPEARANCE:     The patient looks comfortable.    Body habitus is normal BMI 25.87 KG     No signs of respiratory distress.    Normal breathing pattern.    No dysmorphic features    Normal eye contact.     GENERAL MEDICAL EXAM:    HEENT:  Head is atraumatic normocephalic.     No tender temporal arteries. Fundoscopic (Ophthalmoscopic) exam showed no disc edema.      Neck and Axillae: No JVD. No visible lesions.    No carotid bruits. No thyromegaly. No lymphadenopathy.    Cardiopulmonary: No cyanosis. No tachypnea. Normal respiratory effort.    Gastrointestinal/Urogenital:  No  jaundice. No stomas or lesions. No visible hernias. No catheters.     Abdomen is soft non-tender. No masses or organomegaly.    Skin, Hair and Nails: No pathognonomic skin rash. No neurofibromatosis. No visible lesions.No stigmata of autoimmune disease. No clubbing.    Skin is warm and moist. No palpable masses.    Limbs: No varicose veins. No visible swelling.    No palpable edema. Pulses are symmetric. Pedal pulses are palpable.      Muskoskeletal: + OA visible deformities.No visible lesions.    No spine tenderness. + signs of longstanding neuropathy.Poor posture.  No dislocations or fractures.            Neurologic Exam     Mental Status   Oriented to person, place, and time.   Registration: recalls 3 of 3 objects. Recall at 5 minutes: recalls 3 of 3 objects. Follows 3 step commands.   Attention: normal. Concentration: normal.   Speech: speech is normal and not slurred   Level of consciousness: alert  Knowledge: good and consistent with education. Able to perform simple calculations.   Able to name object. Able to read. Able to repeat. Able to write. Normal comprehension.     Cranial Nerves     CN II   Visual fields full to confrontation.   Visual acuity: normal with correction  Right visual field deficit: none  Left visual field deficit: none     CN III, IV, VI   Pupils are equal, round, and reactive to light.  Extraocular motions are normal.   Right pupil: Size: 2 mm. Shape: regular. Reactivity: brisk. Consensual response: intact. Accommodation: intact.   Left pupil: Size: 2 mm. Shape: regular. Reactivity: brisk. Consensual response: intact. Accommodation: intact.   CN III: no CN III palsy  CN VI: no CN VI palsy  Nystagmus: none   Diplopia: none  Ophthalmoparesis: none  Upgaze: normal  Downgaze: normal  Conjugate gaze: present  Vestibulo-ocular reflex: present    CN V   Facial sensation intact.   Right facial sensation deficit: none  Left facial sensation deficit: none  Right corneal reflex: normal  Left  corneal reflex: normal    CN VII   Right facial weakness: none  Left facial weakness: none  Right taste: normal  Left taste: normal    CN VIII   CN VIII normal.   Hearing: intact    CN IX, X   CN IX normal.   CN X normal.   Palate: symmetric  Right gag reflex: normal  Left gag reflex: normal    CN XI   CN XI normal.   Right sternocleidomastoid strength: normal  Left sternocleidomastoid strength: normal  Right trapezius strength: normal  Left trapezius strength: normal    CN XII   CN XII normal.   Tongue: not atrophic  Fasciculations: absent  Tongue deviation: none    Motor Exam   Muscle bulk: normal  Overall muscle tone: normal  Right arm tone: normal  Left arm tone: normal  Right arm pronator drift: absent  Left arm pronator drift: absent  Right leg tone: normal  Left leg tone: normal    Strength   Strength 5/5 throughout.   Right neck flexion: 5/5  Left neck flexion: 5/5  Right neck extension: 5/5  Left neck extension: 5/5  Right deltoid: 5/5  Left deltoid: 5/5  Right biceps: 5/5  Left biceps: 5/5  Right triceps: 5/5  Left triceps: 5/5  Right wrist flexion: 5/5  Left wrist flexion: 5/5  Right wrist extension: 5/5  Left wrist extension: 5/5  Right interossei: 5/5  Left interossei: 5/5  Right iliopsoas: 5/5  Left iliopsoas: 5/5  Right quadriceps: 5/5  Left quadriceps: 5/5  Right hamstrin/5  Left hamstrin/5  Right glutei: 5/5  Left glutei: 5/5  Right anterior tibial: 5/5  Left anterior tibial: 5/5  Right posterior tibial: 5/5  Left posterior tibial: 5/5  Right peroneal: 5/5  Left peroneal: 5/5  Right gastroc: 5/5  Left gastroc: 5/5    Sensory Exam   Light touch normal.   Right arm light touch: normal  Left arm light touch: normal  Right leg light touch: normal  Left leg light touch: normal  Vibration normal.   Right arm vibration: normal  Left arm vibration: normal  Right leg vibration: normal  Left leg vibration: normal  Proprioception normal.   Right arm proprioception: normal  Left arm proprioception:  normal  Right leg proprioception: normal  Left leg proprioception: normal  Pinprick normal.   Right arm pinprick: normal  Left arm pinprick: normal  Right leg pinprick: normal  Left leg pinprick: normal  Sensory deficit distribution on left: lateral cutaneous thigh    Gait, Coordination, and Reflexes     Gait  Gait: wide-based    Coordination   Romberg: negative  Finger to nose coordination: normal    Reflexes   Right brachioradialis: 2+  Left brachioradialis: 2+  Right biceps: 2+  Left biceps: 2+  Right triceps: 2+  Left triceps: 2+  Right patellar: 2+  Left patellar: 2+  Right achilles: 0  Left achilles: 0  Right plantar: normal  Left plantar: normal  Right Baker: absent  Left Baker: absent  Right ankle clonus: absent  Left ankle clonus: absent  Right pendular knee jerk: absent  Left pendular knee jerk: absent    Poor posture hunch-back        Lab Results   Component Value Date    WBC 6.03 12/20/2023    HGB 12.2 12/20/2023    HCT 38.9 12/20/2023     (H) 12/20/2023     12/20/2023     Sodium   Date Value Ref Range Status   02/22/2024 142 136 - 145 mmol/L Final     Potassium   Date Value Ref Range Status   02/22/2024 4.4 3.5 - 5.1 mmol/L Final     Chloride   Date Value Ref Range Status   02/22/2024 105 95 - 110 mmol/L Final     CO2   Date Value Ref Range Status   02/22/2024 28 23 - 29 mmol/L Final     Glucose   Date Value Ref Range Status   02/22/2024 113 (H) 70 - 110 mg/dL Final     BUN   Date Value Ref Range Status   02/22/2024 21 8 - 23 mg/dL Final     Creatinine   Date Value Ref Range Status   02/22/2024 0.8 0.5 - 1.4 mg/dL Final     Calcium   Date Value Ref Range Status   02/22/2024 9.4 8.7 - 10.5 mg/dL Final     Total Protein   Date Value Ref Range Status   02/22/2024 7.0 6.0 - 8.4 g/dL Final     Albumin   Date Value Ref Range Status   02/22/2024 3.8 3.5 - 5.2 g/dL Final     Total Bilirubin   Date Value Ref Range Status   02/22/2024 0.5 0.1 - 1.0 mg/dL Final     Comment:     For infants and  newborns, interpretation of results should be based  on gestational age, weight and in agreement with clinical  observations.    Premature Infant recommended reference ranges:  Up to 24 hours.............<8.0 mg/dL  Up to 48 hours............<12.0 mg/dL  3-5 days..................<15.0 mg/dL  6-29 days.................<15.0 mg/dL       Alkaline Phosphatase   Date Value Ref Range Status   02/22/2024 97 55 - 135 U/L Final     AST   Date Value Ref Range Status   02/22/2024 22 10 - 40 U/L Final     ALT   Date Value Ref Range Status   02/22/2024 24 10 - 44 U/L Final     Anion Gap   Date Value Ref Range Status   02/22/2024 9 8 - 16 mmol/L Final     eGFR if    Date Value Ref Range Status   05/17/2022 >60.0 >60 mL/min/1.73 m^2 Final     eGFR if non    Date Value Ref Range Status   05/17/2022 >60.0 >60 mL/min/1.73 m^2 Final     Comment:     Calculation used to obtain the estimated glomerular filtration  rate (eGFR) is the CKD-EPI equation.        Lab Results   Component Value Date    YGBPECIR38 319 09/01/2023     Lab Results   Component Value Date    TSH 1.106 11/01/2023    FREET4 0.84 08/22/2023   Labs Reviewed:        09-     Vitamin B 12 319 NL, FA level 10.1 NL Vitamin E Pending  06-    MRI Lumbar WO    Mild lumbar degenerative disease with prominent L4-5 facet arthropathy.  Sacral perineural root sleeve cysts.  Otherwise negative.    No acute findings.    09-     Brain MRI age-related atrophy and C/L Spine MRI Mild DDD. Overall, unremarkable    09-    MRI L-SPINE WO    Multilevel mild disc degeneration with facet arthrosis with grade 1 L3-4 and L4-5 spondylolisthesis.  Mild L4-5 central canal stenosis.     S2 perineural root sleeve cysts.      09-    MRI C-Spine WO    Degenerative disc disease most pronounced at C5-6 where there is mild central canal stenosis. Associated uncovertebral joint spurring with moderate left and mild right C5-6 foraminal  stenosis.   Reviewed the neuroimaging independently       Assessment:       1. Abnormal gait    2. Lumbar spondylosis    3. Other symptoms and signs involving the nervous system    4. Cervicalgia    5. Spondylosis without myelopathy or radiculopathy, lumbar region    6. Dorsalgia, unspecified    7. Poor posture    8. Other migraine without status migrainosus, not intractable          Plan:         ABNORMAL GAIT RELATED MULTIFACTORIAL FACTORS: SPONDYLOSIS WITHOUT MYELOPATHY/ LUMBAR SPONDYLOSIS/ CHRONIC NECK AND BACK PAIN/POOR POSTURE    EVALUATE:    Declined Occupational  and Physical therapy at this time     Falling down precautions     Slow down and avoid rushing    Look where you're going    Avoid walking in the dark    Think consciously about walking        VITAMIN D DEFICIENCY    Refill Vitamins D 50,000 units weekly       CHRONIC MIGRAINES LOW FREQUENCY WELL CONTROLLED        HEADACHE DIARY     DISCUSSED THE THREE-FOLD MANAGEMENT OF MIGRAINE:      LIFESTYLE CHANGES:       Good sleep hygiene  Avoid general triggers like lack of sleep/too much sleep, prolonged sun exposure, excessive screen time and specific triggers based on you own diary   Minimize physical and emotional stress  Smoking avoidance and cessation  Limit caffeine drinks to 1-2 a day   Good hydration   Small frequent meals and avoid skipping meals   Moderate 30-minute-long aerobic exercises 3 times/week. Avoid strenuous exercise         ABORTIVE MEDICATIONS (ACUTE-RESCUE MEDICATIONS):     Should only be taken 2-3 times/week to avoid rebound and overuse headaches.    I-explained to the patient that pain meds especially triptans should NOT be taken daily to avoid Rebound Headache and Overuse Headache.    Take at the ONSET of the headache Sumatriptan 100 mg PO  (or other Triptan) in combination with naproxen 500 mg PO or Ibuprofen 800 mg PO for headache without nausea or vomiting.  This regimen can be repeated only once in 24 hours after 2  hours.    Side effects of triptans were discussed and include rare cardiac and cerebral ischemia and cannot be used with migraine associate with focal neurological deficits (complicated migraine) in addition to drowsiness and potential impairment of driving ability. The patient verbalized understanding.    NSAIDs can cause peptic ulcers, renal insufficiency and may increase the risk of cardiovascular diseases.  SEs were discussed with the patient. The patient verbalized understanding.    Triptans have shown to be more effective than Gepatns with more SE/AE.        IMPENDING STATUS: Prednisone and Vistaril.    STATUS MIGRAINOSUS: ED-Infusion for Status Protocol.         NEXT OPTIONS:    Triptans: Sumatriptan (Imitrex), Rizatriptan (Maxalt).    Gepants: Nuretc (rimegepant)75 mg >Ubrelvy (ubrogepant) 100 mg    Ditans: Reyvow (lasmiditan) 100 mg (No driving due to sedation)    Fioricet without codeine with Reglan.      Prednisone with Reglan.      LAST RESORT:     DHE NS Trudhesa (Max 2 a week)     C/I: concomitant use of vasoconstrictors like Triptans, strong CY inhibitors such as HAART PIs (eg, ritonavir, nelfinavir, or indinavir) and Macrolides (eg, erythromycin or clarithromycin), CAD, PVD, Stroke/TIA and Uncontrolled HTN.  Serious SEs include Vasospasm and Fibrosis (chronic use).       PREVENTATIVE (MORE ACCURATELY MIGRAINE REDUCTION) MEDICATIONS:           Since the patient's headache is very frequent a lengthy discussion about preventative medications was carried out.The patient understands that prevention means DECREASING frequency and severity and NOT elimination.The patient was made aware that any new medication can cause serious allergic reaction.The medication is considered failure only if a therapeutic dose reached and maintained for 6-8 weeks.    Currently on Amitriptyline 75 mg po managed by PCP     Continues Propranolol 40 MG PO (DAILY)  for B/P managed by PCP.          HELPFUL SUPPLEMENTS:      Helpful supplements include Co-Q 10, B2, Mg, Feverfew (Dolovent combination) and butterbur (Petadolex)      NEXT OPTIONS:       Topiramate/Topamax slow titration to 50 mg BID which can cause mental slowing, transient tingling, kidney stones, weight loss, cleft lip and palate and rarely glaucoma and visual field defects . The patient was encouraged to drink a lot of fluids.     Amitriptyline/Elavil slow titration to 100-Age which can cause sleepiness, dry eyes, dry mouth, urinary retention, and rarely cardiac arrhythmias    Propranolol/Inderal slow titration to 80 mg BID which can cause low blood pressure, slow heart rate, erectile dysfunction, depression, airway obstruction and heart failure exacerbations. Cannot be used with migraine associate with focal neurological deficits.    Lamotrigine/Lamictal slow titration to 100 mg BID which can cause serious skin rash and rare cardiac arrhythmias.     ANTI-CGRP AGENTS: Qulipta (alogepant) 60 mg QD, Erenumab (Aimovig) 140 mg SQ Pen monthly (Reported cases of Constipation and BP elevation) , Galcanezumab (Emgality) 120 mg SQ Pen monthly after a loading dose of 240 mg  and Fremnezumab (Ajovy) (Ligand Blocker): 225 mg SQ monthly or 675 mg every 3 months     Botox 200 units every 3 months .          Acupuncture, massage therapy and essential oils.        DEPRESSION/HUI    Recommend follow up with PCP to evaluate need to restart  Zoloft 100 mg po daily (Depression/HUI)         MEDICAL/SURGICAL COMORBIDITIES     All relevant medical comorbidities noted and managed by primary care physician and medical care team.          MISCELLANEOUS MEDICAL PROBLEMS       HEALTHY LIFESTYLE AND PREVENTATIVE CARE    Encouraged the patient to adhere to the age-appropriate health maintenance guidelines including screening tests and vaccinations.     Discussed the overall importance of healthy lifestyle, optimal weight, exercise, healthy diet, good sleep hygiene and avoiding drugs including  smoking, alcohol and recreational drugs. The patient verbalized full understanding.       Advised the patient to follow COVID-19 prevention measures.       I spent 50 minutes face to face with the patient    Time spent in counseling and coordination of care including discussions etiology of diagnosis, pathogenesis of diagnosis, prognosis of diagnosis,, diagnostic results, impression and recommendations, diagnostic studies, management, risks and benefits of treatment, instructions of disease self-management, treatment instructions, follow up requirements, patient and family counseling/involvement in care compliance with treatment regimen. All of the patient's questions were answered during this discussion.           Cliff Hawkins, MSN NP      Collaborating Provider: Katya Jonas MD, FAAN Neurologist/Epileptologist

## 2024-03-04 NOTE — TELEPHONE ENCOUNTER
----- Message from Miriam Weathers sent at 3/4/2024  1:46 PM CST -----  States she got her discharge papers today from her appt w/ Ms JUSTIN Hawkins. States it was on it to take calcuim- vitamin D 2Xday. States she never did get this. This is her first time seeing this. States she got the Vitamin D2 ergo 1.25 mg cap epl to take once a week. Please call pt [phone]. Thank you

## 2024-03-12 ENCOUNTER — TELEPHONE (OUTPATIENT)
Dept: GASTROENTEROLOGY | Facility: CLINIC | Age: 84
End: 2024-03-12
Payer: MEDICARE

## 2024-03-12 NOTE — TELEPHONE ENCOUNTER
----- Message from Miriam Weathers sent at 3/11/2024 12:32 PM CDT -----  States she would like to reschedule her appt. States she had an appt on 2/27 w/ Dr Barnes and it was canceled. Nothing available until August. Please call pt [phone]. Thank you

## 2024-03-21 ENCOUNTER — INFUSION (OUTPATIENT)
Dept: INFUSION THERAPY | Facility: HOSPITAL | Age: 84
End: 2024-03-21
Attending: INTERNAL MEDICINE
Payer: MEDICARE

## 2024-03-21 ENCOUNTER — OFFICE VISIT (OUTPATIENT)
Dept: RHEUMATOLOGY | Facility: CLINIC | Age: 84
End: 2024-03-21
Payer: MEDICARE

## 2024-03-21 VITALS
DIASTOLIC BLOOD PRESSURE: 68 MMHG | WEIGHT: 111.31 LBS | OXYGEN SATURATION: 99 % | SYSTOLIC BLOOD PRESSURE: 151 MMHG | RESPIRATION RATE: 18 BRPM | BODY MASS INDEX: 22.44 KG/M2 | TEMPERATURE: 98 F | HEIGHT: 59 IN | HEART RATE: 101 BPM

## 2024-03-21 VITALS
SYSTOLIC BLOOD PRESSURE: 161 MMHG | WEIGHT: 111.31 LBS | DIASTOLIC BLOOD PRESSURE: 67 MMHG | BODY MASS INDEX: 22.44 KG/M2 | HEIGHT: 59 IN | HEART RATE: 107 BPM

## 2024-03-21 DIAGNOSIS — E55.9 VITAMIN D INSUFFICIENCY: ICD-10-CM

## 2024-03-21 DIAGNOSIS — M81.0 AGE-RELATED OSTEOPOROSIS WITHOUT CURRENT PATHOLOGICAL FRACTURE: Primary | ICD-10-CM

## 2024-03-21 DIAGNOSIS — M47.816 LUMBAR SPONDYLOSIS: ICD-10-CM

## 2024-03-21 DIAGNOSIS — M15.9 PRIMARY OSTEOARTHRITIS INVOLVING MULTIPLE JOINTS: ICD-10-CM

## 2024-03-21 DIAGNOSIS — Z51.81 MEDICATION MONITORING ENCOUNTER: ICD-10-CM

## 2024-03-21 PROBLEM — M46.1 SACROILIITIS: Status: RESOLVED | Noted: 2021-11-04 | Resolved: 2024-03-21

## 2024-03-21 PROCEDURE — 25000003 PHARM REV CODE 250: Performed by: INTERNAL MEDICINE

## 2024-03-21 PROCEDURE — 96365 THER/PROPH/DIAG IV INF INIT: CPT

## 2024-03-21 PROCEDURE — 99999 PR PBB SHADOW E&M-EST. PATIENT-LVL III: CPT | Mod: PBBFAC,,, | Performed by: INTERNAL MEDICINE

## 2024-03-21 PROCEDURE — 99214 OFFICE O/P EST MOD 30 MIN: CPT | Mod: S$GLB,,, | Performed by: INTERNAL MEDICINE

## 2024-03-21 PROCEDURE — 63600175 PHARM REV CODE 636 W HCPCS: Performed by: INTERNAL MEDICINE

## 2024-03-21 RX ORDER — HEPARIN 100 UNIT/ML
500 SYRINGE INTRAVENOUS
OUTPATIENT
Start: 2024-03-21

## 2024-03-21 RX ORDER — ERGOCALCIFEROL 1.25 MG/1
50000 CAPSULE ORAL
Qty: 4 CAPSULE | Refills: 0 | Status: SHIPPED | OUTPATIENT
Start: 2024-03-21

## 2024-03-21 RX ORDER — SODIUM CHLORIDE 0.9 % (FLUSH) 0.9 %
10 SYRINGE (ML) INJECTION
OUTPATIENT
Start: 2024-03-21

## 2024-03-21 RX ORDER — SODIUM CHLORIDE 0.9 % (FLUSH) 0.9 %
10 SYRINGE (ML) INJECTION
Status: DISCONTINUED | OUTPATIENT
Start: 2024-03-21 | End: 2024-03-21 | Stop reason: HOSPADM

## 2024-03-21 RX ORDER — ZOLEDRONIC ACID 5 MG/100ML
5 INJECTION, SOLUTION INTRAVENOUS
Status: COMPLETED | OUTPATIENT
Start: 2024-03-21 | End: 2024-03-21

## 2024-03-21 RX ORDER — ZOLEDRONIC ACID 5 MG/100ML
5 INJECTION, SOLUTION INTRAVENOUS
OUTPATIENT
Start: 2024-03-21

## 2024-03-21 RX ADMIN — SODIUM CHLORIDE: 9 INJECTION, SOLUTION INTRAVENOUS at 11:03

## 2024-03-21 RX ADMIN — ZOLEDRONIC ACID 5 MG: 0.05 INJECTION, SOLUTION INTRAVENOUS at 12:03

## 2024-03-21 NOTE — PROGRESS NOTES
RHEUMATOLOGY OUTPATIENT CLINIC NOTE    3/21/2024    Attending Rheumatologist: Jovanny Park  Primary Care Provider/Physician Requesting Consultation: Kavya Mesa MD   Chief Complaint/Reason For Consultation:  Osteoporosis      Subjective:     Haim Martin is a 83 y.o. White female with OP    No acute complaints.  Recurrent back pain w/o alarm s/s.  Recent mechanical fall, no resulting fractures.  Not currently planned for invasive dental procedures.      Review of Systems   Musculoskeletal:  Positive for back pain, falls and joint pain.   Neurological:  Negative for focal weakness and weakness.       Chronic comorbid conditions affecting medical decision making today:  Past Medical History:   Diagnosis Date    Depression     Diverticulosis of colon (without mention of hemorrhage) 08/25/2014    Hyperlipidemia     Hypertension     Thyroid disease      Past Surgical History:   Procedure Laterality Date    APPENDECTOMY      CATARACT EXTRACTION EXTRACAPSULAR W/ INTRAOCULAR LENS IMPLANTATION Bilateral 4/2014    CYST REMOVAL Left 5/19/2022    Procedure: EXCISION, CYST;  Surgeon: Kareem Alatorre MD;  Location: [facility] OR;  Service: Orthopedics;  Laterality: Left;  excision mucous cyst left long finger    CYST REMOVAL Left 1/12/2024    Procedure: EXCISION, CYST;  Surgeon: Kareem Alatorre MD;  Location: [facility] OR;  Service: Orthopedics;  Laterality: Left;    FINGER AMPUTATION Right 3/31/2022    Procedure: AMPUTATION, FINGER;  Surgeon: Kareem Alatorre MD;  Location: [facility] OR;  Service: Orthopedics;  Laterality: Right;  amputation right index finger at the level of proximal interphalangeal joint    INCISION AND DRAINAGE OF HAND Right 1/21/2022    Procedure: INCISION AND DRAINAGE, HAND;  Surgeon: Ramirez Flores MD;  Location: [facility] OR;  Service: Orthopedics;  Laterality: Right;  Right Index Finger    INJECTION OF ANESTHETIC AGENT AROUND MEDIAL BRANCH NERVES INNERVATING LUMBAR FACET  JOINT Bilateral 7/16/2020    Procedure: Bilateral L3-5 MBB with local;  Surgeon: Luis Ashraf MD;  Location: [facility];  Service: Pain Management;  Laterality: Bilateral;    INJECTION OF ANESTHETIC AGENT AROUND MEDIAL BRANCH NERVES INNERVATING LUMBAR FACET JOINT Bilateral 8/10/2020    Procedure: Bilateral L3-5 MBB with local;  Surgeon: Luis Ashraf MD;  Location: [facility];  Service: Pain Management;  Laterality: Bilateral;    INJECTION OF ANESTHETIC AGENT INTO SACROILIAC JOINT Bilateral 11/4/2021    Procedure: Bilateral SIJ Injection;  Surgeon: Luis Ashraf MD;  Location: [facility];  Service: Pain Management;  Laterality: Bilateral;    RADIOFREQUENCY THERMOCOAGULATION Left 9/28/2020    Procedure: Left L3-5 Lumbar RFA;  Surgeon: Luis Ashraf MD;  Location: [facility];  Service: Pain Management;  Laterality: Left;    RADIOFREQUENCY THERMOCOAGULATION Right 10/12/2020    Procedure: Right L3-5 Lumbar RFA;  Surgeon: Luis Ashraf MD;  Location: [facility];  Service: Pain Management;  Laterality: Right;    STOMACH SURGERY  1998    not sure what they did, possible bowel resection, partial gastrectomy    TONSILLECTOMY       Family History   Problem Relation Age of Onset    Lupus Mother     Stroke Father     Coronary artery disease Father     Diabetes type II Father     Kidney cancer Maternal Aunt     Breast cancer Maternal Aunt      Social History     Tobacco Use   Smoking Status Former    Passive exposure: Never   Smokeless Tobacco Never   Tobacco Comments    quit 30 years ago       Current Outpatient Medications:     amitriptyline (ELAVIL) 75 MG tablet, Take 1 tablet (75 mg total) by mouth every evening., Disp: 90 tablet, Rfl: 3    aspirin (ECOTRIN) 81 MG EC tablet, Take 81 mg by mouth once daily., Disp: , Rfl:     atorvastatin (LIPITOR) 40 MG tablet, Take 1 tablet (40 mg total) by mouth every evening., Disp: 90 tablet, Rfl: 0    beta-carotene,A,-vits C,E/mins (OCUVITE ORAL), Take  1 tablet by mouth once daily., Disp: , Rfl:     calcium-vitamin D (CALCIUM 600 + D,3,) 600 mg-10 mcg (400 unit) Tab, Take 1 tablet by mouth 2 (two) times daily., Disp: 60 tablet, Rfl: 3    ergocalciferol (ERGOCALCIFEROL) 50,000 unit Cap, Take 1 capsule by mouth once a week, Disp: 4 capsule, Rfl: 0    ibuprofen (ADVIL,MOTRIN) 600 MG tablet, Take 1 tablet (600 mg total) by mouth every 6 (six) hours as needed for Pain., Disp: 30 tablet, Rfl: 0    levothyroxine (SYNTHROID) 100 MCG tablet, Take 1 tablet (100 mcg total) by mouth before breakfast., Disp: 90 tablet, Rfl: 1    omeprazole (PRILOSEC) 20 MG capsule, Take 1 capsule (20 mg total) by mouth as needed., Disp: 90 capsule, Rfl: 4    propranoloL (INDERAL) 40 MG tablet, Take 1 tablet by mouth twice daily, Disp: 180 tablet, Rfl: 3    sumatriptan (IMITREX) 100 MG tablet, TAKE ONE TABLET BY MOUTH AT ONSET OF MIGRAINE, Disp: 27 tablet, Rfl: 3    vit C/E/Zn/coppr/lutein/zeaxan (PRESERVISION AREDS-2 ORAL), Take 1 tablet by mouth 2 (two) times a day., Disp: , Rfl:     sertraline (ZOLOFT) 100 MG tablet, Take 1 tablet by mouth once daily (Patient not taking: Reported on 3/21/2024), Disp: 90 tablet, Rfl: 3  No current facility-administered medications for this visit.    Facility-Administered Medications Ordered in Other Visits:     lactated ringers infusion, , Intravenous, On Call Procedure, Estefania Quach PA, Last Rate: 100 mL/hr at 03/31/22 0821, New Bag at 03/31/22 0821    lactated ringers infusion, , Intravenous, Continuous, Marzena Ybarra MD, Stopped at 05/19/22 0844     Objective:     Vitals:    03/21/24 1040   BP: (!) 161/67   Pulse: 107     Physical Exam   Eyes: Conjunctivae are normal.   Pulmonary/Chest: Effort normal. No respiratory distress.   Musculoskeletal:         General: Deformity present. No swelling or tenderness. Normal range of motion.   Skin: No rash noted.       Reviewed available old and all outside pertinent medical records available.    All lab  results personally reviewed and interpreted by me.       ASSESSMENT /PLAN     1. Age-related osteoporosis without current pathological fracture  Tolerated Reclast #1-3 w/o side effects.  Plan to repeat DXA 1 year after third dose.      2. Vitamin D insufficiency  ergocalciferol (ERGOCALCIFEROL) 50,000 unit Cap.  Follow with OTC caltrate with D.    Vitamin D level in 6 months.      3. Lumbar spondylosis  Consider addition of gabapentin.      4. Medication monitoring encounter  Comprehensive Metabolic Panel      5. Primary osteoarthritis involving multiple joints  No response to HCQ.  Negative RA serologies.  No marginal erosive changes.                   Jovanny Park M.D.

## 2024-03-21 NOTE — PLAN OF CARE
Problem: Adult Inpatient Plan of Care  Goal: Plan of Care Review  Outcome: Ongoing, Progressing  Goal: Patient-Specific Goal (Individualized)  Outcome: Ongoing, Progressing  Flowsheets (Taken 3/21/2024 1205)  Anxieties, Fears or Concerns: none  Individualized Care Needs: none  Goal: Absence of Hospital-Acquired Illness or Injury  Outcome: Ongoing, Progressing  Intervention: Identify and Manage Fall Risk  Flowsheets (Taken 3/21/2024 1205)  Safety Promotion/Fall Prevention: in recliner, wheels locked  Intervention: Prevent Infection  Flowsheets (Taken 3/21/2024 1205)  Infection Prevention:   equipment surfaces disinfected   hand hygiene promoted   personal protective equipment utilized  Goal: Optimal Comfort and Wellbeing  Outcome: Ongoing, Progressing  Intervention: Provide Person-Centered Care  Flowsheets (Taken 3/21/2024 1205)  Trust Relationship/Rapport:   empathic listening provided   care explained   questions encouraged   thoughts/feelings acknowledged

## 2024-04-09 ENCOUNTER — OFFICE VISIT (OUTPATIENT)
Dept: GASTROENTEROLOGY | Facility: CLINIC | Age: 84
End: 2024-04-09
Payer: MEDICARE

## 2024-04-09 ENCOUNTER — PATIENT MESSAGE (OUTPATIENT)
Dept: GASTROENTEROLOGY | Facility: CLINIC | Age: 84
End: 2024-04-09

## 2024-04-09 VITALS
HEART RATE: 96 BPM | BODY MASS INDEX: 25.15 KG/M2 | WEIGHT: 124.75 LBS | DIASTOLIC BLOOD PRESSURE: 83 MMHG | HEIGHT: 59 IN | SYSTOLIC BLOOD PRESSURE: 168 MMHG

## 2024-04-09 DIAGNOSIS — R19.7 DIARRHEA, UNSPECIFIED TYPE: Primary | ICD-10-CM

## 2024-04-09 PROCEDURE — 1160F RVW MEDS BY RX/DR IN RCRD: CPT | Mod: CPTII,S$GLB,, | Performed by: INTERNAL MEDICINE

## 2024-04-09 PROCEDURE — 99204 OFFICE O/P NEW MOD 45 MIN: CPT | Mod: S$GLB,,, | Performed by: INTERNAL MEDICINE

## 2024-04-09 PROCEDURE — 3079F DIAST BP 80-89 MM HG: CPT | Mod: CPTII,S$GLB,, | Performed by: INTERNAL MEDICINE

## 2024-04-09 PROCEDURE — 1100F PTFALLS ASSESS-DOCD GE2>/YR: CPT | Mod: CPTII,S$GLB,, | Performed by: INTERNAL MEDICINE

## 2024-04-09 PROCEDURE — 99999 PR PBB SHADOW E&M-EST. PATIENT-LVL IV: CPT | Mod: PBBFAC,,, | Performed by: INTERNAL MEDICINE

## 2024-04-09 PROCEDURE — 3077F SYST BP >= 140 MM HG: CPT | Mod: CPTII,S$GLB,, | Performed by: INTERNAL MEDICINE

## 2024-04-09 PROCEDURE — 1159F MED LIST DOCD IN RCRD: CPT | Mod: CPTII,S$GLB,, | Performed by: INTERNAL MEDICINE

## 2024-04-09 PROCEDURE — 1126F AMNT PAIN NOTED NONE PRSNT: CPT | Mod: CPTII,S$GLB,, | Performed by: INTERNAL MEDICINE

## 2024-04-09 PROCEDURE — 3288F FALL RISK ASSESSMENT DOCD: CPT | Mod: CPTII,S$GLB,, | Performed by: INTERNAL MEDICINE

## 2024-04-09 RX ORDER — BRIMONIDINE TARTRATE 2 MG/ML
1 SOLUTION/ DROPS OPHTHALMIC 2 TIMES DAILY
COMMUNITY
Start: 2024-03-15

## 2024-04-09 NOTE — ASSESSMENT & PLAN NOTE
Plan:   - TTG and IgA   -Stool studies to evaluate for infectious causes   -pancreatic elastase   -CRP and fecal calprotectin ordered   -Gastric emptying study ordered to evaluate for gastric dumping

## 2024-04-09 NOTE — PROGRESS NOTES
Clinic Consult:  [facility] Consultation Note    Reason for Consult:  The encounter diagnosis was Diarrhea, unspecified type.    PCP: Kavya Mesa   [address] [facility] [address]    HPI:  This is a 83 y.o. female here for evaluation of fecal incontinence.   This has been occurring for years.     She has been experiencing diarrhea.   She is having loose stools twice a week.   The bowel movement can be explosive.   Denies any new medications.   Denies any changes in diet.   Denies any sick contacts at home.   Denies cholecystectomy.   She had a partial gastrectomy years ago.   Was told she may have gastric dumping.           ROS:  As per HPI         Medical History:   Past Medical History:   Diagnosis Date    Depression     Diverticulosis of colon (without mention of hemorrhage) 08/25/2014    Hyperlipidemia     Hypertension     Thyroid disease        Surgical History:  Past Surgical History:   Procedure Laterality Date    APPENDECTOMY      CATARACT EXTRACTION EXTRACAPSULAR W/ INTRAOCULAR LENS IMPLANTATION Bilateral 4/2014    CYST REMOVAL Left 5/19/2022    Procedure: EXCISION, CYST;  Surgeon: Kareem Alatorre MD;  Location: [facility] OR;  Service: Orthopedics;  Laterality: Left;  excision mucous cyst left long finger    CYST REMOVAL Left 1/12/2024    Procedure: EXCISION, CYST;  Surgeon: Kareem Alatorre MD;  Location: [facility] OR;  Service: Orthopedics;  Laterality: Left;    FINGER AMPUTATION Right 3/31/2022    Procedure: AMPUTATION, FINGER;  Surgeon: Kareem Alatorre MD;  Location: [facility] OR;  Service: Orthopedics;  Laterality: Right;  amputation right index finger at the level of proximal interphalangeal joint    INCISION AND DRAINAGE OF HAND Right 1/21/2022    Procedure: INCISION AND DRAINAGE, HAND;  Surgeon: Ramirez Flores MD;  Location: [facility] OR;  Service: Orthopedics;  Laterality: Right;  Right Index Finger    INJECTION OF ANESTHETIC AGENT AROUND MEDIAL BRANCH  NERVES INNERVATING LUMBAR FACET JOINT Bilateral 7/16/2020    Procedure: Bilateral L3-5 MBB with local;  Surgeon: Luis Ashraf MD;  Location: [facility];  Service: Pain Management;  Laterality: Bilateral;    INJECTION OF ANESTHETIC AGENT AROUND MEDIAL BRANCH NERVES INNERVATING LUMBAR FACET JOINT Bilateral 8/10/2020    Procedure: Bilateral L3-5 MBB with local;  Surgeon: Luis Ashraf MD;  Location: [facility];  Service: Pain Management;  Laterality: Bilateral;    INJECTION OF ANESTHETIC AGENT INTO SACROILIAC JOINT Bilateral 11/4/2021    Procedure: Bilateral SIJ Injection;  Surgeon: Luis Ashraf MD;  Location: [facility];  Service: Pain Management;  Laterality: Bilateral;    RADIOFREQUENCY THERMOCOAGULATION Left 9/28/2020    Procedure: Left L3-5 Lumbar RFA;  Surgeon: Luis Asrhaf MD;  Location: [facility];  Service: Pain Management;  Laterality: Left;    RADIOFREQUENCY THERMOCOAGULATION Right 10/12/2020    Procedure: Right L3-5 Lumbar RFA;  Surgeon: Luis Ashraf MD;  Location: [facility];  Service: Pain Management;  Laterality: Right;    STOMACH SURGERY  1998    not sure what they did, possible bowel resection, partial gastrectomy    TONSILLECTOMY         Family History:   Family History   Problem Relation Age of Onset    Lupus Mother     Stroke Father     Coronary artery disease Father     Diabetes type II Father     Kidney cancer Maternal Aunt     Breast cancer Maternal Aunt        Social History:   Social History     Tobacco Use    Smoking status: Former     Passive exposure: Never    Smokeless tobacco: Never    Tobacco comments:     quit 30 years ago   Substance Use Topics    Alcohol use: No    Drug use: No       Allergies: Reviewed    Home Medications:   Medication List with Changes/Refills   Current Medications    AMITRIPTYLINE (ELAVIL) 75 MG TABLET    Take 1 tablet (75 mg total) by mouth every evening.    ASPIRIN (ECOTRIN) 81 MG EC TABLET    Take 81 mg by mouth once daily.  "   ATORVASTATIN (LIPITOR) 40 MG TABLET    Take 1 tablet (40 mg total) by mouth every evening.    BETA-CAROTENE,A,-VITS C,E/MINS (OCUVITE ORAL)    Take 1 tablet by mouth once daily.    BRIMONIDINE 0.2% (ALPHAGAN) 0.2 % DROP    Place 1 drop into both eyes 2 (two) times daily.    CALCIUM-VITAMIN D (CALCIUM 600 + D,3,) 600 MG-10 MCG (400 UNIT) TAB    Take 1 tablet by mouth 2 (two) times daily.    ERGOCALCIFEROL (ERGOCALCIFEROL) 50,000 UNIT CAP    Take 1 capsule (50,000 Units total) by mouth every 7 days.    IBUPROFEN (ADVIL,MOTRIN) 600 MG TABLET    Take 1 tablet (600 mg total) by mouth every 6 (six) hours as needed for Pain.    LEVOTHYROXINE (SYNTHROID) 100 MCG TABLET    Take 1 tablet (100 mcg total) by mouth before breakfast.    OMEPRAZOLE (PRILOSEC) 20 MG CAPSULE    Take 1 capsule (20 mg total) by mouth as needed.    PROPRANOLOL (INDERAL) 40 MG TABLET    Take 1 tablet by mouth twice daily    SERTRALINE (ZOLOFT) 100 MG TABLET    Take 1 tablet by mouth once daily    SUMATRIPTAN (IMITREX) 100 MG TABLET    TAKE ONE TABLET BY MOUTH AT ONSET OF MIGRAINE    VIT C/E/ZN/COPPR/LUTEIN/ZEAXAN (PRESERVISION AREDS-2 ORAL)    Take 1 tablet by mouth 2 (two) times a day.         Physical Exam:  Vital Signs:  BP (!) 168/83   Pulse 96   Ht 4' 11" (1.499 m)   Wt 56.6 kg (124 lb 12.5 oz)   BMI 25.20 kg/m²   Body mass index is 25.2 kg/m².    Physical Exam  Constitutional:       Appearance: Normal appearance.   HENT:      Mouth/Throat:      Mouth: Mucous membranes are moist.   Pulmonary:      Effort: Pulmonary effort is normal.   Abdominal:      General: There is no distension.      Palpations: Abdomen is soft.      Tenderness: There is no abdominal tenderness.   Neurological:      Mental Status: She is alert.          Labs: Pertinent labs reviewed.        Assessment:  Problem List Items Addressed This Visit          GI    Diarrhea - Primary    Current Assessment & Plan     Plan:   - TTG and IgA   -Stool studies to evaluate for " infectious causes   -pancreatic elastase   -CRP and fecal calprotectin ordered   -Gastric emptying study ordered to evaluate for gastric dumping           Diarrhea, unspecified type  -     Clostridium difficile EIA; Future; Expected date: 04/09/2024  -     IgA; Future; Expected date: 04/09/2024  -     Calprotectin, Stool; Future; Expected date: 04/09/2024  -     C-Reactive Protein; Future; Expected date: 04/09/2024  -     Giardia / Cryptosporidum, EIA; Future; Expected date: 04/09/2024  -     Pancreatic elastase, fecal; Future; Expected date: 04/09/2024  -     Stool culture; Future; Expected date: 04/09/2024  -     Stool Exam-Ova,Cysts,Parasites; Future; Expected date: 04/09/2024  -     Tissue Transglutaminase, IgA; Future; Expected date: 04/09/2024  -     NM Gastric Emptying; Future; Expected date: 04/09/2024                Follow-up after diagnostic evaluation.     Order summary:  Orders Placed This Encounter   Procedures    Clostridium difficile EIA    Stool culture    NM Gastric Emptying    IgA    Calprotectin, Stool    C-Reactive Protein    Giardia / Cryptosporidum, EIA    Pancreatic elastase, fecal    Stool Exam-Ova,Cysts,Parasites    Tissue Transglutaminase, IgA       Thank you so much for allowing me to participate in the care of Haim Barnes MD  Gastroenterology and Hepatology  [facility]

## 2024-04-16 ENCOUNTER — LAB VISIT (OUTPATIENT)
Dept: LAB | Facility: HOSPITAL | Age: 84
End: 2024-04-16
Attending: FAMILY MEDICINE
Payer: MEDICARE

## 2024-04-16 ENCOUNTER — OFFICE VISIT (OUTPATIENT)
Dept: FAMILY MEDICINE | Facility: CLINIC | Age: 84
End: 2024-04-16
Attending: FAMILY MEDICINE
Payer: MEDICARE

## 2024-04-16 VITALS
DIASTOLIC BLOOD PRESSURE: 82 MMHG | HEART RATE: 86 BPM | SYSTOLIC BLOOD PRESSURE: 136 MMHG | WEIGHT: 123.69 LBS | TEMPERATURE: 98 F | HEIGHT: 59 IN | OXYGEN SATURATION: 98 % | BODY MASS INDEX: 24.93 KG/M2

## 2024-04-16 DIAGNOSIS — R79.9 ABNORMAL FINDING OF BLOOD CHEMISTRY, UNSPECIFIED: ICD-10-CM

## 2024-04-16 DIAGNOSIS — E03.9 HYPOTHYROIDISM, UNSPECIFIED TYPE: ICD-10-CM

## 2024-04-16 DIAGNOSIS — N18.31 CHRONIC KIDNEY DISEASE, STAGE 3A: ICD-10-CM

## 2024-04-16 DIAGNOSIS — F32.4 MAJOR DEPRESSIVE DISORDER, SINGLE EPISODE, IN PARTIAL REMISSION: ICD-10-CM

## 2024-04-16 DIAGNOSIS — I70.0 AORTIC ATHEROSCLEROSIS: ICD-10-CM

## 2024-04-16 DIAGNOSIS — E03.9 HYPOTHYROIDISM, UNSPECIFIED TYPE: Primary | ICD-10-CM

## 2024-04-16 PROBLEM — M67.442 MUCOUS CYST OF DIGIT OF LEFT HAND: Status: RESOLVED | Noted: 2023-12-20 | Resolved: 2024-04-16

## 2024-04-16 PROBLEM — R19.7 DIARRHEA: Status: RESOLVED | Noted: 2024-04-09 | Resolved: 2024-04-16

## 2024-04-16 PROBLEM — J96.10 CHRONIC RESPIRATORY FAILURE: Status: RESOLVED | Noted: 2022-02-22 | Resolved: 2024-04-16

## 2024-04-16 LAB
BASOPHILS # BLD AUTO: 0.04 K/UL (ref 0–0.2)
BASOPHILS NFR BLD: 0.6 % (ref 0–1.9)
DIFFERENTIAL METHOD BLD: ABNORMAL
EOSINOPHIL # BLD AUTO: 0.1 K/UL (ref 0–0.5)
EOSINOPHIL NFR BLD: 2.1 % (ref 0–8)
ERYTHROCYTE [DISTWIDTH] IN BLOOD BY AUTOMATED COUNT: 12.6 % (ref 11.5–14.5)
HCT VFR BLD AUTO: 41.8 % (ref 37–48.5)
HGB BLD-MCNC: 12.9 G/DL (ref 12–16)
IMM GRANULOCYTES # BLD AUTO: 0 K/UL (ref 0–0.04)
IMM GRANULOCYTES NFR BLD AUTO: 0 % (ref 0–0.5)
LYMPHOCYTES # BLD AUTO: 1.3 K/UL (ref 1–4.8)
LYMPHOCYTES NFR BLD: 19.1 % (ref 18–48)
MCH RBC QN AUTO: 31.9 PG (ref 27–31)
MCHC RBC AUTO-ENTMCNC: 30.9 G/DL (ref 32–36)
MCV RBC AUTO: 104 FL (ref 82–98)
MONOCYTES # BLD AUTO: 0.7 K/UL (ref 0.3–1)
MONOCYTES NFR BLD: 10 % (ref 4–15)
NEUTROPHILS # BLD AUTO: 4.6 K/UL (ref 1.8–7.7)
NEUTROPHILS NFR BLD: 68.2 % (ref 38–73)
NRBC BLD-RTO: 0 /100 WBC
PLATELET # BLD AUTO: 208 K/UL (ref 150–450)
PMV BLD AUTO: 12.1 FL (ref 9.2–12.9)
RBC # BLD AUTO: 4.04 M/UL (ref 4–5.4)
WBC # BLD AUTO: 6.71 K/UL (ref 3.9–12.7)

## 2024-04-16 PROCEDURE — 99999 PR PBB SHADOW E&M-EST. PATIENT-LVL IV: CPT | Mod: PBBFAC,,, | Performed by: FAMILY MEDICINE

## 2024-04-16 PROCEDURE — 1159F MED LIST DOCD IN RCRD: CPT | Mod: CPTII,S$GLB,, | Performed by: FAMILY MEDICINE

## 2024-04-16 PROCEDURE — 80061 LIPID PANEL: CPT | Performed by: FAMILY MEDICINE

## 2024-04-16 PROCEDURE — 36415 COLL VENOUS BLD VENIPUNCTURE: CPT | Mod: PO | Performed by: FAMILY MEDICINE

## 2024-04-16 PROCEDURE — 3079F DIAST BP 80-89 MM HG: CPT | Mod: CPTII,S$GLB,, | Performed by: FAMILY MEDICINE

## 2024-04-16 PROCEDURE — 1126F AMNT PAIN NOTED NONE PRSNT: CPT | Mod: CPTII,S$GLB,, | Performed by: FAMILY MEDICINE

## 2024-04-16 PROCEDURE — 3075F SYST BP GE 130 - 139MM HG: CPT | Mod: CPTII,S$GLB,, | Performed by: FAMILY MEDICINE

## 2024-04-16 PROCEDURE — 83036 HEMOGLOBIN GLYCOSYLATED A1C: CPT | Performed by: FAMILY MEDICINE

## 2024-04-16 PROCEDURE — 80053 COMPREHEN METABOLIC PANEL: CPT | Performed by: FAMILY MEDICINE

## 2024-04-16 PROCEDURE — 84443 ASSAY THYROID STIM HORMONE: CPT | Performed by: FAMILY MEDICINE

## 2024-04-16 PROCEDURE — 99214 OFFICE O/P EST MOD 30 MIN: CPT | Mod: S$GLB,,, | Performed by: FAMILY MEDICINE

## 2024-04-16 PROCEDURE — 1160F RVW MEDS BY RX/DR IN RCRD: CPT | Mod: CPTII,S$GLB,, | Performed by: FAMILY MEDICINE

## 2024-04-16 PROCEDURE — 85025 COMPLETE CBC W/AUTO DIFF WBC: CPT | Performed by: FAMILY MEDICINE

## 2024-04-16 RX ORDER — SERTRALINE HYDROCHLORIDE 100 MG/1
100 TABLET, FILM COATED ORAL DAILY
Qty: 90 TABLET | Refills: 3 | Status: SHIPPED | OUTPATIENT
Start: 2024-04-16

## 2024-04-16 NOTE — PROGRESS NOTES
Called to patient bedside as patient began to experience shortness of breath, tachycardia, and found to have pulse ox at 83%  Patient placed on 2L O2 via nasal cannula, pulse ox found to be around 95%  Patient had been receiving IVF at 100 cc/hr due to JAKE  On examination patient is awake alert and oriented x 3, cardiac exam regular rate and rhythm  Bilateral lower lobe crackles on lung exam   600 cc of urine output via butt catheter  Patient reports improvement of SOB  Re-check of vitals revealed 98% pulse ox on 2L O2 --> transitioned to 1L O2, wean as tolerated  Discontinued IVF and administered one dose of IV Lasix 20 mg  Will administer another dose of Lasix 20 mg IV at 10 AM, recheck BMP  Subjective:       Patient ID: Haim Martin is a 83 y.o. female.    Chief Complaint: Follow-up (Discuss getting back on Zoloft )    83 y old female here for f.u . More depressed and irritable lately . She has been out of Zoloft for months. She doesn't know why pharmacy stopped dispensing it . No s/I . She talks to her GD when she feels down . Takes NSAID at times . Denies cp , sob .Compliant with statin     Follow-up      Review of Systems   Constitutional: Negative.    HENT: Negative.     Eyes: Negative.    Respiratory: Negative.     Cardiovascular: Negative.    Gastrointestinal: Negative.    Genitourinary: Negative.    Musculoskeletal: Negative.    Skin: Negative.    Hematological: Negative.    Psychiatric/Behavioral:  Positive for decreased concentration and dysphoric mood.        Objective:      Physical Exam  Constitutional:       General: She is not in acute distress.     Appearance: She is well-developed. She is not diaphoretic.   HENT:      Head: Normocephalic and atraumatic.      Right Ear: External ear normal.      Left Ear: External ear normal.      Mouth/Throat:      Pharynx: No oropharyngeal exudate.   Eyes:      General: No scleral icterus.        Right eye: No discharge.         Left eye: No discharge.      Conjunctiva/sclera: Conjunctivae normal.      Pupils: Pupils are equal, round, and reactive to light.   Neck:      Thyroid: No thyromegaly.      Vascular: No JVD.      Trachea: No tracheal deviation.   Cardiovascular:      Rate and Rhythm: Normal rate and regular rhythm.      Heart sounds: Normal heart sounds. No murmur heard.     No friction rub. No gallop.   Pulmonary:      Effort: Pulmonary effort is normal. No respiratory distress.      Breath sounds: Normal breath sounds. No stridor. No wheezing or rales.   Chest:      Chest wall: No tenderness.   Abdominal:      General: Bowel sounds are normal. There is no distension.      Palpations: Abdomen is soft.      Tenderness: There is no  abdominal tenderness. There is no guarding or rebound.   Musculoskeletal:         General: Normal range of motion.      Cervical back: Normal range of motion and neck supple.   Lymphadenopathy:      Cervical: No cervical adenopathy.   Skin:     General: Skin is warm and dry.   Neurological:      Mental Status: She is alert and oriented to person, place, and time.   Psychiatric:         Behavior: Behavior normal.         Thought Content: Thought content normal.         Judgment: Judgment normal.         Assessment:       1. Hypothyroidism, unspecified type    2. Abnormal finding of blood chemistry, unspecified    3. Chronic kidney disease, stage 3a    4. Major depressive disorder, single episode, in partial remission    5. Aortic atherosclerosis        Plan:     Haim was seen today for follow-up.    Diagnoses and all orders for this visit:    Hypothyroidism, unspecified type  -     CBC Auto Differential; Future  -     Comprehensive Metabolic Panel; Future  -     Hemoglobin A1C; Future  -     Lipid Panel; Future  -     TSH; Future    Abnormal finding of blood chemistry, unspecified  -     Hemoglobin A1C; Future    Chronic kidney disease, stage 3a    Major depressive disorder, single episode, in partial remission    Aortic atherosclerosis    Other orders  -     sertraline (ZOLOFT) 100 MG tablet; Take 1 tablet (100 mg total) by mouth once daily.     TSH today   Resolved . Avoid NSAID   Resume Zoloft . F.u in 2 m   Statin

## 2024-04-17 LAB
ALBUMIN SERPL BCP-MCNC: 4 G/DL (ref 3.5–5.2)
ALP SERPL-CCNC: 110 U/L (ref 55–135)
ALT SERPL W/O P-5'-P-CCNC: 29 U/L (ref 10–44)
ANION GAP SERPL CALC-SCNC: 13 MMOL/L (ref 8–16)
AST SERPL-CCNC: 25 U/L (ref 10–40)
BILIRUB SERPL-MCNC: 0.7 MG/DL (ref 0.1–1)
BUN SERPL-MCNC: 17 MG/DL (ref 8–23)
CALCIUM SERPL-MCNC: 10.2 MG/DL (ref 8.7–10.5)
CHLORIDE SERPL-SCNC: 106 MMOL/L (ref 95–110)
CHOLEST SERPL-MCNC: 134 MG/DL (ref 120–199)
CHOLEST/HDLC SERPL: 2.1 {RATIO} (ref 2–5)
CO2 SERPL-SCNC: 24 MMOL/L (ref 23–29)
CREAT SERPL-MCNC: 1.2 MG/DL (ref 0.5–1.4)
EST. GFR  (NO RACE VARIABLE): 45.5 ML/MIN/1.73 M^2
ESTIMATED AVG GLUCOSE: 117 MG/DL (ref 68–131)
GLUCOSE SERPL-MCNC: 99 MG/DL (ref 70–110)
HBA1C MFR BLD: 5.7 % (ref 4–5.6)
HDLC SERPL-MCNC: 63 MG/DL (ref 40–75)
HDLC SERPL: 47 % (ref 20–50)
LDLC SERPL CALC-MCNC: 54.2 MG/DL (ref 63–159)
NONHDLC SERPL-MCNC: 71 MG/DL
POTASSIUM SERPL-SCNC: 3.6 MMOL/L (ref 3.5–5.1)
PROT SERPL-MCNC: 7.5 G/DL (ref 6–8.4)
SODIUM SERPL-SCNC: 143 MMOL/L (ref 136–145)
TRIGL SERPL-MCNC: 84 MG/DL (ref 30–150)
TSH SERPL DL<=0.005 MIU/L-ACNC: 0.81 UIU/ML (ref 0.4–4)

## 2024-05-28 RX ORDER — LEVOTHYROXINE SODIUM 100 UG/1
100 TABLET ORAL
Qty: 90 TABLET | Refills: 3 | Status: SHIPPED | OUTPATIENT
Start: 2024-05-28

## 2024-05-28 NOTE — TELEPHONE ENCOUNTER
No care due was identified.  [facility] Embedded Care Due Messages. Reference number: [id number].   5/28/2024 2:02:23 PM CDT

## 2024-05-28 NOTE — TELEPHONE ENCOUNTER
Refill Decision Note   Haim Martin  is requesting a refill authorization.  Brief Assessment and Rationale for Refill:  Approve     Medication Therapy Plan:         Comments:     Note composed:5:54 PM 05/28/2024

## 2024-06-18 NOTE — TELEPHONE ENCOUNTER
----- Message from Carmen Reddy sent at 7/17/2020  3:07 PM CDT -----  Regarding: return call  .Type:  Patient Returning Call    Who Called: pt  Who Left Message for Patient: Ernesto   Does the patient know what this is regarding?:   Would the patient rather a call back or a response via [facility]?  Call back   Best Call Back Number: [phone]   Additional Information       
Patient Name:___________Haim Dickeron______________________MRN:  [id number]       underwent the following procedure:____bilateral l3-5______ Lumbar Medial Branch Block  _with______Luis Ashraf MD on: __07/16/2020____________________ and  received _____________100_______% RELIEF.    
Tried to call to set up repeat mbb . No answer. No vm set up   Alexis Boyd, MA [facility]   [facility]    
Name band;

## 2024-07-11 RX ORDER — ATORVASTATIN CALCIUM 40 MG/1
40 TABLET, FILM COATED ORAL NIGHTLY
Qty: 90 TABLET | Refills: 3 | Status: SHIPPED | OUTPATIENT
Start: 2024-07-11

## 2024-07-11 NOTE — TELEPHONE ENCOUNTER
No care due was identified.  [facility] Embedded Care Due Messages. Reference number: [id number].   7/11/2024 12:43:44 PM CDT

## 2024-07-31 ENCOUNTER — TELEPHONE (OUTPATIENT)
Dept: RADIOLOGY | Facility: HOSPITAL | Age: 84
End: 2024-07-31
Payer: MEDICARE

## 2024-08-05 ENCOUNTER — LAB VISIT (OUTPATIENT)
Dept: LAB | Facility: HOSPITAL | Age: 84
End: 2024-08-05
Attending: FAMILY MEDICINE
Payer: MEDICARE

## 2024-08-05 ENCOUNTER — OFFICE VISIT (OUTPATIENT)
Dept: FAMILY MEDICINE | Facility: CLINIC | Age: 84
End: 2024-08-05
Attending: FAMILY MEDICINE
Payer: MEDICARE

## 2024-08-05 VITALS
SYSTOLIC BLOOD PRESSURE: 124 MMHG | BODY MASS INDEX: 24.36 KG/M2 | WEIGHT: 120.81 LBS | OXYGEN SATURATION: 98 % | TEMPERATURE: 98 F | HEIGHT: 59 IN | DIASTOLIC BLOOD PRESSURE: 80 MMHG | HEART RATE: 85 BPM

## 2024-08-05 DIAGNOSIS — D52.0 DIETARY FOLATE DEFICIENCY ANEMIA: ICD-10-CM

## 2024-08-05 DIAGNOSIS — D75.89 MACROCYTOSIS: Primary | ICD-10-CM

## 2024-08-05 DIAGNOSIS — D75.89 MACROCYTOSIS: ICD-10-CM

## 2024-08-05 DIAGNOSIS — F32.A DEPRESSION, UNSPECIFIED DEPRESSION TYPE: ICD-10-CM

## 2024-08-05 DIAGNOSIS — D52.1 DRUG-INDUCED FOLATE DEFICIENCY ANEMIA: ICD-10-CM

## 2024-08-05 LAB
FOLATE SERPL-MCNC: 10.5 NG/ML (ref 4–24)
VIT B12 SERPL-MCNC: 336 PG/ML (ref 210–950)

## 2024-08-05 PROCEDURE — 3288F FALL RISK ASSESSMENT DOCD: CPT | Mod: CPTII,S$GLB,, | Performed by: FAMILY MEDICINE

## 2024-08-05 PROCEDURE — 1126F AMNT PAIN NOTED NONE PRSNT: CPT | Mod: CPTII,S$GLB,, | Performed by: FAMILY MEDICINE

## 2024-08-05 PROCEDURE — 3079F DIAST BP 80-89 MM HG: CPT | Mod: CPTII,S$GLB,, | Performed by: FAMILY MEDICINE

## 2024-08-05 PROCEDURE — 99999 PR PBB SHADOW E&M-EST. PATIENT-LVL IV: CPT | Mod: PBBFAC,,, | Performed by: FAMILY MEDICINE

## 2024-08-05 PROCEDURE — 99214 OFFICE O/P EST MOD 30 MIN: CPT | Mod: S$GLB,,, | Performed by: FAMILY MEDICINE

## 2024-08-05 PROCEDURE — 1101F PT FALLS ASSESS-DOCD LE1/YR: CPT | Mod: CPTII,S$GLB,, | Performed by: FAMILY MEDICINE

## 2024-08-05 PROCEDURE — 1159F MED LIST DOCD IN RCRD: CPT | Mod: CPTII,S$GLB,, | Performed by: FAMILY MEDICINE

## 2024-08-05 PROCEDURE — 82746 ASSAY OF FOLIC ACID SERUM: CPT | Performed by: FAMILY MEDICINE

## 2024-08-05 PROCEDURE — 1160F RVW MEDS BY RX/DR IN RCRD: CPT | Mod: CPTII,S$GLB,, | Performed by: FAMILY MEDICINE

## 2024-08-05 PROCEDURE — 3074F SYST BP LT 130 MM HG: CPT | Mod: CPTII,S$GLB,, | Performed by: FAMILY MEDICINE

## 2024-08-05 PROCEDURE — 82607 VITAMIN B-12: CPT | Performed by: FAMILY MEDICINE

## 2024-08-05 PROCEDURE — 36415 COLL VENOUS BLD VENIPUNCTURE: CPT | Mod: PO | Performed by: FAMILY MEDICINE

## 2024-08-14 ENCOUNTER — OFFICE VISIT (OUTPATIENT)
Dept: FAMILY MEDICINE | Facility: CLINIC | Age: 84
End: 2024-08-14
Payer: MEDICARE

## 2024-08-14 VITALS
DIASTOLIC BLOOD PRESSURE: 86 MMHG | HEIGHT: 59 IN | OXYGEN SATURATION: 98 % | HEART RATE: 94 BPM | WEIGHT: 120.13 LBS | TEMPERATURE: 97 F | SYSTOLIC BLOOD PRESSURE: 120 MMHG | BODY MASS INDEX: 24.22 KG/M2

## 2024-08-14 DIAGNOSIS — L50.9 URTICARIA: Primary | ICD-10-CM

## 2024-08-14 PROCEDURE — 99213 OFFICE O/P EST LOW 20 MIN: CPT | Mod: 25,S$GLB,, | Performed by: NURSE PRACTITIONER

## 2024-08-14 PROCEDURE — 96372 THER/PROPH/DIAG INJ SC/IM: CPT | Mod: S$GLB,,, | Performed by: NURSE PRACTITIONER

## 2024-08-14 PROCEDURE — 3074F SYST BP LT 130 MM HG: CPT | Mod: CPTII,S$GLB,, | Performed by: NURSE PRACTITIONER

## 2024-08-14 PROCEDURE — 3288F FALL RISK ASSESSMENT DOCD: CPT | Mod: CPTII,S$GLB,, | Performed by: NURSE PRACTITIONER

## 2024-08-14 PROCEDURE — 1159F MED LIST DOCD IN RCRD: CPT | Mod: CPTII,S$GLB,, | Performed by: NURSE PRACTITIONER

## 2024-08-14 PROCEDURE — 1101F PT FALLS ASSESS-DOCD LE1/YR: CPT | Mod: CPTII,S$GLB,, | Performed by: NURSE PRACTITIONER

## 2024-08-14 PROCEDURE — 99999 PR PBB SHADOW E&M-EST. PATIENT-LVL IV: CPT | Mod: PBBFAC,,, | Performed by: NURSE PRACTITIONER

## 2024-08-14 PROCEDURE — 3079F DIAST BP 80-89 MM HG: CPT | Mod: CPTII,S$GLB,, | Performed by: NURSE PRACTITIONER

## 2024-08-14 PROCEDURE — 1126F AMNT PAIN NOTED NONE PRSNT: CPT | Mod: CPTII,S$GLB,, | Performed by: NURSE PRACTITIONER

## 2024-08-14 RX ORDER — LORATADINE 10 MG/1
10 TABLET ORAL DAILY
Qty: 7 TABLET | Refills: 0 | Status: SHIPPED | OUTPATIENT
Start: 2024-08-14 | End: 2024-08-21

## 2024-08-14 RX ORDER — FAMOTIDINE 20 MG/1
20 TABLET, FILM COATED ORAL 2 TIMES DAILY
Qty: 14 TABLET | Refills: 0 | Status: SHIPPED | OUTPATIENT
Start: 2024-08-14 | End: 2024-08-21

## 2024-08-14 RX ORDER — METHYLPREDNISOLONE ACETATE 80 MG/ML
80 INJECTION, SUSPENSION INTRA-ARTICULAR; INTRALESIONAL; INTRAMUSCULAR; SOFT TISSUE
Status: COMPLETED | OUTPATIENT
Start: 2024-08-14 | End: 2024-08-14

## 2024-08-14 RX ORDER — METHYLPREDNISOLONE 4 MG/1
TABLET ORAL
Qty: 1 EACH | Refills: 0 | Status: SHIPPED | OUTPATIENT
Start: 2024-08-14

## 2024-08-14 RX ADMIN — METHYLPREDNISOLONE ACETATE 80 MG: 80 INJECTION, SUSPENSION INTRA-ARTICULAR; INTRALESIONAL; INTRAMUSCULAR; SOFT TISSUE at 02:08

## 2024-08-14 NOTE — PROGRESS NOTES
Subjective:       Patient ID: Haim Martin is a 84 y.o. female.    Chief Complaint: Rash  Pt reports to clinic with chief complaint of worsening rash.  Present for 1 week.  Denies contact with new exposures.  Complains of severe itching.  Began on Abdomen and has spread to legs and arm.  Tried multiple OTC medications included calamine, and cortisone without relief.      Past Medical History:   Diagnosis Date    Depression     Diverticulosis of colon (without mention of hemorrhage) 08/25/2014    Hyperlipidemia     Hypertension     Thyroid disease       Current Outpatient Medications on File Prior to Visit   Medication Sig Dispense Refill    amitriptyline (ELAVIL) 75 MG tablet Take 1 tablet (75 mg total) by mouth every evening. 90 tablet 3    aspirin (ECOTRIN) 81 MG EC tablet Take 81 mg by mouth once daily.      atorvastatin (LIPITOR) 40 MG tablet TAKE 1 TABLET BY MOUTH ONCE DAILY IN THE EVENING 90 tablet 3    beta-carotene,A,-vits C,E/mins (OCUVITE ORAL) Take 1 tablet by mouth once daily.      brimonidine 0.2% (ALPHAGAN) 0.2 % Drop Place 1 drop into both eyes 2 (two) times daily.      calcium-vitamin D (CALCIUM 600 + D,3,) 600 mg-10 mcg (400 unit) Tab Take 1 tablet by mouth 2 (two) times daily. 60 tablet 3    ergocalciferol (ERGOCALCIFEROL) 50,000 unit Cap Take 1 capsule (50,000 Units total) by mouth every 7 days. 4 capsule 0    ibuprofen (ADVIL,MOTRIN) 600 MG tablet Take 1 tablet (600 mg total) by mouth every 6 (six) hours as needed for Pain. 30 tablet 0    levothyroxine (SYNTHROID) 100 MCG tablet TAKE 1 TABLET BY MOUTH BEFORE BREAKFAST 90 tablet 3    omeprazole (PRILOSEC) 20 MG capsule Take 1 capsule (20 mg total) by mouth as needed. 90 capsule 4    propranoloL (INDERAL) 40 MG tablet Take 1 tablet by mouth twice daily 180 tablet 3    sertraline (ZOLOFT) 100 MG tablet Take 1 tablet (100 mg total) by mouth once daily. 90 tablet 3    sumatriptan (IMITREX) 100 MG tablet TAKE ONE TABLET BY MOUTH AT ONSET OF  MIGRAINE 27 tablet 3    vit C/E/Zn/coppr/lutein/zeaxan (PRESERVISION AREDS-2 ORAL) Take 1 tablet by mouth 2 (two) times a day.       Current Facility-Administered Medications on File Prior to Visit   Medication Dose Route Frequency Provider Last Rate Last Admin    lactated ringers infusion   Intravenous On Call Procedure Estefania Quach  mL/hr at 03/31/22 0821 New Bag at 03/31/22 0821    lactated ringers infusion   Intravenous Continuous Marzena Ybarra MD   Stopped at 05/19/22 0844         Rash      Review of Systems   Constitutional: Negative.    HENT: Negative.     Respiratory: Negative.     Cardiovascular: Negative.    Gastrointestinal: Negative.    Skin:  Positive for rash.       Objective:      Physical Exam  Vitals reviewed.   HENT:      Head: Normocephalic.      Right Ear: External ear normal.      Left Ear: External ear normal.   Eyes:      Extraocular Movements: Extraocular movements intact.   Cardiovascular:      Rate and Rhythm: Normal rate.      Heart sounds: Normal heart sounds.   Pulmonary:      Effort: Pulmonary effort is normal. No respiratory distress.   Musculoskeletal:      Cervical back: Normal range of motion.   Skin:     Findings: Rash present. Rash is urticarial.          Neurological:      Mental Status: She is alert and oriented to person, place, and time.   Psychiatric:         Behavior: Behavior normal.         Assessment:       1. Urticaria        Plan:   Urticaria  -     methylPREDNISolone acetate injection 80 mg  -     methylPREDNISolone (MEDROL DOSEPACK) 4 mg tablet; use as directed  Dispense: 1 each; Refill: 0  -     loratadine (CLARITIN) 10 mg tablet; Take 1 tablet (10 mg total) by mouth once daily. for 7 days  Dispense: 7 tablet; Refill: 0  -     famotidine (PEPCID) 20 MG tablet; Take 1 tablet (20 mg total) by mouth 2 (two) times daily. for 7 days  Dispense: 14 tablet; Refill: 0  -begin oral steroids tomorrow    No follow-ups on file.

## 2024-09-03 NOTE — TELEPHONE ENCOUNTER
No care due was identified.  [facility] Embedded Care Due Messages. Reference number: [id number].   9/03/2024 10:57:16 AM CDT

## 2024-09-04 RX ORDER — PROPRANOLOL HYDROCHLORIDE 40 MG/1
TABLET ORAL
Qty: 180 TABLET | Refills: 3 | Status: SHIPPED | OUTPATIENT
Start: 2024-09-04

## 2024-09-04 NOTE — TELEPHONE ENCOUNTER
Haim Martin  is requesting a refill authorization.  Brief Assessment and Rationale for Refill:  Approve     Medication Therapy Plan:         Comments:     Note composed:8:31 AM 09/04/2024

## 2024-09-27 NOTE — TELEPHONE ENCOUNTER
No care due was identified.  [facility] Embedded Care Due Messages. Reference number: [id number].   9/27/2024 2:16:28 PM CDT

## 2024-09-28 RX ORDER — SUMATRIPTAN SUCCINATE 100 MG/1
TABLET ORAL
Qty: 27 TABLET | Refills: 3 | Status: SHIPPED | OUTPATIENT
Start: 2024-09-28

## 2024-09-28 NOTE — TELEPHONE ENCOUNTER
Refill Decision Note   Haim Martin  is requesting a refill authorization.  Brief Assessment and Rationale for Refill:  Approve     Medication Therapy Plan:         Comments:     Note composed:5:55 PM 09/28/2024

## 2024-11-26 RX ORDER — AMITRIPTYLINE HYDROCHLORIDE 75 MG/1
75 TABLET ORAL NIGHTLY
Qty: 90 TABLET | Refills: 0 | Status: SHIPPED | OUTPATIENT
Start: 2024-11-26

## 2024-11-26 NOTE — TELEPHONE ENCOUNTER
No care due was identified.  [facility] Embedded Care Due Messages. Reference number: [id number].   11/26/2024 12:24:46 PM CST

## 2024-11-26 NOTE — TELEPHONE ENCOUNTER
Refill Routing Note   Medication(s) are not appropriate for processing by [facility] for the following reason(s):      Medication outside of protocol    [facility] action(s):  Route Care Due:  None identified            Appointments  past 12m or future 3m with PCP    Date Provider   Last Visit   8/5/2024 Kavya Mesa MD   Next Visit   2/5/2025 Kavya Mesa MD   ED visits in past 90 days: 0        Note composed:4:55 PM 11/26/2024

## 2025-02-18 ENCOUNTER — OFFICE VISIT (OUTPATIENT)
Dept: FAMILY MEDICINE | Facility: CLINIC | Age: 85
End: 2025-02-18
Attending: FAMILY MEDICINE
Payer: MEDICARE

## 2025-02-18 ENCOUNTER — LAB VISIT (OUTPATIENT)
Dept: LAB | Facility: HOSPITAL | Age: 85
End: 2025-02-18
Attending: FAMILY MEDICINE
Payer: MEDICARE

## 2025-02-18 VITALS
OXYGEN SATURATION: 98 % | SYSTOLIC BLOOD PRESSURE: 128 MMHG | WEIGHT: 123.81 LBS | BODY MASS INDEX: 24.96 KG/M2 | HEART RATE: 79 BPM | DIASTOLIC BLOOD PRESSURE: 82 MMHG | TEMPERATURE: 97 F | HEIGHT: 59 IN

## 2025-02-18 DIAGNOSIS — M81.0 AGE-RELATED OSTEOPOROSIS WITHOUT CURRENT PATHOLOGICAL FRACTURE: ICD-10-CM

## 2025-02-18 DIAGNOSIS — R93.3 ABNORMAL FINDINGS ON DIAGNOSTIC IMAGING OF OTHER PARTS OF DIGESTIVE TRACT: ICD-10-CM

## 2025-02-18 DIAGNOSIS — F32.A DEPRESSION, UNSPECIFIED DEPRESSION TYPE: ICD-10-CM

## 2025-02-18 DIAGNOSIS — R73.03 PRE-DIABETES: Primary | ICD-10-CM

## 2025-02-18 DIAGNOSIS — N18.31 CHRONIC KIDNEY DISEASE, STAGE 3A: ICD-10-CM

## 2025-02-18 DIAGNOSIS — E03.9 HYPOTHYROIDISM, UNSPECIFIED TYPE: ICD-10-CM

## 2025-02-18 DIAGNOSIS — R73.03 PRE-DIABETES: ICD-10-CM

## 2025-02-18 LAB
ALBUMIN SERPL BCP-MCNC: 3.7 G/DL (ref 3.5–5.2)
ALP SERPL-CCNC: 104 U/L (ref 40–150)
ALT SERPL W/O P-5'-P-CCNC: 22 U/L (ref 10–44)
ANION GAP SERPL CALC-SCNC: 10 MMOL/L (ref 8–16)
AST SERPL-CCNC: 31 U/L (ref 10–40)
BASOPHILS # BLD AUTO: 0.05 K/UL (ref 0–0.2)
BASOPHILS NFR BLD: 0.8 % (ref 0–1.9)
BILIRUB SERPL-MCNC: 0.5 MG/DL (ref 0.1–1)
BUN SERPL-MCNC: 15 MG/DL (ref 8–23)
CALCIUM SERPL-MCNC: 9.2 MG/DL (ref 8.7–10.5)
CHLORIDE SERPL-SCNC: 109 MMOL/L (ref 95–110)
CHOLEST SERPL-MCNC: 108 MG/DL (ref 120–199)
CHOLEST/HDLC SERPL: 2.1 {RATIO} (ref 2–5)
CO2 SERPL-SCNC: 23 MMOL/L (ref 23–29)
CREAT SERPL-MCNC: 0.8 MG/DL (ref 0.5–1.4)
DIFFERENTIAL METHOD BLD: ABNORMAL
EOSINOPHIL # BLD AUTO: 0.1 K/UL (ref 0–0.5)
EOSINOPHIL NFR BLD: 2 % (ref 0–8)
ERYTHROCYTE [DISTWIDTH] IN BLOOD BY AUTOMATED COUNT: 13 % (ref 11.5–14.5)
EST. GFR  (NO RACE VARIABLE): >60 ML/MIN/1.73 M^2
ESTIMATED AVG GLUCOSE: 117 MG/DL (ref 68–131)
GLUCOSE SERPL-MCNC: 101 MG/DL (ref 70–110)
HBA1C MFR BLD: 5.7 % (ref 4–5.6)
HCT VFR BLD AUTO: 38.8 % (ref 37–48.5)
HDLC SERPL-MCNC: 52 MG/DL (ref 40–75)
HDLC SERPL: 48.1 % (ref 20–50)
HGB BLD-MCNC: 11.8 G/DL (ref 12–16)
IMM GRANULOCYTES # BLD AUTO: 0.02 K/UL (ref 0–0.04)
IMM GRANULOCYTES NFR BLD AUTO: 0.3 % (ref 0–0.5)
LDLC SERPL CALC-MCNC: 43.4 MG/DL (ref 63–159)
LYMPHOCYTES # BLD AUTO: 1.4 K/UL (ref 1–4.8)
LYMPHOCYTES NFR BLD: 21.4 % (ref 18–48)
MCH RBC QN AUTO: 31.8 PG (ref 27–31)
MCHC RBC AUTO-ENTMCNC: 30.4 G/DL (ref 32–36)
MCV RBC AUTO: 105 FL (ref 82–98)
MONOCYTES # BLD AUTO: 0.7 K/UL (ref 0.3–1)
MONOCYTES NFR BLD: 11.1 % (ref 4–15)
NEUTROPHILS # BLD AUTO: 4.1 K/UL (ref 1.8–7.7)
NEUTROPHILS NFR BLD: 64.4 % (ref 38–73)
NONHDLC SERPL-MCNC: 56 MG/DL
NRBC BLD-RTO: 0 /100 WBC
PLATELET # BLD AUTO: 189 K/UL (ref 150–450)
PMV BLD AUTO: 12 FL (ref 9.2–12.9)
POTASSIUM SERPL-SCNC: 4.3 MMOL/L (ref 3.5–5.1)
PROT SERPL-MCNC: 6.7 G/DL (ref 6–8.4)
RBC # BLD AUTO: 3.71 M/UL (ref 4–5.4)
SODIUM SERPL-SCNC: 142 MMOL/L (ref 136–145)
TRIGL SERPL-MCNC: 63 MG/DL (ref 30–150)
TSH SERPL DL<=0.005 MIU/L-ACNC: 0.61 UIU/ML (ref 0.4–4)
WBC # BLD AUTO: 6.41 K/UL (ref 3.9–12.7)

## 2025-02-18 PROCEDURE — 80053 COMPREHEN METABOLIC PANEL: CPT | Performed by: FAMILY MEDICINE

## 2025-02-18 PROCEDURE — 80061 LIPID PANEL: CPT | Performed by: FAMILY MEDICINE

## 2025-02-18 PROCEDURE — 36415 COLL VENOUS BLD VENIPUNCTURE: CPT | Mod: PO | Performed by: FAMILY MEDICINE

## 2025-02-18 PROCEDURE — 84443 ASSAY THYROID STIM HORMONE: CPT | Performed by: FAMILY MEDICINE

## 2025-02-18 PROCEDURE — 85025 COMPLETE CBC W/AUTO DIFF WBC: CPT | Performed by: FAMILY MEDICINE

## 2025-02-18 PROCEDURE — 83036 HEMOGLOBIN GLYCOSYLATED A1C: CPT | Performed by: FAMILY MEDICINE

## 2025-02-18 RX ORDER — AMITRIPTYLINE HYDROCHLORIDE 75 MG/1
75 TABLET ORAL NIGHTLY
Qty: 90 TABLET | Refills: 4 | Status: SHIPPED | OUTPATIENT
Start: 2025-02-18

## 2025-02-18 RX ORDER — OMEPRAZOLE 20 MG/1
20 CAPSULE, DELAYED RELEASE ORAL
Qty: 90 CAPSULE | Refills: 4 | Status: SHIPPED | OUTPATIENT
Start: 2025-02-18

## 2025-02-18 NOTE — PROGRESS NOTES
Subjective:       Patient ID: Haim Martin is a 84 y.o. female.    Chief Complaint: No chief complaint on file.    84 y old female with depression , pre dm, osteoporosis , hypothyroidism , lumbar ddd, stage 3 ckd here for f.u . Doing fair. Depression is in remission . She sustained a fall last week. She did not get hurt . On Reclast . No symptoms of thyroid dysregulation . Her lower back has been bothering her more. She doesn't want  to look for a second opinion . Avoids NSAID. No an exercise routine . No hearing loss. Subtle memory loss.       Review of Systems   Constitutional: Negative.    HENT: Negative.     Eyes: Negative.    Respiratory: Negative.     Cardiovascular: Negative.    Gastrointestinal: Negative.    Genitourinary: Negative.    Musculoskeletal:  Positive for arthralgias.   Skin: Negative.    Hematological: Negative.        Objective:      Physical Exam  Vitals and nursing note reviewed.   Constitutional:       General: She is not in acute distress.     Appearance: She is well-developed. She is not diaphoretic.   HENT:      Head: Normocephalic and atraumatic.      Right Ear: External ear normal.      Left Ear: External ear normal.      Nose: Nose normal.   Eyes:      General: No scleral icterus.        Left eye: No discharge.      Conjunctiva/sclera: Conjunctivae normal.      Pupils: Pupils are equal, round, and reactive to light.   Neck:      Thyroid: No thyromegaly.      Vascular: No JVD.      Trachea: No tracheal deviation.   Cardiovascular:      Rate and Rhythm: Normal rate and regular rhythm.      Heart sounds: Normal heart sounds. No murmur heard.     No friction rub. No gallop.   Pulmonary:      Effort: Pulmonary effort is normal. No respiratory distress.      Breath sounds: Normal breath sounds. No wheezing or rales.   Chest:      Chest wall: No tenderness.   Abdominal:      General: Bowel sounds are normal. There is no distension.      Palpations: Abdomen is soft. There is no mass.       Tenderness: There is no abdominal tenderness. There is no guarding.   Musculoskeletal:      Cervical back: Normal range of motion and neck supple.   Lymphadenopathy:      Cervical: No cervical adenopathy.         Assessment:       1. Pre-diabetes    2. Depression, unspecified depression type    3. Age-related osteoporosis without current pathological fracture    4. Hypothyroidism, unspecified type    5. Chronic kidney disease, stage 3a    6. Abnormal findings on diagnostic imaging of other parts of digestive tract        Plan:   1.- Diet and exercise  2.- In remission . Continue sertraline .  3.-Reclast   4.-Controlled. Continue levothyroxine   5.- Stable . Avoid NSAIDS.

## 2025-02-19 ENCOUNTER — RESULTS FOLLOW-UP (OUTPATIENT)
Dept: FAMILY MEDICINE | Facility: CLINIC | Age: 85
End: 2025-02-19

## 2025-02-22 DIAGNOSIS — Z00.00 ENCOUNTER FOR MEDICARE ANNUAL WELLNESS EXAM: ICD-10-CM

## 2025-02-26 ENCOUNTER — LAB VISIT (OUTPATIENT)
Dept: LAB | Facility: HOSPITAL | Age: 85
End: 2025-02-26
Attending: INTERNAL MEDICINE
Payer: MEDICARE

## 2025-02-26 DIAGNOSIS — M81.0 AGE-RELATED OSTEOPOROSIS WITHOUT CURRENT PATHOLOGICAL FRACTURE: ICD-10-CM

## 2025-02-26 DIAGNOSIS — Z51.81 MEDICATION MONITORING ENCOUNTER: ICD-10-CM

## 2025-02-26 DIAGNOSIS — E55.9 VITAMIN D INSUFFICIENCY: ICD-10-CM

## 2025-02-26 LAB
25(OH)D3+25(OH)D2 SERPL-MCNC: 17 NG/ML (ref 30–96)
ALBUMIN SERPL BCP-MCNC: 3.9 G/DL (ref 3.5–5.2)
ALP SERPL-CCNC: 111 U/L (ref 40–150)
ALT SERPL W/O P-5'-P-CCNC: 29 U/L (ref 10–44)
ANION GAP SERPL CALC-SCNC: 9 MMOL/L (ref 8–16)
AST SERPL-CCNC: 26 U/L (ref 10–40)
BILIRUB SERPL-MCNC: 0.5 MG/DL (ref 0.1–1)
BUN SERPL-MCNC: 16 MG/DL (ref 8–23)
CALCIUM SERPL-MCNC: 9.2 MG/DL (ref 8.7–10.5)
CHLORIDE SERPL-SCNC: 107 MMOL/L (ref 95–110)
CO2 SERPL-SCNC: 25 MMOL/L (ref 23–29)
CREAT SERPL-MCNC: 0.8 MG/DL (ref 0.5–1.4)
EST. GFR  (NO RACE VARIABLE): >60 ML/MIN/1.73 M^2
GLUCOSE SERPL-MCNC: 109 MG/DL (ref 70–110)
POTASSIUM SERPL-SCNC: 4.7 MMOL/L (ref 3.5–5.1)
PROT SERPL-MCNC: 7 G/DL (ref 6–8.4)
SODIUM SERPL-SCNC: 141 MMOL/L (ref 136–145)

## 2025-02-26 PROCEDURE — 80053 COMPREHEN METABOLIC PANEL: CPT | Performed by: INTERNAL MEDICINE

## 2025-02-26 PROCEDURE — 36415 COLL VENOUS BLD VENIPUNCTURE: CPT | Mod: PO | Performed by: INTERNAL MEDICINE

## 2025-02-26 PROCEDURE — 82306 VITAMIN D 25 HYDROXY: CPT | Performed by: INTERNAL MEDICINE

## 2025-02-27 DIAGNOSIS — E55.9 VITAMIN D INSUFFICIENCY: ICD-10-CM

## 2025-02-27 RX ORDER — ERGOCALCIFEROL 1.25 MG/1
50000 CAPSULE ORAL
Qty: 4 CAPSULE | Refills: 2 | Status: SHIPPED | OUTPATIENT
Start: 2025-02-27 | End: 2025-05-28

## 2025-03-26 ENCOUNTER — OFFICE VISIT (OUTPATIENT)
Dept: RHEUMATOLOGY | Facility: CLINIC | Age: 85
End: 2025-03-26
Payer: MEDICARE

## 2025-03-26 ENCOUNTER — INFUSION (OUTPATIENT)
Dept: INFUSION THERAPY | Facility: HOSPITAL | Age: 85
End: 2025-03-26
Attending: INTERNAL MEDICINE
Payer: MEDICARE

## 2025-03-26 VITALS
SYSTOLIC BLOOD PRESSURE: 154 MMHG | DIASTOLIC BLOOD PRESSURE: 80 MMHG | WEIGHT: 121.06 LBS | BODY MASS INDEX: 24.4 KG/M2 | HEIGHT: 59 IN | HEART RATE: 90 BPM

## 2025-03-26 VITALS
OXYGEN SATURATION: 98 % | TEMPERATURE: 97 F | RESPIRATION RATE: 18 BRPM | SYSTOLIC BLOOD PRESSURE: 159 MMHG | HEART RATE: 77 BPM | DIASTOLIC BLOOD PRESSURE: 72 MMHG

## 2025-03-26 DIAGNOSIS — Z51.81 MEDICATION MONITORING ENCOUNTER: ICD-10-CM

## 2025-03-26 DIAGNOSIS — M47.816 LUMBAR SPONDYLOSIS: ICD-10-CM

## 2025-03-26 DIAGNOSIS — E55.9 VITAMIN D INSUFFICIENCY: ICD-10-CM

## 2025-03-26 DIAGNOSIS — M81.0 AGE-RELATED OSTEOPOROSIS WITHOUT CURRENT PATHOLOGICAL FRACTURE: Primary | ICD-10-CM

## 2025-03-26 DIAGNOSIS — D75.89 MACROCYTIC: ICD-10-CM

## 2025-03-26 PROCEDURE — 3077F SYST BP >= 140 MM HG: CPT | Mod: CPTII,S$GLB,, | Performed by: INTERNAL MEDICINE

## 2025-03-26 PROCEDURE — 99214 OFFICE O/P EST MOD 30 MIN: CPT | Mod: S$GLB,,, | Performed by: INTERNAL MEDICINE

## 2025-03-26 PROCEDURE — 96374 THER/PROPH/DIAG INJ IV PUSH: CPT

## 2025-03-26 PROCEDURE — G2211 COMPLEX E/M VISIT ADD ON: HCPCS | Mod: S$GLB,,, | Performed by: INTERNAL MEDICINE

## 2025-03-26 PROCEDURE — 1126F AMNT PAIN NOTED NONE PRSNT: CPT | Mod: CPTII,S$GLB,, | Performed by: INTERNAL MEDICINE

## 2025-03-26 PROCEDURE — 63600175 PHARM REV CODE 636 W HCPCS: Performed by: INTERNAL MEDICINE

## 2025-03-26 PROCEDURE — 3288F FALL RISK ASSESSMENT DOCD: CPT | Mod: CPTII,S$GLB,, | Performed by: INTERNAL MEDICINE

## 2025-03-26 PROCEDURE — 99999 PR PBB SHADOW E&M-EST. PATIENT-LVL III: CPT | Mod: PBBFAC,,, | Performed by: INTERNAL MEDICINE

## 2025-03-26 PROCEDURE — 1100F PTFALLS ASSESS-DOCD GE2>/YR: CPT | Mod: CPTII,S$GLB,, | Performed by: INTERNAL MEDICINE

## 2025-03-26 PROCEDURE — 3079F DIAST BP 80-89 MM HG: CPT | Mod: CPTII,S$GLB,, | Performed by: INTERNAL MEDICINE

## 2025-03-26 PROCEDURE — 1159F MED LIST DOCD IN RCRD: CPT | Mod: CPTII,S$GLB,, | Performed by: INTERNAL MEDICINE

## 2025-03-26 RX ORDER — SODIUM CHLORIDE 0.9 % (FLUSH) 0.9 %
10 SYRINGE (ML) INJECTION
Status: CANCELLED | OUTPATIENT
Start: 2025-03-26

## 2025-03-26 RX ORDER — ZOLEDRONIC ACID 5 MG/100ML
5 INJECTION, SOLUTION INTRAVENOUS
OUTPATIENT
Start: 2025-03-26

## 2025-03-26 RX ORDER — HEPARIN 100 UNIT/ML
500 SYRINGE INTRAVENOUS
OUTPATIENT
Start: 2025-03-26

## 2025-03-26 RX ORDER — SODIUM CHLORIDE 0.9 % (FLUSH) 0.9 %
10 SYRINGE (ML) INJECTION
Status: DISCONTINUED | OUTPATIENT
Start: 2025-03-26 | End: 2025-03-26 | Stop reason: HOSPADM

## 2025-03-26 RX ORDER — SODIUM CHLORIDE 0.9 % (FLUSH) 0.9 %
10 SYRINGE (ML) INJECTION
OUTPATIENT
Start: 2025-03-26

## 2025-03-26 RX ORDER — HEPARIN 100 UNIT/ML
500 SYRINGE INTRAVENOUS
Status: CANCELLED | OUTPATIENT
Start: 2025-03-26

## 2025-03-26 RX ORDER — ZOLEDRONIC ACID 5 MG/100ML
5 INJECTION, SOLUTION INTRAVENOUS
Status: COMPLETED | OUTPATIENT
Start: 2025-03-26 | End: 2025-03-26

## 2025-03-26 RX ORDER — ERGOCALCIFEROL 1.25 MG/1
50000 CAPSULE ORAL
Qty: 4 CAPSULE | Refills: 2 | Status: SHIPPED | OUTPATIENT
Start: 2025-03-26 | End: 2025-06-24

## 2025-03-26 RX ORDER — ZOLEDRONIC ACID 5 MG/100ML
5 INJECTION, SOLUTION INTRAVENOUS
Status: CANCELLED | OUTPATIENT
Start: 2025-03-26

## 2025-03-26 RX ORDER — METHOCARBAMOL 500 MG/1
500 TABLET, FILM COATED ORAL 2 TIMES DAILY PRN
Qty: 30 TABLET | Refills: 0 | Status: SHIPPED | OUTPATIENT
Start: 2025-03-26 | End: 2025-04-25

## 2025-03-26 RX ADMIN — ZOLEDRONIC ACID 5 MG: 0.05 INJECTION, SOLUTION INTRAVENOUS at 01:03

## 2025-03-26 NOTE — PROGRESS NOTES
RHEUMATOLOGY OUTPATIENT CLINIC NOTE    3/26/2025    Attending Rheumatologist: Jovanny Park  Primary Care Provider/Physician Requesting Consultation: Kavya Mesa MD   Chief Complaint/Reason For Consultation:  No chief complaint on file.      Subjective:     Haim Martin is a 84 y.o. White female with OP    No fractures since last visit.  No upcoming invasive dental w/u.    Review of Systems   Musculoskeletal:  Positive for back pain. Negative for falls.   Neurological:  Negative for focal weakness.       Chronic comorbid conditions affecting medical decision making today:  Past Medical History:   Diagnosis Date    Depression     Diverticulosis of colon (without mention of hemorrhage) 08/25/2014    Hyperlipidemia     Hypertension     Thyroid disease      Past Surgical History:   Procedure Laterality Date    APPENDECTOMY      CATARACT EXTRACTION EXTRACAPSULAR W/ INTRAOCULAR LENS IMPLANTATION Bilateral 4/2014    CYST REMOVAL Left 5/19/2022    Procedure: EXCISION, CYST;  Surgeon: Kareem Alatorre MD;  Location: [facility] OR;  Service: Orthopedics;  Laterality: Left;  excision mucous cyst left long finger    CYST REMOVAL Left 1/12/2024    Procedure: EXCISION, CYST;  Surgeon: Kareem Alatorre MD;  Location: [facility] OR;  Service: Orthopedics;  Laterality: Left;    FINGER AMPUTATION Right 3/31/2022    Procedure: AMPUTATION, FINGER;  Surgeon: Kareem Alatrore MD;  Location: [facility] OR;  Service: Orthopedics;  Laterality: Right;  amputation right index finger at the level of proximal interphalangeal joint    INCISION AND DRAINAGE OF HAND Right 1/21/2022    Procedure: INCISION AND DRAINAGE, HAND;  Surgeon: Ramirez Flores MD;  Location: [facility] OR;  Service: Orthopedics;  Laterality: Right;  Right Index Finger    INJECTION OF ANESTHETIC AGENT AROUND MEDIAL BRANCH NERVES INNERVATING LUMBAR FACET JOINT Bilateral 7/16/2020    Procedure: Bilateral L3-5 MBB with local;  Surgeon: Luis Ashraf MD;   Location: [facility];  Service: Pain Management;  Laterality: Bilateral;    INJECTION OF ANESTHETIC AGENT AROUND MEDIAL BRANCH NERVES INNERVATING LUMBAR FACET JOINT Bilateral 8/10/2020    Procedure: Bilateral L3-5 MBB with local;  Surgeon: Luis Ashraf MD;  Location: [facility];  Service: Pain Management;  Laterality: Bilateral;    INJECTION OF ANESTHETIC AGENT INTO SACROILIAC JOINT Bilateral 11/4/2021    Procedure: Bilateral SIJ Injection;  Surgeon: Luis Ashraf MD;  Location: [facility];  Service: Pain Management;  Laterality: Bilateral;    RADIOFREQUENCY THERMOCOAGULATION Left 9/28/2020    Procedure: Left L3-5 Lumbar RFA;  Surgeon: Luis Ashraf MD;  Location: [facility];  Service: Pain Management;  Laterality: Left;    RADIOFREQUENCY THERMOCOAGULATION Right 10/12/2020    Procedure: Right L3-5 Lumbar RFA;  Surgeon: Luis Ashraf MD;  Location: [facility];  Service: Pain Management;  Laterality: Right;    STOMACH SURGERY  1998    not sure what they did, possible bowel resection, partial gastrectomy    TONSILLECTOMY       Family History   Problem Relation Name Age of Onset    Lupus Mother      Stroke Father      Coronary artery disease Father      Diabetes type II Father      Kidney cancer Maternal Aunt      Breast cancer Maternal Aunt       Tobacco Use History[1]  Current Medications[2]     Objective:     Vitals:    03/26/25 1202   BP: (!) 154/80   Pulse: 90     Physical Exam   Eyes: Conjunctivae are normal.   Pulmonary/Chest: Effort normal. No respiratory distress.   Musculoskeletal:         General: Deformity present. No swelling or tenderness. Normal range of motion.   Skin: No rash noted.       Reviewed available old and all outside pertinent medical records available.    All lab results personally reviewed and interpreted by me.       ASSESSMENT / PLAN     1. Age-related osteoporosis without current pathological fracture  Plan to update DXA after third and last Reclast  infusion.  PTH, Intact      2. Medication monitoring encounter  Comprehensive Metabolic Panel      3. Vitamin D insufficiency  ergocalciferol (ERGOCALCIFEROL) 50,000 unit Cap  Follow with OTC 2000IU per day    Vitamin D      4. Macrocytic  Ambulatory referral/consult to Hematology / Oncology      5. Lumbar spondylosis  methocarbamoL (ROBAXIN) 500 MG Tab              Visit today included increased complexity associated with the care of the episodic problem Age-related osteoporosis without current pathological fracture addressed and managing the longitudinal care of the patient due to the serious and/or complex managed problem(s) Medication monitoring encounter, Lumbar spondylosis.    Jovanny Park M.D.         [1]   Social History  Tobacco Use   Smoking Status Former    Passive exposure: Never   Smokeless Tobacco Never   Tobacco Comments    quit 30 years ago   [2]   Current Outpatient Medications:     amitriptyline (ELAVIL) 75 MG tablet, Take 1 tablet (75 mg total) by mouth every evening., Disp: 90 tablet, Rfl: 4    aspirin (ECOTRIN) 81 MG EC tablet, Take 81 mg by mouth once daily., Disp: , Rfl:     atorvastatin (LIPITOR) 40 MG tablet, TAKE 1 TABLET BY MOUTH ONCE DAILY IN THE EVENING, Disp: 90 tablet, Rfl: 3    beta-carotene,A,-vits C,E/mins (OCUVITE ORAL), Take 1 tablet by mouth once daily., Disp: , Rfl:     brimonidine 0.2% (ALPHAGAN) 0.2 % Drop, Place 1 drop into both eyes 2 (two) times daily., Disp: , Rfl:     calcium-vitamin D (CALCIUM 600 + D,3,) 600 mg-10 mcg (400 unit) Tab, Take 1 tablet by mouth 2 (two) times daily., Disp: 60 tablet, Rfl: 3    levothyroxine (SYNTHROID) 100 MCG tablet, TAKE 1 TABLET BY MOUTH BEFORE BREAKFAST, Disp: 90 tablet, Rfl: 3    omeprazole (PRILOSEC) 20 MG capsule, Take 1 capsule (20 mg total) by mouth as needed., Disp: 90 capsule, Rfl: 4    propranoloL (INDERAL) 40 MG tablet, Take 1 tablet by mouth twice daily, Disp: 180 tablet, Rfl: 3    sertraline (ZOLOFT) 100 MG tablet, Take 1  tablet (100 mg total) by mouth once daily., Disp: 90 tablet, Rfl: 3    sumatriptan (IMITREX) 100 MG tablet, TAKE 1 TABLET BY MOUTH AT ONSET OF MIGRAINE, Disp: 27 tablet, Rfl: 3    vit C/E/Zn/coppr/lutein/zeaxan (PRESERVISION AREDS-2 ORAL), Take 1 tablet by mouth 2 (two) times a day., Disp: , Rfl:     ergocalciferol (ERGOCALCIFEROL) 50,000 unit Cap, Take 1 capsule (50,000 Units total) by mouth every 7 days., Disp: 4 capsule, Rfl: 2    methocarbamoL (ROBAXIN) 500 MG Tab, Take 1 tablet (500 mg total) by mouth 2 (two) times daily as needed (cramps / stiffness / back pain)., Disp: 30 tablet, Rfl: 0  No current facility-administered medications for this visit.    Facility-Administered Medications Ordered in Other Visits:     lactated ringers infusion, , Intravenous, On Call Procedure, Estefania Quach PA, Last Rate: 100 mL/hr at 03/31/22 0821, New Bag at 03/31/22 0821    lactated ringers infusion, , Intravenous, Continuous, Marzena Ybarra MD, Stopped at 05/19/22 0844

## 2025-03-26 NOTE — DISCHARGE INSTRUCTIONS
[facility]  [address] [address]  [address]  [phone] phone     [phone] fax  Hours of Operation: Monday- Friday 8:00am- 5:00pm  After hours phone  [phone]  Hematology / Oncology Physicians on call      PADILLA Zheng Dr., NP Phaon Dunbar, NP Khelsea Conley, FNP    Please call with any concerns regarding your appointment today.

## 2025-03-26 NOTE — PLAN OF CARE
Problem: Adult Inpatient Plan of Care  Goal: Plan of Care Review  Outcome: Progressing  Flowsheets (Taken 3/26/2025 1319)  Plan of Care Reviewed With:   patient   child  Goal: Patient-Specific Goal (Individualized)  Outcome: Progressing  Flowsheets (Taken 3/26/2025 1319)  Individualized Care Needs: pt likes legs up and blanket  Anxieties, Fears or Concerns: pt denies  Patient/Family-Specific Goals (Include Timeframe): pt to tolerate reclast infusion without reaction     Problem: Infection  Goal: Absence of Infection Signs and Symptoms  Outcome: Progressing

## 2025-03-27 ENCOUNTER — PATIENT MESSAGE (OUTPATIENT)
Dept: HEMATOLOGY/ONCOLOGY | Facility: CLINIC | Age: 85
End: 2025-03-27
Payer: MEDICARE

## 2025-03-28 ENCOUNTER — TELEPHONE (OUTPATIENT)
Dept: HEMATOLOGY/ONCOLOGY | Facility: CLINIC | Age: 85
End: 2025-03-28
Payer: MEDICARE

## 2025-04-07 DIAGNOSIS — M47.816 LUMBAR SPONDYLOSIS: ICD-10-CM

## 2025-04-09 RX ORDER — METHOCARBAMOL 500 MG/1
500 TABLET, FILM COATED ORAL 2 TIMES DAILY PRN
Qty: 30 TABLET | Refills: 0 | Status: SHIPPED | OUTPATIENT
Start: 2025-04-09 | End: 2025-05-09

## 2025-05-15 NOTE — TELEPHONE ENCOUNTER
Refill Decision Note   Haim Martin  is requesting a refill authorization.  Brief Assessment and Rationale for Refill:  Approve     Medication Therapy Plan:         Comments:     Note composed:5:19 PM 07/11/2024            Please call us with your blood glucose readings in 2 weeks

## 2025-07-11 NOTE — TELEPHONE ENCOUNTER
No care due was identified.  [facility] Embedded Care Due Messages. Reference number: [id number].   7/11/2025 8:15:10 AM CDT

## 2025-07-12 RX ORDER — LEVOTHYROXINE SODIUM 100 UG/1
100 TABLET ORAL
Qty: 90 TABLET | Refills: 1 | Status: SHIPPED | OUTPATIENT
Start: 2025-07-12

## 2025-07-13 NOTE — TELEPHONE ENCOUNTER
Refill Decision Note   Haim Martin  is requesting a refill authorization.  Brief Assessment and Rationale for Refill:  Approve     Medication Therapy Plan:         Comments:     Note composed:10:17 PM 07/12/2025

## 2025-07-30 RX ORDER — ATORVASTATIN CALCIUM 40 MG/1
40 TABLET, FILM COATED ORAL NIGHTLY
Qty: 90 TABLET | Refills: 1 | Status: SHIPPED | OUTPATIENT
Start: 2025-07-30

## 2025-07-30 NOTE — TELEPHONE ENCOUNTER
No care due was identified.  [facility] Embedded Care Due Messages. Reference number: [id number].   7/30/2025 12:35:02 PM CDT

## 2025-07-30 NOTE — TELEPHONE ENCOUNTER
Refill Decision Note   Haim Martin  is requesting a refill authorization.    Brief Assessment and Rationale for Refill:   Approve       Medication Therapy Plan:         Comments:     Note composed:4:39 PM 07/30/2025

## (undated) DEVICE — TOWEL OR DISP STRL BLUE 4/PK

## (undated) DEVICE — SUT 4-0 ETHILON 18 PS-2

## (undated) DEVICE — GAUZE SPONGE 4X4 12PLY

## (undated) DEVICE — UNDERGLOVES BIOGEL PI SIZE 7.5

## (undated) DEVICE — SEE MEDLINE ITEM 157173

## (undated) DEVICE — COVER LIGHT HANDLE 80/CA

## (undated) DEVICE — DRAPE PLASTIC U 60X72

## (undated) DEVICE — GLOVE SURGICAL LATEX SZ 7

## (undated) DEVICE — ALCOHOL 70% ISOP RUBBING 4OZ

## (undated) DEVICE — BANDAGE ESMARK ELASTIC ST 4X9

## (undated) DEVICE — DRESSING XEROFORM FOIL PK 1X8

## (undated) DEVICE — PAD ABD 8X10 STERILE

## (undated) DEVICE — BANDAGE MATRIX HK LOOP 4IN 5YD

## (undated) DEVICE — GLOVE BIOGEL SZ 8 1/2

## (undated) DEVICE — NDL SAFETY 25G X 1.5 ECLIPSE

## (undated) DEVICE — STRIP PACKING ANTIMIC 1/4X1

## (undated) DEVICE — SUPPORT ULNA NERVE PROTECTOR

## (undated) DEVICE — Device

## (undated) DEVICE — APPLICATOR CHLORAPREP ORN 26ML

## (undated) DEVICE — MANIFOLD 4 PORT

## (undated) DEVICE — SEE L#120831

## (undated) DEVICE — SEE MEDLINE ITEM 157027

## (undated) DEVICE — SOL NS 1000CC

## (undated) DEVICE — SEE MEDLINE ITEM 157131

## (undated) DEVICE — POSITIONER HEAD DONUT 9IN FOAM

## (undated) DEVICE — SEE MEDLINE ITEM 157117

## (undated) DEVICE — SUT ETHILON 4-0 PS2 18 BLK

## (undated) DEVICE — GLOVE BIOGEL PI ORTHO PRO 7.5

## (undated) DEVICE — ELECTRODE REM PLYHSV RETURN 9

## (undated) DEVICE — UNDERGLOVES BIOGEL PI SIZE 8.5

## (undated) DEVICE — PAD CAST 2 IN X 4YDS STERILE

## (undated) DEVICE — SYR 10CC LUER LOCK

## (undated) DEVICE — UNDERGLOVES BIOGEL PI SZ 7 LF

## (undated) DEVICE — IMMOBILIZER SHOULDER MEDIUM

## (undated) DEVICE — SCRUB DYNA-HEX LIQ 4% CHG 4OZ

## (undated) DEVICE — GLOVE SURG BIOGEL LATEX SZ 7.5

## (undated) DEVICE — COVER CAMERA OPERATING ROOM

## (undated) DEVICE — SEE MEDLINE ITEM 146298

## (undated) DEVICE — BANDAGE CONFORM 3IN STRL

## (undated) DEVICE — GOWN SURG 2XL DISP TIE BACK

## (undated) DEVICE — PAD CAST SPECIALIST STRL 3

## (undated) DEVICE — SCRUB HIBICLENS 4% CHG 4OZ